# Patient Record
Sex: FEMALE | Race: ASIAN | NOT HISPANIC OR LATINO | URBAN - METROPOLITAN AREA
[De-identification: names, ages, dates, MRNs, and addresses within clinical notes are randomized per-mention and may not be internally consistent; named-entity substitution may affect disease eponyms.]

---

## 2019-10-30 ENCOUNTER — INPATIENT (INPATIENT)
Facility: HOSPITAL | Age: 84
LOS: 6 days | Discharge: NURSING, REHAB OR LONG-TERM CARE | End: 2019-11-06
Attending: INTERNAL MEDICINE | Admitting: INTERNAL MEDICINE
Payer: MEDICARE

## 2019-10-30 VITALS
HEART RATE: 100 BPM | OXYGEN SATURATION: 93 % | RESPIRATION RATE: 24 BRPM | DIASTOLIC BLOOD PRESSURE: 69 MMHG | SYSTOLIC BLOOD PRESSURE: 156 MMHG | TEMPERATURE: 98 F

## 2019-10-30 DIAGNOSIS — J96.01 ACUTE RESPIRATORY FAILURE WITH HYPOXIA: ICD-10-CM

## 2019-10-30 LAB
ALBUMIN SERPL ELPH-MCNC: 3.9 G/DL — SIGNIFICANT CHANGE UP (ref 3.3–5)
ALP SERPL-CCNC: 64 U/L — SIGNIFICANT CHANGE UP (ref 40–120)
ALT FLD-CCNC: 23 U/L — SIGNIFICANT CHANGE UP (ref 4–33)
ANION GAP SERPL CALC-SCNC: 12 MMO/L — SIGNIFICANT CHANGE UP (ref 7–14)
APTT BLD: 30.7 SEC — SIGNIFICANT CHANGE UP (ref 27.5–36.3)
AST SERPL-CCNC: 40 U/L — HIGH (ref 4–32)
B PERT DNA SPEC QL NAA+PROBE: NOT DETECTED — SIGNIFICANT CHANGE UP
BASE EXCESS BLDA CALC-SCNC: 3 MMOL/L — SIGNIFICANT CHANGE UP
BASE EXCESS BLDA CALC-SCNC: 6.9 MMOL/L — SIGNIFICANT CHANGE UP
BASE EXCESS BLDV CALC-SCNC: 3.5 MMOL/L — SIGNIFICANT CHANGE UP
BASE EXCESS BLDV CALC-SCNC: 6.1 MMOL/L — SIGNIFICANT CHANGE UP
BASE EXCESS BLDV CALC-SCNC: 7.1 MMOL/L — SIGNIFICANT CHANGE UP
BILIRUB SERPL-MCNC: 0.3 MG/DL — SIGNIFICANT CHANGE UP (ref 0.2–1.2)
BLOOD GAS ARTERIAL - FIO2: 40 — SIGNIFICANT CHANGE UP
BLOOD GAS VENOUS - CREATININE: 0.96 MG/DL — SIGNIFICANT CHANGE UP (ref 0.5–1.3)
BLOOD GAS VENOUS - CREATININE: 0.98 MG/DL — SIGNIFICANT CHANGE UP (ref 0.5–1.3)
BLOOD GAS VENOUS - CREATININE: 1.06 MG/DL — SIGNIFICANT CHANGE UP (ref 0.5–1.3)
BLOOD GAS VENOUS - FIO2: 21 — SIGNIFICANT CHANGE UP
BUN SERPL-MCNC: 18 MG/DL — SIGNIFICANT CHANGE UP (ref 7–23)
C PNEUM DNA SPEC QL NAA+PROBE: NOT DETECTED — SIGNIFICANT CHANGE UP
CALCIUM SERPL-MCNC: 9.2 MG/DL — SIGNIFICANT CHANGE UP (ref 8.4–10.5)
CHLORIDE BLDA-SCNC: 97 MMOL/L — SIGNIFICANT CHANGE UP (ref 96–108)
CHLORIDE BLDV-SCNC: 100 MMOL/L — SIGNIFICANT CHANGE UP (ref 96–108)
CHLORIDE BLDV-SCNC: 98 MMOL/L — SIGNIFICANT CHANGE UP (ref 96–108)
CHLORIDE BLDV-SCNC: 98 MMOL/L — SIGNIFICANT CHANGE UP (ref 96–108)
CHLORIDE SERPL-SCNC: 96 MMOL/L — LOW (ref 98–107)
CO2 SERPL-SCNC: 29 MMOL/L — SIGNIFICANT CHANGE UP (ref 22–31)
CREAT SERPL-MCNC: 0.99 MG/DL — SIGNIFICANT CHANGE UP (ref 0.5–1.3)
FLUAV H1 2009 PAND RNA SPEC QL NAA+PROBE: NOT DETECTED — SIGNIFICANT CHANGE UP
FLUAV H1 RNA SPEC QL NAA+PROBE: NOT DETECTED — SIGNIFICANT CHANGE UP
FLUAV H3 RNA SPEC QL NAA+PROBE: NOT DETECTED — SIGNIFICANT CHANGE UP
FLUAV SUBTYP SPEC NAA+PROBE: NOT DETECTED — SIGNIFICANT CHANGE UP
FLUBV RNA SPEC QL NAA+PROBE: NOT DETECTED — SIGNIFICANT CHANGE UP
GAS PNL BLDV: 132 MMOL/L — LOW (ref 136–146)
GAS PNL BLDV: 133 MMOL/L — LOW (ref 136–146)
GAS PNL BLDV: 136 MMOL/L — SIGNIFICANT CHANGE UP (ref 136–146)
GLUCOSE BLDA-MCNC: 198 MG/DL — HIGH (ref 70–99)
GLUCOSE BLDC GLUCOMTR-MCNC: 178 MG/DL — HIGH (ref 70–99)
GLUCOSE BLDV-MCNC: 151 MG/DL — HIGH (ref 70–99)
GLUCOSE BLDV-MCNC: 178 MG/DL — HIGH (ref 70–99)
GLUCOSE BLDV-MCNC: 244 MG/DL — HIGH (ref 70–99)
GLUCOSE SERPL-MCNC: 153 MG/DL — HIGH (ref 70–99)
HADV DNA SPEC QL NAA+PROBE: NOT DETECTED — SIGNIFICANT CHANGE UP
HCO3 BLDA-SCNC: 25 MMOL/L — SIGNIFICANT CHANGE UP (ref 22–26)
HCO3 BLDA-SCNC: 29 MMOL/L — HIGH (ref 22–26)
HCO3 BLDV-SCNC: 25 MMOL/L — SIGNIFICANT CHANGE UP (ref 20–27)
HCO3 BLDV-SCNC: 27 MMOL/L — SIGNIFICANT CHANGE UP (ref 20–27)
HCO3 BLDV-SCNC: 28 MMOL/L — HIGH (ref 20–27)
HCOV PNL SPEC NAA+PROBE: SIGNIFICANT CHANGE UP
HCT VFR BLD CALC: 44.1 % — SIGNIFICANT CHANGE UP (ref 34.5–45)
HCT VFR BLDA CALC: 36.9 % — SIGNIFICANT CHANGE UP (ref 34.5–46.5)
HCT VFR BLDV CALC: 38.6 % — SIGNIFICANT CHANGE UP (ref 34.5–45)
HCT VFR BLDV CALC: 38.6 % — SIGNIFICANT CHANGE UP (ref 34.5–45)
HCT VFR BLDV CALC: 41.2 % — SIGNIFICANT CHANGE UP (ref 34.5–45)
HGB BLD-MCNC: 12.4 G/DL — SIGNIFICANT CHANGE UP (ref 11.5–15.5)
HGB BLDA-MCNC: 12 G/DL — SIGNIFICANT CHANGE UP (ref 11.5–15.5)
HGB BLDV-MCNC: 12.5 G/DL — SIGNIFICANT CHANGE UP (ref 11.5–15.5)
HGB BLDV-MCNC: 12.6 G/DL — SIGNIFICANT CHANGE UP (ref 11.5–15.5)
HGB BLDV-MCNC: 13.4 G/DL — SIGNIFICANT CHANGE UP (ref 11.5–15.5)
HMPV RNA SPEC QL NAA+PROBE: NOT DETECTED — SIGNIFICANT CHANGE UP
HPIV1 RNA SPEC QL NAA+PROBE: NOT DETECTED — SIGNIFICANT CHANGE UP
HPIV2 RNA SPEC QL NAA+PROBE: NOT DETECTED — SIGNIFICANT CHANGE UP
HPIV3 RNA SPEC QL NAA+PROBE: NOT DETECTED — SIGNIFICANT CHANGE UP
HPIV4 RNA SPEC QL NAA+PROBE: NOT DETECTED — SIGNIFICANT CHANGE UP
INR BLD: 0.97 — SIGNIFICANT CHANGE UP (ref 0.88–1.17)
LACTATE BLDA-SCNC: 1.1 MMOL/L — SIGNIFICANT CHANGE UP (ref 0.5–2)
LACTATE BLDV-MCNC: 1.6 MMOL/L — SIGNIFICANT CHANGE UP (ref 0.5–2)
LACTATE BLDV-MCNC: 1.9 MMOL/L — SIGNIFICANT CHANGE UP (ref 0.5–2)
LACTATE BLDV-MCNC: 3.5 MMOL/L — HIGH (ref 0.5–2)
MAGNESIUM SERPL-MCNC: 2.2 MG/DL — SIGNIFICANT CHANGE UP (ref 1.6–2.6)
MCHC RBC-ENTMCNC: 24.8 PG — LOW (ref 27–34)
MCHC RBC-ENTMCNC: 28.1 % — LOW (ref 32–36)
MCV RBC AUTO: 88.2 FL — SIGNIFICANT CHANGE UP (ref 80–100)
NRBC # FLD: 0 K/UL — SIGNIFICANT CHANGE UP (ref 0–0)
NT-PROBNP SERPL-SCNC: 653.2 PG/ML — SIGNIFICANT CHANGE UP
PCO2 BLDA: 66 MMHG — HIGH (ref 32–48)
PCO2 BLDA: 78 MMHG — CRITICAL HIGH (ref 32–48)
PCO2 BLDV: 84 MMHG — CRITICAL HIGH (ref 41–51)
PCO2 BLDV: 86 MMHG — CRITICAL HIGH (ref 41–51)
PCO2 BLDV: 88 MMHG — CRITICAL HIGH (ref 41–51)
PH BLDA: 7.21 PH — LOW (ref 7.35–7.45)
PH BLDA: 7.32 PH — LOW (ref 7.35–7.45)
PH BLDV: 7.18 PH — CRITICAL LOW (ref 7.32–7.43)
PH BLDV: 7.22 PH — LOW (ref 7.32–7.43)
PH BLDV: 7.24 PH — LOW (ref 7.32–7.43)
PLATELET # BLD AUTO: 253 K/UL — SIGNIFICANT CHANGE UP (ref 150–400)
PMV BLD: 10.2 FL — SIGNIFICANT CHANGE UP (ref 7–13)
PO2 BLDA: 147 MMHG — HIGH (ref 83–108)
PO2 BLDA: 95 MMHG — SIGNIFICANT CHANGE UP (ref 83–108)
PO2 BLDV: 44 MMHG — HIGH (ref 35–40)
PO2 BLDV: 53 MMHG — HIGH (ref 35–40)
PO2 BLDV: 57 MMHG — HIGH (ref 35–40)
POTASSIUM BLDA-SCNC: 5.3 MMOL/L — HIGH (ref 3.4–4.5)
POTASSIUM BLDV-SCNC: 4.6 MMOL/L — HIGH (ref 3.4–4.5)
POTASSIUM BLDV-SCNC: 5.4 MMOL/L — HIGH (ref 3.4–4.5)
POTASSIUM BLDV-SCNC: 6.9 MMOL/L — CRITICAL HIGH (ref 3.4–4.5)
POTASSIUM SERPL-MCNC: 4.8 MMOL/L — SIGNIFICANT CHANGE UP (ref 3.5–5.3)
POTASSIUM SERPL-MCNC: 5.6 MMOL/L — HIGH (ref 3.5–5.3)
POTASSIUM SERPL-SCNC: 4.8 MMOL/L — SIGNIFICANT CHANGE UP (ref 3.5–5.3)
POTASSIUM SERPL-SCNC: 5.6 MMOL/L — HIGH (ref 3.5–5.3)
PROT SERPL-MCNC: 8.6 G/DL — HIGH (ref 6–8.3)
PROTHROM AB SERPL-ACNC: 11.1 SEC — SIGNIFICANT CHANGE UP (ref 9.8–13.1)
RBC # BLD: 5 M/UL — SIGNIFICANT CHANGE UP (ref 3.8–5.2)
RBC # FLD: 15.8 % — HIGH (ref 10.3–14.5)
RSV RNA SPEC QL NAA+PROBE: NOT DETECTED — SIGNIFICANT CHANGE UP
RV+EV RNA SPEC QL NAA+PROBE: NOT DETECTED — SIGNIFICANT CHANGE UP
SAO2 % BLDA: 96.6 % — SIGNIFICANT CHANGE UP (ref 95–99)
SAO2 % BLDA: 99.2 % — HIGH (ref 95–99)
SAO2 % BLDV: 71.5 % — SIGNIFICANT CHANGE UP (ref 60–85)
SAO2 % BLDV: 80.7 % — SIGNIFICANT CHANGE UP (ref 60–85)
SAO2 % BLDV: 83.7 % — SIGNIFICANT CHANGE UP (ref 60–85)
SODIUM BLDA-SCNC: 136 MMOL/L — SIGNIFICANT CHANGE UP (ref 136–146)
SODIUM SERPL-SCNC: 137 MMOL/L — SIGNIFICANT CHANGE UP (ref 135–145)
TROPONIN T, HIGH SENSITIVITY: 22 NG/L — SIGNIFICANT CHANGE UP (ref ?–14)
WBC # BLD: 12.29 K/UL — HIGH (ref 3.8–10.5)
WBC # FLD AUTO: 12.29 K/UL — HIGH (ref 3.8–10.5)

## 2019-10-30 PROCEDURE — 71045 X-RAY EXAM CHEST 1 VIEW: CPT | Mod: 26

## 2019-10-30 PROCEDURE — 99291 CRITICAL CARE FIRST HOUR: CPT

## 2019-10-30 RX ORDER — HEPARIN SODIUM 5000 [USP'U]/ML
5000 INJECTION INTRAVENOUS; SUBCUTANEOUS EVERY 8 HOURS
Refills: 0 | Status: DISCONTINUED | OUTPATIENT
Start: 2019-10-30 | End: 2019-11-06

## 2019-10-30 RX ORDER — DEXTROSE 50 % IN WATER 50 %
15 SYRINGE (ML) INTRAVENOUS ONCE
Refills: 0 | Status: DISCONTINUED | OUTPATIENT
Start: 2019-10-30 | End: 2019-11-06

## 2019-10-30 RX ORDER — SODIUM CHLORIDE 9 MG/ML
1000 INJECTION, SOLUTION INTRAVENOUS
Refills: 0 | Status: DISCONTINUED | OUTPATIENT
Start: 2019-10-30 | End: 2019-10-30

## 2019-10-30 RX ORDER — IPRATROPIUM/ALBUTEROL SULFATE 18-103MCG
3 AEROSOL WITH ADAPTER (GRAM) INHALATION ONCE
Refills: 0 | Status: COMPLETED | OUTPATIENT
Start: 2019-10-30 | End: 2019-10-30

## 2019-10-30 RX ORDER — IPRATROPIUM/ALBUTEROL SULFATE 18-103MCG
3 AEROSOL WITH ADAPTER (GRAM) INHALATION ONCE
Refills: 0 | Status: DISCONTINUED | OUTPATIENT
Start: 2019-10-30 | End: 2019-10-30

## 2019-10-30 RX ORDER — CHLORHEXIDINE GLUCONATE 213 G/1000ML
1 SOLUTION TOPICAL
Refills: 0 | Status: DISCONTINUED | OUTPATIENT
Start: 2019-10-30 | End: 2019-10-31

## 2019-10-30 RX ORDER — DEXTROSE 50 % IN WATER 50 %
15 SYRINGE (ML) INTRAVENOUS ONCE
Refills: 0 | Status: DISCONTINUED | OUTPATIENT
Start: 2019-10-30 | End: 2019-10-30

## 2019-10-30 RX ORDER — DEXTROSE 50 % IN WATER 50 %
25 SYRINGE (ML) INTRAVENOUS ONCE
Refills: 0 | Status: DISCONTINUED | OUTPATIENT
Start: 2019-10-30 | End: 2019-11-06

## 2019-10-30 RX ORDER — FUROSEMIDE 40 MG
20 TABLET ORAL DAILY
Refills: 0 | Status: DISCONTINUED | OUTPATIENT
Start: 2019-10-31 | End: 2019-10-31

## 2019-10-30 RX ORDER — INSULIN LISPRO 100/ML
VIAL (ML) SUBCUTANEOUS AT BEDTIME
Refills: 0 | Status: DISCONTINUED | OUTPATIENT
Start: 2019-10-30 | End: 2019-10-30

## 2019-10-30 RX ORDER — INSULIN LISPRO 100/ML
VIAL (ML) SUBCUTANEOUS EVERY 6 HOURS
Refills: 0 | Status: DISCONTINUED | OUTPATIENT
Start: 2019-10-30 | End: 2019-10-31

## 2019-10-30 RX ORDER — INSULIN LISPRO 100/ML
VIAL (ML) SUBCUTANEOUS
Refills: 0 | Status: DISCONTINUED | OUTPATIENT
Start: 2019-10-30 | End: 2019-10-30

## 2019-10-30 RX ORDER — ATORVASTATIN CALCIUM 80 MG/1
40 TABLET, FILM COATED ORAL AT BEDTIME
Refills: 0 | Status: DISCONTINUED | OUTPATIENT
Start: 2019-10-30 | End: 2019-11-06

## 2019-10-30 RX ORDER — DEXTROSE 50 % IN WATER 50 %
25 SYRINGE (ML) INTRAVENOUS ONCE
Refills: 0 | Status: DISCONTINUED | OUTPATIENT
Start: 2019-10-30 | End: 2019-10-30

## 2019-10-30 RX ORDER — FUROSEMIDE 40 MG
20 TABLET ORAL ONCE
Refills: 0 | Status: COMPLETED | OUTPATIENT
Start: 2019-10-30 | End: 2019-10-30

## 2019-10-30 RX ORDER — SODIUM CHLORIDE 9 MG/ML
1000 INJECTION, SOLUTION INTRAVENOUS
Refills: 0 | Status: DISCONTINUED | OUTPATIENT
Start: 2019-10-30 | End: 2019-11-06

## 2019-10-30 RX ORDER — GLUCAGON INJECTION, SOLUTION 0.5 MG/.1ML
1 INJECTION, SOLUTION SUBCUTANEOUS ONCE
Refills: 0 | Status: DISCONTINUED | OUTPATIENT
Start: 2019-10-30 | End: 2019-10-30

## 2019-10-30 RX ORDER — DEXTROSE 50 % IN WATER 50 %
12.5 SYRINGE (ML) INTRAVENOUS ONCE
Refills: 0 | Status: DISCONTINUED | OUTPATIENT
Start: 2019-10-30 | End: 2019-10-30

## 2019-10-30 RX ORDER — GLUCAGON INJECTION, SOLUTION 0.5 MG/.1ML
1 INJECTION, SOLUTION SUBCUTANEOUS ONCE
Refills: 0 | Status: DISCONTINUED | OUTPATIENT
Start: 2019-10-30 | End: 2019-11-06

## 2019-10-30 RX ORDER — IPRATROPIUM/ALBUTEROL SULFATE 18-103MCG
3 AEROSOL WITH ADAPTER (GRAM) INHALATION EVERY 6 HOURS
Refills: 0 | Status: DISCONTINUED | OUTPATIENT
Start: 2019-10-30 | End: 2019-11-06

## 2019-10-30 RX ORDER — DEXTROSE 50 % IN WATER 50 %
12.5 SYRINGE (ML) INTRAVENOUS ONCE
Refills: 0 | Status: DISCONTINUED | OUTPATIENT
Start: 2019-10-30 | End: 2019-11-06

## 2019-10-30 RX ADMIN — Medication 125 MILLIGRAM(S): at 06:30

## 2019-10-30 RX ADMIN — ATORVASTATIN CALCIUM 40 MILLIGRAM(S): 80 TABLET, FILM COATED ORAL at 21:41

## 2019-10-30 RX ADMIN — Medication 20 MILLIGRAM(S): at 07:29

## 2019-10-30 RX ADMIN — Medication 3 MILLILITER(S): at 22:31

## 2019-10-30 RX ADMIN — Medication 3 MILLILITER(S): at 06:30

## 2019-10-30 RX ADMIN — Medication 1: at 23:15

## 2019-10-30 RX ADMIN — HEPARIN SODIUM 5000 UNIT(S): 5000 INJECTION INTRAVENOUS; SUBCUTANEOUS at 21:41

## 2019-10-30 NOTE — ED PROVIDER NOTE - PHYSICAL EXAMINATION
Gen: NAD, AOx3, able to make needs known, non-toxic  Head: NCAT  HEENT: EOMI, oral mucosa moist, normal conjunctiva  Lungs: diminished air entry throughout all lung fields, no obvious respiratory distress, speaking in full sentences  CV: Tachycardic, no murmurs  Abd: soft, NTND, no guarding  MSK: no visible deformities  Neuro: No focal sensory or motor deficits  Skin: Warm, well perfused  Psych: normal affect

## 2019-10-30 NOTE — H&P ADULT - ASSESSMENT
83 y/o F HTN, chronic asthma, copd/emphysema on home O2 (2L), SHAD on CPAP, type 2 DM, HLD, GERD presenting w/ shortness of breath and increased work of breathing concerning for COPD exacerbation vs. acute CHF 85 y/o F HTN, chronic asthma, copd/emphysema on home O2 (2L), SHAD on CPAP, type 2 DM, HLD, GERD presenting w/ shortness of breath and increased work of breathing concerning for COPD exacerbation vs. acute CHF    #Neuro:  -earlier in the day was AAOx3 but currently needs sternal rub to awaken her in the setting of hypercapia; currently on bipap  -continue to monitor mental status every 4 hours  -if mental status does not improve, may need to be intubated     #Respiratory:  -Pt with chronic asthma and copd on home O2 and SHAD on CPAP;   -currently on bipap with worsening pCO2 on blood gas  -continue to monitor VBG q 4hrs; if hypercapnea and/or mental status does not improve on current settings; many need to be intubated; monitor closely  -duonebs tx q 6hrs   -steroids   -CXR shows pulmonary edema; s/p IV lasix 20mg ; c/w IV lasix 20mg and monitor urine output and strict I/O    #Cardiovascular:  -HTN: holding home meds in the setting of worsening mental status and may need to be intubated  -respiratory distress may be a component of heart failure exacerbation as BNP mildly elevated at 600s and CXR showing pulmonary edema  -s/p IV lasix 20mg ; c/w IV lasix 20mg daily  -strict I/O; holman  -TTE    #GI  -currently NPO with bipap and possible intubation    #Renal  -Cr 0.99  -continue to monitor    #Endo  -pt has type 2DM; ISS and monitor FS    #Heme  -anemia ; stable;   -continue to monitor    #ID:  -RVP negative  -f/u blood cx  -monitor off of antibiotics     DVT ppx: heparin subq  Code Full

## 2019-10-30 NOTE — H&P ADULT - NSICDXPASTMEDICALHX_GEN_ALL_CORE_FT
PAST MEDICAL HISTORY:  Asthma     DM (diabetes mellitus)     GERD (gastroesophageal reflux disease)     HLD (hyperlipidemia)     HTN (hypertension)

## 2019-10-30 NOTE — ED ADULT NURSE NOTE - OBJECTIVE STATEMENT
Patient is awake, A&O x 3, appears uncomfortable, respirations equal but labored and unable to speak in full sentences.  Presents to ED for worsening of SOB since last night.  She used neb tx and Albuterol at home with some improvement.  Patient uses continuous oxygen at home.  Complaining of chest tightness.  20g IV left AC, labs drawn and sent, vitals taken and connected to cardiac monitor @ sinus rhythm.  Family at bedside.

## 2019-10-30 NOTE — CONSULT NOTE ADULT - SUBJECTIVE AND OBJECTIVE BOX
CHIEF COMPLAINT: SOB and increased WOB     HPI:    83 y/o F HTN, chronic asthma, copd/emphysema on home O2 (2L), SHAD on CPAP, type 2 DM, HLD, GERD presenting w/ shortness of breath. Per daughter at bedside, pt had increased SOB yesterday and Increased her oxygen to 3L (baseline 2L) for increased work of breathing. When pt placed her CPAP overnight, there was mild improvement but when pt woke up this morning, continued to have SOB and brought her into the ED. Pt also tried duonebs without improvement. Pt Has chronic cough, no change recently in that but does endorse increased sputum production. Also endorses BERG but at baseline.  Pt can walk 1 block at baseline. Denies known hx of CHF but per daughter was prescribed lasix 40mg daily and had not been taking it for the past few days.  No known recent sick contacts. Endorses weakness but Denies fevers, chills, headache, dizziness, chest pain, abdominal pain, n/v/d/c, urinary symptoms, MSK pain.    In the ED, pt had increased WOB and placed on non-rebreather. T: 98.1, HR , /42 and 93% on nonrebreather.  Labs significant for pH 7.2 and pCO2 of 86.  Patient was placed on bipap 12/6 FiO2 of 40%.  Pt received IV lasix 20mg, solumedrol 125mg , and duonebs x 3.     MICU consulted for new bipap.     PAST MEDICAL & SURGICAL HISTORY:  GERD (gastroesophageal reflux disease)  DM (diabetes mellitus)  Asthma  HLD (hyperlipidemia)  HTN (hypertension)      SOCIAL HISTORY:  Never smoker, no EtOH use, no drug use.     Allergies    No Known Allergies    Intolerances    REVIEW OF SYSTEMS:    CONSTITUTIONAL: +weakness, no fevers or chills  EYES/ENT: No visual changes;    NECK: No pain or stiffness  RESPIRATORY: +cough, no wheezing, hemoptysis; +SOB   CARDIOVASCULAR: No chest pain or palpitations  GASTROINTESTINAL: No abdominal or epigastric pain. No nausea, vomiting, or hematemesis; No diarrhea or constipation. No melena or hematochezia.  GENITOURINARY: No dysuria, frequency or hematuria  NEUROLOGICAL: No numbness or weakness  SKIN: No itching, rashes      OBJECTIVE:  ICU Vital Signs Last 24 Hrs  T(C): 36.7 (30 Oct 2019 05:13), Max: 36.7 (30 Oct 2019 05:13)  T(F): 98.1 (30 Oct 2019 05:13), Max: 98.1 (30 Oct 2019 05:13)  HR: 98 (30 Oct 2019 10:00) (98 - 105)  BP: 131/42 (30 Oct 2019 09:40) (131/42 - 156/69)  BP(mean): --  ABP: --  ABP(mean): --  RR: 21 (30 Oct 2019 09:40) (12 - 24)  SpO2: 97% (30 Oct 2019 10:00) (93% - 100%)        CAPILLARY BLOOD GLUCOSE    PHYSICAL EXAM:  GENERAL: NAD, well-developed, obese pt on bipap   HEAD:  Atraumatic, Normocephalic  EYES: EOMI, PERRLA, conjunctiva and sclera clear  NECK: Supple,   CHEST/LUNG: decreased lung sounds bilaterally;   HEART: Regular rate and rhythm; decreased heart sounds due to body habitus   ABDOMEN: Soft, Nontender, Nondistended; Bowel sounds present  EXTREMITIES:  2+ Peripheral Pulses, No clubbing, cyanosis, or edema  PSYCH: AAOx3  NEUROLOGY: non-focal  SKIN: No rashes or lesions      LABS:                        12.4   12.29 )-----------( 253      ( 30 Oct 2019 06:31 )             44.1     10-30    x   |  x   |  x   ----------------------------<  x   4.8   |  x   |  x     Ca    9.2      30 Oct 2019 06:31  Mg     2.2     10-30    TPro  8.6<H>  /  Alb  3.9  /  TBili  0.3  /  DBili  x   /  AST  40<H>  /  ALT  23  /  AlkPhos  64  10-30    PT/INR - ( 30 Oct 2019 06:31 )   PT: 11.1 SEC;   INR: 0.97          PTT - ( 30 Oct 2019 06:31 )  PTT:30.7 SEC      Venous Blood Gas:  10-30 @ 08:04  7.22/86/53/27/80.7  VBG Lactate: 1.9  Venous Blood Gas:  10-30 @ 06:31  7.24/84/44/28/71.5  VBG Lactate: 1.6      < from: Xray Chest 1 View- PORTABLE-Urgent (10.30.19 @ 05:53) >    Perihilar haze and prominent hilar assistant with CHF. Heart is difficult   to evaluate on this projection but is probably enlarged. No focal   consolidations. No definite effusions.      COMPARISON:  None available      IMPRESSION:  CHF.    < end of copied text >      EKG: no ST elevations or TWI

## 2019-10-30 NOTE — ED ADULT NURSE NOTE - INTERVENTIONS DEFINITIONS
[place] to call system/Instruct patient to call for assistance/Stretcher in lowest position, wheels locked, appropriate side rails in place

## 2019-10-30 NOTE — ED PROVIDER NOTE - CLINICAL SUMMARY MEDICAL DECISION MAKING FREE TEXT BOX
85 y/o F w/ PMH of HTN, asthma, copd/emphysema on home O2, DM, HLD, GERD presenting w/ shortness of breath. Concern for asthma vs. COPD exacerbation. No known hx but will eval for CHF. Labs, CXR, nebs, steroids, EKG, bnp, trop. Reassess.

## 2019-10-30 NOTE — ED ADULT TRIAGE NOTE - CHIEF COMPLAINT QUOTE
Pt comes in for c/o SOB, reports that she use O2 at home, and appears in distress in triage, vs as noted and pt placed on non-rebreather in triage.

## 2019-10-30 NOTE — H&P ADULT - ATTENDING COMMENTS
Acute on chronic hypoxemic and hypercapnic respiratory failure from COPD exacerbation. PCO2 in 78 and arousable only to sternal rub. Pt switch to AVAPS from BiPAP. Repeat abg. Agree with steroids and bronchodilators.       I have examined pt at bedside with resident. I have spent 35 min cc time not including procedures.

## 2019-10-30 NOTE — H&P ADULT - NSHPREVIEWOFSYSTEMS_GEN_ALL_CORE
REVIEW OF SYSTEMS:    CONSTITUTIONAL: +weakness, no fevers or chills  EYES/ENT: No visual changes;  No vertigo or throat pain   NECK: No pain or stiffness  RESPIRATORY: +cough, no wheezing, hemoptysis; +SOB  CARDIOVASCULAR: No chest pain or palpitations  GASTROINTESTINAL: No abdominal or epigastric pain. No nausea, vomiting, or hematemesis; No diarrhea or constipation. No melena or hematochezia.  GENITOURINARY: No dysuria, frequency or hematuria  NEUROLOGICAL: No numbness or weakness  SKIN: No itching, rashes

## 2019-10-30 NOTE — H&P ADULT - HISTORY OF PRESENT ILLNESS
83 y/o F HTN, chronic asthma, copd/emphysema on home O2 (2L), SHAD on CPAP, type 2 DM, HLD, GERD presenting w/ shortness of breath. Per daughter at bedside, pt had increased SOB yesterday and Increased her oxygen to 3L (baseline 2L) for increased work of breathing. When pt placed her CPAP overnight, there was mild improvement but when pt woke up this morning, continued to have SOB and brought her into the ED. Pt also tried duonebs without improvement. Pt Has chronic cough, no change recently in that but does endorse increased sputum production. Also endorses BERG but at baseline.  Pt can walk 1 block at baseline. Denies known hx of CHF but per daughter was prescribed lasix 40mg daily and had not been taking it for the past few days.  No known recent sick contacts. Endorses weakness but Denies fevers, chills, headache, dizziness, chest pain, abdominal pain, n/v/d/c, urinary symptoms, MSK pain.    In the ED, pt had increased WOB and placed on non-rebreather. T: 98.1, HR , /42 and 93% on nonrebreather.  Labs significant for pH 7.2 and pCO2 of 86.  Patient was placed on bipap 12/6 FiO2 of 40%.  Pt received IV lasix 20mg, solumedrol 125mg , and duonebs x 3. In the ED, pt's mental status continue to worsen and ABG showed pCO2 of 78. Patient was accepted to MICU for acute hypercapnic respiratory failure.

## 2019-10-30 NOTE — ED PROVIDER NOTE - ATTENDING CONTRIBUTION TO CARE
Afebrile. Awake and Alert. Lungs decreased air movement diffuse. Heart RRR. Abdomen soft NTND. CN II-XII grossly intact. Moves all extremities without lateralization.    h/o asthma tx for exacerbation with steroids and nebs  r/o PNA  r/o bronchitis  r/o CHF

## 2019-10-30 NOTE — H&P ADULT - NSHPLABSRESULTS_GEN_ALL_CORE
LABS:                          12.4   12.29 )-----------( 253      ( 30 Oct 2019 06:31 )             44.1       10-30    x   |  x   |  x   ----------------------------<  x   4.8   |  x   |  x     Ca    9.2      30 Oct 2019 06:31  Mg     2.2     10-30    TPro  8.6<H>  /  Alb  3.9  /  TBili  0.3  /  DBili  x   /  AST  40<H>  /  ALT  23  /  AlkPhos  64  10-30       LIVER FUNCTIONS - ( 30 Oct 2019 06:31 )  Alb: 3.9 g/dL / Pro: 8.6 g/dL / ALK PHOS: 64 u/L / ALT: 23 u/L / AST: 40 u/L / GGT: x                ABG - ( 30 Oct 2019 14:02 )  pH, Arterial: 7.21  pH, Blood: x     /  pCO2: 78    /  pO2: 95    / HCO3: 25    / Base Excess: 3.0   /  SaO2: 96.6                    PT/INR - ( 30 Oct 2019 06:31 )   PT: 11.1 SEC;   INR: 0.97          PTT - ( 30 Oct 2019 06:31 )  PTT:30.7 SEC    Lactate Trend            CAPILLARY BLOOD GLUCOSE                RADIOLOGY & ADDITIONAL TESTS: Reviewed

## 2019-10-30 NOTE — ED PROVIDER NOTE - PMH
Asthma    DM (diabetes mellitus)    GERD (gastroesophageal reflux disease)    HLD (hyperlipidemia)    HTN (hypertension)

## 2019-10-30 NOTE — ED PROVIDER NOTE - OBJECTIVE STATEMENT
83 y/o F w/ PMH of HTN, asthma, copd/emphysema on home O2, DM, HLD, GERD 85 y/o F w/ PMH of HTN, asthma, copd/emphysema on home O2, DM, HLD, GERD presenting w/ shortness of breath. Pt reports over the last 2 days has been having increased work of breath. Described a tight feeling in her chest. Used her albuterol at home which did not help. Increased her oxygen to 3L (baseline 2L) for increased work of breathing. Has chronic cough, no change recently in that. No sputum production. Has been more short of breath while walking. Denies known hx of CHF. No known recent sick contacts. Denies fevers, chills, headache, dizziness, chest pain, abdominal pain, n/v/d/c, urinary symptoms, MSK pain.

## 2019-10-30 NOTE — ED PROVIDER NOTE - CARE PLAN
Principal Discharge DX:	Asthma Principal Discharge DX:	Acute respiratory failure with hypoxia and hypercapnia  Secondary Diagnosis:	Asthma

## 2019-10-30 NOTE — ED ADULT NURSE REASSESSMENT NOTE - NS ED NURSE REASSESS COMMENT FT1
pt brought to the bathroom via wheelchair. appears SOB. received pt on 6L NC. pt currently being placed on BiPap. tolerating well. appears more comfortable. daughter at bedside, will cont to monitor.

## 2019-10-30 NOTE — H&P ADULT - NSHPPHYSICALEXAM_GEN_ALL_CORE
ICU Vital Signs Last 24 Hrs  T(C): 37.2 (30 Oct 2019 16:12), Max: 37.2 (30 Oct 2019 16:12)  T(F): 98.9 (30 Oct 2019 16:12), Max: 98.9 (30 Oct 2019 16:12)  HR: 78 (30 Oct 2019 16:12) (78 - 105)  BP: 126/45 (30 Oct 2019 16:12) (119/53 - 156/69)  RR: 19 (30 Oct 2019 16:12) (12 - 24)  SpO2: 98% (30 Oct 2019 16:12) (93% - 100%)    PHYSICAL EXAM:  GENERAL: NAD, well-developed; on bipap ; obese   HEAD:  Atraumatic, Normocephalic  EYES: EOMI, PERRLA, conjunctiva and sclera clear  NECK: Supple,   CHEST/LUNG: Decreased lung sounds b/l;  HEART: decreased lung sounds b/l   ABDOMEN: Soft, Nontender, Nondistended; Bowel sounds present  EXTREMITIES:  2+ Peripheral Pulses, No clubbing, cyanosis, no edema   PSYCH: AAOx3 but requiring sternal rub to awaken her at a later time   NEUROLOGY: non-focal  SKIN: No rashes or lesions

## 2019-10-30 NOTE — ED ADULT NURSE REASSESSMENT NOTE - NS ED NURSE REASSESS COMMENT FT1
pt increasingly lethargic, responsive to tactile stimuli. pt repositioned and sitting up in bed. pt currently not following commands and remains lethargic. MD Acuña aware of change in pt condition. repeat VBG obtained and sent to lab. daughter remains at bedside, will cont to monitor.

## 2019-10-31 DIAGNOSIS — J44.9 CHRONIC OBSTRUCTIVE PULMONARY DISEASE, UNSPECIFIED: ICD-10-CM

## 2019-10-31 DIAGNOSIS — Z51.5 ENCOUNTER FOR PALLIATIVE CARE: ICD-10-CM

## 2019-10-31 DIAGNOSIS — J45.909 UNSPECIFIED ASTHMA, UNCOMPLICATED: ICD-10-CM

## 2019-10-31 DIAGNOSIS — R53.81 OTHER MALAISE: ICD-10-CM

## 2019-10-31 DIAGNOSIS — R06.02 SHORTNESS OF BREATH: ICD-10-CM

## 2019-10-31 LAB
ALBUMIN SERPL ELPH-MCNC: 3.5 G/DL — SIGNIFICANT CHANGE UP (ref 3.3–5)
ALP SERPL-CCNC: 50 U/L — SIGNIFICANT CHANGE UP (ref 40–120)
ALT FLD-CCNC: 19 U/L — SIGNIFICANT CHANGE UP (ref 4–33)
ANION GAP SERPL CALC-SCNC: 10 MMO/L — SIGNIFICANT CHANGE UP (ref 7–14)
AST SERPL-CCNC: 20 U/L — SIGNIFICANT CHANGE UP (ref 4–32)
BASE EXCESS BLDV CALC-SCNC: 7.1 MMOL/L — SIGNIFICANT CHANGE UP
BASE EXCESS BLDV CALC-SCNC: 7.7 MMOL/L — SIGNIFICANT CHANGE UP
BASOPHILS # BLD AUTO: 0.01 K/UL — SIGNIFICANT CHANGE UP (ref 0–0.2)
BASOPHILS NFR BLD AUTO: 0.1 % — SIGNIFICANT CHANGE UP (ref 0–2)
BILIRUB SERPL-MCNC: 0.3 MG/DL — SIGNIFICANT CHANGE UP (ref 0.2–1.2)
BUN SERPL-MCNC: 28 MG/DL — HIGH (ref 7–23)
CALCIUM SERPL-MCNC: 9.1 MG/DL — SIGNIFICANT CHANGE UP (ref 8.4–10.5)
CHLORIDE SERPL-SCNC: 96 MMOL/L — LOW (ref 98–107)
CO2 SERPL-SCNC: 30 MMOL/L — SIGNIFICANT CHANGE UP (ref 22–31)
CREAT SERPL-MCNC: 1.07 MG/DL — SIGNIFICANT CHANGE UP (ref 0.5–1.3)
EOSINOPHIL # BLD AUTO: 0 K/UL — SIGNIFICANT CHANGE UP (ref 0–0.5)
EOSINOPHIL NFR BLD AUTO: 0 % — SIGNIFICANT CHANGE UP (ref 0–6)
GAS PNL BLDV: 134 MMOL/L — LOW (ref 136–146)
GAS PNL BLDV: 134 MMOL/L — LOW (ref 136–146)
GLUCOSE BLDC GLUCOMTR-MCNC: 124 MG/DL — HIGH (ref 70–99)
GLUCOSE BLDC GLUCOMTR-MCNC: 171 MG/DL — HIGH (ref 70–99)
GLUCOSE BLDC GLUCOMTR-MCNC: 181 MG/DL — HIGH (ref 70–99)
GLUCOSE BLDC GLUCOMTR-MCNC: 181 MG/DL — HIGH (ref 70–99)
GLUCOSE BLDC GLUCOMTR-MCNC: 187 MG/DL — HIGH (ref 70–99)
GLUCOSE BLDC GLUCOMTR-MCNC: 209 MG/DL — HIGH (ref 70–99)
GLUCOSE BLDV-MCNC: 184 MG/DL — HIGH (ref 70–99)
GLUCOSE BLDV-MCNC: 219 MG/DL — HIGH (ref 70–99)
GLUCOSE SERPL-MCNC: 172 MG/DL — HIGH (ref 70–99)
HCO3 BLDV-SCNC: 29 MMOL/L — HIGH (ref 20–27)
HCO3 BLDV-SCNC: 29 MMOL/L — HIGH (ref 20–27)
HCT VFR BLD CALC: 39.6 % — SIGNIFICANT CHANGE UP (ref 34.5–45)
HCT VFR BLDV CALC: 35.9 % — SIGNIFICANT CHANGE UP (ref 34.5–45)
HCT VFR BLDV CALC: 37.5 % — SIGNIFICANT CHANGE UP (ref 34.5–45)
HGB BLD-MCNC: 11.7 G/DL — SIGNIFICANT CHANGE UP (ref 11.5–15.5)
HGB BLDV-MCNC: 11.7 G/DL — SIGNIFICANT CHANGE UP (ref 11.5–15.5)
HGB BLDV-MCNC: 12.2 G/DL — SIGNIFICANT CHANGE UP (ref 11.5–15.5)
IMM GRANULOCYTES NFR BLD AUTO: 0.7 % — SIGNIFICANT CHANGE UP (ref 0–1.5)
LYMPHOCYTES # BLD AUTO: 0.82 K/UL — LOW (ref 1–3.3)
LYMPHOCYTES # BLD AUTO: 6.1 % — LOW (ref 13–44)
MAGNESIUM SERPL-MCNC: 2.1 MG/DL — SIGNIFICANT CHANGE UP (ref 1.6–2.6)
MCHC RBC-ENTMCNC: 25.6 PG — LOW (ref 27–34)
MCHC RBC-ENTMCNC: 29.5 % — LOW (ref 32–36)
MCV RBC AUTO: 86.7 FL — SIGNIFICANT CHANGE UP (ref 80–100)
MONOCYTES # BLD AUTO: 0.27 K/UL — SIGNIFICANT CHANGE UP (ref 0–0.9)
MONOCYTES NFR BLD AUTO: 2 % — SIGNIFICANT CHANGE UP (ref 2–14)
NEUTROPHILS # BLD AUTO: 12.19 K/UL — HIGH (ref 1.8–7.4)
NEUTROPHILS NFR BLD AUTO: 91.1 % — HIGH (ref 43–77)
NRBC # FLD: 0 K/UL — SIGNIFICANT CHANGE UP (ref 0–0)
PCO2 BLDV: 72 MMHG — HIGH (ref 41–51)
PCO2 BLDV: 77 MMHG — HIGH (ref 41–51)
PH BLDV: 7.27 PH — LOW (ref 7.32–7.43)
PH BLDV: 7.29 PH — LOW (ref 7.32–7.43)
PHOSPHATE SERPL-MCNC: 3.1 MG/DL — SIGNIFICANT CHANGE UP (ref 2.5–4.5)
PLATELET # BLD AUTO: 226 K/UL — SIGNIFICANT CHANGE UP (ref 150–400)
PMV BLD: 9.8 FL — SIGNIFICANT CHANGE UP (ref 7–13)
PO2 BLDV: 42 MMHG — HIGH (ref 35–40)
PO2 BLDV: 76 MMHG — HIGH (ref 35–40)
POTASSIUM BLDV-SCNC: 5.3 MMOL/L — HIGH (ref 3.4–4.5)
POTASSIUM BLDV-SCNC: 5.3 MMOL/L — HIGH (ref 3.4–4.5)
POTASSIUM SERPL-MCNC: 5.5 MMOL/L — HIGH (ref 3.5–5.3)
POTASSIUM SERPL-MCNC: 5.5 MMOL/L — HIGH (ref 3.5–5.3)
POTASSIUM SERPL-SCNC: 5.5 MMOL/L — HIGH (ref 3.5–5.3)
POTASSIUM SERPL-SCNC: 5.5 MMOL/L — HIGH (ref 3.5–5.3)
PROT SERPL-MCNC: 8 G/DL — SIGNIFICANT CHANGE UP (ref 6–8.3)
RBC # BLD: 4.57 M/UL — SIGNIFICANT CHANGE UP (ref 3.8–5.2)
RBC # FLD: 16 % — HIGH (ref 10.3–14.5)
SAO2 % BLDV: 71.8 % — SIGNIFICANT CHANGE UP (ref 60–85)
SAO2 % BLDV: 93.4 % — HIGH (ref 60–85)
SODIUM SERPL-SCNC: 136 MMOL/L — SIGNIFICANT CHANGE UP (ref 135–145)
WBC # BLD: 13.38 K/UL — HIGH (ref 3.8–10.5)
WBC # FLD AUTO: 13.38 K/UL — HIGH (ref 3.8–10.5)

## 2019-10-31 PROCEDURE — 99223 1ST HOSP IP/OBS HIGH 75: CPT | Mod: GC

## 2019-10-31 PROCEDURE — 93306 TTE W/DOPPLER COMPLETE: CPT | Mod: 26

## 2019-10-31 RX ORDER — ALBUTEROL 90 UG/1
5 AEROSOL, METERED ORAL ONCE
Refills: 0 | Status: COMPLETED | OUTPATIENT
Start: 2019-10-31 | End: 2019-10-31

## 2019-10-31 RX ORDER — AMLODIPINE BESYLATE 2.5 MG/1
10 TABLET ORAL DAILY
Refills: 0 | Status: DISCONTINUED | OUTPATIENT
Start: 2019-10-31 | End: 2019-11-06

## 2019-10-31 RX ORDER — METOPROLOL TARTRATE 50 MG
100 TABLET ORAL DAILY
Refills: 0 | Status: DISCONTINUED | OUTPATIENT
Start: 2019-10-31 | End: 2019-11-06

## 2019-10-31 RX ORDER — INSULIN LISPRO 100/ML
VIAL (ML) SUBCUTANEOUS AT BEDTIME
Refills: 0 | Status: DISCONTINUED | OUTPATIENT
Start: 2019-10-31 | End: 2019-11-06

## 2019-10-31 RX ORDER — OXYCODONE AND ACETAMINOPHEN 5; 325 MG/1; MG/1
1 TABLET ORAL EVERY 6 HOURS
Refills: 0 | Status: DISCONTINUED | OUTPATIENT
Start: 2019-10-31 | End: 2019-11-01

## 2019-10-31 RX ORDER — INSULIN HUMAN 100 [IU]/ML
5 INJECTION, SOLUTION SUBCUTANEOUS ONCE
Refills: 0 | Status: COMPLETED | OUTPATIENT
Start: 2019-10-31 | End: 2019-10-31

## 2019-10-31 RX ORDER — DEXTROSE 50 % IN WATER 50 %
25 SYRINGE (ML) INTRAVENOUS ONCE
Refills: 0 | Status: COMPLETED | OUTPATIENT
Start: 2019-10-31 | End: 2019-10-31

## 2019-10-31 RX ORDER — INSULIN LISPRO 100/ML
VIAL (ML) SUBCUTANEOUS
Refills: 0 | Status: DISCONTINUED | OUTPATIENT
Start: 2019-10-31 | End: 2019-11-06

## 2019-10-31 RX ADMIN — Medication 3 MILLILITER(S): at 15:45

## 2019-10-31 RX ADMIN — Medication 1: at 12:57

## 2019-10-31 RX ADMIN — Medication 100 MILLIGRAM(S): at 18:55

## 2019-10-31 RX ADMIN — Medication 3 MILLILITER(S): at 04:19

## 2019-10-31 RX ADMIN — INSULIN HUMAN 5 UNIT(S): 100 INJECTION, SOLUTION SUBCUTANEOUS at 05:25

## 2019-10-31 RX ADMIN — Medication 25 MILLILITER(S): at 05:25

## 2019-10-31 RX ADMIN — ATORVASTATIN CALCIUM 40 MILLIGRAM(S): 80 TABLET, FILM COATED ORAL at 21:09

## 2019-10-31 RX ADMIN — HEPARIN SODIUM 5000 UNIT(S): 5000 INJECTION INTRAVENOUS; SUBCUTANEOUS at 21:09

## 2019-10-31 RX ADMIN — ALBUTEROL 5 MILLIGRAM(S): 90 AEROSOL, METERED ORAL at 04:43

## 2019-10-31 RX ADMIN — Medication 40 MILLIGRAM(S): at 05:24

## 2019-10-31 RX ADMIN — OXYCODONE AND ACETAMINOPHEN 1 TABLET(S): 5; 325 TABLET ORAL at 17:58

## 2019-10-31 RX ADMIN — Medication 3 MILLILITER(S): at 11:38

## 2019-10-31 RX ADMIN — AMLODIPINE BESYLATE 10 MILLIGRAM(S): 2.5 TABLET ORAL at 18:55

## 2019-10-31 RX ADMIN — Medication 20 MILLIGRAM(S): at 05:25

## 2019-10-31 RX ADMIN — OXYCODONE AND ACETAMINOPHEN 1 TABLET(S): 5; 325 TABLET ORAL at 18:28

## 2019-10-31 RX ADMIN — Medication 1: at 17:27

## 2019-10-31 RX ADMIN — Medication 1: at 06:34

## 2019-10-31 RX ADMIN — ALBUTEROL 5 MILLIGRAM(S): 90 AEROSOL, METERED ORAL at 04:44

## 2019-10-31 RX ADMIN — HEPARIN SODIUM 5000 UNIT(S): 5000 INJECTION INTRAVENOUS; SUBCUTANEOUS at 13:04

## 2019-10-31 RX ADMIN — HEPARIN SODIUM 5000 UNIT(S): 5000 INJECTION INTRAVENOUS; SUBCUTANEOUS at 05:24

## 2019-10-31 RX ADMIN — Medication 3 MILLILITER(S): at 21:50

## 2019-10-31 NOTE — CONSULT NOTE ADULT - ATTENDING COMMENTS
1. Acute on chronic hypercapnic and hypoxemic respirator failure due to COPD exacerbation. Pt initially unresponsive to BiPAP . Pt switched to AVAPS with good response and reduction of PCO2 to ~80 from 90's.   Admit to MICU for COPD exacerbation . Start steroids and bronchodilators.
Pt seen with Np.  Agree with above plan.  GOC: pt stated that she would want a natural death but for now will discuss with family, ongoing GOC. No HCP appointed, will f/u tomorrow.  Symptoms: controlled.

## 2019-10-31 NOTE — CONSULT NOTE ADULT - PROBLEM SELECTOR RECOMMENDATION 9
secondary to her COPD and Asthma. feeling better after her nebulizer, secondary to her COPD and Asthma. feeling better after her nebulizer. Continue care as per RCU team Secondary to her Asthma. Feeling better after her nebulizer. Continue care as per RCU team.

## 2019-10-31 NOTE — CONSULT NOTE ADULT - PROBLEM SELECTOR RECOMMENDATION 3
Patient with chronic asthma, lives with daughter whose  is a smoker which exacerbates her asthma. Continue care as per RCU team.

## 2019-10-31 NOTE — CONSULT NOTE ADULT - ASSESSMENT
83 y/o F HTN, chronic asthma, copd/emphysema on home O2 (2L), SHAD on CPAP, type 2 DM, HLD, GERD presenting w/ shortness of breath and increased work of breathing concerning for COPD exacerbation vs. acute CHF. MICU consulted for new bipap.     #Increased SOB and WOB DDx: COPD exacerbation vs new CHF  -pt with history of COPD on 2L home O2 coming in with pCO2 of 86 with HCO3 of 29; compensated acutely for respiratory acidosis; currently on bipap 12/6; FiO2 40%; Differential diagnosis includes new acute heart failure exacerbation; per family no history of CHF and BNP is 653  -c/w solumedrol and duonebs; can give azithromycin for anti-inflammatory effects for 3 days   -repeat VBG in 4 hours  -s/p IV lasix 20mg with good urine output per daughter at bedside; please monitor strict I/O; obtain TTE;     Not a MICU candidate at this time.     ***Note not finalized****
84 year old woman with Respiratory distress, debility, COPD and encounter for palliative care.

## 2019-10-31 NOTE — CONSULT NOTE ADULT - SUBJECTIVE AND OBJECTIVE BOX
HPI:  85 y/o F HTN, chronic asthma, copd/emphysema on home O2 (2L), SHAD on CPAP, type 2 DM, HLD, GERD presenting w/ shortness of breath. Per daughter at bedside, pt had increased SOB yesterday and Increased her oxygen to 3L (baseline 2L) for increased work of breathing. When pt placed her CPAP overnight, there was mild improvement but when pt woke up this morning, continued to have SOB and brought her into the ED. Pt also tried duonebs without improvement. Pt Has chronic cough, no change recently in that but does endorse increased sputum production. Also endorses BERG but at baseline.  Pt can walk 1 block at baseline. Denies known hx of CHF but per daughter was prescribed lasix 40mg daily and had not been taking it for the past few days.  No known recent sick contacts. Endorses weakness but Denies fevers, chills, headache, dizziness, chest pain, abdominal pain, n/v/d/c, urinary symptoms, MSK pain.    In the ED, pt had increased WOB and placed on non-rebreather. T: 98.1, HR , /42 and 93% on nonrebreather.  Labs significant for pH 7.2 and pCO2 of 86.  Patient was placed on bipap 12/6 FiO2 of 40%.  Pt received IV lasix 20mg, solumedrol 125mg , and duonebs x 3. In the ED, pt's mental status continue to worsen and ABG showed pCO2 of 78. Patient was accepted to MICU for acute hypercapnic respiratory failure. (30 Oct 2019 16:43)    PERTINENT PM/SXH:   GERD (gastroesophageal reflux disease)  DM (diabetes mellitus)  Asthma  HLD (hyperlipidemia)  HTN (hypertension)      FAMILY HISTORY:      SOCIAL HISTORY:   Significant other/partner: Yes [ ]  No [ ] Children:  Yes [ ]  No [ ] Adventist/Spirituality:  Substance hx: Yes[ ]  No [ ]   Tobacco hx:  Yes [ ] No [ ]   Alcohol hx: Yes [ ] No [ ]   Home Opioid hx:  [ ] I-Stop Reference No:  Living Situation: [ ]Home  [ ]Long term care  [ ]Rehab [ ]Other    ADVANCE DIRECTIVES:    DNR  MOLST  [ ]  Living Will  [ ]   DECISION MAKER(s):  [ ] Health Care Proxy(s)  [ ] Surrogate(s)  [ ] Guardian           Name(s): Phone Number(s):    BASELINE (I)ADL(s) (prior to admission):  [place]: [ ]Total  [ ] Moderate [ ]Dependent    Allergies    No Known Allergies    Intolerances    MEDICATIONS  (STANDING):  albuterol/ipratropium for Nebulization 3 milliLiter(s) Nebulizer every 6 hours  atorvastatin 40 milliGRAM(s) Oral at bedtime  dextrose 5%. 1000 milliLiter(s) (50 mL/Hr) IV Continuous <Continuous>  dextrose 50% Injectable 12.5 Gram(s) IV Push once  dextrose 50% Injectable 25 Gram(s) IV Push once  dextrose 50% Injectable 25 Gram(s) IV Push once  heparin  Injectable 5000 Unit(s) SubCutaneous every 8 hours  insulin lispro (HumaLOG) corrective regimen sliding scale   SubCutaneous every 6 hours  methylPREDNISolone sodium succinate Injectable 40 milliGRAM(s) IV Push daily    MEDICATIONS  (PRN):  dextrose 40% Gel 15 Gram(s) Oral once PRN Blood Glucose LESS THAN 70 milliGRAM(s)/deciliter  glucagon  Injectable 1 milliGRAM(s) IntraMuscular once PRN Glucose LESS THAN 70 milligrams/deciliter    PRESENT SYMPTOMS: [ ]Unable to obtain due to poor mentation   Source if other than patient:  [ ]Family   [ ]Team     Pain (Impact on QOL):    Location -   Severity -        Minimal acceptable level (0-10 scale):  Quality:   Onset:   Duration:                 Aggravating factors -  Relieving factors -  Radiation -    PAIN AD Score:     [email or web address] (press ctrl +  left click to view)    Dyspnea:  Yes [ ] No [ ] - [ ]Mild [ ]Moderate [ ]Severe  Anxiety:    Yes [ ] No [ ] - [ ]Mild [ ]Moderate [ ]Severe  Fatigue:    Yes [ ] No [ ] - [ ]Mild [ ]Moderate [ ]Severe  Nausea:    Yes [ ] No [ ] - [ ]Mild [ ]Moderate [ ]Severe                         Loss of appetite: Yes [ ] No [ ] - [ ]Mild [ ]Moderate [ ]Severe             Constipation:  Yes [ ] No [ ] - [ ]Mild [ ]Moderate [ ]Severe  Grief:  Yes [ ] No [ ]     Other Symptoms:  [ ]All other review of systems negative     Karnofsky Performance Score/Palliative Performance Status Version 2:         %    http://palliative.info/resource_material/[email or web address].pdf  PHYSICAL EXAM:  Vital Signs Last 24 Hrs  T(C): 36.4 (31 Oct 2019 04:00), Max: 37.2 (30 Oct 2019 16:12)  T(F): 97.5 (31 Oct 2019 04:00), Max: 98.9 (30 Oct 2019 16:12)  HR: 90 (31 Oct 2019 09:00) (65 - 100)  BP: 144/63 (31 Oct 2019 09:00) (109/46 - 148/66)  BP(mean): 83 (31 Oct 2019 09:00) (63 - 133)  RR: 27 (31 Oct 2019 09:00) (10 - 27)  SpO2: 95% (31 Oct 2019 09:00) (83% - 100%) I&O's Summary    30 Oct 2019 07:01  -  31 Oct 2019 07:00  --------------------------------------------------------  IN: 0 mL / OUT: 0 mL / NET: 0 mL    31 Oct 2019 07:01  -  31 Oct 2019 11:28  --------------------------------------------------------  IN: 100 mL / OUT: 400 mL / NET: -300 mL    GENERAL:  [ ]Alert  [ ]Oriented x   [ ]Lethargic  [ ]Cachexia  [ ]Unarousable  [ ]Verbal  [ ]Non-Verbal  Behavioral:   [ ] Anxiety  [ ] Delirium [ ] Agitation [ ] Other  HEENT:  [ ]Normal   [ ]Dry mouth   [ ]ET Tube/Trach  [ ]Oral lesions  PULMONARY:   [ ]Clear  [ ]Tachypnea  [ ]Audible excessive secretions   [ ]Rhonchi        [ ]Right [ ]Left [ ]Bilateral  [ ]Crackles        [ ]Right [ ]Left [ ]Bilateral  [ ]Wheezing     [ ]Right [ ]Left [ ]Bilateral  CARDIOVASCULAR:    [ ]Regular [ ]Irregular [ ]Tachy  [ ]Beto [ ]Murmur [ ]Other  GASTROINTESTINAL:  [ ]Soft  [ ]Distended   [ ]+BS  [ ]Non tender [ ]Tender  [ ]PEG [ ]OGT/ NGT  Last BM:     GENITOURINARY:  [ ]Normal [ ] Incontinent   [ ]Oliguria/Anuria   [ ]Humphrey  MUSCULOSKELETAL:   [ ]Normal   [ ]Weakness  [ ]Bed/Wheelchair bound [ ]Edema  NEUROLOGIC:   [ ]No focal deficits  [ ] Cognitive impairment  [ ] Dysphagia [ ]Dysarthria [ ] Paresis [ ]Other   SKIN:   [ ]Normal   [ ]Pressure ulcer(s)  [ ]Rash    LABS:                        11.7   13.38 )-----------( 226      ( 31 Oct 2019 03:35 )             39.6   10-31    x   |  x   |  x   ----------------------------<  x   5.5<H>   |  x   |  x     Ca    9.1      31 Oct 2019 03:35  Phos  3.1     10-31  Mg     2.1     10-31    TPro  8.0  /  Alb  3.5  /  TBili  0.3  /  DBili  x   /  AST  20  /  ALT  19  /  AlkPhos  50  10-31  PT/INR - ( 30 Oct 2019 06:31 )   PT: 11.1 SEC;   INR: 0.97          PTT - ( 30 Oct 2019 06:31 )  PTT:30.7 SEC      RADIOLOGY & ADDITIONAL STUDIES:    PROTEIN CALORIE MALNUTRITION PRESENT: [ ] Yes [ ] No  [ ] PPSV2 < or = to 30% [ ] significant weight loss  [ ] poor nutritional intake [ ] catabolic state [ ] anasarca     Albumin, Serum: 3.5 g/dL (10-31-19 @ 03:35)      REFERRALS:   [ ]Chaplaincy  [ ] Hospice  [ ]Child Life  [ ]Social Work  [ ]Case management [ ]Holistic Therapy   Goals of Care Discussion Document: HPI:  83 y/o F HTN, chronic asthma, copd/emphysema on home O2 (2L), SHAD on CPAP, type 2 DM, HLD, GERD presenting w/ shortness of breath. Per daughter at bedside, pt had increased SOB yesterday and Increased her oxygen to 3L (baseline 2L) for increased work of breathing. When pt placed her CPAP overnight, there was mild improvement but when pt woke up this morning, continued to have SOB and brought her into the ED. Pt also tried duonebs without improvement. Pt Has chronic cough, no change recently in that but does endorse increased sputum production. Also endorses BERG but at baseline.  Pt can walk 1 block at baseline. Denies known hx of CHF but per daughter was prescribed lasix 40mg daily and had not been taking it for the past few days.  No known recent sick contacts. Endorses weakness but Denies fevers, chills, headache, dizziness, chest pain, abdominal pain, n/v/d/c, urinary symptoms, MSK pain.    In the ED, pt had increased WOB and placed on non-rebreather. T: 98.1, HR , /42 and 93% on nonrebreather.  Labs significant for pH 7.2 and pCO2 of 86.  Patient was placed on bipap 12/6 FiO2 of 40%.  Pt received IV lasix 20mg, solumedrol 125mg , and duonebs x 3. In the ED, pt's mental status continue to worsen and ABG showed pCO2 of 78. Patient was accepted to MICU for acute hypercapnic respiratory failure. (30 Oct 2019 16:43)    Patient resting in bed in no distress. Getting nebulizer treatment. No distress noted.     PERTINENT PM/SXH:   GERD (gastroesophageal reflux disease)  DM (diabetes mellitus)  Asthma  HLD (hyperlipidemia)  HTN (hypertension)      FAMILY HISTORY:      SOCIAL HISTORY:   Significant other/partner: Yes [ ]  No [x ] Children:  Yes [x]  No [ ] Rastafari/Spirituality:  Substance hx: Yes[ ]  No [x]   Tobacco hx:  Yes [ ] No [ x ]  Alcohol hx: Yes [ ] No [ x ]   Home Opioid hx:  [ ] I-Stop Reference No:  Living Situation: [x]Home  [ ]Long term care  [ ]Rehab [ ]Other    ADVANCE DIRECTIVES:    Full code  DECISION MAKER(s):  [ ] Health Care Proxy(s)  [x ] Surrogate(s)  [ ] Guardian           Name(s): Phone Number(s):  Melvin Shannon [phone]    BASELINE (I)ADL(s) (prior to admission):  [place]: [ ]Total  [x ] Moderate [ ]Dependent    Allergies    No Known Allergies    Intolerances    MEDICATIONS  (STANDING):  albuterol/ipratropium for Nebulization 3 milliLiter(s) Nebulizer every 6 hours  atorvastatin 40 milliGRAM(s) Oral at bedtime  dextrose 5%. 1000 milliLiter(s) (50 mL/Hr) IV Continuous <Continuous>  dextrose 50% Injectable 12.5 Gram(s) IV Push once  dextrose 50% Injectable 25 Gram(s) IV Push once  dextrose 50% Injectable 25 Gram(s) IV Push once  heparin  Injectable 5000 Unit(s) SubCutaneous every 8 hours  insulin lispro (HumaLOG) corrective regimen sliding scale   SubCutaneous every 6 hours  methylPREDNISolone sodium succinate Injectable 40 milliGRAM(s) IV Push daily    MEDICATIONS  (PRN):  dextrose 40% Gel 15 Gram(s) Oral once PRN Blood Glucose LESS THAN 70 milliGRAM(s)/deciliter  glucagon  Injectable 1 milliGRAM(s) IntraMuscular once PRN Glucose LESS THAN 70 milligrams/deciliter    PRESENT SYMPTOMS: [ ]Unable to obtain due to poor mentation   Source if other than patient:  [ ]Family   [ ]Team     Pain (Impact on QOL):  No pain  Location -   Severity -        Minimal acceptable level (0-10 scale):  Quality:   Onset:   Duration:                 Aggravating factors -  Relieving factors -  Radiation -    PAIN AD Score:     [email or web address] (press ctrl +  left click to view)    Dyspnea:  Yes [x ] No [ ] - [ ]Mild [x]Moderate [ ]Severe  Anxiety:    Yes [ ] No [ x] - [ ]Mild [ ]Moderate [ ]Severe  Fatigue:    Yes [ ] No [x ] - [ ]Mild [ ]Moderate [ ]Severe  Nausea:    Yes [ ] No [ x] - [ ]Mild [ ]Moderate [ ]Severe                         Loss of appetite: Yes [ ] No [x ] - [ ]Mild [ ]Moderate [ ]Severe             Constipation:  Yes [ ] No [x ] - [ ]Mild [ ]Moderate [ ]Severe  Grief:  Yes [ ] No [ x]     Other Symptoms:  [x]All other review of systems negative     Karnofsky Performance Score/Palliative Performance Status Version 2:         50 %    http://palliative.info/resource_material/[email or web address].pdf  PHYSICAL EXAM:  Vital Signs Last 24 Hrs  T(C): 36.4 (31 Oct 2019 04:00), Max: 37.2 (30 Oct 2019 16:12)  T(F): 97.5 (31 Oct 2019 04:00), Max: 98.9 (30 Oct 2019 16:12)  HR: 90 (31 Oct 2019 09:00) (65 - 100)  BP: 144/63 (31 Oct 2019 09:00) (109/46 - 148/66)  BP(mean): 83 (31 Oct 2019 09:00) (63 - 133)  RR: 27 (31 Oct 2019 09:00) (10 - 27)  SpO2: 95% (31 Oct 2019 09:00) (83% - 100%) I&O's Summary    30 Oct 2019 07:01  -  31 Oct 2019 07:00  --------------------------------------------------------  IN: 0 mL / OUT: 0 mL / NET: 0 mL    31 Oct 2019 07:01  -  31 Oct 2019 11:28  --------------------------------------------------------  IN: 100 mL / OUT: 400 mL / NET: -300 mL    GENERAL:  [x ]Alert  [x ]Oriented x 3  [ ]Lethargic  [ ]Cachexia  [ ]Unarousable  [ ]Verbal  [ ]Non-Verbal  Behavioral:   [ ] Anxiety  [ ] Delirium [ ] Agitation [ ] Other  HEENT:  [ ]Normal   [ x]Dry mouth   [ ]ET Tube/Trach  [ ]Oral lesions  PULMONARY:   [ x]dimished breath sound anteriorly   [ ]Tachypnea  [ ]Audible excessive secretions   [ ]Rhonchi        [ ]Right [ ]Left [ ]Bilateral  [ ]Crackles        [ ]Right [ ]Left [ ]Bilateral  [ ]Wheezing     [ ]Right [ ]Left [ ]Bilateral  CARDIOVASCULAR:    [ x]Regular [ ]Irregular [ ]Tachy  [ ]Beto [ ]Murmur [ ]Other  GASTROINTESTINAL:  [x ]Soft  [ ]Distended   [ ]+BS  [ x]Non tender [ ]Tender  [ ]PEG [ ]OGT/ NGT  Last BM:   GENITOURINARY:  [ ]Normal [x ] Incontinent   [ ]Oliguria/Anuria   [ ]Humphrey  MUSCULOSKELETAL:   [ ]Normal   [ x]Weakness  [ ]Bed/Wheelchair bound [ ]Edema  NEUROLOGIC:   [x ]No focal deficits  [ ] Cognitive impairment  [ ] Dysphagia [ ]Dysarthria [ ] Paresis [ ]Other   SKIN:   [x ]Normal   [ ]Pressure ulcer(s)  [ ]Rash    LABS:                        11.7   13.38 )-----------( 226      ( 31 Oct 2019 03:35 )             39.6   10-31    x   |  x   |  x   ----------------------------<  x   5.5<H>   |  x   |  x     Ca    9.1      31 Oct 2019 03:35  Phos  3.1     10-31  Mg     2.1     10-31    TPro  8.0  /  Alb  3.5  /  TBili  0.3  /  DBili  x   /  AST  20  /  ALT  19  /  AlkPhos  50  10-31  PT/INR - ( 30 Oct 2019 06:31 )   PT: 11.1 SEC;   INR: 0.97          PTT - ( 30 Oct 2019 06:31 )  PTT:30.7 SEC      RADIOLOGY & ADDITIONAL STUDIES:  < from: Xray Chest 1 View- PORTABLE-Urgent (10.30.19 @ 05:53) >  EXAM:  XR CHEST PORTABLE URGENT 1V        PROCEDURE DATE:  Oct 30 2019 IMPRESSION:  CHF.    < end of copied text >      PROTEIN CALORIE MALNUTRITION PRESENT: [ ] Yes [ ] No  [ ] PPSV2 < or = to 30% [ ] significant weight loss  [ ] poor nutritional intake [ ] catabolic state [ ] anasarca     Albumin, Serum: 3.5 g/dL (10-31-19 @ 03:35)      REFERRALS:   [ ]Chaplaincy  [ ] Hospice  [ ]Child Life  [ ]Social Work  [ ]Case management [ ]Holistic Therapy   Goals of Care Discussion Document: HPI:  85 y/o F HTN, chronic asthma, copd/emphysema on home O2 (2L), SHAD on CPAP, type 2 DM, HLD, GERD presenting w/ shortness of breath. Per daughter at bedside, pt had increased SOB yesterday and Increased her oxygen to 3L (baseline 2L) for increased work of breathing. When pt placed her CPAP overnight, there was mild improvement but when pt woke up this morning, continued to have SOB and brought her into the ED. Pt also tried duonebs without improvement. Pt Has chronic cough, no change recently in that but does endorse increased sputum production. Also endorses BERG but at baseline.  Pt can walk 1 block at baseline. Denies known hx of CHF but per daughter was prescribed lasix 40mg daily and had not been taking it for the past few days.  No known recent sick contacts. Endorses weakness but Denies fevers, chills, headache, dizziness, chest pain, abdominal pain, n/v/d/c, urinary symptoms, MSK pain.    In the ED, pt had increased WOB and placed on non-rebreather. T: 98.1, HR , /42 and 93% on nonrebreather.  Labs significant for pH 7.2 and pCO2 of 86.  Patient was placed on bipap 12/6 FiO2 of 40%.  Pt received IV lasix 20mg, solumedrol 125mg , and duonebs x 3. In the ED, pt's mental status continue to worsen and ABG showed pCO2 of 78. Patient was accepted to MICU for acute hypercapnic respiratory failure. (30 Oct 2019 16:43)    Patient resting in bed in no distress. Getting nebulizer treatment. No distress noted.     PERTINENT PM/SXH:   GERD (gastroesophageal reflux disease)  DM (diabetes mellitus)  Asthma  HLD (hyperlipidemia)  HTN (hypertension)      FAMILY HISTORY:      SOCIAL HISTORY:   Significant other/partner: Yes [ ]  No [x ] Children:  Yes [x]  No [ ] Roman Catholic/Spirituality:  Substance hx: Yes[ ]  No [x]   Tobacco hx:  Yes [ ] No [ x ]  Alcohol hx: Yes [ ] No [ x ]   Home Opioid hx:  [ ] I-Stop Reference No:  Living Situation: [x]Home  [ ]Long term care  [ ]Rehab [ ]Other    ADVANCE DIRECTIVES:    Full code  DECISION MAKER(s):  [ ] Health Care Proxy(s)  [x ] Surrogate(s)  [ ] Guardian           Name(s): Phone Number(s):  Melvin Shannon [phone]    BASELINE (I)ADL(s) (prior to admission):  [place]: [ ]Total  [x ] Moderate [ ]Dependent    Allergies    No Known Allergies    Intolerances    MEDICATIONS  (STANDING):  albuterol/ipratropium for Nebulization 3 milliLiter(s) Nebulizer every 6 hours  atorvastatin 40 milliGRAM(s) Oral at bedtime  dextrose 5%. 1000 milliLiter(s) (50 mL/Hr) IV Continuous <Continuous>  dextrose 50% Injectable 12.5 Gram(s) IV Push once  dextrose 50% Injectable 25 Gram(s) IV Push once  dextrose 50% Injectable 25 Gram(s) IV Push once  heparin  Injectable 5000 Unit(s) SubCutaneous every 8 hours  insulin lispro (HumaLOG) corrective regimen sliding scale   SubCutaneous every 6 hours  methylPREDNISolone sodium succinate Injectable 40 milliGRAM(s) IV Push daily    MEDICATIONS  (PRN):  dextrose 40% Gel 15 Gram(s) Oral once PRN Blood Glucose LESS THAN 70 milliGRAM(s)/deciliter  glucagon  Injectable 1 milliGRAM(s) IntraMuscular once PRN Glucose LESS THAN 70 milligrams/deciliter    PRESENT SYMPTOMS: [ ]Unable to obtain due to poor mentation   Source if other than patient:  [ ]Family   [ ]Team     Pain (Impact on QOL):  No pain  Location -   Severity -        Minimal acceptable level (0-10 scale):  Quality:   Onset:   Duration:                 Aggravating factors -  Relieving factors -  Radiation -    PAIN AD Score:     [email or web address] (press ctrl +  left click to view)    Dyspnea:  Yes [x ] No [ ] - [ ]Mild [x]Moderate [ ]Severe  Anxiety:    Yes [ ] No [ x] - [ ]Mild [ ]Moderate [ ]Severe  Fatigue:    Yes [ ] No [x ] - [ ]Mild [ ]Moderate [ ]Severe  Nausea:    Yes [ ] No [ x] - [ ]Mild [ ]Moderate [ ]Severe                         Loss of appetite: Yes [ ] No [x ] - [ ]Mild [ ]Moderate [ ]Severe             Constipation:  Yes [ ] No [x ] - [ ]Mild [ ]Moderate [ ]Severe  Grief:  Yes [ ] No [ x]     Other Symptoms:  [x]All other review of systems negative     Karnofsky Performance Score/Palliative Performance Status Version 2:         50 %    http://palliative.info/resource_material/[email or web address].pdf  PHYSICAL EXAM:  Vital Signs Last 24 Hrs  T(C): 36.4 (31 Oct 2019 04:00), Max: 37.2 (30 Oct 2019 16:12)  T(F): 97.5 (31 Oct 2019 04:00), Max: 98.9 (30 Oct 2019 16:12)  HR: 90 (31 Oct 2019 09:00) (65 - 100)  BP: 144/63 (31 Oct 2019 09:00) (109/46 - 148/66)  BP(mean): 83 (31 Oct 2019 09:00) (63 - 133)  RR: 27 (31 Oct 2019 09:00) (10 - 27)  SpO2: 95% (31 Oct 2019 09:00) (83% - 100%) I&O's Summary    30 Oct 2019 07:01  -  31 Oct 2019 07:00  --------------------------------------------------------  IN: 0 mL / OUT: 0 mL / NET: 0 mL    31 Oct 2019 07:01  -  31 Oct 2019 11:28  --------------------------------------------------------  IN: 100 mL / OUT: 400 mL / NET: -300 mL    GENERAL:  [x ]Alert  [x ]Oriented x 3  [ ]Lethargic  [ ]Cachexia  [ ]Unarousable  [ ]Verbal  [ ]Non-Verbal  Behavioral:   [ ] Anxiety  [ ] Delirium [ ] Agitation [x ] Other  HEENT:  [ ]Normal   [x]Dry mouth   [ ]ET Tube/Trach  [ ]Oral lesions  PULMONARY:   [x]dimished breath sound anteriorly   [ ]Tachypnea  [ ]Audible excessive secretions   [ ]Rhonchi        [ ]Right [ ]Left [ ]Bilateral  [ ]Crackles        [ ]Right [ ]Left [ ]Bilateral  [ ]Wheezing     [ ]Right [ ]Left [ ]Bilateral  CARDIOVASCULAR:    [ x]Regular [ ]Irregular [ ]Tachy  [ ]Beto [ ]Murmur [ ]Other  GASTROINTESTINAL:  [x ]Soft  [ ]Distended   [ ]+BS  [ x]Non tender [ ]Tender  [ ]PEG [ ]OGT/ NGT  Last BM:   GENITOURINARY:  [ ]Normal [x ] Incontinent   [ ]Oliguria/Anuria   [ ]Humphrey  MUSCULOSKELETAL:   [ ]Normal   [ x]Weakness  [ ]Bed/Wheelchair bound [ ]Edema  NEUROLOGIC:   [x ]No focal deficits  [ ] Cognitive impairment  [ ] Dysphagia [ ]Dysarthria [ ] Paresis [ ]Other   SKIN:   [x ]Normal   [ ]Pressure ulcer(s)  [ ]Rash    LABS:                        11.7   13.38 )-----------( 226      ( 31 Oct 2019 03:35 )             39.6   10-31    x   |  x   |  x   ----------------------------<  x   5.5<H>   |  x   |  x     Ca    9.1      31 Oct 2019 03:35  Phos  3.1     10-31  Mg     2.1     10-31    TPro  8.0  /  Alb  3.5  /  TBili  0.3  /  DBili  x   /  AST  20  /  ALT  19  /  AlkPhos  50  10-31  PT/INR - ( 30 Oct 2019 06:31 )   PT: 11.1 SEC;   INR: 0.97          PTT - ( 30 Oct 2019 06:31 )  PTT:30.7 SEC      RADIOLOGY & ADDITIONAL STUDIES:  < from: Xray Chest 1 View- PORTABLE-Urgent (10.30.19 @ 05:53) >  EXAM:  XR CHEST PORTABLE URGENT 1V        PROCEDURE DATE:  Oct 30 2019 IMPRESSION:  CHF.    < end of copied text >      PROTEIN CALORIE MALNUTRITION PRESENT: [ ] Yes [ ] No  [ ] PPSV2 < or = to 30% [ ] significant weight loss  [ ] poor nutritional intake [ ] catabolic state [ ] anasarca     Albumin, Serum: 3.5 g/dL (10-31-19 @ 03:35)      REFERRALS:   [ ]Chaplaincy  [ ] Hospice  [ ]Child Life  [ ]Social Work  [ ]Case management [ ]Holistic Therapy   Goals of Care Discussion Document: HPI:  83 y/o F HTN, chronic asthma, copd/emphysema on home O2 (2L), SHAD on CPAP, type 2 DM, HLD, GERD presenting w/ shortness of breath. Per daughter at bedside, pt had increased SOB yesterday and Increased her oxygen to 3L (baseline 2L) for increased work of breathing. When pt placed her CPAP overnight, there was mild improvement but when pt woke up this morning, continued to have SOB and brought her into the ED. Pt also tried duonebs without improvement. Pt Has chronic cough, no change recently in that but does endorse increased sputum production. Also endorses BERG but at baseline.  Pt can walk 1 block at baseline. Denies known hx of CHF but per daughter was prescribed lasix 40mg daily and had not been taking it for the past few days.  No known recent sick contacts. Endorses weakness but Denies fevers, chills, headache, dizziness, chest pain, abdominal pain, n/v/d/c, urinary symptoms, MSK pain.    In the ED, pt had increased WOB and placed on non-rebreather. T: 98.1, HR , /42 and 93% on nonrebreather.  Labs significant for pH 7.2 and pCO2 of 86.  Patient was placed on bipap 12/6 FiO2 of 40%.  Pt received IV lasix 20mg, solumedrol 125mg , and duonebs x 3. In the ED, pt's mental status continue to worsen and ABG showed pCO2 of 78. Patient was accepted to MICU for acute hypercapnic respiratory failure. (30 Oct 2019 16:43)    Patient resting in bed in no distress. Getting nebulizer treatment. No distress noted.     PERTINENT PM/SXH:   GERD (gastroesophageal reflux disease)  DM (diabetes mellitus)  Asthma  HLD (hyperlipidemia)  HTN (hypertension)    FAMILY HISTORY:    SOCIAL HISTORY:   Significant other/partner: Yes [ ]  No [x ] Children:  Yes [x]  No [ ] Confucianist/Spirituality:  Substance hx: Yes[ ]  No [x]   Tobacco hx:  Yes [ ] No [ x ]  Alcohol hx: Yes [ ] No [ x ]   Home Opioid hx:  [ ] I-Stop Reference No:  Living Situation: [x]Home  [ ]Long term care  [ ]Rehab [ ]Other    ADVANCE DIRECTIVES:    Full code  DECISION MAKER(s):  [ ] Health Care Proxy(s)  [x ] Surrogate(s)  [ ] Guardian           Name(s): Phone Number(s):  Melvin Shannon (daughter) [phone]    BASELINE (I)ADL(s) (prior to admission):  [place]: [ ]Total  [x ] Moderate [ ]Dependent    Allergies    No Known Allergies    Intolerances    MEDICATIONS  (STANDING):  albuterol/ipratropium for Nebulization 3 milliLiter(s) Nebulizer every 6 hours  atorvastatin 40 milliGRAM(s) Oral at bedtime  dextrose 5%. 1000 milliLiter(s) (50 mL/Hr) IV Continuous <Continuous>  dextrose 50% Injectable 12.5 Gram(s) IV Push once  dextrose 50% Injectable 25 Gram(s) IV Push once  dextrose 50% Injectable 25 Gram(s) IV Push once  heparin  Injectable 5000 Unit(s) SubCutaneous every 8 hours  insulin lispro (HumaLOG) corrective regimen sliding scale   SubCutaneous every 6 hours  methylPREDNISolone sodium succinate Injectable 40 milliGRAM(s) IV Push daily    MEDICATIONS  (PRN):  dextrose 40% Gel 15 Gram(s) Oral once PRN Blood Glucose LESS THAN 70 milliGRAM(s)/deciliter  glucagon  Injectable 1 milliGRAM(s) IntraMuscular once PRN Glucose LESS THAN 70 milligrams/deciliter    PRESENT SYMPTOMS: [ ]Unable to obtain due to poor mentation   Source if other than patient:  [ ]Family   [ ]Team     Pain (Impact on QOL):  No pain  Location -   Severity -        Minimal acceptable level (0-10 scale):  Quality:   Onset:   Duration:                 Aggravating factors -  Relieving factors -  Radiation -    PAIN AD Score:     [email or web address] (press ctrl +  left click to view)    Dyspnea:  Yes [x ] No [ ] - [ ]Mild [x]Moderate [ ]Severe  Anxiety:    Yes [ ] No [ x] - [ ]Mild [ ]Moderate [ ]Severe  Fatigue:    Yes [ ] No [x ] - [ ]Mild [ ]Moderate [ ]Severe  Nausea:    Yes [ ] No [ x] - [ ]Mild [ ]Moderate [ ]Severe                         Loss of appetite: Yes [ ] No [x ] - [ ]Mild [ ]Moderate [ ]Severe             Constipation:  Yes [ ] No [x ] - [ ]Mild [ ]Moderate [ ]Severe  Grief:  Yes [ ] No [ x]     Other Symptoms:  [x]All other review of systems negative     Karnofsky Performance Score/Palliative Performance Status Version 2:         50 %    http://palliative.info/resource_material/[email or web address]    PHYSICAL EXAM:  Vital Signs Last 24 Hrs  T(C): 36.4 (31 Oct 2019 04:00), Max: 37.2 (30 Oct 2019 16:12)  T(F): 97.5 (31 Oct 2019 04:00), Max: 98.9 (30 Oct 2019 16:12)  HR: 90 (31 Oct 2019 09:00) (65 - 100)  BP: 144/63 (31 Oct 2019 09:00) (109/46 - 148/66)  BP(mean): 83 (31 Oct 2019 09:00) (63 - 133)  RR: 27 (31 Oct 2019 09:00) (10 - 27)  SpO2: 95% (31 Oct 2019 09:00) (83% - 100%) I&O's Summary    30 Oct 2019 07:01  -  31 Oct 2019 07:00  --------------------------------------------------------  IN: 0 mL / OUT: 0 mL / NET: 0 mL    31 Oct 2019 07:01  -  31 Oct 2019 11:28  --------------------------------------------------------  IN: 100 mL / OUT: 400 mL / NET: -300 mL    GENERAL:  [x ]Alert  [x ]Oriented x 3  [ ]Lethargic  [ ]Cachexia  [ ]Unarousable  [ x]Verbal  [ ]Non-Verbal  Behavioral:   [ ] Anxiety  [ ] Delirium [ ] Agitation [x ] Other  HEENT:  [ ]Normal   [x]Dry mouth   [ ]ET Tube/Trach  [ ]Oral lesions  PULMONARY:   [x] diminished breath sound anteriorly   [ ]Tachypnea  [ ]Audible excessive secretions   [ ]Rhonchi        [ ]Right [ ]Left [ ]Bilateral  [ ]Crackles        [ ]Right [ ]Left [ ]Bilateral  [ ]Wheezing     [ ]Right [ ]Left [ ]Bilateral  CARDIOVASCULAR:    [ x]Regular [ ]Irregular [ ]Tachy  [ ]Beto [ ]Murmur [ ]Other  GASTROINTESTINAL:  [x ]Soft  [ ]Distended   [x ]+BS  [ x]Non tender [ ]Tender  [ ]PEG [ ]OGT/ NGT  Last BM: 10/29   GENITOURINARY:  [ ]Normal [x ] Incontinent   [ ]Oliguria/Anuria   [ ]Humphrey  MUSCULOSKELETAL:   [ ]Normal   [ x]Weakness  [ ]Bed/Wheelchair bound [ ]Edema  NEUROLOGIC:   [x ]No focal deficits  [ ] Cognitive impairment  [ ] Dysphagia [ ]Dysarthria [ ] Paresis [ ]Other   SKIN:   [x ]Normal   [ ]Pressure ulcer(s)  [ ]Rash    LABS:                        11.7   13.38 )-----------( 226      ( 31 Oct 2019 03:35 )             39.6   10-31    x   |  x   |  x   ----------------------------<  x   5.5<H>   |  x   |  x     Ca    9.1      31 Oct 2019 03:35  Phos  3.1     10-31  Mg     2.1     10-31    TPro  8.0  /  Alb  3.5  /  TBili  0.3  /  DBili  x   /  AST  20  /  ALT  19  /  AlkPhos  50  10-31  PT/INR - ( 30 Oct 2019 06:31 )   PT: 11.1 SEC;   INR: 0.97        PTT - ( 30 Oct 2019 06:31 )  PTT:30.7 SEC    RADIOLOGY & ADDITIONAL STUDIES:    < from: Xray Chest 1 View- PORTABLE-Urgent (10.30.19 @ 05:53) >    EXAM:  XR CHEST PORTABLE URGENT 1V      PROCEDURE DATE:  Oct 30 2019 IMPRESSION:  CHF.    PROTEIN CALORIE MALNUTRITION PRESENT: [ ] Yes [ ] No  [ ] PPSV2 < or = to 30% [ ] significant weight loss  [ ] poor nutritional intake [ ] catabolic state [ ] anasarca     Albumin, Serum: 3.5 g/dL (10-31-19 @ 03:35)    REFERRALS:   [ ]Chaplaincy  [ ] Hospice  [ ]Child Life  [ ]Social Work  [ ]Case management [ ]Holistic Therapy   Goals of Care Discussion Document:

## 2019-10-31 NOTE — CONSULT NOTE ADULT - PROBLEM SELECTOR RECOMMENDATION 4
Discussion had with Patient, she stated that she would want a natural death and not be put on machines, but wanted to discuss with her daughter first. We discussed HCP and she wanted to make her brother Henna Tao her Proxy [phone] but again wanted to discuss with him as well. Will continue to follow for goals of care. Discussion had with Patient, she stated that she would want a natural death and not be put on machines, but wanted to discuss with her daughter first. We discussed appointing a HCP and she wanted to make her brother Henna Tao her Proxy [phone] but again wanted to discuss with him as well. Will continue to follow for goals of care.

## 2019-10-31 NOTE — CHART NOTE - NSCHARTNOTEFT_GEN_A_CORE
MICU Transfer Note    Transfer from: MICU  Transfer to:  (  ) Medicine    (  ) Telemetry    ( x ) RCU    (  ) Palliative    (  ) Stroke Unit    (  ) _______________  Accepting physican:      MICU COURSE:  83 y/o F HTN, chronic asthma, copd/emphysema on home O2 (2L), SHAD on CPAP, type 2 DM, HLD, GERD presenting w/ shortness of breath. Per daughter at bedside, pt had increased SOB yesterday and Increased her oxygen to 3L (baseline 2L) for increased work of breathing. When pt placed her CPAP overnight, there was mild improvement but when pt woke up this morning, continued to have SOB and brought her into the ED. Pt also tried duonebs without improvement. Pt Has chronic cough, no change recently in that but does endorse increased sputum production. Also endorses BERG but at baseline.  Pt can walk 1 block at baseline. Denies known hx of CHF but per daughter was prescribed lasix 40mg daily and had not been taking it for the past few days.  No known recent sick contacts. Endorses weakness but Denies fevers, chills, headache, dizziness, chest pain, abdominal pain, n/v/d/c, urinary symptoms, MSK pain.    In the ED, pt had increased WOB and placed on non-rebreather. T: 98.1, HR , /42 and 93% on nonrebreather.  Labs significant for pH 7.2 and pCO2 of 86.  Patient was placed on bipap 12/6 FiO2 of 40%.  Pt received IV lasix 20mg, solumedrol 125mg , and duonebs x 3. In the ED, pt's mental status continue to worsen and ABG showed pCO2 of 78. Patient was accepted to MICU for acute hypercapnic respiratory failure.           ASSESSMENT & PLAN:   83 y/o F HTN, chronic asthma, copd/emphysema on home O2 (2L), SHAD on CPAP, type 2 DM, HLD, GERD presenting w/ shortness of breath and increased work of breathing concerning for COPD exacerbation.    #Neuro:  -earlier in the day was AAOx3 but currently needs sternal rub to awaken her in the setting of hypercapia; currently on bipap  -continue to monitor mental status every 4 hours  -if mental status does not improve, may need to be intubated     #Respiratory:  -Pt with chronic asthma and copd on home O2 and SHAD on CPAP;   -currently on bipap with worsening pCO2 on blood gas  -continue to monitor VBG q 4hrs; if hypercapnea and/or mental status does not improve on current settings; many need to be intubated; monitor closely  -duonebs tx q 6hrs   -steroids   -CXR shows pulmonary edema; s/p IV lasix 20mg ; continue to monitor urine output    #Cardiovascular:  -HTN: holding home meds in the setting of worsening mental status and may need to be intubated  -respiratory distress may be a component of heart failure exacerbation as BNP mildly elevated at 600s and CXR showing pulmonary edema  -s/p IV lasix 20mg ;   -strict I/O; holman  -TTE    #GI  -currently NPO with bipap and possible intubation    #Renal  -Cr 0.99  -continue to monitor    #Endo  -pt has type 2DM; ISS and monitor FS    #Heme  -anemia ; stable;   -continue to monitor    #ID:  -RVP negative  -f/u blood cx  -monitor off of antibiotics     DVT ppx: heparin subq  Code Full        For Follow-Up:    [ ] monitor VBG q 8hrs  [ ] restart home bp meds as tolerated   [ ] start feeds      Vital Signs Last 24 Hrs  T(C): 36.4 (31 Oct 2019 04:00), Max: 37.2 (30 Oct 2019 16:12)  T(F): 97.5 (31 Oct 2019 04:00), Max: 98.9 (30 Oct 2019 16:12)  HR: 75 (31 Oct 2019 07:00) (65 - 105)  BP: 138/46 (31 Oct 2019 07:00) (109/46 - 148/66)  BP(mean): 73 (31 Oct 2019 07:00) (63 - 133)  RR: 17 (31 Oct 2019 07:00) (10 - 24)  SpO2: 99% (31 Oct 2019 07:00) (83% - 100%)  I&O's Summary    30 Oct 2019 07:01  -  31 Oct 2019 07:00  --------------------------------------------------------  IN: 0 mL / OUT: 0 mL / NET: 0 mL          MEDICATIONS  (STANDING):  albuterol/ipratropium for Nebulization 3 milliLiter(s) Nebulizer every 6 hours  atorvastatin 40 milliGRAM(s) Oral at bedtime  chlorhexidine 4% Liquid 1 Application(s) Topical <User Schedule>  dextrose 5%. 1000 milliLiter(s) (50 mL/Hr) IV Continuous <Continuous>  dextrose 50% Injectable 12.5 Gram(s) IV Push once  dextrose 50% Injectable 25 Gram(s) IV Push once  dextrose 50% Injectable 25 Gram(s) IV Push once  furosemide   Injectable 20 milliGRAM(s) IV Push daily  heparin  Injectable 5000 Unit(s) SubCutaneous every 8 hours  insulin lispro (HumaLOG) corrective regimen sliding scale   SubCutaneous every 6 hours  methylPREDNISolone sodium succinate Injectable 40 milliGRAM(s) IV Push daily    MEDICATIONS  (PRN):  dextrose 40% Gel 15 Gram(s) Oral once PRN Blood Glucose LESS THAN 70 milliGRAM(s)/deciliter  glucagon  Injectable 1 milliGRAM(s) IntraMuscular once PRN Glucose LESS THAN 70 milligrams/deciliter        LABS                                            11.7                  Neurophils% (auto):   91.1   (10-31 @ 03:35):    13.38)-----------(226          Lymphocytes% (auto):  6.1                                           39.6                   Eosinphils% (auto):   0.0      Manual%: Neutrophils x    ; Lymphocytes x    ; Eosinophils x    ; Bands%: x    ; Blasts x                                    136    |  96     |  28                  Calcium: 9.1   / iCa: x      (10-31 @ 03:35)    ----------------------------<  172       Magnesium: 2.1                              5.5     |  30     |  1.07             Phosphorous: 3.1      TPro  8.0    /  Alb  3.5    /  TBili  0.3    /  DBili  x      /  AST  20     /  ALT  19     /  AlkPhos  50     31 Oct 2019 03:35

## 2019-11-01 DIAGNOSIS — Z29.9 ENCOUNTER FOR PROPHYLACTIC MEASURES, UNSPECIFIED: ICD-10-CM

## 2019-11-01 DIAGNOSIS — Z71.89 OTHER SPECIFIED COUNSELING: ICD-10-CM

## 2019-11-01 DIAGNOSIS — K21.9 GASTRO-ESOPHAGEAL REFLUX DISEASE WITHOUT ESOPHAGITIS: ICD-10-CM

## 2019-11-01 DIAGNOSIS — I10 ESSENTIAL (PRIMARY) HYPERTENSION: ICD-10-CM

## 2019-11-01 DIAGNOSIS — G47.33 OBSTRUCTIVE SLEEP APNEA (ADULT) (PEDIATRIC): ICD-10-CM

## 2019-11-01 DIAGNOSIS — E11.9 TYPE 2 DIABETES MELLITUS WITHOUT COMPLICATIONS: ICD-10-CM

## 2019-11-01 DIAGNOSIS — R06.03 ACUTE RESPIRATORY DISTRESS: ICD-10-CM

## 2019-11-01 DIAGNOSIS — E78.5 HYPERLIPIDEMIA, UNSPECIFIED: ICD-10-CM

## 2019-11-01 LAB
ANION GAP SERPL CALC-SCNC: 10 MMO/L — SIGNIFICANT CHANGE UP (ref 7–14)
ANION GAP SERPL CALC-SCNC: 8 MMO/L — SIGNIFICANT CHANGE UP (ref 7–14)
BUN SERPL-MCNC: 36 MG/DL — HIGH (ref 7–23)
BUN SERPL-MCNC: 37 MG/DL — HIGH (ref 7–23)
CALCIUM SERPL-MCNC: 8.8 MG/DL — SIGNIFICANT CHANGE UP (ref 8.4–10.5)
CALCIUM SERPL-MCNC: 8.9 MG/DL — SIGNIFICANT CHANGE UP (ref 8.4–10.5)
CHLORIDE SERPL-SCNC: 93 MMOL/L — LOW (ref 98–107)
CHLORIDE SERPL-SCNC: 96 MMOL/L — LOW (ref 98–107)
CO2 SERPL-SCNC: 33 MMOL/L — HIGH (ref 22–31)
CO2 SERPL-SCNC: 33 MMOL/L — HIGH (ref 22–31)
CREAT SERPL-MCNC: 0.99 MG/DL — SIGNIFICANT CHANGE UP (ref 0.5–1.3)
CREAT SERPL-MCNC: 1.06 MG/DL — SIGNIFICANT CHANGE UP (ref 0.5–1.3)
GLUCOSE BLDC GLUCOMTR-MCNC: 113 MG/DL — HIGH (ref 70–99)
GLUCOSE BLDC GLUCOMTR-MCNC: 201 MG/DL — HIGH (ref 70–99)
GLUCOSE BLDC GLUCOMTR-MCNC: 202 MG/DL — HIGH (ref 70–99)
GLUCOSE BLDC GLUCOMTR-MCNC: 239 MG/DL — HIGH (ref 70–99)
GLUCOSE SERPL-MCNC: 109 MG/DL — HIGH (ref 70–99)
GLUCOSE SERPL-MCNC: 273 MG/DL — HIGH (ref 70–99)
HBA1C BLD-MCNC: 6.7 % — HIGH (ref 4–5.6)
HCT VFR BLD CALC: 40.7 % — SIGNIFICANT CHANGE UP (ref 34.5–45)
HGB BLD-MCNC: 11.7 G/DL — SIGNIFICANT CHANGE UP (ref 11.5–15.5)
MCHC RBC-ENTMCNC: 25.5 PG — LOW (ref 27–34)
MCHC RBC-ENTMCNC: 28.7 % — LOW (ref 32–36)
MCV RBC AUTO: 88.9 FL — SIGNIFICANT CHANGE UP (ref 80–100)
NRBC # FLD: 0 K/UL — SIGNIFICANT CHANGE UP (ref 0–0)
PLATELET # BLD AUTO: 256 K/UL — SIGNIFICANT CHANGE UP (ref 150–400)
PMV BLD: 10.5 FL — SIGNIFICANT CHANGE UP (ref 7–13)
POTASSIUM SERPL-MCNC: 5.4 MMOL/L — HIGH (ref 3.5–5.3)
POTASSIUM SERPL-MCNC: 5.8 MMOL/L — HIGH (ref 3.5–5.3)
POTASSIUM SERPL-SCNC: 5.4 MMOL/L — HIGH (ref 3.5–5.3)
POTASSIUM SERPL-SCNC: 5.8 MMOL/L — HIGH (ref 3.5–5.3)
RBC # BLD: 4.58 M/UL — SIGNIFICANT CHANGE UP (ref 3.8–5.2)
RBC # FLD: 16.1 % — HIGH (ref 10.3–14.5)
SODIUM SERPL-SCNC: 136 MMOL/L — SIGNIFICANT CHANGE UP (ref 135–145)
SODIUM SERPL-SCNC: 137 MMOL/L — SIGNIFICANT CHANGE UP (ref 135–145)
WBC # BLD: 16.55 K/UL — HIGH (ref 3.8–10.5)
WBC # FLD AUTO: 16.55 K/UL — HIGH (ref 3.8–10.5)

## 2019-11-01 PROCEDURE — 94375 RESPIRATORY FLOW VOLUME LOOP: CPT | Mod: 26

## 2019-11-01 PROCEDURE — 78582 LUNG VENTILAT&PERFUS IMAGING: CPT | Mod: 26,GC

## 2019-11-01 PROCEDURE — 99497 ADVNCD CARE PLAN 30 MIN: CPT | Mod: 25

## 2019-11-01 PROCEDURE — 71045 X-RAY EXAM CHEST 1 VIEW: CPT | Mod: 26

## 2019-11-01 PROCEDURE — 99233 SBSQ HOSP IP/OBS HIGH 50: CPT | Mod: GC

## 2019-11-01 PROCEDURE — 99233 SBSQ HOSP IP/OBS HIGH 50: CPT

## 2019-11-01 RX ORDER — CHLORHEXIDINE GLUCONATE 213 G/1000ML
1 SOLUTION TOPICAL DAILY
Refills: 0 | Status: DISCONTINUED | OUTPATIENT
Start: 2019-11-01 | End: 2019-11-06

## 2019-11-01 RX ORDER — FUROSEMIDE 40 MG
20 TABLET ORAL DAILY
Refills: 0 | Status: DISCONTINUED | OUTPATIENT
Start: 2019-11-01 | End: 2019-11-06

## 2019-11-01 RX ADMIN — Medication 2: at 18:32

## 2019-11-01 RX ADMIN — HEPARIN SODIUM 5000 UNIT(S): 5000 INJECTION INTRAVENOUS; SUBCUTANEOUS at 05:53

## 2019-11-01 RX ADMIN — Medication 40 MILLIGRAM(S): at 05:53

## 2019-11-01 RX ADMIN — ATORVASTATIN CALCIUM 40 MILLIGRAM(S): 80 TABLET, FILM COATED ORAL at 22:27

## 2019-11-01 RX ADMIN — AMLODIPINE BESYLATE 10 MILLIGRAM(S): 2.5 TABLET ORAL at 05:53

## 2019-11-01 RX ADMIN — Medication 3 MILLILITER(S): at 09:00

## 2019-11-01 RX ADMIN — Medication 30 MILLILITER(S): at 18:31

## 2019-11-01 RX ADMIN — CHLORHEXIDINE GLUCONATE 1 APPLICATION(S): 213 SOLUTION TOPICAL at 18:31

## 2019-11-01 RX ADMIN — Medication 3 MILLILITER(S): at 22:23

## 2019-11-01 RX ADMIN — Medication 3 MILLILITER(S): at 14:59

## 2019-11-01 RX ADMIN — HEPARIN SODIUM 5000 UNIT(S): 5000 INJECTION INTRAVENOUS; SUBCUTANEOUS at 22:27

## 2019-11-01 RX ADMIN — Medication 3 MILLILITER(S): at 03:49

## 2019-11-01 RX ADMIN — Medication 20 MILLIGRAM(S): at 11:59

## 2019-11-01 RX ADMIN — Medication 2: at 12:25

## 2019-11-01 RX ADMIN — HEPARIN SODIUM 5000 UNIT(S): 5000 INJECTION INTRAVENOUS; SUBCUTANEOUS at 14:05

## 2019-11-01 RX ADMIN — Medication 100 MILLIGRAM(S): at 05:53

## 2019-11-01 NOTE — PROGRESS NOTE ADULT - ATTENDING COMMENTS
Pt seen with NP.  Agree with above plan.  GOC: pt indicates that she would want to have a natural death, would not want to be intubated or dependent on MV, MOLST filled out for DNR/DNI, limited medical interventions. Despite explaining the importance of filling out proper documentation with her HCP she indicated that she has already completed it in the past and that she wants to continue following care with her own doctors who have all documents. Pt seen with NP.  Agree with above plan.  GOC: pt indicates that she would want to have a natural death, would not want to be intubated or dependent on MV, MOLST filled out for DNR/DNI, limited medical interventions. Despite explaining the importance of filling out proper documentation with her HCP she indicated that she has already completed it in the past and that she wants to continue following care with her own doctors who have all documents.  Spent 30 min of ACP as above.

## 2019-11-01 NOTE — PHYSICAL THERAPY INITIAL EVALUATION ADULT - ADDITIONAL COMMENTS
Pt. left supine in bed with all tubes/lines intact, call bell in reach and in NAD. ORBERT Schaffer present at end of therapy session

## 2019-11-01 NOTE — PROGRESS NOTE ADULT - SUBJECTIVE AND OBJECTIVE BOX
CHIEF COMPLAINT:    Interval Events:    REVIEW OF SYSTEMS:  Constitutional:   Eyes:  ENT:  CV:  Resp:  GI:  :  MSK:  Integumentary:  Neurological:  Psychiatric:  Endocrine:  Hematologic/Lymphatic:  Allergic/Immunologic:  [ ] All other systems negative  [ ] Unable to assess ROS because ________    OBJECTIVE:  ICU Vital Signs Last 24 Hrs  T(C): 36.8 (01 Nov 2019 05:50), Max: 37 (31 Oct 2019 10:00)  T(F): 98.3 (01 Nov 2019 05:50), Max: 98.6 (31 Oct 2019 10:00)  HR: 87 (01 Nov 2019 05:50) (80 - 92)  BP: 143/76 (01 Nov 2019 05:50) (136/56 - 145/65)  BP(mean): 83 (31 Oct 2019 09:00) (83 - 83)  ABP: --  ABP(mean): --  RR: 18 (01 Nov 2019 05:50) (18 - 27)  SpO2: 93% (01 Nov 2019 05:50) (93% - 97%)    Mode: standby    10-31 @ 07:01  -  11-01 @ 07:00  --------------------------------------------------------  IN: 100 mL / OUT: 1150 mL / NET: -1050 mL      CAPILLARY BLOOD GLUCOSE      POCT Blood Glucose.: 113 mg/dL (01 Nov 2019 08:06)      PHYSICAL EXAM:  General:   HEENT:   Lymph Nodes:  Neck:   Respiratory:   Cardiovascular:   Abdomen:   Extremities:   Skin:   Neurological:  Psychiatry:    HOSPITAL MEDICATIONS:  MEDICATIONS  (STANDING):  albuterol/ipratropium for Nebulization 3 milliLiter(s) Nebulizer every 6 hours  amLODIPine   Tablet 10 milliGRAM(s) Oral daily  atorvastatin 40 milliGRAM(s) Oral at bedtime  dextrose 5%. 1000 milliLiter(s) (50 mL/Hr) IV Continuous <Continuous>  dextrose 50% Injectable 12.5 Gram(s) IV Push once  dextrose 50% Injectable 25 Gram(s) IV Push once  dextrose 50% Injectable 25 Gram(s) IV Push once  heparin  Injectable 5000 Unit(s) SubCutaneous every 8 hours  insulin lispro (HumaLOG) corrective regimen sliding scale   SubCutaneous three times a day before meals  insulin lispro (HumaLOG) corrective regimen sliding scale   SubCutaneous at bedtime  methylPREDNISolone sodium succinate Injectable 40 milliGRAM(s) IV Push daily  metoprolol succinate  milliGRAM(s) Oral daily    MEDICATIONS  (PRN):  dextrose 40% Gel 15 Gram(s) Oral once PRN Blood Glucose LESS THAN 70 milliGRAM(s)/deciliter  glucagon  Injectable 1 milliGRAM(s) IntraMuscular once PRN Glucose LESS THAN 70 milligrams/deciliter  oxycodone    5 mG/acetaminophen 325 mG 1 Tablet(s) Oral every 6 hours PRN moderate and severe pain      LABS:                        11.7   16.55 )-----------( 256      ( 01 Nov 2019 06:45 )             40.7     11-01    137  |  96<L>  |  36<H>  ----------------------------<  109<H>  5.8<H>   |  33<H>  |  1.06    Ca    8.9      01 Nov 2019 06:45  Phos  3.1     10-31  Mg     2.1     10-31    TPro  8.0  /  Alb  3.5  /  TBili  0.3  /  DBili  x   /  AST  20  /  ALT  19  /  AlkPhos  50  10-31        Arterial Blood Gas:  10-30 @ 22:40  7.32/66/147/29/99.2/6.9  ABG lactate: 1.1  Arterial Blood Gas:  10-30 @ 14:02  7.21/78/95/25/96.6/3.0  ABG lactate: --    Venous Blood Gas:  10-31 @ 07:00  7.27/77/76/29/93.4  VBG Lactate: --  Venous Blood Gas:  10-31 @ 03:47  7.29/72/42/29/71.8  VBG Lactate: --  Venous Blood Gas:  10-30 @ 13:47  7.18/88/57/25/83.7  VBG Lactate: 3.5      MICROBIOLOGY:     RADIOLOGY:  [ ] Reviewed and interpreted by me    PULMONARY FUNCTION TESTS:    EKG: CHIEF COMPLAINT: Patient is a 84y old  Female who presents with a chief complaint of acute hypercapnic respiratory failure (01 Nov 2019 12:41)      137  |  96<L>  |  36<H>  ----------------------------<  109<H>  5.8<H>   |  33<H>  |  1.06    Ca    8.9      01 Nov 2019 06:45  Phos  3.1     10-31  Mg     2.1     10-31    TPro  8.0  /  Alb  3.5  /  TBili  0.3  /  DBili  x   /  AST  20  /  ALT  19  /  AlkPhos  50  10-31      Interval Events: none overnight     REVIEW OF SYSTEMS:  Constitutional: Feels uncomfortable after breakfast   CV: Denies   Resp: + BERG   GI: abdominal fullness   Endocrine: refusing insulin coverage until speaks with PMD   [x ] All other systems negative  [ ] Unable to assess ROS because ________    OBJECTIVE:  ICU Vital Signs Last 24 Hrs  T(C): 36.8 (01 Nov 2019 05:50), Max: 37 (31 Oct 2019 10:00)  T(F): 98.3 (01 Nov 2019 05:50), Max: 98.6 (31 Oct 2019 10:00)  HR: 87 (01 Nov 2019 05:50) (80 - 92)  BP: 143/76 (01 Nov 2019 05:50) (136/56 - 145/65)  BP(mean): 83 (31 Oct 2019 09:00) (83 - 83)  ABP: --  ABP(mean): --  RR: 18 (01 Nov 2019 05:50) (18 - 27)  SpO2: 93% (01 Nov 2019 05:50) (93% - 97%)    Mode: standby    10-31 @ 07:01  -  11-01 @ 07:00  --------------------------------------------------------  IN: 100 mL / OUT: 1150 mL / NET: -1050 mL      CAPILLARY BLOOD GLUCOSE      POCT Blood Glucose.: 113 mg/dL (01 Nov 2019 08:06)      HOSPITAL MEDICATIONS:  MEDICATIONS  (STANDING):  albuterol/ipratropium for Nebulization 3 milliLiter(s) Nebulizer every 6 hours  amLODIPine   Tablet 10 milliGRAM(s) Oral daily  atorvastatin 40 milliGRAM(s) Oral at bedtime  dextrose 5%. 1000 milliLiter(s) (50 mL/Hr) IV Continuous <Continuous>  dextrose 50% Injectable 12.5 Gram(s) IV Push once  dextrose 50% Injectable 25 Gram(s) IV Push once  dextrose 50% Injectable 25 Gram(s) IV Push once  heparin  Injectable 5000 Unit(s) SubCutaneous every 8 hours  insulin lispro (HumaLOG) corrective regimen sliding scale   SubCutaneous three times a day before meals  insulin lispro (HumaLOG) corrective regimen sliding scale   SubCutaneous at bedtime  methylPREDNISolone sodium succinate Injectable 40 milliGRAM(s) IV Push daily  metoprolol succinate  milliGRAM(s) Oral daily    MEDICATIONS  (PRN):  dextrose 40% Gel 15 Gram(s) Oral once PRN Blood Glucose LESS THAN 70 milliGRAM(s)/deciliter  glucagon  Injectable 1 milliGRAM(s) IntraMuscular once PRN Glucose LESS THAN 70 milligrams/deciliter  oxycodone    5 mG/acetaminophen 325 mG 1 Tablet(s) Oral every 6 hours PRN moderate and severe pain      LABS:                        11.7   16.55 )-----------( 256      ( 01 Nov 2019 06:45 )             40.7     11-01    137  |  96<L>  |  36<H>  ----------------------------<  109<H>  5.8<H>   |  33<H>  |  1.06    Ca    8.9      01 Nov 2019 06:45  Phos  3.1     10-31  Mg     2.1     10-31    TPro  8.0  /  Alb  3.5  /  TBili  0.3  /  DBili  x   /  AST  20  /  ALT  19  /  AlkPhos  50  10-31        Arterial Blood Gas:  10-30 @ 22:40  7.32/66/147/29/99.2/6.9  ABG lactate: 1.1  Arterial Blood Gas:  10-30 @ 14:02  7.21/78/95/25/96.6/3.0  ABG lactate: --    Venous Blood Gas:  10-31 @ 07:00  7.27/77/76/29/93.4  VBG Lactate: --  Venous Blood Gas:  10-31 @ 03:47  7.29/72/42/29/71.8  VBG Lactate: --  Venous Blood Gas:  10-30 @ 13:47  7.18/88/57/25/83.7  VBG Lactate: 3.5      MICROBIOLOGY:     RADIOLOGY:  [ ] Reviewed and interpreted by me    PULMONARY FUNCTION TESTS:    EKG:

## 2019-11-01 NOTE — PHYSICAL THERAPY INITIAL EVALUATION ADULT - DISCHARGE DISPOSITION, PT EVAL
anticipated discharge to rehab facility to address current functional limitation to optimize safety to allow pt. to reach their optimal level of function./rehabilitation facility

## 2019-11-01 NOTE — PHYSICAL THERAPY INITIAL EVALUATION ADULT - PERTINENT HX OF CURRENT PROBLEM, REHAB EVAL
Pt. is an 84 year old female admitted to [facility] with Acute renal failure with hypoxia .PMH: GERD, DM, Asthma,

## 2019-11-01 NOTE — PROGRESS NOTE ADULT - SUBJECTIVE AND OBJECTIVE BOX
INTERVAL HPI/OVERNIGHT EVENTS:    patient resting in bed in no distress, no family at bedside.     Code Status: Full code  Allergies    No Known Allergies    Intolerances    MEDICATIONS  (STANDING):  albuterol/ipratropium for Nebulization 3 milliLiter(s) Nebulizer every 6 hours  amLODIPine   Tablet 10 milliGRAM(s) Oral daily  atorvastatin 40 milliGRAM(s) Oral at bedtime  dextrose 5%. 1000 milliLiter(s) (50 mL/Hr) IV Continuous <Continuous>  dextrose 50% Injectable 12.5 Gram(s) IV Push once  dextrose 50% Injectable 25 Gram(s) IV Push once  dextrose 50% Injectable 25 Gram(s) IV Push once  furosemide    Tablet 20 milliGRAM(s) Oral daily  heparin  Injectable 5000 Unit(s) SubCutaneous every 8 hours  insulin lispro (HumaLOG) corrective regimen sliding scale   SubCutaneous three times a day before meals  insulin lispro (HumaLOG) corrective regimen sliding scale   SubCutaneous at bedtime  metoprolol succinate  milliGRAM(s) Oral daily  predniSONE   Tablet 20 milliGRAM(s) Oral daily    MEDICATIONS  (PRN):  aluminum hydroxide/magnesium hydroxide/simethicone Suspension 30 milliLiter(s) Oral once PRN Dyspepsia  dextrose 40% Gel 15 Gram(s) Oral once PRN Blood Glucose LESS THAN 70 milliGRAM(s)/deciliter  glucagon  Injectable 1 milliGRAM(s) IntraMuscular once PRN Glucose LESS THAN 70 milligrams/deciliter      PRESENT SYMPTOMS: [ ]Unable to obtain due to poor mentation   Source if other than patient:  [ ]Family   [ ]Team     Pain (Impact on QOL):  No pain  Location:  Severity:  Minimal acceptable level (0-10 scale):       Quality:       Onset:  Duration:  Aggravating factors:  Relieving Factors  Radiation:    Dyspnea:  Yes [ x] No [ ] - [x ]Mild [ ]Moderate [ ]Severe  Anxiety:    Yes [ ] No [ x] - [ ]Mild [ ]Moderate [ ]Severe  Fatigue:    Yes [ ] No [x ] - [ ]Mild [ ]Moderate [ ]Severe  Nausea:    Yes [ ] No [ x] - [ ]Mild [ ]Moderate [ ]Severe                         Loss of appetite: Yes [ ] No [x ] - [ ]Mild [ ]Moderate [ ]Severe             Constipation:  Yes [ ] No [ x] - [ ]Mild [ ]Moderate [ ]Severe  Grief:  Yes [ ] No [x ]     PAIN AD Score:	  [email or web address]/cog/painad.pdf (Ctrl + left click to view)    Other Symptoms:  [ x]All other review of systems negative     Karnofsky Performance Score/Palliative Performance Status Version 2:         50%    http://palliative.info/resource_material/[email or web address].pdf    PHYSICAL EXAM:  Vital Signs Last 24 Hrs  T(C): 36.8 (01 Nov 2019 05:50), Max: 36.8 (01 Nov 2019 05:50)  T(F): 98.3 (01 Nov 2019 05:50), Max: 98.3 (01 Nov 2019 05:50)  HR: 70 (01 Nov 2019 09:01) (70 - 90)  BP: 143/76 (01 Nov 2019 05:50) (136/56 - 145/65)  BP(mean): --  RR: 18 (01 Nov 2019 05:50) (18 - 18)  SpO2: 96% (01 Nov 2019 09:01) (93% - 96%) I&O's Summary    31 Oct 2019 07:01  -  01 Nov 2019 07:00  --------------------------------------------------------  IN: 100 mL / OUT: 1150 mL / NET: -1050 mL     GENERAL:  [x ]Alert  [x ]Oriented x 3  [ ]Lethargic  [ ]Cachexia  [ ]Unarousable  [ x]Verbal  [ ]Non-Verbal  Behavioral:   [ ] Anxiety  [ ] Delirium [ ] Agitation [x ] Other  HEENT:  [ ]Normal   [x]Dry mouth   [ ]ET Tube/Trach  [ ]Oral lesions  PULMONARY:   [x] diminished breath sound anteriorly   [ ]Tachypnea  [ ]Audible excessive secretions   [ ]Rhonchi        [ ]Right [ ]Left [ ]Bilateral  [ ]Crackles        [ ]Right [ ]Left [ ]Bilateral  [ ]Wheezing     [ ]Right [ ]Left [ ]Bilateral  CARDIOVASCULAR:    [ x]Regular [ ]Irregular [ ]Tachy  [ ]Beto [ ]Murmur [ ]Other  GASTROINTESTINAL:  [x ]Soft  [ ]Distended   [x ]+BS  [ x]Non tender [ ]Tender  [ ]PEG [ ]OGT/ NGT  Last BM: 10/29   GENITOURINARY:  [ ]Normal [x ] Incontinent   [ ]Oliguria/Anuria   [ ]Humphrey  MUSCULOSKELETAL:   [ ]Normal   [ x]Weakness  [ ]Bed/Wheelchair bound [ ]Edema  NEUROLOGIC:   [x ]No focal deficits  [ ] Cognitive impairment  [ ] Dysphagia [ ]Dysarthria [ ] Paresis [ ]Other   SKIN:   [x ]Normal   [ ]Pressure ulcer(s)  [ ]Rash    CRITICAL CARE:  [ ] Shock Present  [ ]Septic [ ]Cardiogenic [ ]Neurologic [ ]Hypovolemic  [ ]  Vasopressors [ ]  Inotropes   [ ] Respiratory failure present  [ ] Acute  [ ] Chronic [ ] Hypoxic  [ ] Hypercarbic [ ] Other  [ ] Other organ failure     LABS:                        11.7   16.55 )-----------( 256      ( 01 Nov 2019 06:45 )             40.7   11-01    137  |  96<L>  |  36<H>  ----------------------------<  109<H>  5.8<H>   |  33<H>  |  1.06    Ca    8.9      01 Nov 2019 06:45  Phos  3.1     10-31  Mg     2.1     10-31    TPro  8.0  /  Alb  3.5  /  TBili  0.3  /  DBili  x   /  AST  20  /  ALT  19  /  AlkPhos  50  10-31        RADIOLOGY & ADDITIONAL STUDIES:    Protein Calorie Malnutrition Present: [ ] yes [ ] no  [ ] PPSV2 < or = 30%  [ ] significant weight loss [ ] poor nutritional intake [ ] anasarca [ ] catabolic state Albumin, Serum: 3.5 g/dL (10-31-19 @ 03:35)      REFERRALS:   [ ]Chaplaincy  [ ] Hospice  [ ]Child Life  [ ]Social Work  [ ]Case management [ ]Holistic Therapy   Goals of Care Document: INTERVAL HPI/OVERNIGHT EVENTS:    patient resting in bed in no distress, no family at bedside.     Code Status: Full code  Allergies    No Known Allergies    Intolerances    MEDICATIONS  (STANDING):  albuterol/ipratropium for Nebulization 3 milliLiter(s) Nebulizer every 6 hours  amLODIPine   Tablet 10 milliGRAM(s) Oral daily  atorvastatin 40 milliGRAM(s) Oral at bedtime  dextrose 5%. 1000 milliLiter(s) (50 mL/Hr) IV Continuous <Continuous>  dextrose 50% Injectable 12.5 Gram(s) IV Push once  dextrose 50% Injectable 25 Gram(s) IV Push once  dextrose 50% Injectable 25 Gram(s) IV Push once  furosemide    Tablet 20 milliGRAM(s) Oral daily  heparin  Injectable 5000 Unit(s) SubCutaneous every 8 hours  insulin lispro (HumaLOG) corrective regimen sliding scale   SubCutaneous three times a day before meals  insulin lispro (HumaLOG) corrective regimen sliding scale   SubCutaneous at bedtime  metoprolol succinate  milliGRAM(s) Oral daily  predniSONE   Tablet 20 milliGRAM(s) Oral daily    MEDICATIONS  (PRN):  aluminum hydroxide/magnesium hydroxide/simethicone Suspension 30 milliLiter(s) Oral once PRN Dyspepsia  dextrose 40% Gel 15 Gram(s) Oral once PRN Blood Glucose LESS THAN 70 milliGRAM(s)/deciliter  glucagon  Injectable 1 milliGRAM(s) IntraMuscular once PRN Glucose LESS THAN 70 milligrams/deciliter    PRESENT SYMPTOMS: [ ]Unable to obtain due to poor mentation   Source if other than patient:  [ ]Family   [ ]Team     Pain (Impact on QOL):  No pain  Location:  Severity:  Minimal acceptable level (0-10 scale):       Quality:       Onset:  Duration:  Aggravating factors:  Relieving Factors  Radiation:    Dyspnea:  Yes [ x] No [ ] - [x ]Mild [ ]Moderate [ ]Severe  Anxiety:    Yes [ ] No [ x] - [ ]Mild [ ]Moderate [ ]Severe  Fatigue:    Yes [ ] No [x ] - [ ]Mild [ ]Moderate [ ]Severe  Nausea:    Yes [ ] No [ x] - [ ]Mild [ ]Moderate [ ]Severe                         Loss of appetite: Yes [ ] No [x ] - [ ]Mild [ ]Moderate [ ]Severe             Constipation:  Yes [ ] No [ x] - [ ]Mild [ ]Moderate [ ]Severe  Grief:  Yes [ ] No [x ]     PAIN AD Score:	  [email or web address]/[email or web address] (Ctrl + left click to view)    Other Symptoms:  [ x]All other review of systems negative     Karnofsky Performance Score/Palliative Performance Status Version 2:         50%    http://palliative.info/resource_material/[email or web address].pdf    PHYSICAL EXAM:  Vital Signs Last 24 Hrs  T(C): 36.8 (01 Nov 2019 05:50), Max: 36.8 (01 Nov 2019 05:50)  T(F): 98.3 (01 Nov 2019 05:50), Max: 98.3 (01 Nov 2019 05:50)  HR: 70 (01 Nov 2019 09:01) (70 - 90)  BP: 143/76 (01 Nov 2019 05:50) (136/56 - 145/65)  BP(mean): --  RR: 18 (01 Nov 2019 05:50) (18 - 18)  SpO2: 96% (01 Nov 2019 09:01) (93% - 96%) I&O's Summary    31 Oct 2019 07:01  -  01 Nov 2019 07:00  --------------------------------------------------------  IN: 100 mL / OUT: 1150 mL / NET: -1050 mL     GENERAL:  [x ]Alert  [x ]Oriented x 3  [ ]Lethargic  [ ]Cachexia  [ ]Unarousable  [ x]Verbal  [ ]Non-Verbal  Behavioral:   [ ] Anxiety  [ ] Delirium [ ] Agitation [x ] Other  HEENT:  [ ]Normal   [x]Dry mouth   [ ]ET Tube/Trach  [ ]Oral lesions  PULMONARY:   [x] diminished breath sound anteriorly   [ ]Tachypnea  [ ]Audible excessive secretions   [ ]Rhonchi        [ ]Right [ ]Left [ ]Bilateral  [ ]Crackles        [ ]Right [ ]Left [ ]Bilateral  [ ]Wheezing     [ ]Right [ ]Left [ ]Bilateral  CARDIOVASCULAR:    [ x]Regular [ ]Irregular [ ]Tachy  [ ]Beto [ ]Murmur [ ]Other  GASTROINTESTINAL:  [x ]Soft  [ ]Distended   [x ]+BS  [ x]Non tender [ ]Tender  [ ]PEG [ ]OGT/ NGT  Last BM: 10/29   GENITOURINARY:  [ ]Normal [x ] Incontinent   [ ]Oliguria/Anuria   [ ]Humphrey  MUSCULOSKELETAL:   [ ]Normal   [ x]Weakness  [ ]Bed/Wheelchair bound [ ]Edema  NEUROLOGIC:   [x ]No focal deficits  [ ] Cognitive impairment  [ ] Dysphagia [ ]Dysarthria [ ] Paresis [ ]Other   SKIN:   [x ]Normal   [ ]Pressure ulcer(s)  [ ]Rash    CRITICAL CARE:  [ ] Shock Present  [ ]Septic [ ]Cardiogenic [ ]Neurologic [ ]Hypovolemic  [ ]  Vasopressors [ ]  Inotropes   [ ] Respiratory failure present  [ ] Acute  [ ] Chronic [ ] Hypoxic  [ ] Hypercarbic [ ] Other  [ ] Other organ failure     LABS:                        11.7   16.55 )-----------( 256      ( 01 Nov 2019 06:45 )             40.7   11-01    137  |  96<L>  |  36<H>  ----------------------------<  109<H>  5.8<H>   |  33<H>  |  1.06    Ca    8.9      01 Nov 2019 06:45  Phos  3.1     10-31  Mg     2.1     10-31    TPro  8.0  /  Alb  3.5  /  TBili  0.3  /  DBili  x   /  AST  20  /  ALT  19  /  AlkPhos  50  10-31    RADIOLOGY & ADDITIONAL STUDIES: reviewed.     Protein Calorie Malnutrition Present: [ ] yes [ ] no  [ ] PPSV2 < or = 30%  [ ] significant weight loss [ ] poor nutritional intake [ ] anasarca [ ] catabolic state Albumin, Serum: 3.5 g/dL (10-31-19 @ 03:35)    REFERRALS:   [ ]Chaplaincy  [ ] Hospice  [ ]Child Life  [ ]Social Work  [ ]Case management [ ]Holistic Therapy   Goals of Care Document:

## 2019-11-01 NOTE — PROGRESS NOTE ADULT - PROBLEM SELECTOR PLAN 5
Discussion had with patient regarding her overall goals of care. She stated that she makes her own decisions and no one else. She stated that she would like a natural death and not to be put on any machines, and a MOLST was filled out stated that. She also stated that she does not feel that her daughter has her best interest in mind, and because she has filled out paper work at another institution does not want to fill out a health care proxy at this time with this institution. Explained the importance of the health care proxy if she loses the capacity to make decisions for herself but she continued to refuse. MOLST was filled out and put in the chart. Discussion had with patient regarding her overall goals of care. She stated that she makes her own decisions and no one else. She stated that she would like a natural death and not to be put on any machines, and a MOLST was filled out stating that. She also stated that she does not feel that her daughter has her best interest in mind, and because she has filled out paper work at another institution does not want to fill out a health care proxy at this time with this institution. Explained the importance of the health care proxy if she loses the capacity to make decisions for herself but she continued to refuse, she mentioned she has appointed her brother as HCP in the past and wants to continue to have her brother as HCP. MOLST was filled out and put in the chart.

## 2019-11-01 NOTE — PHYSICAL THERAPY INITIAL EVALUATION ADULT - GAIT DISTANCE, PT EVAL
4-6 side steps, during ambulation Pt Spo2 decreasing to 85% while on supplemental O2. Pt. denied symptoms. Pt. returned supine in bed in which Spo2 Increased to above 91%. Further distance to be progress as appropriate.

## 2019-11-01 NOTE — PROGRESS NOTE ADULT - ATTENDING COMMENTS
acute on chronic resp failure with hypoxia and hypercapnea  OHS/SHAD, diastolic dysfunction, pulmonary hypertension, ? carries dx of asthma. nonsmoker  now also reports h/o PE after knee surgery, said she couldn't be on blood thinners?    Continue nocturnal AVAPS. spirometry.  Decrease steroids to pred 20, quickly taper. lungs are clear. no evidence of copd  Lasix 20 daily  VQ scan

## 2019-11-02 ENCOUNTER — TRANSCRIPTION ENCOUNTER (OUTPATIENT)
Age: 84
End: 2019-11-02

## 2019-11-02 LAB
ANION GAP SERPL CALC-SCNC: 6 MMO/L — LOW (ref 7–14)
BUN SERPL-MCNC: 32 MG/DL — HIGH (ref 7–23)
CALCIUM SERPL-MCNC: 9.2 MG/DL — SIGNIFICANT CHANGE UP (ref 8.4–10.5)
CHLORIDE SERPL-SCNC: 95 MMOL/L — LOW (ref 98–107)
CO2 SERPL-SCNC: 36 MMOL/L — HIGH (ref 22–31)
CREAT SERPL-MCNC: 0.99 MG/DL — SIGNIFICANT CHANGE UP (ref 0.5–1.3)
GLUCOSE BLDC GLUCOMTR-MCNC: 107 MG/DL — HIGH (ref 70–99)
GLUCOSE BLDC GLUCOMTR-MCNC: 113 MG/DL — HIGH (ref 70–99)
GLUCOSE BLDC GLUCOMTR-MCNC: 120 MG/DL — HIGH (ref 70–99)
GLUCOSE BLDC GLUCOMTR-MCNC: 179 MG/DL — HIGH (ref 70–99)
GLUCOSE SERPL-MCNC: 153 MG/DL — HIGH (ref 70–99)
POTASSIUM SERPL-MCNC: 5.4 MMOL/L — HIGH (ref 3.5–5.3)
POTASSIUM SERPL-SCNC: 5.4 MMOL/L — HIGH (ref 3.5–5.3)
SODIUM SERPL-SCNC: 137 MMOL/L — SIGNIFICANT CHANGE UP (ref 135–145)

## 2019-11-02 PROCEDURE — 99233 SBSQ HOSP IP/OBS HIGH 50: CPT | Mod: GC

## 2019-11-02 RX ORDER — POLYETHYLENE GLYCOL 3350 17 G/17G
17 POWDER, FOR SOLUTION ORAL ONCE
Refills: 0 | Status: COMPLETED | OUTPATIENT
Start: 2019-11-02 | End: 2019-11-02

## 2019-11-02 RX ORDER — POLYETHYLENE GLYCOL 3350 17 G/17G
POWDER, FOR SOLUTION ORAL
Refills: 0 | Status: DISCONTINUED | OUTPATIENT
Start: 2019-11-02 | End: 2019-11-06

## 2019-11-02 RX ORDER — POLYETHYLENE GLYCOL 3350 17 G/17G
17 POWDER, FOR SOLUTION ORAL DAILY
Refills: 0 | Status: DISCONTINUED | OUTPATIENT
Start: 2019-11-02 | End: 2019-11-06

## 2019-11-02 RX ORDER — INSULIN HUMAN 100 [IU]/ML
5 INJECTION, SOLUTION SUBCUTANEOUS ONCE
Refills: 0 | Status: COMPLETED | OUTPATIENT
Start: 2019-11-02 | End: 2019-11-02

## 2019-11-02 RX ORDER — DEXTROSE 50 % IN WATER 50 %
25 SYRINGE (ML) INTRAVENOUS ONCE
Refills: 0 | Status: COMPLETED | OUTPATIENT
Start: 2019-11-02 | End: 2019-11-02

## 2019-11-02 RX ORDER — HYDRALAZINE HCL 50 MG
100 TABLET ORAL THREE TIMES A DAY
Refills: 0 | Status: DISCONTINUED | OUTPATIENT
Start: 2019-11-02 | End: 2019-11-06

## 2019-11-02 RX ADMIN — Medication 20 MILLIGRAM(S): at 05:42

## 2019-11-02 RX ADMIN — ATORVASTATIN CALCIUM 40 MILLIGRAM(S): 80 TABLET, FILM COATED ORAL at 22:10

## 2019-11-02 RX ADMIN — POLYETHYLENE GLYCOL 3350 17 GRAM(S): 17 POWDER, FOR SOLUTION ORAL at 05:41

## 2019-11-02 RX ADMIN — INSULIN HUMAN 5 UNIT(S): 100 INJECTION, SOLUTION SUBCUTANEOUS at 09:25

## 2019-11-02 RX ADMIN — HEPARIN SODIUM 5000 UNIT(S): 5000 INJECTION INTRAVENOUS; SUBCUTANEOUS at 22:10

## 2019-11-02 RX ADMIN — Medication 25 MILLILITER(S): at 09:26

## 2019-11-02 RX ADMIN — HEPARIN SODIUM 5000 UNIT(S): 5000 INJECTION INTRAVENOUS; SUBCUTANEOUS at 13:48

## 2019-11-02 RX ADMIN — CHLORHEXIDINE GLUCONATE 1 APPLICATION(S): 213 SOLUTION TOPICAL at 12:03

## 2019-11-02 RX ADMIN — AMLODIPINE BESYLATE 10 MILLIGRAM(S): 2.5 TABLET ORAL at 05:40

## 2019-11-02 RX ADMIN — Medication 3 MILLILITER(S): at 09:14

## 2019-11-02 RX ADMIN — HEPARIN SODIUM 5000 UNIT(S): 5000 INJECTION INTRAVENOUS; SUBCUTANEOUS at 05:40

## 2019-11-02 RX ADMIN — Medication 100 MILLIGRAM(S): at 22:10

## 2019-11-02 RX ADMIN — Medication 20 MILLIGRAM(S): at 05:40

## 2019-11-02 RX ADMIN — Medication 1: at 12:02

## 2019-11-02 RX ADMIN — Medication 100 MILLIGRAM(S): at 13:48

## 2019-11-02 RX ADMIN — POLYETHYLENE GLYCOL 3350 17 GRAM(S): 17 POWDER, FOR SOLUTION ORAL at 12:02

## 2019-11-02 RX ADMIN — Medication 3 MILLILITER(S): at 21:52

## 2019-11-02 RX ADMIN — Medication 3 MILLILITER(S): at 15:19

## 2019-11-02 RX ADMIN — Medication 100 MILLIGRAM(S): at 05:40

## 2019-11-02 RX ADMIN — Medication 3 MILLILITER(S): at 03:54

## 2019-11-02 NOTE — PROGRESS NOTE ADULT - PROBLEM SELECTOR PLAN 3
- cont BIPAP HS and PRN  - pt has a machine at home, but unsure if CPAP/BIPAP and settings  - SW daughter today, will bring in machine tomorrow

## 2019-11-02 NOTE — DISCHARGE NOTE PROVIDER - NSDCCPCAREPLAN_GEN_ALL_CORE_FT
PRINCIPAL DISCHARGE DIAGNOSIS  Diagnosis: Acute respiratory failure with hypoxia and hypercapnia  Assessment and Plan of Treatment: Will need to continue nasal cannula during the day that BIPAP nightly.  Please follow up with pulmonologist within 1-2 weeks of discharge from hospital.      SECONDARY DISCHARGE DIAGNOSES  Diagnosis: HTN (hypertension)  Assessment and Plan of Treatment: Blood pressure is improved.  Continue medications------------    Diagnosis: SHAD (obstructive sleep apnea)  Assessment and Plan of Treatment: Continue with BIPAP-------------    Diagnosis: DM (diabetes mellitus)  Assessment and Plan of Treatment: Your a1c is 6.7  Continue oral medicaitons and diet control. PRINCIPAL DISCHARGE DIAGNOSIS  Diagnosis: Acute respiratory failure with hypoxia and hypercapnia  Assessment and Plan of Treatment: Will need to continue nasal cannula during the day that BIPAP nightly.  Nasal cannula 4L day/ Bipap 18/10 40%  Please follow up with pulmonologist within 1-2 weeks of discharge from hospital.  CM arranged [facility] to be delivered on dc from rehab for home use.  Call when pt being dc   continue pulmonary inhalers as ordered      SECONDARY DISCHARGE DIAGNOSES  Diagnosis: SHAD (obstructive sleep apnea)  Assessment and Plan of Treatment: Continue with BIPAP at hs   nasal cannula during day    Diagnosis: HTN (hypertension)  Assessment and Plan of Treatment: Blood pressure is improved.  Continue medications ordered   Losartan held for now    Diagnosis: DM (diabetes mellitus)  Assessment and Plan of Treatment: Your a1c is 6.7  Continue oral medicaitons and diet control.  Follow accuchecks    Diagnosis: Prophylactic measure  Assessment and Plan of Treatment: Prophylactic measure  Heparin subq until ambulation improved at rehab

## 2019-11-02 NOTE — DISCHARGE NOTE PROVIDER - PROVIDER TOKENS
FREE:[LAST:[augie],FIRST:[last],PHONE:[phone]],FAX:[(   )    -]],FREE:[LAST:[edinson BAUMAN],PHONE:[(   )    -],FAX:[(   )    -],ADDRESS:[ on CT from rehab]]

## 2019-11-02 NOTE — PROGRESS NOTE ADULT - SUBJECTIVE AND OBJECTIVE BOX
CHIEF COMPLAINT: Patient is a 84y old  Female who presents with a chief complaint of acute hypercapnic respiratory failure (01 Nov 2019 12:41)    Interval Events:      REVIEW OF SYSTEMS:  Constitutional:   Eyes:  ENT:  CV:  Resp:  GI:  :  MSK:  Integumentary:  Neurological:  Psychiatric:  Endocrine:  Hematologic/Lymphatic:  Allergic/Immunologic:  [ ] All other systems negative  [ ] Unable to assess ROS because ________      OBJECTIVE:  ICU Vital Signs Last 24 Hrs  T(C): 36.6 (02 Nov 2019 05:39), Max: 36.8 (01 Nov 2019 14:02)  T(F): 97.8 (02 Nov 2019 05:39), Max: 98.3 (01 Nov 2019 14:02)  HR: 64 (02 Nov 2019 06:50) (64 - 83)  BP: 142/67 (02 Nov 2019 05:39) (137/64 - 150/68)  BP(mean): --  ABP: --  ABP(mean): --  RR: 22 (02 Nov 2019 05:39) (19 - 22)  SpO2: 93% (02 Nov 2019 06:50) (92% - 99%)    Mode: standby    11-01 @ 07:01  -  11-02 @ 07:00  --------------------------------------------------------  IN: 240 mL / OUT: 400 mL / NET: -160 mL    POCT Blood Glucose.: 120 mg/dL (02 Nov 2019 08:08)    HOSPITAL MEDICATIONS:  MEDICATIONS  (STANDING):  albuterol/ipratropium for Nebulization 3 milliLiter(s) Nebulizer every 6 hours  amLODIPine   Tablet 10 milliGRAM(s) Oral daily  atorvastatin 40 milliGRAM(s) Oral at bedtime  chlorhexidine 4% Liquid 1 Application(s) Topical daily  dextrose 5%. 1000 milliLiter(s) (50 mL/Hr) IV Continuous <Continuous>  dextrose 50% Injectable 12.5 Gram(s) IV Push once  dextrose 50% Injectable 25 Gram(s) IV Push once  dextrose 50% Injectable 25 Gram(s) IV Push once  dextrose 50% Injectable 25 milliLiter(s) IV Push once  furosemide    Tablet 20 milliGRAM(s) Oral daily  heparin  Injectable 5000 Unit(s) SubCutaneous every 8 hours  hydrALAZINE 100 milliGRAM(s) Oral three times a day  insulin lispro (HumaLOG) corrective regimen sliding scale   SubCutaneous three times a day before meals  insulin lispro (HumaLOG) corrective regimen sliding scale   SubCutaneous at bedtime  insulin regular  human recombinant. 5 Unit(s) IV Push once  metoprolol succinate  milliGRAM(s) Oral daily  polyethylene glycol 3350      polyethylene glycol 3350 17 Gram(s) Oral daily    MEDICATIONS  (PRN):  dextrose 40% Gel 15 Gram(s) Oral once PRN Blood Glucose LESS THAN 70 milliGRAM(s)/deciliter  glucagon  Injectable 1 milliGRAM(s) IntraMuscular once PRN Glucose LESS THAN 70 milligrams/deciliter      LABS:                        11.7   16.55 )-----------( 256      ( 01 Nov 2019 06:45 )             40.7     11-02    137  |  95<L>  |  32<H>  ----------------------------<  153<H>  5.4<H>   |  36<H>  |  0.99    Ca    9.2      02 Nov 2019 06:15                MICROBIOLOGY:     RADIOLOGY:  [ ] Reviewed and interpreted by me    PULMONARY FUNCTION TESTS:    EKG: CHIEF COMPLAINT: Patient is a 84y old  Female who presents with a chief complaint of acute hypercapnic respiratory failure (01 Nov 2019 12:41)    Interval Events: none overnight       REVIEW OF SYSTEMS:  Constitutional: no complaints   CV: denies   Resp: denies   GI: denies   [x] All other systems negative  [ ] Unable to assess ROS because ________      OBJECTIVE:  ICU Vital Signs Last 24 Hrs  T(C): 36.6 (02 Nov 2019 05:39), Max: 36.8 (01 Nov 2019 14:02)  T(F): 97.8 (02 Nov 2019 05:39), Max: 98.3 (01 Nov 2019 14:02)  HR: 64 (02 Nov 2019 06:50) (64 - 83)  BP: 142/67 (02 Nov 2019 05:39) (137/64 - 150/68)  BP(mean): --  ABP: --  ABP(mean): --  RR: 22 (02 Nov 2019 05:39) (19 - 22)  SpO2: 93% (02 Nov 2019 06:50) (92% - 99%)    Mode: standby    11-01 @ 07:01  -  11-02 @ 07:00  --------------------------------------------------------  IN: 240 mL / OUT: 400 mL / NET: -160 mL    POCT Blood Glucose.: 120 mg/dL (02 Nov 2019 08:08)    HOSPITAL MEDICATIONS:  MEDICATIONS  (STANDING):  albuterol/ipratropium for Nebulization 3 milliLiter(s) Nebulizer every 6 hours  amLODIPine   Tablet 10 milliGRAM(s) Oral daily  atorvastatin 40 milliGRAM(s) Oral at bedtime  chlorhexidine 4% Liquid 1 Application(s) Topical daily  dextrose 5%. 1000 milliLiter(s) (50 mL/Hr) IV Continuous <Continuous>  dextrose 50% Injectable 12.5 Gram(s) IV Push once  dextrose 50% Injectable 25 Gram(s) IV Push once  dextrose 50% Injectable 25 Gram(s) IV Push once  dextrose 50% Injectable 25 milliLiter(s) IV Push once  furosemide    Tablet 20 milliGRAM(s) Oral daily  heparin  Injectable 5000 Unit(s) SubCutaneous every 8 hours  hydrALAZINE 100 milliGRAM(s) Oral three times a day  insulin lispro (HumaLOG) corrective regimen sliding scale   SubCutaneous three times a day before meals  insulin lispro (HumaLOG) corrective regimen sliding scale   SubCutaneous at bedtime  insulin regular  human recombinant. 5 Unit(s) IV Push once  metoprolol succinate  milliGRAM(s) Oral daily  polyethylene glycol 3350      polyethylene glycol 3350 17 Gram(s) Oral daily    MEDICATIONS  (PRN):  dextrose 40% Gel 15 Gram(s) Oral once PRN Blood Glucose LESS THAN 70 milliGRAM(s)/deciliter  glucagon  Injectable 1 milliGRAM(s) IntraMuscular once PRN Glucose LESS THAN 70 milligrams/deciliter      LABS:               11-02    137  |  95<L>  |  32<H>  ----------------------------<  153<H>  5.4<H>   |  36<H>  |  0.99    Ca    9.2      02 Nov 2019 06:15

## 2019-11-02 NOTE — DISCHARGE NOTE PROVIDER - NSDCMRMEDTOKEN_GEN_ALL_CORE_FT
Advair Diskus 250 mcg-50 mcg inhalation powder: 1 puff(s) inhaled 2 times a day  alendronate 70 mg oral tablet: 1 tab(s) orally once a week  amLODIPine 10 mg oral tablet: 1 tab(s) orally once a day  ascorbic acid 500 mg oral capsule: 1 cap(s) orally once a day  Atrovent HFA 17 mcg/inh inhalation aerosol: 2 puff(s) inhaled 4 times a day  azelastine 137 mcg/inh (0.1%) nasal spray: 2 spray(s) nasal 2 times a day  calcium (as carbonate) 500 mg oral tablet: 0.5 tab(s) orally once a day  cholecalciferol 2000 intl units oral tablet: 1 tab(s) orally once a day  glimepiride 2 mg oral tablet: 1 tab(s) orally once a day  hydrALAZINE 100 mg oral tablet: 1 tab(s) orally 3 times a day  lactulose 10 g/15 mL oral syrup: 30 milliliter(s) orally 2 times a day  losartan 100 mg oral tablet: 1 tab(s) orally once a day  metoprolol succinate 100 mg oral tablet, extended release: 1 tab(s) orally once a day  montelukast 10 mg oral tablet: 1 tab(s) orally once a day  Percocet 5/325 oral tablet: 1 tab(s) orally every 6 hours, As Needed  ProAir HFA 90 mcg/inh inhalation aerosol: 2 puff(s) inhaled 4 times a day, As Needed  rosuvastatin 10 mg oral tablet: 1 tab(s) orally once a day Advair Diskus 250 mcg-50 mcg inhalation powder: 1 puff(s) inhaled 2 times a day  alendronate 70 mg oral tablet: 1 tab(s) orally once a week  amLODIPine 10 mg oral tablet: 1 tab(s) orally once a day  ascorbic acid 500 mg oral capsule: 1 cap(s) orally once a day  Atrovent HFA 17 mcg/inh inhalation aerosol: 2 puff(s) inhaled 4 times a day  azelastine 137 mcg/inh (0.1%) nasal spray: 2 spray(s) nasal 2 times a day  calcium (as carbonate) 500 mg oral tablet: 0.5 tab(s) orally once a day  cholecalciferol 2000 intl units oral tablet: 1 tab(s) orally once a day  furosemide 20 mg oral tablet: 1 tab(s) orally once a day  glimepiride 2 mg oral tablet: 1 tab(s) orally once a day  heparin: 5000 unit(s) subcutaneous every 8 hours  hydrALAZINE 100 mg oral tablet: 1 tab(s) orally 3 times a day  hydrALAZINE 100 mg oral tablet: 1 tab(s) orally 3 times a day  metoprolol succinate 100 mg oral tablet, extended release: 1 tab(s) orally once a day  montelukast 10 mg oral tablet: 1 tab(s) orally once a day  ocular lubricant ophthalmic solution: 1 drop(s) to each affected eye 3 times a day, As needed, Dry Eyes  polyethylene glycol 3350 oral powder for reconstitution: 17 gram(s) orally once a day  ProAir HFA 90 mcg/inh inhalation aerosol: 2 puff(s) inhaled 4 times a day, As Needed  rosuvastatin 10 mg oral tablet: 1 tab(s) orally once a day Advair Diskus 250 mcg-50 mcg inhalation powder: 1 puff(s) inhaled 2 times a day  alendronate 70 mg oral tablet: 1 tab(s) orally once a week  amLODIPine 10 mg oral tablet: 1 tab(s) orally once a day  ascorbic acid 500 mg oral capsule: 1 cap(s) orally once a day  azelastine 137 mcg/inh (0.1%) nasal spray: 2 spray(s) nasal 2 times a day  calcium (as carbonate) 500 mg oral tablet: 0.5 tab(s) orally once a day  cholecalciferol 2000 intl units oral tablet: 1 tab(s) orally once a day  furosemide 20 mg oral tablet: 1 tab(s) orally once a day  glimepiride 2 mg oral tablet: 1 tab(s) orally once a day  heparin: 5000 unit(s) subcutaneous every 8 hours  hydrALAZINE 100 mg oral tablet: 1 tab(s) orally 3 times a day  hydrALAZINE 100 mg oral tablet: 1 tab(s) orally 3 times a day  ipratropium-albuterol 0.5 mg-2.5 mg/3 mLinhalation solution: 3 milliliter(s) inhaled every 6 hours  metoprolol succinate 100 mg oral tablet, extended release: 1 tab(s) orally once a day  montelukast 10 mg oral tablet: 1 tab(s) orally once a day  ocular lubricant ophthalmic solution: 1 drop(s) to each affected eye 3 times a day, As needed, Dry Eyes  polyethylene glycol 3350 oral powder for reconstitution: 17 gram(s) orally once a day  ProAir HFA 90 mcg/inh inhalation aerosol: 2 puff(s) inhaled 4 times a day, As Needed  rosuvastatin 10 mg oral tablet: 1 tab(s) orally once a day

## 2019-11-02 NOTE — PROGRESS NOTE ADULT - ATTENDING COMMENTS
Pt feels well  Wearing her AVAPS at night  V/Q negative  acute on chronic resp failure with hypercapnea and hypoxia, morbid obesity, SHAD/OHS, home opiates for pain, carries dx also asthma?, and moderate pulm hypertension - group 2 and 3  quick taper prednisone  lasix 20 daily  nocturnal avaps, spirometry for qualifier  DM, on home oral meds, sliding scale here as well - hyperglycemia had been worsened by steroids  d/c planning Pt remains stable  d/c planning

## 2019-11-02 NOTE — DISCHARGE NOTE PROVIDER - HOSPITAL COURSE
85 y/o F HTN, chronic asthma, copd/emphysema on home O2 (2L), SHAD on CPAP, type 2 DM, HLD, GERD presenting w/ shortness of breath. Per daughter at bedside, pt had increased SOB yesterday and Increased her oxygen to 3L (baseline 2L) for increased work of breathing. When pt placed her CPAP overnight, there was mild improvement but when pt woke up this morning, continued to have SOB and brought her into the ED. Pt also tried duonebs without improvement. Pt Has chronic cough, no change recently in that but does endorse increased sputum production. Also endorses BERG but at baseline.  Pt can walk 1 block at baseline. Denies known hx of CHF but per daughter was prescribed lasix 40mg daily and had not been taking it for the past few days.  No known recent sick contacts. Endorses weakness but Denies fevers, chills, headache, dizziness, chest pain, abdominal pain, n/v/d/c, urinary symptoms, MSK pain.        In the ED, pt had increased WOB and placed on non-rebreather. T: 98.1, HR , /42 and 93% on nonrebreather.  Labs significant for pH 7.2 and pCO2 of 86.  Patient was placed on bipap 12/6 FiO2 of 40%.  Pt received IV lasix 20mg, solumedrol 125mg , and duonebs x 3. In the ED, pt's mental status continue to worsen and ABG showed pCO2 of 78. Patient was accepted to MICU for acute hypercapnic respiratory failure.         In MICU pt continued BIPAP, steroids, nebs.    Pt improved, was tx to [facility] on 10/31 on NC day, AVAPs HS.    Pt states she has a mask machine at home----------    PT recs rehab - family declines.                dispo: 83 y/o F HTN, chronic asthma, copd/emphysema on home O2 (2L), SHAD on CPAP, type 2 DM, HLD, GERD presenting w/ shortness of breath. Per daughter at bedside, pt had increased SOB yesterday and Increased her oxygen to 3L (baseline 2L) for increased work of breathing. When pt placed her CPAP overnight, there was mild improvement but when pt woke up this morning, continued to have SOB and brought her into the ED. Pt also tried duonebs without improvement. Pt Has chronic cough, no change recently in that but does endorse increased sputum production. Also endorses BERG but at baseline.  Pt can walk 1 block at baseline. Denies known hx of CHF but per daughter was prescribed lasix 40mg daily and had not been taking it for the past few days.  No known recent sick contacts. Endorses weakness but Denies fevers, chills, headache, dizziness, chest pain, abdominal pain, n/v/d/c, urinary symptoms, MSK pain.        In the ED, pt had increased WOB and placed on non-rebreather. T: 98.1, HR , /42 and 93% on nonrebreather.  Labs significant for pH 7.2 and pCO2 of 86.  Patient was placed on bipap 12/6 FiO2 of 40%.  Pt received IV lasix 20mg, solumedrol 125mg , and duonebs x 3. In the ED, pt's mental status continue to worsen and ABG showed pCO2 of 78. Patient was accepted to MICU for acute hypercapnic respiratory failure.         In MICU pt continued BIPAP, steroids, nebs.    Pt improved, was tx to [facility] on 10/31 on NC day, AVAPs HS.    VQ scan was negative for hypoxia. Spirometry done for eval of bi level for home      Pt states she has a mask machine at home-  arranged for bipap for home use - documentation sent to [facility]     HTN continue meds follow bp with paramters     DM consistant carb diet, follow accuchecks continue meds     HLD - statin     PT recs rehab pt will go to [facility] nasal cannula during day /bipap at night         Pt seen and evaluated by Dr. Lisker 11/6/19 and cleared for discharge to rehab         dispo: Rehab

## 2019-11-02 NOTE — DISCHARGE NOTE PROVIDER - CARE PROVIDER_API CALL
last ghosh  Phone: [phone]  Fax: (   )    -  Follow Up Time:     edinson BAUMAN,   Fu on dc from rehab  Phone: (   )    -  Fax: (   )    -  Follow Up Time:

## 2019-11-03 LAB
ANION GAP SERPL CALC-SCNC: 11 MMO/L — SIGNIFICANT CHANGE UP (ref 7–14)
BUN SERPL-MCNC: 28 MG/DL — HIGH (ref 7–23)
CALCIUM SERPL-MCNC: 9 MG/DL — SIGNIFICANT CHANGE UP (ref 8.4–10.5)
CHLORIDE SERPL-SCNC: 92 MMOL/L — LOW (ref 98–107)
CO2 SERPL-SCNC: 34 MMOL/L — HIGH (ref 22–31)
CREAT SERPL-MCNC: 0.88 MG/DL — SIGNIFICANT CHANGE UP (ref 0.5–1.3)
GLUCOSE BLDC GLUCOMTR-MCNC: 113 MG/DL — HIGH (ref 70–99)
GLUCOSE BLDC GLUCOMTR-MCNC: 151 MG/DL — HIGH (ref 70–99)
GLUCOSE BLDC GLUCOMTR-MCNC: 158 MG/DL — HIGH (ref 70–99)
GLUCOSE BLDC GLUCOMTR-MCNC: 94 MG/DL — SIGNIFICANT CHANGE UP (ref 70–99)
GLUCOSE SERPL-MCNC: 83 MG/DL — SIGNIFICANT CHANGE UP (ref 70–99)
HCT VFR BLD CALC: 40.6 % — SIGNIFICANT CHANGE UP (ref 34.5–45)
HGB BLD-MCNC: 11.9 G/DL — SIGNIFICANT CHANGE UP (ref 11.5–15.5)
MCHC RBC-ENTMCNC: 25.2 PG — LOW (ref 27–34)
MCHC RBC-ENTMCNC: 29.3 % — LOW (ref 32–36)
MCV RBC AUTO: 86 FL — SIGNIFICANT CHANGE UP (ref 80–100)
NRBC # FLD: 0 K/UL — SIGNIFICANT CHANGE UP (ref 0–0)
PLATELET # BLD AUTO: 230 K/UL — SIGNIFICANT CHANGE UP (ref 150–400)
PMV BLD: 10.4 FL — SIGNIFICANT CHANGE UP (ref 7–13)
POTASSIUM SERPL-MCNC: 4.8 MMOL/L — SIGNIFICANT CHANGE UP (ref 3.5–5.3)
POTASSIUM SERPL-SCNC: 4.8 MMOL/L — SIGNIFICANT CHANGE UP (ref 3.5–5.3)
RBC # BLD: 4.72 M/UL — SIGNIFICANT CHANGE UP (ref 3.8–5.2)
RBC # FLD: 15.9 % — HIGH (ref 10.3–14.5)
SODIUM SERPL-SCNC: 137 MMOL/L — SIGNIFICANT CHANGE UP (ref 135–145)
WBC # BLD: 11.43 K/UL — HIGH (ref 3.8–10.5)
WBC # FLD AUTO: 11.43 K/UL — HIGH (ref 3.8–10.5)

## 2019-11-03 PROCEDURE — 99233 SBSQ HOSP IP/OBS HIGH 50: CPT | Mod: GC

## 2019-11-03 RX ORDER — KETOROLAC TROMETHAMINE 30 MG/ML
15 SYRINGE (ML) INJECTION ONCE
Refills: 0 | Status: DISCONTINUED | OUTPATIENT
Start: 2019-11-03 | End: 2019-11-03

## 2019-11-03 RX ORDER — LIDOCAINE 4 G/100G
1 CREAM TOPICAL ONCE
Refills: 0 | Status: COMPLETED | OUTPATIENT
Start: 2019-11-03 | End: 2019-11-03

## 2019-11-03 RX ADMIN — POLYETHYLENE GLYCOL 3350 17 GRAM(S): 17 POWDER, FOR SOLUTION ORAL at 11:57

## 2019-11-03 RX ADMIN — Medication 3 MILLILITER(S): at 03:35

## 2019-11-03 RX ADMIN — Medication 100 MILLIGRAM(S): at 05:33

## 2019-11-03 RX ADMIN — CHLORHEXIDINE GLUCONATE 1 APPLICATION(S): 213 SOLUTION TOPICAL at 11:58

## 2019-11-03 RX ADMIN — Medication 3 MILLILITER(S): at 10:44

## 2019-11-03 RX ADMIN — Medication 3 MILLILITER(S): at 16:49

## 2019-11-03 RX ADMIN — Medication 15 MILLIGRAM(S): at 22:33

## 2019-11-03 RX ADMIN — HEPARIN SODIUM 5000 UNIT(S): 5000 INJECTION INTRAVENOUS; SUBCUTANEOUS at 13:48

## 2019-11-03 RX ADMIN — Medication 15 MILLIGRAM(S): at 22:43

## 2019-11-03 RX ADMIN — Medication 100 MILLIGRAM(S): at 21:59

## 2019-11-03 RX ADMIN — Medication 100 MILLIGRAM(S): at 13:48

## 2019-11-03 RX ADMIN — HEPARIN SODIUM 5000 UNIT(S): 5000 INJECTION INTRAVENOUS; SUBCUTANEOUS at 05:36

## 2019-11-03 RX ADMIN — Medication 10 MILLIGRAM(S): at 05:36

## 2019-11-03 RX ADMIN — AMLODIPINE BESYLATE 10 MILLIGRAM(S): 2.5 TABLET ORAL at 05:33

## 2019-11-03 RX ADMIN — Medication 3 MILLILITER(S): at 22:16

## 2019-11-03 RX ADMIN — Medication 20 MILLIGRAM(S): at 05:33

## 2019-11-03 RX ADMIN — ATORVASTATIN CALCIUM 40 MILLIGRAM(S): 80 TABLET, FILM COATED ORAL at 21:59

## 2019-11-03 RX ADMIN — Medication 1: at 11:57

## 2019-11-03 RX ADMIN — HEPARIN SODIUM 5000 UNIT(S): 5000 INJECTION INTRAVENOUS; SUBCUTANEOUS at 21:59

## 2019-11-03 RX ADMIN — LIDOCAINE 1 PATCH: 4 CREAM TOPICAL at 22:33

## 2019-11-03 NOTE — PROGRESS NOTE ADULT - PROBLEM SELECTOR PLAN 5
- cont amlodipine and metoprolol  - will also restart hydralazine today  - monitor BP - cont amlodipine and metoprolol  -hydralazine started 11/2  - monitor BP

## 2019-11-03 NOTE — PROGRESS NOTE ADULT - SUBJECTIVE AND OBJECTIVE BOX
CHIEF COMPLAINT: Patient is a 84y old  Female who presents with a chief complaint of acute hypercapnic respiratory failure (01 Nov 2019 12:41)    Interval Events: none overnight       REVIEW OF SYSTEMS:  Constitutional: no complaints   CV: denies   Resp: denies   GI: denies   [x] All other systems negative  [ ] Unable to assess ROS because ________      OBJECTIVE:  ICU Vital Signs Last 24 Hrs  T(C): 36.6 (02 Nov 2019 05:39), Max: 36.8 (01 Nov 2019 14:02)  T(F): 97.8 (02 Nov 2019 05:39), Max: 98.3 (01 Nov 2019 14:02)  HR: 64 (02 Nov 2019 06:50) (64 - 83)  BP: 142/67 (02 Nov 2019 05:39) (137/64 - 150/68)  BP(mean): --  ABP: --  ABP(mean): --  RR: 22 (02 Nov 2019 05:39) (19 - 22)  SpO2: 93% (02 Nov 2019 06:50) (92% - 99%)    Mode: standby    11-01 @ 07:01  -  11-02 @ 07:00  --------------------------------------------------------  IN: 240 mL / OUT: 400 mL / NET: -160 mL    POCT Blood Glucose.: 120 mg/dL (02 Nov 2019 08:08)    HOSPITAL MEDICATIONS:  MEDICATIONS  (STANDING):  albuterol/ipratropium for Nebulization 3 milliLiter(s) Nebulizer every 6 hours  amLODIPine   Tablet 10 milliGRAM(s) Oral daily  atorvastatin 40 milliGRAM(s) Oral at bedtime  chlorhexidine 4% Liquid 1 Application(s) Topical daily  dextrose 5%. 1000 milliLiter(s) (50 mL/Hr) IV Continuous <Continuous>  dextrose 50% Injectable 12.5 Gram(s) IV Push once  dextrose 50% Injectable 25 Gram(s) IV Push once  dextrose 50% Injectable 25 Gram(s) IV Push once  dextrose 50% Injectable 25 milliLiter(s) IV Push once  furosemide    Tablet 20 milliGRAM(s) Oral daily  heparin  Injectable 5000 Unit(s) SubCutaneous every 8 hours  hydrALAZINE 100 milliGRAM(s) Oral three times a day  insulin lispro (HumaLOG) corrective regimen sliding scale   SubCutaneous three times a day before meals  insulin lispro (HumaLOG) corrective regimen sliding scale   SubCutaneous at bedtime  insulin regular  human recombinant. 5 Unit(s) IV Push once  metoprolol succinate  milliGRAM(s) Oral daily  polyethylene glycol 3350      polyethylene glycol 3350 17 Gram(s) Oral daily    MEDICATIONS  (PRN):  dextrose 40% Gel 15 Gram(s) Oral once PRN Blood Glucose LESS THAN 70 milliGRAM(s)/deciliter  glucagon  Injectable 1 milliGRAM(s) IntraMuscular once PRN Glucose LESS THAN 70 milligrams/deciliter      LABS:               11-02    137  |  95<L>  |  32<H>  ----------------------------<  153<H>  5.4<H>   |  36<H>  |  0.99    Ca    9.2      02 Nov 2019 06:15 CHIEF COMPLAINT: Patient is a 84y old  Female who presents with a chief complaint of acute hypercapnic respiratory failure (01 Nov 2019 12:41)    Interval Events: none overnight       REVIEW OF SYSTEMS:  Constitutional: no complaints   CV: denies   Resp: denies   GI: denies   [x] All other systems negative      OBJECTIVE:  ICU Vital Signs Last 24 Hrs  T(C): 36.6 (02 Nov 2019 05:39), Max: 36.8 (01 Nov 2019 14:02)  T(F): 97.8 (02 Nov 2019 05:39), Max: 98.3 (01 Nov 2019 14:02)  HR: 64 (02 Nov 2019 06:50) (64 - 83)  BP: 142/67 (02 Nov 2019 05:39) (137/64 - 150/68)  BP(mean): --  ABP: --  ABP(mean): --  RR: 22 (02 Nov 2019 05:39) (19 - 22)  SpO2: 93% (02 Nov 2019 06:50) (92% - 99%)    Mode: standby    11-01 @ 07:01  -  11-02 @ 07:00  --------------------------------------------------------  IN: 240 mL / OUT: 400 mL / NET: -160 mL    POCT Blood Glucose.: 120 mg/dL (02 Nov 2019 08:08)    HOSPITAL MEDICATIONS:  MEDICATIONS  (STANDING):  albuterol/ipratropium for Nebulization 3 milliLiter(s) Nebulizer every 6 hours  amLODIPine   Tablet 10 milliGRAM(s) Oral daily  atorvastatin 40 milliGRAM(s) Oral at bedtime  chlorhexidine 4% Liquid 1 Application(s) Topical daily  dextrose 5%. 1000 milliLiter(s) (50 mL/Hr) IV Continuous <Continuous>  dextrose 50% Injectable 12.5 Gram(s) IV Push once  dextrose 50% Injectable 25 Gram(s) IV Push once  dextrose 50% Injectable 25 Gram(s) IV Push once  dextrose 50% Injectable 25 milliLiter(s) IV Push once  furosemide    Tablet 20 milliGRAM(s) Oral daily  heparin  Injectable 5000 Unit(s) SubCutaneous every 8 hours  hydrALAZINE 100 milliGRAM(s) Oral three times a day  insulin lispro (HumaLOG) corrective regimen sliding scale   SubCutaneous three times a day before meals  insulin lispro (HumaLOG) corrective regimen sliding scale   SubCutaneous at bedtime  insulin regular  human recombinant. 5 Unit(s) IV Push once  metoprolol succinate  milliGRAM(s) Oral daily  polyethylene glycol 3350      polyethylene glycol 3350 17 Gram(s) Oral daily    MEDICATIONS  (PRN):  dextrose 40% Gel 15 Gram(s) Oral once PRN Blood Glucose LESS THAN 70 milliGRAM(s)/deciliter  glucagon  Injectable 1 milliGRAM(s) IntraMuscular once PRN Glucose LESS THAN 70 milligrams/deciliter      LABS:               11-02    137  |  95<L>  |  32<H>  ----------------------------<  153<H>  5.4<H>   |  36<H>  |  0.99    Ca    9.2      02 Nov 2019 06:15

## 2019-11-03 NOTE — PROGRESS NOTE ADULT - PROBLEM SELECTOR PLAN 3
- cont BIPAP HS and PRN  - pt has a machine at home, but unsure if CPAP/BIPAP and settings  - SW daughter today, will bring in machine tomorrow - cont BIPAP HS and PRN  - pt has a machine at home, but unsure if CPAP/BIPAP and settings  - SW daughter today, to bring in machine

## 2019-11-04 LAB
GLUCOSE BLDC GLUCOMTR-MCNC: 119 MG/DL — HIGH (ref 70–99)
GLUCOSE BLDC GLUCOMTR-MCNC: 136 MG/DL — HIGH (ref 70–99)
GLUCOSE BLDC GLUCOMTR-MCNC: 156 MG/DL — HIGH (ref 70–99)
GLUCOSE BLDC GLUCOMTR-MCNC: 162 MG/DL — HIGH (ref 70–99)

## 2019-11-04 PROCEDURE — 99233 SBSQ HOSP IP/OBS HIGH 50: CPT | Mod: GC

## 2019-11-04 RX ADMIN — Medication 3 MILLILITER(S): at 10:18

## 2019-11-04 RX ADMIN — LIDOCAINE 1 PATCH: 4 CREAM TOPICAL at 10:39

## 2019-11-04 RX ADMIN — LIDOCAINE 1 PATCH: 4 CREAM TOPICAL at 07:32

## 2019-11-04 RX ADMIN — POLYETHYLENE GLYCOL 3350 17 GRAM(S): 17 POWDER, FOR SOLUTION ORAL at 11:28

## 2019-11-04 RX ADMIN — Medication 3 MILLILITER(S): at 04:08

## 2019-11-04 RX ADMIN — Medication 100 MILLIGRAM(S): at 06:16

## 2019-11-04 RX ADMIN — Medication 3 MILLILITER(S): at 15:40

## 2019-11-04 RX ADMIN — Medication 20 MILLIGRAM(S): at 06:16

## 2019-11-04 RX ADMIN — Medication 100 MILLIGRAM(S): at 13:37

## 2019-11-04 RX ADMIN — Medication 100 MILLIGRAM(S): at 22:29

## 2019-11-04 RX ADMIN — CHLORHEXIDINE GLUCONATE 1 APPLICATION(S): 213 SOLUTION TOPICAL at 11:32

## 2019-11-04 RX ADMIN — Medication 3 MILLILITER(S): at 22:01

## 2019-11-04 RX ADMIN — Medication 10 MILLIGRAM(S): at 06:16

## 2019-11-04 RX ADMIN — HEPARIN SODIUM 5000 UNIT(S): 5000 INJECTION INTRAVENOUS; SUBCUTANEOUS at 22:29

## 2019-11-04 RX ADMIN — Medication 1: at 12:22

## 2019-11-04 RX ADMIN — AMLODIPINE BESYLATE 10 MILLIGRAM(S): 2.5 TABLET ORAL at 06:16

## 2019-11-04 RX ADMIN — HEPARIN SODIUM 5000 UNIT(S): 5000 INJECTION INTRAVENOUS; SUBCUTANEOUS at 06:16

## 2019-11-04 RX ADMIN — ATORVASTATIN CALCIUM 40 MILLIGRAM(S): 80 TABLET, FILM COATED ORAL at 22:30

## 2019-11-04 RX ADMIN — HEPARIN SODIUM 5000 UNIT(S): 5000 INJECTION INTRAVENOUS; SUBCUTANEOUS at 13:37

## 2019-11-04 NOTE — PROGRESS NOTE ADULT - PROBLEM SELECTOR PLAN 3
- cont BIPAP HS and PRN  - pt has a machine at home, but unsure if CPAP/BIPAP and settings  - SW daughter today, to bring in machine - cont BIPAP HS and PRN  - pt has a machine at home, but unsure if CPAP/BIPAP and settings  - SW daughter today, to bring in machine  -spirometry ordered- being tested for Bipap quantification

## 2019-11-04 NOTE — PROGRESS NOTE ADULT - SUBJECTIVE AND OBJECTIVE BOX
CHIEF COMPLAINT: Patient is a 84y old  Female who presents with a chief complaint of acute hypercapnic respiratory failure (01 Nov 2019 12:41)    Interval Events: none overnight       REVIEW OF SYSTEMS:  Constitutional: no complaints   CV: denies   Resp: denies   GI: denies   [x] All other systems negative      OBJECTIVE:  ICU Vital Signs Last 24 Hrs  T(C): 36.6 (02 Nov 2019 05:39), Max: 36.8 (01 Nov 2019 14:02)  T(F): 97.8 (02 Nov 2019 05:39), Max: 98.3 (01 Nov 2019 14:02)  HR: 64 (02 Nov 2019 06:50) (64 - 83)  BP: 142/67 (02 Nov 2019 05:39) (137/64 - 150/68)  BP(mean): --  ABP: --  ABP(mean): --  RR: 22 (02 Nov 2019 05:39) (19 - 22)  SpO2: 93% (02 Nov 2019 06:50) (92% - 99%)    Mode: standby    11-01 @ 07:01  -  11-02 @ 07:00  --------------------------------------------------------  IN: 240 mL / OUT: 400 mL / NET: -160 mL    POCT Blood Glucose.: 120 mg/dL (02 Nov 2019 08:08)    HOSPITAL MEDICATIONS:  MEDICATIONS  (STANDING):  albuterol/ipratropium for Nebulization 3 milliLiter(s) Nebulizer every 6 hours  amLODIPine   Tablet 10 milliGRAM(s) Oral daily  atorvastatin 40 milliGRAM(s) Oral at bedtime  chlorhexidine 4% Liquid 1 Application(s) Topical daily  dextrose 5%. 1000 milliLiter(s) (50 mL/Hr) IV Continuous <Continuous>  dextrose 50% Injectable 12.5 Gram(s) IV Push once  dextrose 50% Injectable 25 Gram(s) IV Push once  dextrose 50% Injectable 25 Gram(s) IV Push once  dextrose 50% Injectable 25 milliLiter(s) IV Push once  furosemide    Tablet 20 milliGRAM(s) Oral daily  heparin  Injectable 5000 Unit(s) SubCutaneous every 8 hours  hydrALAZINE 100 milliGRAM(s) Oral three times a day  insulin lispro (HumaLOG) corrective regimen sliding scale   SubCutaneous three times a day before meals  insulin lispro (HumaLOG) corrective regimen sliding scale   SubCutaneous at bedtime  insulin regular  human recombinant. 5 Unit(s) IV Push once  metoprolol succinate  milliGRAM(s) Oral daily  polyethylene glycol 3350      polyethylene glycol 3350 17 Gram(s) Oral daily    MEDICATIONS  (PRN):  dextrose 40% Gel 15 Gram(s) Oral once PRN Blood Glucose LESS THAN 70 milliGRAM(s)/deciliter  glucagon  Injectable 1 milliGRAM(s) IntraMuscular once PRN Glucose LESS THAN 70 milligrams/deciliter      LABS:               11-02    137  |  95<L>  |  32<H>  ----------------------------<  153<H>  5.4<H>   |  36<H>  |  0.99    Ca    9.2      02 Nov 2019 06:15 CHIEF COMPLAINT: Patient is a 84y old  Female who presents with a chief complaint of acute hypercapnic respiratory failure (01 Nov 2019 12:41)    Interval Events: none overnight, overall feeling better      REVIEW OF SYSTEMS:  Constitutional: no complaints   CV: denies   Resp: denies   GI: denies   [x] All other systems negative      OBJECTIVE:  ICU Vital Signs Last 24 Hrs  T(C): 36.6 (02 Nov 2019 05:39), Max: 36.8 (01 Nov 2019 14:02)  T(F): 97.8 (02 Nov 2019 05:39), Max: 98.3 (01 Nov 2019 14:02)  HR: 64 (02 Nov 2019 06:50) (64 - 83)  BP: 142/67 (02 Nov 2019 05:39) (137/64 - 150/68)  BP(mean): --  ABP: --  ABP(mean): --  RR: 22 (02 Nov 2019 05:39) (19 - 22)  SpO2: 93% (02 Nov 2019 06:50) (92% - 99%)    Mode: standby    11-01 @ 07:01  -  11-02 @ 07:00  --------------------------------------------------------  IN: 240 mL / OUT: 400 mL / NET: -160 mL    POCT Blood Glucose.: 120 mg/dL (02 Nov 2019 08:08)    HOSPITAL MEDICATIONS:  MEDICATIONS  (STANDING):  albuterol/ipratropium for Nebulization 3 milliLiter(s) Nebulizer every 6 hours  amLODIPine   Tablet 10 milliGRAM(s) Oral daily  atorvastatin 40 milliGRAM(s) Oral at bedtime  chlorhexidine 4% Liquid 1 Application(s) Topical daily  dextrose 5%. 1000 milliLiter(s) (50 mL/Hr) IV Continuous <Continuous>  dextrose 50% Injectable 12.5 Gram(s) IV Push once  dextrose 50% Injectable 25 Gram(s) IV Push once  dextrose 50% Injectable 25 Gram(s) IV Push once  dextrose 50% Injectable 25 milliLiter(s) IV Push once  furosemide    Tablet 20 milliGRAM(s) Oral daily  heparin  Injectable 5000 Unit(s) SubCutaneous every 8 hours  hydrALAZINE 100 milliGRAM(s) Oral three times a day  insulin lispro (HumaLOG) corrective regimen sliding scale   SubCutaneous three times a day before meals  insulin lispro (HumaLOG) corrective regimen sliding scale   SubCutaneous at bedtime  insulin regular  human recombinant. 5 Unit(s) IV Push once  metoprolol succinate  milliGRAM(s) Oral daily  polyethylene glycol 3350      polyethylene glycol 3350 17 Gram(s) Oral daily    MEDICATIONS  (PRN):  dextrose 40% Gel 15 Gram(s) Oral once PRN Blood Glucose LESS THAN 70 milliGRAM(s)/deciliter  glucagon  Injectable 1 milliGRAM(s) IntraMuscular once PRN Glucose LESS THAN 70 milligrams/deciliter      LABS:               11-02    137  |  95<L>  |  32<H>  ----------------------------<  153<H>  5.4<H>   |  36<H>  |  0.99    Ca    9.2      02 Nov 2019 06:15

## 2019-11-04 NOTE — PROGRESS NOTE ADULT - ATTENDING COMMENTS
pt remains stable  agreeable for rehab  dispo pending  also in process for qualifying for home bipap (seems like what she has is cpap. )

## 2019-11-04 NOTE — CHART NOTE - NSCHARTNOTEFT_GEN_A_CORE
Patients goals are established. MOLST in chart. Palliative care will sign off at this time. Please reconsult as needed.

## 2019-11-05 LAB
GLUCOSE BLDC GLUCOMTR-MCNC: 105 MG/DL — HIGH (ref 70–99)
GLUCOSE BLDC GLUCOMTR-MCNC: 123 MG/DL — HIGH (ref 70–99)
GLUCOSE BLDC GLUCOMTR-MCNC: 153 MG/DL — HIGH (ref 70–99)
GLUCOSE BLDC GLUCOMTR-MCNC: 156 MG/DL — HIGH (ref 70–99)

## 2019-11-05 PROCEDURE — 99232 SBSQ HOSP IP/OBS MODERATE 35: CPT | Mod: GC

## 2019-11-05 RX ADMIN — Medication 3 MILLILITER(S): at 04:08

## 2019-11-05 RX ADMIN — CHLORHEXIDINE GLUCONATE 1 APPLICATION(S): 213 SOLUTION TOPICAL at 11:07

## 2019-11-05 RX ADMIN — Medication 1 DROP(S): at 00:00

## 2019-11-05 RX ADMIN — HEPARIN SODIUM 5000 UNIT(S): 5000 INJECTION INTRAVENOUS; SUBCUTANEOUS at 13:38

## 2019-11-05 RX ADMIN — Medication 100 MILLIGRAM(S): at 21:19

## 2019-11-05 RX ADMIN — POLYETHYLENE GLYCOL 3350 17 GRAM(S): 17 POWDER, FOR SOLUTION ORAL at 11:08

## 2019-11-05 RX ADMIN — HEPARIN SODIUM 5000 UNIT(S): 5000 INJECTION INTRAVENOUS; SUBCUTANEOUS at 06:17

## 2019-11-05 RX ADMIN — ATORVASTATIN CALCIUM 40 MILLIGRAM(S): 80 TABLET, FILM COATED ORAL at 21:20

## 2019-11-05 RX ADMIN — Medication 1: at 12:02

## 2019-11-05 RX ADMIN — Medication 3 MILLILITER(S): at 15:16

## 2019-11-05 RX ADMIN — Medication 3 MILLILITER(S): at 22:42

## 2019-11-05 RX ADMIN — Medication 100 MILLIGRAM(S): at 06:14

## 2019-11-05 RX ADMIN — HEPARIN SODIUM 5000 UNIT(S): 5000 INJECTION INTRAVENOUS; SUBCUTANEOUS at 21:18

## 2019-11-05 RX ADMIN — AMLODIPINE BESYLATE 10 MILLIGRAM(S): 2.5 TABLET ORAL at 06:13

## 2019-11-05 RX ADMIN — Medication 100 MILLIGRAM(S): at 13:37

## 2019-11-05 RX ADMIN — Medication 20 MILLIGRAM(S): at 06:13

## 2019-11-05 RX ADMIN — Medication 3 MILLILITER(S): at 09:46

## 2019-11-05 RX ADMIN — Medication 100 MILLIGRAM(S): at 06:13

## 2019-11-05 NOTE — DIETITIAN INITIAL EVALUATION ADULT. - PERTINENT LABORATORY DATA
11-03 Na137 mmol/L Glu 83 mg/dL K+ 4.8 mmol/L Cr  0.88 mg/dL BUN 28 mg/dL<H> 10-31 Phos 3.1 mg/dL 10-31 Alb 3.5 g/dL 11-01 [id number] 6.7 %<H>    CAPILLARY BLOOD GLUCOSE      POCT Blood Glucose.: 105 mg/dL (05 Nov 2019 07:56)  POCT Blood Glucose.: 156 mg/dL (04 Nov 2019 21:22)  POCT Blood Glucose.: 136 mg/dL (04 Nov 2019 16:44)  POCT Blood Glucose.: 162 mg/dL (04 Nov 2019 12:00)

## 2019-11-05 NOTE — DIETITIAN INITIAL EVALUATION ADULT. - EST PROTEIN NEEDS4
0800: Pt transferred from [facility] post code blue. 0830: Labs drawn and sent, pt intubated, CXR ordered. Will get lawson and OGT placed. 0845: Pt seizing, 4mg Ativan ordered per Abron Pole  
 
0900: Ativan held at this time. Does not appear to be a seizure per EEG and Abron Pole. Will start sedation. 1020: Family requesting for Dr. Davion Paul to come to bedside. 1030: Pt speaking with Palliative team and Dr. Davion Paul, wishing to withdrawal care at this time. Family stating that pt would not want any of this done. 1215: [facility] called regarding plan to withdrawal of care. 1256: Comfort measures at this time. 1312: Time of death pronounced by Dr. Davion Paul. Family at the bedside, Death record filled out, anh paged. [facility] called. 1410: Pt transported to [facility]. 62.3

## 2019-11-05 NOTE — DIETITIAN INITIAL EVALUATION ADULT. - PERTINENT MEDS FT
MEDICATIONS  (STANDING):  albuterol/ipratropium for Nebulization 3 milliLiter(s) Nebulizer every 6 hours  amLODIPine   Tablet 10 milliGRAM(s) Oral daily  atorvastatin 40 milliGRAM(s) Oral at bedtime  chlorhexidine 4% Liquid 1 Application(s) Topical daily  dextrose 5%. 1000 milliLiter(s) (50 mL/Hr) IV Continuous <Continuous>  dextrose 50% Injectable 12.5 Gram(s) IV Push once  dextrose 50% Injectable 25 Gram(s) IV Push once  dextrose 50% Injectable 25 Gram(s) IV Push once  furosemide    Tablet 20 milliGRAM(s) Oral daily  heparin  Injectable 5000 Unit(s) SubCutaneous every 8 hours  hydrALAZINE 100 milliGRAM(s) Oral three times a day  insulin lispro (HumaLOG) corrective regimen sliding scale   SubCutaneous three times a day before meals  insulin lispro (HumaLOG) corrective regimen sliding scale   SubCutaneous at bedtime  metoprolol succinate  milliGRAM(s) Oral daily  polyethylene glycol 3350      polyethylene glycol 3350 17 Gram(s) Oral daily    MEDICATIONS  (PRN):  artificial tears (preservative free) Ophthalmic Solution 1 Drop(s) Both EYES three times a day PRN Dry Eyes  dextrose 40% Gel 15 Gram(s) Oral once PRN Blood Glucose LESS THAN 70 milliGRAM(s)/deciliter  glucagon  Injectable 1 milliGRAM(s) IntraMuscular once PRN Glucose LESS THAN 70 milligrams/deciliter

## 2019-11-05 NOTE — CHART NOTE - NSCHARTNOTEFT_GEN_A_CORE
85 y/o F HTN, chronic asthma, Chronic hypercapnic respiratory failure,, SHAD on CPAP, type 2 DM, HLD, GERD presenting w/ shortness of breath. Patient had an inpatient spirometry which did not show obstructive disease. Based on her BMI, serum bicarb and Blood gases the patient meets criteria for Obesity Hypoventilation Syndrome. Patient will require Bipap on discharge.     Dae Hyeon Kim MD-PGY5  Pulmonary Critical Care Fellow  Pager [phone] 85 y/o F HTN, chronic asthma, Chronic hypercapnic respiratory failure,, SHAD on CPAP, type 2 DM, HLD, GERD presenting w/ shortness of breath. Patient had an inpatient spirometry which did not show obstructive disease. Based on her BMI, serum bicarb and Blood gases the patient meets criteria for Obesity Hypoventilation Syndrome. This is a NEW diagnosis for the patient. The patient will require Bipap on discharge.     Dae Hyeon Kim MD-PGY5  Pulmonary Critical Care Fellow  Pager [phone]

## 2019-11-05 NOTE — PROGRESS NOTE ADULT - ATTENDING COMMENTS
stable for d/c to [facility]    chronic respiratory failure with hypoxia and hypercapnea, obesity hypoventilation, restricition on spirometry  change to nocturnal bipap - [id number]

## 2019-11-05 NOTE — PROGRESS NOTE ADULT - PROBLEM SELECTOR PLAN 3
- cont BIPAP HS and PRN  - pt has a machine at home, but unsure if CPAP/BIPAP and settings  - SW daughter today, to bring in machine  -spirometry ordered- being tested for Bipap quantification - cont BIPAP HS and PRN  - pt has a machine at home, but unsure if CPAP/BIPAP and settings  - SW daughter to bring in machine  -spirometry done 11/4. Being tested for Bipap quantification  - in process for qualifying for home bipap

## 2019-11-05 NOTE — PROGRESS NOTE ADULT - SUBJECTIVE AND OBJECTIVE BOX
CHIEF COMPLAINT: Patient is a 84y old  Female who presents with a chief complaint of acute hypercapnic respiratory failure (01 Nov 2019 12:41)    Interval Events: none overnight, overall feeling better      REVIEW OF SYSTEMS:  Constitutional: no complaints   CV: denies   Resp: denies   GI: denies   [x] All other systems negative      OBJECTIVE:  ICU Vital Signs Last 24 Hrs  T(C): 36.6 (02 Nov 2019 05:39), Max: 36.8 (01 Nov 2019 14:02)  T(F): 97.8 (02 Nov 2019 05:39), Max: 98.3 (01 Nov 2019 14:02)  HR: 64 (02 Nov 2019 06:50) (64 - 83)  BP: 142/67 (02 Nov 2019 05:39) (137/64 - 150/68)  BP(mean): --  ABP: --  ABP(mean): --  RR: 22 (02 Nov 2019 05:39) (19 - 22)  SpO2: 93% (02 Nov 2019 06:50) (92% - 99%)    Mode: standby    11-01 @ 07:01  -  11-02 @ 07:00  --------------------------------------------------------  IN: 240 mL / OUT: 400 mL / NET: -160 mL    POCT Blood Glucose.: 120 mg/dL (02 Nov 2019 08:08)    HOSPITAL MEDICATIONS:  MEDICATIONS  (STANDING):  albuterol/ipratropium for Nebulization 3 milliLiter(s) Nebulizer every 6 hours  amLODIPine   Tablet 10 milliGRAM(s) Oral daily  atorvastatin 40 milliGRAM(s) Oral at bedtime  chlorhexidine 4% Liquid 1 Application(s) Topical daily  dextrose 5%. 1000 milliLiter(s) (50 mL/Hr) IV Continuous <Continuous>  dextrose 50% Injectable 12.5 Gram(s) IV Push once  dextrose 50% Injectable 25 Gram(s) IV Push once  dextrose 50% Injectable 25 Gram(s) IV Push once  dextrose 50% Injectable 25 milliLiter(s) IV Push once  furosemide    Tablet 20 milliGRAM(s) Oral daily  heparin  Injectable 5000 Unit(s) SubCutaneous every 8 hours  hydrALAZINE 100 milliGRAM(s) Oral three times a day  insulin lispro (HumaLOG) corrective regimen sliding scale   SubCutaneous three times a day before meals  insulin lispro (HumaLOG) corrective regimen sliding scale   SubCutaneous at bedtime  insulin regular  human recombinant. 5 Unit(s) IV Push once  metoprolol succinate  milliGRAM(s) Oral daily  polyethylene glycol 3350      polyethylene glycol 3350 17 Gram(s) Oral daily    MEDICATIONS  (PRN):  dextrose 40% Gel 15 Gram(s) Oral once PRN Blood Glucose LESS THAN 70 milliGRAM(s)/deciliter  glucagon  Injectable 1 milliGRAM(s) IntraMuscular once PRN Glucose LESS THAN 70 milligrams/deciliter      LABS:               11-02    137  |  95<L>  |  32<H>  ----------------------------<  153<H>  5.4<H>   |  36<H>  |  0.99    Ca    9.2      02 Nov 2019 06:15

## 2019-11-05 NOTE — DIETITIAN INITIAL EVALUATION ADULT. - OTHER INFO
83 yo F with PMH of HTN, chronic asthma, copd/emphysema on home O2 (2L), SHAD on CPAP, type 2 DM, HLD, GERD presenting w/ shortness of breath and increased work of breathing concerning for COPD exacerbation vs. acute CHF dx with acute respiratory failure with hypoxia. Pt remains stable. D/c planning.  Patient in good spirits, reports good appetite/ PO Intake during her admission and PTA. Observed 100% of breakfast tray consumed. Patient endorses UBW of 270lb for the past 17 years and denies recent weight changes. Current weight 271lb. BMI 45.2 (Obese) Patient reports that she lives at home, and that her family members help her with grocery shopping and she does most of her own food preparation. Patient with previous diet education, understands and able to state CKD III therapeutic diet guidelines (listed potassium containing foods, and stated that she has cut back on beef protein for her kidney health). Denies any nausea/vomiting/diarrhea or difficulty chewing and swallowing. +C on bowel regimen. NKFA or intolerances reported. Provided verbal and written education on consistent carbohydrates and CKD III therapeutic diet guidelines and encouraged low sodium, heart healthy choices.

## 2019-11-06 ENCOUNTER — TRANSCRIPTION ENCOUNTER (OUTPATIENT)
Age: 84
End: 2019-11-06

## 2019-11-06 VITALS
DIASTOLIC BLOOD PRESSURE: 61 MMHG | RESPIRATION RATE: 18 BRPM | HEART RATE: 79 BPM | OXYGEN SATURATION: 98 % | SYSTOLIC BLOOD PRESSURE: 138 MMHG | TEMPERATURE: 98 F

## 2019-11-06 LAB
GLUCOSE BLDC GLUCOMTR-MCNC: 105 MG/DL — HIGH (ref 70–99)
GLUCOSE BLDC GLUCOMTR-MCNC: 176 MG/DL — HIGH (ref 70–99)

## 2019-11-06 PROCEDURE — 99232 SBSQ HOSP IP/OBS MODERATE 35: CPT | Mod: GC

## 2019-11-06 RX ORDER — IPRATROPIUM/ALBUTEROL SULFATE 18-103MCG
3 AEROSOL WITH ADAPTER (GRAM) INHALATION
Qty: 0 | Refills: 0 | DISCHARGE
Start: 2019-11-06

## 2019-11-06 RX ORDER — HYDRALAZINE HCL 50 MG
1 TABLET ORAL
Qty: 0 | Refills: 0 | DISCHARGE
Start: 2019-11-06

## 2019-11-06 RX ORDER — FUROSEMIDE 40 MG
1 TABLET ORAL
Qty: 0 | Refills: 0 | DISCHARGE
Start: 2019-11-06

## 2019-11-06 RX ORDER — POLYETHYLENE GLYCOL 3350 17 G/17G
17 POWDER, FOR SOLUTION ORAL
Qty: 0 | Refills: 0 | DISCHARGE
Start: 2019-11-06

## 2019-11-06 RX ORDER — HEPARIN SODIUM 5000 [USP'U]/ML
5000 INJECTION INTRAVENOUS; SUBCUTANEOUS
Qty: 0 | Refills: 0 | DISCHARGE
Start: 2019-11-06

## 2019-11-06 RX ADMIN — Medication 20 MILLIGRAM(S): at 06:35

## 2019-11-06 RX ADMIN — Medication 100 MILLIGRAM(S): at 13:30

## 2019-11-06 RX ADMIN — POLYETHYLENE GLYCOL 3350 17 GRAM(S): 17 POWDER, FOR SOLUTION ORAL at 11:21

## 2019-11-06 RX ADMIN — HEPARIN SODIUM 5000 UNIT(S): 5000 INJECTION INTRAVENOUS; SUBCUTANEOUS at 06:35

## 2019-11-06 RX ADMIN — Medication 3 MILLILITER(S): at 09:32

## 2019-11-06 RX ADMIN — Medication 100 MILLIGRAM(S): at 06:35

## 2019-11-06 RX ADMIN — HEPARIN SODIUM 5000 UNIT(S): 5000 INJECTION INTRAVENOUS; SUBCUTANEOUS at 13:30

## 2019-11-06 RX ADMIN — Medication 3 MILLILITER(S): at 03:40

## 2019-11-06 RX ADMIN — Medication 1: at 11:22

## 2019-11-06 RX ADMIN — CHLORHEXIDINE GLUCONATE 1 APPLICATION(S): 213 SOLUTION TOPICAL at 11:21

## 2019-11-06 RX ADMIN — AMLODIPINE BESYLATE 10 MILLIGRAM(S): 2.5 TABLET ORAL at 06:35

## 2019-11-06 NOTE — PROGRESS NOTE ADULT - PROBLEM SELECTOR PLAN 2
- as above
PPS 50%. Skin care PRN. Assist with ADL's PRN.
- as above
-Pt with chronic asthma and copd on home O2 and SHAD on CPAP;   -steroids- decreased to 20mg and taper   -Duonebs   - bedside spirometery

## 2019-11-06 NOTE — PROGRESS NOTE ADULT - PROBLEM SELECTOR PROBLEM 3
Advance care planning
SHAD (obstructive sleep apnea)

## 2019-11-06 NOTE — PROGRESS NOTE ADULT - SUBJECTIVE AND OBJECTIVE BOX
CHIEF COMPLAINT:    Interval Events:    REVIEW OF SYSTEMS:  Constitutional:   Eyes:  ENT:  CV:  Resp:  GI:  :  MSK:  Integumentary:  Neurological:  Psychiatric:  Endocrine:  Hematologic/Lymphatic:  Allergic/Immunologic:  [ ] All other systems negative  [ ] Unable to assess ROS because ________    OBJECTIVE:  ICU Vital Signs Last 24 Hrs  T(C): 36.8 (06 Nov 2019 06:08), Max: 36.8 (05 Nov 2019 21:09)  T(F): 98.2 (06 Nov 2019 06:08), Max: 98.3 (05 Nov 2019 21:09)  HR: 70 (06 Nov 2019 07:28) (66 - 91)  BP: 137/60 (06 Nov 2019 06:08) (137/60 - 144/56)  BP(mean): --  ABP: --  ABP(mean): --  RR: 18 (06 Nov 2019 06:08) (18 - 19)  SpO2: 92% (06 Nov 2019 07:28) (92% - 99%)        CAPILLARY BLOOD GLUCOSE      POCT Blood Glucose.: 105 mg/dL (06 Nov 2019 07:41)      PHYSICAL EXAM:  General:   HEENT:   Lymph Nodes:  Neck:   Respiratory:   Cardiovascular:   Abdomen:   Extremities:   Skin:   Neurological:  Psychiatry:    HOSPITAL MEDICATIONS:  MEDICATIONS  (STANDING):  albuterol/ipratropium for Nebulization 3 milliLiter(s) Nebulizer every 6 hours  amLODIPine   Tablet 10 milliGRAM(s) Oral daily  atorvastatin 40 milliGRAM(s) Oral at bedtime  chlorhexidine 4% Liquid 1 Application(s) Topical daily  dextrose 5%. 1000 milliLiter(s) (50 mL/Hr) IV Continuous <Continuous>  dextrose 50% Injectable 12.5 Gram(s) IV Push once  dextrose 50% Injectable 25 Gram(s) IV Push once  dextrose 50% Injectable 25 Gram(s) IV Push once  furosemide    Tablet 20 milliGRAM(s) Oral daily  heparin  Injectable 5000 Unit(s) SubCutaneous every 8 hours  hydrALAZINE 100 milliGRAM(s) Oral three times a day  insulin lispro (HumaLOG) corrective regimen sliding scale   SubCutaneous three times a day before meals  insulin lispro (HumaLOG) corrective regimen sliding scale   SubCutaneous at bedtime  metoprolol succinate  milliGRAM(s) Oral daily  polyethylene glycol 3350      polyethylene glycol 3350 17 Gram(s) Oral daily    MEDICATIONS  (PRN):  artificial tears (preservative free) Ophthalmic Solution 1 Drop(s) Both EYES three times a day PRN Dry Eyes  dextrose 40% Gel 15 Gram(s) Oral once PRN Blood Glucose LESS THAN 70 milliGRAM(s)/deciliter  glucagon  Injectable 1 milliGRAM(s) IntraMuscular once PRN Glucose LESS THAN 70 milligrams/deciliter      LABS:                    MICROBIOLOGY:     RADIOLOGY:  [ ] Reviewed and interpreted by me    PULMONARY FUNCTION TESTS:    EKG: CHIEF COMPLAINT: Patient is a 84y old  Female who presents with a chief complaint of acute hypercapneic respiratory failure (06 Nov 2019 08:10)      Interval Events: none     REVIEW OF SYSTEMS:  Constitutional: No fever or chills   CV: Denies   Resp: + thomas   GI: Denies   [x ] All other systems negative  [ ] Unable to assess ROS because ________    OBJECTIVE:  ICU Vital Signs Last 24 Hrs  T(C): 36.8 (06 Nov 2019 06:08), Max: 36.8 (05 Nov 2019 21:09)  T(F): 98.2 (06 Nov 2019 06:08), Max: 98.3 (05 Nov 2019 21:09)  HR: 70 (06 Nov 2019 07:28) (66 - 91)  BP: 137/60 (06 Nov 2019 06:08) (137/60 - 144/56)  BP(mean): --  ABP: --  ABP(mean): --  RR: 18 (06 Nov 2019 06:08) (18 - 19)  SpO2: 92% (06 Nov 2019 07:28) (92% - 99%)        CAPILLARY BLOOD GLUCOSE      POCT Blood Glucose.: 105 mg/dL (06 Nov 2019 07:41)      HOSPITAL MEDICATIONS:  MEDICATIONS  (STANDING):  albuterol/ipratropium for Nebulization 3 milliLiter(s) Nebulizer every 6 hours  amLODIPine   Tablet 10 milliGRAM(s) Oral daily  atorvastatin 40 milliGRAM(s) Oral at bedtime  chlorhexidine 4% Liquid 1 Application(s) Topical daily  dextrose 5%. 1000 milliLiter(s) (50 mL/Hr) IV Continuous <Continuous>  dextrose 50% Injectable 12.5 Gram(s) IV Push once  dextrose 50% Injectable 25 Gram(s) IV Push once  dextrose 50% Injectable 25 Gram(s) IV Push once  furosemide    Tablet 20 milliGRAM(s) Oral daily  heparin  Injectable 5000 Unit(s) SubCutaneous every 8 hours  hydrALAZINE 100 milliGRAM(s) Oral three times a day  insulin lispro (HumaLOG) corrective regimen sliding scale   SubCutaneous three times a day before meals  insulin lispro (HumaLOG) corrective regimen sliding scale   SubCutaneous at bedtime  metoprolol succinate  milliGRAM(s) Oral daily  polyethylene glycol 3350      polyethylene glycol 3350 17 Gram(s) Oral daily    MEDICATIONS  (PRN):  artificial tears (preservative free) Ophthalmic Solution 1 Drop(s) Both EYES three times a day PRN Dry Eyes  dextrose 40% Gel 15 Gram(s) Oral once PRN Blood Glucose LESS THAN 70 milliGRAM(s)/deciliter  glucagon  Injectable 1 milliGRAM(s) IntraMuscular once PRN Glucose LESS THAN 70 milligrams/deciliter      LABS:                    MICROBIOLOGY:     RADIOLOGY:  [ ] Reviewed and interpreted by me    PULMONARY FUNCTION TESTS:    EKG:

## 2019-11-06 NOTE — PROGRESS NOTE ADULT - RS GEN PE MLT RESP DETAILS PC
airway patent/breath sounds equal/good air movement
airway patent/breath sounds equal/diminished breath sounds, L/diminished breath sounds, R
airway patent/breath sounds equal/good air movement
airway patent/breath sounds equal
airway patent/breath sounds equal
good air movement/respirations non-labored

## 2019-11-06 NOTE — PROGRESS NOTE ADULT - ASSESSMENT
83 y/o F HTN, chronic asthma, copd/emphysema on home O2 (2L), SHAD on CPAP, type 2 DM, HLD, GERD presenting w/ shortness of breath and increased work of breathing concerning for COPD exacerbation vs. acute CHF
84 year old woman with Respiratory distress, advance care planning, debility, COPD and encounter for palliative care.
85 y/o F HTN, chronic asthma, copd/emphysema on home O2 (2L), SHAD on CPAP, type 2 DM, HLD, GERD presenting w/ shortness of breath and increased work of breathing concerning for COPD exacerbation vs. acute CHF
85 y/o F HTN, chronic asthma, copd/emphysema on home O2 (2L), SHAD on CPAP, type 2 DM, HLD, GERD presenting w/ shortness of breath and increased work of breathing concerning for COPD exacerbation vs. acute CHF    #Neuro:  -earlier in the day was AAOx3 but currently needs sternal rub to awaken her in the setting of hypercapia; currently on bipap  -continue to monitor mental status every 4 hours  -if mental status does not improve, may need to be intubated     #Respiratory:    -currently on bipap with worsening pCO2 on blood gas  -continue to monitor VBG q 4hrs; if hypercapnea and/or mental status does not improve on current settings; many need to be intubated; monitor closely  -duonebs tx q 6hrs   -steroids       #Cardiovascular:  -HTN: holding home meds in the setting of worsening mental status and may need to be intubated  -respiratory distress may be a component of heart failure exacerbation as BNP mildly elevated at 600s and CXR showing pulmonary edema  -s/p IV lasix 20mg ; c/w IV lasix 20mg daily  -strict I/O; holman  -TTE    #GI  -currently NPO with bipap and possible intubation    #Renal  -Cr 0.99  -continue to monitor    #Endo  -pt has type 2DM; ISS and monitor FS    #Heme  -anemia ; stable;   -continue to monitor    #ID:  -RVP negative  -f/u blood cx  -monitor off of antibiotics     DVT ppx: heparin subq  Code Full

## 2019-11-06 NOTE — PROGRESS NOTE ADULT - PROBLEM SELECTOR PLAN 1
- likely 2/2 SHAD/SHAD, poss asthma  - V/Q scan negative  - cont steroids, nebs  - cont NC day, BIPAP HS
Patient feeling better after her Nebulizer treatment. Continue care as per primary team.
- likely 2/2 SHAD/SHAD, poss asthma  - V/Q scan negative  - cont steroids, nebs  - cont NC day, BIPAP HS
- Pt reported today she had PE in past after knee surgery- unable to take blood thinners ? why   - VQ scan ordered  - Nasal cannula during day - bipap at hs  - admitting cxr ? pulm edema   - lasix daily started

## 2019-11-06 NOTE — PROGRESS NOTE ADULT - MS EXT PE MLT D E PC
no clubbing/no cyanosis/no pedal edema
bilat edema 2+/no clubbing/no cyanosis
no clubbing/no cyanosis
no clubbing/no cyanosis

## 2019-11-06 NOTE — PROGRESS NOTE ADULT - PROBLEM SELECTOR PROBLEM 4
Asthma
DM (diabetes mellitus)

## 2019-11-06 NOTE — PROGRESS NOTE ADULT - CVS HE PE MLT D E PC
regular rate and rhythm/no rub/no murmur
regular rate and rhythm
regular rate and rhythm/no murmur
regular rate and rhythm
regular rate and rhythm
regular rate and rhythm/no murmur

## 2019-11-06 NOTE — PROGRESS NOTE ADULT - PROBLEM SELECTOR PROBLEM 6
Prophylactic measure
GERD (gastroesophageal reflux disease)

## 2019-11-06 NOTE — PROGRESS NOTE ADULT - PROBLEM SELECTOR PROBLEM 5
Encounter for palliative care
HTN (hypertension)

## 2019-11-06 NOTE — PROGRESS NOTE ADULT - NEUROLOGICAL DETAILS
alert and oriented x 3/responds to pain/responds to verbal commands
alert and oriented x 3

## 2019-11-06 NOTE — PROGRESS NOTE ADULT - CONSTITUTIONAL DETAILS
no distress/obese
respiratory distress
well-nourished/no distress
well-developed/well-groomed
well-nourished/well-groomed

## 2019-11-06 NOTE — PROGRESS NOTE ADULT - CONSTITUTIONAL
detailed exam
detailed exam
Well-developed, well nourished
detailed exam

## 2019-11-06 NOTE — DISCHARGE NOTE NURSING/CASE MANAGEMENT/SOCIAL WORK - PATIENT PORTAL LINK FT
You can access the FollowMyHealth Patient Portal offered by [facility] by registering at the following website: [email or web address]. By joining [employer]’s FollowMyHealth portal, you will also be able to view your health information using other applications (apps) compatible with our system.

## 2019-11-06 NOTE — PROGRESS NOTE ADULT - PROBLEM SELECTOR PLAN 4
- a1c 6.7  - cont ISS
Patient with chronic asthma, lives with daughter whose  is a smoker which exacerbates her asthma. Patient stated that she is independent and makes her own decisions. Continue care as per RCU team.
- a1c 6.7  - cont ISS
- accuchecks ac and hs   - RISS   - Refusing coverage for elevated accuchecks- wants to speak to her own MD   pt /family given extensive explanations regarding steroids with DM and impact  - consistent carb diet

## 2019-11-06 NOTE — PROGRESS NOTE ADULT - PROBLEM SELECTOR PLAN 3
- cont BIPAP HS and PRN  - pt has a machine at home, but unsure if CPAP/BIPAP and settings  - SW daughter to bring in machine  -spirometry done 11/4. Being tested for Bipap quantification  - in process for qualifying for home bipap - cont BIPAP HS and PRN  - pt has a machine at home, but unsure if CPAP/BIPAP and settings  - SW daughter to bring in machine  -spirometry done 11/4. Being tested for Bipap quantification  -  qualified for home bipap- [facility] for [facility]

## 2020-01-27 PROBLEM — Z00.00 ENCOUNTER FOR PREVENTIVE HEALTH EXAMINATION: Status: ACTIVE | Noted: 2020-01-27

## 2020-01-28 ENCOUNTER — APPOINTMENT (OUTPATIENT)
Dept: PULMONOLOGY | Facility: CLINIC | Age: 85
End: 2020-01-28
Payer: MEDICARE

## 2020-01-28 VITALS
DIASTOLIC BLOOD PRESSURE: 84 MMHG | SYSTOLIC BLOOD PRESSURE: 131 MMHG | TEMPERATURE: 97.9 F | HEIGHT: 61.5 IN | OXYGEN SATURATION: 93 % | RESPIRATION RATE: 18 BRPM | WEIGHT: 266 LBS | BODY MASS INDEX: 49.58 KG/M2 | HEART RATE: 80 BPM

## 2020-01-28 DIAGNOSIS — M81.0 AGE-RELATED OSTEOPOROSIS W/OUT CURRENT PATHOLOGICAL FRACTURE: ICD-10-CM

## 2020-01-28 DIAGNOSIS — I27.20 PULMONARY HYPERTENSION, UNSPECIFIED: ICD-10-CM

## 2020-01-28 DIAGNOSIS — I10 ESSENTIAL (PRIMARY) HYPERTENSION: ICD-10-CM

## 2020-01-28 DIAGNOSIS — E78.5 HYPERLIPIDEMIA, UNSPECIFIED: ICD-10-CM

## 2020-01-28 DIAGNOSIS — J44.9 CHRONIC OBSTRUCTIVE PULMONARY DISEASE, UNSPECIFIED: ICD-10-CM

## 2020-01-28 PROCEDURE — 99204 OFFICE O/P NEW MOD 45 MIN: CPT | Mod: 25

## 2020-01-28 PROCEDURE — 94726 PLETHYSMOGRAPHY LUNG VOLUMES: CPT

## 2020-01-28 PROCEDURE — 94060 EVALUATION OF WHEEZING: CPT

## 2020-01-28 PROCEDURE — 94729 DIFFUSING CAPACITY: CPT

## 2020-01-28 PROCEDURE — ZZZZZ: CPT

## 2020-01-31 PROBLEM — E78.5 HYPERLIPIDEMIA: Status: ACTIVE | Noted: 2020-01-31

## 2020-01-31 PROBLEM — M81.0 OSTEOPOROSIS: Status: ACTIVE | Noted: 2020-01-31

## 2020-01-31 NOTE — REVIEW OF SYSTEMS
[Cough] : no cough [Hemoptysis] : no hemoptysis [Chest Tightness] : no chest tightness [Dyspnea] : dyspnea [Frequent URIs] : no frequent URIs [Sputum] : no sputum [Wheezing] : no wheezing [Pleuritic Pain] : no pleuritic pain [SOB on Exertion] : sob on exertion [A.M. Dry Mouth] : a.m. dry mouth [Claudication] : no claudication [Chest Discomfort] : no chest discomfort [Edema] : edema [Orthopnea] : no orthopnea [Palpitations] : no palpitations [Syncope] : no syncope [Arthralgias] : arthralgias [Diabetes] : no diabetes [Thyroid Problem] : no thyroid problem [Obesity] : obesity [Negative] : Psychiatric [TextBox_144] : osteoporosis

## 2020-01-31 NOTE — CONSULT LETTER
[Dear  ___] : Dear  [unfilled], [Consult Letter:] : I had the pleasure of evaluating your patient, [unfilled]. [Please see my note below.] : Please see my note below. [( Thank you for referring [unfilled] for consultation for _____ )] : Thank you for referring [unfilled] for consultation for [unfilled] [Consult Closing:] : Thank you very much for allowing me to participate in the care of this patient.  If you have any questions, please do not hesitate to contact me. [Sincerely,] : Sincerely, [FreeTextEntry3] : Fariba Yao MD\par  \par Division of Pulmonary, Critical Care & Sleep Medicine\par [facility]\par \par \par  [FreeTextEntry2] : Dr. Seven Huff

## 2020-01-31 NOTE — HISTORY OF PRESENT ILLNESS
[Never] : never [Obstructive Sleep Apnea] : obstructive sleep apnea [TextBox_4] : 83 yo F presents for initial evaluation. History of COPD(?), supplemental oxygen dependence and SHAD on PAP therapy.\dorothy Was hospitalized at [facility] the beginning of Nov 2019 for acute on chronic hypercapnic and hypoxic respiratory failure. Requiring Bilevel therapy and admitted to MICU for close monitoring. Improved and transferred to RCU, discharged to rehab with Bilevel QHS.\par \dorothy Presents today to establish care. Lifelong nonsmoker, denies cough or chest pain. Chronic dyspnea on exertion- progressive, ambulates with walker and becomes dyspneic with approx 20 feet of ambulation per her daughter, however, is predominantly sedentary.\par Chronic bipedal edema\par Inhaler regimen includes: Advair 250/50 BID, Spiriva, Albuterol. \dorothy Uses oxygen as needed, prescribed by her PCP 2 years ago. \par \par PFTs today: Severe restrictive defect, moderately reduced DLCO\par Chest imaging: CXR 11/1/19- limited exam, pulmonary edema. \par NM V/Q scan 11/1/19: very low prob for PE\par \par Echo: 10/31/19: LVEF 65-70%, limited study, Concentric LV remodeling, Hyperdynamic LV. Moderate pulmonary hypertension with est RVSP of 55 mmHg. Mild AS\par \[facility] Does not provide her machine or data card for download.\par DME: [facility]\par \par

## 2020-01-31 NOTE — PHYSICAL EXAM
[No Acute Distress] : no acute distress [No Deformities] : no deformities [III] : Mallampati Class: III [Normal Appearance] : normal appearance [No Neck Mass] : no neck mass [Supple] : supple [Normal S1, S2] : normal s1, s2 [Normal Rate/Rhythm] : normal rate/rhythm [No JVD] : no jvd [No Murmurs] : no murmurs [No Resp Distress] : no resp distress [Clear to Auscultation Bilaterally] : clear to auscultation bilaterally [Benign] : benign [No Abnormalities] : no abnormalities [No Clubbing] : no clubbing [No Focal Deficits] : no focal deficits [1+ Pitting] : 1+ pitting [Normal Color/ Pigmentation] : normal color/ pigmentation [Cranial Nerves Intact] : cranial nerves intact [Oriented x3] : oriented x3 [Normal Affect] : normal affect [TextBox_99] : did not test gait [TextBox_2] : obese

## 2020-01-31 NOTE — REASON FOR VISIT
[Initial] : an initial visit [COPD] : COPD [Sleep Apnea] : sleep apnea [Shortness of Breath] : shortness of breath [Emphysema] : emphysema [Other: _____] : [unfilled] [TextBox_13] : Dr. Seven Huff

## 2020-01-31 NOTE — ASSESSMENT
[FreeTextEntry1] : 85 yo F presents for initial evaluation. History of COPD(?), supplemental oxygen dependence and SHAD on PAP therapy.\dorothy Was hospitalized at [facility] the beginning of Nov 2019 for acute on chronic hypercapnic and hypoxic respiratory failure. Requiring Bilevel therapy and admitted to MICU for close monitoring. Improved and transferred to RCU, discharged to rehab with Bilevel QHS.\par \dorothy Presents today to establish care. Lifelong nonsmoker, denies cough or chest pain. Chronic dyspnea on exertion- progressive, ambulates with walker and becomes dyspneic with approx 20 feet of ambulation per her daughter, however, is predominantly sedentary.\par Chronic bipedal edema\par Inhaler regimen includes: Advair 250/50 BID, Spiriva, Albuterol. \par Uses oxygen as needed, prescribed by her PCP 2 years ago. \par \par PFTs today: Severe restrictive defect, moderately reduced DLCO\par Chest imaging: CXR 11/1/19- limited exam, pulmonary edema. \par NM V/Q scan 11/1/19: very low prob for PE\par \par Echo: 10/31/19: LVEF 65-70%, limited study, Concentric LV remodeling, Hyperdynamic LV. Moderate pulmonary hypertension with est RVSP of 55 mmHg. Mild AS\par \[facility] Does not provide her machine or data card for download.\par DME: [facility] \par A/P: SHAD, OHS, moderate pulmonary hypertension (group 2/3), morbid obesity (BMI 49.5)\par Unclear that the patient has obstructive airways disease. PFTs without evidence of this\par For now will c/w current inhaler regimen.\par Hypoxic respiratory failure - related to obesity, hypoventilation - Unclear what her supplemental oxygen needs are, currently 93% on RA at rest - send for EOT \par Hypercapnic respiratory failure - ABG prior to next visit\par TTE to re-eval RV/PA pressures now that she is in her baseline state\par Pulmonary edema - repeat CXR, c/w diuretic therapy and cardiac optimization\par SHAD/OHS on Bilevel therapy - will request machine data from [facility] \[facility] Follow up in 6 weeks to review above. After this will send for PSG with TCCO2 monitoring and titration study.

## 2020-02-19 ENCOUNTER — FORM ENCOUNTER (OUTPATIENT)
Age: 85
End: 2020-02-19

## 2020-02-20 ENCOUNTER — APPOINTMENT (OUTPATIENT)
Dept: RADIOLOGY | Facility: IMAGING CENTER | Age: 85
End: 2020-02-20

## 2020-02-20 ENCOUNTER — OUTPATIENT (OUTPATIENT)
Dept: OUTPATIENT SERVICES | Facility: HOSPITAL | Age: 85
LOS: 1 days | End: 2020-02-20
Payer: MEDICARE

## 2020-02-20 DIAGNOSIS — Z00.8 ENCOUNTER FOR OTHER GENERAL EXAMINATION: ICD-10-CM

## 2020-02-20 PROCEDURE — 71046 X-RAY EXAM CHEST 2 VIEWS: CPT

## 2020-02-20 PROCEDURE — 71046 X-RAY EXAM CHEST 2 VIEWS: CPT | Mod: 26

## 2020-03-03 ENCOUNTER — APPOINTMENT (OUTPATIENT)
Dept: CV DIAGNOSITCS | Facility: HOSPITAL | Age: 85
End: 2020-03-03
Payer: MEDICARE

## 2020-03-03 ENCOUNTER — OUTPATIENT (OUTPATIENT)
Dept: OUTPATIENT SERVICES | Facility: HOSPITAL | Age: 85
LOS: 1 days | End: 2020-03-03

## 2020-03-03 DIAGNOSIS — J96.11 CHRONIC RESPIRATORY FAILURE WITH HYPOXIA: ICD-10-CM

## 2020-03-03 DIAGNOSIS — I27.20 PULMONARY HYPERTENSION, UNSPECIFIED: ICD-10-CM

## 2020-03-03 PROCEDURE — 93306 TTE W/DOPPLER COMPLETE: CPT | Mod: 26

## 2020-03-12 ENCOUNTER — APPOINTMENT (OUTPATIENT)
Dept: PULMONOLOGY | Facility: CLINIC | Age: 85
End: 2020-03-12
Payer: MEDICARE

## 2020-03-12 VITALS
SYSTOLIC BLOOD PRESSURE: 106 MMHG | RESPIRATION RATE: 16 BRPM | BODY MASS INDEX: 49.39 KG/M2 | HEIGHT: 61.5 IN | HEART RATE: 75 BPM | WEIGHT: 265 LBS | TEMPERATURE: 98.1 F | OXYGEN SATURATION: 93 % | DIASTOLIC BLOOD PRESSURE: 67 MMHG

## 2020-03-12 DIAGNOSIS — Z78.9 OTHER SPECIFIED HEALTH STATUS: ICD-10-CM

## 2020-03-12 PROCEDURE — 99214 OFFICE O/P EST MOD 30 MIN: CPT | Mod: 25

## 2020-03-12 PROCEDURE — ZZZZZ: CPT

## 2020-03-12 PROCEDURE — 94618 PULMONARY STRESS TESTING: CPT

## 2020-03-12 PROCEDURE — 36600 WITHDRAWAL OF ARTERIAL BLOOD: CPT | Mod: 59

## 2020-03-12 PROCEDURE — 82803 BLOOD GASES ANY COMBINATION: CPT

## 2020-03-14 NOTE — PATIENT PROFILE ADULT - FALL HARM RISK
Bedside report received from night RN. Assumed care of patient. Daily plan of care discussed. Pt resting in bed, wakes easily to voice. CBI in place at this time, urine currently appears pale yellow with few red streaks. Hourly rounding in place.     none

## 2020-03-16 PROBLEM — Z78.9 NON-SMOKER: Status: ACTIVE | Noted: 2020-03-16

## 2020-03-16 RX ORDER — AMLODIPINE BESYLATE 10 MG/1
10 TABLET ORAL
Refills: 0 | Status: ACTIVE | COMMUNITY
Start: 2020-03-16

## 2020-03-16 RX ORDER — MONTELUKAST 10 MG/1
10 TABLET, FILM COATED ORAL
Refills: 0 | Status: ACTIVE | COMMUNITY
Start: 2020-03-16

## 2020-03-16 RX ORDER — METOPROLOL SUCCINATE 100 MG/1
100 TABLET, EXTENDED RELEASE ORAL
Refills: 0 | Status: ACTIVE | COMMUNITY
Start: 2020-03-16

## 2020-03-16 RX ORDER — GLIMEPIRIDE 2 MG/1
2 TABLET ORAL
Refills: 0 | Status: ACTIVE | COMMUNITY
Start: 2020-03-16

## 2020-03-16 RX ORDER — MULTIVIT-MIN/FOLIC/VIT K/LYCOP 400-300MCG
500 TABLET ORAL
Refills: 0 | Status: ACTIVE | COMMUNITY
Start: 2020-03-16

## 2020-03-16 RX ORDER — HYDRALAZINE HYDROCHLORIDE 100 MG/1
100 TABLET ORAL
Refills: 0 | Status: ACTIVE | COMMUNITY
Start: 2020-03-16

## 2020-03-16 RX ORDER — CALCIUM CARBONATE 500 MG/1
500 TABLET, CHEWABLE ORAL
Refills: 0 | Status: ACTIVE | COMMUNITY
Start: 2020-03-16

## 2020-03-16 RX ORDER — ROSUVASTATIN CALCIUM 10 MG/1
10 TABLET, FILM COATED ORAL
Refills: 0 | Status: ACTIVE | COMMUNITY
Start: 2020-03-16

## 2020-03-16 RX ORDER — ALENDRONATE SODIUM 70 MG/1
70 TABLET ORAL
Refills: 0 | Status: ACTIVE | COMMUNITY
Start: 2020-03-16

## 2020-03-16 NOTE — PHYSICAL EXAM
[No Acute Distress] : no acute distress [No Deformities] : no deformities [III] : Mallampati Class: III [Normal Appearance] : normal appearance [Supple] : supple [No Neck Mass] : no neck mass [No JVD] : no jvd [Normal Rate/Rhythm] : normal rate/rhythm [Normal S1, S2] : normal s1, s2 [No Murmurs] : no murmurs [No Resp Distress] : no resp distress [Clear to Auscultation Bilaterally] : clear to auscultation bilaterally [No Abnormalities] : no abnormalities [Benign] : benign [No Clubbing] : no clubbing [1+ Pitting] : 1+ pitting [Normal Color/ Pigmentation] : normal color/ pigmentation [No Focal Deficits] : no focal deficits [Cranial Nerves Intact] : cranial nerves intact [Oriented x3] : oriented x3 [Normal Affect] : normal affect [TextBox_2] : obese [TextBox_99] : did not test gait

## 2020-03-16 NOTE — CONSULT LETTER
[Dear  ___] : Dear  [unfilled], [Please see my note below.] : Please see my note below. [Consult Closing:] : Thank you very much for allowing me to participate in the care of this patient.  If you have any questions, please do not hesitate to contact me. [Sincerely,] : Sincerely, [Courtesy Letter:] : I had the pleasure of seeing your patient, [unfilled], in my office today. [FreeTextEntry2] : Dr. Seven Huff [FreeTextEntry3] : Fariba Yao MD\par  \par Division of Pulmonary, Critical Care & Sleep Medicine\par [facility] [employer] [facility]\par \par \par

## 2020-03-16 NOTE — HISTORY OF PRESENT ILLNESS
[Never] : never [Obstructive Sleep Apnea] : obstructive sleep apnea [TextBox_4] : 83 yo F presents for follow up, lifelong nonsmoker, obese (bmi 49), history of COPD(?), supplemental oxygen dependence and SHAD on PAP therapy.\dorothy Was hospitalized at [facility] the beginning of Nov 2019 for acute on chronic hypercapnic and hypoxic respiratory failure. Requiring Bilevel therapy and admitted to MICU for close monitoring. Improved and transferred to RCU, discharged to rehab with Bilevel Q.\par \par Initial office visit 1/28/20: Lifelong nonsmoker, denies cough or chest pain. Chronic dyspnea on exertion- progressive, ambulates with walker and becomes dyspneic with approx 20 feet of ambulation per her daughter, however, is predominantly sedentary.\par Chronic bipedal edema\par Inhaler regimen includes: Advair 250/50 BID, Spiriva, Albuterol. \par Uses oxygen as needed, prescribed by her PCP 2 years ago. \par \par PFTs: Severe restrictive defect, moderately reduced DLCO\par Chest imaging: CXR 11/1/19- limited exam, pulmonary edema. \par NM V/Q scan 11/1/19: very low prob for PE\par \par Echo: 10/31/19: LVEF 65-70%, limited study, Concentric LV remodeling, Hyperdynamic LV. Moderate pulmonary hypertension with est RVSP of 55 mmHg. Mild AS\par \par Follow up visit 2/12/200- accompanied by her daughter\par States symptoms are unchanged - dyspnea on minimal exertion and bipedal edema.\par TTE 3/3/20 - very limited study, unable to eval PA pressures. Grossly normal LV fxn\par \par ABG on RA: 7.35/59.6/58/32/84%\par EOT - unable to complete 2/2 severe hip pain, standing O2 sat on RA 87%\par \par Data download: days 1/4-2/2/2020\par Device: DreamStation Autopap, settings: CPAP 15 cmH2O\par DME: [facility]\par Use 93% of days, average use 23 minutes\par AHI 1.3

## 2020-03-16 NOTE — REVIEW OF SYSTEMS
[Dyspnea] : dyspnea [A.M. Dry Mouth] : a.m. dry mouth [SOB on Exertion] : sob on exertion [Edema] : edema [Arthralgias] : arthralgias [Obesity] : obesity [Negative] : Psychiatric [Fever] : no fever [Fatigue] : no fatigue [Recent Wt Gain (___ Lbs)] : ~T no recent weight gain [EDS] : EDS [Chills] : no chills [Poor Appetite] : no poor appetite [Recent Wt Loss (___ Lbs)] : ~T no recent weight loss [Cough] : no cough [Hemoptysis] : no hemoptysis [Chest Tightness] : no chest tightness [Frequent URIs] : no frequent URIs [Sputum] : no sputum [Pleuritic Pain] : no pleuritic pain [Wheezing] : no wheezing [Chest Discomfort] : no chest discomfort [Claudication] : no claudication [Orthopnea] : no orthopnea [Palpitations] : no palpitations [Syncope] : no syncope [Diabetes] : no diabetes [Thyroid Problem] : no thyroid problem [TextBox_144] : osteoporosis

## 2020-03-16 NOTE — ASSESSMENT
[FreeTextEntry1] : 83 yo F presents for initial evaluation. History of COPD(?), supplemental oxygen dependence and SHAD on PAP therapy.\par Was hospitalized at [facility] the beginning of Nov 2019 for acute on chronic hypercapnic and hypoxic respiratory failure. Requiring Bilevel therapy and admitted to MICU for close monitoring. Improved and transferred to RCU, discharged to rehab with Bilevel QHS.\par \par Presents today to establish care. Lifelong nonsmoker, denies cough or chest pain. Chronic dyspnea on exertion- progressive, ambulates with walker and becomes dyspneic with approx 20 feet of ambulation per her daughter, however, is predominantly sedentary.\par Chronic bipedal edema\par Inhaler regimen includes: Advair 250/50 BID, Spiriva, Albuterol. \par Uses oxygen as needed, prescribed by her PCP 2 years ago. \par \par PFTs today: Severe restrictive defect, moderately reduced DLCO\par Chest imaging: CXR 11/1/19- limited exam, pulmonary edema. \par NM V/Q scan 11/1/19: very low prob for PE\par \par Echo: 10/31/19: LVEF 65-70%, limited study, Concentric LV remodeling, Hyperdynamic LV. Moderate pulmonary hypertension with est RVSP of 55 mmHg. Mild AS\par \par Does not provide her machine or data card for download.\par DME: [facility]\par \par A/P: SHAD, OHS, moderate pulmonary hypertension (group 2/3), morbid obesity (BMI 49.5)\par Unclear that the patient has obstructive airways disease. PFTs without evidence of obstruction. Does not appear to be benefiting from current inhaler regimen, therefore will stop Spiriva. can c/w advair\par Hypoxic respiratory failure - related to obesity, hypoventilation - c/w 3 LNC continuously\par Hypercapnic respiratory failure -currently has Cpap at 15 and data shows minimal use - instructed to use whenever sleeping (napping and at night), Requires Split study with Bilevel and TCCO2 monitoring\par \par TTE to re-eval RV/PA pressures- limited study. Will repeat in 3 months\par c/w diuretic therapy and cardiac optimization. Also referring to cardiology for additional management. Provided with referral list.\par \par Follow up after Split study is completed. \par

## 2020-04-01 ENCOUNTER — APPOINTMENT (OUTPATIENT)
Dept: CARDIOLOGY | Facility: CLINIC | Age: 85
End: 2020-04-01

## 2020-07-29 ENCOUNTER — APPOINTMENT (OUTPATIENT)
Dept: SLEEP CENTER | Facility: CLINIC | Age: 85
End: 2020-07-29

## 2020-11-29 ENCOUNTER — INPATIENT (INPATIENT)
Facility: HOSPITAL | Age: 85
LOS: 9 days | Discharge: HOME HEALTH | End: 2020-12-09
Attending: STUDENT IN AN ORGANIZED HEALTH CARE EDUCATION/TRAINING PROGRAM | Admitting: STUDENT IN AN ORGANIZED HEALTH CARE EDUCATION/TRAINING PROGRAM
Payer: MEDICARE

## 2020-11-29 VITALS
HEIGHT: 65 IN | HEART RATE: 95 BPM | SYSTOLIC BLOOD PRESSURE: 163 MMHG | TEMPERATURE: 99 F | OXYGEN SATURATION: 98 % | DIASTOLIC BLOOD PRESSURE: 78 MMHG | RESPIRATION RATE: 22 BRPM

## 2020-11-29 DIAGNOSIS — E78.5 HYPERLIPIDEMIA, UNSPECIFIED: ICD-10-CM

## 2020-11-29 DIAGNOSIS — I10 ESSENTIAL (PRIMARY) HYPERTENSION: ICD-10-CM

## 2020-11-29 DIAGNOSIS — E11.9 TYPE 2 DIABETES MELLITUS WITHOUT COMPLICATIONS: ICD-10-CM

## 2020-11-29 DIAGNOSIS — U07.1 COVID-19: ICD-10-CM

## 2020-11-29 DIAGNOSIS — E87.1 HYPO-OSMOLALITY AND HYPONATREMIA: ICD-10-CM

## 2020-11-29 DIAGNOSIS — J96.21 ACUTE AND CHRONIC RESPIRATORY FAILURE WITH HYPOXIA: ICD-10-CM

## 2020-11-29 LAB
ALBUMIN SERPL ELPH-MCNC: 3.7 G/DL — SIGNIFICANT CHANGE UP (ref 3.3–5)
ALP SERPL-CCNC: 53 U/L — SIGNIFICANT CHANGE UP (ref 40–120)
ALT FLD-CCNC: 23 U/L — SIGNIFICANT CHANGE UP (ref 4–33)
ANION GAP SERPL CALC-SCNC: 10 MMO/L — SIGNIFICANT CHANGE UP (ref 7–14)
APTT BLD: 35.1 SEC — SIGNIFICANT CHANGE UP (ref 27–36.3)
AST SERPL-CCNC: 36 U/L — HIGH (ref 4–32)
B PERT DNA SPEC QL NAA+PROBE: SIGNIFICANT CHANGE UP
BASE EXCESS BLDV CALC-SCNC: 6.1 MMOL/L — SIGNIFICANT CHANGE UP
BASE EXCESS BLDV CALC-SCNC: 7.5 MMOL/L — SIGNIFICANT CHANGE UP
BASOPHILS # BLD AUTO: 0.02 K/UL — SIGNIFICANT CHANGE UP (ref 0–0.2)
BASOPHILS NFR BLD AUTO: 0.3 % — SIGNIFICANT CHANGE UP (ref 0–2)
BILIRUB SERPL-MCNC: 0.3 MG/DL — SIGNIFICANT CHANGE UP (ref 0.2–1.2)
BLOOD GAS VENOUS - CREATININE: 1 MG/DL — SIGNIFICANT CHANGE UP (ref 0.5–1.3)
BLOOD GAS VENOUS - CREATININE: 1.13 MG/DL — SIGNIFICANT CHANGE UP (ref 0.5–1.3)
BLOOD GAS VENOUS - FIO2: 100 — SIGNIFICANT CHANGE UP
BLOOD GAS VENOUS - FIO2: 21 — SIGNIFICANT CHANGE UP
BUN SERPL-MCNC: 20 MG/DL — SIGNIFICANT CHANGE UP (ref 7–23)
C PNEUM DNA SPEC QL NAA+PROBE: SIGNIFICANT CHANGE UP
CALCIUM SERPL-MCNC: 8.9 MG/DL — SIGNIFICANT CHANGE UP (ref 8.4–10.5)
CHLORIDE BLDV-SCNC: 93 MMOL/L — LOW (ref 96–108)
CHLORIDE BLDV-SCNC: 94 MMOL/L — LOW (ref 96–108)
CHLORIDE SERPL-SCNC: 90 MMOL/L — LOW (ref 98–107)
CO2 SERPL-SCNC: 29 MMOL/L — SIGNIFICANT CHANGE UP (ref 22–31)
CREAT SERPL-MCNC: 0.98 MG/DL — SIGNIFICANT CHANGE UP (ref 0.5–1.3)
EOSINOPHIL # BLD AUTO: 0.01 K/UL — SIGNIFICANT CHANGE UP (ref 0–0.5)
EOSINOPHIL NFR BLD AUTO: 0.2 % — SIGNIFICANT CHANGE UP (ref 0–6)
FLUAV H1 2009 PAND RNA SPEC QL NAA+PROBE: SIGNIFICANT CHANGE UP
FLUAV H1 RNA SPEC QL NAA+PROBE: SIGNIFICANT CHANGE UP
FLUAV H3 RNA SPEC QL NAA+PROBE: SIGNIFICANT CHANGE UP
FLUAV SUBTYP SPEC NAA+PROBE: SIGNIFICANT CHANGE UP
FLUBV RNA SPEC QL NAA+PROBE: SIGNIFICANT CHANGE UP
GAS PNL BLDV: 128 MMOL/L — LOW (ref 136–146)
GAS PNL BLDV: 131 MMOL/L — LOW (ref 136–146)
GLUCOSE BLDV-MCNC: 111 MG/DL — HIGH (ref 70–99)
GLUCOSE BLDV-MCNC: 119 MG/DL — HIGH (ref 70–99)
GLUCOSE SERPL-MCNC: 109 MG/DL — HIGH (ref 70–99)
HADV DNA SPEC QL NAA+PROBE: SIGNIFICANT CHANGE UP
HCO3 BLDV-SCNC: 28 MMOL/L — HIGH (ref 20–27)
HCO3 BLDV-SCNC: 29 MMOL/L — HIGH (ref 20–27)
HCOV PNL SPEC NAA+PROBE: SIGNIFICANT CHANGE UP
HCT VFR BLD CALC: 41.1 % — SIGNIFICANT CHANGE UP (ref 34.5–45)
HCT VFR BLDV CALC: 34.7 % — SIGNIFICANT CHANGE UP (ref 34.5–45)
HCT VFR BLDV CALC: 40.2 % — SIGNIFICANT CHANGE UP (ref 34.5–45)
HGB BLD-MCNC: 12.3 G/DL — SIGNIFICANT CHANGE UP (ref 11.5–15.5)
HGB BLDV-MCNC: 11.3 G/DL — LOW (ref 11.5–15.5)
HGB BLDV-MCNC: 13.1 G/DL — SIGNIFICANT CHANGE UP (ref 11.5–15.5)
HMPV RNA SPEC QL NAA+PROBE: SIGNIFICANT CHANGE UP
HPIV1 RNA SPEC QL NAA+PROBE: SIGNIFICANT CHANGE UP
HPIV2 RNA SPEC QL NAA+PROBE: SIGNIFICANT CHANGE UP
HPIV3 RNA SPEC QL NAA+PROBE: SIGNIFICANT CHANGE UP
HPIV4 RNA SPEC QL NAA+PROBE: SIGNIFICANT CHANGE UP
IMM GRANULOCYTES NFR BLD AUTO: 0.3 % — SIGNIFICANT CHANGE UP (ref 0–1.5)
INR BLD: 1.13 — SIGNIFICANT CHANGE UP (ref 0.88–1.16)
LACTATE BLDV-MCNC: 0.8 MMOL/L — SIGNIFICANT CHANGE UP (ref 0.5–2)
LACTATE BLDV-MCNC: 1 MMOL/L — SIGNIFICANT CHANGE UP (ref 0.5–2)
LYMPHOCYTES # BLD AUTO: 1.79 K/UL — SIGNIFICANT CHANGE UP (ref 1–3.3)
LYMPHOCYTES # BLD AUTO: 28.5 % — SIGNIFICANT CHANGE UP (ref 13–44)
MCHC RBC-ENTMCNC: 25.6 PG — LOW (ref 27–34)
MCHC RBC-ENTMCNC: 29.9 % — LOW (ref 32–36)
MCV RBC AUTO: 85.4 FL — SIGNIFICANT CHANGE UP (ref 80–100)
MONOCYTES # BLD AUTO: 0.68 K/UL — SIGNIFICANT CHANGE UP (ref 0–0.9)
MONOCYTES NFR BLD AUTO: 10.8 % — SIGNIFICANT CHANGE UP (ref 2–14)
NEUTROPHILS # BLD AUTO: 3.75 K/UL — SIGNIFICANT CHANGE UP (ref 1.8–7.4)
NEUTROPHILS NFR BLD AUTO: 59.9 % — SIGNIFICANT CHANGE UP (ref 43–77)
NRBC # FLD: 0 K/UL — SIGNIFICANT CHANGE UP (ref 0–0)
NT-PROBNP SERPL-SCNC: 292.7 PG/ML — SIGNIFICANT CHANGE UP
PCO2 BLDV: 69 MMHG — HIGH (ref 41–51)
PCO2 BLDV: 71 MMHG — HIGH (ref 41–51)
PH BLDV: 7.3 PH — LOW (ref 7.32–7.43)
PH BLDV: 7.3 PH — LOW (ref 7.32–7.43)
PLATELET # BLD AUTO: 201 K/UL — SIGNIFICANT CHANGE UP (ref 150–400)
PMV BLD: 9.3 FL — SIGNIFICANT CHANGE UP (ref 7–13)
PO2 BLDV: 37 MMHG — SIGNIFICANT CHANGE UP (ref 35–40)
PO2 BLDV: 59 MMHG — HIGH (ref 35–40)
POTASSIUM BLDV-SCNC: 4.6 MMOL/L — HIGH (ref 3.4–4.5)
POTASSIUM BLDV-SCNC: 4.9 MMOL/L — HIGH (ref 3.4–4.5)
POTASSIUM SERPL-MCNC: 5 MMOL/L — SIGNIFICANT CHANGE UP (ref 3.5–5.3)
POTASSIUM SERPL-SCNC: 5 MMOL/L — SIGNIFICANT CHANGE UP (ref 3.5–5.3)
PROT SERPL-MCNC: 8.4 G/DL — HIGH (ref 6–8.3)
PROTHROM AB SERPL-ACNC: 12.8 SEC — SIGNIFICANT CHANGE UP (ref 10.6–13.6)
RAPID RVP RESULT: DETECTED
RBC # BLD: 4.81 M/UL — SIGNIFICANT CHANGE UP (ref 3.8–5.2)
RBC # FLD: 15.9 % — HIGH (ref 10.3–14.5)
RV+EV RNA SPEC QL NAA+PROBE: SIGNIFICANT CHANGE UP
SAO2 % BLDV: 63.6 % — SIGNIFICANT CHANGE UP (ref 60–85)
SAO2 % BLDV: 87.9 % — HIGH (ref 60–85)
SARS-COV-2 RNA SPEC QL NAA+PROBE: DETECTED
SODIUM SERPL-SCNC: 129 MMOL/L — LOW (ref 135–145)
TROPONIN T, HIGH SENSITIVITY: 27 NG/L — SIGNIFICANT CHANGE UP (ref ?–14)
WBC # BLD: 6.27 K/UL — SIGNIFICANT CHANGE UP (ref 3.8–10.5)
WBC # FLD AUTO: 6.27 K/UL — SIGNIFICANT CHANGE UP (ref 3.8–10.5)

## 2020-11-29 PROCEDURE — 99291 CRITICAL CARE FIRST HOUR: CPT | Mod: CS

## 2020-11-29 PROCEDURE — 99223 1ST HOSP IP/OBS HIGH 75: CPT | Mod: CS,AI

## 2020-11-29 PROCEDURE — 71045 X-RAY EXAM CHEST 1 VIEW: CPT | Mod: 26

## 2020-11-29 RX ORDER — HYDRALAZINE HCL 50 MG
100 TABLET ORAL THREE TIMES A DAY
Refills: 0 | Status: DISCONTINUED | OUTPATIENT
Start: 2020-11-29 | End: 2020-12-09

## 2020-11-29 RX ORDER — GABAPENTIN 400 MG/1
100 CAPSULE ORAL THREE TIMES A DAY
Refills: 0 | Status: DISCONTINUED | OUTPATIENT
Start: 2020-11-29 | End: 2020-12-09

## 2020-11-29 RX ORDER — SODIUM CHLORIDE 9 MG/ML
1000 INJECTION, SOLUTION INTRAVENOUS
Refills: 0 | Status: DISCONTINUED | OUTPATIENT
Start: 2020-11-29 | End: 2020-12-09

## 2020-11-29 RX ORDER — REMDESIVIR 5 MG/ML
INJECTION INTRAVENOUS
Refills: 0 | Status: COMPLETED | OUTPATIENT
Start: 2020-11-29 | End: 2020-12-04

## 2020-11-29 RX ORDER — DEXTROSE 50 % IN WATER 50 %
12.5 SYRINGE (ML) INTRAVENOUS ONCE
Refills: 0 | Status: DISCONTINUED | OUTPATIENT
Start: 2020-11-29 | End: 2020-12-09

## 2020-11-29 RX ORDER — AMLODIPINE BESYLATE 2.5 MG/1
10 TABLET ORAL DAILY
Refills: 0 | Status: DISCONTINUED | OUTPATIENT
Start: 2020-11-29 | End: 2020-12-09

## 2020-11-29 RX ORDER — REMDESIVIR 5 MG/ML
200 INJECTION INTRAVENOUS EVERY 24 HOURS
Refills: 0 | Status: COMPLETED | OUTPATIENT
Start: 2020-11-29 | End: 2020-11-29

## 2020-11-29 RX ORDER — GLUCAGON INJECTION, SOLUTION 0.5 MG/.1ML
1 INJECTION, SOLUTION SUBCUTANEOUS ONCE
Refills: 0 | Status: DISCONTINUED | OUTPATIENT
Start: 2020-11-29 | End: 2020-12-09

## 2020-11-29 RX ORDER — DEXTROSE 50 % IN WATER 50 %
15 SYRINGE (ML) INTRAVENOUS ONCE
Refills: 0 | Status: DISCONTINUED | OUTPATIENT
Start: 2020-11-29 | End: 2020-12-09

## 2020-11-29 RX ORDER — DEXTROSE 50 % IN WATER 50 %
25 SYRINGE (ML) INTRAVENOUS ONCE
Refills: 0 | Status: DISCONTINUED | OUTPATIENT
Start: 2020-11-29 | End: 2020-12-09

## 2020-11-29 RX ORDER — INSULIN LISPRO 100/ML
VIAL (ML) SUBCUTANEOUS
Refills: 0 | Status: DISCONTINUED | OUTPATIENT
Start: 2020-11-29 | End: 2020-12-09

## 2020-11-29 RX ORDER — HEPARIN SODIUM 5000 [USP'U]/ML
5000 INJECTION INTRAVENOUS; SUBCUTANEOUS EVERY 8 HOURS
Refills: 0 | Status: DISCONTINUED | OUTPATIENT
Start: 2020-11-29 | End: 2020-12-09

## 2020-11-29 RX ORDER — CEFTRIAXONE 500 MG/1
1000 INJECTION, POWDER, FOR SOLUTION INTRAMUSCULAR; INTRAVENOUS ONCE
Refills: 0 | Status: COMPLETED | OUTPATIENT
Start: 2020-11-29 | End: 2020-11-29

## 2020-11-29 RX ORDER — METOPROLOL TARTRATE 50 MG
100 TABLET ORAL DAILY
Refills: 0 | Status: DISCONTINUED | OUTPATIENT
Start: 2020-11-29 | End: 2020-12-09

## 2020-11-29 RX ORDER — CHOLECALCIFEROL (VITAMIN D3) 125 MCG
2000 CAPSULE ORAL DAILY
Refills: 0 | Status: DISCONTINUED | OUTPATIENT
Start: 2020-11-29 | End: 2020-12-09

## 2020-11-29 RX ORDER — ALBUTEROL 90 UG/1
2 AEROSOL, METERED ORAL EVERY 6 HOURS
Refills: 0 | Status: DISCONTINUED | OUTPATIENT
Start: 2020-11-29 | End: 2020-12-09

## 2020-11-29 RX ORDER — INSULIN GLARGINE 100 [IU]/ML
14 INJECTION, SOLUTION SUBCUTANEOUS AT BEDTIME
Refills: 0 | Status: DISCONTINUED | OUTPATIENT
Start: 2020-11-29 | End: 2020-12-09

## 2020-11-29 RX ORDER — FUROSEMIDE 40 MG
40 TABLET ORAL
Refills: 0 | Status: DISCONTINUED | OUTPATIENT
Start: 2020-11-29 | End: 2020-12-02

## 2020-11-29 RX ORDER — ACETAMINOPHEN 500 MG
650 TABLET ORAL ONCE
Refills: 0 | Status: COMPLETED | OUTPATIENT
Start: 2020-11-29 | End: 2020-11-29

## 2020-11-29 RX ORDER — REMDESIVIR 5 MG/ML
100 INJECTION INTRAVENOUS EVERY 24 HOURS
Refills: 0 | Status: COMPLETED | OUTPATIENT
Start: 2020-11-30 | End: 2020-12-04

## 2020-11-29 RX ORDER — AZITHROMYCIN 500 MG/1
500 TABLET, FILM COATED ORAL ONCE
Refills: 0 | Status: COMPLETED | OUTPATIENT
Start: 2020-11-29 | End: 2020-11-29

## 2020-11-29 RX ORDER — CALCIUM CARBONATE 500(1250)
0.5 TABLET ORAL
Refills: 0 | Status: DISCONTINUED | OUTPATIENT
Start: 2020-11-29 | End: 2020-12-09

## 2020-11-29 RX ORDER — DEXAMETHASONE 0.5 MG/5ML
6 ELIXIR ORAL ONCE
Refills: 0 | Status: COMPLETED | OUTPATIENT
Start: 2020-11-29 | End: 2020-11-29

## 2020-11-29 RX ORDER — MONTELUKAST 4 MG/1
10 TABLET, CHEWABLE ORAL DAILY
Refills: 0 | Status: DISCONTINUED | OUTPATIENT
Start: 2020-11-29 | End: 2020-12-09

## 2020-11-29 RX ORDER — INSULIN LISPRO 100/ML
6 VIAL (ML) SUBCUTANEOUS
Refills: 0 | Status: DISCONTINUED | OUTPATIENT
Start: 2020-11-29 | End: 2020-12-09

## 2020-11-29 RX ORDER — ASCORBIC ACID 60 MG
500 TABLET,CHEWABLE ORAL DAILY
Refills: 0 | Status: DISCONTINUED | OUTPATIENT
Start: 2020-11-29 | End: 2020-12-09

## 2020-11-29 RX ORDER — ATORVASTATIN CALCIUM 80 MG/1
40 TABLET, FILM COATED ORAL AT BEDTIME
Refills: 0 | Status: DISCONTINUED | OUTPATIENT
Start: 2020-11-29 | End: 2020-12-09

## 2020-11-29 RX ORDER — DEXAMETHASONE 0.5 MG/5ML
6 ELIXIR ORAL DAILY
Refills: 0 | Status: COMPLETED | OUTPATIENT
Start: 2020-11-30 | End: 2020-12-09

## 2020-11-29 RX ORDER — POLYETHYLENE GLYCOL 3350 17 G/17G
17 POWDER, FOR SOLUTION ORAL DAILY
Refills: 0 | Status: DISCONTINUED | OUTPATIENT
Start: 2020-11-29 | End: 2020-12-07

## 2020-11-29 RX ADMIN — ATORVASTATIN CALCIUM 40 MILLIGRAM(S): 80 TABLET, FILM COATED ORAL at 22:22

## 2020-11-29 RX ADMIN — Medication 100 MILLIGRAM(S): at 22:22

## 2020-11-29 RX ADMIN — Medication 650 MILLIGRAM(S): at 13:50

## 2020-11-29 RX ADMIN — REMDESIVIR 500 MILLIGRAM(S): 5 INJECTION INTRAVENOUS at 22:23

## 2020-11-29 RX ADMIN — INSULIN GLARGINE 14 UNIT(S): 100 INJECTION, SOLUTION SUBCUTANEOUS at 22:22

## 2020-11-29 RX ADMIN — CEFTRIAXONE 100 MILLIGRAM(S): 500 INJECTION, POWDER, FOR SOLUTION INTRAMUSCULAR; INTRAVENOUS at 13:50

## 2020-11-29 RX ADMIN — GABAPENTIN 100 MILLIGRAM(S): 400 CAPSULE ORAL at 22:22

## 2020-11-29 RX ADMIN — CEFTRIAXONE 1000 MILLIGRAM(S): 500 INJECTION, POWDER, FOR SOLUTION INTRAMUSCULAR; INTRAVENOUS at 14:44

## 2020-11-29 RX ADMIN — HEPARIN SODIUM 5000 UNIT(S): 5000 INJECTION INTRAVENOUS; SUBCUTANEOUS at 22:22

## 2020-11-29 RX ADMIN — Medication 6 MILLIGRAM(S): at 15:08

## 2020-11-29 RX ADMIN — AZITHROMYCIN 500 MILLIGRAM(S): 500 TABLET, FILM COATED ORAL at 13:50

## 2020-11-29 RX ADMIN — Medication 650 MILLIGRAM(S): at 14:45

## 2020-11-29 NOTE — ED PROVIDER NOTE - CLINICAL SUMMARY MEDICAL DECISION MAKING FREE TEXT BOX
Joseph Frankel PGY2: 84 yo F with COPD, DM, HTN presenting with sob and cough. Febrile. Tachypneic, requiring oxygen. Otherwise VSS. Patient is non toxic appearing. PE as above. Concern for infectious process- PNA vs COVID. Also consider CHF vs COPD exacerbation. Will get labs, cxr, empiric ab. Will hold fluids for now as patient with signs of fluid overload. Will reassesses closely.

## 2020-11-29 NOTE — H&P ADULT - GASTROINTESTINAL DETAILS
soft/nontender/no distention/no masses palpable/bowel sounds normal/no bruit/no rebound tenderness/no guarding/no rigidity

## 2020-11-29 NOTE — H&P ADULT - NSHPPHYSICALEXAM_GEN_ALL_CORE
Vital Signs Last 24 Hrs  T(C): 37.9 (29 Nov 2020 15:06), Max: 38.7 (29 Nov 2020 13:31)  T(F): 100.2 (29 Nov 2020 15:06), Max: 101.6 (29 Nov 2020 13:31)  HR: 94 (29 Nov 2020 15:26) (94 - 104)  BP: 104/62 (29 Nov 2020 15:26) (104/62 - 167/80)  BP(mean): --  RR: 19 (29 Nov 2020 15:26) (19 - 22)  SpO2: 97% (29 Nov 2020 15:26) (97% - 99%)

## 2020-11-29 NOTE — H&P ADULT - PROBLEM SELECTOR PLAN 2
most likely due to hypervolemia  Lasix 40mg IV bid started  check urine electrolytes, urine osm, TSH, free T4

## 2020-11-29 NOTE — H&P ADULT - ASSESSMENT
85 y.o. female w/ hx morbid obesity, COPD on 2LNC at home, HTN, DM2, hyperlipidemia, GERD p/w worsening SOB at home for a few days with mild dry cough found to have acute on chronic respiratory failure due to acute infection with COVID 19.   85 y.o. female w/ hx morbid obesity, COPD/asthma on 2LNC at home, HTN, DM2, hyperlipidemia, GERD p/w worsening SOB at home for a few days with mild dry cough found to have acute on chronic respiratory failure due to acute infection with COVID 19.

## 2020-11-29 NOTE — H&P ADULT - PROBLEM SELECTOR PLAN 3
Hold glimepiride  start insulin sliding scale; Humalog 6 Units qAC and Lantus 14 Units qHs  diabetic diet  check HbA1c  c/w neurontin for neuropathy

## 2020-11-29 NOTE — ED ADULT TRIAGE NOTE - CHIEF COMPLAINT QUOTE
Pt brought in for increasing SOB. Pt has a hx of COPD and asthma. As per EMS pts daughter called [phone] because pts SpO2 was 80s on RA.

## 2020-11-29 NOTE — ED ADULT NURSE NOTE - OBJECTIVE STATEMENT
pt arrives by ems with 100 nrb in place, c/o sob pt has been having sob for 2 days now,  iv placed in left ac, labs sent off pt swabbed for covid, pt made comfortable

## 2020-11-29 NOTE — ED PROVIDER NOTE - PROGRESS NOTE DETAILS
rectal temp positive for fever, abx for cap coverage ordered, steroids ordered given h/o copd and possibly covid with worsened o2 requirement, hold on fluid for now given exam findings concerning for hypervolemia Norma Pugh MD PGY-3 MICHAEL note. COPD, DM, HTN on home 02 presents with cough and SOB x a few days, worse this morning. Reportedly would become hypoxic at home and increase her NC to 6L with response in Sp02 but today, no response. Brought to ER. Pulm exam significant for RLL crackles, currently on 97% 5 L NC, COVID positive, initially given dose of abx, given decadron, to be admitted given new/worsening 02 requirements in setting of covid

## 2020-11-29 NOTE — H&P ADULT - PROBLEM SELECTOR PLAN 1
due to COVID 19  admit to airborne isolation  c/w 6LNC  start dexamethasone 6mg qdaily  x 10 days  start remdesivir x 5 days  encouraged incentive spirometry  c/w albuterol HFA , advair  given Lasix 40mg IV bid since positive hypervolemia; Patient takes lasix 40mg PO bid at home  c/w montelukast and azelastine for asthma due to COVID 19  admit to airborne isolation  c/w 6LNC  start dexamethasone 6mg IV qdaily  x 10 days  start remdesivir x 5 days  check procalcitonin levels and if > 3 start abx.   encouraged incentive spirometry  c/w albuterol HFA , advair  given Lasix 40mg IV bid since positive hypervolemia; Patient takes lasix 40mg PO bid at home  c/w montelukast and azelastine for asthma

## 2020-11-29 NOTE — ED PROVIDER NOTE - OBJECTIVE STATEMENT
86 yo F with pmh of COPD (on home O2), DM, HTN presenting with few days of SOB worsening this morning. Had similar episode yesterday and increased home O2 to six liters with improvement, but that did not work today. Associated with cough and chronic BL le edema. Denies fevers, n/v, chest pain. 84 yo F with pmh of COPD (on home O2), DM, HTN presenting with few days of cough and SOB worsening this morning. Had similar episode yesterday and increased home O2 to six liters with improvement, but that did not work today. Associated with cough and chronic BL le edema. Denies fevers, n/v, chest pain.

## 2020-11-29 NOTE — ED PROVIDER NOTE - NS ED ROS FT
Gen: Denies fever, weight loss  CV: Denies chest pain, palpitations  HEENT: Denies neck pain, sore throat, eye discharge  Skin: Denies rash, erythema, color changes  Resp: Endorses SOB, cough  GI: Denies constipation, nausea, vomiting  Msk: Denies back pain, extremity pain  : Denies dysuria, increased frequency  Neuro: Denies LOC, weakness, numbness/tingling.

## 2020-11-29 NOTE — ED PROVIDER NOTE - ATTENDING CONTRIBUTION TO CARE
agree with above hpi  on my exam  GEN -A+O x3, able to answer questions, tachypneic  HEAD - NC/AT   EYES- PERRL, EOMI  ENT: Airway patent, mmm, Oral cavity and pharynx normal. No inflammation, swelling, exudate, or lesions.  NECK: Neck supple, no masses.  PULMONARY - tachypneic, 100% o2 on NRB, transition to 5LNC with good o2 sat and appears comfortable, exam limited by habitus, crackles/rhonchi to bases  CARDIAC -s1s2, RRR, no M,G,R  ABDOMEN - +BS, ND, NT, soft, no guarding, no rebound, no masses   BACK - no CVA tenderness, Normal  spine   EXTREMITIES - FROM, 2+ b/l le edema  SKIN - no rash or bruising   NEUROLOGIC - alert, speech clear, no focal deficits  PSYCH -nl mood/affect, nl insight.  Patient presents with several days of nonprod cough and worsening shortness of breath, h/o COPD on home o2, sats dropped at home despite uptitrating NC. Denies fevers at home, chest pain, abd pain, vomiting, diarrhea, dysuria. Does state has had b/l le edema worsening over last few m. Patient appears tachypneic, able to speak and answer questions, lung exam limited by habitus but b/l crackles/rhonchi noted. Concern for infection (bacterial v. viral)  with possible concommitant fluid overload-eval with xr, labs, continue o2, rectal temp, reass.

## 2020-11-29 NOTE — ED PROVIDER NOTE - PHYSICAL EXAMINATION
Gen: Alert and oriented. Appears mildly anxious. Answering questions appropriately  HEENT: extra occular movements intact, no nasal discharge, mucous membranes moist  CV: Regular rate and rhythm, +S1/S2, no murmurs/rubs/gallops,   Resp: Tachypneic. mild crackles at right base. No wheezing auscultated  GI: Abdomen large but soft and non-distended, non tender to palpation, no masses  MSK: No open wounds, no bruising, bl 2+ LE edema, Homans sign negative bl  Neuro: A&Ox4, following commands, moving all four extremities spontaneously  Psych: appropriate mood

## 2020-11-29 NOTE — H&P ADULT - HISTORY OF PRESENT ILLNESS
85 y.o. female w/ hx COPD on 2LNC at home, HTN, DM2, hyperlipidemia, GERD p/w worsening SOB at home found to have B/L patchy infiltrates on CXR and COVID positive 85 y.o. female w/ hx morbid obesity, COPD on 2LNC at home, HTN, DM2, hyperlipidemia, GERD p/w worsening SOB at home for a few days with mild dry cough which didn't improve with her inhalers and increase in supplemental oxygen. In ED she was febrile and found to have B/L patchy infiltrates on CXR and to be COVID positive. Patient denies sick contacts at home or travel history.  She was given dexamethasone 6mg IV x 1, ceftriaxone/zithromax but still requires 6L of oxygen.  85 y.o. female w/ hx morbid obesity, COPD/asthma on 2LNC at home, HTN, DM2, hyperlipidemia, GERD p/w worsening SOB at home for a few days with mild dry cough which didn't improve with her inhalers and increase in supplemental oxygen. In ED she was febrile and found to have B/L patchy infiltrates on CXR and to be COVID positive. Patient denies sick contacts at home or travel history.  She was given dexamethasone 6mg IV x 1, ceftriaxone/zithromax but still requires 6L of oxygen.

## 2020-11-30 DIAGNOSIS — U07.1 COVID-19: ICD-10-CM

## 2020-11-30 DIAGNOSIS — J43.9 EMPHYSEMA, UNSPECIFIED: ICD-10-CM

## 2020-11-30 LAB
ALBUMIN SERPL ELPH-MCNC: 3.3 G/DL — SIGNIFICANT CHANGE UP (ref 3.3–5)
ALBUMIN SERPL ELPH-MCNC: 3.4 G/DL — SIGNIFICANT CHANGE UP (ref 3.3–5)
ALP SERPL-CCNC: 46 U/L — SIGNIFICANT CHANGE UP (ref 40–120)
ALP SERPL-CCNC: 46 U/L — SIGNIFICANT CHANGE UP (ref 40–120)
ALT FLD-CCNC: 20 U/L — SIGNIFICANT CHANGE UP (ref 4–33)
ALT FLD-CCNC: 22 U/L — SIGNIFICANT CHANGE UP (ref 4–33)
ANION GAP SERPL CALC-SCNC: 8 MMO/L — SIGNIFICANT CHANGE UP (ref 7–14)
ANION GAP SERPL CALC-SCNC: 9 MMO/L — SIGNIFICANT CHANGE UP (ref 7–14)
ANISOCYTOSIS BLD QL: SLIGHT — SIGNIFICANT CHANGE UP
AST SERPL-CCNC: 31 U/L — SIGNIFICANT CHANGE UP (ref 4–32)
AST SERPL-CCNC: 51 U/L — HIGH (ref 4–32)
BASOPHILS # BLD AUTO: 0 K/UL — SIGNIFICANT CHANGE UP (ref 0–0.2)
BASOPHILS NFR BLD AUTO: 0 % — SIGNIFICANT CHANGE UP (ref 0–2)
BASOPHILS NFR SPEC: 0 % — SIGNIFICANT CHANGE UP (ref 0–2)
BILIRUB DIRECT SERPL-MCNC: < 0.2 MG/DL — SIGNIFICANT CHANGE UP (ref 0.1–0.2)
BILIRUB SERPL-MCNC: 0.2 MG/DL — SIGNIFICANT CHANGE UP (ref 0.2–1.2)
BILIRUB SERPL-MCNC: 0.2 MG/DL — SIGNIFICANT CHANGE UP (ref 0.2–1.2)
BUN SERPL-MCNC: 26 MG/DL — HIGH (ref 7–23)
BUN SERPL-MCNC: 29 MG/DL — HIGH (ref 7–23)
CALCIUM SERPL-MCNC: 8.1 MG/DL — LOW (ref 8.4–10.5)
CALCIUM SERPL-MCNC: 8.7 MG/DL — SIGNIFICANT CHANGE UP (ref 8.4–10.5)
CHLORIDE SERPL-SCNC: 91 MMOL/L — LOW (ref 98–107)
CHLORIDE SERPL-SCNC: 92 MMOL/L — LOW (ref 98–107)
CO2 SERPL-SCNC: 30 MMOL/L — SIGNIFICANT CHANGE UP (ref 22–31)
CO2 SERPL-SCNC: 34 MMOL/L — HIGH (ref 22–31)
CREAT SERPL-MCNC: 1.02 MG/DL — SIGNIFICANT CHANGE UP (ref 0.5–1.3)
CREAT SERPL-MCNC: 1.02 MG/DL — SIGNIFICANT CHANGE UP (ref 0.5–1.3)
CREAT SERPL-MCNC: 1.17 MG/DL — SIGNIFICANT CHANGE UP (ref 0.5–1.3)
ELLIPTOCYTES BLD QL SMEAR: SLIGHT — SIGNIFICANT CHANGE UP
EOSINOPHIL # BLD AUTO: 0 K/UL — SIGNIFICANT CHANGE UP (ref 0–0.5)
EOSINOPHIL NFR BLD AUTO: 0 % — SIGNIFICANT CHANGE UP (ref 0–6)
EOSINOPHIL NFR FLD: 0 % — SIGNIFICANT CHANGE UP (ref 0–6)
FERRITIN SERPL-MCNC: 431 NG/ML — HIGH (ref 15–150)
GIANT PLATELETS BLD QL SMEAR: PRESENT — SIGNIFICANT CHANGE UP
GLUCOSE BLDC GLUCOMTR-MCNC: 207 MG/DL — HIGH (ref 70–99)
GLUCOSE BLDC GLUCOMTR-MCNC: 228 MG/DL — HIGH (ref 70–99)
GLUCOSE SERPL-MCNC: 257 MG/DL — HIGH (ref 70–99)
GLUCOSE SERPL-MCNC: 289 MG/DL — HIGH (ref 70–99)
HBA1C BLD-MCNC: 6.3 % — HIGH (ref 4–5.6)
HCT VFR BLD CALC: 39.8 % — SIGNIFICANT CHANGE UP (ref 34.5–45)
HGB BLD-MCNC: 11.9 G/DL — SIGNIFICANT CHANGE UP (ref 11.5–15.5)
IMM GRANULOCYTES NFR BLD AUTO: 0.4 % — SIGNIFICANT CHANGE UP (ref 0–1.5)
LYMPHOCYTES # BLD AUTO: 0.67 K/UL — LOW (ref 1–3.3)
LYMPHOCYTES # BLD AUTO: 23.6 % — SIGNIFICANT CHANGE UP (ref 13–44)
LYMPHOCYTES NFR SPEC AUTO: 9.9 % — LOW (ref 13–44)
MAGNESIUM SERPL-MCNC: 1.8 MG/DL — SIGNIFICANT CHANGE UP (ref 1.6–2.6)
MCHC RBC-ENTMCNC: 25.8 PG — LOW (ref 27–34)
MCHC RBC-ENTMCNC: 29.9 % — LOW (ref 32–36)
MCV RBC AUTO: 86.3 FL — SIGNIFICANT CHANGE UP (ref 80–100)
METAMYELOCYTES # FLD: 0.9 % — SIGNIFICANT CHANGE UP (ref 0–1)
MONOCYTES # BLD AUTO: 0.19 K/UL — SIGNIFICANT CHANGE UP (ref 0–0.9)
MONOCYTES NFR BLD AUTO: 6.7 % — SIGNIFICANT CHANGE UP (ref 2–14)
MONOCYTES NFR BLD: 6.3 % — SIGNIFICANT CHANGE UP (ref 2–9)
NEUTROPHIL AB SER-ACNC: 67.6 % — SIGNIFICANT CHANGE UP (ref 43–77)
NEUTROPHILS # BLD AUTO: 1.97 K/UL — SIGNIFICANT CHANGE UP (ref 1.8–7.4)
NEUTROPHILS NFR BLD AUTO: 69.3 % — SIGNIFICANT CHANGE UP (ref 43–77)
NEUTS BAND # BLD: 9.9 % — HIGH (ref 0–6)
NRBC # FLD: 0 K/UL — SIGNIFICANT CHANGE UP (ref 0–0)
OVALOCYTES BLD QL SMEAR: SLIGHT — SIGNIFICANT CHANGE UP
PHOSPHATE SERPL-MCNC: 3.2 MG/DL — SIGNIFICANT CHANGE UP (ref 2.5–4.5)
PLATELET # BLD AUTO: 183 K/UL — SIGNIFICANT CHANGE UP (ref 150–400)
PLATELET COUNT - ESTIMATE: NORMAL — SIGNIFICANT CHANGE UP
PMV BLD: 9.3 FL — SIGNIFICANT CHANGE UP (ref 7–13)
POIKILOCYTOSIS BLD QL AUTO: SLIGHT — SIGNIFICANT CHANGE UP
POTASSIUM SERPL-MCNC: 5 MMOL/L — SIGNIFICANT CHANGE UP (ref 3.5–5.3)
POTASSIUM SERPL-MCNC: 5.5 MMOL/L — HIGH (ref 3.5–5.3)
POTASSIUM SERPL-SCNC: 5 MMOL/L — SIGNIFICANT CHANGE UP (ref 3.5–5.3)
POTASSIUM SERPL-SCNC: 5.5 MMOL/L — HIGH (ref 3.5–5.3)
PROCALCITONIN SERPL-MCNC: 0.08 NG/ML — SIGNIFICANT CHANGE UP (ref 0.02–0.1)
PROCALCITONIN SERPL-MCNC: 0.09 NG/ML — SIGNIFICANT CHANGE UP (ref 0.02–0.1)
PROT SERPL-MCNC: 6.8 G/DL — SIGNIFICANT CHANGE UP (ref 6–8.3)
PROT SERPL-MCNC: 7.4 G/DL — SIGNIFICANT CHANGE UP (ref 6–8.3)
RBC # BLD: 4.61 M/UL — SIGNIFICANT CHANGE UP (ref 3.8–5.2)
RBC # FLD: 15.7 % — HIGH (ref 10.3–14.5)
SMUDGE CELLS # BLD: PRESENT — SIGNIFICANT CHANGE UP
SODIUM SERPL-SCNC: 130 MMOL/L — LOW (ref 135–145)
SODIUM SERPL-SCNC: 134 MMOL/L — LOW (ref 135–145)
T4 FREE SERPL-MCNC: 1.02 NG/DL — SIGNIFICANT CHANGE UP (ref 0.9–1.8)
TROPONIN T, HIGH SENSITIVITY: 20 NG/L — SIGNIFICANT CHANGE UP (ref ?–14)
TSH SERPL-MCNC: 0.49 UIU/ML — SIGNIFICANT CHANGE UP (ref 0.27–4.2)
VARIANT LYMPHS # BLD: 5.4 % — SIGNIFICANT CHANGE UP
WBC # BLD: 2.84 K/UL — LOW (ref 3.8–10.5)
WBC # FLD AUTO: 2.84 K/UL — LOW (ref 3.8–10.5)

## 2020-11-30 PROCEDURE — 99233 SBSQ HOSP IP/OBS HIGH 50: CPT | Mod: CS

## 2020-11-30 RX ADMIN — ATORVASTATIN CALCIUM 40 MILLIGRAM(S): 80 TABLET, FILM COATED ORAL at 21:25

## 2020-11-30 RX ADMIN — HEPARIN SODIUM 5000 UNIT(S): 5000 INJECTION INTRAVENOUS; SUBCUTANEOUS at 14:26

## 2020-11-30 RX ADMIN — INSULIN GLARGINE 14 UNIT(S): 100 INJECTION, SOLUTION SUBCUTANEOUS at 23:58

## 2020-11-30 RX ADMIN — Medication 100 MILLIGRAM(S): at 14:26

## 2020-11-30 RX ADMIN — Medication 500 MILLIGRAM(S): at 14:26

## 2020-11-30 RX ADMIN — Medication 6 UNIT(S): at 12:25

## 2020-11-30 RX ADMIN — Medication 40 MILLIGRAM(S): at 17:31

## 2020-11-30 RX ADMIN — Medication 0.5 TABLET(S): at 17:32

## 2020-11-30 RX ADMIN — GABAPENTIN 100 MILLIGRAM(S): 400 CAPSULE ORAL at 14:26

## 2020-11-30 RX ADMIN — Medication 0.5 TABLET(S): at 06:22

## 2020-11-30 RX ADMIN — Medication 40 MILLIGRAM(S): at 06:22

## 2020-11-30 RX ADMIN — GABAPENTIN 100 MILLIGRAM(S): 400 CAPSULE ORAL at 21:24

## 2020-11-30 RX ADMIN — AMLODIPINE BESYLATE 10 MILLIGRAM(S): 2.5 TABLET ORAL at 06:23

## 2020-11-30 RX ADMIN — Medication 4: at 09:04

## 2020-11-30 RX ADMIN — Medication 6 UNIT(S): at 17:32

## 2020-11-30 RX ADMIN — HEPARIN SODIUM 5000 UNIT(S): 5000 INJECTION INTRAVENOUS; SUBCUTANEOUS at 21:24

## 2020-11-30 RX ADMIN — Medication 100 MILLIGRAM(S): at 06:23

## 2020-11-30 RX ADMIN — REMDESIVIR 500 MILLIGRAM(S): 5 INJECTION INTRAVENOUS at 21:24

## 2020-11-30 RX ADMIN — ALBUTEROL 2 PUFF(S): 90 AEROSOL, METERED ORAL at 23:58

## 2020-11-30 RX ADMIN — Medication 6 MILLIGRAM(S): at 06:22

## 2020-11-30 RX ADMIN — GABAPENTIN 100 MILLIGRAM(S): 400 CAPSULE ORAL at 06:22

## 2020-11-30 RX ADMIN — Medication 4: at 12:24

## 2020-11-30 RX ADMIN — Medication 100 MILLIGRAM(S): at 06:25

## 2020-11-30 RX ADMIN — MONTELUKAST 10 MILLIGRAM(S): 4 TABLET, CHEWABLE ORAL at 14:27

## 2020-11-30 RX ADMIN — HEPARIN SODIUM 5000 UNIT(S): 5000 INJECTION INTRAVENOUS; SUBCUTANEOUS at 06:22

## 2020-11-30 RX ADMIN — Medication 4: at 17:32

## 2020-11-30 RX ADMIN — Medication 100 MILLIGRAM(S): at 21:24

## 2020-11-30 RX ADMIN — Medication 2000 UNIT(S): at 14:26

## 2020-11-30 NOTE — PROGRESS NOTE ADULT - SUBJECTIVE AND OBJECTIVE BOX
[facility] Division of Hospital Medicine  Autumn Mathur MD  Pager [phone]    Patient is a 85y old  Female who presents with a chief complaint of SOB       SUBJECTIVE / OVERNIGHT EVENTS: lots of complaints but doesn't seem SOB while speaking to me;       MEDICATIONS  (STANDING):  amLODIPine   Tablet 10 milliGRAM(s) Oral daily  ascorbic acid 500 milliGRAM(s) Oral daily  atorvastatin 40 milliGRAM(s) Oral at bedtime  calcium carbonate    500 mG (Tums) Chewable 0.5 Tablet(s) Chew two times a day  cholecalciferol 2000 Unit(s) Oral daily  dexAMETHasone  Injectable 6 milliGRAM(s) IV Push daily  dextrose 40% Gel 15 Gram(s) Oral once  dextrose 5%. 1000 milliLiter(s) (50 mL/Hr) IV Continuous <Continuous>  dextrose 5%. 1000 milliLiter(s) (100 mL/Hr) IV Continuous <Continuous>  dextrose 50% Injectable 25 Gram(s) IV Push once  dextrose 50% Injectable 12.5 Gram(s) IV Push once  dextrose 50% Injectable 25 Gram(s) IV Push once  furosemide   Injectable 40 milliGRAM(s) IV Push two times a day  gabapentin 100 milliGRAM(s) Oral three times a day  glucagon  Injectable 1 milliGRAM(s) IntraMuscular once  heparin   Injectable 5000 Unit(s) SubCutaneous every 8 hours  hydrALAZINE 100 milliGRAM(s) Oral three times a day  insulin glargine Injectable (LANTUS) 14 Unit(s) SubCutaneous at bedtime  insulin lispro (ADMELOG) corrective regimen sliding scale   SubCutaneous three times a day before meals  insulin lispro Injectable (ADMELOG) 6 Unit(s) SubCutaneous three times a day before meals  metoprolol succinate  milliGRAM(s) Oral daily  montelukast 10 milliGRAM(s) Oral daily  polyethylene glycol 3350 17 Gram(s) Oral daily  remdesivir  IVPB   IV Intermittent   remdesivir  IVPB 100 milliGRAM(s) IV Intermittent every 24 hours    MEDICATIONS  (PRN):  ALBUTerol    90 MICROgram(s) HFA Inhaler 2 Puff(s) Inhalation every 6 hours PRN Shortness of Breath and/or Wheezing  artificial tears (preservative free) Ophthalmic Solution 1 Drop(s) Both EYES three times a day PRN Dry Eyes      CAPILLARY BLOOD GLUCOSE  POCT Blood Glucose.: 244 mg/dL (30 Nov 2020 08:36)  POCT Blood Glucose.: 258 mg/dL (30 Nov 2020 06:41)        PHYSICAL EXAM:  Vital Signs Last 24 Hrs  T(F): 98.1 (30 Nov 2020 05:22), Max: 100.2 (29 Nov 2020 15:06)  HR: 85 (30 Nov 2020 05:22) (84 - 100)  BP: 127/72 (30 Nov 2020 05:22) (104/62 - 135/79)  RR: 17 (30 Nov 2020 05:22) (17 - 21)  SpO2: 92% (30 Nov 2020 05:22) (91% - 97%)    CONSTITUTIONAL: NAD  RESPIRATORY: Normal respiratory effort;  CARDIOVASCULAR: No lower extremity edema;   ABDOMEN: Nontender to palpation, obese  MUSCULOSKELETAL:  no clubbing or cyanosis of digits  PSYCH: affect appropriate  NEUROLOGY:  no gross sensory deficits       LABS:                        11.9   2.84  )-----------( 183      ( 30 Nov 2020 06:15 )             39.8     11-30    130<L>  |  91<L>  |  26<H>  ----------------------------<  289<H>  5.5<H>   |  30  |  1.02    Ca    8.1<L>      30 Nov 2020 06:15    TPro  7.4  /  Alb  3.3  /  TBili  0.2  /  DBili  < 0.2  /  AST  31  /  ALT  22  /  AlkPhos  46  11-30

## 2020-11-30 NOTE — PROGRESS NOTE ADULT - ASSESSMENT
Pt is an 86 yo female with ho COPD on home O2 2L, morbidly obese, DM a/w acute on chronic hypoxic resp failure due to COVID

## 2020-12-01 LAB
-  CANDIDA ALBICANS: SIGNIFICANT CHANGE UP
-  CANDIDA GLABRATA: SIGNIFICANT CHANGE UP
-  CANDIDA KRUSEI: SIGNIFICANT CHANGE UP
-  CANDIDA PARAPSILOSIS: SIGNIFICANT CHANGE UP
-  CANDIDA TROPICALIS: SIGNIFICANT CHANGE UP
-  COAGULASE NEGATIVE STAPHYLOCOCCUS: SIGNIFICANT CHANGE UP
-  K. PNEUMONIAE GROUP: SIGNIFICANT CHANGE UP
-  KPC RESISTANCE GENE: SIGNIFICANT CHANGE UP
-  STREPTOCOCCUS SP. (NOT GRP A, B OR S PNEUMONIAE): SIGNIFICANT CHANGE UP
A BAUMANNII DNA SPEC QL NAA+PROBE: SIGNIFICANT CHANGE UP
ALBUMIN SERPL ELPH-MCNC: 3.4 G/DL — SIGNIFICANT CHANGE UP (ref 3.3–5)
ALBUMIN SERPL ELPH-MCNC: 3.4 G/DL — SIGNIFICANT CHANGE UP (ref 3.3–5)
ALP SERPL-CCNC: 45 U/L — SIGNIFICANT CHANGE UP (ref 40–120)
ALP SERPL-CCNC: 45 U/L — SIGNIFICANT CHANGE UP (ref 40–120)
ALT FLD-CCNC: 24 U/L — SIGNIFICANT CHANGE UP (ref 4–33)
ALT FLD-CCNC: 24 U/L — SIGNIFICANT CHANGE UP (ref 4–33)
ANION GAP SERPL CALC-SCNC: 9 MMO/L — SIGNIFICANT CHANGE UP (ref 7–14)
APPEARANCE UR: SIGNIFICANT CHANGE UP
AST SERPL-CCNC: 29 U/L — SIGNIFICANT CHANGE UP (ref 4–32)
AST SERPL-CCNC: 29 U/L — SIGNIFICANT CHANGE UP (ref 4–32)
BACTERIA # UR AUTO: HIGH
BASOPHILS # BLD AUTO: 0.01 K/UL — SIGNIFICANT CHANGE UP (ref 0–0.2)
BASOPHILS NFR BLD AUTO: 0.1 % — SIGNIFICANT CHANGE UP (ref 0–2)
BILIRUB DIRECT SERPL-MCNC: < 0.2 MG/DL — SIGNIFICANT CHANGE UP (ref 0.1–0.2)
BILIRUB SERPL-MCNC: 0.2 MG/DL — SIGNIFICANT CHANGE UP (ref 0.2–1.2)
BILIRUB SERPL-MCNC: 0.2 MG/DL — SIGNIFICANT CHANGE UP (ref 0.2–1.2)
BILIRUB UR-MCNC: NEGATIVE — SIGNIFICANT CHANGE UP
BLOOD UR QL VISUAL: NEGATIVE — SIGNIFICANT CHANGE UP
BUN SERPL-MCNC: 30 MG/DL — HIGH (ref 7–23)
CALCIUM SERPL-MCNC: 8.5 MG/DL — SIGNIFICANT CHANGE UP (ref 8.4–10.5)
CHLORIDE SERPL-SCNC: 92 MMOL/L — LOW (ref 98–107)
CHLORIDE UR-SCNC: 79 MMOL/L — SIGNIFICANT CHANGE UP
CO2 SERPL-SCNC: 34 MMOL/L — HIGH (ref 22–31)
COLOR SPEC: SIGNIFICANT CHANGE UP
CREAT SERPL-MCNC: 0.95 MG/DL — SIGNIFICANT CHANGE UP (ref 0.5–1.3)
CREAT SERPL-MCNC: 0.95 MG/DL — SIGNIFICANT CHANGE UP (ref 0.5–1.3)
D DIMER BLD IA.RAPID-MCNC: 336 NG/ML — SIGNIFICANT CHANGE UP
E CLOAC COMP DNA BLD POS QL NAA+PROBE: SIGNIFICANT CHANGE UP
E COLI DNA BLD POS QL NAA+NON-PROBE: SIGNIFICANT CHANGE UP
ENTEROCOC DNA BLD POS QL NAA+NON-PROBE: SIGNIFICANT CHANGE UP
ENTEROCOC DNA BLD POS QL NAA+NON-PROBE: SIGNIFICANT CHANGE UP
EOSINOPHIL # BLD AUTO: 0 K/UL — SIGNIFICANT CHANGE UP (ref 0–0.5)
EOSINOPHIL NFR BLD AUTO: 0 % — SIGNIFICANT CHANGE UP (ref 0–6)
GLUCOSE BLDC GLUCOMTR-MCNC: 135 MG/DL — HIGH (ref 70–99)
GLUCOSE BLDC GLUCOMTR-MCNC: 139 MG/DL — HIGH (ref 70–99)
GLUCOSE BLDC GLUCOMTR-MCNC: 145 MG/DL — HIGH (ref 70–99)
GLUCOSE BLDC GLUCOMTR-MCNC: 181 MG/DL — HIGH (ref 70–99)
GLUCOSE SERPL-MCNC: 148 MG/DL — HIGH (ref 70–99)
GLUCOSE UR-MCNC: NEGATIVE — SIGNIFICANT CHANGE UP
GP B STREP DNA BLD POS QL NAA+NON-PROBE: SIGNIFICANT CHANGE UP
GRAM STN FLD: SIGNIFICANT CHANGE UP
HAEM INFLU DNA BLD POS QL NAA+NON-PROBE: SIGNIFICANT CHANGE UP
HCT VFR BLD CALC: 40.7 % — SIGNIFICANT CHANGE UP (ref 34.5–45)
HGB BLD-MCNC: 12 G/DL — SIGNIFICANT CHANGE UP (ref 11.5–15.5)
HYALINE CASTS # UR AUTO: NEGATIVE — SIGNIFICANT CHANGE UP
IMM GRANULOCYTES NFR BLD AUTO: 0.4 % — SIGNIFICANT CHANGE UP (ref 0–1.5)
K OXYTOCA DNA BLD POS QL NAA+NON-PROBE: SIGNIFICANT CHANGE UP
KETONES UR-MCNC: NEGATIVE — SIGNIFICANT CHANGE UP
L MONOCYTOG DNA BLD POS QL NAA+NON-PROBE: SIGNIFICANT CHANGE UP
LEUKOCYTE ESTERASE UR-ACNC: NEGATIVE — SIGNIFICANT CHANGE UP
LYMPHOCYTES # BLD AUTO: 0.92 K/UL — LOW (ref 1–3.3)
LYMPHOCYTES # BLD AUTO: 8.7 % — LOW (ref 13–44)
MAGNESIUM SERPL-MCNC: 1.9 MG/DL — SIGNIFICANT CHANGE UP (ref 1.6–2.6)
MCHC RBC-ENTMCNC: 25.3 PG — LOW (ref 27–34)
MCHC RBC-ENTMCNC: 29.5 % — LOW (ref 32–36)
MCV RBC AUTO: 85.7 FL — SIGNIFICANT CHANGE UP (ref 80–100)
METHOD TYPE: SIGNIFICANT CHANGE UP
MONOCYTES # BLD AUTO: 0.59 K/UL — SIGNIFICANT CHANGE UP (ref 0–0.9)
MONOCYTES NFR BLD AUTO: 5.6 % — SIGNIFICANT CHANGE UP (ref 2–14)
MRSA SPEC QL CULT: SIGNIFICANT CHANGE UP
MSSA DNA SPEC QL NAA+PROBE: SIGNIFICANT CHANGE UP
N MEN ISLT CULT: SIGNIFICANT CHANGE UP
NEUTROPHILS # BLD AUTO: 9.01 K/UL — HIGH (ref 1.8–7.4)
NEUTROPHILS NFR BLD AUTO: 85.2 % — HIGH (ref 43–77)
NITRITE UR-MCNC: NEGATIVE — SIGNIFICANT CHANGE UP
NRBC # FLD: 0 K/UL — SIGNIFICANT CHANGE UP (ref 0–0)
OSMOLALITY UR: 510 MOSMO/KG — SIGNIFICANT CHANGE UP (ref 50–1200)
P AERUGINOSA DNA BLD POS NAA+NON-PROBE: SIGNIFICANT CHANGE UP
PH UR: 6 — SIGNIFICANT CHANGE UP (ref 5–8)
PHOSPHATE SERPL-MCNC: 2.6 MG/DL — SIGNIFICANT CHANGE UP (ref 2.5–4.5)
PLATELET # BLD AUTO: 219 K/UL — SIGNIFICANT CHANGE UP (ref 150–400)
PMV BLD: 9.8 FL — SIGNIFICANT CHANGE UP (ref 7–13)
POTASSIUM SERPL-MCNC: 4.6 MMOL/L — SIGNIFICANT CHANGE UP (ref 3.5–5.3)
POTASSIUM SERPL-SCNC: 4.6 MMOL/L — SIGNIFICANT CHANGE UP (ref 3.5–5.3)
POTASSIUM UR-SCNC: 28.4 MMOL/L — SIGNIFICANT CHANGE UP
PROT SERPL-MCNC: 7.4 G/DL — SIGNIFICANT CHANGE UP (ref 6–8.3)
PROT SERPL-MCNC: 7.4 G/DL — SIGNIFICANT CHANGE UP (ref 6–8.3)
PROT UR-MCNC: 20 — SIGNIFICANT CHANGE UP
RBC # BLD: 4.75 M/UL — SIGNIFICANT CHANGE UP (ref 3.8–5.2)
RBC # FLD: 15.8 % — HIGH (ref 10.3–14.5)
RBC CASTS # UR COMP ASSIST: SIGNIFICANT CHANGE UP (ref 0–?)
S MARCESCENS DNA BLD POS NAA+NON-PROBE: SIGNIFICANT CHANGE UP
S PNEUM DNA BLD POS QL NAA+NON-PROBE: SIGNIFICANT CHANGE UP
S PYO DNA BLD POS QL NAA+NON-PROBE: SIGNIFICANT CHANGE UP
SODIUM SERPL-SCNC: 135 MMOL/L — SIGNIFICANT CHANGE UP (ref 135–145)
SODIUM UR-SCNC: 85 MMOL/L — SIGNIFICANT CHANGE UP
SP GR SPEC: 1.02 — SIGNIFICANT CHANGE UP (ref 1–1.04)
SQUAMOUS # UR AUTO: SIGNIFICANT CHANGE UP
UROBILINOGEN FLD QL: NORMAL — SIGNIFICANT CHANGE UP
WBC # BLD: 10.57 K/UL — HIGH (ref 3.8–10.5)
WBC # FLD AUTO: 10.57 K/UL — HIGH (ref 3.8–10.5)
WBC UR QL: SIGNIFICANT CHANGE UP (ref 0–?)
YEAST FLD HPF-#/AREA: SIGNIFICANT CHANGE UP

## 2020-12-01 PROCEDURE — 99233 SBSQ HOSP IP/OBS HIGH 50: CPT | Mod: CS

## 2020-12-01 RX ORDER — OXYCODONE HYDROCHLORIDE 5 MG/1
5 TABLET ORAL ONCE
Refills: 0 | Status: DISCONTINUED | OUTPATIENT
Start: 2020-12-01 | End: 2020-12-01

## 2020-12-01 RX ORDER — ACETAMINOPHEN 500 MG
650 TABLET ORAL EVERY 6 HOURS
Refills: 0 | Status: DISCONTINUED | OUTPATIENT
Start: 2020-12-01 | End: 2020-12-09

## 2020-12-01 RX ADMIN — Medication 650 MILLIGRAM(S): at 10:55

## 2020-12-01 RX ADMIN — HEPARIN SODIUM 5000 UNIT(S): 5000 INJECTION INTRAVENOUS; SUBCUTANEOUS at 06:45

## 2020-12-01 RX ADMIN — Medication 100 MILLIGRAM(S): at 14:23

## 2020-12-01 RX ADMIN — ATORVASTATIN CALCIUM 40 MILLIGRAM(S): 80 TABLET, FILM COATED ORAL at 22:43

## 2020-12-01 RX ADMIN — Medication 500 MILLIGRAM(S): at 14:22

## 2020-12-01 RX ADMIN — GABAPENTIN 100 MILLIGRAM(S): 400 CAPSULE ORAL at 22:43

## 2020-12-01 RX ADMIN — Medication 0.5 TABLET(S): at 17:23

## 2020-12-01 RX ADMIN — Medication 2: at 17:24

## 2020-12-01 RX ADMIN — Medication 6 UNIT(S): at 17:24

## 2020-12-01 RX ADMIN — REMDESIVIR 500 MILLIGRAM(S): 5 INJECTION INTRAVENOUS at 22:44

## 2020-12-01 RX ADMIN — Medication 650 MILLIGRAM(S): at 11:55

## 2020-12-01 RX ADMIN — Medication 100 MILLIGRAM(S): at 06:45

## 2020-12-01 RX ADMIN — Medication 2000 UNIT(S): at 14:22

## 2020-12-01 RX ADMIN — Medication 6 MILLIGRAM(S): at 06:45

## 2020-12-01 RX ADMIN — Medication 40 MILLIGRAM(S): at 06:45

## 2020-12-01 RX ADMIN — HEPARIN SODIUM 5000 UNIT(S): 5000 INJECTION INTRAVENOUS; SUBCUTANEOUS at 22:43

## 2020-12-01 RX ADMIN — HEPARIN SODIUM 5000 UNIT(S): 5000 INJECTION INTRAVENOUS; SUBCUTANEOUS at 14:23

## 2020-12-01 RX ADMIN — AMLODIPINE BESYLATE 10 MILLIGRAM(S): 2.5 TABLET ORAL at 06:45

## 2020-12-01 RX ADMIN — Medication 6 UNIT(S): at 08:01

## 2020-12-01 RX ADMIN — INSULIN GLARGINE 14 UNIT(S): 100 INJECTION, SOLUTION SUBCUTANEOUS at 22:43

## 2020-12-01 RX ADMIN — Medication 100 MILLIGRAM(S): at 22:43

## 2020-12-01 RX ADMIN — MONTELUKAST 10 MILLIGRAM(S): 4 TABLET, CHEWABLE ORAL at 14:23

## 2020-12-01 RX ADMIN — GABAPENTIN 100 MILLIGRAM(S): 400 CAPSULE ORAL at 14:22

## 2020-12-01 RX ADMIN — Medication 100 MILLIGRAM(S): at 06:46

## 2020-12-01 RX ADMIN — GABAPENTIN 100 MILLIGRAM(S): 400 CAPSULE ORAL at 06:46

## 2020-12-01 RX ADMIN — Medication 40 MILLIGRAM(S): at 17:23

## 2020-12-01 RX ADMIN — OXYCODONE HYDROCHLORIDE 5 MILLIGRAM(S): 5 TABLET ORAL at 17:22

## 2020-12-01 RX ADMIN — Medication 0.5 TABLET(S): at 06:46

## 2020-12-01 RX ADMIN — Medication 6 UNIT(S): at 12:06

## 2020-12-01 RX ADMIN — POLYETHYLENE GLYCOL 3350 17 GRAM(S): 17 POWDER, FOR SOLUTION ORAL at 17:22

## 2020-12-01 NOTE — PROGRESS NOTE ADULT - SUBJECTIVE AND OBJECTIVE BOX
[facility] Division of Hospital Medicine  Autumn Mathur MD  Pager [phone]    Patient is a 85y old  Female who presents with a chief complaint of SOB       SUBJECTIVE / OVERNIGHT EVENTS: in no distress; wants to sleep today; states she did not sleep because of her roommate      MEDICATIONS  (STANDING):  amLODIPine   Tablet 10 milliGRAM(s) Oral daily  ascorbic acid 500 milliGRAM(s) Oral daily  atorvastatin 40 milliGRAM(s) Oral at bedtime  calcium carbonate    500 mG (Tums) Chewable 0.5 Tablet(s) Chew two times a day  cholecalciferol 2000 Unit(s) Oral daily  dexAMETHasone  Injectable 6 milliGRAM(s) IV Push daily  dextrose 40% Gel 15 Gram(s) Oral once  dextrose 5%. 1000 milliLiter(s) (50 mL/Hr) IV Continuous <Continuous>  dextrose 5%. 1000 milliLiter(s) (100 mL/Hr) IV Continuous <Continuous>  dextrose 50% Injectable 25 Gram(s) IV Push once  dextrose 50% Injectable 12.5 Gram(s) IV Push once  dextrose 50% Injectable 25 Gram(s) IV Push once  furosemide   Injectable 40 milliGRAM(s) IV Push two times a day  gabapentin 100 milliGRAM(s) Oral three times a day  glucagon  Injectable 1 milliGRAM(s) IntraMuscular once  heparin   Injectable 5000 Unit(s) SubCutaneous every 8 hours  hydrALAZINE 100 milliGRAM(s) Oral three times a day  insulin glargine Injectable (LANTUS) 14 Unit(s) SubCutaneous at bedtime  insulin lispro (ADMELOG) corrective regimen sliding scale   SubCutaneous three times a day before meals  insulin lispro Injectable (ADMELOG) 6 Unit(s) SubCutaneous three times a day before meals  metoprolol succinate  milliGRAM(s) Oral daily  montelukast 10 milliGRAM(s) Oral daily  polyethylene glycol 3350 17 Gram(s) Oral daily  remdesivir  IVPB   IV Intermittent   remdesivir  IVPB 100 milliGRAM(s) IV Intermittent every 24 hours    MEDICATIONS  (PRN):  acetaminophen   Tablet .. 650 milliGRAM(s) Oral every 6 hours PRN Mild Pain (1 - 3), Moderate Pain (4 - 6)  ALBUTerol    90 MICROgram(s) HFA Inhaler 2 Puff(s) Inhalation every 6 hours PRN Shortness of Breath and/or Wheezing  artificial tears (preservative free) Ophthalmic Solution 1 Drop(s) Both EYES three times a day PRN Dry Eyes      CAPILLARY BLOOD GLUCOSE  POCT Blood Glucose.: 145 mg/dL (01 Dec 2020 11:53)  POCT Blood Glucose.: 135 mg/dL (01 Dec 2020 07:53)  POCT Blood Glucose.: 207 mg/dL (2020 23:22)  POCT Blood Glucose.: 228 mg/dL (2020 17:13)      PHYSICAL EXAM:  Vital Signs Last 24 Hrs  T(F): 98.8 (01 Dec 2020 08:57), Max: 98.8 (01 Dec 2020 01:46)  HR: 80 (01 Dec 2020 08:57) (73 - 88)  BP: 119/51 (01 Dec 2020 08:57) (119/51 - 140/64)  RR: 22 (01 Dec 2020 08:57) (17 - 22)  SpO2: 91% (01 Dec 2020 08:57) (91% - 98%)    CONSTITUTIONAL: NAD  RESPIRATORY: Normal respiratory effort;   CARDIOVASCULAR: No lower extremity edema;   ABDOMEN: Nontender to palpation, normoactive bowel sounds  MUSCULOSKELETAL:  no joint swelling or tenderness to palpation  PSYCH: affect appropriate  NEUROLOGY: no gross sensory deficits       LABS:                        12.0   10.57 )-----------( 219      ( 01 Dec 2020 07:30 )             40.7     12-    135  |  92<L>  |  30<H>  ----------------------------<  148<H>  4.6   |  34<H>  |  0.95    Ca    8.5      01 Dec 2020 07:30  Phos  2.6     12  Mg     1.9         TPro  7.4  /  Alb  3.4  /  TBili  0.2  /  DBili  < 0.2  /  AST  29  /  ALT  24  /  AlkPhos  45  12-          Urinalysis Basic - ( 01 Dec 2020 09:28 )    Color: LIGHT YELLOW / Appearance: Lt TURBID / S.017 / pH: 6.0  Gluc: NEGATIVE / Ketone: NEGATIVE  / Bili: NEGATIVE / Urobili: NORMAL   Blood: NEGATIVE / Protein: 20 / Nitrite: NEGATIVE   Leuk Esterase: NEGATIVE / RBC: 2-5 / WBC 0-2   Sq Epi: OCC / Non Sq Epi: x / Bacteria: MODERATE        Culture - Blood (collected 2020 16:20)  Source: .Blood Blood-Peripheral  Gram Stain (01 Dec 2020 07:00):    Growth in aerobic bottle: Gram Negative Coccobacilli  Preliminary Report (01 Dec 2020 07:01):    Growth in aerobic bottle: Gram Negative Coccobacilli    "Due to technical problems, Proteus sp. will Not be reported as part of    the BCID panel until further notice"    ***Blood Panel PCR results on this specimen are available    approximately 3 hours after the Gram stain result.***    Gram stain, PCR, and/or culture results may not always    correspond due to difference in methodologies.    ************************************************************    This PCR assay was performed using [employer].    The following targets are tested for: Enterococcus,    vancomycin resistant enterococci, Listeria monocytogenes,    coagulase negative staphylococci, S. aureus,    methicillin resistant S. aureus, Streptococcus agalactiae    (Group B), S. pneumoniae, S. pyogenes (Group A),    Acinetobacter baumannii, Enterobacter cloacae, E. coli,    Klebsiella oxytoca, K. pneumoniae, Proteus sp.,    Serratia marcescens, Haemophilus influenzae,    Neisseria meningitidis, Pseudomonas aeruginosa, Candida    albicans, C. glabrata, C krusei, C parapsilosis,    C. tropicalis and the KPC resistance gene.  Organism: Blood Culture PCR (01 Dec 2020 08:19)  Organism: Blood Culture PCR (01 Dec 2020 08:19)    Culture - Blood (collected 2020 16:20)  Source: .Blood Blood-Peripheral  Preliminary Report (2020 17:01):    No growth to date.

## 2020-12-01 NOTE — PROGRESS NOTE ADULT - ASSESSMENT
Pt is an 84 yo female with ho COPD on home O2 2L, morbidly obese, DM a/w acute on chronic hypoxic resp failure due to COVID

## 2020-12-02 LAB
ALBUMIN SERPL ELPH-MCNC: 3.6 G/DL — SIGNIFICANT CHANGE UP (ref 3.3–5)
ALBUMIN SERPL ELPH-MCNC: 3.6 G/DL — SIGNIFICANT CHANGE UP (ref 3.3–5)
ALP SERPL-CCNC: 46 U/L — SIGNIFICANT CHANGE UP (ref 40–120)
ALP SERPL-CCNC: 47 U/L — SIGNIFICANT CHANGE UP (ref 40–120)
ALT FLD-CCNC: 27 U/L — SIGNIFICANT CHANGE UP (ref 4–33)
ALT FLD-CCNC: 27 U/L — SIGNIFICANT CHANGE UP (ref 4–33)
ANION GAP SERPL CALC-SCNC: 8 MMO/L — SIGNIFICANT CHANGE UP (ref 7–14)
AST SERPL-CCNC: 36 U/L — HIGH (ref 4–32)
AST SERPL-CCNC: 37 U/L — HIGH (ref 4–32)
BASOPHILS # BLD AUTO: 0.01 K/UL — SIGNIFICANT CHANGE UP (ref 0–0.2)
BASOPHILS NFR BLD AUTO: 0.1 % — SIGNIFICANT CHANGE UP (ref 0–2)
BILIRUB DIRECT SERPL-MCNC: < 0.2 MG/DL — SIGNIFICANT CHANGE UP (ref 0.1–0.2)
BILIRUB SERPL-MCNC: 0.3 MG/DL — SIGNIFICANT CHANGE UP (ref 0.2–1.2)
BILIRUB SERPL-MCNC: 0.4 MG/DL — SIGNIFICANT CHANGE UP (ref 0.2–1.2)
BUN SERPL-MCNC: 35 MG/DL — HIGH (ref 7–23)
CALCIUM SERPL-MCNC: 9 MG/DL — SIGNIFICANT CHANGE UP (ref 8.4–10.5)
CHLORIDE SERPL-SCNC: 89 MMOL/L — LOW (ref 98–107)
CO2 SERPL-SCNC: 36 MMOL/L — HIGH (ref 22–31)
CREAT SERPL-MCNC: 1.02 MG/DL — SIGNIFICANT CHANGE UP (ref 0.5–1.3)
CREAT SERPL-MCNC: 1.03 MG/DL — SIGNIFICANT CHANGE UP (ref 0.5–1.3)
CULTURE RESULTS: SIGNIFICANT CHANGE UP
D DIMER BLD IA.RAPID-MCNC: 298 NG/ML — SIGNIFICANT CHANGE UP
EOSINOPHIL # BLD AUTO: 0 K/UL — SIGNIFICANT CHANGE UP (ref 0–0.5)
EOSINOPHIL NFR BLD AUTO: 0 % — SIGNIFICANT CHANGE UP (ref 0–6)
FERRITIN SERPL-MCNC: 735.3 NG/ML — HIGH (ref 15–150)
GLUCOSE BLDC GLUCOMTR-MCNC: 132 MG/DL — HIGH (ref 70–99)
GLUCOSE BLDC GLUCOMTR-MCNC: 142 MG/DL — HIGH (ref 70–99)
GLUCOSE BLDC GLUCOMTR-MCNC: 201 MG/DL — HIGH (ref 70–99)
GLUCOSE BLDC GLUCOMTR-MCNC: 232 MG/DL — HIGH (ref 70–99)
GLUCOSE SERPL-MCNC: 129 MG/DL — HIGH (ref 70–99)
HCT VFR BLD CALC: 43.1 % — SIGNIFICANT CHANGE UP (ref 34.5–45)
HGB BLD-MCNC: 12.9 G/DL — SIGNIFICANT CHANGE UP (ref 11.5–15.5)
IMM GRANULOCYTES NFR BLD AUTO: 0.7 % — SIGNIFICANT CHANGE UP (ref 0–1.5)
LDH SERPL L TO P-CCNC: 246 U/L — HIGH (ref 135–225)
LYMPHOCYTES # BLD AUTO: 1.48 K/UL — SIGNIFICANT CHANGE UP (ref 1–3.3)
LYMPHOCYTES # BLD AUTO: 15.8 % — SIGNIFICANT CHANGE UP (ref 13–44)
MCHC RBC-ENTMCNC: 25.3 PG — LOW (ref 27–34)
MCHC RBC-ENTMCNC: 29.9 % — LOW (ref 32–36)
MCV RBC AUTO: 84.5 FL — SIGNIFICANT CHANGE UP (ref 80–100)
MONOCYTES # BLD AUTO: 0.52 K/UL — SIGNIFICANT CHANGE UP (ref 0–0.9)
MONOCYTES NFR BLD AUTO: 5.6 % — SIGNIFICANT CHANGE UP (ref 2–14)
NEUTROPHILS # BLD AUTO: 7.28 K/UL — SIGNIFICANT CHANGE UP (ref 1.8–7.4)
NEUTROPHILS NFR BLD AUTO: 77.8 % — HIGH (ref 43–77)
NRBC # FLD: 0 K/UL — SIGNIFICANT CHANGE UP (ref 0–0)
ORGANISM # SPEC MICROSCOPIC CNT: SIGNIFICANT CHANGE UP
ORGANISM # SPEC MICROSCOPIC CNT: SIGNIFICANT CHANGE UP
PLATELET # BLD AUTO: 222 K/UL — SIGNIFICANT CHANGE UP (ref 150–400)
PMV BLD: 9.5 FL — SIGNIFICANT CHANGE UP (ref 7–13)
POTASSIUM SERPL-MCNC: 4.3 MMOL/L — SIGNIFICANT CHANGE UP (ref 3.5–5.3)
POTASSIUM SERPL-SCNC: 4.3 MMOL/L — SIGNIFICANT CHANGE UP (ref 3.5–5.3)
PROT SERPL-MCNC: 8 G/DL — SIGNIFICANT CHANGE UP (ref 6–8.3)
PROT SERPL-MCNC: 8 G/DL — SIGNIFICANT CHANGE UP (ref 6–8.3)
RBC # BLD: 5.1 M/UL — SIGNIFICANT CHANGE UP (ref 3.8–5.2)
RBC # FLD: 15.7 % — HIGH (ref 10.3–14.5)
SODIUM SERPL-SCNC: 133 MMOL/L — LOW (ref 135–145)
SPECIMEN SOURCE: SIGNIFICANT CHANGE UP
WBC # BLD: 9.36 K/UL — SIGNIFICANT CHANGE UP (ref 3.8–10.5)
WBC # FLD AUTO: 9.36 K/UL — SIGNIFICANT CHANGE UP (ref 3.8–10.5)

## 2020-12-02 PROCEDURE — 99233 SBSQ HOSP IP/OBS HIGH 50: CPT | Mod: CS

## 2020-12-02 RX ORDER — ONDANSETRON 8 MG/1
4 TABLET, FILM COATED ORAL ONCE
Refills: 0 | Status: DISCONTINUED | OUTPATIENT
Start: 2020-12-02 | End: 2020-12-02

## 2020-12-02 RX ORDER — FUROSEMIDE 40 MG
40 TABLET ORAL DAILY
Refills: 0 | Status: DISCONTINUED | OUTPATIENT
Start: 2020-12-02 | End: 2020-12-09

## 2020-12-02 RX ORDER — ACETAMINOPHEN 500 MG
650 TABLET ORAL EVERY 6 HOURS
Refills: 0 | Status: DISCONTINUED | OUTPATIENT
Start: 2020-12-02 | End: 2020-12-02

## 2020-12-02 RX ADMIN — Medication 6 MILLIGRAM(S): at 05:46

## 2020-12-02 RX ADMIN — HEPARIN SODIUM 5000 UNIT(S): 5000 INJECTION INTRAVENOUS; SUBCUTANEOUS at 14:58

## 2020-12-02 RX ADMIN — Medication 500 MILLIGRAM(S): at 14:58

## 2020-12-02 RX ADMIN — Medication 100 MILLIGRAM(S): at 14:57

## 2020-12-02 RX ADMIN — Medication 6 UNIT(S): at 17:51

## 2020-12-02 RX ADMIN — Medication 2000 UNIT(S): at 14:58

## 2020-12-02 RX ADMIN — GABAPENTIN 100 MILLIGRAM(S): 400 CAPSULE ORAL at 05:46

## 2020-12-02 RX ADMIN — Medication 40 MILLIGRAM(S): at 05:46

## 2020-12-02 RX ADMIN — Medication 40 MILLIGRAM(S): at 17:46

## 2020-12-02 RX ADMIN — Medication 100 MILLIGRAM(S): at 21:31

## 2020-12-02 RX ADMIN — Medication 6 UNIT(S): at 12:35

## 2020-12-02 RX ADMIN — GABAPENTIN 100 MILLIGRAM(S): 400 CAPSULE ORAL at 21:30

## 2020-12-02 RX ADMIN — ALBUTEROL 2 PUFF(S): 90 AEROSOL, METERED ORAL at 17:46

## 2020-12-02 RX ADMIN — Medication 0.5 TABLET(S): at 05:45

## 2020-12-02 RX ADMIN — Medication 0.5 TABLET(S): at 17:46

## 2020-12-02 RX ADMIN — MONTELUKAST 10 MILLIGRAM(S): 4 TABLET, CHEWABLE ORAL at 14:58

## 2020-12-02 RX ADMIN — HEPARIN SODIUM 5000 UNIT(S): 5000 INJECTION INTRAVENOUS; SUBCUTANEOUS at 21:30

## 2020-12-02 RX ADMIN — Medication 6 UNIT(S): at 08:55

## 2020-12-02 RX ADMIN — ATORVASTATIN CALCIUM 40 MILLIGRAM(S): 80 TABLET, FILM COATED ORAL at 21:30

## 2020-12-02 RX ADMIN — GABAPENTIN 100 MILLIGRAM(S): 400 CAPSULE ORAL at 14:56

## 2020-12-02 RX ADMIN — INSULIN GLARGINE 14 UNIT(S): 100 INJECTION, SOLUTION SUBCUTANEOUS at 23:35

## 2020-12-02 RX ADMIN — HEPARIN SODIUM 5000 UNIT(S): 5000 INJECTION INTRAVENOUS; SUBCUTANEOUS at 05:47

## 2020-12-02 RX ADMIN — REMDESIVIR 500 MILLIGRAM(S): 5 INJECTION INTRAVENOUS at 23:34

## 2020-12-02 RX ADMIN — Medication 4: at 12:33

## 2020-12-02 RX ADMIN — Medication 100 MILLIGRAM(S): at 05:46

## 2020-12-02 RX ADMIN — Medication 100 MILLIGRAM(S): at 05:47

## 2020-12-02 RX ADMIN — AMLODIPINE BESYLATE 10 MILLIGRAM(S): 2.5 TABLET ORAL at 05:46

## 2020-12-02 NOTE — PROGRESS NOTE ADULT - SUBJECTIVE AND OBJECTIVE BOX
[facility] Division of Hospital Medicine  Autumn Mathur MD  Pager [phone]    Patient is a 85y old  Female who presents with a chief complaint of SOB       SUBJECTIVE / OVERNIGHT EVENTS: resp status improved; hungry      MEDICATIONS  (STANDING):  amLODIPine   Tablet 10 milliGRAM(s) Oral daily  ascorbic acid 500 milliGRAM(s) Oral daily  atorvastatin 40 milliGRAM(s) Oral at bedtime  calcium carbonate    500 mG (Tums) Chewable 0.5 Tablet(s) Chew two times a day  cholecalciferol 2000 Unit(s) Oral daily  dexAMETHasone  Injectable 6 milliGRAM(s) IV Push daily  dextrose 40% Gel 15 Gram(s) Oral once  dextrose 5%. 1000 milliLiter(s) (50 mL/Hr) IV Continuous <Continuous>  dextrose 5%. 1000 milliLiter(s) (100 mL/Hr) IV Continuous <Continuous>  dextrose 50% Injectable 25 Gram(s) IV Push once  dextrose 50% Injectable 12.5 Gram(s) IV Push once  dextrose 50% Injectable 25 Gram(s) IV Push once  furosemide   Injectable 40 milliGRAM(s) IV Push two times a day  gabapentin 100 milliGRAM(s) Oral three times a day  glucagon  Injectable 1 milliGRAM(s) IntraMuscular once  heparin   Injectable 5000 Unit(s) SubCutaneous every 8 hours  hydrALAZINE 100 milliGRAM(s) Oral three times a day  insulin glargine Injectable (LANTUS) 14 Unit(s) SubCutaneous at bedtime  insulin lispro (ADMELOG) corrective regimen sliding scale   SubCutaneous three times a day before meals  insulin lispro Injectable (ADMELOG) 6 Unit(s) SubCutaneous three times a day before meals  metoprolol succinate  milliGRAM(s) Oral daily  montelukast 10 milliGRAM(s) Oral daily  polyethylene glycol 3350 17 Gram(s) Oral daily  remdesivir  IVPB   IV Intermittent   remdesivir  IVPB 100 milliGRAM(s) IV Intermittent every 24 hours    MEDICATIONS  (PRN):  acetaminophen   Tablet .. 650 milliGRAM(s) Oral every 6 hours PRN Mild Pain (1 - 3), Moderate Pain (4 - 6)  ALBUTerol    90 MICROgram(s) HFA Inhaler 2 Puff(s) Inhalation every 6 hours PRN Shortness of Breath and/or Wheezing  artificial tears (preservative free) Ophthalmic Solution 1 Drop(s) Both EYES three times a day PRN Dry Eyes      CAPILLARY BLOOD GLUCOSE  POCT Blood Glucose.: 232 mg/dL (02 Dec 2020 11:52)  POCT Blood Glucose.: 142 mg/dL (02 Dec 2020 08:43)  POCT Blood Glucose.: 139 mg/dL (01 Dec 2020 22:39)  POCT Blood Glucose.: 181 mg/dL (01 Dec 2020 17:15)          PHYSICAL EXAM:  Vital Signs Last 24 Hrs  T(F): 98.9 (02 Dec 2020 10:42), Max: 99.3 (01 Dec 2020 14:20)  HR: 87 (02 Dec 2020 10:42) (63 - 93)  BP: 139/84 (02 Dec 2020 10:42) (118/69 - 151/62)  RR: 18 (02 Dec 2020 10:42) (17 - 20)  SpO2: 92% (02 Dec 2020 10:42) (85% - 95%)    CONSTITUTIONAL: NAD  RESPIRATORY: Normal respiratory effort;   CARDIOVASCULAR: No lower extremity edema;  ABDOMEN: Nontender to palpation, normoactive bowel sounds  MUSCULOSKELETAL:  no joint swelling or tenderness to palpation  PSYCH:  affect appropriate  NEUROLOGY: no gross sensory deficits       LABS:                        12.9   9.36  )-----------( 222      ( 02 Dec 2020 07:34 )             43.1     12-02    133<L>  |  89<L>  |  35<H>  ----------------------------<  129<H>  4.3   |  36<H>  |  1.03    Ca    9.0      02 Dec 2020 07:34  Phos  2.6     12-  Mg     1.9     12-    TPro  8.0  /  Alb  3.6  /  TBili  0.4  /  DBili  < 0.2  /  AST  36<H>  /  ALT  27  /  AlkPhos  46  12-02          Urinalysis Basic - ( 01 Dec 2020 09:28 )    Color: LIGHT YELLOW / Appearance: Lt TURBID / S.017 / pH: 6.0  Gluc: NEGATIVE / Ketone: NEGATIVE  / Bili: NEGATIVE / Urobili: NORMAL   Blood: NEGATIVE / Protein: 20 / Nitrite: NEGATIVE   Leuk Esterase: NEGATIVE / RBC: 2-5 / WBC 0-2   Sq Epi: OCC / Non Sq Epi: x / Bacteria: MODERATE        Culture - Blood (collected 01 Dec 2020 12:39)  Source: .Blood Blood-Peripheral  Preliminary Report (02 Dec 2020 13:01):    No growth to date.    Culture - Blood (collected 2020 16:20)  Source: .Blood Blood-Peripheral  Preliminary Report (2020 17:01):    No growth to date.

## 2020-12-03 LAB
ALBUMIN SERPL ELPH-MCNC: 3.4 G/DL — SIGNIFICANT CHANGE UP (ref 3.3–5)
ALBUMIN SERPL ELPH-MCNC: 3.4 G/DL — SIGNIFICANT CHANGE UP (ref 3.3–5)
ALP SERPL-CCNC: 42 U/L — SIGNIFICANT CHANGE UP (ref 40–120)
ALP SERPL-CCNC: 42 U/L — SIGNIFICANT CHANGE UP (ref 40–120)
ALT FLD-CCNC: 24 U/L — SIGNIFICANT CHANGE UP (ref 4–33)
ALT FLD-CCNC: 24 U/L — SIGNIFICANT CHANGE UP (ref 4–33)
ANION GAP SERPL CALC-SCNC: 7 MMO/L — SIGNIFICANT CHANGE UP (ref 7–14)
AST SERPL-CCNC: 29 U/L — SIGNIFICANT CHANGE UP (ref 4–32)
AST SERPL-CCNC: 29 U/L — SIGNIFICANT CHANGE UP (ref 4–32)
BILIRUB DIRECT SERPL-MCNC: < 0.2 MG/DL — SIGNIFICANT CHANGE UP (ref 0.1–0.2)
BILIRUB SERPL-MCNC: 0.4 MG/DL — SIGNIFICANT CHANGE UP (ref 0.2–1.2)
BILIRUB SERPL-MCNC: 0.4 MG/DL — SIGNIFICANT CHANGE UP (ref 0.2–1.2)
BUN SERPL-MCNC: 38 MG/DL — HIGH (ref 7–23)
CALCIUM SERPL-MCNC: 8.8 MG/DL — SIGNIFICANT CHANGE UP (ref 8.4–10.5)
CHLORIDE SERPL-SCNC: 91 MMOL/L — LOW (ref 98–107)
CO2 SERPL-SCNC: 38 MMOL/L — HIGH (ref 22–31)
CREAT SERPL-MCNC: 1.05 MG/DL — SIGNIFICANT CHANGE UP (ref 0.5–1.3)
GLUCOSE BLDC GLUCOMTR-MCNC: 150 MG/DL — HIGH (ref 70–99)
GLUCOSE BLDC GLUCOMTR-MCNC: 160 MG/DL — HIGH (ref 70–99)
GLUCOSE BLDC GLUCOMTR-MCNC: 271 MG/DL — HIGH (ref 70–99)
GLUCOSE SERPL-MCNC: 153 MG/DL — HIGH (ref 70–99)
HCT VFR BLD CALC: 41.6 % — SIGNIFICANT CHANGE UP (ref 34.5–45)
HGB BLD-MCNC: 12.3 G/DL — SIGNIFICANT CHANGE UP (ref 11.5–15.5)
MAGNESIUM SERPL-MCNC: 1.9 MG/DL — SIGNIFICANT CHANGE UP (ref 1.6–2.6)
MCHC RBC-ENTMCNC: 25.1 PG — LOW (ref 27–34)
MCHC RBC-ENTMCNC: 29.6 % — LOW (ref 32–36)
MCV RBC AUTO: 84.9 FL — SIGNIFICANT CHANGE UP (ref 80–100)
NRBC # FLD: 0 K/UL — SIGNIFICANT CHANGE UP (ref 0–0)
PHOSPHATE SERPL-MCNC: 3.3 MG/DL — SIGNIFICANT CHANGE UP (ref 2.5–4.5)
PLATELET # BLD AUTO: 211 K/UL — SIGNIFICANT CHANGE UP (ref 150–400)
PMV BLD: 9.5 FL — SIGNIFICANT CHANGE UP (ref 7–13)
POTASSIUM SERPL-MCNC: 4.4 MMOL/L — SIGNIFICANT CHANGE UP (ref 3.5–5.3)
POTASSIUM SERPL-SCNC: 4.4 MMOL/L — SIGNIFICANT CHANGE UP (ref 3.5–5.3)
PROT SERPL-MCNC: 7.5 G/DL — SIGNIFICANT CHANGE UP (ref 6–8.3)
PROT SERPL-MCNC: 7.5 G/DL — SIGNIFICANT CHANGE UP (ref 6–8.3)
RBC # BLD: 4.9 M/UL — SIGNIFICANT CHANGE UP (ref 3.8–5.2)
RBC # FLD: 15.8 % — HIGH (ref 10.3–14.5)
SODIUM SERPL-SCNC: 136 MMOL/L — SIGNIFICANT CHANGE UP (ref 135–145)
WBC # BLD: 4.13 K/UL — SIGNIFICANT CHANGE UP (ref 3.8–10.5)
WBC # FLD AUTO: 4.13 K/UL — SIGNIFICANT CHANGE UP (ref 3.8–10.5)

## 2020-12-03 PROCEDURE — 99233 SBSQ HOSP IP/OBS HIGH 50: CPT | Mod: CS

## 2020-12-03 RX ADMIN — Medication 6 MILLIGRAM(S): at 06:51

## 2020-12-03 RX ADMIN — GABAPENTIN 100 MILLIGRAM(S): 400 CAPSULE ORAL at 06:50

## 2020-12-03 RX ADMIN — Medication 100 MILLIGRAM(S): at 06:50

## 2020-12-03 RX ADMIN — Medication 500 MILLIGRAM(S): at 13:05

## 2020-12-03 RX ADMIN — Medication 40 MILLIGRAM(S): at 06:51

## 2020-12-03 RX ADMIN — INSULIN GLARGINE 14 UNIT(S): 100 INJECTION, SOLUTION SUBCUTANEOUS at 20:49

## 2020-12-03 RX ADMIN — Medication 6 UNIT(S): at 17:27

## 2020-12-03 RX ADMIN — Medication 2000 UNIT(S): at 13:05

## 2020-12-03 RX ADMIN — GABAPENTIN 100 MILLIGRAM(S): 400 CAPSULE ORAL at 20:49

## 2020-12-03 RX ADMIN — Medication 100 MILLIGRAM(S): at 13:06

## 2020-12-03 RX ADMIN — HEPARIN SODIUM 5000 UNIT(S): 5000 INJECTION INTRAVENOUS; SUBCUTANEOUS at 20:49

## 2020-12-03 RX ADMIN — HEPARIN SODIUM 5000 UNIT(S): 5000 INJECTION INTRAVENOUS; SUBCUTANEOUS at 13:05

## 2020-12-03 RX ADMIN — Medication 100 MILLIGRAM(S): at 20:49

## 2020-12-03 RX ADMIN — Medication 0.5 TABLET(S): at 06:49

## 2020-12-03 RX ADMIN — Medication 6 UNIT(S): at 13:06

## 2020-12-03 RX ADMIN — GABAPENTIN 100 MILLIGRAM(S): 400 CAPSULE ORAL at 13:05

## 2020-12-03 RX ADMIN — MONTELUKAST 10 MILLIGRAM(S): 4 TABLET, CHEWABLE ORAL at 13:05

## 2020-12-03 RX ADMIN — Medication 6: at 13:06

## 2020-12-03 RX ADMIN — AMLODIPINE BESYLATE 10 MILLIGRAM(S): 2.5 TABLET ORAL at 06:50

## 2020-12-03 RX ADMIN — POLYETHYLENE GLYCOL 3350 17 GRAM(S): 17 POWDER, FOR SOLUTION ORAL at 13:06

## 2020-12-03 RX ADMIN — HEPARIN SODIUM 5000 UNIT(S): 5000 INJECTION INTRAVENOUS; SUBCUTANEOUS at 06:51

## 2020-12-03 RX ADMIN — Medication 6 UNIT(S): at 09:21

## 2020-12-03 RX ADMIN — Medication 2: at 17:27

## 2020-12-03 RX ADMIN — ALBUTEROL 2 PUFF(S): 90 AEROSOL, METERED ORAL at 14:59

## 2020-12-03 RX ADMIN — ATORVASTATIN CALCIUM 40 MILLIGRAM(S): 80 TABLET, FILM COATED ORAL at 20:49

## 2020-12-03 RX ADMIN — Medication 0.5 TABLET(S): at 17:26

## 2020-12-03 NOTE — CHART NOTE - NSCHARTNOTEFT_GEN_A_CORE
notified by RN - as per patient has BIPAP at home for SHAD ,.  Called family for confirmation - spoke with daughter unsure of setting   previous chart reviewed . will place on previous BIPAP settings . Nocturnal BIPAP ordered

## 2020-12-03 NOTE — PROGRESS NOTE ADULT - SUBJECTIVE AND OBJECTIVE BOX
[facility] Division of Hospital Medicine  Autumn Mathur MD  Pager [phone]    Patient is a 85y old  Female who presents with a chief complaint of SOB       SUBJECTIVE / OVERNIGHT EVENTS: resp status stable, no distress      MEDICATIONS  (STANDING):  amLODIPine   Tablet 10 milliGRAM(s) Oral daily  ascorbic acid 500 milliGRAM(s) Oral daily  atorvastatin 40 milliGRAM(s) Oral at bedtime  calcium carbonate    500 mG (Tums) Chewable 0.5 Tablet(s) Chew two times a day  cholecalciferol 2000 Unit(s) Oral daily  dexAMETHasone  Injectable 6 milliGRAM(s) IV Push daily  dextrose 40% Gel 15 Gram(s) Oral once  dextrose 5%. 1000 milliLiter(s) (50 mL/Hr) IV Continuous <Continuous>  dextrose 5%. 1000 milliLiter(s) (100 mL/Hr) IV Continuous <Continuous>  dextrose 50% Injectable 25 Gram(s) IV Push once  dextrose 50% Injectable 12.5 Gram(s) IV Push once  dextrose 50% Injectable 25 Gram(s) IV Push once  furosemide    Tablet 40 milliGRAM(s) Oral daily  gabapentin 100 milliGRAM(s) Oral three times a day  glucagon  Injectable 1 milliGRAM(s) IntraMuscular once  heparin   Injectable 5000 Unit(s) SubCutaneous every 8 hours  hydrALAZINE 100 milliGRAM(s) Oral three times a day  insulin glargine Injectable (LANTUS) 14 Unit(s) SubCutaneous at bedtime  insulin lispro (ADMELOG) corrective regimen sliding scale   SubCutaneous three times a day before meals  insulin lispro Injectable (ADMELOG) 6 Unit(s) SubCutaneous three times a day before meals  metoprolol succinate  milliGRAM(s) Oral daily  montelukast 10 milliGRAM(s) Oral daily  polyethylene glycol 3350 17 Gram(s) Oral daily  remdesivir  IVPB 100 milliGRAM(s) IV Intermittent every 24 hours  remdesivir  IVPB   IV Intermittent     MEDICATIONS  (PRN):  acetaminophen   Tablet .. 650 milliGRAM(s) Oral every 6 hours PRN Mild Pain (1 - 3), Moderate Pain (4 - 6)  ALBUTerol    90 MICROgram(s) HFA Inhaler 2 Puff(s) Inhalation every 6 hours PRN Shortness of Breath and/or Wheezing  artificial tears (preservative free) Ophthalmic Solution 1 Drop(s) Both EYES three times a day PRN Dry Eyes      CAPILLARY BLOOD GLUCOSE  POCT Blood Glucose.: 271 mg/dL (03 Dec 2020 12:58)  POCT Blood Glucose.: 150 mg/dL (03 Dec 2020 08:54)  POCT Blood Glucose.: 201 mg/dL (02 Dec 2020 23:33)  POCT Blood Glucose.: 132 mg/dL (02 Dec 2020 17:50)        PHYSICAL EXAM:  Vital Signs Last 24 Hrs  T(F): 98.1 (03 Dec 2020 13:09), Max: 98.3 (03 Dec 2020 06:43)  HR: 81 (03 Dec 2020 13:09) (81 - 93)  BP: 130/52 (03 Dec 2020 09:26) (124/65 - 151/79)  RR: 20 (03 Dec 2020 13:09) (16 - 20)  SpO2: 94% (03 Dec 2020 13:09) (88% - 96%)    CONSTITUTIONAL: NAD  RESPIRATORY: Normal respiratory effort;   CARDIOVASCULAR: Regular rate and rhythm; No lower extremity edema;   ABDOMEN: Nontender to palpation, normoactive bowel sounds  MUSCULOSKELETAL:   no joint swelling or tenderness to palpation  PSYCH: affect appropriate  NEUROLOGY: no gross sensory deficits       LABS:                        12.3   4.13  )-----------( 211      ( 03 Dec 2020 06:37 )             41.6     12-03    136  |  91<L>  |  38<H>  ----------------------------<  153<H>  4.4   |  38<H>  |  1.05    Ca    8.8      03 Dec 2020 06:37  Phos  3.3     12-03  Mg     1.9     12-03    TPro  7.5  /  Alb  3.4  /  TBili  0.4  /  DBili  < 0.2  /  AST  29  /  ALT  24  /  AlkPhos  42  12-03              Culture - Blood (collected 01 Dec 2020 12:39)  Source: .Blood Blood-Peripheral  Preliminary Report (02 Dec 2020 13:01):    No growth to date.

## 2020-12-04 LAB
ANION GAP SERPL CALC-SCNC: 10 MMO/L — SIGNIFICANT CHANGE UP (ref 7–14)
BASE EXCESS BLDA CALC-SCNC: 13.1 MMOL/L — SIGNIFICANT CHANGE UP
BLOOD GAS ARTERIAL - FIO2: 32 — SIGNIFICANT CHANGE UP
BUN SERPL-MCNC: 44 MG/DL — HIGH (ref 7–23)
CALCIUM SERPL-MCNC: 9 MG/DL — SIGNIFICANT CHANGE UP (ref 8.4–10.5)
CHLORIDE SERPL-SCNC: 90 MMOL/L — LOW (ref 98–107)
CO2 SERPL-SCNC: 35 MMOL/L — HIGH (ref 22–31)
CREAT SERPL-MCNC: 0.93 MG/DL — SIGNIFICANT CHANGE UP (ref 0.5–1.3)
CULTURE RESULTS: SIGNIFICANT CHANGE UP
GLUCOSE BLDC GLUCOMTR-MCNC: 166 MG/DL — HIGH (ref 70–99)
GLUCOSE SERPL-MCNC: 109 MG/DL — HIGH (ref 70–99)
HCO3 BLDA-SCNC: 35 MMOL/L — HIGH (ref 22–26)
HCT VFR BLD CALC: 41.9 % — SIGNIFICANT CHANGE UP (ref 34.5–45)
HGB BLD-MCNC: 12.7 G/DL — SIGNIFICANT CHANGE UP (ref 11.5–15.5)
MAGNESIUM SERPL-MCNC: 2.1 MG/DL — SIGNIFICANT CHANGE UP (ref 1.6–2.6)
MCHC RBC-ENTMCNC: 25.3 PG — LOW (ref 27–34)
MCHC RBC-ENTMCNC: 30.3 % — LOW (ref 32–36)
MCV RBC AUTO: 83.5 FL — SIGNIFICANT CHANGE UP (ref 80–100)
NRBC # FLD: 0 K/UL — SIGNIFICANT CHANGE UP (ref 0–0)
PCO2 BLDA: 61 MMHG — HIGH (ref 32–48)
PH BLDA: 7.42 PH — SIGNIFICANT CHANGE UP (ref 7.35–7.45)
PHOSPHATE SERPL-MCNC: 3.2 MG/DL — SIGNIFICANT CHANGE UP (ref 2.5–4.5)
PLATELET # BLD AUTO: 243 K/UL — SIGNIFICANT CHANGE UP (ref 150–400)
PMV BLD: 9.9 FL — SIGNIFICANT CHANGE UP (ref 7–13)
PO2 BLDA: 61 MMHG — LOW (ref 83–108)
POTASSIUM SERPL-MCNC: 4.4 MMOL/L — SIGNIFICANT CHANGE UP (ref 3.5–5.3)
POTASSIUM SERPL-SCNC: 4.4 MMOL/L — SIGNIFICANT CHANGE UP (ref 3.5–5.3)
RBC # BLD: 5.02 M/UL — SIGNIFICANT CHANGE UP (ref 3.8–5.2)
RBC # FLD: 15.9 % — HIGH (ref 10.3–14.5)
SAO2 % BLDA: 90.1 % — LOW (ref 95–99)
SODIUM SERPL-SCNC: 135 MMOL/L — SIGNIFICANT CHANGE UP (ref 135–145)
SPECIMEN SOURCE: SIGNIFICANT CHANGE UP
WBC # BLD: 6.1 K/UL — SIGNIFICANT CHANGE UP (ref 3.8–10.5)
WBC # FLD AUTO: 6.1 K/UL — SIGNIFICANT CHANGE UP (ref 3.8–10.5)

## 2020-12-04 PROCEDURE — 99233 SBSQ HOSP IP/OBS HIGH 50: CPT | Mod: CS

## 2020-12-04 PROCEDURE — 99223 1ST HOSP IP/OBS HIGH 75: CPT | Mod: CS,GC

## 2020-12-04 RX ADMIN — Medication 4: at 10:08

## 2020-12-04 RX ADMIN — Medication 6 MILLIGRAM(S): at 06:00

## 2020-12-04 RX ADMIN — AMLODIPINE BESYLATE 10 MILLIGRAM(S): 2.5 TABLET ORAL at 06:01

## 2020-12-04 RX ADMIN — REMDESIVIR 500 MILLIGRAM(S): 5 INJECTION INTRAVENOUS at 23:31

## 2020-12-04 RX ADMIN — Medication 100 MILLIGRAM(S): at 06:01

## 2020-12-04 RX ADMIN — Medication 100 MILLIGRAM(S): at 06:00

## 2020-12-04 RX ADMIN — POLYETHYLENE GLYCOL 3350 17 GRAM(S): 17 POWDER, FOR SOLUTION ORAL at 12:42

## 2020-12-04 RX ADMIN — MONTELUKAST 10 MILLIGRAM(S): 4 TABLET, CHEWABLE ORAL at 12:46

## 2020-12-04 RX ADMIN — Medication 100 MILLIGRAM(S): at 13:21

## 2020-12-04 RX ADMIN — ATORVASTATIN CALCIUM 40 MILLIGRAM(S): 80 TABLET, FILM COATED ORAL at 22:26

## 2020-12-04 RX ADMIN — Medication 100 MILLIGRAM(S): at 22:26

## 2020-12-04 RX ADMIN — Medication 2: at 12:40

## 2020-12-04 RX ADMIN — HEPARIN SODIUM 5000 UNIT(S): 5000 INJECTION INTRAVENOUS; SUBCUTANEOUS at 13:21

## 2020-12-04 RX ADMIN — GABAPENTIN 100 MILLIGRAM(S): 400 CAPSULE ORAL at 13:21

## 2020-12-04 RX ADMIN — Medication 40 MILLIGRAM(S): at 06:01

## 2020-12-04 RX ADMIN — ALBUTEROL 2 PUFF(S): 90 AEROSOL, METERED ORAL at 22:33

## 2020-12-04 RX ADMIN — Medication 2000 UNIT(S): at 12:43

## 2020-12-04 RX ADMIN — HEPARIN SODIUM 5000 UNIT(S): 5000 INJECTION INTRAVENOUS; SUBCUTANEOUS at 06:00

## 2020-12-04 RX ADMIN — Medication 6 UNIT(S): at 17:45

## 2020-12-04 RX ADMIN — Medication 500 MILLIGRAM(S): at 12:41

## 2020-12-04 RX ADMIN — INSULIN GLARGINE 14 UNIT(S): 100 INJECTION, SOLUTION SUBCUTANEOUS at 22:19

## 2020-12-04 RX ADMIN — ALBUTEROL 2 PUFF(S): 90 AEROSOL, METERED ORAL at 12:46

## 2020-12-04 RX ADMIN — Medication 0.5 TABLET(S): at 06:01

## 2020-12-04 RX ADMIN — HEPARIN SODIUM 5000 UNIT(S): 5000 INJECTION INTRAVENOUS; SUBCUTANEOUS at 22:33

## 2020-12-04 RX ADMIN — GABAPENTIN 100 MILLIGRAM(S): 400 CAPSULE ORAL at 22:26

## 2020-12-04 RX ADMIN — GABAPENTIN 100 MILLIGRAM(S): 400 CAPSULE ORAL at 06:01

## 2020-12-04 RX ADMIN — Medication 6 UNIT(S): at 10:09

## 2020-12-04 RX ADMIN — Medication 6 UNIT(S): at 12:43

## 2020-12-04 NOTE — PROGRESS NOTE ADULT - SUBJECTIVE AND OBJECTIVE BOX
[facility] Division of Hospital Medicine  Autumn Mathur MD  Pager [phone]    Patient is a 85y old  Female who presents with a chief complaint of SOB       SUBJECTIVE / OVERNIGHT EVENTS: c/o constipation      MEDICATIONS  (STANDING):  amLODIPine   Tablet 10 milliGRAM(s) Oral daily  ascorbic acid 500 milliGRAM(s) Oral daily  atorvastatin 40 milliGRAM(s) Oral at bedtime  calcium carbonate    500 mG (Tums) Chewable 0.5 Tablet(s) Chew two times a day  cholecalciferol 2000 Unit(s) Oral daily  dexAMETHasone  Injectable 6 milliGRAM(s) IV Push daily  dextrose 40% Gel 15 Gram(s) Oral once  dextrose 5%. 1000 milliLiter(s) (50 mL/Hr) IV Continuous <Continuous>  dextrose 5%. 1000 milliLiter(s) (100 mL/Hr) IV Continuous <Continuous>  dextrose 50% Injectable 25 Gram(s) IV Push once  dextrose 50% Injectable 12.5 Gram(s) IV Push once  dextrose 50% Injectable 25 Gram(s) IV Push once  furosemide    Tablet 40 milliGRAM(s) Oral daily  gabapentin 100 milliGRAM(s) Oral three times a day  glucagon  Injectable 1 milliGRAM(s) IntraMuscular once  heparin   Injectable 5000 Unit(s) SubCutaneous every 8 hours  hydrALAZINE 100 milliGRAM(s) Oral three times a day  insulin glargine Injectable (LANTUS) 14 Unit(s) SubCutaneous at bedtime  insulin lispro (ADMELOG) corrective regimen sliding scale   SubCutaneous three times a day before meals  insulin lispro Injectable (ADMELOG) 6 Unit(s) SubCutaneous three times a day before meals  metoprolol succinate  milliGRAM(s) Oral daily  montelukast 10 milliGRAM(s) Oral daily  polyethylene glycol 3350 17 Gram(s) Oral daily  remdesivir  IVPB   IV Intermittent   remdesivir  IVPB 100 milliGRAM(s) IV Intermittent every 24 hours    MEDICATIONS  (PRN):  acetaminophen   Tablet .. 650 milliGRAM(s) Oral every 6 hours PRN Mild Pain (1 - 3), Moderate Pain (4 - 6)  ALBUTerol    90 MICROgram(s) HFA Inhaler 2 Puff(s) Inhalation every 6 hours PRN Shortness of Breath and/or Wheezing  artificial tears (preservative free) Ophthalmic Solution 1 Drop(s) Both EYES three times a day PRN Dry Eyes        PHYSICAL EXAM:  Vital Signs Last 24 Hrs  T(F): 98.8 (04 Dec 2020 13:17), Max: 98.8 (04 Dec 2020 13:17)  HR: 77 (04 Dec 2020 13:17) (70 - 87)  BP: 132/58 (04 Dec 2020 13:17) (107/63 - 147/83)  RR: 20 (04 Dec 2020 13:17) (18 - 20)  SpO2: 98% (04 Dec 2020 13:17) (93% - 98%)    CONSTITUTIONAL: NAD  RESPIRATORY: Normal respiratory effort;   CARDIOVASCULAR: Regular rate and rhythm; No lower extremity edema;   ABDOMEN: Nontender to palpation, normoactive bowel sounds  MUSCULOSKELETAL: no clubbing or cyanosis of digits;   PSYCH: affect appropriate  NEUROLOGY: no gross sensory deficits       LABS:                        12.7   6.10  )-----------( 243      ( 04 Dec 2020 06:20 )             41.9     12-04    135  |  90<L>  |  44<H>  ----------------------------<  109<H>  4.4   |  35<H>  |  0.93    Ca    9.0      04 Dec 2020 06:20  Phos  3.2     12-04  Mg     2.1     12-04

## 2020-12-04 NOTE — PROGRESS NOTE ADULT - PROBLEM SELECTOR PLAN 3
goal to return to home O2 2L  no evidence of COPD exac  albuterol PRN  now on 3L sat 94-95%  collateral from family that pt on nocturnal bipap, pulm to see pt

## 2020-12-04 NOTE — CONSULT NOTE ADULT - SUBJECTIVE AND OBJECTIVE BOX
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 She feels short of breath but feels she is better since coming to the hospital. Denies fever or cough. Started on remdesivir and dexamethasone. O2 saturation in 90s on 3L.       PAST MEDICAL & SURGICAL HISTORY:  GERD (gastroesophageal reflux disease)    DM (diabetes mellitus)    Asthma    HLD (hyperlipidemia)    HTN (hypertension)      FAMILY HISTORY: noncontributory      SOCIAL HISTORY:  Smoking: [x ] Never Smoked [ ] Former Smoker (__ packs x ___ years) [ ] Current Smoker  (__ packs x ___ years)  Substance Use: [x ] Never Used [ ] Used ____  EtOH Use: none  Marital Status: [ ] Single [ ]  [ ]  [ ]   Sexual History:   Occupation:  Recent Travel:  Country of Birth:  Advance Directives:    Allergies    No Known Allergies    Intolerances        HOME MEDICATIONS:  Home Medications:  Advair Diskus 250 mcg-50 mcg inhalation powder: 1 puff(s) inhaled 2 times a day (30 Oct 2019 16:54)  alendronate 70 mg oral tablet: 1 tab(s) orally once a week (30 Oct 2019 16:54)  amLODIPine 10 mg oral tablet: 1 tab(s) orally once a day (30 Oct 2019 16:54)  ascorbic acid 500 mg oral capsule: 1 cap(s) orally once a day (30 Oct 2019 16:54)  azelastine 137 mcg/inh (0.1%) nasal spray: 2 spray(s) nasal 2 times a day (30 Oct 2019 16:54)  azelastine 137 mcg/inh (0.1%) nasal spray: 1 spray(s) nasal 2 times a day, As Needed (29 Nov 2020 18:20)  calcium (as carbonate) 500 mg oral tablet: 0.5 tab(s) orally once a day (30 Oct 2019 16:54)  cholecalciferol 2000 intl units oral tablet: 1 tab(s) orally once a day (30 Oct 2019 16:54)  furosemide 20 mg oral tablet: 1 tab(s) orally once a day (06 Nov 2019 13:41)  glimepiride 2 mg oral tablet: 1 tab(s) orally once a day (30 Oct 2019 16:54)  heparin: 5000 unit(s) subcutaneous every 8 hours (06 Nov 2019 13:41)  hydrALAZINE 100 mg oral tablet: 1 tab(s) orally 3 times a day (30 Oct 2019 16:54)  hydrALAZINE 100 mg oral tablet: 1 tab(s) orally 3 times a day (06 Nov 2019 13:41)  ipratropium-albuterol 0.5 mg-2.5 mg/3 mLinhalation solution: 3 milliliter(s) inhaled every 6 hours (06 Nov 2019 14:05)  metoprolol succinate 100 mg oral tablet, extended release: 1 tab(s) orally once a day (30 Oct 2019 16:54)  montelukast 10 mg oral tablet: 1 tab(s) orally once a day (30 Oct 2019 16:54)  Neurontin 100 mg oral capsule: 1 cap(s) orally 3 times a day (29 Nov 2020 18:19)  ocular lubricant ophthalmic solution: 1 drop(s) to each affected eye 3 times a day, As needed, Dry Eyes (06 Nov 2019 13:41)  polyethylene glycol 3350 oral powder for reconstitution: 17 gram(s) orally once a day (06 Nov 2019 13:41)  ProAir HFA 90 mcg/inh inhalation aerosol: 2 puff(s) inhaled 4 times a day, As Needed (30 Oct 2019 16:54)  rosuvastatin 10 mg oral tablet: 1 tab(s) orally once a day (30 Oct 2019 16:54)      REVIEW OF SYSTEMS:  Constitutional: [ ] negative [ ] fevers [ ] chills [ ] weight loss [ ] weight gain [ ] malaise  HEENT: [ ] negative [ ] visual disturbances [ ] postnasal drip [ ] nasal congestion [ ] sore throat  CV: [ ] negative [ ] chest pain [ ] palpitations [ ] orthopnea   Resp: [ ] negative [ ] cough [ ] shortness of breath [ ] wheezing [ ] sputum [ ] hemoptysis  GI: [ ] negative [ ] nausea [ ] vomiting [ ] abdominal pain [ ] dysphagia [ ] diarrhea [ ] melena [ ] hematochezia  : [ ] negative [ ] dysuria [ ] nocturia [ ] hematuria [ ] increased urinary frequency  Musculoskeletal: [ ] negative [ ] back pain [ ] myalgias [ ] arthralgias [ ] fracture  Skin: [ ] negative [ ] rash [ ] pruritus  Neurological: [ ] negative [ ] headache [ ] dizziness [ ] syncope [ ] weakness [ ] numbness  Psychiatric: [ ] negative [ ] anxiety [ ] depression  Endocrine: [ ] negative [ ] diabetes [ ] thyroid problem  Hematologic/Lymphatic: [ ] negative [ ] anemia [ ] bleeding problem  Allergic/Immunologic: [ ] hives [ ] angioedema  [ ] All other systems negative  [ ] Unable to assess ROS because ________    OBJECTIVE:  ICU Vital Signs Last 24 Hrs  T(C): 37.1 (04 Dec 2020 13:17), Max: 37.1 (04 Dec 2020 13:17)  T(F): 98.8 (04 Dec 2020 13:17), Max: 98.8 (04 Dec 2020 13:17)  HR: 77 (04 Dec 2020 13:17) (70 - 87)  BP: 132/58 (04 Dec 2020 13:17) (107/63 - 147/83)  BP(mean): 75 (04 Dec 2020 13:17) (73 - 75)  ABP: --  ABP(mean): --  RR: 20 (04 Dec 2020 13:17) (18 - 20)  SpO2: 98% (04 Dec 2020 13:17) (93% - 98%)        12-03 @ 07:01  -  12-04 @ 07:00  --------------------------------------------------------  IN: 0 mL / OUT: 600 mL / NET: -600 mL      CAPILLARY BLOOD GLUCOSE      POCT Blood Glucose.: 160 mg/dL (03 Dec 2020 17:19)      PHYSICAL EXAM:  General: NAD, morbidly obese, appears comfortable on 3L  Skin: Warm and dry, no rashes  Neuro: AAOx3, nonfocal  HEENT: PERRL, EOMI, no oral lesions  Neck: Full range of motion, no JVD  Lungs: Clear to ascultation bilaterally no wheezes, rales, or rhonchi   Heart: Regular rate and rhythm, no murmurs  Abdomen: Normoactive bowl sounds, soft, nontender, nondistended  Extremities: No lower extremity tenderness, erythema, or edema   Psych: normal mood and affect    LINES:     HOSPITAL MEDICATIONS:  Standing Meds:  amLODIPine   Tablet 10 milliGRAM(s) Oral daily  ascorbic acid 500 milliGRAM(s) Oral daily  atorvastatin 40 milliGRAM(s) Oral at bedtime  calcium carbonate    500 mG (Tums) Chewable 0.5 Tablet(s) Chew two times a day  cholecalciferol 2000 Unit(s) Oral daily  dexAMETHasone  Injectable 6 milliGRAM(s) IV Push daily  dextrose 40% Gel 15 Gram(s) Oral once  dextrose 5%. 1000 milliLiter(s) IV Continuous <Continuous>  dextrose 5%. 1000 milliLiter(s) IV Continuous <Continuous>  dextrose 50% Injectable 25 Gram(s) IV Push once  dextrose 50% Injectable 12.5 Gram(s) IV Push once  dextrose 50% Injectable 25 Gram(s) IV Push once  furosemide    Tablet 40 milliGRAM(s) Oral daily  gabapentin 100 milliGRAM(s) Oral three times a day  glucagon  Injectable 1 milliGRAM(s) IntraMuscular once  heparin   Injectable 5000 Unit(s) SubCutaneous every 8 hours  hydrALAZINE 100 milliGRAM(s) Oral three times a day  insulin glargine Injectable (LANTUS) 14 Unit(s) SubCutaneous at bedtime  insulin lispro (ADMELOG) corrective regimen sliding scale   SubCutaneous three times a day before meals  insulin lispro Injectable (ADMELOG) 6 Unit(s) SubCutaneous three times a day before meals  metoprolol succinate  milliGRAM(s) Oral daily  montelukast 10 milliGRAM(s) Oral daily  polyethylene glycol 3350 17 Gram(s) Oral daily  remdesivir  IVPB   IV Intermittent   remdesivir  IVPB 100 milliGRAM(s) IV Intermittent every 24 hours      PRN Meds:  acetaminophen   Tablet .. 650 milliGRAM(s) Oral every 6 hours PRN  ALBUTerol    90 MICROgram(s) HFA Inhaler 2 Puff(s) Inhalation every 6 hours PRN  artificial tears (preservative free) Ophthalmic Solution 1 Drop(s) Both EYES three times a day PRN      LABS:                        12.7   6.10  )-----------( 243      ( 04 Dec 2020 06:20 )             41.9     Hgb Trend: 12.7<--, 12.3<--, 12.9<--, 12.0<--, 11.9<--  12-04    135  |  90<L>  |  44<H>  ----------------------------<  109<H>  4.4   |  35<H>  |  0.93    Ca    9.0      04 Dec 2020 06:20  Phos  3.2     12-04  Mg     2.1     12-04    TPro  7.5  /  Alb  3.4  /  TBili  0.4  /  DBili  < 0.2  /  AST  29  /  ALT  24  /  AlkPhos  42  12-03    Creatinine Trend: 0.93<--, 1.05<--, 1.03<--, 0.95<--, 1.17<--, 1.02<--

## 2020-12-05 LAB
ALBUMIN SERPL ELPH-MCNC: 3.1 G/DL — LOW (ref 3.3–5)
ALP SERPL-CCNC: 46 U/L — SIGNIFICANT CHANGE UP (ref 40–120)
ALT FLD-CCNC: 28 U/L — SIGNIFICANT CHANGE UP (ref 4–33)
ANION GAP SERPL CALC-SCNC: 5 MMO/L — LOW (ref 7–14)
AST SERPL-CCNC: 28 U/L — SIGNIFICANT CHANGE UP (ref 4–32)
BASE EXCESS BLDA CALC-SCNC: 13.1 MMOL/L — SIGNIFICANT CHANGE UP
BILIRUB DIRECT SERPL-MCNC: < 0.2 MG/DL — SIGNIFICANT CHANGE UP (ref 0.1–0.2)
BILIRUB SERPL-MCNC: 0.3 MG/DL — SIGNIFICANT CHANGE UP (ref 0.2–1.2)
BUN SERPL-MCNC: 46 MG/DL — HIGH (ref 7–23)
CALCIUM SERPL-MCNC: 9.1 MG/DL — SIGNIFICANT CHANGE UP (ref 8.4–10.5)
CHLORIDE SERPL-SCNC: 92 MMOL/L — LOW (ref 98–107)
CO2 SERPL-SCNC: 36 MMOL/L — HIGH (ref 22–31)
CREAT SERPL-MCNC: 0.96 MG/DL — SIGNIFICANT CHANGE UP (ref 0.5–1.3)
CRP SERPL-MCNC: 8.5 MG/L — HIGH
FERRITIN SERPL-MCNC: 469.9 NG/ML — HIGH (ref 15–150)
GLUCOSE BLDC GLUCOMTR-MCNC: 135 MG/DL — HIGH (ref 70–99)
GLUCOSE BLDC GLUCOMTR-MCNC: 158 MG/DL — HIGH (ref 70–99)
GLUCOSE BLDC GLUCOMTR-MCNC: 286 MG/DL — HIGH (ref 70–99)
GLUCOSE SERPL-MCNC: 149 MG/DL — HIGH (ref 70–99)
HCO3 BLDA-SCNC: 35 MMOL/L — HIGH (ref 22–26)
HCT VFR BLD CALC: 41.4 % — SIGNIFICANT CHANGE UP (ref 34.5–45)
HGB BLD-MCNC: 12.3 G/DL — SIGNIFICANT CHANGE UP (ref 11.5–15.5)
LDH SERPL L TO P-CCNC: 184 U/L — SIGNIFICANT CHANGE UP (ref 135–225)
MAGNESIUM SERPL-MCNC: 2.2 MG/DL — SIGNIFICANT CHANGE UP (ref 1.6–2.6)
MCHC RBC-ENTMCNC: 25.2 PG — LOW (ref 27–34)
MCHC RBC-ENTMCNC: 29.7 % — LOW (ref 32–36)
MCV RBC AUTO: 84.8 FL — SIGNIFICANT CHANGE UP (ref 80–100)
NRBC # FLD: 0 K/UL — SIGNIFICANT CHANGE UP (ref 0–0)
PCO2 BLDA: 61 MMHG — HIGH (ref 32–48)
PH BLDA: 7.42 PH — SIGNIFICANT CHANGE UP (ref 7.35–7.45)
PHOSPHATE SERPL-MCNC: 3.5 MG/DL — SIGNIFICANT CHANGE UP (ref 2.5–4.5)
PLATELET # BLD AUTO: 252 K/UL — SIGNIFICANT CHANGE UP (ref 150–400)
PMV BLD: SIGNIFICANT CHANGE UP FL (ref 7–13)
PO2 BLDA: 94 MMHG — SIGNIFICANT CHANGE UP (ref 83–108)
POTASSIUM SERPL-MCNC: 4.4 MMOL/L — SIGNIFICANT CHANGE UP (ref 3.5–5.3)
POTASSIUM SERPL-SCNC: 4.4 MMOL/L — SIGNIFICANT CHANGE UP (ref 3.5–5.3)
PROT SERPL-MCNC: 6.9 G/DL — SIGNIFICANT CHANGE UP (ref 6–8.3)
RBC # BLD: 4.88 M/UL — SIGNIFICANT CHANGE UP (ref 3.8–5.2)
RBC # FLD: 15.9 % — HIGH (ref 10.3–14.5)
SAO2 % BLDA: 97.1 % — SIGNIFICANT CHANGE UP (ref 95–99)
SODIUM SERPL-SCNC: 133 MMOL/L — LOW (ref 135–145)
WBC # BLD: 8.02 K/UL — SIGNIFICANT CHANGE UP (ref 3.8–10.5)
WBC # FLD AUTO: 8.02 K/UL — SIGNIFICANT CHANGE UP (ref 3.8–10.5)

## 2020-12-05 PROCEDURE — 93306 TTE W/DOPPLER COMPLETE: CPT | Mod: 26

## 2020-12-05 PROCEDURE — 99233 SBSQ HOSP IP/OBS HIGH 50: CPT | Mod: CS,GC

## 2020-12-05 PROCEDURE — 36600 WITHDRAWAL OF ARTERIAL BLOOD: CPT

## 2020-12-05 RX ADMIN — Medication 6: at 12:25

## 2020-12-05 RX ADMIN — GABAPENTIN 100 MILLIGRAM(S): 400 CAPSULE ORAL at 23:17

## 2020-12-05 RX ADMIN — Medication 0.5 TABLET(S): at 07:01

## 2020-12-05 RX ADMIN — GABAPENTIN 100 MILLIGRAM(S): 400 CAPSULE ORAL at 14:28

## 2020-12-05 RX ADMIN — Medication 100 MILLIGRAM(S): at 14:28

## 2020-12-05 RX ADMIN — Medication 100 MILLIGRAM(S): at 07:02

## 2020-12-05 RX ADMIN — INSULIN GLARGINE 14 UNIT(S): 100 INJECTION, SOLUTION SUBCUTANEOUS at 23:17

## 2020-12-05 RX ADMIN — Medication 6 UNIT(S): at 18:04

## 2020-12-05 RX ADMIN — Medication 2: at 18:04

## 2020-12-05 RX ADMIN — GABAPENTIN 100 MILLIGRAM(S): 400 CAPSULE ORAL at 07:02

## 2020-12-05 RX ADMIN — HEPARIN SODIUM 5000 UNIT(S): 5000 INJECTION INTRAVENOUS; SUBCUTANEOUS at 14:28

## 2020-12-05 RX ADMIN — Medication 2000 UNIT(S): at 12:27

## 2020-12-05 RX ADMIN — POLYETHYLENE GLYCOL 3350 17 GRAM(S): 17 POWDER, FOR SOLUTION ORAL at 12:27

## 2020-12-05 RX ADMIN — MONTELUKAST 10 MILLIGRAM(S): 4 TABLET, CHEWABLE ORAL at 12:26

## 2020-12-05 RX ADMIN — Medication 6 UNIT(S): at 12:26

## 2020-12-05 RX ADMIN — ATORVASTATIN CALCIUM 40 MILLIGRAM(S): 80 TABLET, FILM COATED ORAL at 23:18

## 2020-12-05 RX ADMIN — Medication 100 MILLIGRAM(S): at 23:18

## 2020-12-05 RX ADMIN — Medication 6 MILLIGRAM(S): at 07:01

## 2020-12-05 RX ADMIN — Medication 40 MILLIGRAM(S): at 07:02

## 2020-12-05 RX ADMIN — HEPARIN SODIUM 5000 UNIT(S): 5000 INJECTION INTRAVENOUS; SUBCUTANEOUS at 07:03

## 2020-12-05 RX ADMIN — AMLODIPINE BESYLATE 10 MILLIGRAM(S): 2.5 TABLET ORAL at 07:00

## 2020-12-05 RX ADMIN — Medication 0.5 TABLET(S): at 18:05

## 2020-12-05 RX ADMIN — Medication 6 UNIT(S): at 08:39

## 2020-12-05 RX ADMIN — Medication 500 MILLIGRAM(S): at 12:26

## 2020-12-05 RX ADMIN — HEPARIN SODIUM 5000 UNIT(S): 5000 INJECTION INTRAVENOUS; SUBCUTANEOUS at 23:18

## 2020-12-05 NOTE — PROGRESS NOTE ADULT - SUBJECTIVE AND OBJECTIVE BOX
Patient is a 85y old  Female who presents with a chief complaint of SOB (04 Dec 2020 13:42)      INTERVAL HPI/OVERNIGHT EVENTS: pt very anxious to go home     MEDICATIONS  (STANDING):  amLODIPine   Tablet 10 milliGRAM(s) Oral daily  ascorbic acid 500 milliGRAM(s) Oral daily  atorvastatin 40 milliGRAM(s) Oral at bedtime  calcium carbonate    500 mG (Tums) Chewable 0.5 Tablet(s) Chew two times a day  cholecalciferol 2000 Unit(s) Oral daily  dexAMETHasone  Injectable 6 milliGRAM(s) IV Push daily  dextrose 40% Gel 15 Gram(s) Oral once  dextrose 5%. 1000 milliLiter(s) (50 mL/Hr) IV Continuous <Continuous>  dextrose 5%. 1000 milliLiter(s) (100 mL/Hr) IV Continuous <Continuous>  dextrose 50% Injectable 25 Gram(s) IV Push once  dextrose 50% Injectable 12.5 Gram(s) IV Push once  dextrose 50% Injectable 25 Gram(s) IV Push once  furosemide    Tablet 40 milliGRAM(s) Oral daily  gabapentin 100 milliGRAM(s) Oral three times a day  glucagon  Injectable 1 milliGRAM(s) IntraMuscular once  heparin   Injectable 5000 Unit(s) SubCutaneous every 8 hours  hydrALAZINE 100 milliGRAM(s) Oral three times a day  insulin glargine Injectable (LANTUS) 14 Unit(s) SubCutaneous at bedtime  insulin lispro (ADMELOG) corrective regimen sliding scale   SubCutaneous three times a day before meals  insulin lispro Injectable (ADMELOG) 6 Unit(s) SubCutaneous three times a day before meals  metoprolol succinate  milliGRAM(s) Oral daily  montelukast 10 milliGRAM(s) Oral daily  polyethylene glycol 3350 17 Gram(s) Oral daily    MEDICATIONS  (PRN):  acetaminophen   Tablet .. 650 milliGRAM(s) Oral every 6 hours PRN Mild Pain (1 - 3), Moderate Pain (4 - 6)  ALBUTerol    90 MICROgram(s) HFA Inhaler 2 Puff(s) Inhalation every 6 hours PRN Shortness of Breath and/or Wheezing  artificial tears (preservative free) Ophthalmic Solution 1 Drop(s) Both EYES three times a day PRN Dry Eyes      Allergies    No Known Allergies    Intolerances        REVIEW OF SYSTEMS:  Please see interval HPI:    Vital Signs Last 24 Hrs  T(C): 36.9 (05 Dec 2020 18:08), Max: 37.1 (05 Dec 2020 14:21)  T(F): 98.4 (05 Dec 2020 18:08), Max: 98.7 (05 Dec 2020 14:21)  HR: 79 (05 Dec 2020 19:50) (70 - 83)  BP: 130/80 (05 Dec 2020 18:08) (129/72 - 155/71)  BP(mean): --  RR: 18 (05 Dec 2020 18:08) (18 - 18)  SpO2: 95% (05 Dec 2020 19:50) (92% - 100%)  I&O's Detail    05 Dec 2020 07:01  -  05 Dec 2020 20:03  --------------------------------------------------------  IN:    Oral Fluid: 600 mL  Total IN: 600 mL    OUT:  Total OUT: 0 mL    Total NET: 600 mL            PHYSICAL EXAM:  GENERAL: NAD  HEAD:  NCAT  EYES: anicteric  ENMT: MMM  NECK: supple  NERVOUS SYSTEM:  AAOX3  CHEST/LUNG: Decreased bs b.l  HEART: +s1s2 no mrg  ABDOMEN: soft NTND  EXTREMITIES:  b/l TEE  SKIN: no rashes    LABS:                        12.3   8.02  )-----------( 252      ( 05 Dec 2020 04:00 )             41.4     05 Dec 2020 04:00    133    |  92     |  46     ----------------------------<  149    4.4     |  36     |  0.96     Ca    9.1        05 Dec 2020 04:00  Phos  3.5       05 Dec 2020 04:00  Mg     2.2       05 Dec 2020 04:00    TPro  6.9    /  Alb  3.1    /  TBili  0.3    /  DBili  < 0.2  /  AST  28     /  ALT  28     /  AlkPhos  46     05 Dec 2020 04:00      CAPILLARY BLOOD GLUCOSE      POCT Blood Glucose.: 158 mg/dL (05 Dec 2020 17:37)  POCT Blood Glucose.: 286 mg/dL (05 Dec 2020 11:50)  POCT Blood Glucose.: 166 mg/dL (04 Dec 2020 21:24)    BLOOD CULTURE    RADIOLOGY & ADDITIONAL TESTS:    Imaging Personally Reviewed:  [ ] YES     Consultant(s) Notes Reviewed:      Care Discussed with Consultants/Other Providers:

## 2020-12-05 NOTE — PHYSICAL THERAPY INITIAL EVALUATION ADULT - ADDITIONAL COMMENTS
Pt lives with her family, pt owns a cane, wheelchair and walker. Pt was using walker and cane prior to admission for ambulation. Pt was requiring assist with ADL prior to admission. Pt is on home O2.

## 2020-12-05 NOTE — CHART NOTE - NSCHARTNOTEFT_GEN_A_CORE
Called to obtain ABG. Arterial stick successful on right radial artery. Labs drawn and labeled and sent to lab. Patient tolerated procedure well. Applied pressure to the site. No active bleeding / ecchymosis / erythema noted.

## 2020-12-05 NOTE — PHYSICAL THERAPY INITIAL EVALUATION ADULT - DISCHARGE DISPOSITION, PT EVAL
to improve strength and balance for safe ambulation and transfers to prevent future falls/rehabilitation facility

## 2020-12-05 NOTE — PHYSICAL THERAPY INITIAL EVALUATION ADULT - PERTINENT HX OF CURRENT PROBLEM, REHAB EVAL
85 y.o. female w/ hx morbid obesity, COPD/asthma on 2LNC at home, HTN, DM2, hyperlipidemia, GERD p/w worsening SOB at home for a few days with mild dry cough which didn't improve with her inhalers and increase in supplemental oxygen.

## 2020-12-06 LAB
ALBUMIN SERPL ELPH-MCNC: 3.5 G/DL — SIGNIFICANT CHANGE UP (ref 3.3–5)
ALP SERPL-CCNC: 53 U/L — SIGNIFICANT CHANGE UP (ref 40–120)
ALT FLD-CCNC: 29 U/L — SIGNIFICANT CHANGE UP (ref 4–33)
ANION GAP SERPL CALC-SCNC: 7 MMOL/L — SIGNIFICANT CHANGE UP (ref 7–14)
ANION GAP SERPL CALC-SCNC: 9 MMOL/L — SIGNIFICANT CHANGE UP (ref 7–14)
AST SERPL-CCNC: 30 U/L — SIGNIFICANT CHANGE UP (ref 4–32)
BASOPHILS # BLD AUTO: 0.01 K/UL — SIGNIFICANT CHANGE UP (ref 0–0.2)
BASOPHILS NFR BLD AUTO: 0.1 % — SIGNIFICANT CHANGE UP (ref 0–2)
BILIRUB DIRECT SERPL-MCNC: <0.2 MG/DL — SIGNIFICANT CHANGE UP (ref 0–0.2)
BILIRUB INDIRECT FLD-MCNC: >0.3 MG/DL — SIGNIFICANT CHANGE UP (ref 0–1)
BILIRUB SERPL-MCNC: 0.5 MG/DL — SIGNIFICANT CHANGE UP (ref 0.2–1.2)
BUN SERPL-MCNC: 46 MG/DL — HIGH (ref 7–23)
BUN SERPL-MCNC: 48 MG/DL — HIGH (ref 7–23)
CALCIUM SERPL-MCNC: 9.1 MG/DL — SIGNIFICANT CHANGE UP (ref 8.4–10.5)
CALCIUM SERPL-MCNC: 9.5 MG/DL — SIGNIFICANT CHANGE UP (ref 8.4–10.5)
CHLORIDE SERPL-SCNC: 91 MMOL/L — LOW (ref 98–107)
CHLORIDE SERPL-SCNC: 91 MMOL/L — LOW (ref 98–107)
CO2 SERPL-SCNC: 31 MMOL/L — SIGNIFICANT CHANGE UP (ref 22–31)
CO2 SERPL-SCNC: 33 MMOL/L — HIGH (ref 22–31)
CREAT SERPL-MCNC: 0.96 MG/DL — SIGNIFICANT CHANGE UP (ref 0.5–1.3)
CREAT SERPL-MCNC: 0.99 MG/DL — SIGNIFICANT CHANGE UP (ref 0.5–1.3)
CREAT SERPL-MCNC: 0.99 MG/DL — SIGNIFICANT CHANGE UP (ref 0.5–1.3)
CULTURE RESULTS: SIGNIFICANT CHANGE UP
EOSINOPHIL # BLD AUTO: 0 K/UL — SIGNIFICANT CHANGE UP (ref 0–0.5)
EOSINOPHIL NFR BLD AUTO: 0 % — SIGNIFICANT CHANGE UP (ref 0–6)
GLUCOSE BLDC GLUCOMTR-MCNC: 131 MG/DL — HIGH (ref 70–99)
GLUCOSE BLDC GLUCOMTR-MCNC: 140 MG/DL — HIGH (ref 70–99)
GLUCOSE BLDC GLUCOMTR-MCNC: 157 MG/DL — HIGH (ref 70–99)
GLUCOSE BLDC GLUCOMTR-MCNC: 204 MG/DL — HIGH (ref 70–99)
GLUCOSE SERPL-MCNC: 122 MG/DL — HIGH (ref 70–99)
GLUCOSE SERPL-MCNC: 274 MG/DL — HIGH (ref 70–99)
HCT VFR BLD CALC: 42.8 % — SIGNIFICANT CHANGE UP (ref 34.5–45)
HCT VFR BLD CALC: 46.5 % — HIGH (ref 34.5–45)
HGB BLD-MCNC: 12.6 G/DL — SIGNIFICANT CHANGE UP (ref 11.5–15.5)
HGB BLD-MCNC: 13.8 G/DL — SIGNIFICANT CHANGE UP (ref 11.5–15.5)
IANC: 8.25 K/UL — SIGNIFICANT CHANGE UP (ref 1.5–8.5)
IMM GRANULOCYTES NFR BLD AUTO: 1 % — SIGNIFICANT CHANGE UP (ref 0–1.5)
LYMPHOCYTES # BLD AUTO: 1.58 K/UL — SIGNIFICANT CHANGE UP (ref 1–3.3)
LYMPHOCYTES # BLD AUTO: 14.7 % — SIGNIFICANT CHANGE UP (ref 13–44)
MAGNESIUM SERPL-MCNC: 2.1 MG/DL — SIGNIFICANT CHANGE UP (ref 1.6–2.6)
MAGNESIUM SERPL-MCNC: 2.2 MG/DL — SIGNIFICANT CHANGE UP (ref 1.6–2.6)
MCHC RBC-ENTMCNC: 25.3 PG — LOW (ref 27–34)
MCHC RBC-ENTMCNC: 25.3 PG — LOW (ref 27–34)
MCHC RBC-ENTMCNC: 29.4 GM/DL — LOW (ref 32–36)
MCHC RBC-ENTMCNC: 29.7 GM/DL — LOW (ref 32–36)
MCV RBC AUTO: 85.2 FL — SIGNIFICANT CHANGE UP (ref 80–100)
MCV RBC AUTO: 85.9 FL — SIGNIFICANT CHANGE UP (ref 80–100)
MONOCYTES # BLD AUTO: 0.83 K/UL — SIGNIFICANT CHANGE UP (ref 0–0.9)
MONOCYTES NFR BLD AUTO: 7.7 % — SIGNIFICANT CHANGE UP (ref 2–14)
NEUTROPHILS # BLD AUTO: 8.25 K/UL — HIGH (ref 1.8–7.4)
NEUTROPHILS NFR BLD AUTO: 76.5 % — SIGNIFICANT CHANGE UP (ref 43–77)
NRBC # BLD: 0 /100 WBCS — SIGNIFICANT CHANGE UP
NRBC # BLD: 0 /100 WBCS — SIGNIFICANT CHANGE UP
NRBC # FLD: 0 K/UL — SIGNIFICANT CHANGE UP
NRBC # FLD: 0 K/UL — SIGNIFICANT CHANGE UP
PHOSPHATE SERPL-MCNC: 3.8 MG/DL — SIGNIFICANT CHANGE UP (ref 2.5–4.5)
PHOSPHATE SERPL-MCNC: 3.8 MG/DL — SIGNIFICANT CHANGE UP (ref 2.5–4.5)
PLATELET # BLD AUTO: 251 K/UL — SIGNIFICANT CHANGE UP (ref 150–400)
PLATELET # BLD AUTO: 278 K/UL — SIGNIFICANT CHANGE UP (ref 150–400)
POTASSIUM SERPL-MCNC: 4.5 MMOL/L — SIGNIFICANT CHANGE UP (ref 3.5–5.3)
POTASSIUM SERPL-MCNC: 4.7 MMOL/L — SIGNIFICANT CHANGE UP (ref 3.5–5.3)
POTASSIUM SERPL-SCNC: 4.5 MMOL/L — SIGNIFICANT CHANGE UP (ref 3.5–5.3)
POTASSIUM SERPL-SCNC: 4.7 MMOL/L — SIGNIFICANT CHANGE UP (ref 3.5–5.3)
PROT SERPL-MCNC: 7.5 G/DL — SIGNIFICANT CHANGE UP (ref 6–8.3)
RBC # BLD: 4.98 M/UL — SIGNIFICANT CHANGE UP (ref 3.8–5.2)
RBC # BLD: 5.46 M/UL — HIGH (ref 3.8–5.2)
RBC # FLD: 15.9 % — HIGH (ref 10.3–14.5)
RBC # FLD: 16 % — HIGH (ref 10.3–14.5)
SODIUM SERPL-SCNC: 129 MMOL/L — LOW (ref 135–145)
SODIUM SERPL-SCNC: 133 MMOL/L — LOW (ref 135–145)
SPECIMEN SOURCE: SIGNIFICANT CHANGE UP
WBC # BLD: 10.78 K/UL — HIGH (ref 3.8–10.5)
WBC # BLD: 15.12 K/UL — HIGH (ref 3.8–10.5)
WBC # FLD AUTO: 10.78 K/UL — HIGH (ref 3.8–10.5)
WBC # FLD AUTO: 15.12 K/UL — HIGH (ref 3.8–10.5)

## 2020-12-06 PROCEDURE — 99233 SBSQ HOSP IP/OBS HIGH 50: CPT | Mod: CS

## 2020-12-06 RX ORDER — LACTULOSE 10 G/15ML
20 SOLUTION ORAL ONCE
Refills: 0 | Status: COMPLETED | OUTPATIENT
Start: 2020-12-06 | End: 2020-12-06

## 2020-12-06 RX ADMIN — Medication 100 MILLIGRAM(S): at 07:40

## 2020-12-06 RX ADMIN — Medication 6 UNIT(S): at 12:15

## 2020-12-06 RX ADMIN — HEPARIN SODIUM 5000 UNIT(S): 5000 INJECTION INTRAVENOUS; SUBCUTANEOUS at 07:39

## 2020-12-06 RX ADMIN — Medication 0.5 TABLET(S): at 17:37

## 2020-12-06 RX ADMIN — Medication 100 MILLIGRAM(S): at 22:18

## 2020-12-06 RX ADMIN — Medication 4: at 12:15

## 2020-12-06 RX ADMIN — POLYETHYLENE GLYCOL 3350 17 GRAM(S): 17 POWDER, FOR SOLUTION ORAL at 11:11

## 2020-12-06 RX ADMIN — Medication 6 UNIT(S): at 08:07

## 2020-12-06 RX ADMIN — Medication 2000 UNIT(S): at 11:10

## 2020-12-06 RX ADMIN — GABAPENTIN 100 MILLIGRAM(S): 400 CAPSULE ORAL at 22:19

## 2020-12-06 RX ADMIN — HEPARIN SODIUM 5000 UNIT(S): 5000 INJECTION INTRAVENOUS; SUBCUTANEOUS at 22:19

## 2020-12-06 RX ADMIN — Medication 6 UNIT(S): at 16:54

## 2020-12-06 RX ADMIN — Medication 40 MILLIGRAM(S): at 07:39

## 2020-12-06 RX ADMIN — MONTELUKAST 10 MILLIGRAM(S): 4 TABLET, CHEWABLE ORAL at 11:10

## 2020-12-06 RX ADMIN — Medication 6 MILLIGRAM(S): at 07:39

## 2020-12-06 RX ADMIN — ATORVASTATIN CALCIUM 40 MILLIGRAM(S): 80 TABLET, FILM COATED ORAL at 22:19

## 2020-12-06 RX ADMIN — GABAPENTIN 100 MILLIGRAM(S): 400 CAPSULE ORAL at 07:39

## 2020-12-06 RX ADMIN — Medication 100 MILLIGRAM(S): at 14:55

## 2020-12-06 RX ADMIN — Medication 0.5 TABLET(S): at 07:39

## 2020-12-06 RX ADMIN — AMLODIPINE BESYLATE 10 MILLIGRAM(S): 2.5 TABLET ORAL at 07:39

## 2020-12-06 RX ADMIN — LACTULOSE 20 GRAM(S): 10 SOLUTION ORAL at 17:37

## 2020-12-06 RX ADMIN — INSULIN GLARGINE 14 UNIT(S): 100 INJECTION, SOLUTION SUBCUTANEOUS at 22:18

## 2020-12-06 RX ADMIN — HEPARIN SODIUM 5000 UNIT(S): 5000 INJECTION INTRAVENOUS; SUBCUTANEOUS at 14:55

## 2020-12-06 RX ADMIN — Medication 2: at 16:53

## 2020-12-06 NOTE — PROGRESS NOTE ADULT - PROBLEM SELECTOR PLAN 1
due to COVID  1:2 + bcx, repeat, likely contam; pt non toxic  repeat negative  remains on 3L NC  CM and pulm following for coordination of o/p bipap

## 2020-12-06 NOTE — PROGRESS NOTE ADULT - SUBJECTIVE AND OBJECTIVE BOX
Medicine Progress Note    Patient is a 85y old  Female who presents with a chief complaint of SOB (05 Dec 2020 20:03)      SUBJECTIVE / OVERNIGHT EVENTS: no acute events overnight, pt reports constipation    ADDITIONAL REVIEW OF SYSTEMS:    MEDICATIONS  (STANDING):  amLODIPine   Tablet 10 milliGRAM(s) Oral daily  ascorbic acid 500 milliGRAM(s) Oral daily  atorvastatin 40 milliGRAM(s) Oral at bedtime  calcium carbonate    500 mG (Tums) Chewable 0.5 Tablet(s) Chew two times a day  cholecalciferol 2000 Unit(s) Oral daily  dexAMETHasone  Injectable 6 milliGRAM(s) IV Push daily  dextrose 40% Gel 15 Gram(s) Oral once  dextrose 5%. 1000 milliLiter(s) (50 mL/Hr) IV Continuous <Continuous>  dextrose 5%. 1000 milliLiter(s) (100 mL/Hr) IV Continuous <Continuous>  dextrose 50% Injectable 25 Gram(s) IV Push once  dextrose 50% Injectable 12.5 Gram(s) IV Push once  dextrose 50% Injectable 25 Gram(s) IV Push once  furosemide    Tablet 40 milliGRAM(s) Oral daily  gabapentin 100 milliGRAM(s) Oral three times a day  glucagon  Injectable 1 milliGRAM(s) IntraMuscular once  heparin   Injectable 5000 Unit(s) SubCutaneous every 8 hours  hydrALAZINE 100 milliGRAM(s) Oral three times a day  insulin glargine Injectable (LANTUS) 14 Unit(s) SubCutaneous at bedtime  insulin lispro (ADMELOG) corrective regimen sliding scale   SubCutaneous three times a day before meals  insulin lispro Injectable (ADMELOG) 6 Unit(s) SubCutaneous three times a day before meals  lactulose Syrup 20 Gram(s) Oral once  metoprolol succinate  milliGRAM(s) Oral daily  montelukast 10 milliGRAM(s) Oral daily  polyethylene glycol 3350 17 Gram(s) Oral daily    MEDICATIONS  (PRN):  acetaminophen   Tablet .. 650 milliGRAM(s) Oral every 6 hours PRN Mild Pain (1 - 3), Moderate Pain (4 - 6)  ALBUTerol    90 MICROgram(s) HFA Inhaler 2 Puff(s) Inhalation every 6 hours PRN Shortness of Breath and/or Wheezing  artificial tears (preservative free) Ophthalmic Solution 1 Drop(s) Both EYES three times a day PRN Dry Eyes    CAPILLARY BLOOD GLUCOSE      POCT Blood Glucose.: 204 mg/dL (06 Dec 2020 12:12)  POCT Blood Glucose.: 140 mg/dL (06 Dec 2020 07:51)  POCT Blood Glucose.: 135 mg/dL (05 Dec 2020 22:16)  POCT Blood Glucose.: 158 mg/dL (05 Dec 2020 17:37)    I&O's Summary    05 Dec 2020 07:01  -  06 Dec 2020 07:00  --------------------------------------------------------  IN: 600 mL / OUT: 0 mL / NET: 600 mL        PHYSICAL EXAM:  Vital Signs Last 24 Hrs  T(C): 37.2 (06 Dec 2020 14:54), Max: 37.6 (06 Dec 2020 09:37)  T(F): 99 (06 Dec 2020 14:54), Max: 99.7 (06 Dec 2020 09:37)  HR: 72 (06 Dec 2020 14:55) (68 - 80)  BP: 150/67 (06 Dec 2020 14:54) (125/60 - 151/73)  BP(mean): --  RR: 17 (06 Dec 2020 14:54) (16 - 18)  SpO2: 92% (06 Dec 2020 14:55) (92% - 100%)  CONSTITUTIONAL: NAD, well-developed, well-groomed  ENMT: Moist oral mucosa, no pharyngeal injection or exudates; normal dentition  RESPIRATORY: decreased bs b/l  CARDIOVASCULAR: Regular rate and rhythm, normal S1 and S2, no murmur/rub/gallop; b/l +1 extremity edema; Peripheral pulses are 2+ bilaterally  ABDOMEN: Nontender to palpation, normoactive bowel sounds, no rebound/guarding; No hepatosplenomegaly  PSYCH: A+O to person, place, and time; affect appropriate  NEUROLOGY: CN 2-12 are intact and symmetric; no gross sensory deficits   SKIN: No rashes; no palpable lesions    LABS:                        13.8   10.78 )-----------( 278      ( 06 Dec 2020 12:00 )             46.5     12-06    133<L>  |  91<L>  |  46<H>  ----------------------------<  122<H>  4.7   |  33<H>  |  0.96    Ca    9.5      06 Dec 2020 11:59  Phos  3.8     12-06  Mg     2.2     12-06    TPro  7.5  /  Alb  3.5  /  TBili  0.5  /  DBili  <0.2  /  AST  30  /  ALT  29  /  AlkPhos  53  12-06              SARS-CoV-2: Detected (29 Nov 2020 13:42)      RADIOLOGY & ADDITIONAL TESTS:  Imaging from Last 24 Hours:    Electrocardiogram/QTc Interval:    COORDINATION OF CARE:  Care Discussed with Consultants/Other Providers:

## 2020-12-07 LAB
ALBUMIN SERPL ELPH-MCNC: 3.1 G/DL — LOW (ref 3.3–5)
ALBUMIN SERPL ELPH-MCNC: 3.2 G/DL — LOW (ref 3.3–5)
ALP SERPL-CCNC: 49 U/L — SIGNIFICANT CHANGE UP (ref 40–120)
ALP SERPL-CCNC: 49 U/L — SIGNIFICANT CHANGE UP (ref 40–120)
ALT FLD-CCNC: 24 U/L — SIGNIFICANT CHANGE UP (ref 4–33)
ALT FLD-CCNC: 25 U/L — SIGNIFICANT CHANGE UP (ref 4–33)
ANION GAP SERPL CALC-SCNC: 9 MMOL/L — SIGNIFICANT CHANGE UP (ref 7–14)
AST SERPL-CCNC: 24 U/L — SIGNIFICANT CHANGE UP (ref 4–32)
AST SERPL-CCNC: 24 U/L — SIGNIFICANT CHANGE UP (ref 4–32)
BILIRUB DIRECT SERPL-MCNC: <0.2 MG/DL — SIGNIFICANT CHANGE UP (ref 0–0.2)
BILIRUB INDIRECT FLD-MCNC: >0.3 MG/DL — SIGNIFICANT CHANGE UP (ref 0–1)
BILIRUB SERPL-MCNC: 0.5 MG/DL — SIGNIFICANT CHANGE UP (ref 0.2–1.2)
BILIRUB SERPL-MCNC: 0.5 MG/DL — SIGNIFICANT CHANGE UP (ref 0.2–1.2)
BUN SERPL-MCNC: 46 MG/DL — HIGH (ref 7–23)
CALCIUM SERPL-MCNC: 9.6 MG/DL — SIGNIFICANT CHANGE UP (ref 8.4–10.5)
CHLORIDE SERPL-SCNC: 96 MMOL/L — LOW (ref 98–107)
CO2 SERPL-SCNC: 32 MMOL/L — HIGH (ref 22–31)
CREAT SERPL-MCNC: 0.96 MG/DL — SIGNIFICANT CHANGE UP (ref 0.5–1.3)
CREAT SERPL-MCNC: 0.97 MG/DL — SIGNIFICANT CHANGE UP (ref 0.5–1.3)
GLUCOSE BLDC GLUCOMTR-MCNC: 122 MG/DL — HIGH (ref 70–99)
GLUCOSE BLDC GLUCOMTR-MCNC: 159 MG/DL — HIGH (ref 70–99)
GLUCOSE BLDC GLUCOMTR-MCNC: 181 MG/DL — HIGH (ref 70–99)
GLUCOSE BLDC GLUCOMTR-MCNC: 199 MG/DL — HIGH (ref 70–99)
GLUCOSE SERPL-MCNC: 110 MG/DL — HIGH (ref 70–99)
HCT VFR BLD CALC: 42.8 % — SIGNIFICANT CHANGE UP (ref 34.5–45)
HGB BLD-MCNC: 12.7 G/DL — SIGNIFICANT CHANGE UP (ref 11.5–15.5)
MAGNESIUM SERPL-MCNC: 2.2 MG/DL — SIGNIFICANT CHANGE UP (ref 1.6–2.6)
MCHC RBC-ENTMCNC: 25 PG — LOW (ref 27–34)
MCHC RBC-ENTMCNC: 29.7 GM/DL — LOW (ref 32–36)
MCV RBC AUTO: 84.1 FL — SIGNIFICANT CHANGE UP (ref 80–100)
NRBC # BLD: 0 /100 WBCS — SIGNIFICANT CHANGE UP
NRBC # FLD: 0 K/UL — SIGNIFICANT CHANGE UP
PHOSPHATE SERPL-MCNC: 4.3 MG/DL — SIGNIFICANT CHANGE UP (ref 2.5–4.5)
PLATELET # BLD AUTO: 286 K/UL — SIGNIFICANT CHANGE UP (ref 150–400)
POTASSIUM SERPL-MCNC: 4.7 MMOL/L — SIGNIFICANT CHANGE UP (ref 3.5–5.3)
POTASSIUM SERPL-SCNC: 4.7 MMOL/L — SIGNIFICANT CHANGE UP (ref 3.5–5.3)
PROT SERPL-MCNC: 7.2 G/DL — SIGNIFICANT CHANGE UP (ref 6–8.3)
PROT SERPL-MCNC: 7.2 G/DL — SIGNIFICANT CHANGE UP (ref 6–8.3)
RBC # BLD: 5.09 M/UL — SIGNIFICANT CHANGE UP (ref 3.8–5.2)
RBC # FLD: 16 % — HIGH (ref 10.3–14.5)
SODIUM SERPL-SCNC: 137 MMOL/L — SIGNIFICANT CHANGE UP (ref 135–145)
WBC # BLD: 14.19 K/UL — HIGH (ref 3.8–10.5)
WBC # FLD AUTO: 14.19 K/UL — HIGH (ref 3.8–10.5)

## 2020-12-07 PROCEDURE — 99233 SBSQ HOSP IP/OBS HIGH 50: CPT | Mod: CS,GC

## 2020-12-07 PROCEDURE — 99233 SBSQ HOSP IP/OBS HIGH 50: CPT | Mod: CS

## 2020-12-07 RX ORDER — SENNA PLUS 8.6 MG/1
2 TABLET ORAL AT BEDTIME
Refills: 0 | Status: DISCONTINUED | OUTPATIENT
Start: 2020-12-07 | End: 2020-12-09

## 2020-12-07 RX ORDER — POLYETHYLENE GLYCOL 3350 17 G/17G
17 POWDER, FOR SOLUTION ORAL
Refills: 0 | Status: DISCONTINUED | OUTPATIENT
Start: 2020-12-07 | End: 2020-12-09

## 2020-12-07 RX ADMIN — Medication 0.5 TABLET(S): at 07:51

## 2020-12-07 RX ADMIN — HEPARIN SODIUM 5000 UNIT(S): 5000 INJECTION INTRAVENOUS; SUBCUTANEOUS at 07:50

## 2020-12-07 RX ADMIN — HEPARIN SODIUM 5000 UNIT(S): 5000 INJECTION INTRAVENOUS; SUBCUTANEOUS at 22:40

## 2020-12-07 RX ADMIN — MONTELUKAST 10 MILLIGRAM(S): 4 TABLET, CHEWABLE ORAL at 12:55

## 2020-12-07 RX ADMIN — Medication 2: at 13:01

## 2020-12-07 RX ADMIN — Medication 40 MILLIGRAM(S): at 07:51

## 2020-12-07 RX ADMIN — INSULIN GLARGINE 14 UNIT(S): 100 INJECTION, SOLUTION SUBCUTANEOUS at 22:35

## 2020-12-07 RX ADMIN — POLYETHYLENE GLYCOL 3350 17 GRAM(S): 17 POWDER, FOR SOLUTION ORAL at 12:55

## 2020-12-07 RX ADMIN — Medication 6 UNIT(S): at 17:02

## 2020-12-07 RX ADMIN — GABAPENTIN 100 MILLIGRAM(S): 400 CAPSULE ORAL at 07:51

## 2020-12-07 RX ADMIN — Medication 6 UNIT(S): at 12:54

## 2020-12-07 RX ADMIN — HEPARIN SODIUM 5000 UNIT(S): 5000 INJECTION INTRAVENOUS; SUBCUTANEOUS at 12:59

## 2020-12-07 RX ADMIN — POLYETHYLENE GLYCOL 3350 17 GRAM(S): 17 POWDER, FOR SOLUTION ORAL at 17:44

## 2020-12-07 RX ADMIN — Medication 100 MILLIGRAM(S): at 07:51

## 2020-12-07 RX ADMIN — GABAPENTIN 100 MILLIGRAM(S): 400 CAPSULE ORAL at 22:40

## 2020-12-07 RX ADMIN — Medication 6 MILLIGRAM(S): at 07:50

## 2020-12-07 RX ADMIN — GABAPENTIN 100 MILLIGRAM(S): 400 CAPSULE ORAL at 12:59

## 2020-12-07 RX ADMIN — Medication 100 MILLIGRAM(S): at 22:40

## 2020-12-07 RX ADMIN — Medication 0.5 TABLET(S): at 17:03

## 2020-12-07 RX ADMIN — Medication 2: at 17:02

## 2020-12-07 RX ADMIN — Medication 6 UNIT(S): at 08:00

## 2020-12-07 RX ADMIN — ATORVASTATIN CALCIUM 40 MILLIGRAM(S): 80 TABLET, FILM COATED ORAL at 22:40

## 2020-12-07 RX ADMIN — AMLODIPINE BESYLATE 10 MILLIGRAM(S): 2.5 TABLET ORAL at 07:51

## 2020-12-07 RX ADMIN — Medication 2000 UNIT(S): at 12:55

## 2020-12-07 RX ADMIN — Medication 500 MILLIGRAM(S): at 12:55

## 2020-12-07 RX ADMIN — Medication 100 MILLIGRAM(S): at 12:59

## 2020-12-07 NOTE — PROGRESS NOTE ADULT - PROBLEM SELECTOR PLAN 2
supportive care  continue O2  completed remdesivir  cont decadron supportive care  continue O2  completed remdesivir  cont decadron till 12/9

## 2020-12-07 NOTE — PROGRESS NOTE ADULT - SUBJECTIVE AND OBJECTIVE BOX
[facility] Division of Hospital Medicine  Giuseppe Kessler MD  In House Pager [phone]    Patient is a 85y old  Female who presents with a chief complaint of SOB (07 Dec 2020 09:04)      SUBJECTIVE / OVERNIGHT EVENTS:  No overnight events. Patient seen and examined at bedside, labs and vitals reviewed.  patient reports feeling much better now. her energy has improved. does not feel SOB on NC. She has oxygen and CPAP at home. she wishes to return home ASAP.   No fever, no chills, no SOB, no CP, no n/v/d, no abd pain, no dysuria      MEDICATIONS  (STANDING):  amLODIPine   Tablet 10 milliGRAM(s) Oral daily  ascorbic acid 500 milliGRAM(s) Oral daily  atorvastatin 40 milliGRAM(s) Oral at bedtime  calcium carbonate    500 mG (Tums) Chewable 0.5 Tablet(s) Chew two times a day  cholecalciferol 2000 Unit(s) Oral daily  dexAMETHasone  Injectable 6 milliGRAM(s) IV Push daily  dextrose 40% Gel 15 Gram(s) Oral once  dextrose 5%. 1000 milliLiter(s) (50 mL/Hr) IV Continuous <Continuous>  dextrose 5%. 1000 milliLiter(s) (100 mL/Hr) IV Continuous <Continuous>  dextrose 50% Injectable 25 Gram(s) IV Push once  dextrose 50% Injectable 12.5 Gram(s) IV Push once  dextrose 50% Injectable 25 Gram(s) IV Push once  furosemide    Tablet 40 milliGRAM(s) Oral daily  gabapentin 100 milliGRAM(s) Oral three times a day  glucagon  Injectable 1 milliGRAM(s) IntraMuscular once  heparin   Injectable 5000 Unit(s) SubCutaneous every 8 hours  hydrALAZINE 100 milliGRAM(s) Oral three times a day  insulin glargine Injectable (LANTUS) 14 Unit(s) SubCutaneous at bedtime  insulin lispro (ADMELOG) corrective regimen sliding scale   SubCutaneous three times a day before meals  insulin lispro Injectable (ADMELOG) 6 Unit(s) SubCutaneous three times a day before meals  metoprolol succinate  milliGRAM(s) Oral daily  montelukast 10 milliGRAM(s) Oral daily  polyethylene glycol 3350 17 Gram(s) Oral daily    MEDICATIONS  (PRN):  acetaminophen   Tablet .. 650 milliGRAM(s) Oral every 6 hours PRN Mild Pain (1 - 3), Moderate Pain (4 - 6)  ALBUTerol    90 MICROgram(s) HFA Inhaler 2 Puff(s) Inhalation every 6 hours PRN Shortness of Breath and/or Wheezing  artificial tears (preservative free) Ophthalmic Solution 1 Drop(s) Both EYES three times a day PRN Dry Eyes    CAPILLARY BLOOD GLUCOSE      POCT Blood Glucose.: 181 mg/dL (07 Dec 2020 13:01)  POCT Blood Glucose.: 122 mg/dL (07 Dec 2020 07:49)  POCT Blood Glucose.: 131 mg/dL (06 Dec 2020 21:33)  POCT Blood Glucose.: 157 mg/dL (06 Dec 2020 16:52)    I&O's Summary    06 Dec 2020 07:01  -  07 Dec 2020 07:00  --------------------------------------------------------  IN: 590 mL / OUT: 1000 mL / NET: -410 mL        PHYSICAL EXAM:  Vital Signs Last 24 Hrs  T(C): 36.7 (07 Dec 2020 10:37), Max: 37.2 (06 Dec 2020 14:54)  T(F): 98 (07 Dec 2020 10:37), Max: 99 (06 Dec 2020 14:54)  HR: 79 (07 Dec 2020 10:37) (70 - 80)  BP: 149/70 (07 Dec 2020 10:37) (138/62 - 150/67)  BP(mean): --  RR: 20 (07 Dec 2020 11:00) (17 - 20)  SpO2: 94% (07 Dec 2020 11:00) (92% - 99%)  Gen: NAD; resting in bed.  Pulm: no respiratory distress; CTA b/l; no wheezing  Cards: RRR, nl S1/S2; no obvious murmurs  Abd: soft; NT on exam  Ext: no cyanosis; no edema  Skin: no rash; no cyanosis    LABS:                        12.7   14.19 )-----------( 286      ( 07 Dec 2020 09:48 )             42.8     12-07    137  |  96<L>  |  46<H>  ----------------------------<  110<H>  4.7   |  32<H>  |  0.96    Ca    9.6      07 Dec 2020 09:47  Phos  4.3     12-07  Mg     2.2     12-07    TPro  7.2  /  Alb  3.1<L>  /  TBili  0.5  /  DBili  x   /  AST  24  /  ALT  25  /  AlkPhos  49  12-07                RADIOLOGY & ADDITIONAL TESTS:  Results Reviewed: Y  Imaging Personally Reviewed: Y  Electrocardiogram Personally Reviewed: Y    COORDINATION OF CARE:  Care Discussed with Consultants/Other Providers [Y/N]: Y  Prior or Outpatient Records Reviewed [Y/N]: Y   [facility] Division of Hospital Medicine  Giuseppe Kessler MD  In House Pager [phone]    Patient is a 85y old  Female who presents with a chief complaint of SOB (07 Dec 2020 09:04)      SUBJECTIVE / OVERNIGHT EVENTS:  No overnight events. Patient seen and examined at bedside, labs and vitals reviewed.  patient reports feeling much better now. her energy has improved. does not feel SOB on NC. She has oxygen and CPAP at home. she wishes to return home ASAP.   No fever, no chills, no SOB, no CP, no n/v/d, no abd pain, no dysuria      MEDICATIONS  (STANDING):  amLODIPine   Tablet 10 milliGRAM(s) Oral daily  ascorbic acid 500 milliGRAM(s) Oral daily  atorvastatin 40 milliGRAM(s) Oral at bedtime  calcium carbonate    500 mG (Tums) Chewable 0.5 Tablet(s) Chew two times a day  cholecalciferol 2000 Unit(s) Oral daily  dexAMETHasone  Injectable 6 milliGRAM(s) IV Push daily  dextrose 40% Gel 15 Gram(s) Oral once  dextrose 5%. 1000 milliLiter(s) (50 mL/Hr) IV Continuous <Continuous>  dextrose 5%. 1000 milliLiter(s) (100 mL/Hr) IV Continuous <Continuous>  dextrose 50% Injectable 25 Gram(s) IV Push once  dextrose 50% Injectable 12.5 Gram(s) IV Push once  dextrose 50% Injectable 25 Gram(s) IV Push once  furosemide    Tablet 40 milliGRAM(s) Oral daily  gabapentin 100 milliGRAM(s) Oral three times a day  glucagon  Injectable 1 milliGRAM(s) IntraMuscular once  heparin   Injectable 5000 Unit(s) SubCutaneous every 8 hours  hydrALAZINE 100 milliGRAM(s) Oral three times a day  insulin glargine Injectable (LANTUS) 14 Unit(s) SubCutaneous at bedtime  insulin lispro (ADMELOG) corrective regimen sliding scale   SubCutaneous three times a day before meals  insulin lispro Injectable (ADMELOG) 6 Unit(s) SubCutaneous three times a day before meals  metoprolol succinate  milliGRAM(s) Oral daily  montelukast 10 milliGRAM(s) Oral daily  polyethylene glycol 3350 17 Gram(s) Oral daily    MEDICATIONS  (PRN):  acetaminophen   Tablet .. 650 milliGRAM(s) Oral every 6 hours PRN Mild Pain (1 - 3), Moderate Pain (4 - 6)  ALBUTerol    90 MICROgram(s) HFA Inhaler 2 Puff(s) Inhalation every 6 hours PRN Shortness of Breath and/or Wheezing  artificial tears (preservative free) Ophthalmic Solution 1 Drop(s) Both EYES three times a day PRN Dry Eyes    CAPILLARY BLOOD GLUCOSE      POCT Blood Glucose.: 181 mg/dL (07 Dec 2020 13:01)  POCT Blood Glucose.: 122 mg/dL (07 Dec 2020 07:49)  POCT Blood Glucose.: 131 mg/dL (06 Dec 2020 21:33)  POCT Blood Glucose.: 157 mg/dL (06 Dec 2020 16:52)    I&O's Summary    06 Dec 2020 07:01  -  07 Dec 2020 07:00  --------------------------------------------------------  IN: 590 mL / OUT: 1000 mL / NET: -410 mL        PHYSICAL EXAM:  Vital Signs Last 24 Hrs  T(C): 36.7 (07 Dec 2020 10:37), Max: 37.2 (06 Dec 2020 14:54)  T(F): 98 (07 Dec 2020 10:37), Max: 99 (06 Dec 2020 14:54)  HR: 79 (07 Dec 2020 10:37) (70 - 80)  BP: 149/70 (07 Dec 2020 10:37) (138/62 - 150/67)  BP(mean): --  RR: 20 (07 Dec 2020 11:00) (17 - 20)  SpO2: 94% (07 Dec 2020 11:00) (92% - 99%)  Gen: NAD; resting in bed.  Pulm: no respiratory distress; CTA b/l; no wheezing  Cards: RRR, nl S1/S2; no obvious murmurs, b/l +1 extremity edema; Peripheral pulses are 2+ bilaterally  Abd: soft; NT on exam  Ext: no cyanosis; no edema  Skin: no rash; no cyanosis    LABS:                        12.7   14.19 )-----------( 286      ( 07 Dec 2020 09:48 )             42.8     12-07    137  |  96<L>  |  46<H>  ----------------------------<  110<H>  4.7   |  32<H>  |  0.96    Ca    9.6      07 Dec 2020 09:47  Phos  4.3     12-07  Mg     2.2     12-07    TPro  7.2  /  Alb  3.1<L>  /  TBili  0.5  /  DBili  x   /  AST  24  /  ALT  25  /  AlkPhos  49  12-07                RADIOLOGY & ADDITIONAL TESTS:  Results Reviewed: Y  Imaging Personally Reviewed: Y  Electrocardiogram Personally Reviewed: Y    COORDINATION OF CARE:  Care Discussed with Consultants/Other Providers [Y/N]: Y  Prior or Outpatient Records Reviewed [Y/N]: Y

## 2020-12-07 NOTE — PROGRESS NOTE ADULT - PROBLEM SELECTOR PLAN 3
goal to return to home O2 2L  no evidence of COPD exac  albuterol PRN  now on 3L sat 94-95%  collateral from family that pt on nocturnal bipap  - f/u pulm recs. goal to return to home O2 2L  no evidence of COPD exac  albuterol PRN  now on 2L sat 94-95%  collateral from family that pt on nocturnal bipap  - f/u pulm recs.

## 2020-12-07 NOTE — PROGRESS NOTE ADULT - SUBJECTIVE AND OBJECTIVE BOX
CHIEF COMPLAINT:    Interval Events:    REVIEW OF SYSTEMS:  Constitutional: [ ] negative [ ] fevers [ ] chills [ ] weight loss [ ] weight gain  HEENT: [ ] negative [ ] dry eyes [ ] eye irritation [ ] postnasal drip [ ] nasal congestion  CV: [ ] negative  [ ] chest pain [ ] orthopnea [ ] palpitations [ ] murmur  Resp: [ ] negative [ ] cough [ ] shortness of breath [ ] dyspnea [ ] wheezing [ ] sputum [ ] hemoptysis  GI: [ ] negative [ ] nausea [ ] vomiting [ ] diarrhea [ ] constipation [ ] abd pain [ ] dysphagia   : [ ] negative [ ] dysuria [ ] nocturia [ ] hematuria [ ] increased urinary frequency  Musculoskeletal: [ ] negative [ ] back pain [ ] myalgias [ ] arthralgias [ ] fracture  Skin: [ ] negative [ ] rash [ ] itch  Neurological: [ ] negative [ ] headache [ ] dizziness [ ] syncope [ ] weakness [ ] numbness  Psychiatric: [ ] negative [ ] anxiety [ ] depression  Endocrine: [ ] negative [ ] diabetes [ ] thyroid problem  Hematologic/Lymphatic: [ ] negative [ ] anemia [ ] bleeding problem  Allergic/Immunologic: [ ] negative [ ] itchy eyes [ ] nasal discharge [ ] hives [ ] angioedema  [ ] All other systems negative  [ ] Unable to assess ROS because ________    OBJECTIVE:  ICU Vital Signs Last 24 Hrs  T(C): 36.7 (07 Dec 2020 01:50), Max: 37.6 (06 Dec 2020 09:37)  T(F): 98.1 (07 Dec 2020 01:50), Max: 99.7 (06 Dec 2020 09:37)  HR: 75 (07 Dec 2020 02:45) (70 - 80)  BP: 141/78 (07 Dec 2020 01:50) (125/60 - 150/67)  BP(mean): --  ABP: --  ABP(mean): --  RR: 19 (07 Dec 2020 07:19) (16 - 19)  SpO2: 99% (07 Dec 2020 07:19) (92% - 99%)        12-06 @ 07:01  -  12-07 @ 07:00  --------------------------------------------------------  IN: 590 mL / OUT: 1000 mL / NET: -410 mL      CAPILLARY BLOOD GLUCOSE      POCT Blood Glucose.: 122 mg/dL (07 Dec 2020 07:49)      PHYSICAL EXAM:  General:   HEENT:   Lymph Nodes:  Neck:   Respiratory:   Cardiovascular:   Abdomen:   Extremities:   Skin:   Neurological:  Psychiatry:    HOSPITAL MEDICATIONS:  MEDICATIONS  (STANDING):  amLODIPine   Tablet 10 milliGRAM(s) Oral daily  ascorbic acid 500 milliGRAM(s) Oral daily  atorvastatin 40 milliGRAM(s) Oral at bedtime  calcium carbonate    500 mG (Tums) Chewable 0.5 Tablet(s) Chew two times a day  cholecalciferol 2000 Unit(s) Oral daily  dexAMETHasone  Injectable 6 milliGRAM(s) IV Push daily  dextrose 40% Gel 15 Gram(s) Oral once  dextrose 5%. 1000 milliLiter(s) (50 mL/Hr) IV Continuous <Continuous>  dextrose 5%. 1000 milliLiter(s) (100 mL/Hr) IV Continuous <Continuous>  dextrose 50% Injectable 25 Gram(s) IV Push once  dextrose 50% Injectable 12.5 Gram(s) IV Push once  dextrose 50% Injectable 25 Gram(s) IV Push once  furosemide    Tablet 40 milliGRAM(s) Oral daily  gabapentin 100 milliGRAM(s) Oral three times a day  glucagon  Injectable 1 milliGRAM(s) IntraMuscular once  heparin   Injectable 5000 Unit(s) SubCutaneous every 8 hours  hydrALAZINE 100 milliGRAM(s) Oral three times a day  insulin glargine Injectable (LANTUS) 14 Unit(s) SubCutaneous at bedtime  insulin lispro (ADMELOG) corrective regimen sliding scale   SubCutaneous three times a day before meals  insulin lispro Injectable (ADMELOG) 6 Unit(s) SubCutaneous three times a day before meals  metoprolol succinate  milliGRAM(s) Oral daily  montelukast 10 milliGRAM(s) Oral daily  polyethylene glycol 3350 17 Gram(s) Oral daily    MEDICATIONS  (PRN):  acetaminophen   Tablet .. 650 milliGRAM(s) Oral every 6 hours PRN Mild Pain (1 - 3), Moderate Pain (4 - 6)  ALBUTerol    90 MICROgram(s) HFA Inhaler 2 Puff(s) Inhalation every 6 hours PRN Shortness of Breath and/or Wheezing  artificial tears (preservative free) Ophthalmic Solution 1 Drop(s) Both EYES three times a day PRN Dry Eyes      LABS:                        13.8   10.78 )-----------( 278      ( 06 Dec 2020 12:00 )             46.5     Hgb Trend: 13.8<--, 12.6<--, 12.3<--, 12.7<--, 12.3<--  12-06    133<L>  |  91<L>  |  46<H>  ----------------------------<  122<H>  4.7   |  33<H>  |  0.96    Ca    9.5      06 Dec 2020 11:59  Phos  3.8     12-06  Mg     2.2     12-06    TPro  7.5  /  Alb  3.5  /  TBili  0.5  /  DBili  <0.2  /  AST  30  /  ALT  29  /  AlkPhos  53  12-06    Creatinine Trend: 0.96<--, 0.99<--, 0.99<--, 0.96<--, 0.93<--, 1.05<--            MICROBIOLOGY:       RADIOLOGY:  [ ] Reviewed and interpreted by me    PULMONARY FUNCTION TESTS:    EKG: CHIEF COMPLAINT: Shortness of breath    Interval Events: No events overnight, tolerating 2L NC.    REVIEW OF SYSTEMS:  Constitutional: [ ] negative [ ] fevers [ ] chills [ ] weight loss [ ] weight gain  HEENT: [ ] negative [ ] dry eyes [ ] eye irritation [ ] postnasal drip [ ] nasal congestion  CV: [ ] negative  [ ] chest pain [ ] orthopnea [ ] palpitations [ ] murmur  Resp: [ ] negative [ ] cough [ ] shortness of breath [ ] dyspnea [ ] wheezing [ ] sputum [ ] hemoptysis  GI: [ ] negative [ ] nausea [ ] vomiting [ ] diarrhea [ ] constipation [ ] abd pain [ ] dysphagia   : [ ] negative [ ] dysuria [ ] nocturia [ ] hematuria [ ] increased urinary frequency  Musculoskeletal: [ ] negative [ ] back pain [ ] myalgias [ ] arthralgias [ ] fracture  Skin: [ ] negative [ ] rash [ ] itch  Neurological: [ ] negative [ ] headache [ ] dizziness [ ] syncope [ ] weakness [ ] numbness  Psychiatric: [ ] negative [ ] anxiety [ ] depression  Endocrine: [ ] negative [ ] diabetes [ ] thyroid problem  Hematologic/Lymphatic: [ ] negative [ ] anemia [ ] bleeding problem  Allergic/Immunologic: [ ] negative [ ] itchy eyes [ ] nasal discharge [ ] hives [ ] angioedema  [x ] All other systems negative  [ ] Unable to assess ROS because ________    OBJECTIVE:  ICU Vital Signs Last 24 Hrs  T(C): 36.7 (07 Dec 2020 01:50), Max: 37.6 (06 Dec 2020 09:37)  T(F): 98.1 (07 Dec 2020 01:50), Max: 99.7 (06 Dec 2020 09:37)  HR: 75 (07 Dec 2020 02:45) (70 - 80)  BP: 141/78 (07 Dec 2020 01:50) (125/60 - 150/67)  BP(mean): --  ABP: --  ABP(mean): --  RR: 19 (07 Dec 2020 07:19) (16 - 19)  SpO2: 99% (07 Dec 2020 07:19) (92% - 99%)        12-06 @ 07:01  -  12-07 @ 07:00  --------------------------------------------------------  IN: 590 mL / OUT: 1000 mL / NET: -410 mL      CAPILLARY BLOOD GLUCOSE      POCT Blood Glucose.: 122 mg/dL (07 Dec 2020 07:49)      PHYSICAL EXAM:  General: NAD, morbidly obese, appears comfortable on 2L  Skin: Warm and dry, no rashes  Neuro: AAOx3, nonfocal  HEENT: PERRL, EOMI, no oral lesions  Neck: Full range of motion, no JVD  Lungs: Clear to ascultation bilaterally no wheezes, rales, or rhonchi   Heart: Regular rate and rhythm, no murmurs  Abdomen: Normoactive bowl sounds, soft, nontender, nondistended  Extremities: No lower extremity tenderness, erythema, or edema   Psych: normal mood and affect      HOSPITAL MEDICATIONS:  MEDICATIONS  (STANDING):  amLODIPine   Tablet 10 milliGRAM(s) Oral daily  ascorbic acid 500 milliGRAM(s) Oral daily  atorvastatin 40 milliGRAM(s) Oral at bedtime  calcium carbonate    500 mG (Tums) Chewable 0.5 Tablet(s) Chew two times a day  cholecalciferol 2000 Unit(s) Oral daily  dexAMETHasone  Injectable 6 milliGRAM(s) IV Push daily  dextrose 40% Gel 15 Gram(s) Oral once  dextrose 5%. 1000 milliLiter(s) (50 mL/Hr) IV Continuous <Continuous>  dextrose 5%. 1000 milliLiter(s) (100 mL/Hr) IV Continuous <Continuous>  dextrose 50% Injectable 25 Gram(s) IV Push once  dextrose 50% Injectable 12.5 Gram(s) IV Push once  dextrose 50% Injectable 25 Gram(s) IV Push once  furosemide    Tablet 40 milliGRAM(s) Oral daily  gabapentin 100 milliGRAM(s) Oral three times a day  glucagon  Injectable 1 milliGRAM(s) IntraMuscular once  heparin   Injectable 5000 Unit(s) SubCutaneous every 8 hours  hydrALAZINE 100 milliGRAM(s) Oral three times a day  insulin glargine Injectable (LANTUS) 14 Unit(s) SubCutaneous at bedtime  insulin lispro (ADMELOG) corrective regimen sliding scale   SubCutaneous three times a day before meals  insulin lispro Injectable (ADMELOG) 6 Unit(s) SubCutaneous three times a day before meals  metoprolol succinate  milliGRAM(s) Oral daily  montelukast 10 milliGRAM(s) Oral daily  polyethylene glycol 3350 17 Gram(s) Oral daily    MEDICATIONS  (PRN):  acetaminophen   Tablet .. 650 milliGRAM(s) Oral every 6 hours PRN Mild Pain (1 - 3), Moderate Pain (4 - 6)  ALBUTerol    90 MICROgram(s) HFA Inhaler 2 Puff(s) Inhalation every 6 hours PRN Shortness of Breath and/or Wheezing  artificial tears (preservative free) Ophthalmic Solution 1 Drop(s) Both EYES three times a day PRN Dry Eyes      LABS:                        13.8   10.78 )-----------( 278      ( 06 Dec 2020 12:00 )             46.5     Hgb Trend: 13.8<--, 12.6<--, 12.3<--, 12.7<--, 12.3<--  12-06    133<L>  |  91<L>  |  46<H>  ----------------------------<  122<H>  4.7   |  33<H>  |  0.96    Ca    9.5      06 Dec 2020 11:59  Phos  3.8     12-06  Mg     2.2     12-06    TPro  7.5  /  Alb  3.5  /  TBili  0.5  /  DBili  <0.2  /  AST  30  /  ALT  29  /  AlkPhos  53  12-06    Creatinine Trend: 0.96<--, 0.99<--, 0.99<--, 0.96<--, 0.93<--, 1.05<--            MICROBIOLOGY:       RADIOLOGY:  [ ] Reviewed and interpreted by me    PULMONARY FUNCTION TESTS:    EKG:

## 2020-12-07 NOTE — CHART NOTE - NSCHARTNOTEFT_GEN_A_CORE
RT supervisor Letitia [id number] called to arrange for overnight pulse oxymetry to qualify patient for BiPAP.    Andrea Zhou PA-C  Medicine ACP, pgr [phone]

## 2020-12-07 NOTE — PROGRESS NOTE ADULT - ASSESSMENT
Pt is an 84 yo female with ho COPD on home O2 2L, morbidly obese, DM a/w acute on chronic hypoxic resp failure due to COVID. clinically improving, back to baseline O2 requirement.

## 2020-12-07 NOTE — PROGRESS NOTE ADULT - ASSESSMENT
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     Problems:  Acute on chronic hypoxemic and hypercapnic respiratory failure  Obesity Hypoventilation Syndrome  Restrictive Lung Disease  COVID-19 infection    Recommendations  - Overnight oximetry study on nasal canula for 4-6 hours tonight to help qualify for BIPAP  - Check ABG following oximetry study after 4-6 hours off BIPAP, this would most likely demonstrate hypercapnia to help qualify for BIPAP  - After overnight oximetry study for 4-6 hours and ABG, resume BIPAP 18/10 40% qhs  - Dexamethasone 6 mg IV x10 days  - Remdesivir       Teddy Childress MD  Fellow, Pulmonary and Critical Care Medicine  [facility]  Pager: [phone], [phone] ([facility])  Email: [email or web address]

## 2020-12-07 NOTE — PROGRESS NOTE ADULT - PROBLEM SELECTOR PLAN 1
due to COVID  1:2 + bcx, repeat, likely contam; pt non toxic  repeat negative  CM and pulm following for coordination of o/p bipap  currently on 2L NC at rest.

## 2020-12-08 ENCOUNTER — TRANSCRIPTION ENCOUNTER (OUTPATIENT)
Age: 85
End: 2020-12-08

## 2020-12-08 LAB
ANION GAP SERPL CALC-SCNC: 8 MMOL/L — SIGNIFICANT CHANGE UP (ref 7–14)
BLOOD GAS ARTERIAL COMPREHENSIVE RESULT: SIGNIFICANT CHANGE UP
BUN SERPL-MCNC: 45 MG/DL — HIGH (ref 7–23)
CALCIUM SERPL-MCNC: 9.5 MG/DL — SIGNIFICANT CHANGE UP (ref 8.4–10.5)
CHLORIDE SERPL-SCNC: 95 MMOL/L — LOW (ref 98–107)
CO2 SERPL-SCNC: 33 MMOL/L — HIGH (ref 22–31)
CREAT SERPL-MCNC: 0.98 MG/DL — SIGNIFICANT CHANGE UP (ref 0.5–1.3)
CRP SERPL-MCNC: 13.4 MG/L — HIGH
D DIMER BLD IA.RAPID-MCNC: 242 NG/ML DDU — HIGH
FERRITIN SERPL-MCNC: 477 NG/ML — HIGH (ref 15–150)
GLUCOSE BLDC GLUCOMTR-MCNC: 127 MG/DL — HIGH (ref 70–99)
GLUCOSE BLDC GLUCOMTR-MCNC: 175 MG/DL — HIGH (ref 70–99)
GLUCOSE BLDC GLUCOMTR-MCNC: 178 MG/DL — HIGH (ref 70–99)
GLUCOSE BLDC GLUCOMTR-MCNC: 256 MG/DL — HIGH (ref 70–99)
GLUCOSE SERPL-MCNC: 114 MG/DL — HIGH (ref 70–99)
HCT VFR BLD CALC: 42.9 % — SIGNIFICANT CHANGE UP (ref 34.5–45)
HGB BLD-MCNC: 12.9 G/DL — SIGNIFICANT CHANGE UP (ref 11.5–15.5)
LDH SERPL L TO P-CCNC: 238 U/L — HIGH (ref 135–225)
MAGNESIUM SERPL-MCNC: 2.3 MG/DL — SIGNIFICANT CHANGE UP (ref 1.6–2.6)
MCHC RBC-ENTMCNC: 25.3 PG — LOW (ref 27–34)
MCHC RBC-ENTMCNC: 30.1 GM/DL — LOW (ref 32–36)
MCV RBC AUTO: 84.3 FL — SIGNIFICANT CHANGE UP (ref 80–100)
NRBC # BLD: 0 /100 WBCS — SIGNIFICANT CHANGE UP
NRBC # FLD: 0 K/UL — SIGNIFICANT CHANGE UP
PHOSPHATE SERPL-MCNC: 4.3 MG/DL — SIGNIFICANT CHANGE UP (ref 2.5–4.5)
PLATELET # BLD AUTO: 266 K/UL — SIGNIFICANT CHANGE UP (ref 150–400)
POTASSIUM SERPL-MCNC: 4.5 MMOL/L — SIGNIFICANT CHANGE UP (ref 3.5–5.3)
POTASSIUM SERPL-SCNC: 4.5 MMOL/L — SIGNIFICANT CHANGE UP (ref 3.5–5.3)
PROCALCITONIN SERPL-MCNC: 0.03 NG/ML — SIGNIFICANT CHANGE UP (ref 0.02–0.1)
RBC # BLD: 5.09 M/UL — SIGNIFICANT CHANGE UP (ref 3.8–5.2)
RBC # FLD: 15.9 % — HIGH (ref 10.3–14.5)
SODIUM SERPL-SCNC: 136 MMOL/L — SIGNIFICANT CHANGE UP (ref 135–145)
WBC # BLD: 13 K/UL — HIGH (ref 3.8–10.5)
WBC # FLD AUTO: 13 K/UL — HIGH (ref 3.8–10.5)

## 2020-12-08 PROCEDURE — 99233 SBSQ HOSP IP/OBS HIGH 50: CPT | Mod: CS

## 2020-12-08 PROCEDURE — 99232 SBSQ HOSP IP/OBS MODERATE 35: CPT | Mod: CS

## 2020-12-08 PROCEDURE — 36600 WITHDRAWAL OF ARTERIAL BLOOD: CPT

## 2020-12-08 RX ORDER — RIVAROXABAN 15 MG-20MG
1 KIT ORAL
Qty: 30 | Refills: 0
Start: 2020-12-08 | End: 2021-01-06

## 2020-12-08 RX ADMIN — Medication 6 UNIT(S): at 08:24

## 2020-12-08 RX ADMIN — Medication 100 MILLIGRAM(S): at 06:20

## 2020-12-08 RX ADMIN — GABAPENTIN 100 MILLIGRAM(S): 400 CAPSULE ORAL at 06:20

## 2020-12-08 RX ADMIN — Medication 6 UNIT(S): at 12:21

## 2020-12-08 RX ADMIN — Medication 100 MILLIGRAM(S): at 13:44

## 2020-12-08 RX ADMIN — GABAPENTIN 100 MILLIGRAM(S): 400 CAPSULE ORAL at 23:08

## 2020-12-08 RX ADMIN — HEPARIN SODIUM 5000 UNIT(S): 5000 INJECTION INTRAVENOUS; SUBCUTANEOUS at 06:20

## 2020-12-08 RX ADMIN — ATORVASTATIN CALCIUM 40 MILLIGRAM(S): 80 TABLET, FILM COATED ORAL at 23:07

## 2020-12-08 RX ADMIN — Medication 2000 UNIT(S): at 12:48

## 2020-12-08 RX ADMIN — Medication 6 MILLIGRAM(S): at 06:20

## 2020-12-08 RX ADMIN — Medication 0.5 TABLET(S): at 17:10

## 2020-12-08 RX ADMIN — AMLODIPINE BESYLATE 10 MILLIGRAM(S): 2.5 TABLET ORAL at 06:19

## 2020-12-08 RX ADMIN — Medication 0.5 TABLET(S): at 06:19

## 2020-12-08 RX ADMIN — SENNA PLUS 2 TABLET(S): 8.6 TABLET ORAL at 23:09

## 2020-12-08 RX ADMIN — Medication 500 MILLIGRAM(S): at 12:48

## 2020-12-08 RX ADMIN — MONTELUKAST 10 MILLIGRAM(S): 4 TABLET, CHEWABLE ORAL at 12:48

## 2020-12-08 RX ADMIN — INSULIN GLARGINE 14 UNIT(S): 100 INJECTION, SOLUTION SUBCUTANEOUS at 23:07

## 2020-12-08 RX ADMIN — POLYETHYLENE GLYCOL 3350 17 GRAM(S): 17 POWDER, FOR SOLUTION ORAL at 06:21

## 2020-12-08 RX ADMIN — Medication 10 MILLIGRAM(S): at 23:08

## 2020-12-08 RX ADMIN — Medication 6 UNIT(S): at 17:09

## 2020-12-08 RX ADMIN — Medication 650 MILLIGRAM(S): at 13:44

## 2020-12-08 RX ADMIN — Medication 650 MILLIGRAM(S): at 14:40

## 2020-12-08 RX ADMIN — Medication 100 MILLIGRAM(S): at 23:11

## 2020-12-08 RX ADMIN — HEPARIN SODIUM 5000 UNIT(S): 5000 INJECTION INTRAVENOUS; SUBCUTANEOUS at 23:08

## 2020-12-08 RX ADMIN — Medication 40 MILLIGRAM(S): at 06:19

## 2020-12-08 RX ADMIN — Medication 2: at 17:08

## 2020-12-08 RX ADMIN — GABAPENTIN 100 MILLIGRAM(S): 400 CAPSULE ORAL at 13:44

## 2020-12-08 RX ADMIN — HEPARIN SODIUM 5000 UNIT(S): 5000 INJECTION INTRAVENOUS; SUBCUTANEOUS at 13:45

## 2020-12-08 RX ADMIN — Medication 6: at 12:21

## 2020-12-08 RX ADMIN — POLYETHYLENE GLYCOL 3350 17 GRAM(S): 17 POWDER, FOR SOLUTION ORAL at 17:10

## 2020-12-08 NOTE — PROGRESS NOTE ADULT - SUBJECTIVE AND OBJECTIVE BOX
[facility] Division of Hospital Medicine  Giuseppe Kessler MD  In House Pager [phone]    Patient is a 85y old  Female who presents with a chief complaint of Shortness of breath (08 Dec 2020 09:27)      SUBJECTIVE / OVERNIGHT EVENTS:  No overnight events. Patient seen and examined at bedside, labs and vitals reviewed.  patient is back to baseline O2 requirement. respiratory stable. she has no acute complaints.   currently being evaluated for home BiPAP by pulmonary.   No fever, no chills, no SOB, no CP, no n/v/d, no abd pain, no dysuria      MEDICATIONS  (STANDING):  amLODIPine   Tablet 10 milliGRAM(s) Oral daily  ascorbic acid 500 milliGRAM(s) Oral daily  atorvastatin 40 milliGRAM(s) Oral at bedtime  bisacodyl Suppository 10 milliGRAM(s) Rectal once  calcium carbonate    500 mG (Tums) Chewable 0.5 Tablet(s) Chew two times a day  cholecalciferol 2000 Unit(s) Oral daily  dexAMETHasone  Injectable 6 milliGRAM(s) IV Push daily  dextrose 40% Gel 15 Gram(s) Oral once  dextrose 5%. 1000 milliLiter(s) (50 mL/Hr) IV Continuous <Continuous>  dextrose 5%. 1000 milliLiter(s) (100 mL/Hr) IV Continuous <Continuous>  dextrose 50% Injectable 25 Gram(s) IV Push once  dextrose 50% Injectable 12.5 Gram(s) IV Push once  dextrose 50% Injectable 25 Gram(s) IV Push once  furosemide    Tablet 40 milliGRAM(s) Oral daily  gabapentin 100 milliGRAM(s) Oral three times a day  glucagon  Injectable 1 milliGRAM(s) IntraMuscular once  heparin   Injectable 5000 Unit(s) SubCutaneous every 8 hours  hydrALAZINE 100 milliGRAM(s) Oral three times a day  insulin glargine Injectable (LANTUS) 14 Unit(s) SubCutaneous at bedtime  insulin lispro (ADMELOG) corrective regimen sliding scale   SubCutaneous three times a day before meals  insulin lispro Injectable (ADMELOG) 6 Unit(s) SubCutaneous three times a day before meals  metoprolol succinate  milliGRAM(s) Oral daily  montelukast 10 milliGRAM(s) Oral daily  polyethylene glycol 3350 17 Gram(s) Oral two times a day  senna 2 Tablet(s) Oral at bedtime    MEDICATIONS  (PRN):  acetaminophen   Tablet .. 650 milliGRAM(s) Oral every 6 hours PRN Mild Pain (1 - 3), Moderate Pain (4 - 6)  ALBUTerol    90 MICROgram(s) HFA Inhaler 2 Puff(s) Inhalation every 6 hours PRN Shortness of Breath and/or Wheezing  artificial tears (preservative free) Ophthalmic Solution 1 Drop(s) Both EYES three times a day PRN Dry Eyes    CAPILLARY BLOOD GLUCOSE      POCT Blood Glucose.: 256 mg/dL (08 Dec 2020 11:37)  POCT Blood Glucose.: 127 mg/dL (08 Dec 2020 07:41)  POCT Blood Glucose.: 159 mg/dL (07 Dec 2020 21:41)  POCT Blood Glucose.: 199 mg/dL (07 Dec 2020 16:49)    I&O's Summary    07 Dec 2020 07:01  -  08 Dec 2020 07:00  --------------------------------------------------------  IN: 120 mL / OUT: 401 mL / NET: -281 mL        PHYSICAL EXAM:  Vital Signs Last 24 Hrs  T(C): 37.2 (08 Dec 2020 13:39), Max: 37.2 (08 Dec 2020 05:00)  T(F): 98.9 (08 Dec 2020 13:39), Max: 99 (08 Dec 2020 05:00)  HR: 75 (08 Dec 2020 13:39) (75 - 90)  BP: 133/50 (08 Dec 2020 13:39) (126/79 - 155/77)  BP(mean): --  RR: 17 (08 Dec 2020 13:39) (17 - 20)  SpO2: 95% (08 Dec 2020 13:39) (91% - 97%)    Gen: NAD; resting in bed.  Pulm: on NC; no respiratory distress; CTA b/l; no wheezing  Cards: RRR, nl S1/S2; no obvious murmurs  Abd: soft; NT on exam  Ext: no cyanosis; no edema  Skin: no rash; no cyanosis    LABS:                        12.9   13.00 )-----------( 266      ( 08 Dec 2020 08:08 )             42.9     12-08    136  |  95<L>  |  45<H>  ----------------------------<  114<H>  4.5   |  33<H>  |  0.98    Ca    9.5      08 Dec 2020 08:08  Phos  4.3     12-08  Mg     2.3     12-08    TPro  7.2  /  Alb  3.1<L>  /  TBili  0.5  /  DBili  x   /  AST  24  /  ALT  25  /  AlkPhos  49  12-07                RADIOLOGY & ADDITIONAL TESTS:  Results Reviewed: Y  Imaging Personally Reviewed: Y  Electrocardiogram Personally Reviewed: Y    COORDINATION OF CARE:  Care Discussed with Consultants/Other Providers [Y/N]: Y  Prior or Outpatient Records Reviewed [Y/N]: Y

## 2020-12-08 NOTE — PROVIDER CONTACT NOTE (CRITICAL VALUE NOTIFICATION) - SITUATION
Blood culture 11/29, growth in aerobic bottle, gram negative cocci bacilli
Pt came in for COVID. Pt had arterial blood gas drawn, and lactate came back as 2.6. Pt Normal Sinus Rhythm on telemetry, on 2lpm NC and breathing unlabored.

## 2020-12-08 NOTE — DISCHARGE NOTE PROVIDER - NSDCCPCAREPLAN_GEN_ALL_CORE_FT
PRINCIPAL DISCHARGE DIAGNOSIS  Diagnosis: COVID-19  Assessment and Plan of Treatment: You have been diagnosed with the COVID-19 virus during your hospital stay. You must self quarantine to complete a 14 day time period.  Monitor for fevers, shortness of breath and cough primarily.  Monitor your temperature daily to not any changes and increases.    It has been determined that you no longer need hospitalization and can recover while remaining in self-quarantine at home. You should follow the prevention steps below until a healthcare provider or local or state health department says you can return to your normal activities.  1. You should restrict activities outside your home, except for getting medical care.  2. Do not go to work, school, or public areas.  3. Avoid using public transportation, ride-sharing, or taxis.  4. Separate yourself from other people and animals in your home.  5. Call ahead before visiting your doctor.  6. Wear a facemask.  7. Cover your coughs and sneezes.  8. Clean your hands often.  9. Avoid sharing personal household items.  10. Clean all “high-touch” surfaces everyday.  11. Monitor your symptoms.  If you have a medical emergency and need to call 911, notify the dispatch personnel that you have COVID-19 If possible, put on a facemask before emergency medical services arrive.  12. Stopping home isolation.  Patients with confirmed COVID-19 should remain under home isolation precautions for 14 days since the positive COVID-19 test and until the risk of secondary transmission to others is thought to be low. The decision to discontinue home isolation precautions should be made on a case-by-case basis, in consultation with the primary health care provide. You can call the [facility] [phone] or [facility] of [facility] at [phone] for further information about COVID-19.      SECONDARY DISCHARGE DIAGNOSES  Diagnosis: Need for prophylactic measure  Assessment and Plan of Treatment: During your admission you were noted to be high risk for developing blood clots. Please continue your blood thinners as directed. Monitor for signs/symptoms of bleeding such as easy bruising, shortness of breath, blood in stool or vomit. If bleeding occurs please notify your primary care provider immediately or return to hospital ED.  **Follow up with your primary care provider within 1 month for further care and monitoring.    Diagnosis: Type 2 diabetes mellitus without complication, without long-term current use of insulin  Assessment and Plan of Treatment: Continue your medication regimen and a consistent carbohydrate diet (Meaning eating the same amount of carbohydrates at the same time each day). Monitor blood glucose levels throughout the day before meals and at bedtime. Record blood sugars and bring to outpatient providers appointment in order to be reviewed by your doctor for management modifications. If your sugars are more than 400 or less than 70 you should contact your PCP immediately. Monitor for signs/symptoms of low blood glucose, such as, dizziness, altered mental status, or cool/clammy skin. In addition, monitor for signs/symptoms of high blood glucose, such as, feeling hot, dry, fatigued, or with increased thirst/urination. Make regular podiatry appointments in order to have feet checked for wounds and uncontrolled toe nail growth to prevent infections, as well as, appointments with an ophthalmologist to monitor your vision.    Diagnosis: Pulmonary emphysema, unspecified emphysema type  Assessment and Plan of Treatment: Please continue to use your inhalers as prescribed and follow-up with your primary care provider/pulmonologist for further care/recommendations. It is recommended to undergo annual pulmonary function testings. Monitor for signs/symptoms of COPD exacerbation, such as, shortness of breath, increased sputum production, increased cough, wheezing, difficulty breathing, or fever - Report to the emergency room if symptoms are not relieved by usual regimen.    Diagnosis: Essential hypertension  Assessment and Plan of Treatment: Continue blood pressure medication regimen as directed. Monitor for any visual changes, headaches or dizziness.  Monitor blood pressure regularly.  Follow up with your PCP for further management for high blood pressure.     PRINCIPAL DISCHARGE DIAGNOSIS  Diagnosis: COVID-19  Assessment and Plan of Treatment: You have been diagnosed with the COVID-19 virus during your hospital stay. You must self quarantine to complete a 14 day time period.  Monitor for fevers, shortness of breath and cough primarily.  Monitor your temperature daily to not any changes and increases.    It has been determined that you no longer need hospitalization and can recover while remaining in self-quarantine at home. You should follow the prevention steps below until a healthcare provider or local or state health department says you can return to your normal activities.  1. You should restrict activities outside your home, except for getting medical care.  2. Do not go to work, school, or public areas.  3. Avoid using public transportation, ride-sharing, or taxis.  4. Separate yourself from other people and animals in your home.  5. Call ahead before visiting your doctor.  6. Wear a facemask.  7. Cover your coughs and sneezes.  8. Clean your hands often.  9. Avoid sharing personal household items.  10. Clean all “high-touch” surfaces everyday.  11. Monitor your symptoms.  If you have a medical emergency and need to call 911, notify the dispatch personnel that you have COVID-19 If possible, put on a facemask before emergency medical services arrive.  12. Stopping home isolation.  Patients with confirmed COVID-19 should remain under home isolation precautions for 14 days since the positive COVID-19 test and until the risk of secondary transmission to others is thought to be low. The decision to discontinue home isolation precautions should be made on a case-by-case basis, in consultation with the primary health care provide. You can call the [facility] [phone] or [facility] of [facility] at [phone] for further information about COVID-19.      SECONDARY DISCHARGE DIAGNOSES  Diagnosis: Need for prophylactic measure  Assessment and Plan of Treatment: During your admission you were noted to be high risk for developing blood clots. Please continue your blood thinners as directed. Monitor for signs/symptoms of bleeding such as easy bruising, shortness of breath, blood in stool or vomit. If bleeding occurs please notify your primary care provider immediately or return to hospital ED.  Please take Xarelto as prescribed for 30 days and follow up with your doctor for further management.   **Follow up with your primary care provider within 1 month for further care and monitoring.    Diagnosis: Type 2 diabetes mellitus without complication, without long-term current use of insulin  Assessment and Plan of Treatment: Continue your medication regimen and a consistent carbohydrate diet (Meaning eating the same amount of carbohydrates at the same time each day). Monitor blood glucose levels throughout the day before meals and at bedtime. Record blood sugars and bring to outpatient providers appointment in order to be reviewed by your doctor for management modifications. If your sugars are more than 400 or less than 70 you should contact your PCP immediately. Monitor for signs/symptoms of low blood glucose, such as, dizziness, altered mental status, or cool/clammy skin. In addition, monitor for signs/symptoms of high blood glucose, such as, feeling hot, dry, fatigued, or with increased thirst/urination. Make regular podiatry appointments in order to have feet checked for wounds and uncontrolled toe nail growth to prevent infections, as well as, appointments with an ophthalmologist to monitor your vision.    Diagnosis: Essential hypertension  Assessment and Plan of Treatment: Continue blood pressure medication regimen as directed. Monitor for any visual changes, headaches or dizziness.  Monitor blood pressure regularly.  Follow up with your PCP for further management for high blood pressure.    Diagnosis: Pulmonary emphysema, unspecified emphysema type  Assessment and Plan of Treatment: Please continue to use your inhalers as prescribed and follow-up with your primary care provider/pulmonologist for further care/recommendations. It is recommended to undergo annual pulmonary function testings. Monitor for signs/symptoms of COPD exacerbation, such as, shortness of breath, increased sputum production, increased cough, wheezing, difficulty breathing, or fever - Report to the emergency room if symptoms are not relieved by usual regimen.     PRINCIPAL DISCHARGE DIAGNOSIS  Diagnosis: COVID-19  Assessment and Plan of Treatment: You have been diagnosed with the COVID-19 virus during your hospital stay. You must self quarantine to complete a 14 day time period.  Monitor for fevers, shortness of breath and cough primarily.  Monitor your temperature daily to not any changes and increases.    It has been determined that you no longer need hospitalization and can recover while remaining in self-quarantine at home. You should follow the prevention steps below until a healthcare provider or local or state health department says you can return to your normal activities.  1. You should restrict activities outside your home, except for getting medical care.  2. Do not go to work, school, or public areas.  3. Avoid using public transportation, ride-sharing, or taxis.  4. Separate yourself from other people and animals in your home.  5. Call ahead before visiting your doctor.  6. Wear a facemask.  7. Cover your coughs and sneezes.  8. Clean your hands often.  9. Avoid sharing personal household items.  10. Clean all “high-touch” surfaces everyday.  11. Monitor your symptoms.  If you have a medical emergency and need to call 911, notify the dispatch personnel that you have COVID-19 If possible, put on a facemask before emergency medical services arrive.  12. Stopping home isolation.  Patients with confirmed COVID-19 should remain under home isolation precautions for 14 days since the positive COVID-19 test and until the risk of secondary transmission to others is thought to be low. The decision to discontinue home isolation precautions should be made on a case-by-case basis, in consultation with the primary health care provide. You can call the [facility] [phone] or [facility] of [facility] at [phone] for further information about COVID-19.      SECONDARY DISCHARGE DIAGNOSES  Diagnosis: Need for prophylactic measure  Assessment and Plan of Treatment: During your admission you were noted to be high risk for developing blood clots. Please continue your blood thinners as directed. Monitor for signs/symptoms of bleeding such as easy bruising, shortness of breath, blood in stool or vomit. If bleeding occurs please notify your primary care provider immediately or return to hospital ED.  Please take Xarelto as prescribed for 30 days and follow up with your doctor for further management.   **Follow up with your primary care provider within 1 month for further care and monitoring.    Diagnosis: Type 2 diabetes mellitus without complication, without long-term current use of insulin  Assessment and Plan of Treatment: Continue your medication regimen and a consistent carbohydrate diet (Meaning eating the same amount of carbohydrates at the same time each day). Monitor blood glucose levels throughout the day before meals and at bedtime. Record blood sugars and bring to outpatient providers appointment in order to be reviewed by your doctor for management modifications. If your sugars are more than 400 or less than 70 you should contact your PCP immediately. Monitor for signs/symptoms of low blood glucose, such as, dizziness, altered mental status, or cool/clammy skin. In addition, monitor for signs/symptoms of high blood glucose, such as, feeling hot, dry, fatigued, or with increased thirst/urination. Make regular podiatry appointments in order to have feet checked for wounds and uncontrolled toe nail growth to prevent infections, as well as, appointments with an ophthalmologist to monitor your vision.    Diagnosis: Essential hypertension  Assessment and Plan of Treatment: Continue blood pressure medication regimen as directed. Monitor for any visual changes, headaches or dizziness.  Monitor blood pressure regularly.  Follow up with your PCP for further management for high blood pressure.    Diagnosis: Pulmonary emphysema, unspecified emphysema type  Assessment and Plan of Treatment: Please continue to use your inhalers as prescribed and follow-up with your primary care provider/pulmonologist for further care/recommendations. It is recommended to undergo annual pulmonary function testings. Monitor for signs/symptoms of COPD exacerbation, such as, shortness of breath, increased sputum production, increased cough, wheezing, difficulty breathing, or fever - Report to the emergency room if symptoms are not relieved by usual regimen.  Continue with your CPAP at home at night and follow up with Dr Augustine for further treatment and management.

## 2020-12-08 NOTE — PROGRESS NOTE ADULT - PROBLEM SELECTOR PLAN 3
goal to return to home O2 2L  no evidence of COPD exac  albuterol PRN  now on 2L sat 94-95%  collateral from family that pt on nocturnal bipap  - pulm evaluating for nocturnal BiPap arrangement at home.

## 2020-12-08 NOTE — PROGRESS NOTE ADULT - ASSESSMENT
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     Problems:  Acute on chronic hypoxemic and hypercapnic respiratory failure  Obesity Hypoventilation Syndrome  Restrictive Lung Disease  COVID-19 infection    Recommendations    - Check ABG this AM as was off  this would most likely demonstrate hypercapnia to help qualify for BIPAP  - After overnight oximetry study for 4-6 hours and ABG, resume BIPAP 18/10 40% qhs  - Dexamethasone 6 mg IV x10 days  - Remdesivir       Teddy Childress MD  Fellow, Pulmonary and Critical Care Medicine  [facility]  Pager: [phone], [phone] ([facility])  Email: [email or web address]     85F never smoker with morbidy obesity, DM, chronic 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     Problems:  Acute on chronic hypoxemic and hypercapnic respiratory failure  Obesity Hypoventilation Syndrome  Restrictive Lung Disease  COVID-19 infection    Recommendations  - Check ABG this AM as was off BIPAP and this would most likely demonstrate hypercapnia to help qualify for BIPAP from insurance  - Will review overnight oximetry study  - Dexamethasone 6 mg IV x10 days  - Remdesivir x 5 days  - Albuterol q6h PRN for shortness of breath  - Encourage incentive spirometry, out of bed to chair  - Ambulate to determine home O2 needs, previously was on 2L  - Continue oxygen supplementation to maintain o2 saturation> 92% at rest and ambulation      Teddy Childress MD  Fellow, Pulmonary and Critical Care Medicine  [facility]  Pager: [phone], [phone] ([facility])  Email: [email or web address]

## 2020-12-08 NOTE — DISCHARGE NOTE PROVIDER - NSFOLLOWUPCLINICS_GEN_ALL_ED_FT
[facility] Pulmonolgy and Sleep Medicine  Pulmonology  [address] [place] [address]  Phone: [phone]  Fax:   Follow Up Time:

## 2020-12-08 NOTE — DISCHARGE NOTE PROVIDER - NSDCFUADDINST_GEN_ALL_CORE_FT
You were diagnosed COVID (+) on 11/29/2020.    Call your primary care provider within 1 - 2 days for further management recommendations and to schedule a follow up appointment. If you do not have one, you can call [phone] to establish care with our clinics.     Should you require more information, please call our coronavirus specialists at [phone] [facility] ([phone]).    Those caring for the patient should maintain strict hand hygiene and avoid touching face as much as possible. If any members develop shortness of breath or fevers they should contact their primary care provider as soon as possible.

## 2020-12-08 NOTE — DISCHARGE NOTE PROVIDER - NSDCFUADDAPPT_GEN_ALL_CORE_FT
Please follow up with appointment at [facility]. They should contact you to set up an appointment that is convenient. If not, please call and schedule appointment to be seen within the next 3-5 days.

## 2020-12-08 NOTE — DISCHARGE NOTE PROVIDER - HOSPITAL COURSE
85 F with PmHx of COPD/Asthma (Home O2, 2L NC during the day & CPAP qHS), morbidly obese, DM@ and HTN a/w acute on chronic hypoxic resp failure due to COVID.    COVID-19 infection  - COVID PCR (+) 11/29  - CXR w/ B/L patchy opacities  - s/p Remdesivir/Dexamethasone  - Course complicated by continued respiratory failure requiring upgrade to BiPAP from CPAP  - Pulm consulted for further evaluation and planned for evaluation for BiPAP @ home  - c/w Supplemental O2 as follows: 2L NC during the day & BiPAP/CPAP qHS  - c/w Albuterol/Advair/Singulair/Azelastine  - DVT ppx: SQH with plan for Xarelto on discharge    COPD/Emphysema  - Follows with [facility] pulmonary [facility]  - c/w Inhalers & home oxygen  - Outpatient follow up with Pulm [facility] clinic    Hyponatremia  - Likely 2/2 hypervolemia  - Patient started on Lasix with improvement in sodium levels  - Resolved    DM type II  - HgbA1c 6.3%  - c/w Basal/Bolus & ISS w/ FS QID  - c/w Neurontin for neuropathy  - Resume PO anti-diabetics on discharge    Essential hypertension  - c/w Hydralazine, Metoprolol, losartan and Norvasc  - c/w Rosuvastatin    On ___ this case was reviewed with  ____, the patient is medically stable and optimized for discharge. All medications were reviewed and prescriptions were sent to mutually agreed upon pharmacy. 85 F with PmHx of COPD/Asthma (Home O2, 2L NC during the day & CPAP qHS), morbidly obese, DM2 and HTN a/w acute on chronic hypoxic resp failure due to COVID.    COVID-19 infection  - COVID PCR (+) 11/29  - CXR w/ B/L patchy opacities  - s/p Remdesivir/Dexamethasone  - Course complicated by continued respiratory failure requiring upgrade to BiPAP from CPAP  - Pulm consulted for further evaluation and planned for evaluation for BiPAP @ home  - c/w Supplemental O2 as follows: 2L NC during the day & BiPAP/CPAP qHS  - c/w Albuterol/Advair/Singulair/Azelastine  - DVT ppx: SQH with plan for Xarelto on discharge, will send home with patient from [facility] pharmacy     COPD/Emphysema  - Follows with [facility] pulmonary clinic  - c/w Inhalers & home oxygen  - Outpatient follow up with Pulm [facility] clinic, emailed for appt to contact daughter     Hyponatremia  - Likely 2/2 hypervolemia  - Patient started on Lasix with improvement in sodium levels  - Resolved    DM type II  - HgbA1c 6.3%  - c/w Basal/Bolus & ISS w/ FS QID  - c/w Neurontin for neuropathy  - Resume PO anti-diabetics on discharge    Essential hypertension  - c/w Hydralazine, Metoprolol, losartan and Norvasc  - c/w Rosuvastatin    On 12/9/2020, this case was reviewed with Dr. Giuseppe Kessler, the patient is medically stable and optimized for discharge. All medications were reviewed and prescriptions were sent to mutually agreed upon pharmacy. 85 F with PmHx of COPD/Asthma (Home O2, 2L NC during the day & CPAP qHS), morbidly obese, DM2 and HTN a/w acute on chronic hypoxic resp failure due to COVID.    COVID-19 infection  - COVID PCR (+) 11/29  - CXR w/ B/L patchy opacities  - s/p Remdesivir/Dexamethasone  - Course complicated by continued respiratory failure requiring upgrade to BiPAP from CPAP  - Pulm consulted for further evaluation and planned for evaluation for BiPAP @ home  - c/w Supplemental O2 as follows: 2L NC during the day & BiPAP/CPAP qHS  - c/w Albuterol/Advair/Singulair/Azelastine  - DVT ppx: SQH with plan for Xarelto on discharge, will send home with patient from [facility] pharmacy     COPD/Emphysema  - Follows with [facility] pulmonary clinic  - c/w Inhalers & home oxygen  - Outpatient follow up with Pulm [facility] clinic, emailed for appt to contact daughter     Hyponatremia  - Likely 2/2 hypervolemia  - Patient started on Lasix with improvement in sodium levels  - Resolved    DM type II  - HgbA1c 6.3%  - c/w Basal/Bolus & ISS w/ FS QID  - c/w Neurontin for neuropathy  - Resume PO anti-diabetics on discharge    Essential hypertension  - c/w Hydralazine, Metoprolol, losartan and Norvasc  - c/w Rosuvastatin    On 12/9/2020, this case was reviewed with Dr. Giuseppe Kessler, the patient is medically stable and optimized for discharge. All medications were reviewed and prescriptions were sent to mutually agreed upon pharmacy.     Patient with chronic emphysema presneted with acute on chronic hypercarbic and hypoxic respiratory failure due to COVID19. Her symptoms improved with treatment, now back to baseline oxygen requirement. She is medically stable to discharge home with outpatient follow up with her pulmonologist and PCP.

## 2020-12-08 NOTE — DISCHARGE NOTE PROVIDER - NSDCMRMEDTOKEN_GEN_ALL_CORE_FT
Advair Diskus 250 mcg-50 mcg inhalation powder: 1 puff(s) inhaled 2 times a day  alendronate 70 mg oral tablet: 1 tab(s) orally once a week  amLODIPine 10 mg oral tablet: 1 tab(s) orally once a day  ascorbic acid 500 mg oral capsule: 1 cap(s) orally once a day  azelastine 137 mcg/inh (0.1%) nasal spray: 2 spray(s) nasal 2 times a day  azelastine 137 mcg/inh (0.1%) nasal spray: 1 spray(s) nasal 2 times a day, As Needed  calcium (as carbonate) 500 mg oral tablet: 0.5 tab(s) orally once a day  cholecalciferol 2000 intl units oral tablet: 1 tab(s) orally once a day  furosemide 20 mg oral tablet: 1 tab(s) orally once a day  glimepiride 2 mg oral tablet: 1 tab(s) orally once a day  heparin: 5000 unit(s) subcutaneous every 8 hours  hydrALAZINE 100 mg oral tablet: 1 tab(s) orally 3 times a day  hydrALAZINE 100 mg oral tablet: 1 tab(s) orally 3 times a day  ipratropium-albuterol 0.5 mg-2.5 mg/3 mLinhalation solution: 3 milliliter(s) inhaled every 6 hours  metoprolol succinate 100 mg oral tablet, extended release: 1 tab(s) orally once a day  montelukast 10 mg oral tablet: 1 tab(s) orally once a day  Neurontin 100 mg oral capsule: 1 cap(s) orally 3 times a day  ocular lubricant ophthalmic solution: 1 drop(s) to each affected eye 3 times a day, As needed, Dry Eyes  polyethylene glycol 3350 oral powder for reconstitution: 17 gram(s) orally once a day  ProAir HFA 90 mcg/inh inhalation aerosol: 2 puff(s) inhaled 4 times a day, As Needed  rosuvastatin 10 mg oral tablet: 1 tab(s) orally once a day  Xarelto 10 mg oral tablet: 1 tab(s) orally once a day for blood clot prevention   acetaminophen 325 mg oral tablet: 2 tab(s) orally every 6 hours, As needed, Mild Pain (1 - 3), Moderate Pain (4 - 6)  Advair Diskus 250 mcg-50 mcg inhalation powder: 1 puff(s) inhaled 2 times a day  alendronate 70 mg oral tablet: 1 tab(s) orally once a week  amLODIPine 10 mg oral tablet: 1 tab(s) orally once a day  ascorbic acid 500 mg oral capsule: 1 cap(s) orally once a day  azelastine 137 mcg/inh (0.1%) nasal spray: 2 spray(s) nasal 2 times a day  calcium (as carbonate) 500 mg oral tablet: 0.5 tab(s) orally once a day  cholecalciferol 2000 intl units oral tablet: 1 tab(s) orally once a day  furosemide 20 mg oral tablet: 1 tab(s) orally once a day  glimepiride 2 mg oral tablet: 1 tab(s) orally once a day  hydrALAZINE 100 mg oral tablet: 1 tab(s) orally 3 times a day  ipratropium-albuterol 0.5 mg-2.5 mg/3 mLinhalation solution: 3 milliliter(s) inhaled every 6 hours  metoprolol succinate 100 mg oral tablet, extended release: 1 tab(s) orally once a day  montelukast 10 mg oral tablet: 1 tab(s) orally once a day  Neurontin 100 mg oral capsule: 1 cap(s) orally 3 times a day  ocular lubricant ophthalmic solution: 1 drop(s) to each affected eye 3 times a day, As needed, Dry Eyes  polyethylene glycol 3350 oral powder for reconstitution: 17 gram(s) orally once a day  ProAir HFA 90 mcg/inh inhalation aerosol: 2 puff(s) inhaled 4 times a day, As Needed  rosuvastatin 10 mg oral tablet: 1 tab(s) orally once a day  Xarelto 10 mg oral tablet: 1 tab(s) orally once a day for blood clot prevention

## 2020-12-08 NOTE — DISCHARGE NOTE PROVIDER - CARE PROVIDER_API CALL
Dr. Babb,   Your primary care provider  Phone: (   )    -  Fax: (   )    -  Established Patient  Follow Up Time: 1-3 days    Fariba Yao  INTERNAL MEDICINE  [address] [place] [address]  Phone: [phone]  Fax: [phone]  Established Patient  Follow Up Time:

## 2020-12-08 NOTE — PROVIDER CONTACT NOTE (CRITICAL VALUE NOTIFICATION) - ASSESSMENT
Blood culture 11/29, growth in aerobic bottle, gram negative cocci bacilli. Patient afebrile throughout shift. Patient currently on 5L nasal cannula.
Pt sitting at edge of bed, upright and talking on the phone to son. Pt on 2lpm NC that she is on at home. Pt denies chest pain, shortness of breath or dizziness.

## 2020-12-08 NOTE — DISCHARGE NOTE PROVIDER - CARE PROVIDERS DIRECT ADDRESSES
,DirectAddress_Unknown,[email or web address]

## 2020-12-08 NOTE — PROGRESS NOTE ADULT - SUBJECTIVE AND OBJECTIVE BOX
CHIEF COMPLAINT: Shortness of breath    Interval Events: No events overnight. Pt was off bipap for oximetry study. Saturating low 90s on home 2L, no respiratory complaints.     REVIEW OF SYSTEMS:  Constitutional: [ ] negative [ ] fevers [ ] chills [ ] weight loss [ ] weight gain  HEENT: [ ] negative [ ] dry eyes [ ] eye irritation [ ] postnasal drip [ ] nasal congestion  CV: [ ] negative  [ ] chest pain [ ] orthopnea [ ] palpitations [ ] murmur  Resp: [ ] negative [ ] cough [ ] shortness of breath [ ] dyspnea [ ] wheezing [ ] sputum [ ] hemoptysis  GI: [ ] negative [ ] nausea [ ] vomiting [ ] diarrhea [ ] constipation [ ] abd pain [ ] dysphagia   : [ ] negative [ ] dysuria [ ] nocturia [ ] hematuria [ ] increased urinary frequency  Musculoskeletal: [ ] negative [ ] back pain [ ] myalgias [ ] arthralgias [ ] fracture  Skin: [ ] negative [ ] rash [ ] itch  Neurological: [ ] negative [ ] headache [ ] dizziness [ ] syncope [ ] weakness [ ] numbness  Psychiatric: [ ] negative [ ] anxiety [ ] depression  Endocrine: [ ] negative [ ] diabetes [ ] thyroid problem  Hematologic/Lymphatic: [ ] negative [ ] anemia [ ] bleeding problem  Allergic/Immunologic: [ ] negative [ ] itchy eyes [ ] nasal discharge [ ] hives [ ] angioedema  [ x ] All other systems negative  [ ] Unable to assess ROS because ________    OBJECTIVE:  ICU Vital Signs Last 24 Hrs  T(C): 37.2 (08 Dec 2020 05:00), Max: 37.2 (08 Dec 2020 05:00)  T(F): 99 (08 Dec 2020 05:00), Max: 99 (08 Dec 2020 05:00)  HR: 88 (08 Dec 2020 06:04) (77 - 90)  BP: 155/77 (08 Dec 2020 06:04) (130/64 - 155/77)  BP(mean): --  ABP: --  ABP(mean): --  RR: 18 (08 Dec 2020 06:04) (18 - 20)  SpO2: 92% (08 Dec 2020 06:04) (91% - 97%)        12-07 @ 07:01  -  12-08 @ 07:00  --------------------------------------------------------  IN: 120 mL / OUT: 401 mL / NET: -281 mL      CAPILLARY BLOOD GLUCOSE      POCT Blood Glucose.: 127 mg/dL (08 Dec 2020 07:41)      PHYSICAL EXAM:  General: NAD, morbidly obese, appears comfortable on 2L  Skin: Warm and dry, no rashes  Neuro: AAOx3, nonfocal  HEENT: PERRL, EOMI, no oral lesions  Neck: Full range of motion, no JVD  Lungs: Clear to ascultation bilaterally no wheezes, rales, or rhonchi   Heart: Regular rate and rhythm, no murmurs  Abdomen: Normoactive bowl sounds, soft, nontender, nondistended  Extremities: No lower extremity tenderness, erythema, or edema   Psych: normal mood and affect    HOSPITAL MEDICATIONS:  MEDICATIONS  (STANDING):  amLODIPine   Tablet 10 milliGRAM(s) Oral daily  ascorbic acid 500 milliGRAM(s) Oral daily  atorvastatin 40 milliGRAM(s) Oral at bedtime  bisacodyl Suppository 10 milliGRAM(s) Rectal once  calcium carbonate    500 mG (Tums) Chewable 0.5 Tablet(s) Chew two times a day  cholecalciferol 2000 Unit(s) Oral daily  dexAMETHasone  Injectable 6 milliGRAM(s) IV Push daily  dextrose 40% Gel 15 Gram(s) Oral once  dextrose 5%. 1000 milliLiter(s) (50 mL/Hr) IV Continuous <Continuous>  dextrose 5%. 1000 milliLiter(s) (100 mL/Hr) IV Continuous <Continuous>  dextrose 50% Injectable 25 Gram(s) IV Push once  dextrose 50% Injectable 25 Gram(s) IV Push once  dextrose 50% Injectable 12.5 Gram(s) IV Push once  furosemide    Tablet 40 milliGRAM(s) Oral daily  gabapentin 100 milliGRAM(s) Oral three times a day  glucagon  Injectable 1 milliGRAM(s) IntraMuscular once  heparin   Injectable 5000 Unit(s) SubCutaneous every 8 hours  hydrALAZINE 100 milliGRAM(s) Oral three times a day  insulin glargine Injectable (LANTUS) 14 Unit(s) SubCutaneous at bedtime  insulin lispro (ADMELOG) corrective regimen sliding scale   SubCutaneous three times a day before meals  insulin lispro Injectable (ADMELOG) 6 Unit(s) SubCutaneous three times a day before meals  metoprolol succinate  milliGRAM(s) Oral daily  montelukast 10 milliGRAM(s) Oral daily  polyethylene glycol 3350 17 Gram(s) Oral two times a day  senna 2 Tablet(s) Oral at bedtime    MEDICATIONS  (PRN):  acetaminophen   Tablet .. 650 milliGRAM(s) Oral every 6 hours PRN Mild Pain (1 - 3), Moderate Pain (4 - 6)  ALBUTerol    90 MICROgram(s) HFA Inhaler 2 Puff(s) Inhalation every 6 hours PRN Shortness of Breath and/or Wheezing  artificial tears (preservative free) Ophthalmic Solution 1 Drop(s) Both EYES three times a day PRN Dry Eyes      LABS:                        12.9   13.00 )-----------( 266      ( 08 Dec 2020 08:08 )             42.9     Hgb Trend: 12.9<--, 12.7<--, 13.8<--, 12.6<--, 12.3<--  12-07    137  |  96<L>  |  46<H>  ----------------------------<  110<H>  4.7   |  32<H>  |  0.96    Ca    9.6      07 Dec 2020 09:47  Phos  4.3     12-07  Mg     2.2     12-07    TPro  7.2  /  Alb  3.1<L>  /  TBili  0.5  /  DBili  x   /  AST  24  /  ALT  25  /  AlkPhos  49  12-07    Creatinine Trend: 0.96<--, 0.97<--, 0.96<--, 0.99<--, 0.99<--, 0.96<--            MICROBIOLOGY:       RADIOLOGY:  [ ] Reviewed and interpreted by me    PULMONARY FUNCTION TESTS:    EKG:

## 2020-12-08 NOTE — PROGRESS NOTE ADULT - PROBLEM SELECTOR PLAN 1
due to COVID  CM and pulm following for coordination of o/p bipap  currently on 2L NC at rest, which is baseline for patient.

## 2020-12-08 NOTE — PROVIDER CONTACT NOTE (CRITICAL VALUE NOTIFICATION) - BACKGROUND
Patient A&Ox4, admitted for worsening shortness of breath. Patient covid positive. PMH DM, GERD, HTN, HLD, asthma. Patient on 2LNC at home.
Pt has hx of HTN, HLD, DM, and GERD.

## 2020-12-08 NOTE — DISCHARGE NOTE PROVIDER - PROVIDER TOKENS
FREE:[LAST:[Dr. Babb],PHONE:[(   )    -],FAX:[(   )    -],ADDRESS:[Your primary care provider],FOLLOWUP:[1-3 days],ESTABLISHEDPATIENT:[T]],PROVIDER:[TOKEN:[[id number]],ESTABLISHEDPATIENT:[T]]

## 2020-12-09 ENCOUNTER — TRANSCRIPTION ENCOUNTER (OUTPATIENT)
Age: 85
End: 2020-12-09

## 2020-12-09 ENCOUNTER — APPOINTMENT (OUTPATIENT)
Dept: PULMONOLOGY | Facility: CLINIC | Age: 85
End: 2020-12-09

## 2020-12-09 VITALS — OXYGEN SATURATION: 95 % | HEART RATE: 81 BPM

## 2020-12-09 LAB
GLUCOSE BLDC GLUCOMTR-MCNC: 121 MG/DL — HIGH (ref 70–99)
GLUCOSE BLDC GLUCOMTR-MCNC: 136 MG/DL — HIGH (ref 70–99)
GLUCOSE BLDC GLUCOMTR-MCNC: 166 MG/DL — HIGH (ref 70–99)
GLUCOSE BLDC GLUCOMTR-MCNC: 204 MG/DL — HIGH (ref 70–99)

## 2020-12-09 PROCEDURE — 99239 HOSP IP/OBS DSCHRG MGMT >30: CPT | Mod: CS

## 2020-12-09 PROCEDURE — 99233 SBSQ HOSP IP/OBS HIGH 50: CPT | Mod: CS,25

## 2020-12-09 RX ORDER — ACETAMINOPHEN 500 MG
2 TABLET ORAL
Qty: 0 | Refills: 0 | DISCHARGE
Start: 2020-12-09

## 2020-12-09 RX ADMIN — HEPARIN SODIUM 5000 UNIT(S): 5000 INJECTION INTRAVENOUS; SUBCUTANEOUS at 13:07

## 2020-12-09 RX ADMIN — Medication 500 MILLIGRAM(S): at 11:17

## 2020-12-09 RX ADMIN — Medication 6 UNIT(S): at 13:00

## 2020-12-09 RX ADMIN — GABAPENTIN 100 MILLIGRAM(S): 400 CAPSULE ORAL at 13:07

## 2020-12-09 RX ADMIN — Medication 100 MILLIGRAM(S): at 13:07

## 2020-12-09 RX ADMIN — GABAPENTIN 100 MILLIGRAM(S): 400 CAPSULE ORAL at 06:23

## 2020-12-09 RX ADMIN — Medication 6 MILLIGRAM(S): at 06:24

## 2020-12-09 RX ADMIN — Medication 100 MILLIGRAM(S): at 06:24

## 2020-12-09 RX ADMIN — Medication 4: at 13:00

## 2020-12-09 RX ADMIN — Medication 6 UNIT(S): at 08:33

## 2020-12-09 RX ADMIN — POLYETHYLENE GLYCOL 3350 17 GRAM(S): 17 POWDER, FOR SOLUTION ORAL at 06:25

## 2020-12-09 RX ADMIN — Medication 100 MILLIGRAM(S): at 11:16

## 2020-12-09 RX ADMIN — Medication 40 MILLIGRAM(S): at 06:23

## 2020-12-09 RX ADMIN — Medication 2000 UNIT(S): at 11:17

## 2020-12-09 RX ADMIN — Medication 0.5 TABLET(S): at 06:23

## 2020-12-09 RX ADMIN — HEPARIN SODIUM 5000 UNIT(S): 5000 INJECTION INTRAVENOUS; SUBCUTANEOUS at 06:23

## 2020-12-09 RX ADMIN — MONTELUKAST 10 MILLIGRAM(S): 4 TABLET, CHEWABLE ORAL at 11:17

## 2020-12-09 RX ADMIN — AMLODIPINE BESYLATE 10 MILLIGRAM(S): 2.5 TABLET ORAL at 06:23

## 2020-12-09 NOTE — PROGRESS NOTE ADULT - PROVIDER SPECIALTY LIST ADULT
Hospitalist
Pulmonology
Hospitalist

## 2020-12-09 NOTE — DISCHARGE NOTE NURSING/CASE MANAGEMENT/SOCIAL WORK - NSDCFUADDAPPT_GEN_ALL_CORE_FT
Transportation [facility] 4pm pickup via [facility] 4pm pickup via [facility] [phone]   TRIP #: [id number]

## 2020-12-09 NOTE — PROGRESS NOTE ADULT - PROBLEM SELECTOR PROBLEM 3
Pulmonary emphysema, unspecified emphysema type
PAIN SCALE 9 OF 10.

## 2020-12-09 NOTE — PROGRESS NOTE ADULT - ASSESSMENT
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 Pt now comfortable on 2-3L O2, which is her baseline.     Problems:  Acute on chronic hypoxemic and hypercapnic respiratory failure  Obesity Hypoventilation Syndrome  Restrictive Lung Disease  COVID-19 infection    Recommendations  - No pulmonary contraindication to discharge  - Ambulate to determine liters home O2 needed with ambulation   - Continue home oxygen supplementation to maintain O2 saturation> 92% at rest and ambulation  - Continue home CPAP, will arrange for pulmonary follow up   - Complete remdesivir x 5 days  - Completed dexamethasone 6 mg IV x10 days  - Albuterol q6h PRN for shortness of breath      Teddy Childress MD  Fellow, Pulmonary and Critical Care Medicine  [facility]  Pager: [phone], [phone] ([facility])  Email: [email or web address]

## 2020-12-09 NOTE — PROGRESS NOTE ADULT - PROBLEM SELECTOR PROBLEM 1
Acute on chronic respiratory failure with hypoxia

## 2020-12-09 NOTE — PROGRESS NOTE ADULT - SUBJECTIVE AND OBJECTIVE BOX
[facility] Division of Hospital Medicine  Giuseppe Kessler MD  In House Pager [phone]    Patient is a 85y old  Female who presents with a chief complaint of SOB (08 Dec 2020 15:43)      SUBJECTIVE / OVERNIGHT EVENTS:  No overnight events. Patient seen and examined at bedside, labs and vitals reviewed.  respiratory wise stable. no acute complaints today.   No fever, no chills, no SOB, no CP, no n/v/d, no abd pain, no dysuria      MEDICATIONS  (STANDING):  amLODIPine   Tablet 10 milliGRAM(s) Oral daily  ascorbic acid 500 milliGRAM(s) Oral daily  atorvastatin 40 milliGRAM(s) Oral at bedtime  calcium carbonate    500 mG (Tums) Chewable 0.5 Tablet(s) Chew two times a day  cholecalciferol 2000 Unit(s) Oral daily  dextrose 40% Gel 15 Gram(s) Oral once  dextrose 5%. 1000 milliLiter(s) (50 mL/Hr) IV Continuous <Continuous>  dextrose 5%. 1000 milliLiter(s) (100 mL/Hr) IV Continuous <Continuous>  dextrose 50% Injectable 25 Gram(s) IV Push once  dextrose 50% Injectable 12.5 Gram(s) IV Push once  dextrose 50% Injectable 25 Gram(s) IV Push once  furosemide    Tablet 40 milliGRAM(s) Oral daily  gabapentin 100 milliGRAM(s) Oral three times a day  glucagon  Injectable 1 milliGRAM(s) IntraMuscular once  heparin   Injectable 5000 Unit(s) SubCutaneous every 8 hours  hydrALAZINE 100 milliGRAM(s) Oral three times a day  insulin glargine Injectable (LANTUS) 14 Unit(s) SubCutaneous at bedtime  insulin lispro (ADMELOG) corrective regimen sliding scale   SubCutaneous three times a day before meals  insulin lispro Injectable (ADMELOG) 6 Unit(s) SubCutaneous three times a day before meals  metoprolol succinate  milliGRAM(s) Oral daily  montelukast 10 milliGRAM(s) Oral daily  polyethylene glycol 3350 17 Gram(s) Oral two times a day  senna 2 Tablet(s) Oral at bedtime    MEDICATIONS  (PRN):  acetaminophen   Tablet .. 650 milliGRAM(s) Oral every 6 hours PRN Mild Pain (1 - 3), Moderate Pain (4 - 6)  ALBUTerol    90 MICROgram(s) HFA Inhaler 2 Puff(s) Inhalation every 6 hours PRN Shortness of Breath and/or Wheezing  artificial tears (preservative free) Ophthalmic Solution 1 Drop(s) Both EYES three times a day PRN Dry Eyes    CAPILLARY BLOOD GLUCOSE      POCT Blood Glucose.: 136 mg/dL (09 Dec 2020 08:32)  POCT Blood Glucose.: 121 mg/dL (09 Dec 2020 07:11)  POCT Blood Glucose.: 178 mg/dL (08 Dec 2020 21:45)  POCT Blood Glucose.: 175 mg/dL (08 Dec 2020 16:46)  POCT Blood Glucose.: 256 mg/dL (08 Dec 2020 11:37)    I&O's Summary    08 Dec 2020 07:01  -  09 Dec 2020 07:00  --------------------------------------------------------  IN: 400 mL / OUT: 400 mL / NET: 0 mL        PHYSICAL EXAM:  Vital Signs Last 24 Hrs  T(C): 36.9 (09 Dec 2020 07:04), Max: 37.2 (08 Dec 2020 13:39)  T(F): 98.4 (09 Dec 2020 07:04), Max: 98.9 (08 Dec 2020 13:39)  HR: 94 (09 Dec 2020 07:40) (75 - 94)  BP: 127/72 (09 Dec 2020 07:04) (126/79 - 133/50)  BP(mean): --  RR: 18 (09 Dec 2020 07:04) (17 - 19)  SpO2: 96% (09 Dec 2020 08:00) (93% - 100%)  Gen: NAD; resting in bed.  Pulm: no respiratory distress; CTA b/l; no wheezing  Cards: RRR, nl S1/S2; no obvious murmurs  Abd: soft; NT on exam  Ext: no cyanosis; no edema  Skin: no rash; no cyanosis    LABS:                        12.9   13.00 )-----------( 266      ( 08 Dec 2020 08:08 )             42.9     12-08    136  |  95<L>  |  45<H>  ----------------------------<  114<H>  4.5   |  33<H>  |  0.98    Ca    9.5      08 Dec 2020 08:08  Phos  4.3     12-08  Mg     2.3     12-08                  RADIOLOGY & ADDITIONAL TESTS:  Results Reviewed: Y  Imaging Personally Reviewed: Y  Electrocardiogram Personally Reviewed: Y    COORDINATION OF CARE:  Care Discussed with Consultants/Other Providers [Y/N]: Y  Prior or Outpatient Records Reviewed [Y/N]: Y

## 2020-12-09 NOTE — DISCHARGE NOTE NURSING/CASE MANAGEMENT/SOCIAL WORK - PATIENT PORTAL LINK FT
You can access the FollowMyHealth Patient Portal offered by [facility] by registering at the following website: [email or web address]. By joining [employer]’s FollowMyHealth portal, you will also be able to view your health information using other applications (apps) compatible with our system.

## 2020-12-09 NOTE — PROGRESS NOTE ADULT - ATTENDING COMMENTS
Acute on chronic hypoxemic and hypercapnic respiratory failure due to covid 19, OHS, restrictive lung disease. Improving!  Would c/w steroids, diuretics for pulm htn, bipap qhs, O2 during day.
Agree with plan as above. Patient seen and examined at bedside. Patient history, laboratory data, and imaging personally reviewed.     Pt is an 85F with PMHx chronic hypercapnic respiratory failure 2/2 SHAD/OHA and severe restrctive lung dz on continuous home O2 with pulmonary HTN presenting to [facility] on 11/29/20 with acute on chronic hypoxemic respiratory failure 2/2 COVID19 PNA. Pt clinically well-appearing on 2L NC. Continue to wean O2 supplementation with goal >90% O2 saturation. Evaluate for home O2 at rest and with ambulation. Completed course of Remdesivir, on 10 day course Dexamethasone through 12/9. Airway clearance therapy as above. Will need O/P evaluation for change to home CPAP settings.
Agree with plan as above. Patient seen and examined at bedside. Patient history, laboratory data, and imaging personally reviewed.     Pt is an 85F with PMHx chronic hypercapnic respiratory failure 2/2 SHAD/OHA and severe restrctive lung dz on continuous home O2 with pulmonary HTN presenting to [facility] on 11/29/20 with acute on chronic hypoxemic respiratory failure 2/2 COVID19 PNA. Pt clinically well-appearing on 2L NC. Continue to wean O2 supplementation with goal >90% O2 saturation. Evaluate for home O2 at rest and with ambulation. Completed course of Remdesivir, on 10 day course Dexamethasone through 12/9. Airway clearance therapy as above. qhs BIPAP on current prescribed settings. Pulmonary to continue to follow.
goal for dc is to get back to baseline O2 requirements
goal for dc is to get back to baseline O2 requirements
Patient is medically stable for discharge home. not candidate for home BiPap per pulmonary evaluation. c/w CPAP at home. will need extended Xarelto for extended DVT ppx.    55 minutes spent coordinating discharge

## 2020-12-09 NOTE — PROGRESS NOTE ADULT - ASSESSMENT
Pt is an 86 yo female with ho COPD on home O2 2L, morbidly obese, DM a/w acute on chronic hypoxic resp failure due to COVID. clinically improving, back to baseline O2 requirement. awaiting evaluation for home BiPAP.

## 2020-12-09 NOTE — DISCHARGE NOTE NURSING/CASE MANAGEMENT/SOCIAL WORK - NSSCTYPOFSERV_GEN_ALL_CORE
RN/PT services. Nurse to contact patient within 24 hours of discharge. Nurse to evaluate for Home Physical therapy.

## 2020-12-09 NOTE — PROGRESS NOTE ADULT - SUBJECTIVE AND OBJECTIVE BOX
CHIEF COMPLAINT:    Interval Events:    REVIEW OF SYSTEMS:  Constitutional: [ ] negative [ ] fevers [ ] chills [ ] weight loss [ ] weight gain  HEENT: [ ] negative [ ] dry eyes [ ] eye irritation [ ] postnasal drip [ ] nasal congestion  CV: [ ] negative  [ ] chest pain [ ] orthopnea [ ] palpitations [ ] murmur  Resp: [ ] negative [ ] cough [ ] shortness of breath [ ] dyspnea [ ] wheezing [ ] sputum [ ] hemoptysis  GI: [ ] negative [ ] nausea [ ] vomiting [ ] diarrhea [ ] constipation [ ] abd pain [ ] dysphagia   : [ ] negative [ ] dysuria [ ] nocturia [ ] hematuria [ ] increased urinary frequency  Musculoskeletal: [ ] negative [ ] back pain [ ] myalgias [ ] arthralgias [ ] fracture  Skin: [ ] negative [ ] rash [ ] itch  Neurological: [ ] negative [ ] headache [ ] dizziness [ ] syncope [ ] weakness [ ] numbness  Psychiatric: [ ] negative [ ] anxiety [ ] depression  Endocrine: [ ] negative [ ] diabetes [ ] thyroid problem  Hematologic/Lymphatic: [ ] negative [ ] anemia [ ] bleeding problem  Allergic/Immunologic: [ ] negative [ ] itchy eyes [ ] nasal discharge [ ] hives [ ] angioedema  [ ] All other systems negative  [ ] Unable to assess ROS because ________    OBJECTIVE:  ICU Vital Signs Last 24 Hrs  T(C): 36.9 (09 Dec 2020 07:04), Max: 37.2 (08 Dec 2020 13:39)  T(F): 98.4 (09 Dec 2020 07:04), Max: 98.9 (08 Dec 2020 13:39)  HR: 99 (09 Dec 2020 11:00) (75 - 105)  BP: 131/63 (09 Dec 2020 11:00) (127/72 - 133/50)  BP(mean): --  ABP: --  ABP(mean): --  RR: 18 (09 Dec 2020 11:00) (17 - 18)  SpO2: 91% (09 Dec 2020 11:00) (91% - 100%)        12-08 @ 07:01  -  12-09 @ 07:00  --------------------------------------------------------  IN: 400 mL / OUT: 400 mL / NET: 0 mL      CAPILLARY BLOOD GLUCOSE      POCT Blood Glucose.: 136 mg/dL (09 Dec 2020 08:32)      PHYSICAL EXAM:  General:   HEENT:   Lymph Nodes:  Neck:   Respiratory:   Cardiovascular:   Abdomen:   Extremities:   Skin:   Neurological:  Psychiatry:    HOSPITAL MEDICATIONS:  MEDICATIONS  (STANDING):  amLODIPine   Tablet 10 milliGRAM(s) Oral daily  ascorbic acid 500 milliGRAM(s) Oral daily  atorvastatin 40 milliGRAM(s) Oral at bedtime  calcium carbonate    500 mG (Tums) Chewable 0.5 Tablet(s) Chew two times a day  cholecalciferol 2000 Unit(s) Oral daily  dextrose 40% Gel 15 Gram(s) Oral once  dextrose 5%. 1000 milliLiter(s) (50 mL/Hr) IV Continuous <Continuous>  dextrose 5%. 1000 milliLiter(s) (100 mL/Hr) IV Continuous <Continuous>  dextrose 50% Injectable 25 Gram(s) IV Push once  dextrose 50% Injectable 12.5 Gram(s) IV Push once  dextrose 50% Injectable 25 Gram(s) IV Push once  furosemide    Tablet 40 milliGRAM(s) Oral daily  gabapentin 100 milliGRAM(s) Oral three times a day  glucagon  Injectable 1 milliGRAM(s) IntraMuscular once  heparin   Injectable 5000 Unit(s) SubCutaneous every 8 hours  hydrALAZINE 100 milliGRAM(s) Oral three times a day  insulin glargine Injectable (LANTUS) 14 Unit(s) SubCutaneous at bedtime  insulin lispro (ADMELOG) corrective regimen sliding scale   SubCutaneous three times a day before meals  insulin lispro Injectable (ADMELOG) 6 Unit(s) SubCutaneous three times a day before meals  metoprolol succinate  milliGRAM(s) Oral daily  montelukast 10 milliGRAM(s) Oral daily  polyethylene glycol 3350 17 Gram(s) Oral two times a day  senna 2 Tablet(s) Oral at bedtime    MEDICATIONS  (PRN):  acetaminophen   Tablet .. 650 milliGRAM(s) Oral every 6 hours PRN Mild Pain (1 - 3), Moderate Pain (4 - 6)  ALBUTerol    90 MICROgram(s) HFA Inhaler 2 Puff(s) Inhalation every 6 hours PRN Shortness of Breath and/or Wheezing  artificial tears (preservative free) Ophthalmic Solution 1 Drop(s) Both EYES three times a day PRN Dry Eyes      LABS:                        12.9   13.00 )-----------( 266      ( 08 Dec 2020 08:08 )             42.9     Hgb Trend: 12.9<--, 12.7<--, 13.8<--, 12.6<--, 12.3<--  12-08    136  |  95<L>  |  45<H>  ----------------------------<  114<H>  4.5   |  33<H>  |  0.98    Ca    9.5      08 Dec 2020 08:08  Phos  4.3     12-08  Mg     2.3     12-08      Creatinine Trend: 0.98<--, 0.96<--, 0.97<--, 0.96<--, 0.99<--, 0.99<--      Arterial Blood Gas:  12-08 @ 10:34  7.43/48/76/30/94.2/6.8  ABG lactate: --        MICROBIOLOGY:       RADIOLOGY:  [ ] Reviewed and interpreted by me    PULMONARY FUNCTION TESTS:    EKG: CHIEF COMPLAINT: Shortness of breath    Interval Events: No events overnight. No shortness of breath on 2-3L O2. Wa    REVIEW OF SYSTEMS:  Constitutional: [ ] negative [ ] fevers [ ] chills [ ] weight loss [ ] weight gain  HEENT: [ ] negative [ ] dry eyes [ ] eye irritation [ ] postnasal drip [ ] nasal congestion  CV: [ ] negative  [ ] chest pain [ ] orthopnea [ ] palpitations [ ] murmur  Resp: [ ] negative [ ] cough [ ] shortness of breath [ ] dyspnea [ ] wheezing [ ] sputum [ ] hemoptysis  GI: [ ] negative [ ] nausea [ ] vomiting [ ] diarrhea [ ] constipation [ ] abd pain [ ] dysphagia   : [ ] negative [ ] dysuria [ ] nocturia [ ] hematuria [ ] increased urinary frequency  Musculoskeletal: [ ] negative [ ] back pain [ ] myalgias [ ] arthralgias [ ] fracture  Skin: [ ] negative [ ] rash [ ] itch  Neurological: [ ] negative [ ] headache [ ] dizziness [ ] syncope [ ] weakness [ ] numbness  Psychiatric: [ ] negative [ ] anxiety [ ] depression  Endocrine: [ ] negative [ ] diabetes [ ] thyroid problem  Hematologic/Lymphatic: [ ] negative [ ] anemia [ ] bleeding problem  Allergic/Immunologic: [ ] negative [ ] itchy eyes [ ] nasal discharge [ ] hives [ ] angioedema  [ ] All other systems negative  [ ] Unable to assess ROS because ________    OBJECTIVE:  ICU Vital Signs Last 24 Hrs  T(C): 36.9 (09 Dec 2020 07:04), Max: 37.2 (08 Dec 2020 13:39)  T(F): 98.4 (09 Dec 2020 07:04), Max: 98.9 (08 Dec 2020 13:39)  HR: 99 (09 Dec 2020 11:00) (75 - 105)  BP: 131/63 (09 Dec 2020 11:00) (127/72 - 133/50)  BP(mean): --  ABP: --  ABP(mean): --  RR: 18 (09 Dec 2020 11:00) (17 - 18)  SpO2: 91% (09 Dec 2020 11:00) (91% - 100%)        12-08 @ 07:01  -  12-09 @ 07:00  --------------------------------------------------------  IN: 400 mL / OUT: 400 mL / NET: 0 mL      CAPILLARY BLOOD GLUCOSE      POCT Blood Glucose.: 136 mg/dL (09 Dec 2020 08:32)      PHYSICAL EXAM:  General:   HEENT:   Lymph Nodes:  Neck:   Respiratory:   Cardiovascular:   Abdomen:   Extremities:   Skin:   Neurological:  Psychiatry:    HOSPITAL MEDICATIONS:  MEDICATIONS  (STANDING):  amLODIPine   Tablet 10 milliGRAM(s) Oral daily  ascorbic acid 500 milliGRAM(s) Oral daily  atorvastatin 40 milliGRAM(s) Oral at bedtime  calcium carbonate    500 mG (Tums) Chewable 0.5 Tablet(s) Chew two times a day  cholecalciferol 2000 Unit(s) Oral daily  dextrose 40% Gel 15 Gram(s) Oral once  dextrose 5%. 1000 milliLiter(s) (50 mL/Hr) IV Continuous <Continuous>  dextrose 5%. 1000 milliLiter(s) (100 mL/Hr) IV Continuous <Continuous>  dextrose 50% Injectable 25 Gram(s) IV Push once  dextrose 50% Injectable 12.5 Gram(s) IV Push once  dextrose 50% Injectable 25 Gram(s) IV Push once  furosemide    Tablet 40 milliGRAM(s) Oral daily  gabapentin 100 milliGRAM(s) Oral three times a day  glucagon  Injectable 1 milliGRAM(s) IntraMuscular once  heparin   Injectable 5000 Unit(s) SubCutaneous every 8 hours  hydrALAZINE 100 milliGRAM(s) Oral three times a day  insulin glargine Injectable (LANTUS) 14 Unit(s) SubCutaneous at bedtime  insulin lispro (ADMELOG) corrective regimen sliding scale   SubCutaneous three times a day before meals  insulin lispro Injectable (ADMELOG) 6 Unit(s) SubCutaneous three times a day before meals  metoprolol succinate  milliGRAM(s) Oral daily  montelukast 10 milliGRAM(s) Oral daily  polyethylene glycol 3350 17 Gram(s) Oral two times a day  senna 2 Tablet(s) Oral at bedtime    MEDICATIONS  (PRN):  acetaminophen   Tablet .. 650 milliGRAM(s) Oral every 6 hours PRN Mild Pain (1 - 3), Moderate Pain (4 - 6)  ALBUTerol    90 MICROgram(s) HFA Inhaler 2 Puff(s) Inhalation every 6 hours PRN Shortness of Breath and/or Wheezing  artificial tears (preservative free) Ophthalmic Solution 1 Drop(s) Both EYES three times a day PRN Dry Eyes      LABS:                        12.9   13.00 )-----------( 266      ( 08 Dec 2020 08:08 )             42.9     Hgb Trend: 12.9<--, 12.7<--, 13.8<--, 12.6<--, 12.3<--  12-08    136  |  95<L>  |  45<H>  ----------------------------<  114<H>  4.5   |  33<H>  |  0.98    Ca    9.5      08 Dec 2020 08:08  Phos  4.3     12-08  Mg     2.3     12-08      Creatinine Trend: 0.98<--, 0.96<--, 0.97<--, 0.96<--, 0.99<--, 0.99<--      Arterial Blood Gas:  12-08 @ 10:34  7.43/48/76/30/94.2/6.8  ABG lactate: --        MICROBIOLOGY:       RADIOLOGY:  [ ] Reviewed and interpreted by me    PULMONARY FUNCTION TESTS:    EKG: CHIEF COMPLAINT: Shortness of breath    Interval Events: No events overnight. No shortness of breath on 2-3L O2. Wa    REVIEW OF SYSTEMS:  Constitutional: [ ] negative [ ] fevers [ ] chills [ ] weight loss [ ] weight gain  HEENT: [ ] negative [ ] dry eyes [ ] eye irritation [ ] postnasal drip [ ] nasal congestion  CV: [ ] negative  [ ] chest pain [ ] orthopnea [ ] palpitations [ ] murmur  Resp: [ ] negative [ ] cough [ ] shortness of breath [ ] dyspnea [ ] wheezing [ ] sputum [ ] hemoptysis  GI: [ ] negative [ ] nausea [ ] vomiting [ ] diarrhea [ ] constipation [ ] abd pain [ ] dysphagia   : [ ] negative [ ] dysuria [ ] nocturia [ ] hematuria [ ] increased urinary frequency  Musculoskeletal: [ ] negative [ ] back pain [ ] myalgias [ ] arthralgias [ ] fracture  Skin: [ ] negative [ ] rash [ ] itch  Neurological: [ ] negative [ ] headache [ ] dizziness [ ] syncope [ ] weakness [ ] numbness  Psychiatric: [ ] negative [ ] anxiety [ ] depression  Endocrine: [ ] negative [ ] diabetes [ ] thyroid problem  Hematologic/Lymphatic: [ ] negative [ ] anemia [ ] bleeding problem  Allergic/Immunologic: [ ] negative [ ] itchy eyes [ ] nasal discharge [ ] hives [ ] angioedema  [ ] All other systems negative  [ ] Unable to assess ROS because ________    OBJECTIVE:  ICU Vital Signs Last 24 Hrs  T(C): 36.9 (09 Dec 2020 07:04), Max: 37.2 (08 Dec 2020 13:39)  T(F): 98.4 (09 Dec 2020 07:04), Max: 98.9 (08 Dec 2020 13:39)  HR: 99 (09 Dec 2020 11:00) (75 - 105)  BP: 131/63 (09 Dec 2020 11:00) (127/72 - 133/50)  BP(mean): --  ABP: --  ABP(mean): --  RR: 18 (09 Dec 2020 11:00) (17 - 18)  SpO2: 91% (09 Dec 2020 11:00) (91% - 100%)        12-08 @ 07:01  -  12-09 @ 07:00  --------------------------------------------------------  IN: 400 mL / OUT: 400 mL / NET: 0 mL      CAPILLARY BLOOD GLUCOSE      POCT Blood Glucose.: 136 mg/dL (09 Dec 2020 08:32)      PHYSICAL EXAM:  General: NAD, morbidly obese, appears comfortable on nasal canula  Skin: Warm and dry, no rashes  Neuro: AAOx3, nonfocal  HEENT: PERRL, EOMI, no oral lesions  Neck: Full range of motion, no JVD  Lungs: Clear to ascultation bilaterally no wheezes, rales, or rhonchi   Heart: Regular rate and rhythm, no murmurs  Abdomen: Normoactive bowl sounds, soft, nontender, nondistended  Extremities: No lower extremity tenderness, erythema, or edema   Psych: normal mood and affect    HOSPITAL MEDICATIONS:  MEDICATIONS  (STANDING):  amLODIPine   Tablet 10 milliGRAM(s) Oral daily  ascorbic acid 500 milliGRAM(s) Oral daily  atorvastatin 40 milliGRAM(s) Oral at bedtime  calcium carbonate    500 mG (Tums) Chewable 0.5 Tablet(s) Chew two times a day  cholecalciferol 2000 Unit(s) Oral daily  dextrose 40% Gel 15 Gram(s) Oral once  dextrose 5%. 1000 milliLiter(s) (50 mL/Hr) IV Continuous <Continuous>  dextrose 5%. 1000 milliLiter(s) (100 mL/Hr) IV Continuous <Continuous>  dextrose 50% Injectable 25 Gram(s) IV Push once  dextrose 50% Injectable 12.5 Gram(s) IV Push once  dextrose 50% Injectable 25 Gram(s) IV Push once  furosemide    Tablet 40 milliGRAM(s) Oral daily  gabapentin 100 milliGRAM(s) Oral three times a day  glucagon  Injectable 1 milliGRAM(s) IntraMuscular once  heparin   Injectable 5000 Unit(s) SubCutaneous every 8 hours  hydrALAZINE 100 milliGRAM(s) Oral three times a day  insulin glargine Injectable (LANTUS) 14 Unit(s) SubCutaneous at bedtime  insulin lispro (ADMELOG) corrective regimen sliding scale   SubCutaneous three times a day before meals  insulin lispro Injectable (ADMELOG) 6 Unit(s) SubCutaneous three times a day before meals  metoprolol succinate  milliGRAM(s) Oral daily  montelukast 10 milliGRAM(s) Oral daily  polyethylene glycol 3350 17 Gram(s) Oral two times a day  senna 2 Tablet(s) Oral at bedtime    MEDICATIONS  (PRN):  acetaminophen   Tablet .. 650 milliGRAM(s) Oral every 6 hours PRN Mild Pain (1 - 3), Moderate Pain (4 - 6)  ALBUTerol    90 MICROgram(s) HFA Inhaler 2 Puff(s) Inhalation every 6 hours PRN Shortness of Breath and/or Wheezing  artificial tears (preservative free) Ophthalmic Solution 1 Drop(s) Both EYES three times a day PRN Dry Eyes      LABS:                        12.9   13.00 )-----------( 266      ( 08 Dec 2020 08:08 )             42.9     Hgb Trend: 12.9<--, 12.7<--, 13.8<--, 12.6<--, 12.3<--  12-08    136  |  95<L>  |  45<H>  ----------------------------<  114<H>  4.5   |  33<H>  |  0.98    Ca    9.5      08 Dec 2020 08:08  Phos  4.3     12-08  Mg     2.3     12-08      Creatinine Trend: 0.98<--, 0.96<--, 0.97<--, 0.96<--, 0.99<--, 0.99<--      Arterial Blood Gas:  12-08 @ 10:34  7.43/48/76/30/94.2/6.8  ABG lactate: --        MICROBIOLOGY:       RADIOLOGY:  [ ] Reviewed and interpreted by me    PULMONARY FUNCTION TESTS:    EKG:

## 2020-12-15 ENCOUNTER — APPOINTMENT (OUTPATIENT)
Dept: PULMONOLOGY | Facility: CLINIC | Age: 85
End: 2020-12-15
Payer: MEDICARE

## 2020-12-15 DIAGNOSIS — R93.89 ABNORMAL FINDINGS ON DIAGNOSTIC IMAGING OF OTHER SPECIFIED BODY STRUCTURES: ICD-10-CM

## 2020-12-15 PROCEDURE — 99214 OFFICE O/P EST MOD 30 MIN: CPT | Mod: CS,95

## 2020-12-16 PROBLEM — R93.89 ABNORMAL CHEST XRAY: Status: ACTIVE | Noted: 2020-12-16

## 2020-12-16 NOTE — REASON FOR VISIT
[Home] : at home, [unfilled] , at the time of the visit. [Medical Office: ([facility])___] : at the medical office located in  [Family Member] : family member [Verbal consent obtained from patient] : the patient, [unfilled] [Sleep Apnea] : sleep apnea [Shortness of Breath] : shortness of breath [Follow-Up - From Hospitalization] : a follow-up visit after a recent hospitalization

## 2020-12-16 NOTE — HISTORY OF PRESENT ILLNESS
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 Requiring Bilevel therapy and admitted to MICU for close monitoring. Improved and transferred to RCU, discharged to rehab with Bilevel Q.\par \par Initial office visit 1/28/20: Lifelong nonsmoker, denies cough or chest pain. Chronic dyspnea on exertion- progressive, ambulates with walker and becomes dyspneic with approx 20 feet of ambulation per her daughter, however, is predominantly sedentary.\par Chronic bipedal edema\par Inhaler regimen includes: Advair 250/50 BID, Spiriva, Albuterol. \par Uses oxygen as needed, prescribed by her PCP 2 years ago. \par \par PFTs: Severe restrictive defect, moderately reduced DLCO\par Chest imaging: CXR 11/1/19- limited exam, pulmonary edema. \par NM V/Q scan 11/1/19: very low prob for PE\par \par Echo: 10/31/19: LVEF 65-70%, limited study, Concentric LV remodeling, Hyperdynamic LV. Moderate pulmonary hypertension with est RVSP of 55 mmHg. Mild AS\par \par Follow up visit 2/12/200- accompanied by her daughter\par States symptoms are unchanged - dyspnea on minimal exertion and bipedal edema.\par TTE 3/3/20 - very limited study, unable to eval PA pressures. Grossly normal LV fxn\par \par ABG on RA: 7.35/59.6/58/32/84%\par EOT - unable to complete 2/2 severe hip pain, standing O2 sat on RA 87%\par \par Data download: days 1/4-2/2/2020\par Device: DreamStation Autopap, settings: CPAP 15 cmH2O\par DME: [facility]\par Use 93% of days, average use 23 minutes\par AHI 1.3\par \par 12/15/20:\par Televisit follow up, accompanied by her daughter.\par REcently hospitalized at [facility] hospital 11/29-12/9/20 for COVID 19 infecttion, acute on chronic hypoxic and hypercapnic respiratory failure.\par Was treated with Remdesivir, Decadron, Bilevel and discharge on Xarelto. CXR reviewed- bilateral patchy opacities. \par Overall doing better - minimally ambulates at home, assisted by her HHA. Endorses mild dyspnea and decreased appetite. Denies fever or cough. Using Albuterol prn and Advair 500/50 bid. \par Seen by her PMD Dr. Huff yesterday who narendra blood work and gave her the influenza vaccine.

## 2020-12-16 NOTE — ASSESSMENT
[FreeTextEntry1] : A/P: SHAD, OHS, moderate pulmonary hypertension (group 2/3), morbid obesity (BMI 49.5), recent hospitalization for COVID-19 infection.\par \par 1. COVID-19 infection - \par Clinically improving\par repeat CXR at 6 weeks (mid January) - mail CXR prescription\par Will c/w Advair and bronchodilators\par Encouraged to remain active at home. \par remains on Xarelto to complete 30 days\par \par 2. Hypoxic respiratory failure - related to obesity, hypoventilation - c/w 3 LNC continuously\par \par 3. Hypercapnic respiratory failure -currently has Cpap at 15 and data shows minimal use - instructed to use whenever sleeping (napping and at night)\par ABG from 12/8 at baseline 7.43/48/76/30/94\par Requires Split study with Bilevel and TCCO2 monitoring- Will readdress obtaining Split study at next visit\par \par 4.Pulmonary hypertension - TTE from 12/8/20 reviewed - very limited study, unable to eval RV\par c/w diuretic therapy and cardiac optimization. \par \par Above was discussed with the patient and her daughter at length and they appear to understand, all questions answered. \par Follow up in 1 month

## 2020-12-16 NOTE — CONSULT LETTER
[Dear  ___] : Dear  [unfilled], [Please see my note below.] : Please see my note below. [Consult Closing:] : Thank you very much for allowing me to participate in the care of this patient.  If you have any questions, please do not hesitate to contact me. [Sincerely,] : Sincerely, [Courtesy Letter:] : I had the pleasure of seeing your patient, [unfilled], in my office today. [FreeTextEntry2] : Dr. Seevn Huff [FreeTextEntry3] : Fariba Yao MD\par  \par Division of Pulmonary, Critical Care & Sleep Medicine\par [facility] [employer] [facility]\par \par \par

## 2020-12-16 NOTE — REVIEW OF SYSTEMS
[EDS] : EDS [Dyspnea] : dyspnea [A.M. Dry Mouth] : a.m. dry mouth [SOB on Exertion] : sob on exertion [Edema] : edema [Arthralgias] : arthralgias [Obesity] : obesity [Negative] : Psychiatric [Fever] : no fever [Fatigue] : no fatigue [Recent Wt Gain (___ Lbs)] : ~T no recent weight gain [Chills] : no chills [Poor Appetite] : no poor appetite [Recent Wt Loss (___ Lbs)] : ~T no recent weight loss [Cough] : no cough [Hemoptysis] : no hemoptysis [Chest Tightness] : no chest tightness [Frequent URIs] : no frequent URIs [Sputum] : no sputum [Pleuritic Pain] : no pleuritic pain [Wheezing] : no wheezing [Chest Discomfort] : no chest discomfort [Claudication] : no claudication [Orthopnea] : no orthopnea [Palpitations] : no palpitations [Syncope] : no syncope [Diabetes] : no diabetes [Thyroid Problem] : no thyroid problem [TextBox_144] : osteoporosis

## 2021-05-29 ENCOUNTER — INPATIENT (INPATIENT)
Facility: HOSPITAL | Age: 86
LOS: 5 days | Discharge: HOME HEALTH | End: 2021-06-04
Attending: INTERNAL MEDICINE | Admitting: INTERNAL MEDICINE
Payer: MEDICARE

## 2021-05-29 VITALS
HEIGHT: 66 IN | HEART RATE: 79 BPM | DIASTOLIC BLOOD PRESSURE: 61 MMHG | RESPIRATION RATE: 20 BRPM | TEMPERATURE: 98 F | OXYGEN SATURATION: 100 % | SYSTOLIC BLOOD PRESSURE: 141 MMHG

## 2021-05-29 DIAGNOSIS — M79.89 OTHER SPECIFIED SOFT TISSUE DISORDERS: ICD-10-CM

## 2021-05-29 DIAGNOSIS — Z96.659 PRESENCE OF UNSPECIFIED ARTIFICIAL KNEE JOINT: Chronic | ICD-10-CM

## 2021-05-29 DIAGNOSIS — I50.9 HEART FAILURE, UNSPECIFIED: ICD-10-CM

## 2021-05-29 DIAGNOSIS — Z29.9 ENCOUNTER FOR PROPHYLACTIC MEASURES, UNSPECIFIED: ICD-10-CM

## 2021-05-29 DIAGNOSIS — R82.90 UNSPECIFIED ABNORMAL FINDINGS IN URINE: ICD-10-CM

## 2021-05-29 DIAGNOSIS — J44.9 CHRONIC OBSTRUCTIVE PULMONARY DISEASE, UNSPECIFIED: ICD-10-CM

## 2021-05-29 DIAGNOSIS — I10 ESSENTIAL (PRIMARY) HYPERTENSION: ICD-10-CM

## 2021-05-29 DIAGNOSIS — F32.9 MAJOR DEPRESSIVE DISORDER, SINGLE EPISODE, UNSPECIFIED: ICD-10-CM

## 2021-05-29 DIAGNOSIS — E11.69 TYPE 2 DIABETES MELLITUS WITH OTHER SPECIFIED COMPLICATION: ICD-10-CM

## 2021-05-29 DIAGNOSIS — G47.33 OBSTRUCTIVE SLEEP APNEA (ADULT) (PEDIATRIC): ICD-10-CM

## 2021-05-29 LAB
ALBUMIN SERPL ELPH-MCNC: 3.7 G/DL — SIGNIFICANT CHANGE UP (ref 3.3–5)
ALP SERPL-CCNC: 65 U/L — SIGNIFICANT CHANGE UP (ref 40–120)
ALT FLD-CCNC: 17 U/L — SIGNIFICANT CHANGE UP (ref 4–33)
ANION GAP SERPL CALC-SCNC: 10 MMOL/L — SIGNIFICANT CHANGE UP (ref 7–14)
APPEARANCE UR: CLEAR — SIGNIFICANT CHANGE UP
AST SERPL-CCNC: 20 U/L — SIGNIFICANT CHANGE UP (ref 4–32)
BACTERIA # UR AUTO: ABNORMAL
BASOPHILS # BLD AUTO: 0.03 K/UL — SIGNIFICANT CHANGE UP (ref 0–0.2)
BASOPHILS NFR BLD AUTO: 0.3 % — SIGNIFICANT CHANGE UP (ref 0–2)
BILIRUB SERPL-MCNC: 0.3 MG/DL — SIGNIFICANT CHANGE UP (ref 0.2–1.2)
BILIRUB UR-MCNC: NEGATIVE — SIGNIFICANT CHANGE UP
BUN SERPL-MCNC: 29 MG/DL — HIGH (ref 7–23)
CALCIUM SERPL-MCNC: 9.6 MG/DL — SIGNIFICANT CHANGE UP (ref 8.4–10.5)
CHLORIDE SERPL-SCNC: 95 MMOL/L — LOW (ref 98–107)
CO2 SERPL-SCNC: 33 MMOL/L — HIGH (ref 22–31)
COLOR SPEC: SIGNIFICANT CHANGE UP
CREAT SERPL-MCNC: 0.97 MG/DL — SIGNIFICANT CHANGE UP (ref 0.5–1.3)
DIFF PNL FLD: NEGATIVE — SIGNIFICANT CHANGE UP
EOSINOPHIL # BLD AUTO: 0.19 K/UL — SIGNIFICANT CHANGE UP (ref 0–0.5)
EOSINOPHIL NFR BLD AUTO: 2.1 % — SIGNIFICANT CHANGE UP (ref 0–6)
EPI CELLS # UR: 6 /HPF — HIGH (ref 0–5)
GLUCOSE SERPL-MCNC: 124 MG/DL — HIGH (ref 70–99)
GLUCOSE UR QL: NEGATIVE — SIGNIFICANT CHANGE UP
HCT VFR BLD CALC: 37.3 % — SIGNIFICANT CHANGE UP (ref 34.5–45)
HGB BLD-MCNC: 10.7 G/DL — LOW (ref 11.5–15.5)
HYALINE CASTS # UR AUTO: 1 /LPF — SIGNIFICANT CHANGE UP (ref 0–7)
IANC: 6.43 K/UL — SIGNIFICANT CHANGE UP (ref 1.5–8.5)
IMM GRANULOCYTES NFR BLD AUTO: 0.2 % — SIGNIFICANT CHANGE UP (ref 0–1.5)
KETONES UR-MCNC: NEGATIVE — SIGNIFICANT CHANGE UP
LEUKOCYTE ESTERASE UR-ACNC: ABNORMAL
LYMPHOCYTES # BLD AUTO: 1.89 K/UL — SIGNIFICANT CHANGE UP (ref 1–3.3)
LYMPHOCYTES # BLD AUTO: 20.4 % — SIGNIFICANT CHANGE UP (ref 13–44)
MCHC RBC-ENTMCNC: 25.6 PG — LOW (ref 27–34)
MCHC RBC-ENTMCNC: 28.7 GM/DL — LOW (ref 32–36)
MCV RBC AUTO: 89.2 FL — SIGNIFICANT CHANGE UP (ref 80–100)
MONOCYTES # BLD AUTO: 0.69 K/UL — SIGNIFICANT CHANGE UP (ref 0–0.9)
MONOCYTES NFR BLD AUTO: 7.5 % — SIGNIFICANT CHANGE UP (ref 2–14)
NEUTROPHILS # BLD AUTO: 6.43 K/UL — SIGNIFICANT CHANGE UP (ref 1.8–7.4)
NEUTROPHILS NFR BLD AUTO: 69.5 % — SIGNIFICANT CHANGE UP (ref 43–77)
NITRITE UR-MCNC: NEGATIVE — SIGNIFICANT CHANGE UP
NRBC # BLD: 0 /100 WBCS — SIGNIFICANT CHANGE UP
NRBC # FLD: 0 K/UL — SIGNIFICANT CHANGE UP
NT-PROBNP SERPL-SCNC: 322 PG/ML — HIGH
PH UR: 6.5 — SIGNIFICANT CHANGE UP (ref 5–8)
PLATELET # BLD AUTO: 217 K/UL — SIGNIFICANT CHANGE UP (ref 150–400)
POTASSIUM SERPL-MCNC: 4.9 MMOL/L — SIGNIFICANT CHANGE UP (ref 3.5–5.3)
POTASSIUM SERPL-SCNC: 4.9 MMOL/L — SIGNIFICANT CHANGE UP (ref 3.5–5.3)
PROT SERPL-MCNC: 8.5 G/DL — HIGH (ref 6–8.3)
PROT UR-MCNC: ABNORMAL
RBC # BLD: 4.18 M/UL — SIGNIFICANT CHANGE UP (ref 3.8–5.2)
RBC # FLD: 14.7 % — HIGH (ref 10.3–14.5)
RBC CASTS # UR COMP ASSIST: 1 /HPF — SIGNIFICANT CHANGE UP (ref 0–4)
SODIUM SERPL-SCNC: 138 MMOL/L — SIGNIFICANT CHANGE UP (ref 135–145)
SP GR SPEC: 1.01 — SIGNIFICANT CHANGE UP (ref 1.01–1.02)
TROPONIN T, HIGH SENSITIVITY RESULT: 24 NG/L — SIGNIFICANT CHANGE UP
TROPONIN T, HIGH SENSITIVITY RESULT: 25 NG/L — SIGNIFICANT CHANGE UP
UROBILINOGEN FLD QL: SIGNIFICANT CHANGE UP
WBC # BLD: 9.25 K/UL — SIGNIFICANT CHANGE UP (ref 3.8–10.5)
WBC # FLD AUTO: 9.25 K/UL — SIGNIFICANT CHANGE UP (ref 3.8–10.5)
WBC UR QL: 32 /HPF — HIGH (ref 0–5)

## 2021-05-29 PROCEDURE — 99223 1ST HOSP IP/OBS HIGH 75: CPT

## 2021-05-29 PROCEDURE — 99285 EMERGENCY DEPT VISIT HI MDM: CPT

## 2021-05-29 PROCEDURE — 71046 X-RAY EXAM CHEST 2 VIEWS: CPT | Mod: 26

## 2021-05-29 RX ORDER — GLUCAGON INJECTION, SOLUTION 0.5 MG/.1ML
1 INJECTION, SOLUTION SUBCUTANEOUS ONCE
Refills: 0 | Status: DISCONTINUED | OUTPATIENT
Start: 2021-05-29 | End: 2021-06-04

## 2021-05-29 RX ORDER — DEXTROSE 50 % IN WATER 50 %
15 SYRINGE (ML) INTRAVENOUS ONCE
Refills: 0 | Status: DISCONTINUED | OUTPATIENT
Start: 2021-05-29 | End: 2021-06-04

## 2021-05-29 RX ORDER — FUROSEMIDE 40 MG
40 TABLET ORAL ONCE
Refills: 0 | Status: COMPLETED | OUTPATIENT
Start: 2021-05-29 | End: 2021-05-29

## 2021-05-29 RX ORDER — DEXTROSE 50 % IN WATER 50 %
25 SYRINGE (ML) INTRAVENOUS ONCE
Refills: 0 | Status: DISCONTINUED | OUTPATIENT
Start: 2021-05-29 | End: 2021-06-04

## 2021-05-29 RX ORDER — SODIUM CHLORIDE 9 MG/ML
1000 INJECTION, SOLUTION INTRAVENOUS
Refills: 0 | Status: DISCONTINUED | OUTPATIENT
Start: 2021-05-29 | End: 2021-06-04

## 2021-05-29 RX ORDER — METOPROLOL TARTRATE 50 MG
100 TABLET ORAL DAILY
Refills: 0 | Status: DISCONTINUED | OUTPATIENT
Start: 2021-05-29 | End: 2021-06-04

## 2021-05-29 RX ORDER — AMLODIPINE BESYLATE 2.5 MG/1
10 TABLET ORAL DAILY
Refills: 0 | Status: DISCONTINUED | OUTPATIENT
Start: 2021-05-29 | End: 2021-06-04

## 2021-05-29 RX ORDER — ENOXAPARIN SODIUM 100 MG/ML
40 INJECTION SUBCUTANEOUS DAILY
Refills: 0 | Status: DISCONTINUED | OUTPATIENT
Start: 2021-05-29 | End: 2021-06-04

## 2021-05-29 RX ORDER — DEXTROSE 50 % IN WATER 50 %
12.5 SYRINGE (ML) INTRAVENOUS ONCE
Refills: 0 | Status: DISCONTINUED | OUTPATIENT
Start: 2021-05-29 | End: 2021-06-04

## 2021-05-29 RX ORDER — INSULIN LISPRO 100/ML
VIAL (ML) SUBCUTANEOUS AT BEDTIME
Refills: 0 | Status: DISCONTINUED | OUTPATIENT
Start: 2021-05-29 | End: 2021-05-30

## 2021-05-29 RX ORDER — BUDESONIDE AND FORMOTEROL FUMARATE DIHYDRATE 160; 4.5 UG/1; UG/1
2 AEROSOL RESPIRATORY (INHALATION)
Refills: 0 | Status: DISCONTINUED | OUTPATIENT
Start: 2021-05-29 | End: 2021-06-04

## 2021-05-29 RX ORDER — HYDRALAZINE HCL 50 MG
100 TABLET ORAL THREE TIMES A DAY
Refills: 0 | Status: DISCONTINUED | OUTPATIENT
Start: 2021-05-29 | End: 2021-06-04

## 2021-05-29 RX ORDER — FUROSEMIDE 40 MG
40 TABLET ORAL EVERY 12 HOURS
Refills: 0 | Status: DISCONTINUED | OUTPATIENT
Start: 2021-05-30 | End: 2021-05-31

## 2021-05-29 RX ORDER — GABAPENTIN 400 MG/1
100 CAPSULE ORAL THREE TIMES A DAY
Refills: 0 | Status: DISCONTINUED | OUTPATIENT
Start: 2021-05-29 | End: 2021-06-04

## 2021-05-29 RX ORDER — INSULIN LISPRO 100/ML
VIAL (ML) SUBCUTANEOUS
Refills: 0 | Status: DISCONTINUED | OUTPATIENT
Start: 2021-05-29 | End: 2021-05-30

## 2021-05-29 RX ORDER — ATORVASTATIN CALCIUM 80 MG/1
40 TABLET, FILM COATED ORAL AT BEDTIME
Refills: 0 | Status: DISCONTINUED | OUTPATIENT
Start: 2021-05-29 | End: 2021-06-04

## 2021-05-29 RX ORDER — ALBUTEROL 90 UG/1
2 AEROSOL, METERED ORAL EVERY 6 HOURS
Refills: 0 | Status: DISCONTINUED | OUTPATIENT
Start: 2021-05-29 | End: 2021-06-04

## 2021-05-29 RX ORDER — IPRATROPIUM/ALBUTEROL SULFATE 18-103MCG
3 AEROSOL WITH ADAPTER (GRAM) INHALATION EVERY 8 HOURS
Refills: 0 | Status: DISCONTINUED | OUTPATIENT
Start: 2021-05-29 | End: 2021-06-04

## 2021-05-29 RX ORDER — MONTELUKAST 4 MG/1
10 TABLET, CHEWABLE ORAL DAILY
Refills: 0 | Status: DISCONTINUED | OUTPATIENT
Start: 2021-05-29 | End: 2021-06-04

## 2021-05-29 RX ADMIN — Medication 100 MILLIGRAM(S): at 23:44

## 2021-05-29 RX ADMIN — Medication 40 MILLIGRAM(S): at 22:25

## 2021-05-29 NOTE — H&P ADULT - HISTORY OF PRESENT ILLNESS
Pt is an 84 y/o woman with hx of COPD/asthma on 3L NC O2, Dm2, HTN, mirela on cpap, lymphedema on bumex who was brought to the ER by daughter initially because she had woken up from a deep sleep and was seemingly very depressed (now denying) but also complaining of worsening LE edema b/l and continued SOB. Pt's son passed away 7 months ago and apparently when she woke up from sleep she was talking about wanting to join him. She denies SI or hallucinations currently. She then is saying her bumex was increased recently but still having no effect on her legs. She is also having issues with her nasal cannula, complaining of dry nose and upper lip requiring alot of vaseline. Her shortness of breath is at baseline for her. No cough, fever, chest pain, abd pain, dysuria or diarrhea. In the ER pt was given iv lasix.

## 2021-05-29 NOTE — ED ADULT TRIAGE NOTE - CHIEF COMPLAINT QUOTE
Pt brought in by EMS called by daughter due to unable to wake her home, reports pt kept stating "let me sleep. I want to go the eternal home." Denies SI/HI. Pt denies any pain/discomfort, dizziness, SOB. Pt with no complaints. PMHx diabetes, htn, hld, GERD, COPD on 2-4L of O2 Pt brought in by EMS called by daughter due to unable to wake her up, reports pt kept stating "let me sleep. I want to go the eternal home." Denies SI/HI. Pt denies any pain/discomfort, dizziness, SOB. Pt with no complaints. PMHx diabetes, htn, hld, GERD, COPD on 2-4L of O2

## 2021-05-29 NOTE — H&P ADULT - NSICDXPASTMEDICALHX_GEN_ALL_CORE_FT
PAST MEDICAL HISTORY:  Asthma     DM (diabetes mellitus)     GERD (gastroesophageal reflux disease)     HLD (hyperlipidemia)     HTN (hypertension)     Lymphedema

## 2021-05-29 NOTE — ED PROVIDER NOTE - ATTENDING CONTRIBUTION TO CARE
I performed a face-to-face evaluation of the patient and performed a history and physical examination. I agree with the history and physical examination.    Francisco: SHAD (on home O2). Daughter tried to wake pt. Pt. said she's depressed (son  6 mos ago). Pt. denies depression or SI. C/o chronic lymphedema swelling (on Bumex). Check BNP and Cr and UA as causes of swelling (CHF, CKD, cirrhosis, proteinuria). If labs unremarkable, dc home.

## 2021-05-29 NOTE — H&P ADULT - NSHPLABSRESULTS_GEN_ALL_CORE
138  |  95<L>  |  29<H>  ----------------------------<  124<H>  4.9   |  33<H>  |  0.97    Ca    9.6      29 May 2021 18:59    TPro  8.5<H>  /  Alb  3.7  /  TBili  0.3  /  DBili  x   /  AST  20  /  ALT  17  /  AlkPhos  65                              10.7   9.25  )-----------( 217      ( 29 May 2021 18:59 )             37.3             LIVER FUNCTIONS - ( 29 May 2021 18:59 )  Alb: 3.7 g/dL / Pro: 8.5 g/dL / ALK PHOS: 65 U/L / ALT: 17 U/L / AST: 20 U/L / GGT: x                 Urinalysis Basic - ( 29 May 2021 18:59 )    Color: Light Yellow / Appearance: Clear / S.010 / pH: x  Gluc: x / Ketone: Negative  / Bili: Negative / Urobili: <2 mg/dL   Blood: x / Protein: Trace / Nitrite: Negative   Leuk Esterase: Large / RBC: 1 /HPF / WBC 32 /HPF   Sq Epi: x / Non Sq Epi: 6 /HPF / Bacteria: Few    EKG: unavailable in paper chart, repeat ordered    CXR: INTERPRETATION:  cardiomegaly. pulmonary edema.

## 2021-05-29 NOTE — H&P ADULT - NSHPREVIEWOFSYSTEMS_GEN_ALL_CORE
REVIEW OF SYSTEMS:    CONSTITUTIONAL: No weakness, fevers or chills  EYES/ENT: No visual changes;  No dysphagia  NECK: No pain or stiffness  RESPIRATORY: +chronic SOB. No cough, wheezing, hemoptysis  CARDIOVASCULAR: +b/l LE edema No chest pain or palpitations  GASTROINTESTINAL: No abdominal or epigastric pain. No nausea, vomiting, or hematemesis; No diarrhea or constipation. No melena or hematochezia.  GENITOURINARY: No dysuria, frequency or hematuria  NEUROLOGICAL: No numbness or weakness  SKIN: +dry skin/cracked around nasal cannula oxygen delivery

## 2021-05-29 NOTE — H&P ADULT - NSHPPHYSICALEXAM_GEN_ALL_CORE
PHYSICAL EXAM:  GENERAL: NAD, well-developed, well-nourished  HEAD:  Atraumatic, Normocephalic  EYES: EOMI, PERRLA, conjunctiva and sclera clear  NOSE: +dry cracked skin around nasal cannula with vaseline on  NECK: Supple, No JVD, +mild rash around skin fold/nasal cannula tubing, nonerythematous, nontender to palpation  CHEST/LUNG: diminished breath sounds b/l. noo wheezes or rales  HEART: Regular rate and rhythm; No murmurs, rubs, or gallops, (+)S1, S2  ABDOMEN: Soft, obese, Nontender, Nondistended; Normal Bowel sounds   EXTREMITIES:  b/l 2+ pitting edema   PSYCH: denies suicidal ideation hallucinations  NEUROLOGY: AAOx3, non-focal  SKIN: as above

## 2021-05-29 NOTE — ED ADULT NURSE NOTE - NSIMPLEMENTINTERV_GEN_ALL_ED
Implemented All Fall Risk Interventions:  [place] to call system. Call bell, personal items and telephone within reach. Instruct patient to call for assistance. Room bathroom lighting operational. Non-slip footwear when patient is off stretcher. Physically safe environment: no spills, clutter or unnecessary equipment. Stretcher in lowest position, wheels locked, appropriate side rails in place. Provide visual cue, wrist band, yellow gown, etc. Monitor gait and stability. Monitor for mental status changes and reorient to person, place, and time. Review medications for side effects contributing to fall risk. Reinforce activity limits and safety measures with patient and family.

## 2021-05-29 NOTE — ED ADULT NURSE NOTE - OBJECTIVE STATEMENT
Pt presents to room 26, alert&orientedx4, ambulatory with cane, PMHX COPD (3L of O2 NC at home), HTN, HLD, and [id number] coming to ED for multiple complaints, coming from home by EMS. Daughter states she is concern with pt's depression as her son passed away 7 months ago, continues to say "I want to be with him and go peacefully". As per daughter, she was trying to wake up patient this morning and "took a while" and that is not normal. As per patient " I wanted to get extra sleep". Pt denies any fevers,n/v/d, chest pain or SOB at this time. Redness and rash noted to neck and face, concern of allergies. +3 pitting edema noted to b/l lower extremity. Pt denies any SI/HI. PA at bedside for eval. Awaiting further plan of care.

## 2021-05-29 NOTE — ED PROVIDER NOTE - OBJECTIVE STATEMENT
This is a 85 y.o female with a PMHx of COPD on home oxygen, DM, HTN, HLD, Asthma, pulmonary emphysema, lymphedema, sleep apnea on CPAP Presented to the ED complaining of having bilateral leg swelling worsening over the past few months. Pt states that she was recently started on a bumex at home however states that the swelling in her legs has persistent getting worse. Pt states that she has shortness of breath at baseline however she normally does due to the COPD and the asthma. Pt was never told that she has heart failure. As per daughter, patient was sleeping more than normal and daughter was concerned so she went to wake up the patient whom then said that she would like to go to her son whom passed away 7 months prior. Daughter got concerned thus called EMS. Patient currently does not have any depression or anxiety, denies having any sucidial ideations or homicidal ideations, and currently is not a threat to herself. Daughter agrees that patient is not harm to herselr or a risk.

## 2021-05-29 NOTE — H&P ADULT - PROBLEM SELECTOR PLAN 3
continue home metoprolol, hydralazine. Pt reportedly on losartan? will need to confirm with medrec in AM

## 2021-05-29 NOTE — H&P ADULT - PROBLEM SELECTOR PLAN 7
unclear episode at home. pt reports she was in deep sleep and was not in right mind. Now denying SI or hallucinations. monitor.  -sleeping more today per daughter? pt denies this and is Aox3. If more confused, would elect to treat UA above

## 2021-05-29 NOTE — H&P ADULT - ASSESSMENT
84 y/o woman with hx of COPD/asthma on 3L NC O2, Dm2, HTN, mirela on cpap, lymphedema on bumex who was brought to the ER by daughter initially because she had woken up from a deep sleep and was seemingly very depressed (now denying) but also complaining of worsening LE edema b/l and continued SOB. found in the ER to have pulmonary edema and b/l LE pitting edema to be admitted for further evaluation.

## 2021-05-29 NOTE — H&P ADULT - PROBLEM SELECTOR PLAN 1
Pt with worsening LE edema b/l despite using bumex (supposedly for lymphedema). found to have evidence of pulmonary edema on cxr. no shortness of breath nor chest pain reported. Trops flat. will obtain EKG (unavailable in chart, however per ED read : no ST segment changes or arrythmias.)  -probably in the setting of pulmonary hypertension given hx of SHAD, OHS, COPD/asthma  -repeat echo. continue Iv lasix 40mg bid for now. monitor on tele  -will need accurate medrec in AM, pt unsure how much bumex she is on

## 2021-05-29 NOTE — H&P ADULT - PROBLEM SELECTOR PLAN 2
no wheezing, uses 3L NC at baseline. continue nebs prn, advair  -pt reporting the nasal cannula and oxygen are irritating her nose and causing dry skin. will see if social work can arrange humidified oxygen at home

## 2021-05-29 NOTE — ED ADULT NURSE NOTE - CHIEF COMPLAINT QUOTE
Pt brought in by EMS called by daughter due to unable to wake her up, reports pt kept stating "let me sleep. I want to go the eternal home." Denies SI/HI. Pt denies any pain/discomfort, dizziness, SOB. Pt with no complaints. PMHx diabetes, htn, hld, GERD, COPD on 2-4L of O2

## 2021-05-29 NOTE — ED PROVIDER NOTE - CLINICAL SUMMARY MEDICAL DECISION MAKING FREE TEXT BOX
Francisco: SHAD (on home O2). Daughter tried to wake pt. Pt. said she's depressed (son  6 mos ago). Pt. denies depression or SI. C/o chronic lymphedema swelling (on Bumex). Check BNP and Cr and UA as causes of swelling (CHF, CKD, cirrhosis, proteinuria). If labs unremarkable, dc home. This is a 85 y.o female with a PMHx of COPD on home oxygen, DM, HTN, HLD, Asthma, pulmonary emphysema, lymphedema, sleep apnea on CPAP Presented to the ED complaining of having bilateral leg swelling worsening over the past few months- chronic bilateral leg swelling-labs, ekg, trop reassess     Francisco: SHAD (on home O2). Daughter tried to wake pt. Pt. said she's depressed (son  6 mos ago). Pt. denies depression or SI. C/o chronic lymphedema swelling (on Bumex). Check BNP and Cr and UA as causes of swelling (CHF, CKD, cirrhosis, proteinuria). If labs unremarkable, dc home.

## 2021-05-29 NOTE — ED PROVIDER NOTE - PROGRESS NOTE DETAILS
Jim, PGY2 - Received pt as sign-out, tba for new CHF w/u, endorsed to Dr. Bermudez. UA+, bt pt denies any urinary symptoms.

## 2021-05-30 LAB
A1C WITH ESTIMATED AVERAGE GLUCOSE RESULT: 6.8 % — HIGH (ref 4–5.6)
ALBUMIN SERPL ELPH-MCNC: 3.6 G/DL — SIGNIFICANT CHANGE UP (ref 3.3–5)
ALP SERPL-CCNC: 55 U/L — SIGNIFICANT CHANGE UP (ref 40–120)
ALT FLD-CCNC: 13 U/L — SIGNIFICANT CHANGE UP (ref 4–33)
ANION GAP SERPL CALC-SCNC: 8 MMOL/L — SIGNIFICANT CHANGE UP (ref 7–14)
ANISOCYTOSIS BLD QL: SLIGHT — SIGNIFICANT CHANGE UP
AST SERPL-CCNC: 16 U/L — SIGNIFICANT CHANGE UP (ref 4–32)
BASE EXCESS BLDV CALC-SCNC: 15 MMOL/L — HIGH (ref -3–2)
BASOPHILS # BLD AUTO: 0.02 K/UL — SIGNIFICANT CHANGE UP (ref 0–0.2)
BASOPHILS NFR BLD AUTO: 0.3 % — SIGNIFICANT CHANGE UP (ref 0–2)
BILIRUB SERPL-MCNC: 0.2 MG/DL — SIGNIFICANT CHANGE UP (ref 0.2–1.2)
BUN SERPL-MCNC: 30 MG/DL — HIGH (ref 7–23)
CALCIUM SERPL-MCNC: 9.3 MG/DL — SIGNIFICANT CHANGE UP (ref 8.4–10.5)
CHLORIDE SERPL-SCNC: 93 MMOL/L — LOW (ref 98–107)
CO2 SERPL-SCNC: 38 MMOL/L — HIGH (ref 22–31)
CREAT SERPL-MCNC: 1.05 MG/DL — SIGNIFICANT CHANGE UP (ref 0.5–1.3)
EOSINOPHIL # BLD AUTO: 0.13 K/UL — SIGNIFICANT CHANGE UP (ref 0–0.5)
EOSINOPHIL NFR BLD AUTO: 1.6 % — SIGNIFICANT CHANGE UP (ref 0–6)
ESTIMATED AVERAGE GLUCOSE: 148 MG/DL — HIGH (ref 68–114)
GIANT PLATELETS BLD QL SMEAR: PRESENT — SIGNIFICANT CHANGE UP
GLUCOSE SERPL-MCNC: 161 MG/DL — HIGH (ref 70–99)
HCO3 BLDV-SCNC: 36 MMOL/L — HIGH (ref 20–27)
HCT VFR BLD CALC: 35.8 % — SIGNIFICANT CHANGE UP (ref 34.5–45)
HGB BLD-MCNC: 10 G/DL — LOW (ref 11.5–15.5)
HYPOCHROMIA BLD QL: SLIGHT — SIGNIFICANT CHANGE UP
IANC: 5.64 K/UL — SIGNIFICANT CHANGE UP (ref 1.5–8.5)
IMM GRANULOCYTES NFR BLD AUTO: 0.3 % — SIGNIFICANT CHANGE UP (ref 0–1.5)
INR BLD: 1.13 RATIO — SIGNIFICANT CHANGE UP (ref 0.88–1.16)
LYMPHOCYTES # BLD AUTO: 1.59 K/UL — SIGNIFICANT CHANGE UP (ref 1–3.3)
LYMPHOCYTES # BLD AUTO: 20.2 % — SIGNIFICANT CHANGE UP (ref 13–44)
MAGNESIUM SERPL-MCNC: 1.8 MG/DL — SIGNIFICANT CHANGE UP (ref 1.6–2.6)
MANUAL SMEAR VERIFICATION: YES — SIGNIFICANT CHANGE UP
MCHC RBC-ENTMCNC: 25.1 PG — LOW (ref 27–34)
MCHC RBC-ENTMCNC: 27.9 GM/DL — LOW (ref 32–36)
MCV RBC AUTO: 89.9 FL — SIGNIFICANT CHANGE UP (ref 80–100)
MONOCYTES # BLD AUTO: 0.49 K/UL — SIGNIFICANT CHANGE UP (ref 0–0.9)
MONOCYTES NFR BLD AUTO: 6.2 % — SIGNIFICANT CHANGE UP (ref 2–14)
NEUTROPHILS # BLD AUTO: 5.64 K/UL — SIGNIFICANT CHANGE UP (ref 1.8–7.4)
NEUTROPHILS NFR BLD AUTO: 71.4 % — SIGNIFICANT CHANGE UP (ref 43–77)
NRBC # BLD: 0 /100 WBCS — SIGNIFICANT CHANGE UP
NRBC # FLD: 0 K/UL — SIGNIFICANT CHANGE UP
PCO2 BLDV: 92 MMHG — CRITICAL HIGH (ref 41–51)
PH BLDV: 7.28 — LOW (ref 7.32–7.43)
PHOSPHATE SERPL-MCNC: 3.8 MG/DL — SIGNIFICANT CHANGE UP (ref 2.5–4.5)
PLAT MORPH BLD: ABNORMAL
PLATELET # BLD AUTO: 193 K/UL — SIGNIFICANT CHANGE UP (ref 150–400)
PLATELET COUNT - ESTIMATE: NORMAL — SIGNIFICANT CHANGE UP
PO2 BLDV: 36 MMHG — SIGNIFICANT CHANGE UP (ref 35–40)
POLYCHROMASIA BLD QL SMEAR: SLIGHT — SIGNIFICANT CHANGE UP
POTASSIUM SERPL-MCNC: 4.3 MMOL/L — SIGNIFICANT CHANGE UP (ref 3.5–5.3)
POTASSIUM SERPL-SCNC: 4.3 MMOL/L — SIGNIFICANT CHANGE UP (ref 3.5–5.3)
PROT SERPL-MCNC: 7.6 G/DL — SIGNIFICANT CHANGE UP (ref 6–8.3)
PROTHROM AB SERPL-ACNC: 12.8 SEC — SIGNIFICANT CHANGE UP (ref 10.6–13.6)
RBC # BLD: 3.98 M/UL — SIGNIFICANT CHANGE UP (ref 3.8–5.2)
RBC # FLD: 14.6 % — HIGH (ref 10.3–14.5)
RBC BLD AUTO: ABNORMAL
SAO2 % BLDV: 65.4 % — SIGNIFICANT CHANGE UP (ref 60–85)
SARS-COV-2 RNA SPEC QL NAA+PROBE: SIGNIFICANT CHANGE UP
SODIUM SERPL-SCNC: 139 MMOL/L — SIGNIFICANT CHANGE UP (ref 135–145)
WBC # BLD: 7.89 K/UL — SIGNIFICANT CHANGE UP (ref 3.8–10.5)
WBC # FLD AUTO: 7.89 K/UL — SIGNIFICANT CHANGE UP (ref 3.8–10.5)

## 2021-05-30 RX ORDER — INSULIN LISPRO 100/ML
VIAL (ML) SUBCUTANEOUS EVERY 6 HOURS
Refills: 0 | Status: DISCONTINUED | OUTPATIENT
Start: 2021-05-30 | End: 2021-05-31

## 2021-05-30 RX ADMIN — GABAPENTIN 100 MILLIGRAM(S): 400 CAPSULE ORAL at 13:17

## 2021-05-30 RX ADMIN — Medication 100 MILLIGRAM(S): at 05:33

## 2021-05-30 RX ADMIN — Medication 100 MILLIGRAM(S): at 05:32

## 2021-05-30 RX ADMIN — Medication 3: at 17:05

## 2021-05-30 RX ADMIN — Medication 100 MILLIGRAM(S): at 13:17

## 2021-05-30 RX ADMIN — ENOXAPARIN SODIUM 40 MILLIGRAM(S): 100 INJECTION SUBCUTANEOUS at 11:22

## 2021-05-30 RX ADMIN — Medication 40 MILLIGRAM(S): at 17:06

## 2021-05-30 RX ADMIN — MONTELUKAST 10 MILLIGRAM(S): 4 TABLET, CHEWABLE ORAL at 11:22

## 2021-05-30 RX ADMIN — ALBUTEROL 2 PUFF(S): 90 AEROSOL, METERED ORAL at 21:09

## 2021-05-30 RX ADMIN — ATORVASTATIN CALCIUM 40 MILLIGRAM(S): 80 TABLET, FILM COATED ORAL at 21:07

## 2021-05-30 RX ADMIN — GABAPENTIN 100 MILLIGRAM(S): 400 CAPSULE ORAL at 21:07

## 2021-05-30 RX ADMIN — ALBUTEROL 2 PUFF(S): 90 AEROSOL, METERED ORAL at 11:21

## 2021-05-30 RX ADMIN — BUDESONIDE AND FORMOTEROL FUMARATE DIHYDRATE 2 PUFF(S): 160; 4.5 AEROSOL RESPIRATORY (INHALATION) at 21:08

## 2021-05-30 RX ADMIN — ALBUTEROL 2 PUFF(S): 90 AEROSOL, METERED ORAL at 05:31

## 2021-05-30 RX ADMIN — Medication 40 MILLIGRAM(S): at 05:32

## 2021-05-30 RX ADMIN — AMLODIPINE BESYLATE 10 MILLIGRAM(S): 2.5 TABLET ORAL at 05:33

## 2021-05-30 RX ADMIN — GABAPENTIN 100 MILLIGRAM(S): 400 CAPSULE ORAL at 05:33

## 2021-05-30 RX ADMIN — BUDESONIDE AND FORMOTEROL FUMARATE DIHYDRATE 2 PUFF(S): 160; 4.5 AEROSOL RESPIRATORY (INHALATION) at 11:21

## 2021-05-30 RX ADMIN — Medication 100 MILLIGRAM(S): at 21:06

## 2021-05-30 NOTE — CONSULT NOTE ADULT - ASSESSMENT
84 y/o woman with hx of COPD/asthma on 3L NC O2, Dm2, HTN, mirela on cpap, lymphedema on bumex who was brought to the ER by daughter initially because she had woken up from a deep sleep and was seemingly very depressed (now denying) but also complaining of worsening LE edema b/l and continued SOB. found in the ER to have pulmonary edema and b/l LE pitting edema to be admitted for further evaluation.       hypoxic HYPERCAPNIC RESP FAILURE: CHF: DO VBG: CONT DIURETICS:  cont to mantain os sao2 above 905: she may need bipap at night tie depending upon the ABG :   ASTHMA: ON SYMBICORT, SINGULAIR AS WELL AS bd: SHE IS NOT WHEEZING AT THIS TIE=ME:   obese: counselled  DM : control   HTN: controled  MIRELA: N CPAP; DOES not  REMEMBER THE SEttings  dax bernstein

## 2021-05-30 NOTE — CONSULT NOTE ADULT - TIME BILLING
Patient seen and examined.  Agree with above NP note.  85 year old woman with history of COPD/asthma on 3L NC O2, Dm2, HTN, mirela on cpap, lymphedema on bumex who was brought to the ER by daughter initially because she had woken up from a deep sleep and was seemingly very depressed (now denying) but also complaining of worsening LE edema b/l and continued SOB.    Acute on Chronic Diastolic CHF  -pt mildly overloaded on exam  -pt with hx of lymphedema - on bumex at home   -CXR w pulmonary edema  -recent echo w grossly nl lv sys fx  -r/o DVT   -check echo   -cont Lasix IVP 40mg BID for now  -cont bb    2. COPD/asthma  -on home O2  -pulm eval     3. HTN  -bp stable  -cont current meds     4. DM2  -med eval    5. Abnormal UA  +UA noted  -no abx for now   -med eval     dvt ppx

## 2021-05-30 NOTE — ED ADULT NURSE REASSESSMENT NOTE - NS ED NURSE REASSESS COMMENT FT1
Patient received to spot 20 for BiPAP, same initiated. Pt tolerating well. VSS. call bell placed within reach. warm blanket provided. Will monitor

## 2021-05-30 NOTE — CONSULT NOTE ADULT - ASSESSMENT
Echo 12/5/20: grossly nl lv sys fx    a/p  Pt is an 86 y/o woman with hx of COPD/asthma on 3L NC O2, Dm2, HTN, mirela on cpap, lymphedema on bumex who was brought to the ER by daughter initially because she had woken up from a deep sleep and was seemingly very depressed (now denying) but also complaining of worsening LE edema b/l and continued SOB.    1. Acute on Chronic Diastolic CHF  -pt mildly overloaded on exam  -pt with hx of lymphedema - on bumex at home   -CXR w pulmonary edema  -hsT neg, no acs on EKG   -recent echo w grossly nl lv sys fx  -obtain LE doppler to r/o DVT   -cont Lasix IVP 40mg BID for now  -cont bb  -strict IO/DW    2. COPD/asthma  -on home O2  -pulm eval     3. HTN  -bp stable  -cont current meds     4. DM2  -med eval    5. Abnormal UA  +UA noted  -no abx for now   -med eval     dvt ppx        Echo 12/5/20: grossly nl lv sys fx    a/p  Pt is an 84 y/o woman with hx of COPD/asthma on 3L NC O2, Dm2, HTN, mirela on cpap, lymphedema on bumex who was brought to the ER by daughter initially because she had woken up from a deep sleep and was seemingly very depressed (now denying) but also complaining of worsening LE edema b/l and continued SOB.    1. Acute on Chronic Diastolic CHF  -pt mildly overloaded on exam  -pt with hx of lymphedema - on bumex at home   -CXR w pulmonary edema  -hsT neg, no acs on EKG   -recent echo w grossly nl lv sys fx  -obtain LE doppler to r/o DVT   -pending repeat echo   -cont Lasix IVP 40mg BID for now  -cont bb  -strict IO/DW    2. COPD/asthma  -on home O2  -pulm eval     3. HTN  -bp stable  -cont current meds     4. DM2  -med eval    5. Abnormal UA  +UA noted  -no abx for now   -med eval     dvt ppx

## 2021-05-31 LAB
BASE EXCESS BLDV CALC-SCNC: 14.6 MMOL/L — HIGH (ref -3–2)
BLOOD GAS VENOUS - CREATININE: 1.2 MG/DL — SIGNIFICANT CHANGE UP (ref 0.5–1.3)
BLOOD GAS VENOUS COMPREHENSIVE RESULT: SIGNIFICANT CHANGE UP
CHLORIDE BLDV-SCNC: 95 MMOL/L — LOW (ref 96–108)
COVID-19 SPIKE DOMAIN AB INTERP: POSITIVE
COVID-19 SPIKE DOMAIN ANTIBODY RESULT: >250 U/ML — HIGH
D DIMER BLD IA.RAPID-MCNC: 228 NG/ML DDU — SIGNIFICANT CHANGE UP
GAS PNL BLDV: 142 MMOL/L — SIGNIFICANT CHANGE UP (ref 136–146)
GLUCOSE BLDV-MCNC: 107 MG/DL — HIGH (ref 70–99)
HCO3 BLDV-SCNC: 36 MMOL/L — HIGH (ref 20–27)
HCT VFR BLDA CALC: 33.6 % — LOW (ref 34.5–46.5)
HGB BLD CALC-MCNC: 10.9 G/DL — LOW (ref 11.5–15.5)
LACTATE BLDV-MCNC: 1.5 MMOL/L — SIGNIFICANT CHANGE UP (ref 0.5–2)
PCO2 BLDV: 87 MMHG — CRITICAL HIGH (ref 41–51)
PH BLDV: 7.3 — LOW (ref 7.32–7.43)
PO2 BLDV: 47 MMHG — HIGH (ref 35–40)
POTASSIUM BLDV-SCNC: 4.1 MMOL/L — SIGNIFICANT CHANGE UP (ref 3.4–4.5)
SAO2 % BLDV: 77.9 % — SIGNIFICANT CHANGE UP (ref 60–85)
SARS-COV-2 IGG+IGM SERPL QL IA: >250 U/ML — HIGH
SARS-COV-2 IGG+IGM SERPL QL IA: POSITIVE

## 2021-05-31 PROCEDURE — 71250 CT THORAX DX C-: CPT | Mod: 26

## 2021-05-31 RX ORDER — FUROSEMIDE 40 MG
40 TABLET ORAL DAILY
Refills: 0 | Status: DISCONTINUED | OUTPATIENT
Start: 2021-06-01 | End: 2021-06-01

## 2021-05-31 RX ORDER — INSULIN LISPRO 100/ML
VIAL (ML) SUBCUTANEOUS AT BEDTIME
Refills: 0 | Status: DISCONTINUED | OUTPATIENT
Start: 2021-05-31 | End: 2021-06-04

## 2021-05-31 RX ORDER — SODIUM CHLORIDE 0.65 %
1 AEROSOL, SPRAY (ML) NASAL
Refills: 0 | Status: DISCONTINUED | OUTPATIENT
Start: 2021-05-31 | End: 2021-06-04

## 2021-05-31 RX ORDER — INSULIN LISPRO 100/ML
VIAL (ML) SUBCUTANEOUS
Refills: 0 | Status: DISCONTINUED | OUTPATIENT
Start: 2021-05-31 | End: 2021-06-04

## 2021-05-31 RX ORDER — PETROLATUM,WHITE
1 JELLY (GRAM) TOPICAL
Refills: 0 | Status: DISCONTINUED | OUTPATIENT
Start: 2021-05-31 | End: 2021-06-04

## 2021-05-31 RX ADMIN — GABAPENTIN 100 MILLIGRAM(S): 400 CAPSULE ORAL at 15:04

## 2021-05-31 RX ADMIN — ALBUTEROL 2 PUFF(S): 90 AEROSOL, METERED ORAL at 15:04

## 2021-05-31 RX ADMIN — Medication 100 MILLIGRAM(S): at 20:41

## 2021-05-31 RX ADMIN — GABAPENTIN 100 MILLIGRAM(S): 400 CAPSULE ORAL at 05:04

## 2021-05-31 RX ADMIN — GABAPENTIN 100 MILLIGRAM(S): 400 CAPSULE ORAL at 20:41

## 2021-05-31 RX ADMIN — ALBUTEROL 2 PUFF(S): 90 AEROSOL, METERED ORAL at 20:42

## 2021-05-31 RX ADMIN — ALBUTEROL 2 PUFF(S): 90 AEROSOL, METERED ORAL at 09:59

## 2021-05-31 RX ADMIN — MONTELUKAST 10 MILLIGRAM(S): 4 TABLET, CHEWABLE ORAL at 12:30

## 2021-05-31 RX ADMIN — ALBUTEROL 2 PUFF(S): 90 AEROSOL, METERED ORAL at 04:58

## 2021-05-31 RX ADMIN — ENOXAPARIN SODIUM 40 MILLIGRAM(S): 100 INJECTION SUBCUTANEOUS at 12:29

## 2021-05-31 RX ADMIN — Medication 1 APPLICATION(S): at 17:40

## 2021-05-31 RX ADMIN — Medication 100 MILLIGRAM(S): at 05:04

## 2021-05-31 RX ADMIN — ATORVASTATIN CALCIUM 40 MILLIGRAM(S): 80 TABLET, FILM COATED ORAL at 20:41

## 2021-05-31 RX ADMIN — BUDESONIDE AND FORMOTEROL FUMARATE DIHYDRATE 2 PUFF(S): 160; 4.5 AEROSOL RESPIRATORY (INHALATION) at 09:59

## 2021-05-31 RX ADMIN — Medication 100 MILLIGRAM(S): at 05:05

## 2021-05-31 RX ADMIN — AMLODIPINE BESYLATE 10 MILLIGRAM(S): 2.5 TABLET ORAL at 05:05

## 2021-05-31 RX ADMIN — BUDESONIDE AND FORMOTEROL FUMARATE DIHYDRATE 2 PUFF(S): 160; 4.5 AEROSOL RESPIRATORY (INHALATION) at 20:42

## 2021-05-31 RX ADMIN — Medication 100 MILLIGRAM(S): at 15:04

## 2021-05-31 RX ADMIN — Medication 40 MILLIGRAM(S): at 05:05

## 2021-05-31 RX ADMIN — Medication 1 SPRAY(S): at 17:39

## 2021-05-31 NOTE — PROGRESS NOTE ADULT - ASSESSMENT
86 y/o woman with hx of COPD/asthma on 3L NC O2, Dm2, HTN, mirela on cpap, lymphedema on bumex who was brought to the ER by daughter initially because she had woken up from a deep sleep and was seemingly very depressed (now denying) but also complaining of worsening LE edema b/l and continued SOB. found in the ER to have pulmonary edema and b/l LE pitting edema to be admitted for further evaluation.       hypoxic HYPERCAPNIC RESP FAILURE: CHF: DO VBG: CONT DIURETICS:  cont to mantain os sao2 above 905: she may need bipap at night tie depending upon the ABG :   ASTHMA: ON SYMBICORT, SINGULAIR AS WELL AS bd: SHE IS NOT WHEEZING AT THIS TIE=ME:   obese: counselled  DM : control   HTN: controled  MIRELA: N CPAP; DOES not  REMEMBER THE SEttings  dax bersntein      05/31:      hypoxic HYPERCAPNIC RESP FAILURE: CHF: DO VBG: CONT DIURETICS:  cont to maintain os sao2 above 90%: she stil needs bipap in the daytime  as she desats: she has ac on chr hypercaribc resp fialure: likely has MIRELA and oHS too:   ASTHMA: ON SYMBICORT, SINGULAIR : no wheezing  obese: counselled  DM : control   HTN: controlled  MIRELA: N CPAP; DOES not  REMEMBER THE Settings: however at this time she would need bipap at this time as well as dc:   nathalia bernstein

## 2021-05-31 NOTE — CONSULT NOTE ADULT - ASSESSMENT
CHF : distolic dysfunction :  -c/w diuresis   mild leg edema   -denies any SOB now   cardio following     COPD   -c/w neb rx  -pul following   no wheezing     HTN / HLD   -c/w home meds     thank you so much for the consult , will follow     justine cook MD

## 2021-05-31 NOTE — PROGRESS NOTE ADULT - ASSESSMENT
Echo 12/5/20: grossly nl lv sys fx    a/p  Pt is an 86 y/o woman with hx of COPD/asthma on 3L NC O2, Dm2, HTN, mirela on cpap, lymphedema on bumex who was brought to the ER by daughter initially because she had woken up from a deep sleep and was seemingly very depressed (now denying) but also complaining of worsening LE edema b/l and continued SOB.    1. Acute on Chronic Diastolic CHF  -le edema improving, still mildly overloaded   -pt with hx of lymphedema - on bumex at home   -CXR w pulmonary edema  -hsT neg, no acs on EKG   -recent echo w grossly nl lv sys fx  -r/o DVT   -pending repeat echo   -cont Lasix IVP 40mg BID for now  -cont bb  -strict IO/DW    2. COPD/asthma  -on home O2  -CO2 noted  -pulm f/u     3. HTN  -bp stable  -cont current meds     4. DM2  -med eval    5. Abnormal UA  +UA noted, asymptomatic   -no abx for now   -med eval     dvt ppx       plan discussed with ACP

## 2021-05-31 NOTE — CONSULT NOTE ADULT - SUBJECTIVE AND OBJECTIVE BOX
CARDIOLOGY CONSULT - Dr. 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 Pt noted le edema worsening over the past month.  Went to [facility] for dopplers several weeks ago, but were negative for clots.  Pt's son passed away 7 months ago and apparently when she woke up from sleep she was talking about wanting to join him. She denies SI or hallucinations currently. She then is saying her bumex was increased recently but still having no effect on her legs. She is also having issues with her nasal cannula, complaining of dry nose and upper lip requiring alot of vaseline. Her shortness of breath is at baseline for her. No cough, fever, chest pain, abd pain, dysuria or diarrhea. In the ER pt was given iv lasix. Of note, recent echo from 2020 revealed nl lv sys fx. Patient sees a cardio in [place], but is unsure of recent cardiac testing.  Denies sob, cp, palpations, dizziness.  ROS otherwise neg       PAST MEDICAL & SURGICAL HISTORY:  HTN (hypertension)    HLD (hyperlipidemia)    Asthma    DM (diabetes mellitus)    GERD (gastroesophageal reflux disease)    Lymphedema    S/P knee replacement            PREVIOUS DIAGNOSTIC TESTING:    [x ] Echocardiogram:   < from: Transthoracic Echocardiogram (12.05.20 @ 09:47) >  OBSERVATIONS:  Mitral Valve: Mitral annular calcification, otherwise  normal mitral valve.  Aortic Root: Aortic root not well visualized.  Aortic Valve: Aortic valve leaflet morphology not well  visualized.  Left Atrium: Normal left atrium.  Left Ventricle: Endocardium not well visualized; grossly  normal left ventricular systolic function.  Right Heart: Normal right atrium. Unable to accurately  evaluate right ventricular size or systolic function.  Tricuspid valve not well visualized. Pulmonic valve not  well visualized.  Pericardium/PleuraNormal pericardium with no pericardial  effusion.  ------------------------------------------------------------------------  CONCLUSIONS:  Technically very difficult study.  1. Endocardium not well visualized; grossly normal left  ventricular systolic function.  2. Unable to accurately evaluate right ventricular sizeor  systolic function.        [ ]  Catheterization:  [ ] Stress Test:  	    MEDICATIONS:  Home Medications:  acetaminophen 325 mg oral tablet: 2 tab(s) orally every 6 hours, As needed, Mild Pain (1 - 3), Moderate Pain (4 - 6) (09 Dec 2020 14:08)  Advair Diskus 250 mcg-50 mcg inhalation powder: 1 puff(s) inhaled 2 times a day (30 Oct 2019 16:54)  alendronate 70 mg oral tablet: 1 tab(s) orally once a week (30 Oct 2019 16:54)  amLODIPine 10 mg oral tablet: 1 tab(s) orally once a day (30 Oct 2019 16:54)  ascorbic acid 500 mg oral capsule: 1 cap(s) orally once a day (30 Oct 2019 16:54)  azelastine 137 mcg/inh (0.1%) nasal spray: 2 spray(s) nasal 2 times a day (30 Oct 2019 16:54)  calcium (as carbonate) 500 mg oral tablet: 0.5 tab(s) orally once a day (30 Oct 2019 16:54)  cholecalciferol 2000 intl units oral tablet: 1 tab(s) orally once a day (30 Oct 2019 16:54)  glimepiride 2 mg oral tablet: 1 tab(s) orally once a day (30 Oct 2019 16:54)  hydrALAZINE 100 mg oral tablet: 1 tab(s) orally 3 times a day (06 Nov 2019 13:41)  ipratropium-albuterol 0.5 mg-2.5 mg/3 mLinhalation solution: 3 milliliter(s) inhaled every 6 hours (06 Nov 2019 14:05)  metoprolol succinate 100 mg oral tablet, extended release: 1 tab(s) orally once a day (30 Oct 2019 16:54)  montelukast 10 mg oral tablet: 1 tab(s) orally once a day (30 Oct 2019 16:54)  Neurontin 100 mg oral capsule: 1 cap(s) orally 3 times a day (29 Nov 2020 18:19)  ocular lubricant ophthalmic solution: 1 drop(s) to each affected eye 3 times a day, As needed, Dry Eyes (06 Nov 2019 13:41)  polyethylene glycol 3350 oral powder for reconstitution: 17 gram(s) orally once a day (06 Nov 2019 13:41)  ProAir HFA 90 mcg/inh inhalation aerosol: 2 puff(s) inhaled 4 times a day, As Needed (30 Oct 2019 16:54)  rosuvastatin 10 mg oral tablet: 1 tab(s) orally once a day (30 Oct 2019 16:54)      MEDICATIONS  (STANDING):  ALBUTerol    90 MICROgram(s) HFA Inhaler 2 Puff(s) Inhalation every 6 hours  amLODIPine   Tablet 10 milliGRAM(s) Oral daily  atorvastatin 40 milliGRAM(s) Oral at bedtime  budesonide 160 MICROgram(s)/formoterol 4.5 MICROgram(s) Inhaler 2 Puff(s) Inhalation two times a day  dextrose 40% Gel 15 Gram(s) Oral once  dextrose 5%. 1000 milliLiter(s) (50 mL/Hr) IV Continuous <Continuous>  dextrose 5%. 1000 milliLiter(s) (100 mL/Hr) IV Continuous <Continuous>  dextrose 50% Injectable 25 Gram(s) IV Push once  dextrose 50% Injectable 12.5 Gram(s) IV Push once  dextrose 50% Injectable 25 Gram(s) IV Push once  enoxaparin Injectable 40 milliGRAM(s) SubCutaneous daily  furosemide   Injectable 40 milliGRAM(s) IV Push every 12 hours  gabapentin 100 milliGRAM(s) Oral three times a day  glucagon  Injectable 1 milliGRAM(s) IntraMuscular once  hydrALAZINE 100 milliGRAM(s) Oral three times a day  insulin lispro (ADMELOG) corrective regimen sliding scale   SubCutaneous three times a day before meals  insulin lispro (ADMELOG) corrective regimen sliding scale   SubCutaneous at bedtime  metoprolol succinate  milliGRAM(s) Oral daily  montelukast 10 milliGRAM(s) Oral daily      FAMILY HISTORY:  No pertinent family history in first degree relatives        SOCIAL HISTORY:    [ ] Non-smoker  [ ] Smoker  [ ] Alcohol    Allergies    No Known Allergies    Intolerances    	    REVIEW OF SYSTEMS:  CONSTITUTIONAL: No fever, weight loss, or fatigue  EYES: No eye pain, visual disturbances, or discharge  ENMT:  No difficulty hearing, tinnitus, vertigo; No sinus or throat pain  NECK: No pain or stiffness  RESPIRATORY: No cough, wheezing, chills or hemoptysis; +Shortness of Breath  CARDIOVASCULAR: No chest pain, palpitations, passing out, dizziness, +leg swelling  GASTROINTESTINAL: No abdominal or epigastric pain. No nausea, vomiting, or hematemesis; No diarrhea or constipation. No melena or hematochezia.  GENITOURINARY: No dysuria, frequency, hematuria, or incontinence  NEUROLOGICAL: No headaches, memory loss, loss of strength, numbness, or tremors  SKIN: No itching, burning, rashes, or lesions   	    [ x] All others negative	 see hpi   [ ] Unable to obtain    PHYSICAL EXAM:  T(C): 36.8 (05-30-21 @ 05:04), Max: 36.8 (05-30-21 @ 05:04)  HR: 78 (05-30-21 @ 07:48) (73 - 86)  BP: 128/44 (05-30-21 @ 05:04) (128/44 - 175/85)  RR: 20 (05-30-21 @ 05:04) (17 - 20)  SpO2: 97% (05-30-21 @ 07:48) (95% - 100%)  Wt(kg): --  I&O's Summary    29 May 2021 07:01  -  30 May 2021 07:00  --------------------------------------------------------  IN: 120 mL / OUT: 1150 mL / NET: -1030 mL        Appearance: Normal	  Psychiatry: A & O x 3, Mood & affect appropriate  HEENT:   Normal oral mucosa, PERRL, EOMI	  Lymphatic: No lymphadenopathy  Cardiovascular: Normal S1 S2,RRR, No JVD, No murmurs  Respiratory: Lungs clear to auscultation	  Gastrointestinal:  Soft, Non-tender, + BS	  Skin: No rashes, No ecchymoses, No cyanosis	  Neurologic: Non-focal  Extremities: Normal range of motion, No clubbing, cyanosis + bl le edema  Vascular: Peripheral pulses palpable 2+ bilaterally    TELEMETRY: 	NSR     ECG:  	NSR 79 - no acute ischemic changes   RADIOLOGY:  < from: Xray Chest 2 Views PA/Lat (05.29.21 @ 19:29) >    INTERPRETATION:  cardiomegaly.pulmonary edema.    < end of copied text >    OTHER: 	  	  LABS:	 	    CARDIAC MARKERS:  Troponin T, High Sensitivity Result: 24 ng/L (05-29 @ 21:42)  Troponin T, High Sensitivity Result: 25 ng/L (05-29 @ 18:59)                                  10.7   9.25  )-----------( 217      ( 29 May 2021 18:59 )             37.3     05-29    138  |  95<L>  |  29<H>  ----------------------------<  124<H>  4.9   |  33<H>  |  0.97    Ca    9.6      29 May 2021 18:59    TPro  8.5<H>  /  Alb  3.7  /  TBili  0.3  /  DBili  x   /  AST  20  /  ALT  17  /  AlkPhos  65  05-29      proBNP: Serum Pro-Brain Natriuretic Peptide: 322 pg/mL (05-29 @ 18:59)    Lipid Profile:   HgA1c:   TSH:       
  05-30-21 @ 12:36    Patient is a 85y old  Female who presents with a chief complaint of hypervolemia (30 May 2021 10:41)      HPI:  Pt is an 86 y/o woman with hx of COPD/asthma on 3L NC O2, Dm2, HTN, shad on cpap, lymphedema on bumex who was brought to the ER by daughter initially because she had woken up from a deep sleep and was seemingly very depressed (now denying      ) but also complaining of worsening LE edema b/l and continued SOB. Pt's son passed away 7 months ago and apparently when she woke up from sleep she was talking about wanting to join him. She denies SI or hallucinations currently. She then is saying her bumex was increased recently but still having no effect on her legs. She is also having issues with her nasal cannula, complaining of dry nose and upper lip requiring alot of vaseline. Her shortness of breath is at baseline for her. No cough, fever, chest pain, abd pain, dysuria or diarrhea. In the ER pt was given iv lasix.  (29 May 2021 22:51)      now pulmonary called to evaluated. she ia alert and aweke and says sheis es cpap as well as oxygen at home; she also has astma dn takes Advair and albuterol!     ?FOLLOWING PRESENT  [x ] Hx of PE/DVT, [ ?] Hx COPD, [ y] Hx of Asthma, [y ] Hx of Hospitalization, y[ ]  Hx of BiPAP/CPAP use, [ y] Hx of SHAD    Allergies    No Known Allergies    Intolerances        PAST MEDICAL & SURGICAL HISTORY:  HTN (hypertension)    HLD (hyperlipidemia)    Asthma    DM (diabetes mellitus)    GERD (gastroesophageal reflux disease)    Lymphedema    S/P knee replacement        FAMILY HISTORY:  No pertinent family history in first degree relatives        Social History: [ x ] TOBACCO                  [  x] ETOH                                 [ x ] IVDA/DRUGS    REVIEW OF SYSTEMS    SOMNOLENT  General:	OBESE++    Skin/Breast:  	  Ophthalmologic:  	  ENMT:	    Respiratory and Thorax: BERG   	  Cardiovascular:	    Gastrointestinal:	    Genitourinary:	    Musculoskeletal:	    Neurological:	    Psychiatric:	    Hematology/Lymphatics:	    Endocrine:	    Allergic/Immunologic:	    MEDICATIONS  (STANDING):  ALBUTerol    90 MICROgram(s) HFA Inhaler 2 Puff(s) Inhalation every 6 hours  amLODIPine   Tablet 10 milliGRAM(s) Oral daily  atorvastatin 40 milliGRAM(s) Oral at bedtime  budesonide 160 MICROgram(s)/formoterol 4.5 MICROgram(s) Inhaler 2 Puff(s) Inhalation two times a day  dextrose 40% Gel 15 Gram(s) Oral once  dextrose 5%. 1000 milliLiter(s) (50 mL/Hr) IV Continuous <Continuous>  dextrose 5%. 1000 milliLiter(s) (100 mL/Hr) IV Continuous <Continuous>  dextrose 50% Injectable 25 Gram(s) IV Push once  dextrose 50% Injectable 12.5 Gram(s) IV Push once  dextrose 50% Injectable 25 Gram(s) IV Push once  enoxaparin Injectable 40 milliGRAM(s) SubCutaneous daily  furosemide   Injectable 40 milliGRAM(s) IV Push every 12 hours  gabapentin 100 milliGRAM(s) Oral three times a day  glucagon  Injectable 1 milliGRAM(s) IntraMuscular once  hydrALAZINE 100 milliGRAM(s) Oral three times a day  insulin lispro (ADMELOG) corrective regimen sliding scale   SubCutaneous three times a day before meals  insulin lispro (ADMELOG) corrective regimen sliding scale   SubCutaneous at bedtime  metoprolol succinate  milliGRAM(s) Oral daily  montelukast 10 milliGRAM(s) Oral daily    MEDICATIONS  (PRN):  albuterol/ipratropium for Nebulization 3 milliLiter(s) Nebulizer every 8 hours PRN Bronchospasm       Vital Signs Last 24 Hrs  T(C): 37.1 (30 May 2021 11:41), Max: 37.1 (30 May 2021 11:41)  T(F): 98.7 (30 May 2021 11:41), Max: 98.7 (30 May 2021 11:41)  HR: 72 (30 May 2021 11:41) (72 - 86)  BP: 118/52 (30 May 2021 11:41) (118/52 - 175/85)  BP(mean): --  RR: 18 (30 May 2021 11:41) (17 - 20)  SpO2: 95% (30 May 2021 11:41) (95% - 100%)Orthostatic VS          I&O's Summary    29 May 2021 07:01  -  30 May 2021 07:00  --------------------------------------------------------  IN: 120 mL / OUT: 1150 mL / NET: -1030 mL        Physical Exam:   GENERAL: oBESE+  HEENT: NANCY/   Atraumatic, Normocephalic  ENMT: No tonsillar erythema, exudates, or enlargement; Moist mucous membranes, Good dentition, No lesions  NECK: Supple, No JVD, Normal thyroid  CHEST/LUNG: Clear to auscultation bilaterally; No rales, rhonchi, wheezing, or rub THOUGH EXAM IS LIMITED  CVS: Regular rate and rhythm; No murmurs, rubs, or gallops  GI: : Soft, Nontender, Nondistended; Bowel sounds present  NERVOUS SYSTEM:  Alert & Oriented X3, Good concentration; Motor Strength 5/5 B/L upper and lower extremities; DTRs 2+ intact and symmetric  EXTREMITIES: + edema  LYMPH: No lymphadenopathy noted  SKIN: No rashes or lesions  ENDOCRINOLOGY: No Thyromegaly  PSYCH: Appropriate    Labs:  COVID-19 PCR: NotDetec (29 May 2021 21:14)                              10.0   7.89  )-----------( 193      ( 30 May 2021 11:32 )             35.8                         10.7   9.25  )-----------( 217      ( 29 May 2021 18:59 )             37.3     05-30    139  |  93<L>  |  30<H>  ----------------------------<  161<H>  4.3   |  38<H>  |  1.05  05-    138  |  95<L>  |  29<H>  ----------------------------<  124<H>  4.9   |  33<H>  |  0.97    Ca    9.3      30 May 2021 11:32  Ca    9.6      29 May 2021 18:59  Phos  3.8     05  Mg     1.8         TPro  7.6  /  Alb  3.6  /  TBili  0.2  /  DBili  x   /  AST  16  /  ALT  13  /  AlkPhos  55    TPro  8.5<H>  /  Alb  3.7  /  TBili  0.3  /  DBili  x   /  AST  20  /  ALT  17  /  AlkPhos  65      CAPILLARY BLOOD GLUCOSE      POCT Blood Glucose.: 143 mg/dL (30 May 2021 12:20)  POCT Blood Glucose.: 108 mg/dL (30 May 2021 09:01)  POCT Blood Glucose.: 91 mg/dL (29 May 2021 22:54)  POCT Blood Glucose.: 116 mg/dL (29 May 2021 18:18)    LIVER FUNCTIONS - ( 30 May 2021 11:32 )  Alb: 3.6 g/dL / Pro: 7.6 g/dL / ALK PHOS: 55 U/L / ALT: 13 U/L / AST: 16 U/L / GGT: x           PT/INR - ( 30 May 2021 11:32 )   PT: 12.8 sec;   INR: 1.13 ratio           Urinalysis Basic - ( 29 May 2021 18:59 )    Color: Light Yellow / Appearance: Clear / S.010 / pH: x  Gluc: x / Ketone: Negative  / Bili: Negative / Urobili: <2 mg/dL   Blood: x / Protein: Trace / Nitrite: Negative   Leuk Esterase: Large / RBC: 1 /HPF / WBC 32 /HPF   Sq Epi: x / Non Sq Epi: 6 /HPF / Bacteria: Few      D DImer  Serum Pro-Brain Natriuretic Peptide: 322 pg/mL ( @ 18:59)      Studies  Chest X-RAY  CT SCAN Chest   CT Abdomen  Venous Dopplers: LE:   Others      AD< from: Xray Chest 2 Views PA/Lat (21 @ 19:29) >  XAMINATION: XR CHEST PA AND LATERAL    CLINICAL INDICATION: Leg swelling. Evaluate for pulmonary edema.    TECHNIQUE: Frontal and lateral views of the chest were obtained.    COMPARISON: X-ray chest from 2020.    FINDINGS:    Cardiomegaly.  Pulmonary edema. There is no pneumothorax. There is no significant pleural effusion.    IMPRESSION:  Pulmonary edema.  Cardiomegaly.            JAVIER ALVARADO MD; Resident Interventional Radiology  This document has been electronically signed.  BEN CONNOR MD; Attending Interventional Radiologist  This document has been electronically signed. May 30 2021 10:48AM    < end of copied text >              
Pt is an 84 y/o woman with hx of COPD/asthma on 3L NC O2, Dm2, HTN, mirela on cpap, lymphedema on bumex who was brought to the ER by daughter initially because she had woken up from a deep sleep and was seemingly very depressed (now denying) but also complaining of worsening LE edema b/l and continued SOB. Pt's son passed away 7 months ago and apparently when she woke up from sleep she was talking about wanting to join him. She denies SI or hallucinations currently. She then is saying her bumex was increased recently but still having no effect on her legs. She is also having issues with her nasal cannula, complaining of dry nose and upper lip requiring alot of vaseline. Her shortness of breath is at baseline for her. No cough, fever, chest pain, abd pain, dysuria or diarrhea    INTERVAL HPI/OVERNIGHT EVENTS:  T(C): 36.9 (05-31-21 @ 19:48), Max: 36.9 (05-31-21 @ 11:23)  HR: 88 (05-31-21 @ 19:48) (76 - 92)  BP: 118/64 (05-31-21 @ 19:48) (118/64 - 119/67)  RR: 16 (05-31-21 @ 19:48) (12 - 18)  SpO2: 98% (05-31-21 @ 19:48) (96% - 100%)  Wt(kg): --  I&O's Summary    30 May 2021 07:01  -  31 May 2021 07:00  --------------------------------------------------------  IN: 0 mL / OUT: 1600 mL / NET: -1600 mL    31 May 2021 07:01  -  31 May 2021 21:34  --------------------------------------------------------  IN: 0 mL / OUT: 1050 mL / NET: -1050 mL        PAST MEDICAL & SURGICAL HISTORY:  HTN (hypertension)    HLD (hyperlipidemia)    Asthma    DM (diabetes mellitus)    GERD (gastroesophageal reflux disease)    Lymphedema    S/P knee replacement        SOCIAL HISTORY  Alcohol:  Tobacco:  Illicit substance use:    FAMILY HISTORY:    REVIEW OF SYSTEMS:  CONSTITUTIONAL: No fever, weight loss, or fatigue  EYES: No eye pain, visual disturbances, or discharge  ENMT:  No difficulty hearing, tinnitus, vertigo; No sinus or throat pain  NECK: No pain or stiffness  RESPIRATORY: No cough, wheezing, chills or hemoptysis; No shortness of breath  CARDIOVASCULAR: No chest pain, palpitations, dizziness, or leg swelling  GASTROINTESTINAL: No abdominal or epigastric pain. No nausea, vomiting, or hematemesis; No diarrhea or constipation. No melena or hematochezia.  GENITOURINARY: No dysuria, frequency, hematuria, or incontinence  NEUROLOGICAL: No headaches, memory loss, loss of strength, numbness, or tremors  SKIN: No itching, burning, rashes, or lesions   LYMPH NODES: No enlarged glands  ENDOCRINE: No heat or cold intolerance; No hair loss  MUSCULOSKELETAL: No joint pain or swelling; No muscle, back, or extremity pain  PSYCHIATRIC: No depression, anxiety, mood swings, or difficulty sleeping  HEME/LYMPH: No easy bruising, or bleeding gums  ALLERY AND IMMUNOLOGIC: No hives or eczema    RADIOLOGY & ADDITIONAL TESTS:    Imaging Personally Reviewed:  [ ] YES  [ ] NO    Consultant(s) Notes Reviewed:  [ ] YES  [ ] NO    PHYSICAL EXAM:  GENERAL: NAD, well-groomed, well-developed  HEAD:  Atraumatic, Normocephalic  EYES: EOMI, PERRLA, conjunctiva and sclera clear  ENMT: No tonsillar erythema, exudates, or enlargement; Moist mucous membranes, Good dentition, No lesions  NECK: Supple, No JVD, Normal thyroid  NERVOUS SYSTEM:  Alert & Oriented X3, Good concentration; Motor Strength 5/5 B/L upper and lower extremities; DTRs 2+ intact and symmetric  CHEST/LUNG: Clear to percussion bilaterally; No rales, rhonchi, wheezing, or rubs  HEART: Regular rate and rhythm; No murmurs, rubs, or gallops  ABDOMEN: Soft, Nontender, Nondistended; Bowel sounds present  EXTREMITIES:  2+ Peripheral Pulses, No clubbing, cyanosis, or edema  LYMPH: No lymphadenopathy noted  SKIN: No rashes or lesions    LABS:                        10.0   7.89  )-----------( 193      ( 30 May 2021 11:32 )             35.8     05-30    139  |  93<L>  |  30<H>  ----------------------------<  161<H>  4.3   |  38<H>  |  1.05    Ca    9.3      30 May 2021 11:32  Phos  3.8     05-30  Mg     1.8     05-30    TPro  7.6  /  Alb  3.6  /  TBili  0.2  /  DBili  x   /  AST  16  /  ALT  13  /  AlkPhos  55  05-30    PT/INR - ( 30 May 2021 11:32 )   PT: 12.8 sec;   INR: 1.13 ratio             CAPILLARY BLOOD GLUCOSE      POCT Blood Glucose.: 207 mg/dL (31 May 2021 21:27)  POCT Blood Glucose.: 121 mg/dL (31 May 2021 17:28)  POCT Blood Glucose.: 133 mg/dL (31 May 2021 12:15)  POCT Blood Glucose.: 92 mg/dL (31 May 2021 08:46)  POCT Blood Glucose.: 111 mg/dL (31 May 2021 05:26)  POCT Blood Glucose.: 113 mg/dL (30 May 2021 23:36)            MEDICATIONS  (STANDING):  ALBUTerol    90 MICROgram(s) HFA Inhaler 2 Puff(s) Inhalation every 6 hours  amLODIPine   Tablet 10 milliGRAM(s) Oral daily  atorvastatin 40 milliGRAM(s) Oral at bedtime  budesonide 160 MICROgram(s)/formoterol 4.5 MICROgram(s) Inhaler 2 Puff(s) Inhalation two times a day  dextrose 40% Gel 15 Gram(s) Oral once  dextrose 5%. 1000 milliLiter(s) (50 mL/Hr) IV Continuous <Continuous>  dextrose 5%. 1000 milliLiter(s) (100 mL/Hr) IV Continuous <Continuous>  dextrose 50% Injectable 25 Gram(s) IV Push once  dextrose 50% Injectable 12.5 Gram(s) IV Push once  dextrose 50% Injectable 25 Gram(s) IV Push once  enoxaparin Injectable 40 milliGRAM(s) SubCutaneous daily  gabapentin 100 milliGRAM(s) Oral three times a day  glucagon  Injectable 1 milliGRAM(s) IntraMuscular once  hydrALAZINE 100 milliGRAM(s) Oral three times a day  insulin lispro (ADMELOG) corrective regimen sliding scale   SubCutaneous three times a day before meals  insulin lispro (ADMELOG) corrective regimen sliding scale   SubCutaneous at bedtime  metoprolol succinate  milliGRAM(s) Oral daily  montelukast 10 milliGRAM(s) Oral daily    MEDICATIONS  (PRN):  albuterol/ipratropium for Nebulization 3 milliLiter(s) Nebulizer every 8 hours PRN Bronchospasm  AQUAPHOR (petrolatum Ointment) 1 Application(s) Topical four times a day PRN nasal dryness  sodium chloride 0.65% Nasal 1 Spray(s) Both Nostrils four times a day PRN Nasal Congestion      Care Discussed with Consultants/Other Providers [ ] YES  [ ] NO

## 2021-06-01 LAB
-  IMIPENEM: SIGNIFICANT CHANGE UP
-  PIPERACILLIN/TAZOBACTAM: SIGNIFICANT CHANGE UP
ANION GAP SERPL CALC-SCNC: 10 MMOL/L — SIGNIFICANT CHANGE UP (ref 7–14)
BUN SERPL-MCNC: 34 MG/DL — HIGH (ref 7–23)
CALCIUM SERPL-MCNC: 9.3 MG/DL — SIGNIFICANT CHANGE UP (ref 8.4–10.5)
CHLORIDE SERPL-SCNC: 93 MMOL/L — LOW (ref 98–107)
CO2 SERPL-SCNC: 36 MMOL/L — HIGH (ref 22–31)
CREAT SERPL-MCNC: 1.06 MG/DL — SIGNIFICANT CHANGE UP (ref 0.5–1.3)
GLUCOSE SERPL-MCNC: 155 MG/DL — HIGH (ref 70–99)
HCT VFR BLD CALC: 34.9 % — SIGNIFICANT CHANGE UP (ref 34.5–45)
HGB BLD-MCNC: 9.9 G/DL — LOW (ref 11.5–15.5)
MAGNESIUM SERPL-MCNC: 2 MG/DL — SIGNIFICANT CHANGE UP (ref 1.6–2.6)
MCHC RBC-ENTMCNC: 25.5 PG — LOW (ref 27–34)
MCHC RBC-ENTMCNC: 28.4 GM/DL — LOW (ref 32–36)
MCV RBC AUTO: 89.9 FL — SIGNIFICANT CHANGE UP (ref 80–100)
METHOD TYPE: SIGNIFICANT CHANGE UP
NRBC # BLD: 0 /100 WBCS — SIGNIFICANT CHANGE UP
NRBC # FLD: 0 K/UL — SIGNIFICANT CHANGE UP
PHOSPHATE SERPL-MCNC: 4 MG/DL — SIGNIFICANT CHANGE UP (ref 2.5–4.5)
PLATELET # BLD AUTO: 211 K/UL — SIGNIFICANT CHANGE UP (ref 150–400)
POTASSIUM SERPL-MCNC: 3.8 MMOL/L — SIGNIFICANT CHANGE UP (ref 3.5–5.3)
POTASSIUM SERPL-SCNC: 3.8 MMOL/L — SIGNIFICANT CHANGE UP (ref 3.5–5.3)
RBC # BLD: 3.88 M/UL — SIGNIFICANT CHANGE UP (ref 3.8–5.2)
RBC # FLD: 15 % — HIGH (ref 10.3–14.5)
SODIUM SERPL-SCNC: 139 MMOL/L — SIGNIFICANT CHANGE UP (ref 135–145)
WBC # BLD: 9.1 K/UL — SIGNIFICANT CHANGE UP (ref 3.8–10.5)
WBC # FLD AUTO: 9.1 K/UL — SIGNIFICANT CHANGE UP (ref 3.8–10.5)

## 2021-06-01 PROCEDURE — 93970 EXTREMITY STUDY: CPT | Mod: 26

## 2021-06-01 PROCEDURE — 93306 TTE W/DOPPLER COMPLETE: CPT | Mod: 26

## 2021-06-01 RX ORDER — BUMETANIDE 0.25 MG/ML
1 INJECTION INTRAMUSCULAR; INTRAVENOUS DAILY
Refills: 0 | Status: DISCONTINUED | OUTPATIENT
Start: 2021-06-02 | End: 2021-06-04

## 2021-06-01 RX ORDER — POLYETHYLENE GLYCOL 3350 17 G/17G
17 POWDER, FOR SOLUTION ORAL DAILY
Refills: 0 | Status: DISCONTINUED | OUTPATIENT
Start: 2021-06-01 | End: 2021-06-04

## 2021-06-01 RX ADMIN — Medication 1: at 17:54

## 2021-06-01 RX ADMIN — ALBUTEROL 2 PUFF(S): 90 AEROSOL, METERED ORAL at 04:46

## 2021-06-01 RX ADMIN — Medication 1 DROP(S): at 17:52

## 2021-06-01 RX ADMIN — ENOXAPARIN SODIUM 40 MILLIGRAM(S): 100 INJECTION SUBCUTANEOUS at 13:58

## 2021-06-01 RX ADMIN — GABAPENTIN 100 MILLIGRAM(S): 400 CAPSULE ORAL at 20:56

## 2021-06-01 RX ADMIN — Medication 1: at 13:57

## 2021-06-01 RX ADMIN — ALBUTEROL 2 PUFF(S): 90 AEROSOL, METERED ORAL at 10:56

## 2021-06-01 RX ADMIN — Medication 100 MILLIGRAM(S): at 13:58

## 2021-06-01 RX ADMIN — AMLODIPINE BESYLATE 10 MILLIGRAM(S): 2.5 TABLET ORAL at 04:45

## 2021-06-01 RX ADMIN — Medication 100 MILLIGRAM(S): at 04:45

## 2021-06-01 RX ADMIN — Medication 40 MILLIGRAM(S): at 04:46

## 2021-06-01 RX ADMIN — Medication 100 MILLIGRAM(S): at 20:56

## 2021-06-01 RX ADMIN — MONTELUKAST 10 MILLIGRAM(S): 4 TABLET, CHEWABLE ORAL at 13:57

## 2021-06-01 RX ADMIN — ALBUTEROL 2 PUFF(S): 90 AEROSOL, METERED ORAL at 20:55

## 2021-06-01 RX ADMIN — GABAPENTIN 100 MILLIGRAM(S): 400 CAPSULE ORAL at 13:57

## 2021-06-01 RX ADMIN — Medication 1 SPRAY(S): at 17:53

## 2021-06-01 RX ADMIN — BUDESONIDE AND FORMOTEROL FUMARATE DIHYDRATE 2 PUFF(S): 160; 4.5 AEROSOL RESPIRATORY (INHALATION) at 10:56

## 2021-06-01 RX ADMIN — ATORVASTATIN CALCIUM 40 MILLIGRAM(S): 80 TABLET, FILM COATED ORAL at 20:56

## 2021-06-01 RX ADMIN — BUDESONIDE AND FORMOTEROL FUMARATE DIHYDRATE 2 PUFF(S): 160; 4.5 AEROSOL RESPIRATORY (INHALATION) at 20:55

## 2021-06-01 RX ADMIN — GABAPENTIN 100 MILLIGRAM(S): 400 CAPSULE ORAL at 04:45

## 2021-06-01 RX ADMIN — ALBUTEROL 2 PUFF(S): 90 AEROSOL, METERED ORAL at 17:53

## 2021-06-01 NOTE — PROGRESS NOTE ADULT - ASSESSMENT
86 y/o woman with hx of COPD/asthma on 3L NC O2, Dm2, HTN, mirela on cpap, lymphedema on bumex who was brought to the ER by daughter initially because she had woken up from a deep sleep and was seemingly very depressed (now denying) but also complaining of worsening LE edema b/l and continued SOB. found in the ER to have pulmonary edema and b/l LE pitting edema to be admitted for further evaluation.       hypoxic HYPERCAPNIC RESP FAILURE: CHF: DO VBG: CONT DIURETICS:  cont to mantain os sao2 above 905: she may need bipap at night tie depending upon the ABG :   ASTHMA: ON SYMBICORT, SINGULAIR AS WELL AS bd: SHE IS NOT WHEEZING AT THIS TIE=ME:   obese: counselled  DM : control   HTN: controled  MIRELA: N CPAP; DOES not  REMEMBER THE SEttings  dax cp      05/31:      hypoxic HYPERCAPNIC RESP FAILURE: CHF: DO VBG: CONT DIURETICS:  cont to maintain os sao2 above 90%: she stil needs bipap in the daytime  as she desats: she has ac on chr hypercaribc resp fialure: likely has MIRELA and oHS too:   ASTHMA: ON SYMBICORT, SINGULAIR : no wheezing  obese: counselled  DM : control   HTN: controlled  MIRELA: N CPAP; DOES not  REMEMBER THE Settings: however at this time she would need bipap at this time as well as dc:   nathalia  cp    6/1;        hypoxic HYPERCAPNIC RESP FAILURE: CHF: DO VBG: CONT DIURETICS:  cont to maintain os sao2 above 90%: she still  needs bipap in the daytime  as she desats: she has ac on chr hypercarbic resp failure likely has MIRELA and OHS too: she would need bipap at home: she only has oxygen at home: sheis responding appropriately today and seems more awake:   ASTHMA: ON SYMBICORT, SINGULAIR : no wheezing: and BD prn   obese: counselled  DM : control   HTN: controlled  MIRELA: N CPAP; DOES not  REMEMBER THE Settings: however at this time she would need bipap at this time as well as dc:   dw  acp

## 2021-06-01 NOTE — PROGRESS NOTE ADULT - ASSESSMENT
A/P    CHF : distolic dysfunction :  -c/w diuresis   mild leg edema   -denies any SOB now   cardio following     COPD   -c/w neb rx  -pul following   no wheezing     HTN / HLD   -c/w home meds     DM-2 :  -c/w FSBS/ Insulin

## 2021-06-01 NOTE — PROGRESS NOTE ADULT - ASSESSMENT
Echo 12/5/20: grossly nl lv sys fx    a/p  Pt is an 84 y/o woman with hx of COPD/asthma on 3L NC O2, Dm2, HTN, mirela on cpap, lymphedema on bumex who was brought to the ER by daughter initially because she had woken up from a deep sleep and was seemingly very depressed (now denying) but also complaining of worsening LE edema b/l and continued SOB.    1. Acute on Chronic Diastolic CHF  -le edema improved  -transition to oral bumex 1 mg QD  -CXR w pulmonary edema  -hsT neg, no acs on EKG   -recent echo w grossly nl lv sys fx  -r/o DVT   -pending repeat echo   -cont bb  -strict IO/DW    2. Acute on Chronic Hypercarbic Resp Failure /asthma  -currently on 3L NC - tolerating well   -CO2 noted  -CT chest with GGO   -Ddimer neg   -pulm f/u     3. HTN  -bp stable  -cont current meds     4. DM2  -med eval noted    5. Abnormal UA  +UA noted, asymptomatic   +Ucx noted   -med f/u     6. Chronic Lymphedema  -transition to Bumex as above     dvt ppx     plan discussed with ACP

## 2021-06-02 LAB
-  AMIKACIN: SIGNIFICANT CHANGE UP
-  AMPICILLIN/SULBACTAM: SIGNIFICANT CHANGE UP
-  CEFEPIME: SIGNIFICANT CHANGE UP
-  CEFTAZIDIME: SIGNIFICANT CHANGE UP
-  CEFTRIAXONE: SIGNIFICANT CHANGE UP
-  CIPROFLOXACIN: SIGNIFICANT CHANGE UP
-  GENTAMICIN: SIGNIFICANT CHANGE UP
-  LEVOFLOXACIN: SIGNIFICANT CHANGE UP
-  MEROPENEM: SIGNIFICANT CHANGE UP
-  TOBRAMYCIN: SIGNIFICANT CHANGE UP
-  TRIMETHOPRIM/SULFAMETHOXAZOLE: SIGNIFICANT CHANGE UP
ANION GAP SERPL CALC-SCNC: 9 MMOL/L — SIGNIFICANT CHANGE UP (ref 7–14)
BUN SERPL-MCNC: 30 MG/DL — HIGH (ref 7–23)
CALCIUM SERPL-MCNC: 9 MG/DL — SIGNIFICANT CHANGE UP (ref 8.4–10.5)
CHLORIDE SERPL-SCNC: 96 MMOL/L — LOW (ref 98–107)
CO2 SERPL-SCNC: 34 MMOL/L — HIGH (ref 22–31)
CREAT SERPL-MCNC: 1.01 MG/DL — SIGNIFICANT CHANGE UP (ref 0.5–1.3)
CULTURE RESULTS: SIGNIFICANT CHANGE UP
GLUCOSE SERPL-MCNC: 110 MG/DL — HIGH (ref 70–99)
HCT VFR BLD CALC: 33.9 % — LOW (ref 34.5–45)
HGB BLD-MCNC: 9.7 G/DL — LOW (ref 11.5–15.5)
MAGNESIUM SERPL-MCNC: 2.1 MG/DL — SIGNIFICANT CHANGE UP (ref 1.6–2.6)
MCHC RBC-ENTMCNC: 25.7 PG — LOW (ref 27–34)
MCHC RBC-ENTMCNC: 28.6 GM/DL — LOW (ref 32–36)
MCV RBC AUTO: 89.7 FL — SIGNIFICANT CHANGE UP (ref 80–100)
METHOD TYPE: SIGNIFICANT CHANGE UP
NRBC # BLD: 0 /100 WBCS — SIGNIFICANT CHANGE UP
NRBC # FLD: 0 K/UL — SIGNIFICANT CHANGE UP
ORGANISM # SPEC MICROSCOPIC CNT: SIGNIFICANT CHANGE UP
PHOSPHATE SERPL-MCNC: 3.6 MG/DL — SIGNIFICANT CHANGE UP (ref 2.5–4.5)
PLATELET # BLD AUTO: 210 K/UL — SIGNIFICANT CHANGE UP (ref 150–400)
POTASSIUM SERPL-MCNC: 4.2 MMOL/L — SIGNIFICANT CHANGE UP (ref 3.5–5.3)
POTASSIUM SERPL-SCNC: 4.2 MMOL/L — SIGNIFICANT CHANGE UP (ref 3.5–5.3)
RBC # BLD: 3.78 M/UL — LOW (ref 3.8–5.2)
RBC # FLD: 14.9 % — HIGH (ref 10.3–14.5)
SODIUM SERPL-SCNC: 139 MMOL/L — SIGNIFICANT CHANGE UP (ref 135–145)
SPECIMEN SOURCE: SIGNIFICANT CHANGE UP
WBC # BLD: 8.39 K/UL — SIGNIFICANT CHANGE UP (ref 3.8–10.5)
WBC # FLD AUTO: 8.39 K/UL — SIGNIFICANT CHANGE UP (ref 3.8–10.5)

## 2021-06-02 RX ADMIN — BUMETANIDE 1 MILLIGRAM(S): 0.25 INJECTION INTRAMUSCULAR; INTRAVENOUS at 05:07

## 2021-06-02 RX ADMIN — GABAPENTIN 100 MILLIGRAM(S): 400 CAPSULE ORAL at 15:17

## 2021-06-02 RX ADMIN — MONTELUKAST 10 MILLIGRAM(S): 4 TABLET, CHEWABLE ORAL at 15:17

## 2021-06-02 RX ADMIN — AMLODIPINE BESYLATE 10 MILLIGRAM(S): 2.5 TABLET ORAL at 05:08

## 2021-06-02 RX ADMIN — Medication 100 MILLIGRAM(S): at 21:17

## 2021-06-02 RX ADMIN — Medication 1: at 12:34

## 2021-06-02 RX ADMIN — Medication 100 MILLIGRAM(S): at 05:08

## 2021-06-02 RX ADMIN — GABAPENTIN 100 MILLIGRAM(S): 400 CAPSULE ORAL at 21:17

## 2021-06-02 RX ADMIN — ATORVASTATIN CALCIUM 40 MILLIGRAM(S): 80 TABLET, FILM COATED ORAL at 21:17

## 2021-06-02 RX ADMIN — BUDESONIDE AND FORMOTEROL FUMARATE DIHYDRATE 2 PUFF(S): 160; 4.5 AEROSOL RESPIRATORY (INHALATION) at 08:51

## 2021-06-02 RX ADMIN — POLYETHYLENE GLYCOL 3350 17 GRAM(S): 17 POWDER, FOR SOLUTION ORAL at 12:34

## 2021-06-02 RX ADMIN — ALBUTEROL 2 PUFF(S): 90 AEROSOL, METERED ORAL at 09:03

## 2021-06-02 RX ADMIN — Medication 100 MILLIGRAM(S): at 05:07

## 2021-06-02 RX ADMIN — ALBUTEROL 2 PUFF(S): 90 AEROSOL, METERED ORAL at 21:17

## 2021-06-02 RX ADMIN — GABAPENTIN 100 MILLIGRAM(S): 400 CAPSULE ORAL at 05:08

## 2021-06-02 RX ADMIN — Medication 100 MILLIGRAM(S): at 15:18

## 2021-06-02 RX ADMIN — Medication 1 DROP(S): at 05:08

## 2021-06-02 RX ADMIN — ALBUTEROL 2 PUFF(S): 90 AEROSOL, METERED ORAL at 15:18

## 2021-06-02 RX ADMIN — Medication 1: at 17:33

## 2021-06-02 RX ADMIN — ENOXAPARIN SODIUM 40 MILLIGRAM(S): 100 INJECTION SUBCUTANEOUS at 12:34

## 2021-06-02 RX ADMIN — Medication 1 DROP(S): at 17:33

## 2021-06-02 RX ADMIN — BUDESONIDE AND FORMOTEROL FUMARATE DIHYDRATE 2 PUFF(S): 160; 4.5 AEROSOL RESPIRATORY (INHALATION) at 21:17

## 2021-06-02 NOTE — CHART NOTE - NSCHARTNOTEFT_GEN_A_CORE
patient has not used cpap for over a year; will need to re-demonstrate need.  pulms requesting change cpap to bipap for home.  discussed with respiratory will need overnight oximetry on oxygen showing desat to at least 88% or less for 5 minutes during testing

## 2021-06-02 NOTE — PHYSICAL THERAPY INITIAL EVALUATION ADULT - GENERAL OBSERVATIONS, REHAB EVAL
Patient received seated at bedside (+) morbidly obese , (+) 3LO2 nasal cannula , (+) prima fit , (+) tele monitor , mild SOB with activities

## 2021-06-02 NOTE — PROGRESS NOTE ADULT - ASSESSMENT
86 y/o woman with hx of COPD/asthma on 3L NC O2, Dm2, HTN, mirela on cpap, lymphedema on bumex who was brought to the ER by daughter initially because she had woken up from a deep sleep and was seemingly very depressed (now denying) but also complaining of worsening LE edema b/l and continued SOB. found in the ER to have pulmonary edema and b/l LE pitting edema to be admitted for further evaluation.       hypoxic HYPERCAPNIC RESP FAILURE: CHF: DO VBG: CONT DIURETICS:  cont to mantain os sao2 above 90%: she may need bipap at night tie depending upon the ABG :   ASTHMA: ON SYMBICORT, SINGULAIR AS WELL AS bd: SHE IS NOT WHEEZING AT THIS TIE=ME:   obese: counselled  DM : control   HTN: controled  MIRELA: N CPAP; DOES not  REMEMBER THE SEttings  dwa cp      05/31:      hypoxic HYPERCAPNIC RESP FAILURE: CHF: DO VBG: CONT DIURETICS:  cont to maintain os sao2 above 90%: she stil needs bipap in the daytime  as she desats: she has ac on chr hypercaribc resp fialure: likely has MIRELA and oHS too:   ASTHMA: ON SYMBICORT, SINGULAIR : no wheezing  obese: counselled  DM : control   HTN: controlled  MIRELA: N CPAP; DOES not  REMEMBER THE Settings: however at this time she would need bipap at this time as well as dc:   dw  cp    6/1;        hypoxic HYPERCAPNIC RESP FAILURE: CHF: DO VBG: CONT DIURETICS:  cont to maintain os sao2 above 90%: she still  needs bipap in the daytime  as she desats: she has ac on chr hypercarbic resp failure likely has MIRELA and OHS too: she would need bipap at home: she only has oxygen at home: sheis responding appropriately today and seems more awake:   ASTHMA: ON SYMBICORT, SINGULAIR : no wheezing: and BD prn   obese: counselled  DM : control   HTN: controlled  MIRELA: N CPAP; DOES not  REMEMBER THE Settings: however at this time she would need bipap at this time as well as dc:   dw  acp      6/2:    hypoxic HYPERCAPNIC RESP FAILURE: CHF: CONT DIURETICS:  cont to maintain os sao2 above 90%: she still  needs bipap in the daytime  as she desats: she has ac on chr hypercarbic resp failure likely has MIRELA and OHS too: she would need bipap at home: she only has oxygen at home: she is responding appropriately today and seems more awake:   ASTHMA: ON SYMBICORT, SINGULAIR : no wheezing: and BD prn   obese: counselled  DM : control   HTN: controlled  MIRELA: N CPAP; DOES not  REMEMBER THE Settings: however at this time she would need bipap at this time as well as dc:   nathalia  acp

## 2021-06-02 NOTE — PHYSICAL THERAPY INITIAL EVALUATION ADULT - PERTINENT HX OF CURRENT PROBLEM, REHAB EVAL
This is an 86 y/o woman with hx of COPD/asthma on 3L NC O2, DM2, daughter brought her seemingly very depressed but also complaining of worsening LE edema b/l and continued SOB. found in the ER to have pulmonary edema and b/l LE pitting edema and admitted for further evaluation.

## 2021-06-02 NOTE — PROGRESS NOTE ADULT - ASSESSMENT
Echo 6/2/21: Min MR, grossly nl lv sys fx   Echo 12/5/20: grossly nl lv sys fx    a/p  Pt is an 84 y/o woman with hx of COPD/asthma on 3L NC O2, Dm2, HTN, mirela on cpap, lymphedema on bumex who was brought to the ER by daughter initially because she had woken up from a deep sleep and was seemingly very depressed (now denying) but also complaining of worsening LE edema b/l and continued SOB.    1. Acute on Chronic Diastolic CHF  -le edema improved  -cont oral bumex as ordered   -repeat Echo w grossly nl lv sys fx   -CXR w pulmonary edema  -hsT neg, no acs on EKG   -Le doppler neg for DVT  -cont bb  -strict IO/DW    2. Acute on Chronic Hypercarbic Resp Failure /asthma  -currently on 3L NC - tolerating well   -CO2 noted  -CT chest with GGO   -Ddimer neg   -pulm f/u     3. HTN  -bp stable  -cont current meds     4. DM2  -med f/u     5. Abnormal UA  +UA noted, asymptomatic   +Ucx noted   -med f/u     6. Chronic Lymphedema  -on bumex     dvt ppx   DCP pending bipap set up at home and PT recs   plan discussed with ACP

## 2021-06-03 ENCOUNTER — TRANSCRIPTION ENCOUNTER (OUTPATIENT)
Age: 86
End: 2021-06-03

## 2021-06-03 LAB
ANION GAP SERPL CALC-SCNC: 9 MMOL/L — SIGNIFICANT CHANGE UP (ref 7–14)
BASE EXCESS BLDA CALC-SCNC: 11.3 MMOL/L — HIGH (ref -2–2)
BUN SERPL-MCNC: 26 MG/DL — HIGH (ref 7–23)
CALCIUM SERPL-MCNC: 8.8 MG/DL — SIGNIFICANT CHANGE UP (ref 8.4–10.5)
CHLORIDE SERPL-SCNC: 92 MMOL/L — LOW (ref 98–107)
CO2 SERPL-SCNC: 35 MMOL/L — HIGH (ref 22–31)
CREAT SERPL-MCNC: 0.92 MG/DL — SIGNIFICANT CHANGE UP (ref 0.5–1.3)
GLUCOSE SERPL-MCNC: 108 MG/DL — HIGH (ref 70–99)
HCO3 BLDA-SCNC: 34 MMOL/L — HIGH (ref 22–26)
HCT VFR BLD CALC: 33 % — LOW (ref 34.5–45)
HGB BLD-MCNC: 9.8 G/DL — LOW (ref 11.5–15.5)
MAGNESIUM SERPL-MCNC: 2 MG/DL — SIGNIFICANT CHANGE UP (ref 1.6–2.6)
MCHC RBC-ENTMCNC: 26.1 PG — LOW (ref 27–34)
MCHC RBC-ENTMCNC: 29.7 GM/DL — LOW (ref 32–36)
MCV RBC AUTO: 87.8 FL — SIGNIFICANT CHANGE UP (ref 80–100)
NRBC # BLD: 0 /100 WBCS — SIGNIFICANT CHANGE UP
NRBC # FLD: 0 K/UL — SIGNIFICANT CHANGE UP
PCO2 BLDA: 71 MMHG — CRITICAL HIGH (ref 32–48)
PH BLDA: 7.34 — LOW (ref 7.35–7.45)
PHOSPHATE SERPL-MCNC: 3.2 MG/DL — SIGNIFICANT CHANGE UP (ref 2.5–4.5)
PLATELET # BLD AUTO: 202 K/UL — SIGNIFICANT CHANGE UP (ref 150–400)
PO2 BLDA: 113 MMHG — HIGH (ref 83–108)
POTASSIUM SERPL-MCNC: 3.8 MMOL/L — SIGNIFICANT CHANGE UP (ref 3.5–5.3)
POTASSIUM SERPL-SCNC: 3.8 MMOL/L — SIGNIFICANT CHANGE UP (ref 3.5–5.3)
RBC # BLD: 3.76 M/UL — LOW (ref 3.8–5.2)
RBC # FLD: 14.7 % — HIGH (ref 10.3–14.5)
SAO2 % BLDA: 98.6 % — SIGNIFICANT CHANGE UP (ref 95–99)
SODIUM SERPL-SCNC: 136 MMOL/L — SIGNIFICANT CHANGE UP (ref 135–145)
WBC # BLD: 9.39 K/UL — SIGNIFICANT CHANGE UP (ref 3.8–10.5)
WBC # FLD AUTO: 9.39 K/UL — SIGNIFICANT CHANGE UP (ref 3.8–10.5)

## 2021-06-03 RX ORDER — ACETAMINOPHEN 500 MG
650 TABLET ORAL ONCE
Refills: 0 | Status: COMPLETED | OUTPATIENT
Start: 2021-06-03 | End: 2021-06-03

## 2021-06-03 RX ORDER — POTASSIUM CHLORIDE 20 MEQ
20 PACKET (EA) ORAL ONCE
Refills: 0 | Status: COMPLETED | OUTPATIENT
Start: 2021-06-03 | End: 2021-06-03

## 2021-06-03 RX ADMIN — Medication 100 MILLIGRAM(S): at 04:26

## 2021-06-03 RX ADMIN — ALBUTEROL 2 PUFF(S): 90 AEROSOL, METERED ORAL at 22:49

## 2021-06-03 RX ADMIN — ALBUTEROL 2 PUFF(S): 90 AEROSOL, METERED ORAL at 04:26

## 2021-06-03 RX ADMIN — ENOXAPARIN SODIUM 40 MILLIGRAM(S): 100 INJECTION SUBCUTANEOUS at 12:50

## 2021-06-03 RX ADMIN — Medication 100 MILLIGRAM(S): at 12:51

## 2021-06-03 RX ADMIN — BUDESONIDE AND FORMOTEROL FUMARATE DIHYDRATE 2 PUFF(S): 160; 4.5 AEROSOL RESPIRATORY (INHALATION) at 22:50

## 2021-06-03 RX ADMIN — MONTELUKAST 10 MILLIGRAM(S): 4 TABLET, CHEWABLE ORAL at 12:49

## 2021-06-03 RX ADMIN — BUMETANIDE 1 MILLIGRAM(S): 0.25 INJECTION INTRAMUSCULAR; INTRAVENOUS at 04:27

## 2021-06-03 RX ADMIN — Medication 1 DROP(S): at 04:28

## 2021-06-03 RX ADMIN — ATORVASTATIN CALCIUM 40 MILLIGRAM(S): 80 TABLET, FILM COATED ORAL at 22:49

## 2021-06-03 RX ADMIN — Medication 1 DROP(S): at 17:23

## 2021-06-03 RX ADMIN — GABAPENTIN 100 MILLIGRAM(S): 400 CAPSULE ORAL at 22:49

## 2021-06-03 RX ADMIN — Medication 20 MILLIEQUIVALENT(S): at 12:49

## 2021-06-03 RX ADMIN — Medication 1 APPLICATION(S): at 22:50

## 2021-06-03 RX ADMIN — AMLODIPINE BESYLATE 10 MILLIGRAM(S): 2.5 TABLET ORAL at 04:27

## 2021-06-03 RX ADMIN — Medication 650 MILLIGRAM(S): at 21:06

## 2021-06-03 RX ADMIN — ALBUTEROL 2 PUFF(S): 90 AEROSOL, METERED ORAL at 17:23

## 2021-06-03 RX ADMIN — BUDESONIDE AND FORMOTEROL FUMARATE DIHYDRATE 2 PUFF(S): 160; 4.5 AEROSOL RESPIRATORY (INHALATION) at 09:13

## 2021-06-03 RX ADMIN — GABAPENTIN 100 MILLIGRAM(S): 400 CAPSULE ORAL at 04:27

## 2021-06-03 RX ADMIN — Medication 650 MILLIGRAM(S): at 20:12

## 2021-06-03 RX ADMIN — Medication 1: at 12:49

## 2021-06-03 RX ADMIN — ALBUTEROL 2 PUFF(S): 90 AEROSOL, METERED ORAL at 09:13

## 2021-06-03 RX ADMIN — Medication 100 MILLIGRAM(S): at 22:49

## 2021-06-03 RX ADMIN — Medication 100 MILLIGRAM(S): at 04:27

## 2021-06-03 RX ADMIN — GABAPENTIN 100 MILLIGRAM(S): 400 CAPSULE ORAL at 12:50

## 2021-06-03 NOTE — PROVIDER CONTACT NOTE (OTHER) - ASSESSMENT
Patient desat to mid 60's. Woken up, increased to 4L patient sating in 70's. Increased 02 to 5L NC, patient sating 95%
pt not in any distress
Patient unable to tolerate room air. 3L NC applied, sating 100%

## 2021-06-03 NOTE — DISCHARGE NOTE PROVIDER - HOSPITAL COURSE
86 y/o woman with hx of COPD/asthma on 3L NC O2, Dm2, HTN, mirela on cpap, lymphedema on bumex who was brought to the ER by daughter initially because she had woken up from a deep sleep and was seemingly very depressed (now denying) but also complaining of worsening LE edema b/l and continued SOB. found in the ER to have pulmonary edema and b/l LE pitting edema to be admitted for further evaluation.       Acute congestive heart failure, unspecified heart failure type.   worsening LE edema b/l despite using bumex (supposedly for lymphedema). found to have evidence of pulmonary edema on cxr. no shortness of breath nor chest pain reported.   -probably in the setting of pulmonary hypertension given hx of MIRELA, OHS, COPD/asthma  -continue Iv lasix 40mg bid changed to bumex po 6/1   -CT chest Mild interlobular septal thickening and groundglass opacities can represent mild edema. Bibasilar subsegmental atelectasis/consolidations.  -TTE Technically difficult study. Mitral annular calcification, Minimal mitral regurgitation. Unable to accurately evaluate right ventricular size or  -LE US negative dvt   cardiology following     COPD   uses 3L NC at home   has not used cpap trying to obtain bipap for home use   pulmonary following     Obstructive sleep apnea.    3LNC during day and CPAP at night - change to BIPAP for discharge     HTN / HLD   -c/w home meds     DM-2 :  -c/w FSBS/ Insulin   continue home meds for discharge     Discussed with attending ready for discharge ??????????????                 86 y/o woman with hx of COPD/asthma on 3L NC O2, Dm2, HTN, mirela on cpap, lymphedema on bumex who was brought to the ER by daughter initially because she had woken up from a deep sleep and was seemingly very depressed (now denying) but also complaining of worsening LE edema b/l and continued SOB. found in the ER to have pulmonary edema and b/l LE pitting edema to be admitted for further evaluation.       Acute congestive heart failure, unspecified heart failure type.   worsening LE edema b/l despite using bumex (supposedly for lymphedema). found to have evidence of pulmonary edema on cxr. no shortness of breath nor chest pain reported.   -probably in the setting of pulmonary hypertension given hx of MIRELA, OHS, COPD/asthma  -continue Iv lasix 40mg bid changed to bumex po 6/1   -CT chest Mild interlobular septal thickening and groundglass opacities can represent mild edema. Bibasilar subsegmental atelectasis/consolidations.  -TTE Technically difficult study. Mitral annular calcification, Minimal mitral regurgitation. Unable to accurately evaluate right ventricular size or  -LE US negative dvt   cardiology following     COPD   uses 3L NC at home   has not used cpap trying to obtain bipap for home use   pulmonary following     Obstructive sleep apnea.    3LNC during day and CPAP at night - change to BIPAP for discharge     HTN / HLD   -c/w home meds     DM-2 :  -c/w FSBS/ Insulin   continue home meds for discharge     Discussed with attending ready for discharge 6/4 - home with home care and new Bipap set up

## 2021-06-03 NOTE — PROVIDER CONTACT NOTE (OTHER) - SITUATION
Patient desat to mid 60's. Woken up, increased to 4L patient sating in 70's. Increased 02 to 5L NC, patient sating 95%

## 2021-06-03 NOTE — DISCHARGE NOTE PROVIDER - CARE PROVIDER_API CALL
French Rubio)  Medicine  [address] [place] [address]  Phone: [phone]  Fax: [phone]  Follow Up Time:     Arjun Mckeon)  Critical Care Medicine; Internal Medicine; Pulmonary Disease  [address] [place] [address]  Phone: [phone]  Fax: [phone]  Follow Up Time:     Gutierrez Bermudez)  Cardiology  [address], [address]  [place] [address]  Phone: [phone]  Fax: [phone]  Follow Up Time:    Arjun Mckeon)  Critical Care Medicine; Internal Medicine; Pulmonary Disease  [address] [place] [address]  Phone: [phone]  Fax: [phone]  Follow Up Time:     Gutierrez Bermudez)  Cardiology  [address] [facility] [address], [address]  [place] [address]  Phone: [phone]  Fax: [phone]  Scheduled Appointment: 06/10/2021 04:15 PM    Dr. Tripp - PCP,   Phone: (   )    -  Fax: (   )    -  Follow Up Time:

## 2021-06-03 NOTE — DISCHARGE NOTE PROVIDER - NSDCCPCAREPLAN_GEN_ALL_CORE_FT
PRINCIPAL DISCHARGE DIAGNOSIS  Diagnosis: Acute on chronic systolic congestive heart failure  Assessment and Plan of Treatment:   worsening lower extremity edema b/l despite using bumex (supposedly for lymphedema). found to have evidence of pulmonary edema on chest xray.   -probably in the setting of pulmonary hypertension given hx of SHAD, OHS, COPD/asthma  -was on intravenous  lasix 40mg twice a day changed to bumex oral  6/1   -CT chest Mild interlobular septal thickening and groundglass opacities can represent mild edema. Bibasilar subsegmental atelectasis/consolidations.  -TTE Technically difficult study. Mitral annular calcification, Minimal mitral regurgitation. Unable to accurately evaluate right ventricular size or  -LE US negative dvt   take your medications as perscribed, follow recommended diet, follow up with your PCP and cardiologist call for appointment      SECONDARY DISCHARGE DIAGNOSES  Diagnosis: COPD with asthma  Assessment and Plan of Treatment:   uses 3L NC at home   has not used cpap trying to obtain bipap for home use   pulmonary following   take your medications as perscribed, use your oxygen during the day and BIPAP at night   follow up with pulmonary call for appointment       Diagnosis: SHAD (obstructive sleep apnea)  Assessment and Plan of Treatment:   3LNC during day and CPAP at night - change to BIPAP for discharge    Diagnosis: HTN (hypertension)  Assessment and Plan of Treatment: continue taking your medications as perscribed follow  a low salt diet   follow up with your PCP and or cardiologist call for appointment    Diagnosis: HLD (hyperlipidemia)  Assessment and Plan of Treatment: continue taking your medications as perscribed follow  a low cholesterold diet   follow up with your PCP and or cardiologist call for appointment       Diagnosis: Diabetes  Assessment and Plan of Treatment: continue taking your medications as perscribed follow  a diabetic  diet   follow up with your PCP and or endocrinologist  call for appointment  follow up with opthamology and podiatry call for appointment     PRINCIPAL DISCHARGE DIAGNOSIS  Diagnosis: Acute on chronic systolic congestive heart failure  Assessment and Plan of Treatment: worsening lower extremity edema b/l despite using bumex (supposedly for lymphedema). found to have evidence of pulmonary edema on chest xray.   -probably in the setting of pulmonary hypertension given hx of SHAD, OHS, COPD/asthma  -was on intravenous  lasix 40mg twice a day changed to bumex oral  6/1 - being toelrated well - continue bumex as directed  take your medications as perscribed, follow recommended diet, follow up with your PCP and cardiologist call for appointment      SECONDARY DISCHARGE DIAGNOSES  Diagnosis: COPD with asthma  Assessment and Plan of Treatment: uses 3L NC at home   has not used cpap trying to obtain bipap for home use   take your medications as perscribed, use your oxygen during the day and BIPAP at night   follow up with pulmonary call for appointment       Diagnosis: SHAD (obstructive sleep apnea)  Assessment and Plan of Treatment: 3LNC during day and CPAP at night - change to BIPAP for discharge    Diagnosis: HTN (hypertension)  Assessment and Plan of Treatment: continue taking your medications as perscribed follow  a low salt diet   follow up with your PCP and or cardiologist call for appointment    Diagnosis: HLD (hyperlipidemia)  Assessment and Plan of Treatment: continue taking your medications as perscribed follow  a low cholesterold diet   follow up with your PCP and or cardiologist call for appointment       Diagnosis: Diabetes  Assessment and Plan of Treatment: Continue consistent carbohydrate diet.  Monitor blood glucose levels throughout the day before meals and at bedtime.  Record blood sugars and bring to outpatient providers appointment in order to be reviewed by your doctor for management modifications.  Be aware of diabetes management symptoms including feeling cool and clammy may be related to low glucose levels.  Feeling hot and dry may indicate high glucose levels.  If  you feel these symptoms, check your blood sugar.  Make regular podiatry appointments in order to have feet checked for wounds and toe nails cut by a doctor to prevent infections.

## 2021-06-03 NOTE — PROGRESS NOTE ADULT - ASSESSMENT
84 y/o woman with hx of COPD/asthma on 3L NC O2, Dm2, HTN, mirela on cpap, lymphedema on bumex who was brought to the ER by daughter initially because she had woken up from a deep sleep and was seemingly very depressed (now denying) but also complaining of worsening LE edema b/l and continued SOB. found in the ER to have pulmonary edema and b/l LE pitting edema to be admitted for further evaluation.       hypoxic HYPERCAPNIC RESP FAILURE: CHF: DO VBG: CONT DIURETICS:  cont to mantain os sao2 above 90%: she may need bipap at night tie depending upon the ABG :   ASTHMA: ON SYMBICORT, SINGULAIR AS WELL AS bd: SHE IS NOT WHEEZING AT THIS TIE=ME:   obese: counselled  DM : control   HTN: controled  MIRELA: N CPAP; DOES not  REMEMBER THE SEttings  dwa cp      05/31:      hypoxic HYPERCAPNIC RESP FAILURE: CHF: DO VBG: CONT DIURETICS:  cont to maintain os sao2 above 90%: she stil needs bipap in the daytime  as she desats: she has ac on chr hypercaribc resp fialure: likely has MIRELA and oHS too:   ASTHMA: ON SYMBICORT, SINGULAIR : no wheezing  obese: counselled  DM : control   HTN: controlled  MIRELA: N CPAP; DOES not  REMEMBER THE Settings: however at this time she would need bipap at this time as well as dc:   dw  cp    6/1;        hypoxic HYPERCAPNIC RESP FAILURE: CHF: DO VBG: CONT DIURETICS:  cont to maintain os sao2 above 90%: she still  needs bipap in the daytime  as she desats: she has ac on chr hypercarbic resp failure likely has MIRELA and OHS too: she would need bipap at home: she only has oxygen at home: sheis responding appropriately today and seems more awake:   ASTHMA: ON SYMBICORT, SINGULAIR : no wheezing: and BD prn   obese: counselled  DM : control   HTN: controlled  MIRELA: N CPAP; DOES not  REMEMBER THE Settings: however at this time she would need bipap at this time as well as dc:   dw  acp      6/2:    hypoxic HYPERCAPNIC RESP FAILURE: CHF: CONT DIURETICS:  cont to maintain os sao2 above 90%: she still  needs bipap in the daytime  as she desats: she has ac on chr hypercarbic resp failure likely has MIRELA and OHS too: she would need bipap at home: she only has oxygen at home: she is responding appropriately today and seems more awake:   ASTHMA: ON SYMBICORT, SINGULAIR : no wheezing: and BD prn   obese: counselled  DM : control   HTN: controlled  MIRELA: N CPAP; DOES not  REMEMBER THE Settings: however at this time she would need bipap at this time as well as dc:   dw  acp      6/3:  hypoxic HYPERCAPNIC RESP FAILURE: CHF: CONT DIURETICS:  she seems to be better with bipap: she feels better: she is awake and alert: no chest pain:   ASTHMA: ON SYMBICORT, SINGULAIR : no wheezing: and BD prn   obese: counselled  DM : control   HTN: controlled  MIRELA: N CPAP; DOES not  REMEMBER THE Settings: however at this time she would need bipap at this time as well as dc:   dw  acp

## 2021-06-03 NOTE — DISCHARGE NOTE PROVIDER - NSFTFHOMEHTHYNRD_GEN_ALL_CORE
Quality 130: Documentation Of Current Medications In The Medical Record: Eligible clinician attests to documenting in the medical record the patient is not eligible for a current list of medications being obtained, updated, or reviewed by the eligible clinician Yes Additional Notes: Patient does Not take any medications Detail Level: Detailed

## 2021-06-03 NOTE — CHART NOTE - NSCHARTNOTEFT_GEN_A_CORE
Pt has a pmhx of COPD.  PCO2 on RA abg 87.  overnight oximetry on oxygen desaturation to mid 60's, woken up, increased to 4L patient sating in 70's.  BIPAP settings fi02 34% IP 15 EP 5 back up rate 12 target sat 88%-97% Pt will require BIPAP for a diagnosis of COPD.  Patient was tested for BIPAP overnight oximetry overnight oximetry on oxygen desaturation to mid 60's, woken up, increased to 4L patient sating in 70's. PCO2 on RA abg 87. Pt will require BIPAP for a diagnosis of COPD.  Patient was tested for BIPAP overnight oximetry overnight oximetry on oxygen desaturation to mid 60's, woken up, increased to 4L patient sating in 70's. PCO2 on RA vbg 87. Pt will require BIPAP for a diagnosis of COPD.  Patient was tested for BIPAP overnight oximetry overnight oximetry on oxygen desaturation to mid 60's, woken up, increased to 4L patient sating in 70's. PCO2 on oxygen  abg 71.

## 2021-06-03 NOTE — PROGRESS NOTE ADULT - ASSESSMENT
Echo 6/2/21: Min MR, grossly nl lv sys fx   Echo 12/5/20: grossly nl lv sys fx    a/p  Pt is an 84 y/o woman with hx of COPD/asthma on 3L NC O2, Dm2, HTN, mirela on cpap, lymphedema on bumex who was brought to the ER by daughter initially because she had woken up from a deep sleep and was seemingly very depressed (now denying) but also complaining of worsening LE edema b/l and continued SOB.    1. Acute on Chronic Diastolic CHF  -le edema improved  -cont oral bumex as ordered   -repeat Echo w grossly nl lv sys fx   -CXR w pulmonary edema  -hsT neg, no acs on EKG   -Le doppler neg for DVT  -cont bb  -strict IO/DW    2. Acute on Chronic Hypercarbic Resp Failure /asthma  -CO2 noted  -CT chest with GGO   -Ddimer neg   -pt qualified for bipap at home -pending set up   -pulm f/u     3. HTN  -bp stable  -cont current meds     4. DM2  -med f/u     5. Abnormal UA  +UA noted, asymptomatic   +Ucx noted   -med f/u     6. Chronic Lymphedema  -on bumex     dvt ppx   DCP pending bipap set up at home   plan discussed with ACP  Echo 6/2/21: Min MR, grossly nl lv sys fx   Echo 12/5/20: grossly nl lv sys fx    a/p  Pt is an 84 y/o woman with hx of COPD/asthma on 3L NC O2, Dm2, HTN, mirela on cpap, lymphedema on bumex who was brought to the ER by daughter initially because she had woken up from a deep sleep and was seemingly very depressed (now denying) but also complaining of worsening LE edema b/l and continued SOB.    1. Acute on Chronic Diastolic CHF  -le edema improved  -cont oral bumex as ordered   -repeat Echo w grossly nl lv sys fx   -CXR w pulmonary edema  -hsT neg, no acs on EKG   -Le doppler neg for DVT  -cont bb  -strict IO/DW    2. Acute on Chronic Hypercarbic Resp Failure /asthma  -CO2 noted  -CT chest with GGO   -Ddimer neg   -pt qualified for bipap at home -pending set up   -pulm f/u     3. HTN  -bp stable  -cont current meds     4. DM2  -med f/u     5. Abnormal UA  +UA noted, asymptomatic   +Ucx noted   -med f/u     6. Chronic Lymphedema  -on bumex     dvt ppx   DCP pending bipap set up at home   pt to f/u w outpt cardio upon DC  plan discussed with ACP

## 2021-06-03 NOTE — PROVIDER CONTACT NOTE (OTHER) - BACKGROUND
HF
(Admit Diagnosis) Other disorders of soft tissue  (PMH) Lymphedema  (PMH) DM (diabetes mellitus)  (PMH) HTN (hypertension)
(Admit Diagnosis) Other disorders of soft tissue  (PMH) Lymphedema  (PMH) DM (diabetes mellitus)  (PMH) HTN (hypertension)

## 2021-06-03 NOTE — PROVIDER CONTACT NOTE (OTHER) - RECOMMENDATIONS
Continue to monitor on oxygen therapy
Continue to monitor on oxygen therapy, attempt to titrate down

## 2021-06-03 NOTE — DISCHARGE NOTE PROVIDER - PROVIDER TOKENS
PROVIDER:[TOKEN:[[id number]]],PROVIDER:[TOKEN:[[id number]]],PROVIDER:[TOKEN:[[id number]]] PROVIDER:[TOKEN:[[id number]]],PROVIDER:[TOKEN:[[id number]],SCHEDULEDAPPT:[06/10/2021],SCHEDULEDAPPTTIME:[04:15 PM]],FREE:[LAST:[Dr. Tripp - PCP],PHONE:[(   )    -],FAX:[(   )    -]]

## 2021-06-03 NOTE — DISCHARGE NOTE PROVIDER - CARE PROVIDERS DIRECT ADDRESSES
,[email or web address],DirectAddress_Unknown,DirectAddress_Unknown ,DirectAddress_Unknown,DirectAddress_Unknown,DirectAddress_Unknown

## 2021-06-03 NOTE — DISCHARGE NOTE PROVIDER - NSDCMRMEDTOKEN_GEN_ALL_CORE_FT
acetaminophen 325 mg oral tablet: 2 tab(s) orally every 6 hours, As needed, Mild Pain (1 - 3), Moderate Pain (4 - 6)  Advair Diskus 250 mcg-50 mcg inhalation powder: 1 puff(s) inhaled 2 times a day  alendronate 70 mg oral tablet: 1 tab(s) orally once a week  amLODIPine 10 mg oral tablet: 1 tab(s) orally once a day  ascorbic acid 500 mg oral capsule: 1 cap(s) orally once a day  azelastine 137 mcg/inh (0.1%) nasal spray: 2 spray(s) nasal 2 times a day  BIPAP : BIPAP IP 15 EP 5 backup rate 12 FIO2 34% target saturation 88%-97%   calcium (as carbonate) 500 mg oral tablet: 0.5 tab(s) orally once a day  cholecalciferol 2000 intl units oral tablet: 1 tab(s) orally once a day  glimepiride 2 mg oral tablet: 1 tab(s) orally once a day  hydrALAZINE 100 mg oral tablet: 1 tab(s) orally 3 times a day  ipratropium-albuterol 0.5 mg-2.5 mg/3 mLinhalation solution: 3 milliliter(s) inhaled every 6 hours  metoprolol succinate 100 mg oral tablet, extended release: 1 tab(s) orally once a day  montelukast 10 mg oral tablet: 1 tab(s) orally once a day  Neurontin 100 mg oral capsule: 1 cap(s) orally 3 times a day  ocular lubricant ophthalmic solution: 1 drop(s) to each affected eye 3 times a day, As needed, Dry Eyes  polyethylene glycol 3350 oral powder for reconstitution: 17 gram(s) orally once a day  ProAir HFA 90 mcg/inh inhalation aerosol: 2 puff(s) inhaled 4 times a day, As Needed  rosuvastatin 10 mg oral tablet: 1 tab(s) orally once a day   Advair Diskus 250 mcg-50 mcg inhalation powder: 1 puff(s) inhaled 2 times a day  alendronate 70 mg oral tablet: 1 tab(s) orally once a week  amLODIPine 10 mg oral tablet: 1 tab(s) orally once a day  ascorbic acid 500 mg oral capsule: 1 cap(s) orally once a day  azelastine 137 mcg/inh (0.1%) nasal spray: 2 spray(s) nasal 2 times a day  BIPAP : BIPAP IP 15 EP 5 backup rate 12 FIO2 34% target saturation 88%-97%   bumetanide 1 mg oral tablet: 1 tab(s) orally once a day  cholecalciferol 2000 intl units oral tablet: 1 tab(s) orally once a day  glimepiride 2 mg oral tablet: 1 tab(s) orally once a day  hydrALAZINE 100 mg oral tablet: 1 tab(s) orally 3 times a day  ipratropium-albuterol 0.5 mg-2.5 mg/3 mLinhalation solution: 3 milliliter(s) inhaled every 6 hours, As Needed  metoprolol succinate 100 mg oral tablet, extended release: 1 tab(s) orally once a day  montelukast 10 mg oral tablet: 1 tab(s) orally once a day  Neurontin 100 mg oral capsule: 1 cap(s) orally 3 times a day  ocular lubricant ophthalmic solution: 1 drop(s) to each affected eye 3 times a day, As needed, Dry Eyes  polyethylene glycol 3350 oral powder for reconstitution: 17 gram(s) orally once a day  ProAir HFA 90 mcg/inh inhalation aerosol: 2 puff(s) inhaled 4 times a day, As Needed  rosuvastatin 10 mg oral tablet: 1 tab(s) orally once a day

## 2021-06-04 ENCOUNTER — TRANSCRIPTION ENCOUNTER (OUTPATIENT)
Age: 86
End: 2021-06-04

## 2021-06-04 VITALS
SYSTOLIC BLOOD PRESSURE: 150 MMHG | TEMPERATURE: 98 F | RESPIRATION RATE: 16 BRPM | DIASTOLIC BLOOD PRESSURE: 60 MMHG | HEART RATE: 80 BPM | OXYGEN SATURATION: 100 %

## 2021-06-04 LAB
ANION GAP SERPL CALC-SCNC: 9 MMOL/L — SIGNIFICANT CHANGE UP (ref 7–14)
BUN SERPL-MCNC: 35 MG/DL — HIGH (ref 7–23)
CALCIUM SERPL-MCNC: 9 MG/DL — SIGNIFICANT CHANGE UP (ref 8.4–10.5)
CHLORIDE SERPL-SCNC: 93 MMOL/L — LOW (ref 98–107)
CO2 SERPL-SCNC: 35 MMOL/L — HIGH (ref 22–31)
CREAT SERPL-MCNC: 1.05 MG/DL — SIGNIFICANT CHANGE UP (ref 0.5–1.3)
GLUCOSE SERPL-MCNC: 116 MG/DL — HIGH (ref 70–99)
HCT VFR BLD CALC: 33.6 % — LOW (ref 34.5–45)
HGB BLD-MCNC: 9.3 G/DL — LOW (ref 11.5–15.5)
MAGNESIUM SERPL-MCNC: 2.2 MG/DL — SIGNIFICANT CHANGE UP (ref 1.6–2.6)
MCHC RBC-ENTMCNC: 25.3 PG — LOW (ref 27–34)
MCHC RBC-ENTMCNC: 27.7 GM/DL — LOW (ref 32–36)
MCV RBC AUTO: 91.3 FL — SIGNIFICANT CHANGE UP (ref 80–100)
NRBC # BLD: 0 /100 WBCS — SIGNIFICANT CHANGE UP
NRBC # FLD: 0 K/UL — SIGNIFICANT CHANGE UP
PHOSPHATE SERPL-MCNC: 3.6 MG/DL — SIGNIFICANT CHANGE UP (ref 2.5–4.5)
PLATELET # BLD AUTO: 210 K/UL — SIGNIFICANT CHANGE UP (ref 150–400)
POTASSIUM SERPL-MCNC: 4.2 MMOL/L — SIGNIFICANT CHANGE UP (ref 3.5–5.3)
POTASSIUM SERPL-SCNC: 4.2 MMOL/L — SIGNIFICANT CHANGE UP (ref 3.5–5.3)
RBC # BLD: 3.68 M/UL — LOW (ref 3.8–5.2)
RBC # FLD: 14.9 % — HIGH (ref 10.3–14.5)
SODIUM SERPL-SCNC: 137 MMOL/L — SIGNIFICANT CHANGE UP (ref 135–145)
WBC # BLD: 7.27 K/UL — SIGNIFICANT CHANGE UP (ref 3.8–10.5)
WBC # FLD AUTO: 7.27 K/UL — SIGNIFICANT CHANGE UP (ref 3.8–10.5)

## 2021-06-04 RX ORDER — BUMETANIDE 0.25 MG/ML
1 INJECTION INTRAMUSCULAR; INTRAVENOUS
Qty: 30 | Refills: 0
Start: 2021-06-04 | End: 2021-07-03

## 2021-06-04 RX ADMIN — Medication 1 DROP(S): at 05:06

## 2021-06-04 RX ADMIN — AMLODIPINE BESYLATE 10 MILLIGRAM(S): 2.5 TABLET ORAL at 05:07

## 2021-06-04 RX ADMIN — ALBUTEROL 2 PUFF(S): 90 AEROSOL, METERED ORAL at 09:22

## 2021-06-04 RX ADMIN — MONTELUKAST 10 MILLIGRAM(S): 4 TABLET, CHEWABLE ORAL at 09:24

## 2021-06-04 RX ADMIN — GABAPENTIN 100 MILLIGRAM(S): 400 CAPSULE ORAL at 05:09

## 2021-06-04 RX ADMIN — GABAPENTIN 100 MILLIGRAM(S): 400 CAPSULE ORAL at 13:09

## 2021-06-04 RX ADMIN — Medication 100 MILLIGRAM(S): at 13:07

## 2021-06-04 RX ADMIN — Medication 100 MILLIGRAM(S): at 05:07

## 2021-06-04 RX ADMIN — BUMETANIDE 1 MILLIGRAM(S): 0.25 INJECTION INTRAMUSCULAR; INTRAVENOUS at 05:07

## 2021-06-04 RX ADMIN — Medication 1: at 13:07

## 2021-06-04 RX ADMIN — ENOXAPARIN SODIUM 40 MILLIGRAM(S): 100 INJECTION SUBCUTANEOUS at 09:24

## 2021-06-04 RX ADMIN — BUDESONIDE AND FORMOTEROL FUMARATE DIHYDRATE 2 PUFF(S): 160; 4.5 AEROSOL RESPIRATORY (INHALATION) at 09:22

## 2021-06-04 NOTE — PROGRESS NOTE ADULT - ASSESSMENT
Echo 6/2/21: Min MR, grossly nl lv sys fx   Echo 12/5/20: grossly nl lv sys fx    a/p  Pt is an 84 y/o woman with hx of COPD/asthma on 3L NC O2, Dm2, HTN, mirela on cpap, lymphedema on bumex who was brought to the ER by daughter initially because she had woken up from a deep sleep and was seemingly very depressed (now denying) but also complaining of worsening LE edema b/l and continued SOB.    1. Acute on Chronic Diastolic CHF  -le edema improved  -cont oral bumex as ordered   -repeat Echo w grossly nl lv sys fx   -CXR w pulmonary edema  -hsT neg, no acs on EKG   -Le doppler neg for DVT  -cont bb  -strict IO/DW    2. Acute on Chronic Hypercarbic Resp Failure /asthma  -CO2 noted  -CT chest with GGO   -Ddimer neg   -pt qualified for bipap at home -pending set up   -pulm f/u     3. HTN  -bp stable  -cont current meds     4. DM2  -med f/u     5. Abnormal UA  +UA noted, asymptomatic   +Ucx noted   -med f/u     6. Chronic Lymphedema  -on bumex     dvt ppx   DCP pending bipap set up at home     plan discussed with ACP  Echo 6/2/21: Min MR, grossly nl lv sys fx   Echo 12/5/20: grossly nl lv sys fx    a/p  Pt is an 86 y/o woman with hx of COPD/asthma on 3L NC O2, Dm2, HTN, mirela on cpap, lymphedema on bumex who was brought to the ER by daughter initially because she had woken up from a deep sleep and was seemingly very depressed (now denying) but also complaining of worsening LE edema b/l and continued SOB.    1. Acute on Chronic Diastolic CHF  -le edema improved  -cont oral bumex as ordered   -repeat Echo w grossly nl lv sys fx   -CXR w pulmonary edema  -hsT neg, no acs on EKG   -Le doppler neg for DVT  -cont bb  -strict IO/DW    2. Acute on Chronic Hypercarbic Resp Failure /asthma  -CO2 noted  -CT chest with GGO   -Ddimer neg   -pt qualified for bipap at home -pending set up   -pulm f/u     3. HTN  -bp stable  -cont current meds     4. DM2  -med f/u     5. Abnormal UA  +UA noted, asymptomatic   +Ucx noted   -med f/u     6. Chronic Lymphedema  -on bumex     dvt ppx   DCP pending bipap set up at home   pt to f/u with Dr. Erasmo Bermudez on 6/10 @ 415pm   plan discussed with ACP

## 2021-06-04 NOTE — DISCHARGE NOTE NURSING/CASE MANAGEMENT/SOCIAL WORK - PATIENT PORTAL LINK FT
You can access the FollowMyHealth Patient Portal offered by [facility] by registering at the following website: [email or web address]. By joining [employer]’s FollowMyHealth portal, you will also be able to view your health information using other applications (apps) compatible with our system.

## 2021-06-04 NOTE — PROGRESS NOTE ADULT - NSICDXPILOT_GEN_ALL_CORE
[place]

## 2021-06-04 NOTE — PHARMACOTHERAPY INTERVENTION NOTE - COMMENTS
Patient and patient's daughter via phone, Marina, counseled on medication indications, administration, and side effects. Also discussed fluid and salt restrictions, and maintaining a log of daily weights. Patient and patient's daughter verbalized understanding.    Jean Fox, PharmD  Clinical Pharmacist- Internal Medicine  [facility] [id number]

## 2021-06-04 NOTE — PROGRESS NOTE ADULT - PROVIDER SPECIALTY LIST ADULT
Cardiology
Internal Medicine
Pulmonology
Cardiology
Internal Medicine
Pulmonology
Cardiology
Cardiology
Pulmonology
Pulmonology
Cardiology
Internal Medicine
Pulmonology

## 2021-06-04 NOTE — PROGRESS NOTE ADULT - SUBJECTIVE AND OBJECTIVE BOX
CARDIOLOGY FOLLOW UP - Dr. Bermudez  Date of Service: 6/3/21  CC: events noted, denies cp, sob, and palpitations     Review of Systems:  Constitutional: No fever, weight loss, or fatigue  Respiratory: No cough, wheezing, or hemoptysis, no shortness of breath  Cardiovascular: No chest pain, palpitations, passing out, dizziness, or leg swelling  Gastrointestinal: No abd or epigastric pain.  No nausea, vomiting, or hematemesis; no diarrhea or constipation, no melena or hematochezia  Vascular: no edema       PHYSICAL EXAM:  T(C): 36.7 (06-03-21 @ 09:08), Max: 37 (06-03-21 @ 04:25)  HR: 79 (06-03-21 @ 09:08) (73 - 83)  BP: 129/57 (06-03-21 @ 09:08) (129/57 - 155/59)  RR: 18 (06-03-21 @ 09:08) (18 - 18)  SpO2: 96% (06-03-21 @ 09:08) (90% - 99%)  Wt(kg): --  I&O's Summary    02 Jun 2021 07:01  -  03 Jun 2021 07:00  --------------------------------------------------------  IN: 400 mL / OUT: 1000 mL / NET: -600 mL        Appearance: Normal	  Cardiovascular: Normal S1 S2,RRR, No JVD, No murmurs  Respiratory: Lungs clear to auscultation	  Gastrointestinal:  Soft, Non-tender, + BS	  Extremities: Normal range of motion, No clubbing, cyanosis or edema      Home Medications:  acetaminophen 325 mg oral tablet: 2 tab(s) orally every 6 hours, As needed, Mild Pain (1 - 3), Moderate Pain (4 - 6) (09 Dec 2020 14:08)  Advair Diskus 250 mcg-50 mcg inhalation powder: 1 puff(s) inhaled 2 times a day (30 Oct 2019 16:54)  alendronate 70 mg oral tablet: 1 tab(s) orally once a week (30 Oct 2019 16:54)  amLODIPine 10 mg oral tablet: 1 tab(s) orally once a day (30 Oct 2019 16:54)  ascorbic acid 500 mg oral capsule: 1 cap(s) orally once a day (30 Oct 2019 16:54)  azelastine 137 mcg/inh (0.1%) nasal spray: 2 spray(s) nasal 2 times a day (30 Oct 2019 16:54)  calcium (as carbonate) 500 mg oral tablet: 0.5 tab(s) orally once a day (30 Oct 2019 16:54)  cholecalciferol 2000 intl units oral tablet: 1 tab(s) orally once a day (30 Oct 2019 16:54)  glimepiride 2 mg oral tablet: 1 tab(s) orally once a day (30 Oct 2019 16:54)  hydrALAZINE 100 mg oral tablet: 1 tab(s) orally 3 times a day (06 Nov 2019 13:41)  ipratropium-albuterol 0.5 mg-2.5 mg/3 mLinhalation solution: 3 milliliter(s) inhaled every 6 hours (06 Nov 2019 14:05)  metoprolol succinate 100 mg oral tablet, extended release: 1 tab(s) orally once a day (30 Oct 2019 16:54)  montelukast 10 mg oral tablet: 1 tab(s) orally once a day (30 Oct 2019 16:54)  Neurontin 100 mg oral capsule: 1 cap(s) orally 3 times a day (29 Nov 2020 18:19)  ocular lubricant ophthalmic solution: 1 drop(s) to each affected eye 3 times a day, As needed, Dry Eyes (06 Nov 2019 13:41)  polyethylene glycol 3350 oral powder for reconstitution: 17 gram(s) orally once a day (06 Nov 2019 13:41)  ProAir HFA 90 mcg/inh inhalation aerosol: 2 puff(s) inhaled 4 times a day, As Needed (30 Oct 2019 16:54)  rosuvastatin 10 mg oral tablet: 1 tab(s) orally once a day (30 Oct 2019 16:54)      MEDICATIONS  (STANDING):  ALBUTerol    90 MICROgram(s) HFA Inhaler 2 Puff(s) Inhalation every 6 hours  amLODIPine   Tablet 10 milliGRAM(s) Oral daily  artificial tears (preservative free) Ophthalmic Solution 1 Drop(s) Both EYES two times a day  atorvastatin 40 milliGRAM(s) Oral at bedtime  budesonide 160 MICROgram(s)/formoterol 4.5 MICROgram(s) Inhaler 2 Puff(s) Inhalation two times a day  buMETAnide 1 milliGRAM(s) Oral daily  dextrose 40% Gel 15 Gram(s) Oral once  dextrose 5%. 1000 milliLiter(s) (50 mL/Hr) IV Continuous <Continuous>  dextrose 5%. 1000 milliLiter(s) (100 mL/Hr) IV Continuous <Continuous>  dextrose 50% Injectable 25 Gram(s) IV Push once  dextrose 50% Injectable 12.5 Gram(s) IV Push once  dextrose 50% Injectable 25 Gram(s) IV Push once  enoxaparin Injectable 40 milliGRAM(s) SubCutaneous daily  gabapentin 100 milliGRAM(s) Oral three times a day  glucagon  Injectable 1 milliGRAM(s) IntraMuscular once  hydrALAZINE 100 milliGRAM(s) Oral three times a day  insulin lispro (ADMELOG) corrective regimen sliding scale   SubCutaneous three times a day before meals  insulin lispro (ADMELOG) corrective regimen sliding scale   SubCutaneous at bedtime  metoprolol succinate  milliGRAM(s) Oral daily  montelukast 10 milliGRAM(s) Oral daily  polyethylene glycol 3350 17 Gram(s) Oral daily      TELEMETRY: NSR 	    ECG:  	  RADIOLOGY:   DIAGNOSTIC TESTING:  [ ] Echocardiogram:  [ ]  Catheterization:  [ ] Stress Test:    OTHER: 	    LABS:	 	    Troponin T, High Sensitivity Result: 24 ng/L (05-29 @ 21:42)  Troponin T, High Sensitivity Result: 25 ng/L (05-29 @ 18:59)                          9.8    9.39  )-----------( 202 ( 03 Jun 2021 07:50 )             33.0     06-03    136  |  92<L>  |  26<H>  ----------------------------<  108<H>  3.8   |  35<H>  |  0.92    Ca    8.8      03 Jun 2021 07:50  Phos  3.2     06-03  Mg     2.0     06-03              
Patient is a 85y old  Female who presents with a chief complaint of hypervolemia (02 Jun 2021 10:37)      INTERVAL HPI/OVERNIGHT EVENTS: seen and examined   T(C): 36.7 (06-02-21 @ 21:15), Max: 36.9 (06-02-21 @ 11:52)  HR: 79 (06-02-21 @ 21:15) (74 - 83)  BP: 155/59 (06-02-21 @ 21:15) (130/60 - 155/59)  RR: 18 (06-02-21 @ 21:15) (16 - 18)  SpO2: 96% (06-02-21 @ 21:15) (94% - 99%)  Wt(kg): --  I&O's Summary    01 Jun 2021 07:01  -  02 Jun 2021 07:00  --------------------------------------------------------  IN: 100 mL / OUT: 2300 mL / NET: -2200 mL    02 Jun 2021 07:01  -  02 Jun 2021 22:00  --------------------------------------------------------  IN: 400 mL / OUT: 1000 mL / NET: -600 mL        PAST MEDICAL & SURGICAL HISTORY:  HTN (hypertension)    HLD (hyperlipidemia)    Asthma    DM (diabetes mellitus)    GERD (gastroesophageal reflux disease)    Lymphedema    S/P knee replacement        SOCIAL HISTORY  Alcohol:  Tobacco:  Illicit substance use:    FAMILY HISTORY:    REVIEW OF SYSTEMS:  CONSTITUTIONAL: No fever, weight loss, or fatigue  EYES: No eye pain, visual disturbances, or discharge  ENMT:  No difficulty hearing, tinnitus, vertigo; No sinus or throat pain  NECK: No pain or stiffness  RESPIRATORY: No cough, wheezing, chills or hemoptysis; No shortness of breath  CARDIOVASCULAR: No chest pain, palpitations, dizziness, or leg swelling  GASTROINTESTINAL: No abdominal or epigastric pain. No nausea, vomiting, or hematemesis; No diarrhea or constipation. No melena or hematochezia.  GENITOURINARY: No dysuria, frequency, hematuria, or incontinence  NEUROLOGICAL: No headaches, memory loss, loss of strength, numbness, or tremors  SKIN: No itching, burning, rashes, or lesions   LYMPH NODES: No enlarged glands  ENDOCRINE: No heat or cold intolerance; No hair loss  MUSCULOSKELETAL: No joint pain or swelling; No muscle, back, or extremity pain  PSYCHIATRIC: No depression, anxiety, mood swings, or difficulty sleeping  HEME/LYMPH: No easy bruising, or bleeding gums  ALLERY AND IMMUNOLOGIC: No hives or eczema    RADIOLOGY & ADDITIONAL TESTS:    Imaging Personally Reviewed:  [ ] YES  [ ] NO    Consultant(s) Notes Reviewed:  [ ] YES  [ ] NO    PHYSICAL EXAM:  GENERAL: NAD, well-groomed, well-developed  HEAD:  Atraumatic, Normocephalic  EYES: EOMI, PERRLA, conjunctiva and sclera clear  ENMT: No tonsillar erythema, exudates, or enlargement; Moist mucous membranes, Good dentition, No lesions  NECK: Supple, No JVD, Normal thyroid  NERVOUS SYSTEM:  Alert & Oriented X3, Good concentration; Motor Strength 5/5 B/L upper and lower extremities; DTRs 2+ intact and symmetric  CHEST/LUNG: Clear to percussion bilaterally; No rales, rhonchi, wheezing, or rubs  HEART: Regular rate and rhythm; No murmurs, rubs, or gallops  ABDOMEN: Soft, Nontender, Nondistended; Bowel sounds present  EXTREMITIES:  2+ Peripheral Pulses, No clubbing, cyanosis, or edema  LYMPH: No lymphadenopathy noted  SKIN: No rashes or lesions    LABS:                        9.7    8.39  )-----------( 210      ( 02 Jun 2021 07:54 )             33.9     06-02    139  |  96<L>  |  30<H>  ----------------------------<  110<H>  4.2   |  34<H>  |  1.01    Ca    9.0      02 Jun 2021 07:54  Phos  3.6     06-02  Mg     2.1     06-02          CAPILLARY BLOOD GLUCOSE      POCT Blood Glucose.: 196 mg/dL (02 Jun 2021 21:10)  POCT Blood Glucose.: 199 mg/dL (02 Jun 2021 17:20)  POCT Blood Glucose.: 156 mg/dL (02 Jun 2021 12:25)  POCT Blood Glucose.: 121 mg/dL (02 Jun 2021 08:41)  POCT Blood Glucose.: 163 mg/dL (02 Jun 2021 04:59)            MEDICATIONS  (STANDING):  ALBUTerol    90 MICROgram(s) HFA Inhaler 2 Puff(s) Inhalation every 6 hours  amLODIPine   Tablet 10 milliGRAM(s) Oral daily  artificial tears (preservative free) Ophthalmic Solution 1 Drop(s) Both EYES two times a day  atorvastatin 40 milliGRAM(s) Oral at bedtime  budesonide 160 MICROgram(s)/formoterol 4.5 MICROgram(s) Inhaler 2 Puff(s) Inhalation two times a day  buMETAnide 1 milliGRAM(s) Oral daily  dextrose 40% Gel 15 Gram(s) Oral once  dextrose 5%. 1000 milliLiter(s) (50 mL/Hr) IV Continuous <Continuous>  dextrose 5%. 1000 milliLiter(s) (100 mL/Hr) IV Continuous <Continuous>  dextrose 50% Injectable 25 Gram(s) IV Push once  dextrose 50% Injectable 12.5 Gram(s) IV Push once  dextrose 50% Injectable 25 Gram(s) IV Push once  enoxaparin Injectable 40 milliGRAM(s) SubCutaneous daily  gabapentin 100 milliGRAM(s) Oral three times a day  glucagon  Injectable 1 milliGRAM(s) IntraMuscular once  hydrALAZINE 100 milliGRAM(s) Oral three times a day  insulin lispro (ADMELOG) corrective regimen sliding scale   SubCutaneous three times a day before meals  insulin lispro (ADMELOG) corrective regimen sliding scale   SubCutaneous at bedtime  metoprolol succinate  milliGRAM(s) Oral daily  montelukast 10 milliGRAM(s) Oral daily  polyethylene glycol 3350 17 Gram(s) Oral daily    MEDICATIONS  (PRN):  albuterol/ipratropium for Nebulization 3 milliLiter(s) Nebulizer every 8 hours PRN Bronchospasm  AQUAPHOR (petrolatum Ointment) 1 Application(s) Topical four times a day PRN nasal dryness  sodium chloride 0.65% Nasal 1 Spray(s) Both Nostrils four times a day PRN Nasal Congestion      Care Discussed with Consultants/Other Providers [ ] YES  [ ] NO
CARDIOLOGY FOLLOW UP - Dr. Bermudez  Date of Service: 6/1/21  CC: denies cp, sob, and palpitations     Review of Systems:  Constitutional: No fever, weight loss, or fatigue  Respiratory: No cough, wheezing, or hemoptysis, no shortness of breath  Cardiovascular: No chest pain, palpitations, passing out, dizziness, +leg swelling  Gastrointestinal: No abd or epigastric pain.  No nausea, vomiting, or hematemesis; no diarrhea or constipation, no melena or hematochezia  Vascular: no edema       PHYSICAL EXAM:  T(C): 36.6 (06-01-21 @ 05:00), Max: 36.9 (05-31-21 @ 11:23)  HR: 76 (06-01-21 @ 10:33) (76 - 92)  BP: 129/77 (06-01-21 @ 05:00) (118/64 - 129/77)  RR: 18 (06-01-21 @ 05:00) (16 - 18)  SpO2: 98% (06-01-21 @ 10:33) (97% - 100%)  Wt(kg): --  I&O's Summary    31 May 2021 07:01  -  01 Jun 2021 07:00  --------------------------------------------------------  IN: 100 mL / OUT: 1650 mL / NET: -1550 mL        Appearance: Normal	  Cardiovascular: Normal S1 S2,RRR, No JVD, No murmurs  Respiratory: Lungs clear to auscultation	  Gastrointestinal:  Soft, Non-tender, + BS	  Extremities: Normal range of motion, No clubbing, cyanosis, + bl le edema      Home Medications:  acetaminophen 325 mg oral tablet: 2 tab(s) orally every 6 hours, As needed, Mild Pain (1 - 3), Moderate Pain (4 - 6) (09 Dec 2020 14:08)  Advair Diskus 250 mcg-50 mcg inhalation powder: 1 puff(s) inhaled 2 times a day (30 Oct 2019 16:54)  alendronate 70 mg oral tablet: 1 tab(s) orally once a week (30 Oct 2019 16:54)  amLODIPine 10 mg oral tablet: 1 tab(s) orally once a day (30 Oct 2019 16:54)  ascorbic acid 500 mg oral capsule: 1 cap(s) orally once a day (30 Oct 2019 16:54)  azelastine 137 mcg/inh (0.1%) nasal spray: 2 spray(s) nasal 2 times a day (30 Oct 2019 16:54)  calcium (as carbonate) 500 mg oral tablet: 0.5 tab(s) orally once a day (30 Oct 2019 16:54)  cholecalciferol 2000 intl units oral tablet: 1 tab(s) orally once a day (30 Oct 2019 16:54)  glimepiride 2 mg oral tablet: 1 tab(s) orally once a day (30 Oct 2019 16:54)  hydrALAZINE 100 mg oral tablet: 1 tab(s) orally 3 times a day (06 Nov 2019 13:41)  ipratropium-albuterol 0.5 mg-2.5 mg/3 mLinhalation solution: 3 milliliter(s) inhaled every 6 hours (06 Nov 2019 14:05)  metoprolol succinate 100 mg oral tablet, extended release: 1 tab(s) orally once a day (30 Oct 2019 16:54)  montelukast 10 mg oral tablet: 1 tab(s) orally once a day (30 Oct 2019 16:54)  Neurontin 100 mg oral capsule: 1 cap(s) orally 3 times a day (29 Nov 2020 18:19)  ocular lubricant ophthalmic solution: 1 drop(s) to each affected eye 3 times a day, As needed, Dry Eyes (06 Nov 2019 13:41)  polyethylene glycol 3350 oral powder for reconstitution: 17 gram(s) orally once a day (06 Nov 2019 13:41)  ProAir HFA 90 mcg/inh inhalation aerosol: 2 puff(s) inhaled 4 times a day, As Needed (30 Oct 2019 16:54)  rosuvastatin 10 mg oral tablet: 1 tab(s) orally once a day (30 Oct 2019 16:54)      MEDICATIONS  (STANDING):  ALBUTerol    90 MICROgram(s) HFA Inhaler 2 Puff(s) Inhalation every 6 hours  amLODIPine   Tablet 10 milliGRAM(s) Oral daily  atorvastatin 40 milliGRAM(s) Oral at bedtime  budesonide 160 MICROgram(s)/formoterol 4.5 MICROgram(s) Inhaler 2 Puff(s) Inhalation two times a day  dextrose 40% Gel 15 Gram(s) Oral once  dextrose 5%. 1000 milliLiter(s) (50 mL/Hr) IV Continuous <Continuous>  dextrose 5%. 1000 milliLiter(s) (100 mL/Hr) IV Continuous <Continuous>  dextrose 50% Injectable 25 Gram(s) IV Push once  dextrose 50% Injectable 12.5 Gram(s) IV Push once  dextrose 50% Injectable 25 Gram(s) IV Push once  enoxaparin Injectable 40 milliGRAM(s) SubCutaneous daily  gabapentin 100 milliGRAM(s) Oral three times a day  glucagon  Injectable 1 milliGRAM(s) IntraMuscular once  hydrALAZINE 100 milliGRAM(s) Oral three times a day  insulin lispro (ADMELOG) corrective regimen sliding scale   SubCutaneous three times a day before meals  insulin lispro (ADMELOG) corrective regimen sliding scale   SubCutaneous at bedtime  metoprolol succinate  milliGRAM(s) Oral daily  montelukast 10 milliGRAM(s) Oral daily      TELEMETRY: NSR 	    ECG:  	  RADIOLOGY:   < from: CT Chest No Cont (05.31.21 @ 22:08) >  FINDINGS:    AIRWAYS, LUNGS and PLEURA: Patent central airways. Mild interlobular septal thickening and groundglass opacities. Bibasilar subsegmental atelectasis/consolidation. No pleural effusion.    MEDIASTINUM AND SOURAV: No lymphadenopathy.    HEART AND VESSELS: Cardiomegaly. Mild coronary calcifications. Mitral annulus calcification. No pericardial effusion. Thoracic aorta normal in diameter. Dilated pulmonary artery measuring 4 cm.    VISUALIZED UPPER ABDOMEN: Right renal cysts..    CHEST WALL AND BONES: No aggressive osseous lesion.    LOWER NECK: Subcentimeter left thyroid nodule.    IMPRESSION:    Mild interlobular septal thickening and groundglass opacities can represent mild edema.    Bibasilar subsegmental atelectasis/consolidations.        DIAGNOSTIC TESTING:  [ ] Echocardiogram:  [ ]  Catheterization:  [ ] Stress Test:    OTHER: 	    LABS:	 	    Troponin T, High Sensitivity Result: 24 ng/L (05-29 @ 21:42)  Troponin T, High Sensitivity Result: 25 ng/L (05-29 @ 18:59)                          9.9    9.10  )-----------( 211      ( 01 Jun 2021 07:11 )             34.9     06-01    139  |  93<L>  |  34<H>  ----------------------------<  155<H>  3.8   |  36<H>  |  1.06    Ca    9.3      01 Jun 2021 07:11  Phos  4.0     06-01  Mg     2.0     06-01    TPro  7.6  /  Alb  3.6  /  TBili  0.2  /  DBili  x   /  AST  16  /  ALT  13  /  AlkPhos  55  05-30    PT/INR - ( 30 May 2021 11:32 )   PT: 12.8 sec;   INR: 1.13 ratio                 
CARDIOLOGY FOLLOW UP - Dr. Bermudez  Date of Service: 6/4/21  CC: denies cp, sob, and palpitations     Review of Systems:  Constitutional: No fever, weight loss, or fatigue  Respiratory: No cough, wheezing, or hemoptysis, no shortness of breath  Cardiovascular: No chest pain, palpitations, passing out, dizziness, +leg swelling  Gastrointestinal: No abd or epigastric pain.  No nausea, vomiting, or hematemesis; no diarrhea or constipation, no melena or hematochezia  Vascular: no edema       PHYSICAL EXAM:  T(C): 36.6 (06-04-21 @ 05:05), Max: 36.6 (06-03-21 @ 12:43)  HR: 69 (06-04-21 @ 07:22) (69 - 80)  BP: 116/57 (06-04-21 @ 05:05) (116/57 - 136/53)  RR: 16 (06-04-21 @ 05:05) (16 - 19)  SpO2: 98% (06-04-21 @ 07:22) (93% - 98%)  Wt(kg): --  I&O's Summary    03 Jun 2021 07:01  -  04 Jun 2021 07:00  --------------------------------------------------------  IN: 0 mL / OUT: 800 mL / NET: -800 mL        Appearance: Normal	  Cardiovascular: Normal S1 S2,RRR, No JVD, No murmurs  Respiratory: Lungs clear to auscultation	  Gastrointestinal:  Soft, Non-tender, + BS	  Extremities: Normal range of motion, No clubbing, cyanosis , + bl le edema      Home Medications:  acetaminophen 325 mg oral tablet: 2 tab(s) orally every 6 hours, As needed, Mild Pain (1 - 3), Moderate Pain (4 - 6) (09 Dec 2020 14:08)  Advair Diskus 250 mcg-50 mcg inhalation powder: 1 puff(s) inhaled 2 times a day (30 Oct 2019 16:54)  alendronate 70 mg oral tablet: 1 tab(s) orally once a week (30 Oct 2019 16:54)  amLODIPine 10 mg oral tablet: 1 tab(s) orally once a day (30 Oct 2019 16:54)  ascorbic acid 500 mg oral capsule: 1 cap(s) orally once a day (30 Oct 2019 16:54)  azelastine 137 mcg/inh (0.1%) nasal spray: 2 spray(s) nasal 2 times a day (30 Oct 2019 16:54)  calcium (as carbonate) 500 mg oral tablet: 0.5 tab(s) orally once a day (30 Oct 2019 16:54)  cholecalciferol 2000 intl units oral tablet: 1 tab(s) orally once a day (30 Oct 2019 16:54)  glimepiride 2 mg oral tablet: 1 tab(s) orally once a day (30 Oct 2019 16:54)  hydrALAZINE 100 mg oral tablet: 1 tab(s) orally 3 times a day (06 Nov 2019 13:41)  ipratropium-albuterol 0.5 mg-2.5 mg/3 mLinhalation solution: 3 milliliter(s) inhaled every 6 hours (06 Nov 2019 14:05)  metoprolol succinate 100 mg oral tablet, extended release: 1 tab(s) orally once a day (30 Oct 2019 16:54)  montelukast 10 mg oral tablet: 1 tab(s) orally once a day (30 Oct 2019 16:54)  Neurontin 100 mg oral capsule: 1 cap(s) orally 3 times a day (29 Nov 2020 18:19)  ocular lubricant ophthalmic solution: 1 drop(s) to each affected eye 3 times a day, As needed, Dry Eyes (06 Nov 2019 13:41)  polyethylene glycol 3350 oral powder for reconstitution: 17 gram(s) orally once a day (06 Nov 2019 13:41)  ProAir HFA 90 mcg/inh inhalation aerosol: 2 puff(s) inhaled 4 times a day, As Needed (30 Oct 2019 16:54)  rosuvastatin 10 mg oral tablet: 1 tab(s) orally once a day (30 Oct 2019 16:54)      MEDICATIONS  (STANDING):  ALBUTerol    90 MICROgram(s) HFA Inhaler 2 Puff(s) Inhalation every 6 hours  amLODIPine   Tablet 10 milliGRAM(s) Oral daily  artificial tears (preservative free) Ophthalmic Solution 1 Drop(s) Both EYES two times a day  atorvastatin 40 milliGRAM(s) Oral at bedtime  budesonide 160 MICROgram(s)/formoterol 4.5 MICROgram(s) Inhaler 2 Puff(s) Inhalation two times a day  buMETAnide 1 milliGRAM(s) Oral daily  dextrose 40% Gel 15 Gram(s) Oral once  dextrose 5%. 1000 milliLiter(s) (50 mL/Hr) IV Continuous <Continuous>  dextrose 5%. 1000 milliLiter(s) (100 mL/Hr) IV Continuous <Continuous>  dextrose 50% Injectable 25 Gram(s) IV Push once  dextrose 50% Injectable 12.5 Gram(s) IV Push once  dextrose 50% Injectable 25 Gram(s) IV Push once  enoxaparin Injectable 40 milliGRAM(s) SubCutaneous daily  gabapentin 100 milliGRAM(s) Oral three times a day  glucagon  Injectable 1 milliGRAM(s) IntraMuscular once  hydrALAZINE 100 milliGRAM(s) Oral three times a day  insulin lispro (ADMELOG) corrective regimen sliding scale   SubCutaneous at bedtime  insulin lispro (ADMELOG) corrective regimen sliding scale   SubCutaneous three times a day before meals  metoprolol succinate  milliGRAM(s) Oral daily  montelukast 10 milliGRAM(s) Oral daily  polyethylene glycol 3350 17 Gram(s) Oral daily      TELEMETRY: NSR  	    ECG:  	  RADIOLOGY:   DIAGNOSTIC TESTING:  [ ] Echocardiogram:  [ ]  Catheterization:  [ ] Stress Test:    OTHER: 	    LABS:	 	    Troponin T, High Sensitivity Result: 24 ng/L (05-29 @ 21:42)  Troponin T, High Sensitivity Result: 25 ng/L (05-29 @ 18:59)                          9.3    7.27  )-----------( 210      ( 04 Jun 2021 08:01 )             33.6     06-04    137  |  93<L>  |  35<H>  ----------------------------<  116<H>  4.2   |  35<H>  |  1.05    Ca    9.0      04 Jun 2021 08:01  Phos  3.6     06-04  Mg     2.2     06-04              
Patient is a 85y old  Female who presents with a chief complaint of hypervolemia (01 Jun 2021 12:30)      INTERVAL HPI/OVERNIGHT EVENTS: seen and examined   T(C): 36.7 (06-01-21 @ 20:10), Max: 37 (06-01-21 @ 12:10)  HR: 86 (06-01-21 @ 20:10) (76 - 91)  BP: 132/67 (06-01-21 @ 20:10) (129/77 - 142/62)  RR: 16 (06-01-21 @ 20:10) (16 - 18)  SpO2: 93% (06-01-21 @ 20:10) (93% - 100%)  Wt(kg): --  I&O's Summary    31 May 2021 07:01  -  01 Jun 2021 07:00  --------------------------------------------------------  IN: 100 mL / OUT: 1650 mL / NET: -1550 mL    01 Jun 2021 07:01  -  01 Jun 2021 21:16  --------------------------------------------------------  IN: 0 mL / OUT: 1800 mL / NET: -1800 mL        PAST MEDICAL & SURGICAL HISTORY:  HTN (hypertension)    HLD (hyperlipidemia)    Asthma    DM (diabetes mellitus)    GERD (gastroesophageal reflux disease)    Lymphedema    S/P knee replacement        SOCIAL HISTORY  Alcohol:  Tobacco:  Illicit substance use:    FAMILY HISTORY:    REVIEW OF SYSTEMS:  CONSTITUTIONAL: No fever, weight loss, or fatigue  EYES: No eye pain, visual disturbances, or discharge  ENMT:  No difficulty hearing, tinnitus, vertigo; No sinus or throat pain  NECK: No pain or stiffness  RESPIRATORY: No cough, wheezing, chills or hemoptysis; No shortness of breath  CARDIOVASCULAR: No chest pain, palpitations, dizziness, or leg swelling  GASTROINTESTINAL: No abdominal or epigastric pain. No nausea, vomiting, or hematemesis; No diarrhea or constipation. No melena or hematochezia.  GENITOURINARY: No dysuria, frequency, hematuria, or incontinence  NEUROLOGICAL: No headaches, memory loss, loss of strength, numbness, or tremors  SKIN: No itching, burning, rashes, or lesions   LYMPH NODES: No enlarged glands  ENDOCRINE: No heat or cold intolerance; No hair loss  MUSCULOSKELETAL: No joint pain or swelling; No muscle, back, or extremity pain  PSYCHIATRIC: No depression, anxiety, mood swings, or difficulty sleeping  HEME/LYMPH: No easy bruising, or bleeding gums  ALLERY AND IMMUNOLOGIC: No hives or eczema    RADIOLOGY & ADDITIONAL TESTS:    Imaging Personally Reviewed:  [ ] YES  [ ] NO    Consultant(s) Notes Reviewed:  [ ] YES  [ ] NO    PHYSICAL EXAM:  GENERAL: NAD, well-groomed, well-developed  HEAD:  Atraumatic, Normocephalic  EYES: EOMI, PERRLA, conjunctiva and sclera clear  ENMT: No tonsillar erythema, exudates, or enlargement; Moist mucous membranes, Good dentition, No lesions  NECK: Supple, No JVD, Normal thyroid  NERVOUS SYSTEM:  Alert & Oriented X3, Good concentration; Motor Strength 5/5 B/L upper and lower extremities; DTRs 2+ intact and symmetric  CHEST/LUNG: Clear to percussion bilaterally; No rales, rhonchi, wheezing, or rubs  HEART: Regular rate and rhythm; No murmurs, rubs, or gallops  ABDOMEN: Soft, Nontender, Nondistended; Bowel sounds present  EXTREMITIES:  2+ Peripheral Pulses, No clubbing, cyanosis, or edema  LYMPH: No lymphadenopathy noted  SKIN: No rashes or lesions    LABS:                        9.9    9.10  )-----------( 211      ( 01 Jun 2021 07:11 )             34.9     06-01    139  |  93<L>  |  34<H>  ----------------------------<  155<H>  3.8   |  36<H>  |  1.06    Ca    9.3      01 Jun 2021 07:11  Phos  4.0     06-01  Mg     2.0     06-01          CAPILLARY BLOOD GLUCOSE      POCT Blood Glucose.: 187 mg/dL (01 Jun 2021 17:25)  POCT Blood Glucose.: 144 mg/dL (01 Jun 2021 08:38)  POCT Blood Glucose.: 207 mg/dL (31 May 2021 21:27)            MEDICATIONS  (STANDING):  ALBUTerol    90 MICROgram(s) HFA Inhaler 2 Puff(s) Inhalation every 6 hours  amLODIPine   Tablet 10 milliGRAM(s) Oral daily  artificial tears (preservative free) Ophthalmic Solution 1 Drop(s) Both EYES two times a day  atorvastatin 40 milliGRAM(s) Oral at bedtime  budesonide 160 MICROgram(s)/formoterol 4.5 MICROgram(s) Inhaler 2 Puff(s) Inhalation two times a day  dextrose 40% Gel 15 Gram(s) Oral once  dextrose 5%. 1000 milliLiter(s) (50 mL/Hr) IV Continuous <Continuous>  dextrose 5%. 1000 milliLiter(s) (100 mL/Hr) IV Continuous <Continuous>  dextrose 50% Injectable 25 Gram(s) IV Push once  dextrose 50% Injectable 12.5 Gram(s) IV Push once  dextrose 50% Injectable 25 Gram(s) IV Push once  enoxaparin Injectable 40 milliGRAM(s) SubCutaneous daily  gabapentin 100 milliGRAM(s) Oral three times a day  glucagon  Injectable 1 milliGRAM(s) IntraMuscular once  hydrALAZINE 100 milliGRAM(s) Oral three times a day  insulin lispro (ADMELOG) corrective regimen sliding scale   SubCutaneous three times a day before meals  insulin lispro (ADMELOG) corrective regimen sliding scale   SubCutaneous at bedtime  metoprolol succinate  milliGRAM(s) Oral daily  montelukast 10 milliGRAM(s) Oral daily  polyethylene glycol 3350 17 Gram(s) Oral daily    MEDICATIONS  (PRN):  albuterol/ipratropium for Nebulization 3 milliLiter(s) Nebulizer every 8 hours PRN Bronchospasm  AQUAPHOR (petrolatum Ointment) 1 Application(s) Topical four times a day PRN nasal dryness  sodium chloride 0.65% Nasal 1 Spray(s) Both Nostrils four times a day PRN Nasal Congestion      Care Discussed with Consultants/Other Providers [ ] YES  [ ] NO
Patient is a 86y old  Female who presents with a chief complaint of hypervolemia (03 Jun 2021 11:01)      INTERVAL HPI/OVERNIGHT EVENTS: seen and examined , no c/o  T(C): 36.5 (06-03-21 @ 22:56), Max: 37 (06-03-21 @ 04:25)  HR: 73 (06-03-21 @ 23:09) (73 - 80)  BP: 121/64 (06-03-21 @ 22:56) (121/64 - 148/61)  RR: 16 (06-03-21 @ 22:56) (16 - 19)  SpO2: 95% (06-03-21 @ 23:09) (92% - 96%)  Wt(kg): --  I&O's Summary    02 Jun 2021 07:01  -  03 Jun 2021 07:00  --------------------------------------------------------  IN: 400 mL / OUT: 1000 mL / NET: -600 mL        PAST MEDICAL & SURGICAL HISTORY:  HTN (hypertension)    HLD (hyperlipidemia)    Asthma    DM (diabetes mellitus)    GERD (gastroesophageal reflux disease)    Lymphedema    S/P knee replacement        SOCIAL HISTORY  Alcohol:  Tobacco:  Illicit substance use:    FAMILY HISTORY:    REVIEW OF SYSTEMS:  CONSTITUTIONAL: No fever, weight loss, or fatigue  EYES: No eye pain, visual disturbances, or discharge  ENMT:  No difficulty hearing, tinnitus, vertigo; No sinus or throat pain  NECK: No pain or stiffness  RESPIRATORY: No cough, wheezing, chills or hemoptysis; No shortness of breath  CARDIOVASCULAR: No chest pain, palpitations, dizziness, or leg swelling  GASTROINTESTINAL: No abdominal or epigastric pain. No nausea, vomiting, or hematemesis; No diarrhea or constipation. No melena or hematochezia.  GENITOURINARY: No dysuria, frequency, hematuria, or incontinence  NEUROLOGICAL: No headaches, memory loss, loss of strength, numbness, or tremors  SKIN: No itching, burning, rashes, or lesions   LYMPH NODES: No enlarged glands  ENDOCRINE: No heat or cold intolerance; No hair loss  MUSCULOSKELETAL: No joint pain or swelling; No muscle, back, or extremity pain  PSYCHIATRIC: No depression, anxiety, mood swings, or difficulty sleeping  HEME/LYMPH: No easy bruising, or bleeding gums  ALLERY AND IMMUNOLOGIC: No hives or eczema    RADIOLOGY & ADDITIONAL TESTS:    Imaging Personally Reviewed:  [ ] YES  [ ] NO    Consultant(s) Notes Reviewed:  [ ] YES  [ ] NO    PHYSICAL EXAM:  GENERAL: NAD, well-groomed, well-developed  HEAD:  Atraumatic, Normocephalic  EYES: EOMI, PERRLA, conjunctiva and sclera clear  ENMT: No tonsillar erythema, exudates, or enlargement; Moist mucous membranes, Good dentition, No lesions  NECK: Supple, No JVD, Normal thyroid  NERVOUS SYSTEM:  Alert & Oriented X3, Good concentration; Motor Strength 5/5 B/L upper and lower extremities; DTRs 2+ intact and symmetric  CHEST/LUNG: Clear to percussion bilaterally; No rales, rhonchi, wheezing, or rubs  HEART: Regular rate and rhythm; No murmurs, rubs, or gallops  ABDOMEN: Soft, Nontender, Nondistended; Bowel sounds present  EXTREMITIES:  2+ Peripheral Pulses, No clubbing, cyanosis, or edema  LYMPH: No lymphadenopathy noted  SKIN: No rashes or lesions    LABS:                        9.8    9.39  )-----------( 202      ( 03 Jun 2021 07:50 )             33.0     06-03    136  |  92<L>  |  26<H>  ----------------------------<  108<H>  3.8   |  35<H>  |  0.92    Ca    8.8      03 Jun 2021 07:50  Phos  3.2     06-03  Mg     2.0     06-03          CAPILLARY BLOOD GLUCOSE      POCT Blood Glucose.: 154 mg/dL (03 Jun 2021 21:52)  POCT Blood Glucose.: 147 mg/dL (03 Jun 2021 17:01)  POCT Blood Glucose.: 199 mg/dL (03 Jun 2021 12:15)  POCT Blood Glucose.: 121 mg/dL (03 Jun 2021 08:26)    ABG - ( 03 Jun 2021 11:06 )  pH, Arterial: 7.34  pH, Blood: x     /  pCO2: 71    /  pO2: 113   / HCO3: 34    / Base Excess: 11.3  /  SaO2: 98.6                    MEDICATIONS  (STANDING):  ALBUTerol    90 MICROgram(s) HFA Inhaler 2 Puff(s) Inhalation every 6 hours  amLODIPine   Tablet 10 milliGRAM(s) Oral daily  artificial tears (preservative free) Ophthalmic Solution 1 Drop(s) Both EYES two times a day  atorvastatin 40 milliGRAM(s) Oral at bedtime  budesonide 160 MICROgram(s)/formoterol 4.5 MICROgram(s) Inhaler 2 Puff(s) Inhalation two times a day  buMETAnide 1 milliGRAM(s) Oral daily  dextrose 40% Gel 15 Gram(s) Oral once  dextrose 5%. 1000 milliLiter(s) (50 mL/Hr) IV Continuous <Continuous>  dextrose 5%. 1000 milliLiter(s) (100 mL/Hr) IV Continuous <Continuous>  dextrose 50% Injectable 25 Gram(s) IV Push once  dextrose 50% Injectable 12.5 Gram(s) IV Push once  dextrose 50% Injectable 25 Gram(s) IV Push once  enoxaparin Injectable 40 milliGRAM(s) SubCutaneous daily  gabapentin 100 milliGRAM(s) Oral three times a day  glucagon  Injectable 1 milliGRAM(s) IntraMuscular once  hydrALAZINE 100 milliGRAM(s) Oral three times a day  insulin lispro (ADMELOG) corrective regimen sliding scale   SubCutaneous three times a day before meals  insulin lispro (ADMELOG) corrective regimen sliding scale   SubCutaneous at bedtime  metoprolol succinate  milliGRAM(s) Oral daily  montelukast 10 milliGRAM(s) Oral daily  polyethylene glycol 3350 17 Gram(s) Oral daily    MEDICATIONS  (PRN):  albuterol/ipratropium for Nebulization 3 milliLiter(s) Nebulizer every 8 hours PRN Bronchospasm  AQUAPHOR (petrolatum Ointment) 1 Application(s) Topical four times a day PRN nasal dryness  sodium chloride 0.65% Nasal 1 Spray(s) Both Nostrils four times a day PRN Nasal Congestion      Care Discussed with Consultants/Other Providers [ ] YES  [ ] NO
CARDIOLOGY FOLLOW UP - Dr. Bermudez  Date of Service: 5/31/21  CC: denies cp, sob, and palpitations, improved le edema     Review of Systems:  Constitutional: No fever, weight loss, or fatigue  Respiratory: No cough, wheezing, or hemoptysis, no shortness of breath  Cardiovascular: No chest pain, palpitations, passing out, dizziness, + leg swelling  Gastrointestinal: No abd or epigastric pain.  No nausea, vomiting, or hematemesis; no diarrhea or constipation, no melena or hematochezia  Vascular: no edema       PHYSICAL EXAM:  T(C): 36.6 (05-31-21 @ 05:00), Max: 37.1 (05-30-21 @ 11:41)  HR: 76 (05-31-21 @ 07:39) (72 - 84)  BP: 119/67 (05-31-21 @ 05:00) (118/52 - 137/63)  RR: 12 (05-31-21 @ 05:00) (12 - 18)  SpO2: 97% (05-31-21 @ 07:39) (95% - 100%)  Wt(kg): --  I&O's Summary    30 May 2021 07:01  -  31 May 2021 07:00  --------------------------------------------------------  IN: 0 mL / OUT: 1600 mL / NET: -1600 mL    31 May 2021 07:01  -  31 May 2021 09:23  --------------------------------------------------------  IN: 0 mL / OUT: 550 mL / NET: -550 mL        Appearance: Normal	  Cardiovascular: Normal S1 S2,RRR, No JVD, No murmurs  Respiratory: Lungs clear to auscultation	  Gastrointestinal:  Soft, Non-tender, + BS	  Extremities: Normal range of motion, No clubbing, cyanosis, + bl le  edema      Home Medications:  acetaminophen 325 mg oral tablet: 2 tab(s) orally every 6 hours, As needed, Mild Pain (1 - 3), Moderate Pain (4 - 6) (09 Dec 2020 14:08)  Advair Diskus 250 mcg-50 mcg inhalation powder: 1 puff(s) inhaled 2 times a day (30 Oct 2019 16:54)  alendronate 70 mg oral tablet: 1 tab(s) orally once a week (30 Oct 2019 16:54)  amLODIPine 10 mg oral tablet: 1 tab(s) orally once a day (30 Oct 2019 16:54)  ascorbic acid 500 mg oral capsule: 1 cap(s) orally once a day (30 Oct 2019 16:54)  azelastine 137 mcg/inh (0.1%) nasal spray: 2 spray(s) nasal 2 times a day (30 Oct 2019 16:54)  calcium (as carbonate) 500 mg oral tablet: 0.5 tab(s) orally once a day (30 Oct 2019 16:54)  cholecalciferol 2000 intl units oral tablet: 1 tab(s) orally once a day (30 Oct 2019 16:54)  glimepiride 2 mg oral tablet: 1 tab(s) orally once a day (30 Oct 2019 16:54)  hydrALAZINE 100 mg oral tablet: 1 tab(s) orally 3 times a day (06 Nov 2019 13:41)  ipratropium-albuterol 0.5 mg-2.5 mg/3 mLinhalation solution: 3 milliliter(s) inhaled every 6 hours (06 Nov 2019 14:05)  metoprolol succinate 100 mg oral tablet, extended release: 1 tab(s) orally once a day (30 Oct 2019 16:54)  montelukast 10 mg oral tablet: 1 tab(s) orally once a day (30 Oct 2019 16:54)  Neurontin 100 mg oral capsule: 1 cap(s) orally 3 times a day (29 Nov 2020 18:19)  ocular lubricant ophthalmic solution: 1 drop(s) to each affected eye 3 times a day, As needed, Dry Eyes (06 Nov 2019 13:41)  polyethylene glycol 3350 oral powder for reconstitution: 17 gram(s) orally once a day (06 Nov 2019 13:41)  ProAir HFA 90 mcg/inh inhalation aerosol: 2 puff(s) inhaled 4 times a day, As Needed (30 Oct 2019 16:54)  rosuvastatin 10 mg oral tablet: 1 tab(s) orally once a day (30 Oct 2019 16:54)      MEDICATIONS  (STANDING):  ALBUTerol    90 MICROgram(s) HFA Inhaler 2 Puff(s) Inhalation every 6 hours  amLODIPine   Tablet 10 milliGRAM(s) Oral daily  atorvastatin 40 milliGRAM(s) Oral at bedtime  budesonide 160 MICROgram(s)/formoterol 4.5 MICROgram(s) Inhaler 2 Puff(s) Inhalation two times a day  dextrose 40% Gel 15 Gram(s) Oral once  dextrose 5%. 1000 milliLiter(s) (50 mL/Hr) IV Continuous <Continuous>  dextrose 5%. 1000 milliLiter(s) (100 mL/Hr) IV Continuous <Continuous>  dextrose 50% Injectable 25 Gram(s) IV Push once  dextrose 50% Injectable 12.5 Gram(s) IV Push once  dextrose 50% Injectable 25 Gram(s) IV Push once  enoxaparin Injectable 40 milliGRAM(s) SubCutaneous daily  furosemide   Injectable 40 milliGRAM(s) IV Push every 12 hours  gabapentin 100 milliGRAM(s) Oral three times a day  glucagon  Injectable 1 milliGRAM(s) IntraMuscular once  hydrALAZINE 100 milliGRAM(s) Oral three times a day  insulin lispro (ADMELOG) corrective regimen sliding scale   SubCutaneous three times a day before meals  insulin lispro (ADMELOG) corrective regimen sliding scale   SubCutaneous at bedtime  metoprolol succinate  milliGRAM(s) Oral daily  montelukast 10 milliGRAM(s) Oral daily      TELEMETRY: NSr  	    ECG:  	  RADIOLOGY:   DIAGNOSTIC TESTING:  [ ] Echocardiogram:  [ ]  Catheterization:  [ ] Stress Test:    OTHER: 	    LABS:	 	    Troponin T, High Sensitivity Result: 24 ng/L (05-29 @ 21:42)  Troponin T, High Sensitivity Result: 25 ng/L (05-29 @ 18:59)                          10.0   7.89  )-----------( 193      ( 30 May 2021 11:32 )             35.8     05-30    139  |  93<L>  |  30<H>  ----------------------------<  161<H>  4.3   |  38<H>  |  1.05    Ca    9.3      30 May 2021 11:32  Phos  3.8     05-30  Mg     1.8     05-30    TPro  7.6  /  Alb  3.6  /  TBili  0.2  /  DBili  x   /  AST  16  /  ALT  13  /  AlkPhos  55  05-30    PT/INR - ( 30 May 2021 11:32 )   PT: 12.8 sec;   INR: 1.13 ratio                 
Date of Service: 06-01-21 @ 12:31    Patient is a 85y old  Female who presents with a chief complaint of hypervolemia (01 Jun 2021 11:15)      Any change in ROS: doing better today : she is alert and nadeen ke: more appropriate in responding to questions!    MEDICATIONS  (STANDING):  ALBUTerol    90 MICROgram(s) HFA Inhaler 2 Puff(s) Inhalation every 6 hours  amLODIPine   Tablet 10 milliGRAM(s) Oral daily  atorvastatin 40 milliGRAM(s) Oral at bedtime  budesonide 160 MICROgram(s)/formoterol 4.5 MICROgram(s) Inhaler 2 Puff(s) Inhalation two times a day  dextrose 40% Gel 15 Gram(s) Oral once  dextrose 5%. 1000 milliLiter(s) (50 mL/Hr) IV Continuous <Continuous>  dextrose 5%. 1000 milliLiter(s) (100 mL/Hr) IV Continuous <Continuous>  dextrose 50% Injectable 25 Gram(s) IV Push once  dextrose 50% Injectable 12.5 Gram(s) IV Push once  dextrose 50% Injectable 25 Gram(s) IV Push once  enoxaparin Injectable 40 milliGRAM(s) SubCutaneous daily  gabapentin 100 milliGRAM(s) Oral three times a day  glucagon  Injectable 1 milliGRAM(s) IntraMuscular once  hydrALAZINE 100 milliGRAM(s) Oral three times a day  insulin lispro (ADMELOG) corrective regimen sliding scale   SubCutaneous three times a day before meals  insulin lispro (ADMELOG) corrective regimen sliding scale   SubCutaneous at bedtime  metoprolol succinate  milliGRAM(s) Oral daily  montelukast 10 milliGRAM(s) Oral daily    MEDICATIONS  (PRN):  albuterol/ipratropium for Nebulization 3 milliLiter(s) Nebulizer every 8 hours PRN Bronchospasm  AQUAPHOR (petrolatum Ointment) 1 Application(s) Topical four times a day PRN nasal dryness  sodium chloride 0.65% Nasal 1 Spray(s) Both Nostrils four times a day PRN Nasal Congestion    Vital Signs Last 24 Hrs  T(C): 37 (01 Jun 2021 12:10), Max: 37 (01 Jun 2021 12:10)  T(F): 98.6 (01 Jun 2021 12:10), Max: 98.6 (01 Jun 2021 12:10)  HR: 80 (01 Jun 2021 12:10) (76 - 92)  BP: 142/62 (01 Jun 2021 12:10) (118/64 - 142/62)  BP(mean): --  RR: 18 (01 Jun 2021 12:10) (16 - 18)  SpO2: 95% (01 Jun 2021 12:10) (95% - 100%)    I&O's Summary    31 May 2021 07:01  -  01 Jun 2021 07:00  --------------------------------------------------------  IN: 100 mL / OUT: 1650 mL / NET: -1550 mL          Physical Exam:   GENERAL: Obese+  HEENT: NANCY/   Atraumatic, Normocephalic  ENMT: No tonsillar erythema, exudates, or enlargement; Moist mucous membranes, Good dentition, No lesions  NECK: Supple, No JVD, Normal thyroid  CHEST/LUNG: no wheezing-  CVS: Regular rate and rhythm; No murmurs, rubs, or gallops  GI: : Soft, Nontender, Nondistended; Bowel sounds present  NERVOUS SYSTEM:  Alert & Oriented X3  EXTREMITIES:  Mild edema  LYMPH: No lymphadenopathy noted  SKIN: No rashes or lesions  ENDOCRINOLOGY: No Thyromegaly  PSYCH: graciela+    Labs:  36, 36                            9.9    9.10  )-----------( 211      ( 01 Jun 2021 07:11 )             34.9                         10.0   7.89  )-----------( 193      ( 30 May 2021 11:32 )             35.8                         10.7   9.25  )-----------( 217      ( 29 May 2021 18:59 )             37.3     06-01    139  |  93<L>  |  34<H>  ----------------------------<  155<H>  3.8   |  36<H>  |  1.06  05-30    139  |  93<L>  |  30<H>  ----------------------------<  161<H>  4.3   |  38<H>  |  1.05  05-29    138  |  95<L>  |  29<H>  ----------------------------<  124<H>  4.9   |  33<H>  |  0.97    Ca    9.3      01 Jun 2021 07:11  Phos  4.0     06-01  Mg     2.0     06-01    TPro  7.6  /  Alb  3.6  /  TBili  0.2  /  DBili  x   /  AST  16  /  ALT  13  /  AlkPhos  55  05-30  TPro  8.5<H>  /  Alb  3.7  /  TBili  0.3  /  DBili  x   /  AST  20  /  ALT  17  /  AlkPhos  65  05-29    CAPILLARY BLOOD GLUCOSE      POCT Blood Glucose.: 144 mg/dL (01 Jun 2021 08:38)  POCT Blood Glucose.: 207 mg/dL (31 May 2021 21:27)  POCT Blood Glucose.: 121 mg/dL (31 May 2021 17:28)            D-Dimer Assay, Quantitative: 228 ng/mL DDU (05-31 @ 13:28)  Serum Pro-Brain Natriuretic Peptide: 322 pg/mL (05-29 @ 18:59)        RECENT CULTURES:  05-29 @ 20:59 .Urine Clean Catch (Midstream)         r< from: CT Chest No Cont (05.31.21 @ 22:08) >  PROCEDURE DATE:  May 31 2021         INTERPRETATION:  INDICATION: Shortness of breath, CHF, COPD, asthma  TECHNIQUE: Unenhanced CT of the chest. Coronal and sagittal images were reconstructed. Maximum intensity projection images were generated.  COMPARISON: No prior chest CT.    FINDINGS:    AIRWAYS, LUNGS and PLEURA: Patent central airways. Mild interlobular septal thickening and groundglass opacities. Bibasilar subsegmental atelectasis/consolidation. No pleural effusion.    MEDIASTINUM AND SOURAV: No lymphadenopathy.    HEART AND VESSELS: Cardiomegaly. Mild coronary calcifications. Mitral annulus calcification. No pericardial effusion. Thoracic aorta normal in diameter. Dilated pulmonary artery measuring 4 cm.    VISUALIZED UPPER ABDOMEN: Right renal cysts..    CHEST WALL AND BONES: No aggressive osseous lesion.    LOWER NECK: Subcentimeter left thyroid nodule.    IMPRESSION:    Mild interlobular septal thickening and groundglass opacities can represent mild edema.    Bibasilar subsegmental atelectasis/consolidations.              CAITLYN FARNSWORTH MD; Attending Radiologist  This document has been electronically signed. Jun 1 2021  8:44AM    < end of copied text >         50,000 - 99,000 CFU/mL Acinetobacter species          RESPIRATORY CULTURES:      < from: Xray Chest 2 Views PA/Lat (05.29.21 @ 19:29) >    EXAM:  XR CHEST PA LAT 2V        PROCEDURE DATE:  May 29 2021         INTERPRETATION:  EXAMINATION: XR CHEST PA AND LATERAL    CLINICAL INDICATION: Leg swelling. Evaluate for pulmonary edema.    TECHNIQUE: Frontal and lateral views of the chest were obtained.    COMPARISON: X-ray chest from 11/29/2020.    FINDINGS:    Cardiomegaly.  Pulmonary edema. There is no pneumothorax. There is no significant pleural effusion.    IMPRESSION:  Pulmonary edema.  Cardiomegaly.            JAVIER ALVARADO MD; Resident Interventional Radiology  This document has been electronically signed.  BEN CONNOR MD; Attending Interventional Radiologist  This document has been electronically signed. May 30 2021 10:48AM    < end of copied text >      Studies  Chest X-RAY  CT SCAN Chest   Venous Dopplers: LE:   CT Abdomen  Others              
CARDIOLOGY FOLLOW UP - Dr. Bermudez  Date of Service: 6/2/21  /CC: denies cp, sob, and palpitations     Review of Systems:  Constitutional: No fever, weight loss, or fatigue  Respiratory: No cough, wheezing, or hemoptysis, no shortness of breath  Cardiovascular: No chest pain, palpitations, passing out, dizziness, or leg swelling  Gastrointestinal: No abd or epigastric pain.  No nausea, vomiting, or hematemesis; no diarrhea or constipation, no melena or hematochezia  Vascular: no edema       PHYSICAL EXAM:  T(C): 36.8 (06-02-21 @ 05:52), Max: 37 (06-01-21 @ 12:10)  HR: 77 (06-02-21 @ 07:30) (74 - 86)  BP: 130/60 (06-02-21 @ 05:52) (130/60 - 142/62)  RR: 16 (06-02-21 @ 05:52) (16 - 18)  SpO2: 98% (06-02-21 @ 07:30) (93% - 99%)  Wt(kg): --  I&O's Summary    01 Jun 2021 07:01  -  02 Jun 2021 07:00  --------------------------------------------------------  IN: 100 mL / OUT: 2300 mL / NET: -2200 mL        Appearance: Normal	  Cardiovascular: Normal S1 S2,RRR, No JVD, No murmurs  Respiratory: Lungs clear to auscultation	  Gastrointestinal:  Soft, Non-tender, + BS	  Extremities: Normal range of motion, No clubbing, cyanosis or edema      Home Medications:  acetaminophen 325 mg oral tablet: 2 tab(s) orally every 6 hours, As needed, Mild Pain (1 - 3), Moderate Pain (4 - 6) (09 Dec 2020 14:08)  Advair Diskus 250 mcg-50 mcg inhalation powder: 1 puff(s) inhaled 2 times a day (30 Oct 2019 16:54)  alendronate 70 mg oral tablet: 1 tab(s) orally once a week (30 Oct 2019 16:54)  amLODIPine 10 mg oral tablet: 1 tab(s) orally once a day (30 Oct 2019 16:54)  ascorbic acid 500 mg oral capsule: 1 cap(s) orally once a day (30 Oct 2019 16:54)  azelastine 137 mcg/inh (0.1%) nasal spray: 2 spray(s) nasal 2 times a day (30 Oct 2019 16:54)  calcium (as carbonate) 500 mg oral tablet: 0.5 tab(s) orally once a day (30 Oct 2019 16:54)  cholecalciferol 2000 intl units oral tablet: 1 tab(s) orally once a day (30 Oct 2019 16:54)  glimepiride 2 mg oral tablet: 1 tab(s) orally once a day (30 Oct 2019 16:54)  hydrALAZINE 100 mg oral tablet: 1 tab(s) orally 3 times a day (06 Nov 2019 13:41)  ipratropium-albuterol 0.5 mg-2.5 mg/3 mLinhalation solution: 3 milliliter(s) inhaled every 6 hours (06 Nov 2019 14:05)  metoprolol succinate 100 mg oral tablet, extended release: 1 tab(s) orally once a day (30 Oct 2019 16:54)  montelukast 10 mg oral tablet: 1 tab(s) orally once a day (30 Oct 2019 16:54)  Neurontin 100 mg oral capsule: 1 cap(s) orally 3 times a day (29 Nov 2020 18:19)  ocular lubricant ophthalmic solution: 1 drop(s) to each affected eye 3 times a day, As needed, Dry Eyes (06 Nov 2019 13:41)  polyethylene glycol 3350 oral powder for reconstitution: 17 gram(s) orally once a day (06 Nov 2019 13:41)  ProAir HFA 90 mcg/inh inhalation aerosol: 2 puff(s) inhaled 4 times a day, As Needed (30 Oct 2019 16:54)  rosuvastatin 10 mg oral tablet: 1 tab(s) orally once a day (30 Oct 2019 16:54)      MEDICATIONS  (STANDING):  ALBUTerol    90 MICROgram(s) HFA Inhaler 2 Puff(s) Inhalation every 6 hours  amLODIPine   Tablet 10 milliGRAM(s) Oral daily  artificial tears (preservative free) Ophthalmic Solution 1 Drop(s) Both EYES two times a day  atorvastatin 40 milliGRAM(s) Oral at bedtime  budesonide 160 MICROgram(s)/formoterol 4.5 MICROgram(s) Inhaler 2 Puff(s) Inhalation two times a day  buMETAnide 1 milliGRAM(s) Oral daily  dextrose 40% Gel 15 Gram(s) Oral once  dextrose 5%. 1000 milliLiter(s) (50 mL/Hr) IV Continuous <Continuous>  dextrose 5%. 1000 milliLiter(s) (100 mL/Hr) IV Continuous <Continuous>  dextrose 50% Injectable 25 Gram(s) IV Push once  dextrose 50% Injectable 12.5 Gram(s) IV Push once  dextrose 50% Injectable 25 Gram(s) IV Push once  enoxaparin Injectable 40 milliGRAM(s) SubCutaneous daily  gabapentin 100 milliGRAM(s) Oral three times a day  glucagon  Injectable 1 milliGRAM(s) IntraMuscular once  hydrALAZINE 100 milliGRAM(s) Oral three times a day  insulin lispro (ADMELOG) corrective regimen sliding scale   SubCutaneous three times a day before meals  insulin lispro (ADMELOG) corrective regimen sliding scale   SubCutaneous at bedtime  metoprolol succinate  milliGRAM(s) Oral daily  montelukast 10 milliGRAM(s) Oral daily  polyethylene glycol 3350 17 Gram(s) Oral daily      TELEMETRY:  NSR 	    ECG:  	  RADIOLOGY:    < from: TTE with Doppler (w/Cont) (06.01.21 @ 13:22) >  ------------------------------------------------------------------------  OBSERVATIONS:  Mitral Valve: Mitral annular calcification, otherwise  normal mitral valve. Minimal mitral regurgitation.  Aortic Root: Normal aortic root.  Aortic Valve: Aortic valve leaflet morphology not well  visualized.  Left Atrium: Normal left atrium.  Left Ventricle: Endocardium not well visualized; grossly  normal left ventricular systolic function. Endocardial  visualization enhanced with intravenous injection of echo  contrast (Definity).  Right Heart: Normal right atrium. Unable to accurately  evaluate right ventricular size or systolic function.  Tricuspid valve not well visualized. Pulmonic valve not  well visualized.  Pericardium/PleuraNormal pericardium with no pericardial  effusion.  ------------------------------------------------------------------------  CONCLUSIONS:  Technically difficult study.  1. Mitral annular calcification, otherwise normal mitral  valve. Minimal mitralregurgitation.  2. Endocardium not well visualized; grossly normal left  ventricular systolic function. Endocardial visualization  enhanced with intravenous injection of echo contrast  (Definity).  3. Unable to accurately evaluate right ventricular size or  systolic function.  *** Compared with echocardiogram of 12/5/2020, no  significant changes noted.    < end of copied text >    DIAGNOSTIC TESTING:  [ ] Echocardiogram:  [ ]  Catheterization:  [ ] Stress Test:    OTHER: 	    LABS:	 	    Troponin T, High Sensitivity Result: 24 ng/L (05-29 @ 21:42)  Troponin T, High Sensitivity Result: 25 ng/L (05-29 @ 18:59)                          9.7    8.39  )-----------( 210      ( 02 Jun 2021 07:54 )             33.9     06-02    139  |  96<L>  |  30<H>  ----------------------------<  110<H>  4.2   |  34<H>  |  1.01    Ca    9.0      02 Jun 2021 07:54  Phos  3.6     06-02  Mg     2.1     06-02              
Date of Service: 06-03-21 @ 10:33    Patient is a 86y old  Female who presents with a chief complaint of hypervolemia (03 Jun 2021 09:57)      Any change in ROS: seems to be doing much better she is alert and awake no SOB : on oxygen : no chest pain: nose os blocked:       MEDICATIONS  (STANDING):  ALBUTerol    90 MICROgram(s) HFA Inhaler 2 Puff(s) Inhalation every 6 hours  amLODIPine   Tablet 10 milliGRAM(s) Oral daily  artificial tears (preservative free) Ophthalmic Solution 1 Drop(s) Both EYES two times a day  atorvastatin 40 milliGRAM(s) Oral at bedtime  budesonide 160 MICROgram(s)/formoterol 4.5 MICROgram(s) Inhaler 2 Puff(s) Inhalation two times a day  buMETAnide 1 milliGRAM(s) Oral daily  dextrose 40% Gel 15 Gram(s) Oral once  dextrose 5%. 1000 milliLiter(s) (50 mL/Hr) IV Continuous <Continuous>  dextrose 5%. 1000 milliLiter(s) (100 mL/Hr) IV Continuous <Continuous>  dextrose 50% Injectable 25 Gram(s) IV Push once  dextrose 50% Injectable 12.5 Gram(s) IV Push once  dextrose 50% Injectable 25 Gram(s) IV Push once  enoxaparin Injectable 40 milliGRAM(s) SubCutaneous daily  gabapentin 100 milliGRAM(s) Oral three times a day  glucagon  Injectable 1 milliGRAM(s) IntraMuscular once  hydrALAZINE 100 milliGRAM(s) Oral three times a day  insulin lispro (ADMELOG) corrective regimen sliding scale   SubCutaneous three times a day before meals  insulin lispro (ADMELOG) corrective regimen sliding scale   SubCutaneous at bedtime  metoprolol succinate  milliGRAM(s) Oral daily  montelukast 10 milliGRAM(s) Oral daily  polyethylene glycol 3350 17 Gram(s) Oral daily  potassium chloride   Powder 20 milliEquivalent(s) Oral once    MEDICATIONS  (PRN):  albuterol/ipratropium for Nebulization 3 milliLiter(s) Nebulizer every 8 hours PRN Bronchospasm  AQUAPHOR (petrolatum Ointment) 1 Application(s) Topical four times a day PRN nasal dryness  sodium chloride 0.65% Nasal 1 Spray(s) Both Nostrils four times a day PRN Nasal Congestion    Vital Signs Last 24 Hrs  T(C): 36.7 (03 Jun 2021 09:08), Max: 37 (03 Jun 2021 04:25)  T(F): 98 (03 Jun 2021 09:08), Max: 98.6 (03 Jun 2021 04:25)  HR: 79 (03 Jun 2021 09:08) (73 - 83)  BP: 129/57 (03 Jun 2021 09:08) (129/57 - 155/59)  BP(mean): --  RR: 18 (03 Jun 2021 09:08) (18 - 18)  SpO2: 96% (03 Jun 2021 09:08) (90% - 98%)    I&O's Summary    02 Jun 2021 07:01  -  03 Jun 2021 07:00  --------------------------------------------------------  IN: 400 mL / OUT: 1000 mL / NET: -600 mL          Physical Exam:   GENERAL: NAD, well-groomed, well-developed  HEENT: NANCY/   Atraumatic, Normocephalic  ENMT: No tonsillar erythema, exudates, or enlargement; Moist mucous membranes, Good dentition, No lesions  NECK: Supple, No JVD, Normal thyroid  CHEST/LUNG: Clear to auscultaion  CVS: Regular rate and rhythm; No murmurs, rubs, or gallops  GI: : Soft, Nontender, Nondistended; Bowel sounds present  NERVOUS SYSTEM:  Alert & Oriented X3  EXTREMITIES:- edema  LYMPH: No lymphadenopathy noted  SKIN: No rashes or lesions  ENDOCRINOLOGY: No Thyromegaly  PSYCH: calm++    Labs:  36, 36                            9.8    9.39  )-----------( 202      ( 03 Jun 2021 07:50 )             33.0                         9.7    8.39  )-----------( 210      ( 02 Jun 2021 07:54 )             33.9                         9.9    9.10  )-----------( 211      ( 01 Jun 2021 07:11 )             34.9                         10.0   7.89  )-----------( 193      ( 30 May 2021 11:32 )             35.8     06-03    136  |  92<L>  |  26<H>  ----------------------------<  108<H>  3.8   |  35<H>  |  0.92  06-02    139  |  96<L>  |  30<H>  ----------------------------<  110<H>  4.2   |  34<H>  |  1.01  06-01    139  |  93<L>  |  34<H>  ----------------------------<  155<H>  3.8   |  36<H>  |  1.06  05-30    139  |  93<L>  |  30<H>  ----------------------------<  161<H>  4.3   |  38<H>  |  1.05    Ca    8.8      03 Jun 2021 07:50  Ca    9.0      02 Jun 2021 07:54  Phos  3.2     06-03  Phos  3.6     06-02  Mg     2.0     06-03  Mg     2.1     06-02    TPro  7.6  /  Alb  3.6  /  TBili  0.2  /  DBili  x   /  AST  16  /  ALT  13  /  AlkPhos  55  05-30    CAPILLARY BLOOD GLUCOSE      POCT Blood Glucose.: 121 mg/dL (03 Jun 2021 08:26)  POCT Blood Glucose.: 196 mg/dL (02 Jun 2021 21:10)  POCT Blood Glucose.: 199 mg/dL (02 Jun 2021 17:20)  POCT Blood Glucose.: 156 mg/dL (02 Jun 2021 12:25)            D-Dimer Assay, Quantitative: 228 ng/mL DDU (05-31 @ 13:28)        RECENT CULTURES:  05-29 @ 18:55 .Urine Clean Catch (Midstream)   KB      Acinetobacter species  Acinetobacter species     50,000 - 99,000 CFU/mL Acinetobacter species      rad< from: CT Chest No Cont (05.31.21 @ 22:08) >    PROCEDURE DATE:  May 31 2021         INTERPRETATION:  INDICATION: Shortness of breath, CHF, COPD, asthma  TECHNIQUE: Unenhanced CT of the chest. Coronal and sagittal images were reconstructed. Maximum intensity projection images were generated.  COMPARISON: No prior chest CT.    FINDINGS:    AIRWAYS, LUNGS and PLEURA: Patent central airways. Mild interlobular septal thickening and groundglass opacities. Bibasilar subsegmental atelectasis/consolidation. No pleural effusion.    MEDIASTINUM AND SOURAV: No lymphadenopathy.    HEART AND VESSELS: Cardiomegaly. Mild coronary calcifications. Mitral annulus calcification. No pericardial effusion. Thoracic aorta normal in diameter. Dilated pulmonary artery measuring 4 cm.    VISUALIZED UPPER ABDOMEN: Right renal cysts..    CHEST WALL AND BONES: No aggressive osseous lesion.    LOWER NECK: Subcentimeter left thyroid nodule.    IMPRESSION:    Mild interlobular septal thickening and groundglass opacities can represent mild edema.    Bibasilar subsegmental atelectasis/consolidations.              CAITLYN FARNSWORTH MD; Attending Radiologist  This document has been electronically signed. Jun 1 2021  8:44AM    < end of copied text >      RESPIRATORY CULTURES:          Studies  Chest X-RAY  CT SCAN Chest   Venous Dopplers: LE:   CT Abdomen  Others              
Date of Service: 06-02-21 @ 10:37    Patient is a 85y old  Female who presents with a chief complaint of hypervolemia (02 Jun 2021 09:42)      Any change in ROS: Doing ok: no SOB : Alert and awake:     MEDICATIONS  (STANDING):  ALBUTerol    90 MICROgram(s) HFA Inhaler 2 Puff(s) Inhalation every 6 hours  amLODIPine   Tablet 10 milliGRAM(s) Oral daily  artificial tears (preservative free) Ophthalmic Solution 1 Drop(s) Both EYES two times a day  atorvastatin 40 milliGRAM(s) Oral at bedtime  budesonide 160 MICROgram(s)/formoterol 4.5 MICROgram(s) Inhaler 2 Puff(s) Inhalation two times a day  buMETAnide 1 milliGRAM(s) Oral daily  dextrose 40% Gel 15 Gram(s) Oral once  dextrose 5%. 1000 milliLiter(s) (50 mL/Hr) IV Continuous <Continuous>  dextrose 5%. 1000 milliLiter(s) (100 mL/Hr) IV Continuous <Continuous>  dextrose 50% Injectable 25 Gram(s) IV Push once  dextrose 50% Injectable 12.5 Gram(s) IV Push once  dextrose 50% Injectable 25 Gram(s) IV Push once  enoxaparin Injectable 40 milliGRAM(s) SubCutaneous daily  gabapentin 100 milliGRAM(s) Oral three times a day  glucagon  Injectable 1 milliGRAM(s) IntraMuscular once  hydrALAZINE 100 milliGRAM(s) Oral three times a day  insulin lispro (ADMELOG) corrective regimen sliding scale   SubCutaneous three times a day before meals  insulin lispro (ADMELOG) corrective regimen sliding scale   SubCutaneous at bedtime  metoprolol succinate  milliGRAM(s) Oral daily  montelukast 10 milliGRAM(s) Oral daily  polyethylene glycol 3350 17 Gram(s) Oral daily    MEDICATIONS  (PRN):  albuterol/ipratropium for Nebulization 3 milliLiter(s) Nebulizer every 8 hours PRN Bronchospasm  AQUAPHOR (petrolatum Ointment) 1 Application(s) Topical four times a day PRN nasal dryness  sodium chloride 0.65% Nasal 1 Spray(s) Both Nostrils four times a day PRN Nasal Congestion    Vital Signs Last 24 Hrs  T(C): 36.8 (02 Jun 2021 05:52), Max: 37 (01 Jun 2021 12:10)  T(F): 98.2 (02 Jun 2021 05:52), Max: 98.6 (01 Jun 2021 12:10)  HR: 80 (02 Jun 2021 10:14) (74 - 86)  BP: 130/60 (02 Jun 2021 05:52) (130/60 - 142/62)  BP(mean): --  RR: 16 (02 Jun 2021 05:52) (16 - 18)  SpO2: 99% (02 Jun 2021 10:14) (93% - 99%)    I&O's Summary    01 Jun 2021 07:01  -  02 Jun 2021 07:00  --------------------------------------------------------  IN: 100 mL / OUT: 2300 mL / NET: -2200 mL          Physical Exam:   GENERAL: Obese+  HEENT: NANCY/   Atraumatic, Normocephalic  ENMT: No tonsillar erythema, exudates, or enlargement; Moist mucous membranes, Good dentition, No lesions  NECK: Supple, No JVD, Normal thyroid  CHEST/LUNG: Clear to auscultaion  CVS: Regular rate and rhythm; No murmurs, rubs, or gallops  GI: : Soft, Nontender, Nondistended; Bowel sounds present  NERVOUS SYSTEM:  Alert & Oriented X3  EXTREMITIES:  -edema  LYMPH: No lymphadenopathy noted  SKIN: No rashes or lesions  ENDOCRINOLOGY: No Thyromegaly  PSYCH: Appropriate    Labs:  36, 36                            9.7    8.39  )-----------( 210      ( 02 Jun 2021 07:54 )             33.9                         9.9    9.10  )-----------( 211      ( 01 Jun 2021 07:11 )             34.9                         10.0   7.89  )-----------( 193      ( 30 May 2021 11:32 )             35.8                         10.7   9.25  )-----------( 217      ( 29 May 2021 18:59 )             37.3     06-02    139  |  96<L>  |  30<H>  ----------------------------<  110<H>  4.2   |  34<H>  |  1.01  06-01    139  |  93<L>  |  34<H>  ----------------------------<  155<H>  3.8   |  36<H>  |  1.06  05-30    139  |  93<L>  |  30<H>  ----------------------------<  161<H>  4.3   |  38<H>  |  1.05  05-29    138  |  95<L>  |  29<H>  ----------------------------<  124<H>  4.9   |  33<H>  |  0.97    Ca    9.0      02 Jun 2021 07:54  Ca    9.3      01 Jun 2021 07:11  Phos  3.6     06-02  Phos  4.0     06-01  Mg     2.1     06-02  Mg     2.0     06-01    TPro  7.6  /  Alb  3.6  /  TBili  0.2  /  DBili  x   /  AST  16  /  ALT  13  /  AlkPhos  55  05-30  TPro  8.5<H>  /  Alb  3.7  /  TBili  0.3  /  DBili  x   /  AST  20  /  ALT  17  /  AlkPhos  65  05-29    CAPILLARY BLOOD GLUCOSE      POCT Blood Glucose.: 121 mg/dL (02 Jun 2021 08:41)  POCT Blood Glucose.: 163 mg/dL (02 Jun 2021 04:59)  POCT Blood Glucose.: 133 mg/dL (01 Jun 2021 21:21)  POCT Blood Glucose.: 187 mg/dL (01 Jun 2021 17:25)            D-Dimer Assay, Quantitative: 228 ng/mL DDU (05-31 @ 13:28)        RECENT CULTURES:  05-29 @ 20:59 .Urine Clean Catch (Midstream)   KB      Acinetobacter species  Acinetobacter species     50,000 - 99,000 CFU/mL Acinetobacter species      r< from: CT Chest No Cont (05.31.21 @ 22:08) >    MEDIASTINUM AND SOURAV: No lymphadenopathy.    HEART AND VESSELS: Cardiomegaly. Mild coronary calcifications. Mitral annulus calcification. No pericardial effusion. Thoracic aorta normal in diameter. Dilated pulmonary artery measuring 4 cm.    VISUALIZED UPPER ABDOMEN: Right renal cysts..    CHEST WALL AND BONES: No aggressive osseous lesion.    LOWER NECK: Subcentimeter left thyroid nodule.    IMPRESSION:    Mild interlobular septal thickening and groundglass opacities can represent mild edema.    Bibasilar subsegmental atelectasis/consolidations.              CAITLYN FARNSWORTH MD; Attending Radiologist  This document has been electronically signed. Jun 1 2021  8:44AM    < end of copied text >      RESPIRATORY CULTURES:          Studies  Chest X-RAY  CT SCAN Chest   Venous Dopplers: LE:   CT Abdomen  Others              
Date of Service: 06-04-21 @ 13:54    Patient is a 86y old  Female who presents with a chief complaint of hypervolemia (04 Jun 2021 09:39)      Any change in ROS: She looks OK: but sleepy today but responds appropriately:     MEDICATIONS  (STANDING):  ALBUTerol    90 MICROgram(s) HFA Inhaler 2 Puff(s) Inhalation every 6 hours  amLODIPine   Tablet 10 milliGRAM(s) Oral daily  artificial tears (preservative free) Ophthalmic Solution 1 Drop(s) Both EYES two times a day  atorvastatin 40 milliGRAM(s) Oral at bedtime  budesonide 160 MICROgram(s)/formoterol 4.5 MICROgram(s) Inhaler 2 Puff(s) Inhalation two times a day  buMETAnide 1 milliGRAM(s) Oral daily  dextrose 40% Gel 15 Gram(s) Oral once  dextrose 5%. 1000 milliLiter(s) (50 mL/Hr) IV Continuous <Continuous>  dextrose 5%. 1000 milliLiter(s) (100 mL/Hr) IV Continuous <Continuous>  dextrose 50% Injectable 25 Gram(s) IV Push once  dextrose 50% Injectable 12.5 Gram(s) IV Push once  dextrose 50% Injectable 25 Gram(s) IV Push once  enoxaparin Injectable 40 milliGRAM(s) SubCutaneous daily  gabapentin 100 milliGRAM(s) Oral three times a day  glucagon  Injectable 1 milliGRAM(s) IntraMuscular once  hydrALAZINE 100 milliGRAM(s) Oral three times a day  insulin lispro (ADMELOG) corrective regimen sliding scale   SubCutaneous three times a day before meals  insulin lispro (ADMELOG) corrective regimen sliding scale   SubCutaneous at bedtime  metoprolol succinate  milliGRAM(s) Oral daily  montelukast 10 milliGRAM(s) Oral daily  polyethylene glycol 3350 17 Gram(s) Oral daily    MEDICATIONS  (PRN):  albuterol/ipratropium for Nebulization 3 milliLiter(s) Nebulizer every 8 hours PRN Bronchospasm  AQUAPHOR (petrolatum Ointment) 1 Application(s) Topical four times a day PRN nasal dryness  sodium chloride 0.65% Nasal 1 Spray(s) Both Nostrils four times a day PRN Nasal Congestion    Vital Signs Last 24 Hrs  T(C): 36.7 (04 Jun 2021 11:52), Max: 36.7 (04 Jun 2021 11:52)  T(F): 98 (04 Jun 2021 11:52), Max: 98 (04 Jun 2021 11:52)  HR: 82 (04 Jun 2021 11:52) (69 - 82)  BP: 154/70 (04 Jun 2021 11:52) (116/57 - 154/70)  BP(mean): --  RR: 18 (04 Jun 2021 11:52) (16 - 19)  SpO2: 96% (04 Jun 2021 11:52) (93% - 98%)    I&O's Summary    03 Jun 2021 07:01  -  04 Jun 2021 07:00  --------------------------------------------------------  IN: 0 mL / OUT: 800 mL / NET: -800 mL          Physical Exam:   GENERAL: Obese  HEENT: NANCY/   Atraumatic, Normocephalic  ENMT: No tonsillar erythema, exudates, or enlargement; Moist mucous membranes, Good dentition, No lesions  NECK: Supple, No JVD, Normal thyroid  CHEST/LUNG: Clear to auscultaion  CVS: Regular rate and rhythm; No murmurs, rubs, or gallops  GI: : Soft, Nontender, Nondistended; Bowel sounds present  NERVOUS SYSTEM:  Alert & Oriented X3  EXTREMITIES:  Mild  edema  LYMPH: No lymphadenopathy noted  SKIN: No rashes or lesions  ENDOCRINOLOGY: No Thyromegaly  PSYCH: Appropriate    Labs:  ABG - ( 03 Jun 2021 11:06 )  pH, Arterial: 7.34  pH, Blood: x     /  pCO2: 71    /  pO2: 113   / HCO3: 34    / Base Excess: 11.3  /  SaO2: 98.6            36, 36                            9.3    7.27  )-----------( 210      ( 04 Jun 2021 08:01 )             33.6                         9.8    9.39  )-----------( 202      ( 03 Jun 2021 07:50 )             33.0                         9.7    8.39  )-----------( 210      ( 02 Jun 2021 07:54 )             33.9                         9.9    9.10  )-----------( 211      ( 01 Jun 2021 07:11 )             34.9     06-04    137  |  93<L>  |  35<H>  ----------------------------<  116<H>  4.2   |  35<H>  |  1.05  06-03    136  |  92<L>  |  26<H>  ----------------------------<  108<H>  3.8   |  35<H>  |  0.92  06-02    139  |  96<L>  |  30<H>  ----------------------------<  110<H>  4.2   |  34<H>  |  1.01  06-01    139  |  93<L>  |  34<H>  ----------------------------<  155<H>  3.8   |  36<H>  |  1.06    Ca    9.0      04 Jun 2021 08:01  Ca    8.8      03 Jun 2021 07:50  Phos  3.6     06-04  Phos  3.2     06-03  Mg     2.2     06-04  Mg     2.0     06-03      CAPILLARY BLOOD GLUCOSE      POCT Blood Glucose.: 184 mg/dL (04 Jun 2021 12:19)  POCT Blood Glucose.: 139 mg/dL (04 Jun 2021 08:18)  POCT Blood Glucose.: 154 mg/dL (03 Jun 2021 21:52)  POCT Blood Glucose.: 147 mg/dL (03 Jun 2021 17:01)            D-Dimer Assay, Quantitative: 228 ng/mL DDU (05-31 @ 13:28)        RECENT CULTURES:  05-29 @ 18:55 .Urine Clean Catch (Midstream)   KB      Acinetobacter species  Acinetobacter species     50,000 - 99,000 CFU/mL Acinetobacter species          RESPIRATORY CULTURES:  rad< from: CT Chest No Cont (05.31.21 @ 22:08) >  WAYS, LUNGS and PLEURA: Patent central airways. Mild interlobular septal thickening and groundglass opacities. Bibasilar subsegmental atelectasis/consolidation. No pleural effusion.    MEDIASTINUM AND SOURAV: No lymphadenopathy.    HEART AND VESSELS: Cardiomegaly. Mild coronary calcifications. Mitral annulus calcification. No pericardial effusion. Thoracic aorta normal in diameter. Dilated pulmonary artery measuring 4 cm.    VISUALIZED UPPER ABDOMEN: Right renal cysts..    CHEST WALL AND BONES: No aggressive osseous lesion.    LOWER NECK: Subcentimeter left thyroid nodule.    IMPRESSION:    Mild interlobular septal thickening and groundglass opacities can represent mild edema.    Bibasilar subsegmental atelectasis/consolidations.              CAITLYN FARNSWORTH MD; Attending Radiologist  This document has been electronically signed. Jun 1 2021  8:44AM    < end of copied text >          Studies  Chest X-RAY  CT SCAN Chest   Venous Dopplers: LE:   CT Abdomen  Others              
Date of Service: 21 @ 11:57    Patient is a 85y old  Female who presents with a chief complaint of hypervolemia (31 May 2021 09:19)      Any change in ROS: doing OK: she is alert and awake: on bipap: when bipap was taken off today , she desaturated on 6 L of oxygen     MEDICATIONS  (STANDING):  ALBUTerol    90 MICROgram(s) HFA Inhaler 2 Puff(s) Inhalation every 6 hours  amLODIPine   Tablet 10 milliGRAM(s) Oral daily  atorvastatin 40 milliGRAM(s) Oral at bedtime  budesonide 160 MICROgram(s)/formoterol 4.5 MICROgram(s) Inhaler 2 Puff(s) Inhalation two times a day  dextrose 40% Gel 15 Gram(s) Oral once  dextrose 5%. 1000 milliLiter(s) (50 mL/Hr) IV Continuous <Continuous>  dextrose 5%. 1000 milliLiter(s) (100 mL/Hr) IV Continuous <Continuous>  dextrose 50% Injectable 25 Gram(s) IV Push once  dextrose 50% Injectable 12.5 Gram(s) IV Push once  dextrose 50% Injectable 25 Gram(s) IV Push once  enoxaparin Injectable 40 milliGRAM(s) SubCutaneous daily  gabapentin 100 milliGRAM(s) Oral three times a day  glucagon  Injectable 1 milliGRAM(s) IntraMuscular once  hydrALAZINE 100 milliGRAM(s) Oral three times a day  insulin lispro (ADMELOG) corrective regimen sliding scale   SubCutaneous three times a day before meals  insulin lispro (ADMELOG) corrective regimen sliding scale   SubCutaneous at bedtime  metoprolol succinate  milliGRAM(s) Oral daily  montelukast 10 milliGRAM(s) Oral daily    MEDICATIONS  (PRN):  albuterol/ipratropium for Nebulization 3 milliLiter(s) Nebulizer every 8 hours PRN Bronchospasm    Vital Signs Last 24 Hrs  T(C): 36.9 (31 May 2021 11:23), Max: 36.9 (31 May 2021 11:23)  T(F): 98.4 (31 May 2021 11:23), Max: 98.4 (31 May 2021 11:23)  HR: 78 (31 May 2021 11:23) (72 - 84)  BP: 119/53 (31 May 2021 11:23) (119/53 - 137/63)  BP(mean): --  RR: 18 (31 May 2021 11:23) (12 - 18)  SpO2: 97% (31 May 2021 11:23) (95% - 100%)    I&O's Summary    30 May 2021 07:01  -  31 May 2021 07:00  --------------------------------------------------------  IN: 0 mL / OUT: 1600 mL / NET: -1600 mL    31 May 2021 07:01  -  31 May 2021 11:57  --------------------------------------------------------  IN: 0 mL / OUT: 550 mL / NET: -550 mL          Physical Exam:   GENERAL: Obese" alert and awake  HEENT: NANCY/   Atraumatic, Normocephalic  ENMT: No tonsillar erythema, exudates, or enlargement; Moist mucous membranes, Good dentition, No lesions  NECK: Supple, No JVD, Normal thyroid  CHEST/LUNG: Clear to auscultaion- Limited exam  CVS: Regular rate and rhythm; No murmurs, rubs, or gallops  GI: : Soft, Nontender, Nondistended; Bowel sounds present  NERVOUS SYSTEM:  Alert & Oriented X3  EXTREMITIES: Mild  edema  LYMPH: No lymphadenopathy noted  SKIN: No rashes or lesions  ENDOCRINOLOGY: No Thyromegaly  PSYCH: Appropriate    Labs:  36, 36                            10.0   7.89  )-----------( 193      ( 30 May 2021 11:32 )             35.8                         10.7   9.25  )-----------( 217      ( 29 May 2021 18:59 )             37.3     05-30    139  |  93<L>  |  30<H>  ----------------------------<  161<H>  4.3   |  38<H>  |  1.05  05-29    138  |  95<L>  |  29<H>  ----------------------------<  124<H>  4.9   |  33<H>  |  0.97    Ca    9.3      30 May 2021 11:32  Ca    9.6      29 May 2021 18:59  Phos  3.8     -  Mg     1.8         TPro  7.6  /  Alb  3.6  /  TBili  0.2  /  DBili  x   /  AST  16  /  ALT  13  /  AlkPhos  55    TPro  8.5<H>  /  Alb  3.7  /  TBili  0.3  /  DBili  x   /  AST  20  /  ALT  17  /  AlkPhos  65  0529    CAPILLARY BLOOD GLUCOSE      POCT Blood Glucose.: 92 mg/dL (31 May 2021 08:46)  POCT Blood Glucose.: 111 mg/dL (31 May 2021 05:26)  POCT Blood Glucose.: 113 mg/dL (30 May 2021 23:36)  POCT Blood Glucose.: 283 mg/dL (30 May 2021 17:03)  POCT Blood Glucose.: 143 mg/dL (30 May 2021 12:20)      LIVER FUNCTIONS - ( 30 May 2021 11:32 )  Alb: 3.6 g/dL / Pro: 7.6 g/dL / ALK PHOS: 55 U/L / ALT: 13 U/L / AST: 16 U/L / GGT: x           PT/INR - ( 30 May 2021 11:32 )   PT: 12.8 sec;   INR: 1.13 ratio           Urinalysis Basic - ( 29 May 2021 18:59 )    Color: Light Yellow / Appearance: Clear / S.010 / pH: x  Gluc: x / Ketone: Negative  / Bili: Negative / Urobili: <2 mg/dL   Blood: x / Protein: Trace / Nitrite: Negative   Leuk Esterase: Large / RBC: 1 /HPF / WBC 32 /HPF   Sq Epi: x / Non Sq Epi: 6 /HPF / Bacteria: Few      Serum Pro-Brain Natriuretic Peptide: 322 pg/mL ( @ 18:59)        RECENT CULTURES:   @ 20:59 .Urine Clean Catch (Midstream)       rd< from: Xray Chest 2 Views PA/Lat (21 @ 19:29) >    INTERPRETATION:  EXAMINATION: XR CHEST PA AND LATERAL    CLINICAL INDICATION: Leg swelling. Evaluate for pulmonary edema.    TECHNIQUE: Frontal and lateral views of the chest were obtained.    COMPARISON: X-ray chest from 2020.    FINDINGS:    Cardiomegaly.  Pulmonary edema. There is no pneumothorax. There is no significant pleural effusion.    IMPRESSION:  Pulmonary edema.  Cardiomegaly.            JAVIER ALVARADO MD; Resident Interventional Radiology  This document has been electronically signed.  BEN CONNOR MD; Attending Interventional Radiologist  This document has been electronically signed. May 30 2021 10:48AM    < end of copied text >           50,000 - 99,000 CFU/mL Gram Negative Rods          RESPIRATORY CULTURES:          Studies  Chest X-RAY  CT SCAN Chest   Venous Dopplers: LE:   CT Abdomen  Others

## 2021-06-04 NOTE — PROGRESS NOTE ADULT - REASON FOR ADMISSION
hypervolemia

## 2021-06-04 NOTE — PROGRESS NOTE ADULT - TIME BILLING
Patient seen and examined.  Agree with above NP note.  85 year old woman with history of COPD/asthma on 3L NC O2, Dm2, HTN, mirela on cpap, lymphedema on bumex who was brought to the ER by daughter initially because she had woken up from a deep sleep and was seemingly very depressed (now denying) but also complaining of worsening LE edema b/l and continued SOB.    Acute on Chronic Diastolic CHF  -clinically improved  -check echo   -cont Lasix IVP, dec to 40 qd  -cont bb  -trend bmp, co2    COPD/asthma  -on home O2  -pulm f/u     HTN  -bp stable  -cont current meds     Abnormal UA  -med eval     dvt ppx
Pt seen and examined, agree with the above assessment and plan by SRIDHAR Lay.  clinically improved  ECHO w grossly nl LVEF  Cont oral bumex  Awaiting insurance approval to reinstate home BIPAP prior to DC  Anticipate discharge tomorrow  dvt ppx
Agree with above NP note.  clinically improved  check echo   change to oral lasix  dvt ppx
Agree with above NP note.  cv stable  cont current tx  diuretics as ordered  await home bipap  med/pulmo f/u
Agree with above NP note.  cv stable  cont current tx  diuretics as ordered  home bipap to be delivered today  med/pulmo f/u  dc home today  outpt followup

## 2021-06-04 NOTE — PROGRESS NOTE ADULT - ASSESSMENT
84 y/o woman with hx of COPD/asthma on 3L NC O2, Dm2, HTN, mirela on cpap, lymphedema on bumex who was brought to the ER by daughter initially because she had woken up from a deep sleep and was seemingly very depressed (now denying) but also complaining of worsening LE edema b/l and continued SOB. found in the ER to have pulmonary edema and b/l LE pitting edema to be admitted for further evaluation.       hypoxic HYPERCAPNIC RESP FAILURE: CHF: DO VBG: CONT DIURETICS:  cont to mantain os sao2 above 90%: she may need bipap at night tie depending upon the ABG :   ASTHMA: ON SYMBICORT, SINGULAIR AS WELL AS bd: SHE IS NOT WHEEZING AT THIS TIE=ME:   obese: counselled  DM : control   HTN: controled  MIRELA: N CPAP; DOES not  REMEMBER THE SEttings  dwa cp      05/31:      hypoxic HYPERCAPNIC RESP FAILURE: CHF: DO VBG: CONT DIURETICS:  cont to maintain os sao2 above 90%: she stil needs bipap in the daytime  as she desats: she has ac on chr hypercaribc resp fialure: likely has MIRELA and oHS too:   ASTHMA: ON SYMBICORT, SINGULAIR : no wheezing  obese: counselled  DM : control   HTN: controlled  MIRELA: N CPAP; DOES not  REMEMBER THE Settings: however at this time she would need bipap at this time as well as dc:   dw  cp    6/1;        hypoxic HYPERCAPNIC RESP FAILURE: CHF: DO VBG: CONT DIURETICS:  cont to maintain os sao2 above 90%: she still  needs bipap in the daytime  as she desats: she has ac on chr hypercarbic resp failure likely has MIRELA and OHS too: she would need bipap at home: she only has oxygen at home: sheis responding appropriately today and seems more awake:   ASTHMA: ON SYMBICORT, SINGULAIR : no wheezing: and BD prn   obese: counselled  DM : control   HTN: controlled  MIRELA: N CPAP; DOES not  REMEMBER THE Settings: however at this time she would need bipap at this time as well as dc:   dw  acp      6/2:    hypoxic HYPERCAPNIC RESP FAILURE: CHF: CONT DIURETICS:  cont to maintain os sao2 above 90%: she still  needs bipap in the daytime  as she desats: she has ac on chr hypercarbic resp failure likely has MIRELA and OHS too: she would need bipap at home: she only has oxygen at home: she is responding appropriately today and seems more awake:   ASTHMA: ON SYMBICORT, SINGULAIR : no wheezing: and BD prn   obese: counselled  DM : control   HTN: controlled  MIRELA: N CPAP; DOES not  REMEMBER THE Settings: however at this time she would need bipap at this time as well as dc:   dw  acp      6/3:  hypoxic HYPERCAPNIC RESP FAILURE: CHF: CONT DIURETICS:  she seems to be better with bipap: she feels better: she is awake and alert: no chest pain:   ASTHMA: ON SYMBICORT, SINGULAIR : no wheezing: and BD prn   obese: counselled  DM : control   HTN: controlled  MIRELA: N CPAP; DOES not  REMEMBER THE Settings: however at this time she would need bipap at this time as well as dc:   dw  acp      6/4:    hypoxic HYPERCAPNIC RESP FAILURE: CHF: CONT DIURETICS:  Her ABG yesterday morning with mild ac on chr hypercapnic resp failure   ASTHMA: ON SYMBICORT, SINGULAIR : no wheezing: and BD prn   obese: counselled  DM : control   HTN: controlled  MIRELA: N CPAP; DOES not  REMEMBER THE Settings: however at this time she would need bipap at this time as well as dc:   dw  acp

## 2021-06-09 ENCOUNTER — APPOINTMENT (OUTPATIENT)
Dept: CARE COORDINATION | Facility: HOME HEALTH | Age: 86
End: 2021-06-09
Payer: MEDICARE

## 2021-06-09 DIAGNOSIS — I50.9 HEART FAILURE, UNSPECIFIED: ICD-10-CM

## 2021-06-09 DIAGNOSIS — J44.1 CHRONIC OBSTRUCTIVE PULMONARY DISEASE WITH (ACUTE) EXACERBATION: ICD-10-CM

## 2021-06-09 DIAGNOSIS — E11.9 TYPE 2 DIABETES MELLITUS W/OUT COMPLICATIONS: ICD-10-CM

## 2021-06-09 DIAGNOSIS — I89.0 LYMPHEDEMA, NOT ELSEWHERE CLASSIFIED: ICD-10-CM

## 2021-06-09 PROCEDURE — 99348 HOME/RES VST EST LOW MDM 30: CPT | Mod: 95

## 2021-06-09 RX ORDER — ASPIRIN ENTERIC COATED TABLETS 81 MG 81 MG/1
81 TABLET, DELAYED RELEASE ORAL DAILY
Refills: 0 | Status: ACTIVE | COMMUNITY
Start: 2021-06-09

## 2021-06-09 RX ORDER — MULTIVIT-MIN/FOLIC/VIT K/LYCOP 400-300MCG
50 MCG TABLET ORAL
Refills: 0 | Status: ACTIVE | COMMUNITY
Start: 2021-06-09

## 2021-06-09 RX ORDER — AZELASTINE HYDROCHLORIDE 137 UG/1
0.1 SPRAY, METERED NASAL TWICE DAILY
Refills: 0 | Status: ACTIVE | COMMUNITY
Start: 2021-06-09

## 2021-06-09 RX ORDER — BUMETANIDE 1 MG/1
1 TABLET ORAL DAILY
Refills: 0 | Status: ACTIVE | COMMUNITY
Start: 2021-06-09

## 2021-06-09 RX ORDER — LORATADINE 10 MG
17 TABLET,DISINTEGRATING ORAL
Refills: 0 | Status: ACTIVE | COMMUNITY
Start: 2021-06-09

## 2021-06-09 RX ORDER — GABAPENTIN 100 MG/1
100 CAPSULE ORAL 3 TIMES DAILY
Refills: 0 | Status: ACTIVE | COMMUNITY
Start: 2021-06-09

## 2021-06-09 RX ORDER — FUROSEMIDE 20 MG/1
20 TABLET ORAL
Refills: 0 | Status: DISCONTINUED | COMMUNITY
Start: 2020-03-16 | End: 2021-06-09

## 2021-06-09 RX ORDER — FOLIC ACID/VIT B COMPLEX AND C 0.8 MG
0.8 TABLET ORAL
Refills: 0 | Status: ACTIVE | COMMUNITY
Start: 2021-06-09

## 2021-06-09 NOTE — HISTORY OF PRESENT ILLNESS
[Home] : at home, [unfilled] , at the time of the visit. [Other Location: e.g. Home (Enter Location, City,State)___] : at [unfilled] [Family Member] : family member [Verbal consent obtained from patient] : the patient, [unfilled] [FreeTextEntry1] : follow up hospitalization for CHF/COPD [de-identified] : 84 y/o woman with hx of COPD/asthma on 3L NC O2, Dm2, HTN, mirela on cpap, lymphedema on bumex who was brought to the ER for worsening LE edema and SOB.  Patient admitted for CHF exac.  PAtient d/c home with private HHA 8hrs daily\par Patient to see pcp 6/15\par COPD: denies worsening s/s, 4LNC, compliant with regimen,  home pulse ox readings 89-96. dtr to make appt with pulm\par CHF: denies CP, SOB, dizziness, denies taking daily weight. PAtient to see cardio tomorrow, hx of lymphadema\par DM: denies worsening s/s, compliant with regimen, not taking FS

## 2021-06-09 NOTE — PHYSICAL EXAM
[No Acute Distress] : no acute distress [Well Nourished] : well nourished [No Respiratory Distress] : no respiratory distress  [No Accessory Muscle Use] : no accessory muscle use [de-identified] : +2 edema

## 2021-06-09 NOTE — PLAN
[FreeTextEntry1] : Reminded of CN/NP role and contact info, advised to call with any questions/concerns, verbalized understanding

## 2021-06-14 ENCOUNTER — APPOINTMENT (OUTPATIENT)
Dept: CARE COORDINATION | Facility: HOME HEALTH | Age: 86
End: 2021-06-14

## 2021-06-18 ENCOUNTER — APPOINTMENT (OUTPATIENT)
Dept: PULMONOLOGY | Facility: CLINIC | Age: 86
End: 2021-06-18
Payer: MEDICARE

## 2021-06-18 VITALS
BODY MASS INDEX: 51.07 KG/M2 | SYSTOLIC BLOOD PRESSURE: 119 MMHG | RESPIRATION RATE: 16 BRPM | HEIGHT: 61.5 IN | HEART RATE: 74 BPM | TEMPERATURE: 97.6 F | OXYGEN SATURATION: 96 % | DIASTOLIC BLOOD PRESSURE: 69 MMHG | WEIGHT: 274 LBS

## 2021-06-18 DIAGNOSIS — R09.81 NASAL CONGESTION: ICD-10-CM

## 2021-06-18 PROCEDURE — 99214 OFFICE O/P EST MOD 30 MIN: CPT

## 2021-06-18 RX ORDER — IPRATROPIUM BROMIDE AND ALBUTEROL SULFATE 2.5; .5 MG/3ML; MG/3ML
0.5-2.5 (3) SOLUTION RESPIRATORY (INHALATION) EVERY 6 HOURS
Qty: 90 | Refills: 3 | Status: ACTIVE | COMMUNITY
Start: 2020-03-16 | End: 1900-01-01

## 2021-06-18 RX ORDER — FLUTICASONE PROPIONATE AND SALMETEROL 250; 50 UG/1; UG/1
250-50 POWDER RESPIRATORY (INHALATION)
Qty: 1 | Refills: 3 | Status: ACTIVE | COMMUNITY
Start: 2020-03-16 | End: 1900-01-01

## 2021-06-18 RX ORDER — BUDESONIDE AND FORMOTEROL FUMARATE DIHYDRATE 160; 4.5 UG/1; UG/1
160-4.5 AEROSOL RESPIRATORY (INHALATION) TWICE DAILY
Qty: 1 | Refills: 3 | Status: ACTIVE | COMMUNITY
Start: 2021-06-18 | End: 1900-01-01

## 2021-06-18 RX ORDER — SODIUM CHLORIDE 0.65 %
0.65 AEROSOL, SPRAY (ML) NASAL TWICE DAILY
Qty: 1 | Refills: 3 | Status: ACTIVE | COMMUNITY
Start: 2021-06-18 | End: 1900-01-01

## 2021-06-18 RX ORDER — ALBUTEROL SULFATE 90 UG/1
108 (90 BASE) INHALANT RESPIRATORY (INHALATION)
Qty: 1 | Refills: 5 | Status: ACTIVE | COMMUNITY
Start: 2020-03-16

## 2021-06-28 ENCOUNTER — APPOINTMENT (OUTPATIENT)
Dept: CARE COORDINATION | Facility: HOME HEALTH | Age: 86
End: 2021-06-28

## 2021-06-28 NOTE — REASON FOR VISIT
[Follow-Up - From Hospitalization] : a follow-up visit after a recent hospitalization [Sleep Apnea] : sleep apnea [COPD] : COPD [Shortness of Breath] : shortness of breath [Family Member] : family member

## 2021-06-28 NOTE — CONSULT LETTER
[Dear  ___] : Dear  [unfilled], [Courtesy Letter:] : I had the pleasure of seeing your patient, [unfilled], in my office today. [Please see my note below.] : Please see my note below. [Consult Closing:] : Thank you very much for allowing me to participate in the care of this patient.  If you have any questions, please do not hesitate to contact me. [Sincerely,] : Sincerely, [FreeTextEntry2] : Dr. Seven Huff [FreeTextEntry3] : Fariba Yao MD\par  \par Division of Pulmonary, Critical Care & Sleep Medicine\par [facility]\par \par \par

## 2021-06-28 NOTE — ASSESSMENT
[FreeTextEntry1] : A/P: SHAD, OHS, moderate pulmonary hypertension (group 2/3), morbid obesity (BMI 49.5), hospitalization for COVID-19 infection in Nov 2020, recently hospitalized for CHF exacerbation..\par \par 1. Chronic respiratory failure with hypoxia and hypercapnia - related to obesity, hypoventilation \par - c/w 3 LNC continuously- ordered portable O2 concentrator. \par -prior on Cpap [id number] - switched to Bilevel on discharge. Will reach out to DME to connect me wirelessly to her account to review data.\par -repeat PFTs and EOT\par -Trial of Trelegy (will stop Symbicort), can c/w BARRINGTON prn\par \par 2. SHAD/OHS\par -Requires Split study with Bilevel titration and TCCO2 monitoring. I explained the importance of this study\par \par 3.Pulmonary hypertension - TTE reviewed - very limited study, unable to eval RV\par c/w diuretic therapy and cardiac optimization per Dr. Bermudez\par \par Above was discussed with the patient and her daughter at length and they appear to understand, all questions answered. \par Follow up after sleep study is completed. \par

## 2021-06-28 NOTE — HISTORY OF PRESENT ILLNESS
[Never] : never [Obstructive Sleep Apnea] : obstructive sleep apnea [TextBox_4] : 85 yo F presents for hospital follow up.\par PMG includes: lifelong nonsmoker, obese (bmi 49), history of COPD(?), supplemental oxygen dependence and SHAD on PAP therapy, CHF, lymphedema\par \par Was hospitalized at [facility] the beginning of Nov 2019 for acute on chronic hypercapnic and hypoxic respiratory failure. Requiring Bilevel therapy and admitted to MICU for close monitoring. Improved and transferred to RCU, discharged to rehab with Bilevel QHS.\par \par Initial office visit 1/28/20: Lifelong nonsmoker, denies cough or chest pain. Chronic dyspnea on exertion- progressive, ambulates with walker and becomes dyspneic with approx 20 feet of ambulation per her daughter, however, is predominantly sedentary.\par Chronic bipedal edema\par Inhaler regimen includes: Advair 250/50 BID, Spiriva, Albuterol. \par Uses oxygen as needed, prescribed by her PCP 2 years ago. \par \par PFTs: Severe restrictive defect, moderately reduced DLCO\par Chest imaging: CXR 11/1/19- limited exam, pulmonary edema. \par NM V/Q scan 11/1/19: very low prob for PE\par \par Echo: 10/31/19: LVEF 65-70%, limited study, Concentric LV remodeling, Hyperdynamic LV. Moderate pulmonary hypertension with est RVSP of 55 mmHg. Mild AS\par \par Follow up visit 2/12/200- accompanied by her daughter\par States symptoms are unchanged - dyspnea on minimal exertion and bipedal edema.\par TTE 3/3/20 - very limited study, unable to eval PA pressures. Grossly normal LV fxn\par \par ABG on RA: 7.35/59.6/58/32/84%\par EOT - unable to complete 2/2 severe hip pain, standing O2 sat on RA 87%\par \par Data download: days 1/4-2/2/2020\par Device: DreamStation Autopap, settings: CPAP 15 cmH2O\par DME: [facility]\par Use 93% of days, average use 23 minutes\par AHI 1.3\par \par 12/15/20:\par Televisit follow up, accompanied by her daughter.\par Recently hospitalized at [facility] hospital 11/29-12/9/20 for COVID 19 infecttion, acute on chronic hypoxic and hypercapnic respiratory failure.\par Was treated with Remdesivir, Decadron, Bilevel and discharge on Xarelto. CXR reviewed- bilateral patchy opacities. \par Overall doing better - minimally ambulates at home, assisted by her HHA. Endorses mild dyspnea and decreased appetite. Denies fever or cough. Using Albuterol prn and Advair 500/50 bid. \par Seen by her PMD Dr. Huff yesterday who narendra blood work and gave her the influenza vaccine. \par \par HFU 6/18/2021:\par Recently admitted for increased LE edema and dyspnea. CXR with e/o pulmonary edema. Treated for CHF exacerbation IV Lasix 40mg bid changed to bumex po 6/1 \par Remains on supplemental O2, 4 LPM\par Dyspneic on minimal exertion and nonproductive cough. \par Hospital meds reviewed including Albuterol and Atrovent MDI 3x a day, Albuterol nebs TID. Bumex 2 mg daily\par Advair 250/50 mg BID was switched to Symbicort 160/4.5 BID which she feels is helping, but still BERG.\par \par Cardiology - Dr. Gutierrez Bermudez\par \par -CT chest 5/31/21 Mild interlobular septal thickening and groundglass opacities can represent mild edema. Bibasilar subsegmental atelectasis/consolidations.  \par -TTE Technically difficult study. Mitral annular calcification, Minimal mitral regurgitation. Unable to accurately evaluate right ventricular size or \par -LE US negative dvt \par Obstructive sleep apnea. \par 3LNC during day and CPAP at night - changed to BIPAP for discharge- DME [facility], unclear on settings.\par \par \par  \par \par \par

## 2021-07-28 NOTE — PROGRESS NOTE ADULT - I WAS PHYSICALLY PRESENT FOR THE KEY PORTIONS OF THE EVALUATION AND MANAGEMENT (E/M) SERVICE PROVIDED.  I AGREE WITH THE ABOVE HISTORY, PHYSICAL, AND PLAN WHICH I HAVE REVIEWED AND EDITED WHERE APPROPRIATE
Statement Selected
Ketoconazole Pregnancy And Lactation Text: This medication is Pregnancy Category C and it isn't know if it is safe during pregnancy. It is also excreted in breast milk and breast feeding isn't recommended.

## 2021-07-29 ENCOUNTER — APPOINTMENT (OUTPATIENT)
Dept: PULMONOLOGY | Facility: CLINIC | Age: 86
End: 2021-07-29
Payer: MEDICARE

## 2021-07-29 VITALS
HEART RATE: 84 BPM | TEMPERATURE: 98.1 F | BODY MASS INDEX: 52.19 KG/M2 | DIASTOLIC BLOOD PRESSURE: 71 MMHG | OXYGEN SATURATION: 96 % | HEIGHT: 61.5 IN | WEIGHT: 280 LBS | SYSTOLIC BLOOD PRESSURE: 125 MMHG

## 2021-07-29 DIAGNOSIS — Z99.81 CHRONIC RESPIRATORY FAILURE WITH HYPOXIA: ICD-10-CM

## 2021-07-29 DIAGNOSIS — E66.2 MORBID (SEVERE) OBESITY WITH ALVEOLAR HYPOVENTILATION: ICD-10-CM

## 2021-07-29 DIAGNOSIS — E66.01 MORBID (SEVERE) OBESITY DUE TO EXCESS CALORIES: ICD-10-CM

## 2021-07-29 DIAGNOSIS — J96.11 CHRONIC RESPIRATORY FAILURE WITH HYPOXIA: ICD-10-CM

## 2021-07-29 DIAGNOSIS — G47.33 OBSTRUCTIVE SLEEP APNEA (ADULT) (PEDIATRIC): ICD-10-CM

## 2021-07-29 PROCEDURE — 94010 BREATHING CAPACITY TEST: CPT

## 2021-07-29 PROCEDURE — 99214 OFFICE O/P EST MOD 30 MIN: CPT | Mod: 25

## 2021-07-29 PROCEDURE — 94618 PULMONARY STRESS TESTING: CPT

## 2021-07-29 PROCEDURE — 82803 BLOOD GASES ANY COMBINATION: CPT

## 2021-07-29 PROCEDURE — 94729 DIFFUSING CAPACITY: CPT

## 2021-07-29 PROCEDURE — ZZZZZ: CPT

## 2021-07-29 PROCEDURE — 94726 PLETHYSMOGRAPHY LUNG VOLUMES: CPT

## 2021-07-29 PROCEDURE — 36600 WITHDRAWAL OF ARTERIAL BLOOD: CPT | Mod: 59

## 2021-07-31 PROBLEM — E66.2 OBESITY HYPOVENTILATION SYNDROME: Status: ACTIVE | Noted: 2020-01-31

## 2021-07-31 PROBLEM — G47.33 OSA (OBSTRUCTIVE SLEEP APNEA): Status: ACTIVE | Noted: 2020-01-31

## 2021-07-31 PROBLEM — E66.01 MORBID OBESITY: Status: ACTIVE | Noted: 2020-01-31

## 2021-07-31 PROBLEM — J96.11 CHRONIC RESPIRATORY FAILURE WITH HYPOXIA, ON HOME O2 THERAPY: Status: ACTIVE | Noted: 2020-01-28

## 2021-07-31 NOTE — REASON FOR VISIT
[Sleep Apnea] : sleep apnea [COPD] : COPD [Shortness of Breath] : shortness of breath [Family Member] : family member [Follow-Up] : a follow-up visit

## 2021-07-31 NOTE — CONSULT LETTER
[Dear  ___] : Dear  [unfilled], [Courtesy Letter:] : I had the pleasure of seeing your patient, [unfilled], in my office today. [Please see my note below.] : Please see my note below. [Consult Closing:] : Thank you very much for allowing me to participate in the care of this patient.  If you have any questions, please do not hesitate to contact me. [Sincerely,] : Sincerely, [FreeTextEntry2] : Dr. Seven Huff [FreeTextEntry3] : Fariba Yao MD\par  \par Division of Pulmonary, Critical Care & Sleep Medicine\par [facility]\par \par \par

## 2021-07-31 NOTE — HISTORY OF PRESENT ILLNESS
[Never] : never [Obstructive Sleep Apnea] : obstructive sleep apnea [TextBox_4] : 87 yo F presents for hospital follow up.\par PMG includes: lifelong nonsmoker, obese (bmi 49), history of COPD(?), supplemental oxygen dependence and SHAD on PAP therapy, CHF, lymphedema\par \par Was hospitalized at [facility] the beginning of Nov 2019 for acute on chronic hypercapnic and hypoxic respiratory failure. Requiring Bilevel therapy and admitted to MICU for close monitoring. Improved and transferred to RCU, discharged to rehab with Bilevel QHS.\par \par Initial office visit 1/28/20: denies cough or chest pain. Chronic dyspnea on exertion- progressive, ambulates with walker and becomes dyspneic with approx 20 feet of ambulation per her daughter, however, is predominantly sedentary.\par Chronic bipedal edema\par Inhaler regimen includes: Advair 250/50 BID, Spiriva, Albuterol. \par Uses oxygen as needed, prescribed by her PCP 2 years ago. \par \par PFTs: Severe restrictive defect, moderately reduced DLCO\par Chest imaging: CXR 11/1/19- limited exam, pulmonary edema. \par NM V/Q scan 11/1/19: very low prob for PE\par \par Echo: 10/31/19: LVEF 65-70%, limited study, Concentric LV remodeling, Hyperdynamic LV. Moderate pulmonary hypertension with est RVSP of 55 mmHg. Mild AS\par \par Follow up visit 2/12/200- accompanied by her daughter\par States symptoms are unchanged - dyspnea on minimal exertion and bipedal edema.\par TTE 3/3/20 - very limited study, unable to eval PA pressures. Grossly normal LV fxn\par \par ABG on RA: 7.35/59.6/58/32/84%\par EOT - unable to complete 2/2 severe hip pain, standing O2 sat on RA 87%\par \par Data download: days 1/4-2/2/2020\par Device: [employer]Station Autopap, settings: CPAP 15 cmH2O\par DME: [facility]\par Use 93% of days, average use 23 minutes\par AHI 1.3\par \par 12/15/20:\par Televisit follow up, accompanied by her daughter.\par Recently hospitalized at [facility] hospital 11/29-12/9/20 for COVID 19 infecttion, acute on chronic hypoxic and hypercapnic respiratory failure.\par Was treated with Remdesivir, Decadron, Bilevel and discharge on Xarelto. CXR reviewed- bilateral patchy opacities. \par Overall doing better - minimally ambulates at home, assisted by her HHA. Endorses mild dyspnea and decreased appetite. Denies fever or cough. Using Albuterol prn and Advair 500/50 bid. \par Seen by her PMD Dr. Huff yesterday who narendra blood work and gave her the influenza vaccine. \par \par HFU 6/18/2021:\par Recently admitted for increased LE edema and dyspnea. CXR with e/o pulmonary edema. Treated for CHF exacerbation IV Lasix 40mg bid changed to bumex po 6/1 \par Remains on supplemental O2, 4 LPM\par Dyspneic on minimal exertion and nonproductive cough. \par Hospital meds reviewed including Albuterol and Atrovent MDI 3x a day, Albuterol nebs TID. Bumex 2 mg daily\par Advair 250/50 mg BID was switched to Symbicort 160/4.5 BID which she feels is helping, but still BEGR.\par \par Cardiology - Dr. Gutierrez Bermudez\par \par -CT chest 5/31/21 Mild interlobular septal thickening and groundglass opacities can represent mild edema. Bibasilar subsegmental atelectasis/consolidations.  \par -TTE Technically difficult study. Mitral annular calcification, Minimal mitral regurgitation. Unable to accurately evaluate right ventricular size or \par -LE US negative dvt \par Obstructive sleep apnea. \par 3LNC during day and CPAP at night - changed to BIPAP for discharge- DME [facility], unclear on settings.\par \par \par Follow up OV 7/29/21:\par Clinically unchanged - has been using Bilevel nightly and 3 LNC ATC. Continues to complain of very limited ability to ambulate due to dyspnea and LE edema. Falling asleep often throughout the day.\par Wireless data from her resmed Bilevel shows settings of 15/5 cmH2O- good compliance, average 4 hours used, excessive mask leak >100 lpm.\par When I discussed this with her, she states she is using her oxygen nasal cannula under her FFM because she feels she can not breath. (and likely why she has such high mask leak). \par PFT:patient had difficulty performing study - restricted pattern\par EOT: 86% at rest on RA, 94% at rest with 3 LNC, 91% with ambulation on 3 LPM\par ABG performed on 3LPM: 7.32/72.6/64/36.5/91%\par \par Quality metrics:\par Tobacco use- never smoker\par ESS 14\par \par Vaccines: \par COVID: completed\par Influenza: receives annually\par Pneumococcal: has received in the past - unsure of year\par  \par  \par \par \par

## 2021-07-31 NOTE — ASSESSMENT
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 \par \par 1. Chronic respiratory failure with hypoxia and hypercapnia - related to obesity, hypoventilation \par -ABG with chronic, mostly compensated respiratory acidosis (similar to her ABG on last discharge)\par - c/w 3 LNC continuously- ordered portable O2 concentrator. \par -prior on Cpap [id number] - switched to Bilevel 15/10. I made additional adjustments to 18/10 cmH2O and encouraged the patient to use nightly and for the majority of time she is sleep. Exaplained that she should bleed in the O2 into her bilevel from her stationary concentrator and fit with new FFM - size Large F&P [employer].\par -c.w  Trelegy , can c/w BARRINGTON prn\par FOLLOW UP WITH REPEAT ABG AT NEXT VISIT\par \par 2. SHAD/OHS\par -Requires Split study with Bilevel titration and TCCO2 monitoring. I explained the importance of this study at length and she is amenable. \par \par 3.Pulmonary hypertension -\par c/w diuretic therapy and cardiac optimization per Dr. Bermudez\par supplemental O2 and treatment of SDB\par \par Above was discussed with the patient and her daughter at length and they appear to understand, all questions answered. \par Follow up after sleep study is completed. \par \par \par

## 2021-07-31 NOTE — PHYSICAL EXAM
[No Acute Distress] : no acute distress [Low Lying Soft Palate] : low lying soft palate [Enlarged Base of the Tongue] : enlarged base of the tongue [IV] : Mallampati Class: IV [Normal Appearance] : normal appearance [No Neck Mass] : no neck mass [Normal Rate/Rhythm] : normal rate/rhythm [Normal S1, S2] : normal s1, s2 [No Murmurs] : no murmurs [No Resp Distress] : no resp distress [No Acc Muscle Use] : no acc muscle use [Clear to Auscultation Bilaterally] : clear to auscultation bilaterally [No Abnormalities] : no abnormalities [Benign] : benign [No Clubbing] : no clubbing [3+ Pitting] : 3+ pitting [TextBox_2] : in a wheelchair, obese, pleasant [TextBox_99] : did not test gait [TextBox_105] : lymphedema

## 2021-07-31 NOTE — REVIEW OF SYSTEMS
[EDS] : EDS [Dyspnea] : dyspnea [A.M. Dry Mouth] : a.m. dry mouth [SOB on Exertion] : sob on exertion [Edema] : edema [Arthralgias] : arthralgias [Obesity] : obesity [Negative] : Psychiatric [Fever] : no fever [Fatigue] : no fatigue [Recent Wt Gain (___ Lbs)] : ~T no recent weight gain [Chills] : no chills [Poor Appetite] : no poor appetite [Recent Wt Loss (___ Lbs)] : ~T no recent weight loss [Cough] : no cough [Hemoptysis] : no hemoptysis [Chest Tightness] : no chest tightness [Frequent URIs] : no frequent URIs [Sputum] : no sputum [Pleuritic Pain] : no pleuritic pain [Wheezing] : no wheezing [Chest Discomfort] : no chest discomfort [Claudication] : no claudication [Orthopnea] : no orthopnea [Palpitations] : no palpitations [Syncope] : no syncope [Diabetes] : diabetes [Thyroid Problem] : no thyroid problem [TextBox_144] : osteoporosis

## 2021-10-02 ENCOUNTER — APPOINTMENT (OUTPATIENT)
Dept: SLEEP CENTER | Facility: CLINIC | Age: 86
End: 2021-10-02

## 2021-10-06 PROBLEM — I10 ESSENTIAL HYPERTENSION: Status: ACTIVE | Noted: 2020-01-31

## 2021-11-18 ENCOUNTER — INPATIENT (INPATIENT)
Facility: HOSPITAL | Age: 86
LOS: 8 days | Discharge: HOME | End: 2021-11-27
Attending: FAMILY MEDICINE | Admitting: FAMILY MEDICINE
Payer: MEDICARE

## 2021-11-18 VITALS
TEMPERATURE: 98 F | HEART RATE: 92 BPM | OXYGEN SATURATION: 99 % | RESPIRATION RATE: 20 BRPM | SYSTOLIC BLOOD PRESSURE: 167 MMHG | HEIGHT: 60 IN | WEIGHT: 270.07 LBS | DIASTOLIC BLOOD PRESSURE: 74 MMHG

## 2021-11-18 DIAGNOSIS — Z96.659 PRESENCE OF UNSPECIFIED ARTIFICIAL KNEE JOINT: Chronic | ICD-10-CM

## 2021-11-18 PROCEDURE — 99285 EMERGENCY DEPT VISIT HI MDM: CPT

## 2021-11-19 DIAGNOSIS — M97.11XA PERIPROSTHETIC FRACTURE AROUND INTERNAL PROSTHETIC RIGHT KNEE JOINT, INITIAL ENCOUNTER: ICD-10-CM

## 2021-11-19 DIAGNOSIS — J44.9 CHRONIC OBSTRUCTIVE PULMONARY DISEASE, UNSPECIFIED: ICD-10-CM

## 2021-11-19 DIAGNOSIS — G47.33 OBSTRUCTIVE SLEEP APNEA (ADULT) (PEDIATRIC): ICD-10-CM

## 2021-11-19 DIAGNOSIS — E66.01 MORBID (SEVERE) OBESITY DUE TO EXCESS CALORIES: ICD-10-CM

## 2021-11-19 DIAGNOSIS — E87.70 FLUID OVERLOAD, UNSPECIFIED: ICD-10-CM

## 2021-11-19 DIAGNOSIS — I10 ESSENTIAL (PRIMARY) HYPERTENSION: ICD-10-CM

## 2021-11-19 DIAGNOSIS — E87.5 HYPERKALEMIA: ICD-10-CM

## 2021-11-19 LAB
ALBUMIN SERPL ELPH-MCNC: 2.6 G/DL — LOW (ref 3.3–5)
ALP SERPL-CCNC: 62 U/L — SIGNIFICANT CHANGE UP (ref 40–120)
ALT FLD-CCNC: 15 U/L — SIGNIFICANT CHANGE UP (ref 12–78)
ANION GAP SERPL CALC-SCNC: 3 MMOL/L — LOW (ref 5–17)
ANION GAP SERPL CALC-SCNC: 5 MMOL/L — SIGNIFICANT CHANGE UP (ref 5–17)
AST SERPL-CCNC: 28 U/L — SIGNIFICANT CHANGE UP (ref 15–37)
BASOPHILS # BLD AUTO: 0.03 K/UL — SIGNIFICANT CHANGE UP (ref 0–0.2)
BASOPHILS NFR BLD AUTO: 0.2 % — SIGNIFICANT CHANGE UP (ref 0–2)
BILIRUB SERPL-MCNC: 0.2 MG/DL — SIGNIFICANT CHANGE UP (ref 0.2–1.2)
BUN SERPL-MCNC: 34 MG/DL — HIGH (ref 7–23)
BUN SERPL-MCNC: 36 MG/DL — HIGH (ref 7–23)
CALCIUM SERPL-MCNC: 8.6 MG/DL — SIGNIFICANT CHANGE UP (ref 8.5–10.1)
CALCIUM SERPL-MCNC: 8.7 MG/DL — SIGNIFICANT CHANGE UP (ref 8.5–10.1)
CHLORIDE SERPL-SCNC: 93 MMOL/L — LOW (ref 96–108)
CHLORIDE SERPL-SCNC: 93 MMOL/L — LOW (ref 96–108)
CK SERPL-CCNC: 117 U/L — SIGNIFICANT CHANGE UP (ref 26–192)
CO2 SERPL-SCNC: 34 MMOL/L — HIGH (ref 22–31)
CO2 SERPL-SCNC: 38 MMOL/L — HIGH (ref 22–31)
CREAT SERPL-MCNC: 1.05 MG/DL — SIGNIFICANT CHANGE UP (ref 0.5–1.3)
CREAT SERPL-MCNC: 1.06 MG/DL — SIGNIFICANT CHANGE UP (ref 0.5–1.3)
EOSINOPHIL # BLD AUTO: 0.07 K/UL — SIGNIFICANT CHANGE UP (ref 0–0.5)
EOSINOPHIL NFR BLD AUTO: 0.5 % — SIGNIFICANT CHANGE UP (ref 0–6)
FLUAV AG NPH QL: SIGNIFICANT CHANGE UP
FLUBV AG NPH QL: SIGNIFICANT CHANGE UP
GLUCOSE BLDC GLUCOMTR-MCNC: 145 MG/DL — HIGH (ref 70–99)
GLUCOSE BLDC GLUCOMTR-MCNC: 146 MG/DL — HIGH (ref 70–99)
GLUCOSE SERPL-MCNC: 151 MG/DL — HIGH (ref 70–99)
GLUCOSE SERPL-MCNC: 82 MG/DL — SIGNIFICANT CHANGE UP (ref 70–99)
HCT VFR BLD CALC: 32.2 % — LOW (ref 34.5–45)
HGB BLD-MCNC: 9.3 G/DL — LOW (ref 11.5–15.5)
IMM GRANULOCYTES NFR BLD AUTO: 0.5 % — SIGNIFICANT CHANGE UP (ref 0–1.5)
LYMPHOCYTES # BLD AUTO: 1.51 K/UL — SIGNIFICANT CHANGE UP (ref 1–3.3)
LYMPHOCYTES # BLD AUTO: 11.4 % — LOW (ref 13–44)
MCHC RBC-ENTMCNC: 26 PG — LOW (ref 27–34)
MCHC RBC-ENTMCNC: 28.9 GM/DL — LOW (ref 32–36)
MCV RBC AUTO: 89.9 FL — SIGNIFICANT CHANGE UP (ref 80–100)
MONOCYTES # BLD AUTO: 0.76 K/UL — SIGNIFICANT CHANGE UP (ref 0–0.9)
MONOCYTES NFR BLD AUTO: 5.7 % — SIGNIFICANT CHANGE UP (ref 2–14)
NEUTROPHILS # BLD AUTO: 10.84 K/UL — HIGH (ref 1.8–7.4)
NEUTROPHILS NFR BLD AUTO: 81.7 % — HIGH (ref 43–77)
NRBC # BLD: 0 /100 WBCS — SIGNIFICANT CHANGE UP (ref 0–0)
NT-PROBNP SERPL-SCNC: 809 PG/ML — HIGH (ref 0–450)
PLATELET # BLD AUTO: 237 K/UL — SIGNIFICANT CHANGE UP (ref 150–400)
POTASSIUM SERPL-MCNC: 5.5 MMOL/L — HIGH (ref 3.5–5.3)
POTASSIUM SERPL-MCNC: 6 MMOL/L — HIGH (ref 3.5–5.3)
POTASSIUM SERPL-SCNC: 5.5 MMOL/L — HIGH (ref 3.5–5.3)
POTASSIUM SERPL-SCNC: 6 MMOL/L — HIGH (ref 3.5–5.3)
PROT SERPL-MCNC: 7.8 GM/DL — SIGNIFICANT CHANGE UP (ref 6–8.3)
RBC # BLD: 3.58 M/UL — LOW (ref 3.8–5.2)
RBC # FLD: 15.8 % — HIGH (ref 10.3–14.5)
SARS-COV-2 RNA SPEC QL NAA+PROBE: SIGNIFICANT CHANGE UP
SODIUM SERPL-SCNC: 132 MMOL/L — LOW (ref 135–145)
SODIUM SERPL-SCNC: 134 MMOL/L — LOW (ref 135–145)
WBC # BLD: 13.27 K/UL — HIGH (ref 3.8–10.5)
WBC # FLD AUTO: 13.27 K/UL — HIGH (ref 3.8–10.5)

## 2021-11-19 PROCEDURE — 99223 1ST HOSP IP/OBS HIGH 75: CPT

## 2021-11-19 PROCEDURE — 73564 X-RAY EXAM KNEE 4 OR MORE: CPT | Mod: 26,50

## 2021-11-19 PROCEDURE — 93010 ELECTROCARDIOGRAM REPORT: CPT

## 2021-11-19 PROCEDURE — 72170 X-RAY EXAM OF PELVIS: CPT | Mod: 26

## 2021-11-19 PROCEDURE — 71045 X-RAY EXAM CHEST 1 VIEW: CPT | Mod: 26

## 2021-11-19 PROCEDURE — 93306 TTE W/DOPPLER COMPLETE: CPT | Mod: 26

## 2021-11-19 RX ORDER — OXYCODONE AND ACETAMINOPHEN 5; 325 MG/1; MG/1
1 TABLET ORAL ONCE
Refills: 0 | Status: DISCONTINUED | OUTPATIENT
Start: 2021-11-19 | End: 2021-11-19

## 2021-11-19 RX ORDER — BUDESONIDE AND FORMOTEROL FUMARATE DIHYDRATE 160; 4.5 UG/1; UG/1
2 AEROSOL RESPIRATORY (INHALATION)
Refills: 0 | Status: DISCONTINUED | OUTPATIENT
Start: 2021-11-19 | End: 2021-11-27

## 2021-11-19 RX ORDER — SODIUM CHLORIDE 9 MG/ML
1000 INJECTION, SOLUTION INTRAVENOUS
Refills: 0 | Status: DISCONTINUED | OUTPATIENT
Start: 2021-11-19 | End: 2021-11-27

## 2021-11-19 RX ORDER — METOPROLOL TARTRATE 50 MG
100 TABLET ORAL DAILY
Refills: 0 | Status: DISCONTINUED | OUTPATIENT
Start: 2021-11-19 | End: 2021-11-27

## 2021-11-19 RX ORDER — DEXTROSE 50 % IN WATER 50 %
15 SYRINGE (ML) INTRAVENOUS ONCE
Refills: 0 | Status: DISCONTINUED | OUTPATIENT
Start: 2021-11-19 | End: 2021-11-27

## 2021-11-19 RX ORDER — MONTELUKAST 4 MG/1
10 TABLET, CHEWABLE ORAL AT BEDTIME
Refills: 0 | Status: DISCONTINUED | OUTPATIENT
Start: 2021-11-19 | End: 2021-11-27

## 2021-11-19 RX ORDER — ALBUTEROL 90 UG/1
2 AEROSOL, METERED ORAL EVERY 6 HOURS
Refills: 0 | Status: DISCONTINUED | OUTPATIENT
Start: 2021-11-19 | End: 2021-11-27

## 2021-11-19 RX ORDER — INSULIN HUMAN 100 [IU]/ML
10 INJECTION, SOLUTION SUBCUTANEOUS ONCE
Refills: 0 | Status: COMPLETED | OUTPATIENT
Start: 2021-11-19 | End: 2021-11-19

## 2021-11-19 RX ORDER — ENOXAPARIN SODIUM 100 MG/ML
40 INJECTION SUBCUTANEOUS EVERY 12 HOURS
Refills: 0 | Status: DISCONTINUED | OUTPATIENT
Start: 2021-11-19 | End: 2021-11-27

## 2021-11-19 RX ORDER — GLUCAGON INJECTION, SOLUTION 0.5 MG/.1ML
1 INJECTION, SOLUTION SUBCUTANEOUS ONCE
Refills: 0 | Status: DISCONTINUED | OUTPATIENT
Start: 2021-11-19 | End: 2021-11-27

## 2021-11-19 RX ORDER — POLYETHYLENE GLYCOL 3350 17 G/17G
17 POWDER, FOR SOLUTION ORAL DAILY
Refills: 0 | Status: DISCONTINUED | OUTPATIENT
Start: 2021-11-19 | End: 2021-11-27

## 2021-11-19 RX ORDER — DEXTROSE 50 % IN WATER 50 %
12.5 SYRINGE (ML) INTRAVENOUS ONCE
Refills: 0 | Status: DISCONTINUED | OUTPATIENT
Start: 2021-11-19 | End: 2021-11-27

## 2021-11-19 RX ORDER — ATORVASTATIN CALCIUM 80 MG/1
40 TABLET, FILM COATED ORAL AT BEDTIME
Refills: 0 | Status: DISCONTINUED | OUTPATIENT
Start: 2021-11-19 | End: 2021-11-27

## 2021-11-19 RX ORDER — DEXTROSE 50 % IN WATER 50 %
25 SYRINGE (ML) INTRAVENOUS ONCE
Refills: 0 | Status: DISCONTINUED | OUTPATIENT
Start: 2021-11-19 | End: 2021-11-27

## 2021-11-19 RX ORDER — GABAPENTIN 400 MG/1
100 CAPSULE ORAL THREE TIMES A DAY
Refills: 0 | Status: DISCONTINUED | OUTPATIENT
Start: 2021-11-19 | End: 2021-11-23

## 2021-11-19 RX ORDER — INSULIN LISPRO 100/ML
VIAL (ML) SUBCUTANEOUS EVERY 6 HOURS
Refills: 0 | Status: DISCONTINUED | OUTPATIENT
Start: 2021-11-19 | End: 2021-11-21

## 2021-11-19 RX ORDER — ACETAMINOPHEN 500 MG
650 TABLET ORAL EVERY 6 HOURS
Refills: 0 | Status: DISCONTINUED | OUTPATIENT
Start: 2021-11-19 | End: 2021-11-27

## 2021-11-19 RX ORDER — AMLODIPINE BESYLATE 2.5 MG/1
10 TABLET ORAL DAILY
Refills: 0 | Status: DISCONTINUED | OUTPATIENT
Start: 2021-11-19 | End: 2021-11-27

## 2021-11-19 RX ORDER — SODIUM BICARBONATE 1 MEQ/ML
50 SYRINGE (ML) INTRAVENOUS ONCE
Refills: 0 | Status: COMPLETED | OUTPATIENT
Start: 2021-11-19 | End: 2021-11-19

## 2021-11-19 RX ORDER — FUROSEMIDE 40 MG
40 TABLET ORAL ONCE
Refills: 0 | Status: COMPLETED | OUTPATIENT
Start: 2021-11-19 | End: 2021-11-19

## 2021-11-19 RX ORDER — LANOLIN ALCOHOL/MO/W.PET/CERES
3 CREAM (GRAM) TOPICAL AT BEDTIME
Refills: 0 | Status: DISCONTINUED | OUTPATIENT
Start: 2021-11-19 | End: 2021-11-27

## 2021-11-19 RX ORDER — DEXTROSE 50 % IN WATER 50 %
50 SYRINGE (ML) INTRAVENOUS ONCE
Refills: 0 | Status: COMPLETED | OUTPATIENT
Start: 2021-11-19 | End: 2021-11-19

## 2021-11-19 RX ORDER — SODIUM POLYSTYRENE SULFONATE 4.1 MEQ/G
15 POWDER, FOR SUSPENSION ORAL ONCE
Refills: 0 | Status: COMPLETED | OUTPATIENT
Start: 2021-11-19 | End: 2021-11-19

## 2021-11-19 RX ORDER — CHOLECALCIFEROL (VITAMIN D3) 125 MCG
2000 CAPSULE ORAL DAILY
Refills: 0 | Status: DISCONTINUED | OUTPATIENT
Start: 2021-11-19 | End: 2021-11-27

## 2021-11-19 RX ORDER — FUROSEMIDE 40 MG
40 TABLET ORAL
Refills: 0 | Status: DISCONTINUED | OUTPATIENT
Start: 2021-11-19 | End: 2021-11-22

## 2021-11-19 RX ORDER — HYDRALAZINE HCL 50 MG
100 TABLET ORAL THREE TIMES A DAY
Refills: 0 | Status: DISCONTINUED | OUTPATIENT
Start: 2021-11-19 | End: 2021-11-27

## 2021-11-19 RX ADMIN — POLYETHYLENE GLYCOL 3350 17 GRAM(S): 17 POWDER, FOR SOLUTION ORAL at 12:45

## 2021-11-19 RX ADMIN — Medication 50 MILLIEQUIVALENT(S): at 07:28

## 2021-11-19 RX ADMIN — OXYCODONE AND ACETAMINOPHEN 1 TABLET(S): 5; 325 TABLET ORAL at 13:45

## 2021-11-19 RX ADMIN — OXYCODONE AND ACETAMINOPHEN 1 TABLET(S): 5; 325 TABLET ORAL at 12:49

## 2021-11-19 RX ADMIN — Medication 40 MILLIGRAM(S): at 17:38

## 2021-11-19 RX ADMIN — ENOXAPARIN SODIUM 40 MILLIGRAM(S): 100 INJECTION SUBCUTANEOUS at 17:38

## 2021-11-19 RX ADMIN — GABAPENTIN 100 MILLIGRAM(S): 400 CAPSULE ORAL at 14:44

## 2021-11-19 RX ADMIN — Medication 2000 UNIT(S): at 12:45

## 2021-11-19 RX ADMIN — GABAPENTIN 100 MILLIGRAM(S): 400 CAPSULE ORAL at 21:51

## 2021-11-19 RX ADMIN — Medication 100 MILLIGRAM(S): at 21:51

## 2021-11-19 RX ADMIN — Medication 100 MILLIGRAM(S): at 14:44

## 2021-11-19 RX ADMIN — OXYCODONE AND ACETAMINOPHEN 1 TABLET(S): 5; 325 TABLET ORAL at 04:30

## 2021-11-19 RX ADMIN — AMLODIPINE BESYLATE 10 MILLIGRAM(S): 2.5 TABLET ORAL at 10:38

## 2021-11-19 RX ADMIN — ATORVASTATIN CALCIUM 40 MILLIGRAM(S): 80 TABLET, FILM COATED ORAL at 21:51

## 2021-11-19 RX ADMIN — Medication 40 MILLIGRAM(S): at 04:30

## 2021-11-19 RX ADMIN — INSULIN HUMAN 10 UNIT(S): 100 INJECTION, SOLUTION SUBCUTANEOUS at 07:27

## 2021-11-19 RX ADMIN — MONTELUKAST 10 MILLIGRAM(S): 4 TABLET, CHEWABLE ORAL at 21:51

## 2021-11-19 RX ADMIN — Medication 50 MILLILITER(S): at 07:28

## 2021-11-19 RX ADMIN — SODIUM POLYSTYRENE SULFONATE 15 GRAM(S): 4.1 POWDER, FOR SUSPENSION ORAL at 14:43

## 2021-11-19 RX ADMIN — OXYCODONE AND ACETAMINOPHEN 1 TABLET(S): 5; 325 TABLET ORAL at 07:32

## 2021-11-19 RX ADMIN — Medication 100 MILLIGRAM(S): at 10:38

## 2021-11-19 NOTE — CONSULT NOTE ADULT - SUBJECTIVE AND OBJECTIVE BOX
CHIEF COMPLAINT:  Patient is a 86y old  Female who presents with a chief complaint of fluid overload, right ankle fracture (19 Nov 2021 11:09)      HPI:  86 years old female with h/o COPD/asthma ( on 3L NC), DM2, HTN, SHAD on CPAP, lymphedema on bumex, morbid obesity (BMI 52.7) present to ED with complain of mechanical fall. Patient said she rolled out of bed this morning and then she complain of right knee pain. Reported always SOB. No chest pain, fever, chills, N/V/D.   Hemodynamically stable, afebrile and sat 94% at 3L NC. WBC 13.27, Hb 9.3, Plt 237. K 6--> 5.5 after cocktails. Cr 1.06, glucose 151, . EKG with NSR, VR 82, QTc 440, no T wave changes of hyperkalemia. CXR image reviewed, appear to have pul congestion. Received IV lasix 40mg       ALLERGIES:  No Known Allergies      Home Medications:  Advair Diskus 250 mcg-50 mcg inhalation powder: 1 puff(s) inhaled 2 times a day (30 Oct 2019 16:54)  alendronate 70 mg oral tablet: 1 tab(s) orally once a week (30 Oct 2019 16:54)  amLODIPine 10 mg oral tablet: 1 tab(s) orally once a day (30 Oct 2019 16:54)  ascorbic acid 500 mg oral capsule: 1 cap(s) orally once a day (30 Oct 2019 16:54)  azelastine 137 mcg/inh (0.1%) nasal spray: 2 spray(s) nasal 2 times a day (30 Oct 2019 16:54)  cholecalciferol 2000 intl units oral tablet: 1 tab(s) orally once a day (30 Oct 2019 16:54)  glimepiride 2 mg oral tablet: 1 tab(s) orally once a day (30 Oct 2019 16:54)  hydrALAZINE 100 mg oral tablet: 1 tab(s) orally 3 times a day (06 Nov 2019 13:41)  ipratropium-albuterol 0.5 mg-2.5 mg/3 mLinhalation solution: 3 milliliter(s) inhaled every 6 hours, As Needed (04 Jun 2021 10:05)  metoprolol succinate 100 mg oral tablet, extended release: 1 tab(s) orally once a day (30 Oct 2019 16:54)  montelukast 10 mg oral tablet: 1 tab(s) orally once a day (30 Oct 2019 16:54)  Neurontin 100 mg oral capsule: 1 cap(s) orally 3 times a day (29 Nov 2020 18:19)  ocular lubricant ophthalmic solution: 1 drop(s) to each affected eye 3 times a day, As needed, Dry Eyes (06 Nov 2019 13:41)  polyethylene glycol 3350 oral powder for reconstitution: 17 gram(s) orally once a day (06 Nov 2019 13:41)  ProAir HFA 90 mcg/inh inhalation aerosol: 2 puff(s) inhaled 4 times a day, As Needed (30 Oct 2019 16:54)  rosuvastatin 10 mg oral tablet: 1 tab(s) orally once a day (30 Oct 2019 16:54)    PAST MEDICAL & SURGICAL HISTORY:  HTN (hypertension)    HLD (hyperlipidemia)    Asthma    DM (diabetes mellitus)    GERD (gastroesophageal reflux disease)    Lymphedema    S/P knee replacement          FAMILY HISTORY:  No pertinent family history in first degree relatives        SOCIAL HISTORY:    REVIEW OF SYSTEMS:  General:  No wt loss, fevers, chills, night sweats  Eyes:  Good vision, no reported pain  ENT:  No sore throat, pain, runny nose, dysphagia  CV:  No pain, palpitations, hypo/hypertension  Resp:  No dyspnea, cough, tachypnea, wheezing  GI:  No pain, nausea, vomiting, diarrhea, constipation  :  No pain, bleeding, incontinence, nocturia  Muscle:  No pain, weakness  Breast:  No pain, abscess, mass, discharge  Neuro:  No weakness, tingling, memory problems  Psych:  No fatigue, insomnia, mood problems, depression  Endocrine:  No polyuria, polydipsia, cold/heat intolerance  Heme:  No petechiae, ecchymosis, easy bruisability  Skin:  No rash, edema    PHYSICAL EXAM:  Vital Signs:  Vital Signs Last 24 Hrs  T(C): 36.7 (19 Nov 2021 10:14), Max: 36.9 (19 Nov 2021 06:40)  T(F): 98.1 (19 Nov 2021 10:14), Max: 98.4 (19 Nov 2021 06:40)  HR: 82 (19 Nov 2021 10:14) (78 - 92)  BP: 171/72 (19 Nov 2021 10:14) (124/67 - 171/72)  RR: 18 (19 Nov 2021 10:14) (16 - 20)  SpO2: 94% (19 Nov 2021 10:14) (90% - 99%)    Tele: SR    Constitutional: well developed, normal appearance, well groomed, well nourished, no deformities and no acute distress.   Eyes: the conjunctiva exhibited no abnormalities and the eyelids demonstrated no xanthelasmas.   HEENT: normal oral mucosa, no oral pallor and no oral cyanosis.   Neck: normal jugular venous A waves present, normal jugular venous V waves present and no jugular venous hernandez A waves.   Pulmonary: no respiratory distress, normal respiratory rhythm and effort, no accessory muscle use and lungs were clear to auscultation bilaterally.   Cardiovascular: heart rate and rhythm were normal, normal S1 and S2 and no murmur, gallop, rub, heave or thrill are present.   Abdomen: soft, non-tender.  Musculoskeletal: the gait could not be assessed.   Extremities: no clubbing of the fingernails, no localized cyanosis, no petechial hemorrhages and no ischemic changes.   Skin: normal skin color and pigmentation, no rash, no venous stasis, no skin lesions, no skin ulcer and no xanthoma was observed.   Psychiatric: oriented to person, place, and time, the affect was normal, the mood was normal and not feeling anxious.      LABORATORY:                          9.3    13.27 )-----------( 237      ( 19 Nov 2021 04:41 )             32.2     11-19    134<L>  |  93<L>  |  34<H>  ----------------------------<  151<H>  5.5<H>   |  38<H>  |  1.06    Ca    8.7      19 Nov 2021 10:15    TPro  7.8  /  Alb  2.6<L>  /  TBili  0.2  /  DBili  x   /  AST  28  /  ALT  15  /  AlkPhos  62  11-19      CARDIAC MARKERS ( 19 Nov 2021 04:41 )  x     / x     / 117 U/L / x     / x            LIVER FUNCTIONS - ( 19 Nov 2021 04:41 )  Alb: 2.6 g/dL / Pro: 7.8 gm/dL / ALK PHOS: 62 U/L / ALT: 15 U/L / AST: 28 U/L / GGT: x               BNPSerum Pro-Brain Natriuretic Peptide: 809 pg/mL (11-19-21 @ 04:41)      IMAGING:    ASSESSMENT:  86 years old female with h/o COPD/asthma ( on 3L NC), DM2, HTN, SHAD on CPAP, lymphedema on bumex, morbid obesity (BMI 52.7) present to ED with complain of mechanical fall. Patient said she rolled out of bed this morning and then she complain of right knee pain. Reported always SOB. No chest pain, fever, chills, N/V/D.   Hemodynamically stable, afebrile and sat 94% at 3L NC. WBC 13.27, Hb 9.3, Plt 237. K 6--> 5.5 after cocktails. Cr 1.06, glucose 151, . EKG with NSR, VR 82, QTc 440, no T wave changes of hyperkalemia. CXR image reviewed, appear to have pul congestion. Received IV lasix 40mg     PLAN:       amLODIPine   Tablet 10 milliGRAM(s) Oral daily  atorvastatin 40 milliGRAM(s) Oral at bedtime  budesonide 160 MICROgram(s)/formoterol 4.5 MICROgram(s) Inhaler 2 Puff(s) Inhalation two times a day  cholecalciferol 2000 Unit(s) Oral daily  enoxaparin Injectable 40 milliGRAM(s) SubCutaneous every 12 hours  furosemide   Injectable 40 milliGRAM(s) IV Push two times a day  gabapentin 100 milliGRAM(s) Oral three times a day  glucagon  Injectable 1 milliGRAM(s) IntraMuscular once  hydrALAZINE 100 milliGRAM(s) Oral three times a day  insulin lispro (ADMELOG) corrective regimen sliding scale   SubCutaneous every 6 hours  metoprolol succinate  milliGRAM(s) Oral daily  montelukast 10 milliGRAM(s) Oral at bedtime  oxycodone    5 mG/acetaminophen 325 mG 1 Tablet(s) Oral once  polyethylene glycol 3350 17 Gram(s) Oral daily  sodium polystyrene sulfonate Suspension 15 Gram(s) Oral once      Erasmo Briceno MD, FACC, FASE, FASNC, FACP  Director, Heart Failure Services  [facility]  , Department of Cardiology  [facility]     CHIEF COMPLAINT:  Patient is a 86y old  Female who presents with a chief complaint of fluid overload, right ankle fracture (19 Nov 2021 11:09)      HPI:  86 years old female with h/o COPD/asthma ( on 3L NC), DM2, HTN, SHAD on CPAP, lymphedema on bumex, morbid obesity (BMI 52.7) present to ED with complain of mechanical fall. Patient said she rolled out of bed this morning and then she complain of right knee pain. Reported always SOB. No chest pain, fever, chills, N/V/D.   Hemodynamically stable, afebrile and sat 94% at 3L NC. WBC 13.27, Hb 9.3, Plt 237. K 6--> 5.5 after cocktails. Cr 1.06, glucose 151, . EKG with NSR, VR 82, QTc 440, no T wave changes of hyperkalemia. CXR image reviewed, appear to have pul congestion. Received IV lasix 40mg       ALLERGIES:  No Known Allergies      Home Medications:  Advair Diskus 250 mcg-50 mcg inhalation powder: 1 puff(s) inhaled 2 times a day (30 Oct 2019 16:54)  alendronate 70 mg oral tablet: 1 tab(s) orally once a week (30 Oct 2019 16:54)  amLODIPine 10 mg oral tablet: 1 tab(s) orally once a day (30 Oct 2019 16:54)  ascorbic acid 500 mg oral capsule: 1 cap(s) orally once a day (30 Oct 2019 16:54)  azelastine 137 mcg/inh (0.1%) nasal spray: 2 spray(s) nasal 2 times a day (30 Oct 2019 16:54)  cholecalciferol 2000 intl units oral tablet: 1 tab(s) orally once a day (30 Oct 2019 16:54)  glimepiride 2 mg oral tablet: 1 tab(s) orally once a day (30 Oct 2019 16:54)  hydrALAZINE 100 mg oral tablet: 1 tab(s) orally 3 times a day (06 Nov 2019 13:41)  ipratropium-albuterol 0.5 mg-2.5 mg/3 mLinhalation solution: 3 milliliter(s) inhaled every 6 hours, As Needed (04 Jun 2021 10:05)  metoprolol succinate 100 mg oral tablet, extended release: 1 tab(s) orally once a day (30 Oct 2019 16:54)  montelukast 10 mg oral tablet: 1 tab(s) orally once a day (30 Oct 2019 16:54)  Neurontin 100 mg oral capsule: 1 cap(s) orally 3 times a day (29 Nov 2020 18:19)  ocular lubricant ophthalmic solution: 1 drop(s) to each affected eye 3 times a day, As needed, Dry Eyes (06 Nov 2019 13:41)  polyethylene glycol 3350 oral powder for reconstitution: 17 gram(s) orally once a day (06 Nov 2019 13:41)  ProAir HFA 90 mcg/inh inhalation aerosol: 2 puff(s) inhaled 4 times a day, As Needed (30 Oct 2019 16:54)  rosuvastatin 10 mg oral tablet: 1 tab(s) orally once a day (30 Oct 2019 16:54)    PAST MEDICAL & SURGICAL HISTORY:  HTN (hypertension)    HLD (hyperlipidemia)    Asthma    DM (diabetes mellitus)    GERD (gastroesophageal reflux disease)    Lymphedema    S/P knee replacement          FAMILY HISTORY:  No pertinent family history in first degree relatives        SOCIAL HISTORY:    REVIEW OF SYSTEMS:  General:  No wt loss, fevers, chills, night sweats  Eyes:  Good vision, no reported pain  ENT:  No sore throat, pain, runny nose, dysphagia  CV:  No pain, palpitations, hypo/hypertension  Resp:  No dyspnea, cough, tachypnea, wheezing  GI:  No pain, nausea, vomiting, diarrhea, constipation  :  No pain, bleeding, incontinence, nocturia  Muscle:  No pain, weakness  Breast:  No pain, abscess, mass, discharge  Neuro:  No weakness, tingling, memory problems  Psych:  No fatigue, insomnia, mood problems, depression  Endocrine:  No polyuria, polydipsia, cold/heat intolerance  Heme:  No petechiae, ecchymosis, easy bruisability  Skin:  No rash, edema    PHYSICAL EXAM:  Vital Signs:  Vital Signs Last 24 Hrs  T(C): 36.7 (19 Nov 2021 10:14), Max: 36.9 (19 Nov 2021 06:40)  T(F): 98.1 (19 Nov 2021 10:14), Max: 98.4 (19 Nov 2021 06:40)  HR: 82 (19 Nov 2021 10:14) (78 - 92)  BP: 171/72 (19 Nov 2021 10:14) (124/67 - 171/72)  RR: 18 (19 Nov 2021 10:14) (16 - 20)  SpO2: 94% (19 Nov 2021 10:14) (90% - 99%)    Tele: SR    Constitutional: well developed, normal appearance, well groomed, well nourished, no deformities and no acute distress.   Eyes: the conjunctiva exhibited no abnormalities and the eyelids demonstrated no xanthelasmas.   HEENT: normal oral mucosa, no oral pallor and no oral cyanosis.   Neck: normal jugular venous A waves present, normal jugular venous V waves present and no jugular venous hernandez A waves.   Pulmonary: no respiratory distress, normal respiratory rhythm and effort, no accessory muscle use and lungs were clear to auscultation bilaterally.   Cardiovascular: heart rate and rhythm were normal, normal S1 and S2 and no murmur, gallop, rub, heave or thrill are present.   Abdomen: soft, non-tender.  Musculoskeletal: the gait could not be assessed.   Extremities: no clubbing of the fingernails, no localized cyanosis, no petechial hemorrhages and no ischemic changes.   Skin: normal skin color and pigmentation, no rash, no venous stasis, no skin lesions, no skin ulcer and no xanthoma was observed.       LABORATORY:                          9.3    13.27 )-----------( 237      ( 19 Nov 2021 04:41 )             32.2     11-19    134<L>  |  93<L>  |  34<H>  ----------------------------<  151<H>  5.5<H>   |  38<H>  |  1.06    Ca    8.7      19 Nov 2021 10:15    TPro  7.8  /  Alb  2.6<L>  /  TBili  0.2  /  DBili  x   /  AST  28  /  ALT  15  /  AlkPhos  62  11-19      CARDIAC MARKERS ( 19 Nov 2021 04:41 )  x     / x     / 117 U/L / x     / x            LIVER FUNCTIONS - ( 19 Nov 2021 04:41 )  Alb: 2.6 g/dL / Pro: 7.8 gm/dL / ALK PHOS: 62 U/L / ALT: 15 U/L / AST: 28 U/L / GGT: x               BNPSerum Pro-Brain Natriuretic Peptide: 809 pg/mL (11-19-21 @ 04:41)      IMAGING:    ASSESSMENT:  86 years old female with h/o COPD/asthma ( on 3L NC), DM2, HTN, SHAD on CPAP, lymphedema on bumex, morbid obesity (BMI 52.7) present to ED with complain of mechanical fall. Patient said she rolled out of bed this morning and then she complain of right knee pain. Reported always SOB. No chest pain, fever, chills, N/V/D.   Hemodynamically stable, afebrile and sat 94% at 3L NC. WBC 13.27, Hb 9.3, Plt 237. K 6--> 5.5 after cocktails. Cr 1.06, glucose 151, . EKG with NSR, VR 82, QTc 440, no T wave changes of hyperkalemia. CXR image reviewed, appear to have pul congestion. Received IV lasix 40mg     PLAN:       amLODIPine   Tablet 10 milliGRAM(s) Oral daily  atorvastatin 40 milliGRAM(s) Oral at bedtime  budesonide 160 MICROgram(s)/formoterol 4.5 MICROgram(s) Inhaler 2 Puff(s) Inhalation two times a day  cholecalciferol 2000 Unit(s) Oral daily  enoxaparin Injectable 40 milliGRAM(s) SubCutaneous every 12 hours  furosemide   Injectable 40 milliGRAM(s) IV Push two times a day  gabapentin 100 milliGRAM(s) Oral three times a day  glucagon  Injectable 1 milliGRAM(s) IntraMuscular once  hydrALAZINE 100 milliGRAM(s) Oral three times a day  insulin lispro (ADMELOG) corrective regimen sliding scale   SubCutaneous every 6 hours  metoprolol succinate  milliGRAM(s) Oral daily  montelukast 10 milliGRAM(s) Oral at bedtime  oxycodone    5 mG/acetaminophen 325 mG 1 Tablet(s) Oral once  polyethylene glycol 3350 17 Gram(s) Oral daily  sodium polystyrene sulfonate Suspension 15 Gram(s) Oral once    Tele  echo,  ortho care.  supportive measures.  f/u labs    Erasmo Briceno MD, FACC, FASE, FASNC, FACP  Director, Heart Failure Services  [facility]  , Department of Cardiology  [facility]     CHIEF COMPLAINT:  Patient is a 86y old  Female who presents with a chief complaint of fluid overload, right ankle fracture (19 Nov 2021 11:09)      HPI:  86-year-old female with obesity, obstructive sleep apnea on CPAP, hypertension, type 2 diabetes, COPD, lymphedema, status post fall and knee fracture.  Seen resting comfortably in bed earlier today no acute distress.  Given IV Lasix for element of pulmonary edema and elevated BNP.    ALLERGIES:  No Known Allergies      Home Medications:  Advair Diskus 250 mcg-50 mcg inhalation powder: 1 puff(s) inhaled 2 times a day (30 Oct 2019 16:54)  alendronate 70 mg oral tablet: 1 tab(s) orally once a week (30 Oct 2019 16:54)  amLODIPine 10 mg oral tablet: 1 tab(s) orally once a day (30 Oct 2019 16:54)  ascorbic acid 500 mg oral capsule: 1 cap(s) orally once a day (30 Oct 2019 16:54)  azelastine 137 mcg/inh (0.1%) nasal spray: 2 spray(s) nasal 2 times a day (30 Oct 2019 16:54)  cholecalciferol 2000 intl units oral tablet: 1 tab(s) orally once a day (30 Oct 2019 16:54)  glimepiride 2 mg oral tablet: 1 tab(s) orally once a day (30 Oct 2019 16:54)  hydrALAZINE 100 mg oral tablet: 1 tab(s) orally 3 times a day (06 Nov 2019 13:41)  ipratropium-albuterol 0.5 mg-2.5 mg/3 mLinhalation solution: 3 milliliter(s) inhaled every 6 hours, As Needed (04 Jun 2021 10:05)  metoprolol succinate 100 mg oral tablet, extended release: 1 tab(s) orally once a day (30 Oct 2019 16:54)  montelukast 10 mg oral tablet: 1 tab(s) orally once a day (30 Oct 2019 16:54)  Neurontin 100 mg oral capsule: 1 cap(s) orally 3 times a day (29 Nov 2020 18:19)  ocular lubricant ophthalmic solution: 1 drop(s) to each affected eye 3 times a day, As needed, Dry Eyes (06 Nov 2019 13:41)  polyethylene glycol 3350 oral powder for reconstitution: 17 gram(s) orally once a day (06 Nov 2019 13:41)  ProAir HFA 90 mcg/inh inhalation aerosol: 2 puff(s) inhaled 4 times a day, As Needed (30 Oct 2019 16:54)  rosuvastatin 10 mg oral tablet: 1 tab(s) orally once a day (30 Oct 2019 16:54)    PAST MEDICAL & SURGICAL HISTORY:  HTN (hypertension)    HLD (hyperlipidemia)    Asthma    DM (diabetes mellitus)    GERD (gastroesophageal reflux disease)    Lymphedema    S/P knee replacement          FAMILY HISTORY:  No pertinent family history in first degree relatives        SOCIAL HISTORY:  no reported tobacco    REVIEW OF SYSTEMS:  General:  No wt loss, fevers, chills, night sweats  Eyes:  Good vision, no reported pain  ENT:  No sore throat, pain, runny nose, dysphagia  CV:  No pain, palpitations, hypo/hypertension  Resp:  No dyspnea, cough, tachypnea, wheezing  GI:  No pain, nausea, vomiting, diarrhea, constipation  :  No pain, bleeding, incontinence, nocturia  Muscle:  No pain, weakness  Breast:  No pain, abscess, mass, discharge  Neuro:  No weakness, tingling, memory problems  Psych:  No fatigue, insomnia, mood problems, depression  Endocrine:  No polyuria, polydipsia, cold/heat intolerance  Heme:  No petechiae, ecchymosis, easy bruisability  Skin:  No rash, edema    PHYSICAL EXAM:  Vital Signs:  Vital Signs Last 24 Hrs  T(C): 36.7 (19 Nov 2021 10:14), Max: 36.9 (19 Nov 2021 06:40)  T(F): 98.1 (19 Nov 2021 10:14), Max: 98.4 (19 Nov 2021 06:40)  HR: 82 (19 Nov 2021 10:14) (78 - 92)  BP: 171/72 (19 Nov 2021 10:14) (124/67 - 171/72)  RR: 18 (19 Nov 2021 10:14) (16 - 20)  SpO2: 94% (19 Nov 2021 10:14) (90% - 99%)    Tele: SR    Constitutional:  no acute distress.   HEENT: normal oral mucosa, no oral pallor and no oral cyanosis.   Neck: normal jugular venous A waves present, normal jugular venous V waves present and no jugular venous hernandez A waves.   Pulmonary: no respiratory distress, normal respiratory rhythm and effort, no accessory muscle use.  Cardiovascular: heart rate and rhythm were normal, normal S1 and S2 and no murmur.  Abdomen: soft, non-tender.  Musculoskeletal: the gait could not be assessed.   Extremities: no clubbing of the fingernails, no localized cyanosis, no petechial hemorrhages and no ischemic changes.   Skin: normal skin color and pigmentation, no rash, no venous stasis, no skin lesions.      LABORATORY:                          9.3    13.27 )-----------( 237      ( 19 Nov 2021 04:41 )             32.2     11-19    134<L>  |  93<L>  |  34<H>  ----------------------------<  151<H>  5.5<H>   |  38<H>  |  1.06    Ca    8.7      19 Nov 2021 10:15    TPro  7.8  /  Alb  2.6<L>  /  TBili  0.2  /  DBili  x   /  AST  28  /  ALT  15  /  AlkPhos  62  11-19      CARDIAC MARKERS ( 19 Nov 2021 04:41 )  x     / x     / 117 U/L / x     / x            LIVER FUNCTIONS - ( 19 Nov 2021 04:41 )  Alb: 2.6 g/dL / Pro: 7.8 gm/dL / ALK PHOS: 62 U/L / ALT: 15 U/L / AST: 28 U/L / GGT: x               BNPSerum Pro-Brain Natriuretic Peptide: 809 pg/mL (11-19-21 @ 04:41)      IMAGING:  < from: TTE Echo Complete w/o Contrast w/ Doppler (11.19.21 @ 13:56) >  Summary:   1. Left ventricular ejection fraction, by visual estimation, is 60 to 65%.   2. Technically difficult study.   3. Normal global left ventricular systolic function.   4. Normal left ventricular internal cavity size.   5. Spectral Doppler shows impaired relaxation pattern of left ventricular myocardial filling (Grade I diastolic dysfunction).   6. There is mild concentric left ventricular hypertrophy.   7. Normal right ventricular size and function.   8. Normal left atrial size.   9. Normal right atrial size.  10. There is no evidence of pericardial effusion.  11. Mild thickening and calcification of the anterior and posterior mitral valve leaflets.  12. Moderate mitral annular calcification.  13. Degenerative tricuspidvalve.  14. Mild aortic valve stenosis.  15. Mitral valve mean gradient is 4.7 mmHg consistent with mild mitral stenosis.  16. Peak transaortic gradient equals 21.7 mmHg, mean transaortic gradient equals 10.9 mmHg, the calculated aortic valve area equals 1.69 cm² by the continuity equation consistent with mild aortic stenosis.  17. There is mild aortic root calcification.    < end of copied text >    ASSESSMENT:  86-year-old female with obesity, obstructive sleep apnea on CPAP, hypertension, type 2 diabetes, COPD, lymphedema, status post fall and knee fracture.  Seen resting comfortably in bed earlier today no acute distress.  Given IV Lasix for element of pulmonary edema and elevated BNP. Possible element of acute on chronic heart failure preserved EF.    PLAN:     Continue telemetry cardiac monitoring.  Echo ordered to assess current LV function and valvular status.  Continue IV Lasix 40 mg twice daily to keep fluid negative balance.  Continue metoprolol  mg daily, hydralazine 100 mg 3 times daily, amlodipine 10 mg daily for blood pressure control.  Keep on glycemic management of type 2 diabetes with insulin.  DVT prevention with Lovenox.  Continue atorvastatin 40 mg daily.  Orthopedic care and supportive measures.  Follow-up labs.    Erasmo Briceno MD, FACC, CHENG, KATHERINE, FACP  Director, Heart Failure Services  [facility]  , Department of Cardiology  [employer] [facility] [employer]     CHIEF COMPLAINT:  Patient is a 86y old  Female who presents with a chief complaint of fluid overload, right ankle fracture (19 Nov 2021 11:09)      HPI:  86-year-old female with obesity, obstructive sleep apnea on CPAP, hypertension, type 2 diabetes, COPD, lymphedema, status post fall and knee fracture. Right knee proximal tibia periprosthetic fracture s/p R TKA ('09): RLE in bulky marshall knee immobilizer.  Seen resting comfortably in bed earlier today no acute distress.  Given IV Lasix for element of pulmonary edema and elevated BNP.    ALLERGIES:  No Known Allergies      Home Medications:  Advair Diskus 250 mcg-50 mcg inhalation powder: 1 puff(s) inhaled 2 times a day (30 Oct 2019 16:54)  alendronate 70 mg oral tablet: 1 tab(s) orally once a week (30 Oct 2019 16:54)  amLODIPine 10 mg oral tablet: 1 tab(s) orally once a day (30 Oct 2019 16:54)  ascorbic acid 500 mg oral capsule: 1 cap(s) orally once a day (30 Oct 2019 16:54)  azelastine 137 mcg/inh (0.1%) nasal spray: 2 spray(s) nasal 2 times a day (30 Oct 2019 16:54)  cholecalciferol 2000 intl units oral tablet: 1 tab(s) orally once a day (30 Oct 2019 16:54)  glimepiride 2 mg oral tablet: 1 tab(s) orally once a day (30 Oct 2019 16:54)  hydrALAZINE 100 mg oral tablet: 1 tab(s) orally 3 times a day (06 Nov 2019 13:41)  ipratropium-albuterol 0.5 mg-2.5 mg/3 mLinhalation solution: 3 milliliter(s) inhaled every 6 hours, As Needed (04 Jun 2021 10:05)  metoprolol succinate 100 mg oral tablet, extended release: 1 tab(s) orally once a day (30 Oct 2019 16:54)  montelukast 10 mg oral tablet: 1 tab(s) orally once a day (30 Oct 2019 16:54)  Neurontin 100 mg oral capsule: 1 cap(s) orally 3 times a day (29 Nov 2020 18:19)  ocular lubricant ophthalmic solution: 1 drop(s) to each affected eye 3 times a day, As needed, Dry Eyes (06 Nov 2019 13:41)  polyethylene glycol 3350 oral powder for reconstitution: 17 gram(s) orally once a day (06 Nov 2019 13:41)  ProAir HFA 90 mcg/inh inhalation aerosol: 2 puff(s) inhaled 4 times a day, As Needed (30 Oct 2019 16:54)  rosuvastatin 10 mg oral tablet: 1 tab(s) orally once a day (30 Oct 2019 16:54)    PAST MEDICAL & SURGICAL HISTORY:  HTN (hypertension)    HLD (hyperlipidemia)    Asthma    DM (diabetes mellitus)    GERD (gastroesophageal reflux disease)    Lymphedema    S/P knee replacement          FAMILY HISTORY:  No pertinent family history in first degree relatives        SOCIAL HISTORY:  no reported tobacco    REVIEW OF SYSTEMS:  General:  No wt loss, fevers, chills, night sweats  Eyes:  Good vision, no reported pain  ENT:  No sore throat, pain, runny nose, dysphagia  CV:  No pain, palpitations, hypo/hypertension  Resp:  No dyspnea, cough, tachypnea, wheezing  GI:  No pain, nausea, vomiting, diarrhea, constipation  :  No pain, bleeding, incontinence, nocturia  Muscle:  No pain, weakness  Breast:  No pain, abscess, mass, discharge  Neuro:  No weakness, tingling, memory problems  Psych:  No fatigue, insomnia, mood problems, depression  Endocrine:  No polyuria, polydipsia, cold/heat intolerance  Heme:  No petechiae, ecchymosis, easy bruisability  Skin:  No rash, chronic lymphedema.    PHYSICAL EXAM:  Vital Signs:  Vital Signs Last 24 Hrs  T(C): 36.7 (19 Nov 2021 10:14), Max: 36.9 (19 Nov 2021 06:40)  T(F): 98.1 (19 Nov 2021 10:14), Max: 98.4 (19 Nov 2021 06:40)  HR: 82 (19 Nov 2021 10:14) (78 - 92)  BP: 171/72 (19 Nov 2021 10:14) (124/67 - 171/72)  RR: 18 (19 Nov 2021 10:14) (16 - 20)  SpO2: 94% (19 Nov 2021 10:14) (90% - 99%)    Tele: SR    Constitutional:  no acute distress.   HEENT: normal oral mucosa, no oral pallor and no oral cyanosis.   Neck: normal jugular venous A waves present, normal jugular venous V waves present and no jugular venous hernandez A waves.   Pulmonary: no respiratory distress, normal respiratory rhythm and effort, no accessory muscle use.  Cardiovascular: heart rate and rhythm were normal, normal S1 and S2 and no murmur.  Abdomen: soft, non-tender.  Musculoskeletal: the gait could not be assessed.   Extremities: no clubbing of the fingernails, no localized cyanosis, no petechial hemorrhages and no ischemic changes.   Skin: Chronic bilateral lymphedema of legs.    LABORATORY:                          9.3    13.27 )-----------( 237      ( 19 Nov 2021 04:41 )             32.2     11-19    134<L>  |  93<L>  |  34<H>  ----------------------------<  151<H>  5.5<H>   |  38<H>  |  1.06    Ca    8.7      19 Nov 2021 10:15    TPro  7.8  /  Alb  2.6<L>  /  TBili  0.2  /  DBili  x   /  AST  28  /  ALT  15  /  AlkPhos  62  11-19      CARDIAC MARKERS ( 19 Nov 2021 04:41 )  x     / x     / 117 U/L / x     / x            LIVER FUNCTIONS - ( 19 Nov 2021 04:41 )  Alb: 2.6 g/dL / Pro: 7.8 gm/dL / ALK PHOS: 62 U/L / ALT: 15 U/L / AST: 28 U/L / GGT: x               BNPSerum Pro-Brain Natriuretic Peptide: 809 pg/mL (11-19-21 @ 04:41)      IMAGING:  < from: TTE Echo Complete w/o Contrast w/ Doppler (11.19.21 @ 13:56) >  Summary:   1. Left ventricular ejection fraction, by visual estimation, is 60 to 65%.   2. Technically difficult study.   3. Normal global left ventricular systolic function.   4. Normal left ventricular internal cavity size.   5. Spectral Doppler shows impaired relaxation pattern of left ventricular myocardial filling (Grade I diastolic dysfunction).   6. There is mild concentric left ventricular hypertrophy.   7. Normal right ventricular size and function.   8. Normal left atrial size.   9. Normal right atrial size.  10. There is no evidence of pericardial effusion.  11. Mild thickening and calcification of the anterior and posterior mitral valve leaflets.  12. Moderate mitral annular calcification.  13. Degenerative tricuspidvalve.  14. Mild aortic valve stenosis.  15. Mitral valve mean gradient is 4.7 mmHg consistent with mild mitral stenosis.  16. Peak transaortic gradient equals 21.7 mmHg, mean transaortic gradient equals 10.9 mmHg, the calculated aortic valve area equals 1.69 cm² by the continuity equation consistent with mild aortic stenosis.  17. There is mild aortic root calcification.    < end of copied text >    ASSESSMENT:  86-year-old female with obesity, obstructive sleep apnea on CPAP, hypertension, type 2 diabetes, COPD, lymphedema, status post fall and knee fracture. Right knee proximal tibia periprosthetic fracture s/p R TKA ('09): RLE in bulky marshall knee immobilizer.  Seen resting comfortably in bed earlier today no acute distress.  Given IV Lasix for element of pulmonary edema and elevated BNP. Given IV Lasix for element of pulmonary edema and elevated BNP. Possible element of acute on chronic heart failure preserved EF.    PLAN:     Continue telemetry cardiac monitoring.  Continue IV Lasix 40 mg twice daily to keep fluid negative balance.  Continue metoprolol  mg daily, hydralazine 100 mg 3 times daily, amlodipine 10 mg daily for blood pressure control.  Keep on glycemic management of type 2 diabetes with insulin.  DVT prevention with Lovenox.  Continue atorvastatin 40 mg daily.  Orthopedic care and supportive measures.  Follow-up labs.    Erasmo Briceno MD, FACC, CHENG, KATHERINE, FACP  Director, Heart Failure Services  [facility]  , Department of Cardiology  [facility]

## 2021-11-19 NOTE — ED PROVIDER NOTE - WR ORDER STATUS 2
Physical Therapy Daily Treatment     Visit Count: 6   Plan of Care Dates: Initial: 8/22/2017 Through: 10/17/2017    Insurance Information: physical and speech therapy combined cap of $1980/$3700 per calendar year  Next Referring Provider Visit: not scheduled    Referred by: Lane Narayanan,*  Medical Diagnosis (from order):  172.9 (ICD-9-CM) - C79.9 (ICD-10-CM) - Metastatic melanoma (CMS/HCC)  Treatment Diagnosis: Shoulder Symptoms with Pain and Impaired Range of Motion  Insurance: 1. MEDICARE  2. [facility]    Date of Onset:7/28/2017 left axillary lymph node dissection  Diagnosis Precautions: history of cancer  Chart reviewed: Relevant co-morbidities, allergies, tests and medications:    Past Medical History:   Diagnosis Date   • Bradycardia 7/29/2017   • COPD (chronic obstructive pulmonary disease) (CMS/HCC) 5/6/2014   • Essential hypertension, benign 1992   • Malignant neoplasm of breast (female), unspecified site 1/6/2015    Right mastectomy 2/2015. Adjuvant chemo [facility] declined Anastrozole 1mg daily 3/1/15    • Osteoarthrosis, unspecified whether generalized or localized, pelvic region and thigh 3/10    Moderate-severe osteoarthritis right hip   • OSTEOPENIA 11/02, 8/05, 10/07,10/09, 10/12.   • Osteoporosis 6/2/2015    started Alendronate June 2015.   • Stroke (CMS/HCC) 1/8/2010   • Tobacco use disorder     1/2 ppd   • Unspecified transient cerebral ischemia 1/14/2010      Past Surgical History:   Procedure Laterality Date   • AXILLARY LYMPHADENECTOMY COMPLETE Left 07/28/2017    Lady   • MALIGNANT SKIN LESION EXCISION  07/06/2017    Left wide excision melanoma back with flap closure.  L axillary node bx x 2- Dr Garza   • MASTECTOMY Right Feb 2015    Right mastectomy   • TOTAL HIP REPLACEMENT  3/3/2011    [facility] Right Hip Replacement, Total        History of Present Illness: Patient is status post Left axillary lymph node dissection on 7/28/17 following melanoma of the back with positive left  axillary sentinel node biopsy.Upper back lesion was first biopsied in June 2017. Patient has decided not to do adjuvant therapy. She has had drains out since about 8/8 without complication. She feels she cannot reach as high, has pulling in left arm when reaching for things. She also has pain in left axilla. She has occasional tightness in upper back where melanoma was removed, \"it's no big deal.\"     SUBJECTIVE   Patient had mild soreness after last visit, but tolerable. She no longer has sharp pains in elbow region with reaching though can still feel it. Her pain continues at 2/10 with activity.     Initial Pain:  Intensity: Now: 0/10; Best: 0/10; Worst: 2/10 (in the last 2 weeks)  Location: intermittent left axillary pain    Functional limitations: reaching overhead, reaching for dog food, grooming hair, carrying things    OBJECTIVE   Hand Dominance: right    Observation:  Incision: axillary- surgical glue starting to fall off, mildly raised scar; no sign of infection or inflammation, no open areas      Back-not observed this date; previously well-healing incision left upper back; mildly dry; no sign of infection or inflammation, no open areas      Range of Motion (degrees) Norm Left Right left left Left   (supine) Left (standing) Left    Shoulder                    Date  Initial Initial 8/25 8/29 9/1 9/7 9/12   Flexion 170-180 112 167 122 133 before, 141 after treatment 135 before  150 active after   135 before, 148 after treatment 140   Abduction 170-180 91 170  115      Adduction 50-75 Within functional limits  Within functional limits         Internal Rotation   To T10 To T12        External  Rotation 80-90 80 75        standard testing positions unless otherwise noted; Key: ranges are reported in active range of motion unless noted as AA=active assistive or P=passive range of motion, * denotes pain         Strength (out of 5) Left Right   Date Initial Initial   Shoulder Flexion 4 4   Shoulder Internal  Rotation 5 5   Shoulder External Rotation 5 5   Elbow Flexion 5 5   Elbow Extension 5 5   standard testing positions unless otherwise noted,* denotes pain         Palpation:  Cording: moderate/marked cording left axilla, mild into upper medial left arm with mild reactivity to all  Upper back incision: mild scar tissue tightness mid scar, overall pretty good mobility      Initial Outcome Measures:   Disabilities of the Arm, Shoulder and Hand (DASH): 30 (scored 0-100; a higher score indicates greater disability)     Treatment     Therapeutic Exercise:   *Supine bilateral cane flexion Active Assisted Range of Motion x 10 with instructions to hold and relax into stretch for 10 seconds  *Star gazer stretch -instructions on techniques for self-scar mobilization in this position  *snow amy x 5-verbal review today  *abduction \"bye bye\" x 5 for cording - moderate reinstruction today to increase wrist active range (was moving from metacarpals more than wrist)      Manual Therapy:   Myofascial Release, cording releases to left axilla, lateral torso, upper arm in various positions of abduction with patient in supine and sidelying.  Most releases from axilla to upper arm.  Cording releases at elbow.  Axillary scar mobilization, minimal tenderness reported  Review of self-scar tissue mobilization left axilla  Inferior glides of glenohumeral joint    Home Program:  * above denotes home program issuance as well  *Supine bilateral cane flexion Active Assisted Range of Motion 5 seconds x 10  *Star gazer stretch x10  *snow amy x 10  *abduction \"bye bye\" x 5  *self scar mobilization as able    Plan for next session: continue with manual releases to promote increased shoulder range of motion.    ASSESSMENT   77 year old female presents to therapy with significant decline in prior level of function due to signs and symptoms consistent with decreased left shoulder range of motion, pain, scar tissue adhesions status post left axillary  lymph node dissection on 7/28/2017. Patient has cording present most prominent in left axilla that is likely contributing to decreased range of motion.     Today: Continued gradual improvement in range of motion left shoulder. Joint discomfort reported end range today versus axillary stretch therefore worked on inferior glides of glenohumeral joint to improve joint mobility. Improving cording at elbow though still present. Patient is approaching goal range of motion.    Goals:  To be obtained by end of this plan of care:  1. Patient independent with modified and progressed home exercise program.  2. Patient will decrease involved shoulder pain to 2/10  to aid in reaching activities.  3. Increase left shoulder flexion to >150 for grooming, overhead reaching.  4. Increase left shoulder abduction to >150 degrees for grooming, washing back of hair.  5. Patient will be educated in lymphedema precautions and skin protection.    PLAN   Frequency/Duration: 2 times per week for 8 weeks with tapering as the patient progresses  Skilled training and instruction for the following interventions:  Manual Therapy (01982)  Therapeutic Activity (16204)  Therapeutic Exercise (98783)  Heat/Cold (07321)    The plan of care and goals were established with the patient who concurs.  Patient has been given attendance policy at time of initial evaluation.      THERAPY DAILY BILLING   Primary Insurance: MEDICARE  Secondary Insurance: [facility]    Evaluation Procedures:  No evaluation codes were used on this date of service    Timed Procedures:  Manual Therapy, 31 minutes  Therapeutic Exercise, 8 minutes    Untimed Procedures:  No untimed codes were used on this date of service    Total Treatment Time: 39 minutes        G-Code:  G-Code Score ABN form  : Current Carrying/Moving/Handling Objects Limitation,  CJ - 20% to 39% impaired, limited or restricted  : Goal Carrying/Moving/Handling Objects Limitation,  CI - 1% to 19%  impaired, limited or restricted  Modifier based on outcome measure(s)/functional testing/clinical judgement as listed above    The referring provider's electronic or written signature on the evaluation authorizes the therapy plan of care and certifies the need for these services, furnished under this plan of care while under their care.  Physician Signature on file.       Performed

## 2021-11-19 NOTE — CONSULT NOTE ADULT - SUBJECTIVE AND OBJECTIVE BOX
HPI:  85 yo F with PMHx of COPD/Asthma (on 3L NC at home), DM2, Morbid Obesity, Lymphedema on Bumex, SHAD on CPAP, L TKA (Dr. Castro '12), R TKA (Dr. Castro, '09) who presents to the ED after sustaining a mechanical fall. Pt states she had a fall out of bed this AM. She had pain of the right knee and was unable to ambulate. Pt is a minimal ambulator per pt daughter, uses walker to get to bathroom. Pt states she has SOB. Denies cp, fever, chills, n/v. Pt denies numbness or tingling. Denies other orthopedic injuries.     PAST MEDICAL & SURGICAL HISTORY:  HTN (hypertension)    HLD (hyperlipidemia)    Asthma    DM (diabetes mellitus)    GERD (gastroesophageal reflux disease)    Lymphedema    S/P knee replacement    Home Medications:  Advair Diskus 250 mcg-50 mcg inhalation powder: 1 puff(s) inhaled 2 times a day (30 Oct 2019 16:54)  alendronate 70 mg oral tablet: 1 tab(s) orally once a week (30 Oct 2019 16:54)  amLODIPine 10 mg oral tablet: 1 tab(s) orally once a day (30 Oct 2019 16:54)  ascorbic acid 500 mg oral capsule: 1 cap(s) orally once a day (30 Oct 2019 16:54)  azelastine 137 mcg/inh (0.1%) nasal spray: 2 spray(s) nasal 2 times a day (30 Oct 2019 16:54)  cholecalciferol 2000 intl units oral tablet: 1 tab(s) orally once a day (30 Oct 2019 16:54)  glimepiride 2 mg oral tablet: 1 tab(s) orally once a day (30 Oct 2019 16:54)  hydrALAZINE 100 mg oral tablet: 1 tab(s) orally 3 times a day (06 Nov 2019 13:41)  ipratropium-albuterol 0.5 mg-2.5 mg/3 mLinhalation solution: 3 milliliter(s) inhaled every 6 hours, As Needed (04 Jun 2021 10:05)  metoprolol succinate 100 mg oral tablet, extended release: 1 tab(s) orally once a day (30 Oct 2019 16:54)  montelukast 10 mg oral tablet: 1 tab(s) orally once a day (30 Oct 2019 16:54)  Neurontin 100 mg oral capsule: 1 cap(s) orally 3 times a day (29 Nov 2020 18:19)  ocular lubricant ophthalmic solution: 1 drop(s) to each affected eye 3 times a day, As needed, Dry Eyes (06 Nov 2019 13:41)  polyethylene glycol 3350 oral powder for reconstitution: 17 gram(s) orally once a day (06 Nov 2019 13:41)  ProAir HFA 90 mcg/inh inhalation aerosol: 2 puff(s) inhaled 4 times a day, As Needed (30 Oct 2019 16:54)  rosuvastatin 10 mg oral tablet: 1 tab(s) orally once a day (30 Oct 2019 16:54)    Allergies    No Known Allergies    Intolerances    Vital Signs Last 24 Hrs  T(C): 36.7 (19 Nov 2021 10:14), Max: 36.9 (19 Nov 2021 06:40)  T(F): 98.1 (19 Nov 2021 10:14), Max: 98.4 (19 Nov 2021 06:40)  HR: 82 (19 Nov 2021 10:14) (78 - 92)  BP: 171/72 (19 Nov 2021 10:14) (124/67 - 171/72)  BP(mean): --  RR: 18 (19 Nov 2021 10:14) (16 - 20)  SpO2: 94% (19 Nov 2021 10:14) (90% - 99%)    PE:  Gen: NAD when not moving, mild dementia  RLE:  Skin intact, surgical scar visible, well healed  Large obese extremity, no obvious deformity noted  Compartments soft and compressible  Severe lymphedema bilaterally  No calf ttp  Severe pain of the RLE, about the knee and proximal tibia  +EHL/FHL/Gsc/TA  SILT L2-S1, mildly diminished secondary to lymphedema  Difficult to appreciate pulses secondary to lymphedema    Secondary Survey:  Unknown head trauma, no obvious signs  No ttp along c/t/l/s spine  No ttp along bony prominences diffusely, other than mentioned above  +A/PROM intact in all joints diffusely, other than mentioned above  SILT/NVI diffusely  Compartments soft and compressible diffusely    XR R Knee: demonstrating R knee proximal tibia periprosthetic fracture, prosthesis in position with no evidence of subsidence

## 2021-11-19 NOTE — ED ADULT NURSE NOTE - OBJECTIVE STATEMENT
pt complain of "too much" pain on the knee and right leg. s/p fall at home. pt states she slipped hit right knee. denies head trauma. h/o R knee replacement. on oxygen at 4 lpm. pt complain of "too much" pain on the knee and right leg. s/p fall at home. pt states she slipped hit right knee. denies head trauma. h/o R knee replacement. on oxygen at 4 lpm. on aspirin.

## 2021-11-19 NOTE — H&P ADULT - HISTORY OF PRESENT ILLNESS
86 years old female with h/o COPD/asthma ( on 3L NC), DM2, HTN, SHAD on CPAP, lymphedema on bumex, morbid obesity (BMI 52.7) present to ED with complain of mechanical fall. Patient said she rolled out of bed this morning and then she complain of right knee pain. Reported always SOB. No chest pain, fever, chills, N/V/D.   Hemodynamically stable, afebrile and sat 94% at 3L NC. WBC 13.27, Hb 9.3, Plt 237. K 6--> 5.5 after cocktails. Cr 1.06, glucose 151, . EKG with NSR, VR 82, QTc 440, no T wave changes of hyperkalemia. CXR image reviewed, appear to have pul congestion. Received IV lasix 40mg     SH: no toxic habits

## 2021-11-19 NOTE — ED PROVIDER NOTE - MUSCULOSKELETAL MINIMAL EXAM
Tender bilateral knees. Has no swelling, no erythema or warmth. FROM. Well healed surgical scars bilaterally.

## 2021-11-19 NOTE — ED PROVIDER NOTE - CLINICAL SUMMARY MEDICAL DECISION MAKING FREE TEXT BOX
Ddx: ro fracture/ pt states unable to walk  Plan: Cbc, cmp, cxr, pelvis, knee xray, pain control, likely admit

## 2021-11-19 NOTE — H&P ADULT - PROBLEM SELECTOR PLAN 2
Mechanical fall with right periprosthetic knee fracture  Ortho is following  Currently still hypervolemic

## 2021-11-19 NOTE — H&P ADULT - NSHPPHYSICALEXAM_GEN_ALL_CORE
CONSTITUTIONAL: Well developed, large body habitus, alert and cooperative, no acute distress. BP-171/72 , HR-82 , RR- 18  EYES: PERRL, EOMI, no scleral icterus  ENT: Mucosa moist, tongue normal.   NECK: Neck supple, trachea midline, non-tender, no masses or thyromegaly.  CARDIAC: Normal S1 and S2. Regular rate and rhythms. Pedal edema++. Peripheral pulses intact  LUNGS: Equal air entry both lungs. Bibasilar crackles +. Increase respiratory effort.   ABDOMEN: Soft, nondistended, nontender. No guarding or rebound tenderness. No hepatomegaly or splenomegaly.   MUSCULOSKELETAL: Normocephalic, atraumatic. No clubbing or cyanosis. Right knee pain  NEUROLOGICAL: No gross motor or sensory deficits.   SKIN: no lesions or eruptions. Normal turgor

## 2021-11-19 NOTE — ED PROVIDER NOTE - OBJECTIVE STATEMENT
Pt is a 87 yo lady with a pmhx of HTn, DM, COPD on 3l NC who presents to the ED with fall. She says she rolled out of bed this morning, and hurt her knees, and now is unable to walk. Denies hitting her head, no chest pain, no hip pain. No abdominal pain. No other injuries. Has swelling of legs, but appears chronic.

## 2021-11-19 NOTE — ED ADULT NURSE NOTE - CHIEF COMPLAINT QUOTE
c/o right leg pain s/p mechanical fall. no LOC. on 4L nasal cannula oxygen at baseline  hx: HTN, DM, COPD, b/l knee replacements

## 2021-11-19 NOTE — H&P ADULT - NSHPREVIEWOFSYSTEMS_GEN_ALL_CORE
CONSTITUTIONAL: No fever, chills   EYES: No eye pain. No vision changes  ENT:  No hearing changes or pain, No nasal congestion.  Cardiovascular:  SOB, orthopnea  Respiratory:  SOB.   Gastrointestinal:  No nausea, vomiting or abdominal pain.  Genitourinary: No dysuria, frequency or hematuria  Musculoskeletal:  right knee pain  Skin:  No skin lesion, rash or pruritis  Neuro:  generalized weakness  Psych:  No hallucination  Heme/Lymph:  No easy bruising or bleeding  Endocrine:  No heat or cold intolerance

## 2021-11-19 NOTE — H&P ADULT - PROBLEM SELECTOR PLAN 1
Elevated BNP of 809  Complain of SOB  CXR image reviewed, appear to have some pulmonary congestion  Bibasilar crackles  Has orthopnea   ECHO in 06/2021 with grossly normal LV systolic function. Endocardium not well visualized. Unable to accurately evaluate right ventricular size or  systolic function  IV lasix 40mg bid  Monitor I/O , daily weight  ECHO  Cardiology consulted-Dr Briceno

## 2021-11-19 NOTE — CONSULT NOTE ADULT - ATTENDING COMMENTS
The family and the patient are not decided yet regarding the surgical versus non surgical treatment options. They are aware about all possible complications and the recovery regarding surgical versus non surgical treatment options.   The family will be contacted again for the final decision.

## 2021-11-19 NOTE — H&P ADULT - ASSESSMENT
86 years old female with h/o COPD/asthma ( on 3L NC), DM2, HTN, SHAD on CPAP, lymphedema on bumex, morbid obesity (BMI 52.7) present to ED with complain of mechanical fall. Patient also complain of SOB  Hemodynamically stable, afebrile and sat 94% at 3L NC. WBC 13.27, Hb 9.3, Plt 237. K 6--> 5.5 after cocktails. Cr 1.06, glucose 151, . EKG with NSR, VR 82, QTc 440, no T wave changes of hyperkalemia. CXR image reviewed, appear to have pul congestion. Received IV lasix 40mg    Admitted with fluid overload, right knee fracture

## 2021-11-19 NOTE — ED ADULT NURSE NOTE - NSFALLRSKOUTCOME_ED_ALL_ED
5/21/2017              Cornell Petrosnathan        [address]         [place] [id number]         Dear Orlando Doan,    It was a pleasure to see you. Your PAP test was normal.  There is no need for further testing at this time.   I look forward to seeing you at you [employer]

## 2021-11-20 LAB
A1C WITH ESTIMATED AVERAGE GLUCOSE RESULT: 6.9 % — HIGH (ref 4–5.6)
ALBUMIN SERPL ELPH-MCNC: 2.4 G/DL — LOW (ref 3.3–5)
ALP SERPL-CCNC: 50 U/L — SIGNIFICANT CHANGE UP (ref 40–120)
ALT FLD-CCNC: 14 U/L — SIGNIFICANT CHANGE UP (ref 12–78)
ANION GAP SERPL CALC-SCNC: 0 MMOL/L — LOW (ref 5–17)
AST SERPL-CCNC: 18 U/L — SIGNIFICANT CHANGE UP (ref 15–37)
BASE EXCESS BLDA CALC-SCNC: 19 MMOL/L — HIGH (ref -2–3)
BILIRUB SERPL-MCNC: 0.3 MG/DL — SIGNIFICANT CHANGE UP (ref 0.2–1.2)
BLOOD GAS COMMENTS: SIGNIFICANT CHANGE UP
BLOOD GAS COMMENTS: SIGNIFICANT CHANGE UP
BLOOD GAS SOURCE: SIGNIFICANT CHANGE UP
BUN SERPL-MCNC: 30 MG/DL — HIGH (ref 7–23)
CALCIUM SERPL-MCNC: 8.6 MG/DL — SIGNIFICANT CHANGE UP (ref 8.5–10.1)
CHLORIDE SERPL-SCNC: 91 MMOL/L — LOW (ref 96–108)
CO2 BLDA-SCNC: 50 MMOL/L — HIGH (ref 19–24)
CO2 SERPL-SCNC: 43 MMOL/L — HIGH (ref 22–31)
COVID-19 NUCLEOCAPSID GAM AB INTERP: POSITIVE
COVID-19 NUCLEOCAPSID TOTAL GAM ANTIBODY RESULT: 219 INDEX — HIGH
COVID-19 SPIKE DOMAIN AB INTERP: POSITIVE
COVID-19 SPIKE DOMAIN ANTIBODY RESULT: >250 U/ML — HIGH
CREAT SERPL-MCNC: 0.98 MG/DL — SIGNIFICANT CHANGE UP (ref 0.5–1.3)
ESTIMATED AVERAGE GLUCOSE: 151 MG/DL — HIGH (ref 68–114)
GLUCOSE BLDC GLUCOMTR-MCNC: 129 MG/DL — HIGH (ref 70–99)
GLUCOSE BLDC GLUCOMTR-MCNC: 146 MG/DL — HIGH (ref 70–99)
GLUCOSE BLDC GLUCOMTR-MCNC: 153 MG/DL — HIGH (ref 70–99)
GLUCOSE BLDC GLUCOMTR-MCNC: 209 MG/DL — HIGH (ref 70–99)
GLUCOSE SERPL-MCNC: 140 MG/DL — HIGH (ref 70–99)
HCO3 BLDA-SCNC: 48 MMOL/L — CRITICAL HIGH (ref 21–28)
HCT VFR BLD CALC: 29.1 % — LOW (ref 34.5–45)
HGB BLD-MCNC: 8.3 G/DL — LOW (ref 11.5–15.5)
HOROWITZ INDEX BLDA+IHG-RTO: 36 — SIGNIFICANT CHANGE UP
MAGNESIUM SERPL-MCNC: 2.5 MG/DL — SIGNIFICANT CHANGE UP (ref 1.6–2.6)
MCHC RBC-ENTMCNC: 25.5 PG — LOW (ref 27–34)
MCHC RBC-ENTMCNC: 28.5 GM/DL — LOW (ref 32–36)
MCV RBC AUTO: 89.3 FL — SIGNIFICANT CHANGE UP (ref 80–100)
NRBC # BLD: 0 /100 WBCS — SIGNIFICANT CHANGE UP (ref 0–0)
PCO2 BLDA: 70 MMHG — HIGH (ref 32–46)
PH BLD: 7.44 — SIGNIFICANT CHANGE UP (ref 7.35–7.45)
PHOSPHATE SERPL-MCNC: 3.1 MG/DL — SIGNIFICANT CHANGE UP (ref 2.5–4.5)
PLATELET # BLD AUTO: 223 K/UL — SIGNIFICANT CHANGE UP (ref 150–400)
PO2 BLDA: 71 MMHG — LOW (ref 83–108)
POTASSIUM SERPL-MCNC: 4.7 MMOL/L — SIGNIFICANT CHANGE UP (ref 3.5–5.3)
POTASSIUM SERPL-SCNC: 4.7 MMOL/L — SIGNIFICANT CHANGE UP (ref 3.5–5.3)
PROT SERPL-MCNC: 7.4 GM/DL — SIGNIFICANT CHANGE UP (ref 6–8.3)
RBC # BLD: 3.26 M/UL — LOW (ref 3.8–5.2)
RBC # FLD: 15.9 % — HIGH (ref 10.3–14.5)
SAO2 % BLDA: 96 % — SIGNIFICANT CHANGE UP (ref 94–98)
SARS-COV-2 IGG+IGM SERPL QL IA: 219 INDEX — HIGH
SARS-COV-2 IGG+IGM SERPL QL IA: >250 U/ML — HIGH
SARS-COV-2 IGG+IGM SERPL QL IA: POSITIVE
SARS-COV-2 IGG+IGM SERPL QL IA: POSITIVE
SODIUM SERPL-SCNC: 134 MMOL/L — LOW (ref 135–145)
WBC # BLD: 8.4 K/UL — SIGNIFICANT CHANGE UP (ref 3.8–10.5)
WBC # FLD AUTO: 8.4 K/UL — SIGNIFICANT CHANGE UP (ref 3.8–10.5)

## 2021-11-20 PROCEDURE — 99222 1ST HOSP IP/OBS MODERATE 55: CPT

## 2021-11-20 PROCEDURE — 99233 SBSQ HOSP IP/OBS HIGH 50: CPT

## 2021-11-20 RX ORDER — OXYCODONE HYDROCHLORIDE 5 MG/1
5 TABLET ORAL EVERY 4 HOURS
Refills: 0 | Status: DISCONTINUED | OUTPATIENT
Start: 2021-11-20 | End: 2021-11-26

## 2021-11-20 RX ADMIN — Medication 100 MILLIGRAM(S): at 06:19

## 2021-11-20 RX ADMIN — Medication 2000 UNIT(S): at 11:25

## 2021-11-20 RX ADMIN — Medication 40 MILLIGRAM(S): at 06:18

## 2021-11-20 RX ADMIN — Medication 40 MILLIGRAM(S): at 17:22

## 2021-11-20 RX ADMIN — Medication 2: at 11:25

## 2021-11-20 RX ADMIN — POLYETHYLENE GLYCOL 3350 17 GRAM(S): 17 POWDER, FOR SOLUTION ORAL at 11:26

## 2021-11-20 RX ADMIN — GABAPENTIN 100 MILLIGRAM(S): 400 CAPSULE ORAL at 13:36

## 2021-11-20 RX ADMIN — BUDESONIDE AND FORMOTEROL FUMARATE DIHYDRATE 2 PUFF(S): 160; 4.5 AEROSOL RESPIRATORY (INHALATION) at 06:19

## 2021-11-20 RX ADMIN — Medication 650 MILLIGRAM(S): at 13:05

## 2021-11-20 RX ADMIN — Medication 1: at 00:40

## 2021-11-20 RX ADMIN — GABAPENTIN 100 MILLIGRAM(S): 400 CAPSULE ORAL at 06:19

## 2021-11-20 RX ADMIN — Medication 650 MILLIGRAM(S): at 11:24

## 2021-11-20 RX ADMIN — BUDESONIDE AND FORMOTEROL FUMARATE DIHYDRATE 2 PUFF(S): 160; 4.5 AEROSOL RESPIRATORY (INHALATION) at 17:23

## 2021-11-20 RX ADMIN — ENOXAPARIN SODIUM 40 MILLIGRAM(S): 100 INJECTION SUBCUTANEOUS at 06:19

## 2021-11-20 RX ADMIN — ENOXAPARIN SODIUM 40 MILLIGRAM(S): 100 INJECTION SUBCUTANEOUS at 17:22

## 2021-11-20 NOTE — CONSULT NOTE ADULT - SUBJECTIVE AND OBJECTIVE BOX
WESTON SETHI    S 2D 268 D    Allergies    No Known Allergies    Intolerances        PAST MEDICAL & SURGICAL HISTORY:  HTN (hypertension)    HLD (hyperlipidemia)    Asthma    DM (diabetes mellitus)    GERD (gastroesophageal reflux disease)    Lymphedema    S/P knee replacement        FAMILY HISTORY:  No pertinent family history in first degree relatives        Home Medications:  Advair Diskus 250 mcg-50 mcg inhalation powder: 1 puff(s) inhaled 2 times a day (30 Oct 2019 16:54)  alendronate 70 mg oral tablet: 1 tab(s) orally once a week (30 Oct 2019 16:54)  amLODIPine 10 mg oral tablet: 1 tab(s) orally once a day (30 Oct 2019 16:54)  ascorbic acid 500 mg oral capsule: 1 cap(s) orally once a day (30 Oct 2019 16:54)  azelastine 137 mcg/inh (0.1%) nasal spray: 2 spray(s) nasal 2 times a day (30 Oct 2019 16:54)  cholecalciferol 2000 intl units oral tablet: 1 tab(s) orally once a day (30 Oct 2019 16:54)  glimepiride 2 mg oral tablet: 1 tab(s) orally once a day (30 Oct 2019 16:54)  hydrALAZINE 100 mg oral tablet: 1 tab(s) orally 3 times a day (06 Nov 2019 13:41)  ipratropium-albuterol 0.5 mg-2.5 mg/3 mLinhalation solution: 3 milliliter(s) inhaled every 6 hours, As Needed (04 Jun 2021 10:05)  metoprolol succinate 100 mg oral tablet, extended release: 1 tab(s) orally once a day (30 Oct 2019 16:54)  montelukast 10 mg oral tablet: 1 tab(s) orally once a day (30 Oct 2019 16:54)  Neurontin 100 mg oral capsule: 1 cap(s) orally 3 times a day (29 Nov 2020 18:19)  ocular lubricant ophthalmic solution: 1 drop(s) to each affected eye 3 times a day, As needed, Dry Eyes (06 Nov 2019 13:41)  polyethylene glycol 3350 oral powder for reconstitution: 17 gram(s) orally once a day (06 Nov 2019 13:41)  ProAir HFA 90 mcg/inh inhalation aerosol: 2 puff(s) inhaled 4 times a day, As Needed (30 Oct 2019 16:54)  rosuvastatin 10 mg oral tablet: 1 tab(s) orally once a day (30 Oct 2019 16:54)      MEDICATIONS  (STANDING):  amLODIPine   Tablet 10 milliGRAM(s) Oral daily  atorvastatin 40 milliGRAM(s) Oral at bedtime  budesonide 160 MICROgram(s)/formoterol 4.5 MICROgram(s) Inhaler 2 Puff(s) Inhalation two times a day  cholecalciferol 2000 Unit(s) Oral daily  dextrose 40% Gel 15 Gram(s) Oral once  dextrose 5%. 1000 milliLiter(s) (50 mL/Hr) IV Continuous <Continuous>  dextrose 5%. 1000 milliLiter(s) (100 mL/Hr) IV Continuous <Continuous>  dextrose 50% Injectable 25 Gram(s) IV Push once  dextrose 50% Injectable 12.5 Gram(s) IV Push once  dextrose 50% Injectable 25 Gram(s) IV Push once  enoxaparin Injectable 40 milliGRAM(s) SubCutaneous every 12 hours  furosemide   Injectable 40 milliGRAM(s) IV Push two times a day  gabapentin 100 milliGRAM(s) Oral three times a day  glucagon  Injectable 1 milliGRAM(s) IntraMuscular once  hydrALAZINE 100 milliGRAM(s) Oral three times a day  insulin lispro (ADMELOG) corrective regimen sliding scale   SubCutaneous every 6 hours  metoprolol succinate  milliGRAM(s) Oral daily  montelukast 10 milliGRAM(s) Oral at bedtime  polyethylene glycol 3350 17 Gram(s) Oral daily    MEDICATIONS  (PRN):  acetaminophen     Tablet .. 650 milliGRAM(s) Oral every 6 hours PRN Mild Pain (1 - 3), Moderate Pain (4 - 6)  ALBUTerol    90 MICROgram(s) HFA Inhaler 2 Puff(s) Inhalation every 6 hours PRN Shortness of Breath and/or Wheezing  melatonin 3 milliGRAM(s) Oral at bedtime PRN Insomnia      Diet, Regular:   Consistent Carbohydrate Evening Snack (11-20-21 @ 08:26) [Active]          Vital Signs Last 24 Hrs  T(C): 37.3 (20 Nov 2021 05:25), Max: 37.4 (19 Nov 2021 12:30)  T(F): 99.1 (20 Nov 2021 05:25), Max: 99.4 (19 Nov 2021 12:30)  HR: 80 (20 Nov 2021 05:25) (78 - 96)  BP: 125/70 (20 Nov 2021 05:25) (125/70 - 171/72)  BP(mean): --  RR: 19 (20 Nov 2021 05:25) (18 - 19)  SpO2: 95% (20 Nov 2021 05:25) (92% - 98%)      11-19-21 @ 07:01  -  11-20-21 @ 07:00  --------------------------------------------------------  IN: 0 mL / OUT: 1750 mL / NET: -1750 mL              LABS:                        8.3    8.40  )-----------( 223      ( 20 Nov 2021 07:14 )             29.1     11-20    134<L>  |  91<L>  |  30<H>  ----------------------------<  140<H>  4.7   |  43<H>  |  0.98    Ca    8.6      20 Nov 2021 07:14  Phos  3.1     11-20  Mg     2.5     11-20    TPro  7.4  /  Alb  2.4<L>  /  TBili  0.3  /  DBili  x   /  AST  18  /  ALT  14  /  AlkPhos  50  11-20              WBC:  WBC Count: 8.40 K/uL (11-20 @ 07:14)  WBC Count: 13.27 K/uL (11-19 @ 04:41)      MICROBIOLOGY:  RECENT CULTURES:        CARDIAC MARKERS ( 19 Nov 2021 04:41 )  x     / x     / 117 U/L / x     / x                Sodium:  Sodium, Serum: 134 mmol/L (11-20 @ 07:14)  Sodium, Serum: 134 mmol/L (11-19 @ 10:15)  Sodium, Serum: 132 mmol/L (11-19 @ 04:41)      0.98 mg/dL 11-20 @ 07:14  1.06 mg/dL 11-19 @ 10:15  1.05 mg/dL 11-19 @ 04:41      Hemoglobin:  Hemoglobin: 8.3 g/dL (11-20 @ 07:14)  Hemoglobin: 9.3 g/dL (11-19 @ 04:41)      Platelets: Platelet Count - Automated: 223 K/uL (11-20 @ 07:14)  Platelet Count - Automated: 237 K/uL (11-19 @ 04:41)      LIVER FUNCTIONS - ( 20 Nov 2021 07:14 )  Alb: 2.4 g/dL / Pro: 7.4 gm/dL / ALK PHOS: 50 U/L / ALT: 14 U/L / AST: 18 U/L / GGT: x                 RADIOLOGY & ADDITIONAL STUDIES:      MICROBIOLOGY:  RECENT CULTURES:

## 2021-11-20 NOTE — PROGRESS NOTE ADULT - SUBJECTIVE AND OBJECTIVE BOX
24H hour events:   resting  no acute events overnight  tele: SR 80s  MEDICATIONS:  amLODIPine   Tablet 10 milliGRAM(s) Oral daily  enoxaparin Injectable 40 milliGRAM(s) SubCutaneous every 12 hours  furosemide   Injectable 40 milliGRAM(s) IV Push two times a day  hydrALAZINE 100 milliGRAM(s) Oral three times a day  metoprolol succinate  milliGRAM(s) Oral daily      ALBUTerol    90 MICROgram(s) HFA Inhaler 2 Puff(s) Inhalation every 6 hours PRN  budesonide 160 MICROgram(s)/formoterol 4.5 MICROgram(s) Inhaler 2 Puff(s) Inhalation two times a day  montelukast 10 milliGRAM(s) Oral at bedtime    acetaminophen     Tablet .. 650 milliGRAM(s) Oral every 6 hours PRN  gabapentin 100 milliGRAM(s) Oral three times a day  melatonin 3 milliGRAM(s) Oral at bedtime PRN    polyethylene glycol 3350 17 Gram(s) Oral daily    atorvastatin 40 milliGRAM(s) Oral at bedtime  dextrose 40% Gel 15 Gram(s) Oral once  dextrose 50% Injectable 25 Gram(s) IV Push once  dextrose 50% Injectable 12.5 Gram(s) IV Push once  dextrose 50% Injectable 25 Gram(s) IV Push once  glucagon  Injectable 1 milliGRAM(s) IntraMuscular once  insulin lispro (ADMELOG) corrective regimen sliding scale   SubCutaneous every 6 hours    cholecalciferol 2000 Unit(s) Oral daily  dextrose 5%. 1000 milliLiter(s) IV Continuous <Continuous>  dextrose 5%. 1000 milliLiter(s) IV Continuous <Continuous>          PHYSICAL EXAM:  T(C): 37.3 (11-20-21 @ 05:25), Max: 37.4 (11-19-21 @ 12:30)  HR: 80 (11-20-21 @ 05:25) (78 - 96)  BP: 125/70 (11-20-21 @ 05:25) (125/70 - 171/72)  RR: 19 (11-20-21 @ 05:25) (18 - 19)  SpO2: 95% (11-20-21 @ 05:25) (92% - 98%)  Wt(kg): --  I&O's Summary    19 Nov 2021 07:01  -  20 Nov 2021 07:00  --------------------------------------------------------  IN: 0 mL / OUT: 1750 mL / NET: -1750 mL        Appearance: Normal	  HEENT:   Normal oral mucosa, PERRL, EOMI	  Lymphatic: No lymphadenopathy  Cardiovascular: Normal S1 S2, No JVD, No murmurs, +2 pitting b/l LE edema  Respiratory: coarse b/s b/l, cpap in place  Psychiatry: A & O x 3, Mood & affect appropriate  Gastrointestinal:  Soft, Non-tender, + BS	  Skin: No rashes, No ecchymoses, No cyanosis	  Neurologic: Non-focal  Extremities: Normal range of motion, No clubbing, cyanosis       LABS:	 	    CBC Full  -  ( 20 Nov 2021 07:14 )  WBC Count : 8.40 K/uL  Hemoglobin : 8.3 g/dL  Hematocrit : 29.1 %  Platelet Count - Automated : 223 K/uL  Mean Cell Volume : 89.3 fl  Mean Cell Hemoglobin : 25.5 pg  Mean Cell Hemoglobin Concentration : 28.5 gm/dL  Auto Neutrophil # : x  Auto Lymphocyte # : x  Auto Monocyte # : x  Auto Eosinophil # : x  Auto Basophil # : x  Auto Neutrophil % : x  Auto Lymphocyte % : x  Auto Monocyte % : x  Auto Eosinophil % : x  Auto Basophil % : x    11-20    134<L>  |  91<L>  |  30<H>  ----------------------------<  140<H>  4.7   |  43<H>  |  0.98  11-19    134<L>  |  93<L>  |  34<H>  ----------------------------<  151<H>  5.5<H>   |  38<H>  |  1.06    Ca    8.6      20 Nov 2021 07:14  Ca    8.7      19 Nov 2021 10:15  Phos  3.1     11-20  Mg     2.5     11-20    TPro  7.4  /  Alb  2.4<L>  /  TBili  0.3  /  DBili  x   /  AST  18  /  ALT  14  /  AlkPhos  50  11-20  TPro  7.8  /  Alb  2.6<L>  /  TBili  0.2  /  DBili  x   /  AST  28  /  ALT  15  /  AlkPhos  62  11-19      proBNP: Serum Pro-Brain Natriuretic Peptide: 809 pg/mL (11-19-21 @ 04:41)    Lipid Profile:   HgA1c:   TSH:       CARDIAC MARKERS:            TELEMETRY: 	    ECG:  	  RADIOLOGY:  OTHER: 	    PREVIOUS DIAGNOSTIC TESTING:    [ ] Echocardiogram:  [ ]  Catheterization:  [ ] Stress Test:  	  	  ASSESSMENT/PLAN:

## 2021-11-20 NOTE — PROGRESS NOTE ADULT - SUBJECTIVE AND OBJECTIVE BOX
Patient is a 86y old  Female who presents with a chief complaint of fluid overload, right ankle fracture (2021 10:50)    INTERVAL HPI/OVERNIGHT EVENTS: Patients seen and examined at bedside this morning. No acute events overnight. Pt reports pain 10/10 on right leg.     MEDICATIONS  (STANDING):  amLODIPine   Tablet 10 milliGRAM(s) Oral daily  atorvastatin 40 milliGRAM(s) Oral at bedtime  budesonide 160 MICROgram(s)/formoterol 4.5 MICROgram(s) Inhaler 2 Puff(s) Inhalation two times a day  cholecalciferol 2000 Unit(s) Oral daily  dextrose 40% Gel 15 Gram(s) Oral once  dextrose 5%. 1000 milliLiter(s) (50 mL/Hr) IV Continuous <Continuous>  dextrose 5%. 1000 milliLiter(s) (100 mL/Hr) IV Continuous <Continuous>  dextrose 50% Injectable 25 Gram(s) IV Push once  dextrose 50% Injectable 12.5 Gram(s) IV Push once  dextrose 50% Injectable 25 Gram(s) IV Push once  enoxaparin Injectable 40 milliGRAM(s) SubCutaneous every 12 hours  furosemide   Injectable 40 milliGRAM(s) IV Push two times a day  gabapentin 100 milliGRAM(s) Oral three times a day  glucagon  Injectable 1 milliGRAM(s) IntraMuscular once  hydrALAZINE 100 milliGRAM(s) Oral three times a day  insulin lispro (ADMELOG) corrective regimen sliding scale   SubCutaneous every 6 hours  metoprolol succinate  milliGRAM(s) Oral daily  montelukast 10 milliGRAM(s) Oral at bedtime  polyethylene glycol 3350 17 Gram(s) Oral daily    MEDICATIONS  (PRN):  acetaminophen     Tablet .. 650 milliGRAM(s) Oral every 6 hours PRN Mild Pain (1 - 3), Moderate Pain (4 - 6)  ALBUTerol    90 MICROgram(s) HFA Inhaler 2 Puff(s) Inhalation every 6 hours PRN Shortness of Breath and/or Wheezing  melatonin 3 milliGRAM(s) Oral at bedtime PRN Insomnia  oxyCODONE    IR 5 milliGRAM(s) Oral every 4 hours PRN Moderate Pain (4 - 6)    Allergies    No Known Allergies    Intolerances      REVIEW OF SYSTEMS:  All other systems reviewed and are negative    Vital Signs Last 24 Hrs  T(C): 37.4 (2021 16:45), Max: 38 (2021 11:12)  T(F): 99.3 (2021 16:45), Max: 100.4 (2021 11:12)  HR: 80 (2021 16:45) (78 - 102)  BP: 123/74 (2021 16:45) (102/58 - 129/68)  BP(mean): --  RR: 20 (2021 16:45) (17 - 20)  SpO2: 96% (2021 16:53) (92% - 98%)  Daily     Daily Weight in k.4 (2021 05:25)  I&O's Summary    2021 07:01  -  2021 07:00  --------------------------------------------------------  IN: 0 mL / OUT: 1750 mL / NET: -1750 mL    2021 07:01  -  2021 18:42  --------------------------------------------------------  IN: 100 mL / OUT: 0 mL / NET: 100 mL      CAPILLARY BLOOD GLUCOSE      POCT Blood Glucose.: 129 mg/dL (2021 17:05)  POCT Blood Glucose.: 209 mg/dL (2021 11:21)  POCT Blood Glucose.: 146 mg/dL (2021 06:09)  POCT Blood Glucose.: 153 mg/dL (2021 00:38)    PHYSICAL EXAM:  GENERAL: NAD, well-groomed, well-developed  HEAD:  Atraumatic, Normocephalic  EYES: EOMI, PERRLA, conjunctiva and sclera clear  ENMT: No tonsillar erythema, exudates, or enlargement; Moist mucous membranes, Good dentition, No lesions  NECK: Supple, No JVD, Normal thyroid  NERVOUS SYSTEM:  Alert & Oriented X3, Good concentration; Motor Strength 5/5 B/L upper and lower extremities; DTRs 2+ intact and symmetric  CHEST/LUNG: Clear to percussion bilaterally; No rales, rhonchi, wheezing, or rubs  HEART: Regular rate and rhythm; No murmurs, rubs, or gallops  ABDOMEN: Soft, Nontender, Nondistended; Bowel sounds present  EXTREMITIES:  2+ Peripheral Pulses, No clubbing, cyanosis, or edema  LYMPH: No lymphadenopathy noted  SKIN: No rashes or lesions    Labs                          8.3    8.40  )-----------( 223      ( 2021 07:14 )             29.1     11-20    134<L>  |  91<L>  |  30<H>  ----------------------------<  140<H>  4.7   |  43<H>  |  0.98    Ca    8.6      2021 07:14  Phos  3.1     11-20  Mg     2.5     11-20    TPro  7.4  /  Alb  2.4<L>  /  TBili  0.3  /  DBili  x   /  AST  18  /  ALT  14  /  AlkPhos  50  11-20      CARDIAC MARKERS ( 2021 04:41 )  x     / x     / 117 U/L / x     / x                        DVT prophylaxis: > Lovenox 40mg SQ daily  > Heparin   > SCD's Patient is a 86y old  Female who presents with a chief complaint of fluid overload, right ankle fracture (2021 10:50)    INTERVAL HPI/OVERNIGHT EVENTS: Patients seen and examined at bedside this morning. No acute events overnight. Pt reports pain 10/10 on right leg.     MEDICATIONS  (STANDING):  amLODIPine   Tablet 10 milliGRAM(s) Oral daily  atorvastatin 40 milliGRAM(s) Oral at bedtime  budesonide 160 MICROgram(s)/formoterol 4.5 MICROgram(s) Inhaler 2 Puff(s) Inhalation two times a day  cholecalciferol 2000 Unit(s) Oral daily  dextrose 40% Gel 15 Gram(s) Oral once  dextrose 5%. 1000 milliLiter(s) (50 mL/Hr) IV Continuous <Continuous>  dextrose 5%. 1000 milliLiter(s) (100 mL/Hr) IV Continuous <Continuous>  dextrose 50% Injectable 25 Gram(s) IV Push once  dextrose 50% Injectable 12.5 Gram(s) IV Push once  dextrose 50% Injectable 25 Gram(s) IV Push once  enoxaparin Injectable 40 milliGRAM(s) SubCutaneous every 12 hours  furosemide   Injectable 40 milliGRAM(s) IV Push two times a day  gabapentin 100 milliGRAM(s) Oral three times a day  glucagon  Injectable 1 milliGRAM(s) IntraMuscular once  hydrALAZINE 100 milliGRAM(s) Oral three times a day  insulin lispro (ADMELOG) corrective regimen sliding scale   SubCutaneous every 6 hours  metoprolol succinate  milliGRAM(s) Oral daily  montelukast 10 milliGRAM(s) Oral at bedtime  polyethylene glycol 3350 17 Gram(s) Oral daily    MEDICATIONS  (PRN):  acetaminophen     Tablet .. 650 milliGRAM(s) Oral every 6 hours PRN Mild Pain (1 - 3), Moderate Pain (4 - 6)  ALBUTerol    90 MICROgram(s) HFA Inhaler 2 Puff(s) Inhalation every 6 hours PRN Shortness of Breath and/or Wheezing  melatonin 3 milliGRAM(s) Oral at bedtime PRN Insomnia  oxyCODONE    IR 5 milliGRAM(s) Oral every 4 hours PRN Moderate Pain (4 - 6)    Allergies    No Known Allergies    Intolerances      REVIEW OF SYSTEMS:  All other systems reviewed and are negative    Vital Signs Last 24 Hrs  T(C): 37.4 (2021 16:45), Max: 38 (2021 11:12)  T(F): 99.3 (2021 16:45), Max: 100.4 (2021 11:12)  HR: 80 (2021 16:45) (78 - 102)  BP: 123/74 (2021 16:45) (102/58 - 129/68)  BP(mean): --  RR: 20 (2021 16:45) (17 - 20)  SpO2: 96% (2021 16:53) (92% - 98%)  Daily     Daily Weight in k.4 (2021 05:25)  I&O's Summary    2021 07:01  -  2021 07:00  --------------------------------------------------------  IN: 0 mL / OUT: 1750 mL / NET: -1750 mL    2021 07:01  -  2021 18:42  --------------------------------------------------------  IN: 100 mL / OUT: 0 mL / NET: 100 mL      CAPILLARY BLOOD GLUCOSE      POCT Blood Glucose.: 129 mg/dL (2021 17:05)  POCT Blood Glucose.: 209 mg/dL (2021 11:21)  POCT Blood Glucose.: 146 mg/dL (2021 06:09)  POCT Blood Glucose.: 153 mg/dL (2021 00:38)    Appearance: Normal	  HEENT:   Normal oral mucosa, PERRL, EOMI	  Lymphatic: No lymphadenopathy  Cardiovascular: Normal S1 S2, No JVD, No murmurs, +2 pitting b/l LE edema  Respiratory: coarse b/s b/l, cpap in place  Psychiatry: A & O x 3, Mood & affect appropriate  Gastrointestinal:  Soft, Non-tender, + BS	  Skin: No rashes, No ecchymoses, No cyanosis	  Neurologic: Non-focal  Extremities: dressing c/d/i  KI in place  Compartments soft and compressible  Severe lymphedema bilaterally  No calf ttp  Severe pain of the RLE, about the knee and proximal tibia  +EHL/FHL/Gsc/TA  SILT L2-S1, mildly diminished secondary to lymphedema  Difficult to appreciate pulses secondary to lymphedema  Labs                          8.3    8.40  )-----------( 223      ( 2021 07:14 )             29.1     11-    134<L>  |  91<L>  |  30<H>  ----------------------------<  140<H>  4.7   |  43<H>  |  0.98    Ca    8.6      2021 07:14  Phos  3.1     11-20  Mg     2.5     -20    TPro  7.4  /  Alb  2.4<L>  /  TBili  0.3  /  DBili  x   /  AST  18  /  ALT  14  /  AlkPhos  50  -20      CARDIAC MARKERS ( 2021 04:41 )  x     / x     / 117 U/L / x     / x                        DVT prophylaxis: > Lovenox

## 2021-11-20 NOTE — PROGRESS NOTE ADULT - ASSESSMENT
86F with HFpEF, obesity, obstructive sleep apnea on CPAP, hypertension, type 2 diabetes, COPD, lymphedema, status post fall and knee fracture.     -Continue IV Lasix 40 mg twice daily for now,  to keep fluid negative balance.    -Continue metoprolol  mg daily, hydralazine 100 mg 3 times daily, amlodipine 10 mg daily for blood pressure control.    -DVT prevention with Lovenox, per ortho.   -will follow with you

## 2021-11-20 NOTE — PROGRESS NOTE ADULT - ASSESSMENT
A/P:  87 yo F s/p MF with Right knee proximal tibia periprosthetic fracture s/p R TKA ('09):  -NWB RLE in bulky marshall knee immobilizer  -pain control  -continue home meds  -medical management per primary team  -recommend dvt ppx  -per hospitalist, pt is a poor surgical candidate, and if surgery recommended will need medical optimization by cardiology/pulmonology  -given the complicated pattern and location of the fracture, and in the setting of multiple severe medical comorbidities, pt is a poor surgical candidate and benefits may not outweigh the risks of operative management  -pt to be transferred to [facility] for further management by her primary orthopedic surgeon, PER FAMILY'S REQUEST  -will discuss with Dr. Dee and update plan accordingly No

## 2021-11-20 NOTE — PROGRESS NOTE ADULT - SUBJECTIVE AND OBJECTIVE BOX
Patient seen and examined at bedside. Patient demented but appears comfortable. Denies N/V/CP/SOB.       VITAL SIGNS:  T(C): 37.3 (11-20-21 @ 05:25), Max: 37.4 (11-19-21 @ 12:30)  HR: 86 (11-20-21 @ 08:50) (78 - 96)  BP: 125/70 (11-20-21 @ 05:25) (125/70 - 154/70)  RR: 19 (11-20-21 @ 05:25) (18 - 19)  SpO2: 93% (11-20-21 @ 08:50) (92% - 98%)      LABS:                        8.3    8.40  )-----------( 223      ( 20 Nov 2021 07:14 )             29.1     11-20    134<L>  |  91<L>  |  30<H>  ----------------------------<  140<H>  4.7   |  43<H>  |  0.98    Ca    8.6      20 Nov 2021 07:14  Phos  3.1     11-20  Mg     2.5     11-20    TPro  7.4  /  Alb  2.4<L>  /  TBili  0.3  /  DBili  x   /  AST  18  /  ALT  14  /  AlkPhos  50  11-20          PE:  Gen: NAD when not moving, mild dementia  RLE:  dressing c/d/i  KI in place  Compartments soft and compressible  Severe lymphedema bilaterally  No calf ttp  Severe pain of the RLE, about the knee and proximal tibia  +EHL/FHL/Gsc/TA  SILT L2-S1, mildly diminished secondary to lymphedema  Difficult to appreciate pulses secondary to lymphedema

## 2021-11-20 NOTE — CONSULT NOTE ADULT - ASSESSMENT
A/P:  87 yo F s/p MF with Right knee proximal tibia periprosthetic fracture s/p R TKA ('09):  -NWB RLE in bulky marshall knee immobilizer  -pain control  -continue home meds  -medical management per primary team  -cardiology and pulmonology to be consulted  -recommend dvt ppx  -per hospitalist, pt is a poor surgical candidate, and if surgery recommended will need medical optimization by cardiology/pulmonology  -given the complicated pattern and location of the fracture, and in the setting of multiple severe medical comorbidities, pt is a poor surgical candidate and benefits may not outweigh the risks of operative management  -pt may be candidate for nonoperative management  -will discuss with Dr. Dee and update plan accordingly
      NAVDEEP ONTIVEROS 86 f 11/19/2021 1935 DR KAT SYKES     Initial evaluation/Pulmonary Critical Care consultation requested on 11/20/2021  by Dr BARRON   from Dr Ceballos   Patient examined chart reviewed    HOSPITAL ADMISSION   PATIENT CAME  FROM (if information available)      NAVDEEP ONTIVEROS 86 f 11/19/2021 1935 DR KAT SYKES     REVIEW OF SYMPTOMS      Able to give ROS  Yes     RELIABLE +/-   CONSTITUTIONAL Weakness Yes  Chills No   ENDOCRINE  No heat or cold intolerance    ALLERGY No hives  Sore throat No stridor  RESP Coughing blood no  Shortness of breath YES   NEURO No Headache  Confusion Pain neck No   CARDIAC No Chest pain No Palpitations   GI  Pain abdomen NO   Vomiting NO     PHYSICAL EXAM    HEENT Unremarkable  atraumatic   RESP Fair air entry EXP prolonged    Harsh breath sound Resp distres mild   CARDIAC S1 S2 No S3     NO JVD    ABDOMEN SOFT BS PRESENT NOT DISTENDED No hepatosplenomegaly PEDAL EDEMA present No calf tenderness  NO rash                                      PATIENT PRESENTATION/COURSE.  86F with HFpEF, obesity, obstructive sleep apnea on CPAP, hypertension, type 2 diabetes, COPD, lymphedema, status post fall and knee fracture.   Pt admitted 11/19  Pulm called for periop followup    NOTABLE HOME MEDS  advair 250 duoneb montelukast                                      PROBLEMS/ISSUES.  GOC.  COVID STATUS. 11/19 scv2 (-)     MECHANICAL FALL 11/19/2021  R KNEE PERIPROS FRACTURE poa 11/19/2021 in setting of R TKA 2009.  PULMONARY OPTIMIZATION FOR ORTHO SURGERY 11/19/2021 ADMISSION  COPD pmh  SHAD pmh  HFPEF pmh  DM pmh   OBESITY  pmh 11/19/2021 BMI 44     ANEMIA 11/19-11/20 Hb 9.3-8.3    pmh HFPEF  pmh LYMPHEDEMA on Bumex   pmh SHAD on CPAP   pmh DM   pmh OBESITY   pmh COPD   pmh L TKA Dr Castro 2012  pmh R TKA Dr Castro 2009   pmh MINIMAL AMBULATOR       PATIENT DATA   VITALS/PO/IO/VENT/DRIPS.     11/20/2021 99F 80 120/70   11/20/2021 3l 93%      BEST PRACTICE ISSUES.                                                  HEAD OF BED ELEVATION. Yes  DVT PROPHYLAXIS.  11/19/2021 lvnx 40.2                                        GOLDSTEIN PROPHYLAXIS.                                                                                         DIET.    11/20/2021 regr                      INFECTION PROPHYLAXIS.      GENERAL ISSUES  [facility] ADVANCED DIRECTIVE.                                         ALLERGY.       nka                     WEIGHT.          11/20/2021 131                              BMI.                    11/20/2021 44     ASSESSMENT/RECOMMENDATIONS.      MECHANICAL FALL 11/19/2021  R KNEE PERIPROS FRACTURE poa 11/19/2021 in setting of R TKA 2009.  PULMONARY OPTIMIZATION FOR ORTHO SURGERY 11/19/2021 ADMISSION  Hb 11/20 Hb 8.4  Cr 11/20 Cr .9  ECHO 11/20/2021 ef 60% [id number] as   a/r 11/20/2021 Patient is above average risk of pulm complications due to obesity SHAD COPD/asthma   She is at high risk of resp failure as well as dvt pe  She is on dvt pplx  Will check abg  She is in optimal pulm status for urgen surgery if benefits appear greater than risks  COPD pmh  11/20/2021 albuterol HFA P   11/20/2021 SYMBICORT 160   11/20/2021 MONTELUKAST 10   Cont rx  SHAD pmh  11/19 BPAP ordered by admitting md 15/5/12/3l   continue for now   May eventually just need cpap   CAD HYPERTENSION pmh  11/20/2021 METOPROLOL 100  11/20/2021 ATORVASTAT 40  11/20/2021 AMLODIPINE 10   cardio on case  HFPEF pmh  ECHO 11/20/2021 ef 60% wk0roow as   11/20/2021 HYDRALAZINE 100.3   11/20/2021 LASIX 40 V.2   cardio on case  DM pmh   11/20/2021 insulin   PAIN.   11/20/2021 tylenol  OBESITY  pmh 11/19/2021 BMI 44   ANEMIA 11/19-11/20 Hb 9.3-8.3  Monitor    OVERALL PLAN  86 obese female with COPD asthma SHAD on home cpap HFPEF admitted 11/19 with mechanical fall and r periprosth knee fx  Pt is in optimal pulm status but is at high risk for periop pulm complications       TIME SPENT   Over 55 minutes aggregate care time spent on encounter; activities included   direct patient care, counseling and/or coordinating care reviewing notes, lab data/ imaging , discussion with multidisciplinary team/ patient  /family and explaining in detail risks, benefits, alternatives  of the recommendations     NAVDEEP ONTIVEROS 86 f 11/19/2021 1935 DR KAT SYKES

## 2021-11-20 NOTE — PROGRESS NOTE ADULT - ASSESSMENT
86F with HFpEF, obesity, obstructive sleep apnea on CPAP, hypertension, type 2 diabetes, COPD, lymphedema, status post fall and knee fracture currently on telemetry.    -Continue IV Lasix 40 mg twice daily for now,  to keep fluid negative balance.    -Continue metoprolol  mg daily, hydralazine 100 mg 3 times daily, amlodipine 10 mg daily for blood pressure control.    -DVT prevention with Lovenox, per ortho. NWB RLE in bulky marshall knee immobilizer  -pain control  -continue home meds  -medical management per primary team  -recommend dvt ppx  - pt is a poor surgical candidate, and if surgery recommended will need medical optimization by cardiology/pulmonology  -given the complicated pattern and location of the fracture, and in the setting of multiple severe medical comorbidities, pt is a poor surgical candidate and benefits may not outweigh the risks of operative management  -pt family requesting to be transferred to [facility] for further management by her primary orthopedic surgeon, PER FAMILY'S REQUEST (Dr. Ariel Sandoval)

## 2021-11-21 LAB
APPEARANCE UR: CLEAR — SIGNIFICANT CHANGE UP
BACTERIA # UR AUTO: ABNORMAL
BILIRUB UR-MCNC: NEGATIVE — SIGNIFICANT CHANGE UP
COD CRY URNS QL: ABNORMAL
COLOR SPEC: YELLOW — SIGNIFICANT CHANGE UP
CULTURE RESULTS: SIGNIFICANT CHANGE UP
DIFF PNL FLD: NEGATIVE — SIGNIFICANT CHANGE UP
EPI CELLS # UR: SIGNIFICANT CHANGE UP
GLUCOSE BLDC GLUCOMTR-MCNC: 102 MG/DL — HIGH (ref 70–99)
GLUCOSE BLDC GLUCOMTR-MCNC: 107 MG/DL — HIGH (ref 70–99)
GLUCOSE BLDC GLUCOMTR-MCNC: 116 MG/DL — HIGH (ref 70–99)
GLUCOSE BLDC GLUCOMTR-MCNC: 117 MG/DL — HIGH (ref 70–99)
GLUCOSE BLDC GLUCOMTR-MCNC: 119 MG/DL — HIGH (ref 70–99)
GLUCOSE UR QL: NEGATIVE MG/DL — SIGNIFICANT CHANGE UP
GRAN CASTS # UR COMP ASSIST: ABNORMAL /LPF
HYALINE CASTS # UR AUTO: ABNORMAL /LPF
KETONES UR-MCNC: NEGATIVE — SIGNIFICANT CHANGE UP
LEUKOCYTE ESTERASE UR-ACNC: ABNORMAL
NITRITE UR-MCNC: NEGATIVE — SIGNIFICANT CHANGE UP
PH UR: 6 — SIGNIFICANT CHANGE UP (ref 5–8)
PROT UR-MCNC: NEGATIVE MG/DL — SIGNIFICANT CHANGE UP
RBC CASTS # UR COMP ASSIST: SIGNIFICANT CHANGE UP /HPF (ref 0–4)
SP GR SPEC: 1.01 — SIGNIFICANT CHANGE UP (ref 1.01–1.02)
SPECIMEN SOURCE: SIGNIFICANT CHANGE UP
UROBILINOGEN FLD QL: NEGATIVE MG/DL — SIGNIFICANT CHANGE UP
WBC UR QL: SIGNIFICANT CHANGE UP

## 2021-11-21 PROCEDURE — 99233 SBSQ HOSP IP/OBS HIGH 50: CPT

## 2021-11-21 PROCEDURE — 99232 SBSQ HOSP IP/OBS MODERATE 35: CPT

## 2021-11-21 RX ORDER — SENNA PLUS 8.6 MG/1
2 TABLET ORAL AT BEDTIME
Refills: 0 | Status: DISCONTINUED | OUTPATIENT
Start: 2021-11-21 | End: 2021-11-27

## 2021-11-21 RX ORDER — INSULIN LISPRO 100/ML
VIAL (ML) SUBCUTANEOUS AT BEDTIME
Refills: 0 | Status: DISCONTINUED | OUTPATIENT
Start: 2021-11-21 | End: 2021-11-27

## 2021-11-21 RX ORDER — OXYCODONE HYDROCHLORIDE 5 MG/1
10 TABLET ORAL EVERY 4 HOURS
Refills: 0 | Status: DISCONTINUED | OUTPATIENT
Start: 2021-11-21 | End: 2021-11-27

## 2021-11-21 RX ORDER — INSULIN LISPRO 100/ML
VIAL (ML) SUBCUTANEOUS
Refills: 0 | Status: DISCONTINUED | OUTPATIENT
Start: 2021-11-21 | End: 2021-11-27

## 2021-11-21 RX ADMIN — Medication 100 MILLIGRAM(S): at 22:55

## 2021-11-21 RX ADMIN — Medication 100 MILLIGRAM(S): at 13:26

## 2021-11-21 RX ADMIN — OXYCODONE HYDROCHLORIDE 5 MILLIGRAM(S): 5 TABLET ORAL at 09:49

## 2021-11-21 RX ADMIN — OXYCODONE HYDROCHLORIDE 5 MILLIGRAM(S): 5 TABLET ORAL at 10:30

## 2021-11-21 RX ADMIN — MONTELUKAST 10 MILLIGRAM(S): 4 TABLET, CHEWABLE ORAL at 00:12

## 2021-11-21 RX ADMIN — SENNA PLUS 2 TABLET(S): 8.6 TABLET ORAL at 22:57

## 2021-11-21 RX ADMIN — ATORVASTATIN CALCIUM 40 MILLIGRAM(S): 80 TABLET, FILM COATED ORAL at 00:05

## 2021-11-21 RX ADMIN — Medication 2000 UNIT(S): at 12:43

## 2021-11-21 RX ADMIN — OXYCODONE HYDROCHLORIDE 10 MILLIGRAM(S): 5 TABLET ORAL at 18:19

## 2021-11-21 RX ADMIN — Medication 40 MILLIGRAM(S): at 06:09

## 2021-11-21 RX ADMIN — ENOXAPARIN SODIUM 40 MILLIGRAM(S): 100 INJECTION SUBCUTANEOUS at 06:09

## 2021-11-21 RX ADMIN — OXYCODONE HYDROCHLORIDE 10 MILLIGRAM(S): 5 TABLET ORAL at 19:12

## 2021-11-21 RX ADMIN — GABAPENTIN 100 MILLIGRAM(S): 400 CAPSULE ORAL at 22:49

## 2021-11-21 RX ADMIN — MONTELUKAST 10 MILLIGRAM(S): 4 TABLET, CHEWABLE ORAL at 22:50

## 2021-11-21 RX ADMIN — Medication 40 MILLIGRAM(S): at 17:21

## 2021-11-21 RX ADMIN — BUDESONIDE AND FORMOTEROL FUMARATE DIHYDRATE 2 PUFF(S): 160; 4.5 AEROSOL RESPIRATORY (INHALATION) at 17:22

## 2021-11-21 RX ADMIN — ENOXAPARIN SODIUM 40 MILLIGRAM(S): 100 INJECTION SUBCUTANEOUS at 17:22

## 2021-11-21 RX ADMIN — GABAPENTIN 100 MILLIGRAM(S): 400 CAPSULE ORAL at 06:13

## 2021-11-21 RX ADMIN — GABAPENTIN 100 MILLIGRAM(S): 400 CAPSULE ORAL at 00:05

## 2021-11-21 RX ADMIN — ATORVASTATIN CALCIUM 40 MILLIGRAM(S): 80 TABLET, FILM COATED ORAL at 22:49

## 2021-11-21 RX ADMIN — BUDESONIDE AND FORMOTEROL FUMARATE DIHYDRATE 2 PUFF(S): 160; 4.5 AEROSOL RESPIRATORY (INHALATION) at 06:09

## 2021-11-21 RX ADMIN — GABAPENTIN 100 MILLIGRAM(S): 400 CAPSULE ORAL at 13:27

## 2021-11-21 RX ADMIN — POLYETHYLENE GLYCOL 3350 17 GRAM(S): 17 POWDER, FOR SOLUTION ORAL at 16:02

## 2021-11-21 NOTE — PROGRESS NOTE ADULT - SUBJECTIVE AND OBJECTIVE BOX
Patient is a 86y old  Female who presents with a chief complaint of fluid overload, right ankle fracture (2021 10:21)    INTERVAL HPI/OVERNIGHT EVENTS: Patients seen and examined at bedside this morning. No acute events overnight. Pt reports pain on the right leg. Seen on Bipap. Requesting to go home.     MEDICATIONS  (STANDING):  amLODIPine   Tablet 10 milliGRAM(s) Oral daily  atorvastatin 40 milliGRAM(s) Oral at bedtime  budesonide 160 MICROgram(s)/formoterol 4.5 MICROgram(s) Inhaler 2 Puff(s) Inhalation two times a day  cholecalciferol 2000 Unit(s) Oral daily  dextrose 40% Gel 15 Gram(s) Oral once  dextrose 5%. 1000 milliLiter(s) (50 mL/Hr) IV Continuous <Continuous>  dextrose 5%. 1000 milliLiter(s) (100 mL/Hr) IV Continuous <Continuous>  dextrose 50% Injectable 25 Gram(s) IV Push once  dextrose 50% Injectable 12.5 Gram(s) IV Push once  dextrose 50% Injectable 25 Gram(s) IV Push once  enoxaparin Injectable 40 milliGRAM(s) SubCutaneous every 12 hours  furosemide   Injectable 40 milliGRAM(s) IV Push two times a day  gabapentin 100 milliGRAM(s) Oral three times a day  glucagon  Injectable 1 milliGRAM(s) IntraMuscular once  hydrALAZINE 100 milliGRAM(s) Oral three times a day  insulin lispro (ADMELOG) corrective regimen sliding scale   SubCutaneous three times a day before meals  insulin lispro (ADMELOG) corrective regimen sliding scale   SubCutaneous at bedtime  metoprolol succinate  milliGRAM(s) Oral daily  montelukast 10 milliGRAM(s) Oral at bedtime  polyethylene glycol 3350 17 Gram(s) Oral daily    MEDICATIONS  (PRN):  acetaminophen     Tablet .. 650 milliGRAM(s) Oral every 6 hours PRN Mild Pain (1 - 3), Moderate Pain (4 - 6)  ALBUTerol    90 MICROgram(s) HFA Inhaler 2 Puff(s) Inhalation every 6 hours PRN Shortness of Breath and/or Wheezing  melatonin 3 milliGRAM(s) Oral at bedtime PRN Insomnia  oxyCODONE    IR 5 milliGRAM(s) Oral every 4 hours PRN Moderate Pain (4 - 6)    Allergies    No Known Allergies    Intolerances      REVIEW OF SYSTEMS:  All other systems reviewed and are negative    Vital Signs Last 24 Hrs  T(C): 37.1 (2021 05:00), Max: 38 (2021 11:12)  T(F): 98.7 (2021 05:00), Max: 100.4 (2021 11:12)  HR: 99 (2021 09:25) (55 - 102)  BP: 101/63 (2021 05:00) (101/63 - 123/74)  BP(mean): --  RR: 25 (2021 05:00) (17 - 25)  SpO2: 93% (2021 09:25) (91% - 96%)  Daily     Daily Weight in k (2021 05:00)  I&O's Summary    2021 07:01  -  2021 07:00  --------------------------------------------------------  IN: 200 mL / OUT: 0 mL / NET: 200 mL      CAPILLARY BLOOD GLUCOSE      POCT Blood Glucose.: 107 mg/dL (2021 07:19)  POCT Blood Glucose.: 116 mg/dL (2021 00:19)  POCT Blood Glucose.: 129 mg/dL (2021 17:05)  POCT Blood Glucose.: 209 mg/dL (2021 11:21)    PHYSICAL EXAM:  GENERAL: NAD, obese, on BIPAP  HEAD:  Atraumatic, Normocephalic  EYES: EOMI, PERRLA, conjunctiva and sclera clear  ENMT: No tonsillar erythema, exudates, or enlargement; Moist mucous membranes, Good dentition, No lesions  NECK: Supple, No JVD, Normal thyroid  NERVOUS SYSTEM:  Alert & Oriented X3, Good concentration; Motor Strength 5/5 B/L upper and lower extremities; DTRs 2+ intact and symmetric  CHEST/LUNG: Clear to percussion bilaterally; diminished lungs throughout. Coarse breath sounds and rales at b/l bases.  HEART: Regular rate and rhythm; No murmurs, rubs, or gallops  ABDOMEN: Soft, Nontender, Nondistended; Bowel sounds present  EXTREMITIES: lymphedema b/l legs. right leg in brace. 2+ edema b/l. pulses difficult to palpate due to body habitus.  LYMPH: No lymphadenopathy noted  SKIN: No rashes or lesions    Labs                          8.3    8.40  )-----------( 223      ( 2021 07:14 )             29.1     11-    134<L>  |  91<L>  |  30<H>  ----------------------------<  140<H>  4.7   |  43<H>  |  0.98    Ca    8.6      2021 07:14  Phos  3.1     11-20  Mg     2.5     -    TPro  7.4  /  Alb  2.4<L>  /  TBili  0.3  /  DBili  x   /  AST  18  /  ALT  14  /  AlkPhos  50  -20                        DVT prophylaxis: > Lovenox

## 2021-11-21 NOTE — PROGRESS NOTE ADULT - SUBJECTIVE AND OBJECTIVE BOX
Patient seen and examined at bedside. Patient demented but appears comfortable. Denies N/V/CP/SOB.       ICU Vital Signs Last 24 Hrs  T(C): 37.1 (21 Nov 2021 05:00), Max: 38 (20 Nov 2021 11:12)  T(F): 98.7 (21 Nov 2021 05:00), Max: 100.4 (20 Nov 2021 11:12)  HR: 94 (21 Nov 2021 05:31) (55 - 102)  BP: 101/63 (21 Nov 2021 05:00) (101/63 - 123/74)  BP(mean): --  ABP: --  ABP(mean): --  RR: 25 (21 Nov 2021 05:00) (17 - 25)  SpO2: 92% (21 Nov 2021 05:31) (91% - 96%)                          8.3    8.40  )-----------( 223      ( 20 Nov 2021 07:14 )             29.1   11-20    134<L>  |  91<L>  |  30<H>  ----------------------------<  140<H>  4.7   |  43<H>  |  0.98    Ca    8.6      20 Nov 2021 07:14  Phos  3.1     11-20  Mg     2.5     11-20    TPro  7.4  /  Alb  2.4<L>  /  TBili  0.3  /  DBili  x   /  AST  18  /  ALT  14  /  AlkPhos  50  11-20      PE:  Gen: NAD when not moving, mild dementia  RLE:  dressing c/d/i  KI in place  Compartments soft and compressible  Severe lymphedema bilaterally  No calf ttp  Severe pain of the RLE, about the knee and proximal tibia  +EHL/FHL/Gsc/TA  SILT L2-S1, mildly diminished secondary to lymphedema  Difficult to appreciate pulses secondary to lymphedema

## 2021-11-21 NOTE — PROGRESS NOTE ADULT - ASSESSMENT
A/P:  85 yo F s/p MF with Right knee proximal tibia periprosthetic fracture s/p R TKA ('09):  -NWB RLE in bulky marshall knee immobilizer  -pain control  -continue home meds  -medical management per primary team  -recommend dvt ppx  -Transfer to [facility] for operative management by primary surgeon per family's request

## 2021-11-21 NOTE — PROGRESS NOTE ADULT - ASSESSMENT
86F with HFpEF, obesity, obstructive sleep apnea on CPAP, hypertension, type 2 diabetes, COPD, lymphedema, status post fall and knee fracture currently on telemetry.    -Continue IV Lasix 40 mg twice daily for now,  to keep fluid negative balance.    -Continue metoprolol  mg daily, hydralazine 100 mg 3 times daily, amlodipine 10 mg daily for blood pressure control.    -DVT prevention with Lovenox, per ortho. NWB RLE in bulky marshall knee immobilizer  -pain control  -continue home meds  -medical management per primary team  -recommend dvt ppx  - pt is a poor surgical candidate, and if surgery recommended will need medical optimization by cardiology/pulmonology  -given the complicated pattern and location of the fracture, and in the setting of multiple severe medical comorbidities, pt is a poor surgical candidate and benefits may not outweigh the risks of operative management  -pt family requesting to be transferred to [facility] for further management by her primary orthopedic surgeon, PER FAMILY'S REQUEST (Dr. Ariel Sandoval) 86F with HFpEF, obesity, obstructive sleep apnea on CPAP, hypertension, type 2 diabetes, COPD, lymphedema, status post fall and knee fracture currently on telemetry.    -Continue IV Lasix 40 mg twice daily for now,  to keep fluid negative balance.    -Continue metoprolol  mg daily, hydralazine 100 mg 3 times daily, amlodipine 10 mg daily for blood pressure control.    -DVT prevention with Lovenox, per ortho. NWB RLE in bulky marshall knee immobilizer  -pain control  -continue home meds  -medical management per primary team  -recommend dvt ppx  - pt is a poor surgical candidate, and if surgery recommended will need medical optimization by cardiology/pulmonology  -given the complicated pattern and location of the fracture, and in the setting of multiple severe medical comorbidities, pt is a poor surgical candidate and benefits may not outweigh the risks of operative management  -pt family requesting to be transferred to [facility] for further management by her primary orthopedic surgeon, PER FAMILY'S REQUEST (Dr. Ariel Sandoval)  - Spoke to primary surgeon regarding request for transfer --> As per surgeon he requested to review the XRAY and will call back. (Dr. Castro) [phone]  - Spoke to daughter regarding transfer attempt and long term plan in case transfer does not happen. Pt's daughter expressed she would like mother to go to [facility] but will discuss with patient.  86F with HFpEF, obesity, obstructive sleep apnea on CPAP, hypertension, type 2 diabetes, COPD, lymphedema, status post fall and knee fracture currently on telemetry.    -Continue IV Lasix 40 mg twice daily for now,  to keep fluid negative balance.    -Continue metoprolol  mg daily, hydralazine 100 mg 3 times daily, amlodipine 10 mg daily for blood pressure control.    -DVT prevention with Lovenox, per ortho. NWB RLE in bulky marshall knee immobilizer  -pain control  -continue home meds  - dvt ppx  - pt is a poor surgical candidate and will need medical optimization by cardiology/pulmonology  -given the complicated pattern and location of the fracture, and in the setting of multiple severe medical comorbidities, pt is a poor surgical candidate and benefits may not outweigh the risks of operative management  -pt family requesting to be transferred to [facility] for further management by her primary orthopedic surgeon, PER FAMILY'S REQUEST (Dr. Ariel Sandoval)  - Spoke to primary surgeon regarding request for transfer --> As per surgeon he requested to review the XRAY and will call back. (Dr. Castro) [phone]  - Spoke to daughter regarding transfer attempt and long term plan in case transfer does not happen. Pt's daughter expressed she would like mother to go to [facility] but will discuss with patient.

## 2021-11-21 NOTE — PROGRESS NOTE ADULT - ASSESSMENT
NAVDEEP ONTIVEROS 86 f 11/19/2021 1935 DR KAT SYKES     REVIEW OF SYMPTOMS      Able to give ROS  Yes     RELIABLE +/-   CONSTITUTIONAL Weakness Yes  Chills No   ENDOCRINE  No heat or cold intolerance    ALLERGY No hives  Sore throat No stridor  RESP Coughing blood no  Shortness of breath YES   NEURO No Headache  Confusion Pain neck No   CARDIAC No Chest pain No Palpitations   GI  Pain abdomen NO   Vomiting NO     PHYSICAL EXAM    HEENT Unremarkable  atraumatic   RESP Fair air entry EXP prolonged    Harsh breath sound Resp distres mild   CARDIAC S1 S2 No S3     NO JVD    ABDOMEN SOFT BS PRESENT NOT DISTENDED No hepatosplenomegaly PEDAL EDEMA present No calf tenderness  NO rash                                      PROBLEMS/ISSUES.  GOC.  COVID STATUS. 11/19 scv2 (-)     MECHANICAL FALL 11/19/2021  R KNEE PERIPROS FRACTURE poa 11/19/2021 in setting of R TKA 2009.  PULMONARY OPTIMIZATION FOR ORTHO SURGERY 11/19/2021 ADMISSION  COPD pmh  MIRELA pmh  HFPEF pmh  DM pmh   OBESITY  pmh 11/19/2021 BMI 44     ANEMIA 11/19-11/20 Hb 9.3-8.3    pmh HFPEF  pmh LYMPHEDEMA on Bumex   pmh MIRELA on CPAP   pmh DM   pmh OBESITY   pmh COPD   pmh L TKA Dr Castro 2012  pmh R TKA Dr Castro 2009   [facility] MINIMAL AMBULATOR       PATIENT DATA   VITALS/PO/IO/VENT/DRIPS.     11/21/2021 99f 89 110/60   11/21/2021 3l 92%   11/21/2021 9p 15/5/12/.34      BEST PRACTICE ISSUES.                                                  HEAD OF BED ELEVATION. Yes  DVT PROPHYLAXIS.  11/19/2021 lvnx 40.2                                        GOLDSTEIN PROPHYLAXIS.                                                                                         DIET.    11/20/2021 regr                      INFECTION PROPHYLAXIS.      GENERAL ISSUES  [facility] ADVANCED DIRECTIVE.                                         ALLERGY.       nka                     WEIGHT.          11/20/2021 131                              BMI.                    11/20/2021 44     ASSESSMENT/RECOMMENDATIONS.      MECHANICAL FALL 11/19/2021  R KNEE PERIPROS FRACTURE poa 11/19/2021 in setting of R TKA 2009.  r leg in bulky marshall knee immobilizer being followed by prtho   Pt to be tr to [facility] for surgery by pts own orth   PULMONARY OPTIMIZATION FOR ORTHO SURGERY 11/19/2021 ADMISSION  Hb 11/20 Hb 8.4  Cr 11/20 Cr .9  ECHO 11/20/2021 ef 60% [id number] as   a/r 11/20/2021 Patient is above average risk of pulm complications due to obesity MIRELA COPD/asthma   She is at high risk of resp failure as well as dvt pe  She is on dvt pplx  11/20/2021 4l 744/70/71   ABG cw onesity hypoventilation syndrome and she will need elective [place] for mirela [facility] copd to plan optimal ventilatory support for the long term   She is in optimal pulm status for urgen surgery if benefits appear greater than risks  COPD pmh  11/20/2021 albuterol HFA P   11/20/2021 SYMBICORT 160   11/20/2021 MONTELUKAST 10   Cont rx  MIRELA pmh  11/19 BPAP ordered by admitting md 15/5/12/3l   continue for now   May eventually just need cpap   CAD HYPERTENSION pmh  11/20/2021 METOPROLOL 100  11/20/2021 ATORVASTAT 40  11/20/2021 AMLODIPINE 10   cardio on case  HFPEF pmh  ECHO 11/20/2021 ef 60% [id number] as   11/20/2021 HYDRALAZINE 100.3   11/20/2021 LASIX 40 V.2   cardio on case  DM pmh   11/20/2021 insulin   PAIN.   11/20/2021 tylenol  OBESITY  pmh 11/19/2021 BMI 44   ANEMIA 11/19-11/20 Hb 9.3-8.3  Monitor    OVERALL PLAN  86 obese female with COPD asthma MIRELA on home cpap HFPEF admitted 11/19 with mechanical fall and r periprosth knee fx  Pt is in optimal pulm status but is at high risk for periop pulm complications including difficulty weaning off vent      TIME SPENT   Over 25 minutes aggregate care time spent on encounter; activities included   direct patient care, counseling and/or coordinating care reviewing notes, lab data/ imaging , discussion with multidisciplinary team/ patient  /family and explaining in detail risks, benefits, alternatives  of the recommendations     NAVDEEP ONTIVEROS 86 f 11/19/2021 1935 DR KAT SYKES

## 2021-11-21 NOTE — PROGRESS NOTE ADULT - SUBJECTIVE AND OBJECTIVE BOX
WESTON SETHI    S 2D 89 D    Allergies    No Known Allergies    Intolerances        PAST MEDICAL & SURGICAL HISTORY:  HTN (hypertension)    HLD (hyperlipidemia)    Asthma    DM (diabetes mellitus)    GERD (gastroesophageal reflux disease)    Lymphedema    S/P knee replacement        FAMILY HISTORY:  No pertinent family history in first degree relatives        Home Medications:  Advair Diskus 250 mcg-50 mcg inhalation powder: 1 puff(s) inhaled 2 times a day (30 Oct 2019 16:54)  alendronate 70 mg oral tablet: 1 tab(s) orally once a week (30 Oct 2019 16:54)  amLODIPine 10 mg oral tablet: 1 tab(s) orally once a day (30 Oct 2019 16:54)  ascorbic acid 500 mg oral capsule: 1 cap(s) orally once a day (30 Oct 2019 16:54)  azelastine 137 mcg/inh (0.1%) nasal spray: 2 spray(s) nasal 2 times a day (30 Oct 2019 16:54)  cholecalciferol 2000 intl units oral tablet: 1 tab(s) orally once a day (30 Oct 2019 16:54)  glimepiride 2 mg oral tablet: 1 tab(s) orally once a day (30 Oct 2019 16:54)  hydrALAZINE 100 mg oral tablet: 1 tab(s) orally 3 times a day (06 Nov 2019 13:41)  ipratropium-albuterol 0.5 mg-2.5 mg/3 mLinhalation solution: 3 milliliter(s) inhaled every 6 hours, As Needed (04 Jun 2021 10:05)  metoprolol succinate 100 mg oral tablet, extended release: 1 tab(s) orally once a day (30 Oct 2019 16:54)  montelukast 10 mg oral tablet: 1 tab(s) orally once a day (30 Oct 2019 16:54)  Neurontin 100 mg oral capsule: 1 cap(s) orally 3 times a day (29 Nov 2020 18:19)  ocular lubricant ophthalmic solution: 1 drop(s) to each affected eye 3 times a day, As needed, Dry Eyes (06 Nov 2019 13:41)  polyethylene glycol 3350 oral powder for reconstitution: 17 gram(s) orally once a day (06 Nov 2019 13:41)  ProAir HFA 90 mcg/inh inhalation aerosol: 2 puff(s) inhaled 4 times a day, As Needed (30 Oct 2019 16:54)  rosuvastatin 10 mg oral tablet: 1 tab(s) orally once a day (30 Oct 2019 16:54)      MEDICATIONS  (STANDING):  amLODIPine   Tablet 10 milliGRAM(s) Oral daily  atorvastatin 40 milliGRAM(s) Oral at bedtime  budesonide 160 MICROgram(s)/formoterol 4.5 MICROgram(s) Inhaler 2 Puff(s) Inhalation two times a day  cholecalciferol 2000 Unit(s) Oral daily  dextrose 40% Gel 15 Gram(s) Oral once  dextrose 5%. 1000 milliLiter(s) (50 mL/Hr) IV Continuous <Continuous>  dextrose 5%. 1000 milliLiter(s) (100 mL/Hr) IV Continuous <Continuous>  dextrose 50% Injectable 25 Gram(s) IV Push once  dextrose 50% Injectable 12.5 Gram(s) IV Push once  dextrose 50% Injectable 25 Gram(s) IV Push once  enoxaparin Injectable 40 milliGRAM(s) SubCutaneous every 12 hours  furosemide   Injectable 40 milliGRAM(s) IV Push two times a day  gabapentin 100 milliGRAM(s) Oral three times a day  glucagon  Injectable 1 milliGRAM(s) IntraMuscular once  hydrALAZINE 100 milliGRAM(s) Oral three times a day  insulin lispro (ADMELOG) corrective regimen sliding scale   SubCutaneous three times a day before meals  insulin lispro (ADMELOG) corrective regimen sliding scale   SubCutaneous at bedtime  metoprolol succinate  milliGRAM(s) Oral daily  montelukast 10 milliGRAM(s) Oral at bedtime  polyethylene glycol 3350 17 Gram(s) Oral daily    MEDICATIONS  (PRN):  acetaminophen     Tablet .. 650 milliGRAM(s) Oral every 6 hours PRN Mild Pain (1 - 3), Moderate Pain (4 - 6)  ALBUTerol    90 MICROgram(s) HFA Inhaler 2 Puff(s) Inhalation every 6 hours PRN Shortness of Breath and/or Wheezing  melatonin 3 milliGRAM(s) Oral at bedtime PRN Insomnia  oxyCODONE    IR 5 milliGRAM(s) Oral every 4 hours PRN Moderate Pain (4 - 6)      Diet, Regular:   Consistent Carbohydrate Evening Snack (11-20-21 @ 08:26) [Active]          Vital Signs Last 24 Hrs  T(C): 37.1 (21 Nov 2021 05:00), Max: 38 (20 Nov 2021 11:12)  T(F): 98.7 (21 Nov 2021 05:00), Max: 100.4 (20 Nov 2021 11:12)  HR: 94 (21 Nov 2021 05:31) (55 - 102)  BP: 101/63 (21 Nov 2021 05:00) (101/63 - 123/74)  BP(mean): --  RR: 25 (21 Nov 2021 05:00) (17 - 25)  SpO2: 92% (21 Nov 2021 05:31) (91% - 96%)      11-20-21 @ 07:01  -  11-21-21 @ 07:00  --------------------------------------------------------  IN: 200 mL / OUT: 0 mL / NET: 200 mL              LABS:                        8.3    8.40  )-----------( 223      ( 20 Nov 2021 07:14 )             29.1     11-20    134<L>  |  91<L>  |  30<H>  ----------------------------<  140<H>  4.7   |  43<H>  |  0.98    Ca    8.6      20 Nov 2021 07:14  Phos  3.1     11-20  Mg     2.5     11-20    TPro  7.4  /  Alb  2.4<L>  /  TBili  0.3  /  DBili  x   /  AST  18  /  ALT  14  /  AlkPhos  50  11-20          ABG - ( 20 Nov 2021 12:00 )  pH, Arterial: x     pH, Blood: 7.44  /  pCO2: 70    /  pO2: 71    / HCO3: 48    / Base Excess: 19.0  /  SaO2: 96.0                WBC:  WBC Count: 8.40 K/uL (11-20 @ 07:14)  WBC Count: 13.27 K/uL (11-19 @ 04:41)      MICROBIOLOGY:  RECENT CULTURES:                  Sodium:  Sodium, Serum: 134 mmol/L (11-20 @ 07:14)  Sodium, Serum: 134 mmol/L (11-19 @ 10:15)  Sodium, Serum: 132 mmol/L (11-19 @ 04:41)      0.98 mg/dL 11-20 @ 07:14  1.06 mg/dL 11-19 @ 10:15  1.05 mg/dL 11-19 @ 04:41      Hemoglobin:  Hemoglobin: 8.3 g/dL (11-20 @ 07:14)  Hemoglobin: 9.3 g/dL (11-19 @ 04:41)      Platelets: Platelet Count - Automated: 223 K/uL (11-20 @ 07:14)  Platelet Count - Automated: 237 K/uL (11-19 @ 04:41)      LIVER FUNCTIONS - ( 20 Nov 2021 07:14 )  Alb: 2.4 g/dL / Pro: 7.4 gm/dL / ALK PHOS: 50 U/L / ALT: 14 U/L / AST: 18 U/L / GGT: x                 RADIOLOGY & ADDITIONAL STUDIES:      MICROBIOLOGY:  RECENT CULTURES:

## 2021-11-21 NOTE — PROGRESS NOTE ADULT - SUBJECTIVE AND OBJECTIVE BOX
24H hour events:   pt resting  no acute events overnight    MEDICATIONS:  amLODIPine   Tablet 10 milliGRAM(s) Oral daily  enoxaparin Injectable 40 milliGRAM(s) SubCutaneous every 12 hours  furosemide   Injectable 40 milliGRAM(s) IV Push two times a day  hydrALAZINE 100 milliGRAM(s) Oral three times a day  metoprolol succinate  milliGRAM(s) Oral daily      ALBUTerol    90 MICROgram(s) HFA Inhaler 2 Puff(s) Inhalation every 6 hours PRN  budesonide 160 MICROgram(s)/formoterol 4.5 MICROgram(s) Inhaler 2 Puff(s) Inhalation two times a day  montelukast 10 milliGRAM(s) Oral at bedtime    acetaminophen     Tablet .. 650 milliGRAM(s) Oral every 6 hours PRN  gabapentin 100 milliGRAM(s) Oral three times a day  melatonin 3 milliGRAM(s) Oral at bedtime PRN  oxyCODONE    IR 5 milliGRAM(s) Oral every 4 hours PRN    polyethylene glycol 3350 17 Gram(s) Oral daily    atorvastatin 40 milliGRAM(s) Oral at bedtime  dextrose 40% Gel 15 Gram(s) Oral once  dextrose 50% Injectable 25 Gram(s) IV Push once  dextrose 50% Injectable 12.5 Gram(s) IV Push once  dextrose 50% Injectable 25 Gram(s) IV Push once  glucagon  Injectable 1 milliGRAM(s) IntraMuscular once  insulin lispro (ADMELOG) corrective regimen sliding scale   SubCutaneous three times a day before meals  insulin lispro (ADMELOG) corrective regimen sliding scale   SubCutaneous at bedtime    cholecalciferol 2000 Unit(s) Oral daily  dextrose 5%. 1000 milliLiter(s) IV Continuous <Continuous>  dextrose 5%. 1000 milliLiter(s) IV Continuous <Continuous>          PHYSICAL EXAM:  T(C): 37.1 (11-21-21 @ 05:00), Max: 38 (11-20-21 @ 11:12)  HR: 99 (11-21-21 @ 09:25) (55 - 102)  BP: 101/63 (11-21-21 @ 05:00) (101/63 - 123/74)  RR: 25 (11-21-21 @ 05:00) (17 - 25)  SpO2: 93% (11-21-21 @ 09:25) (91% - 96%)  Wt(kg): --  I&O's Summary    20 Nov 2021 07:01  -  21 Nov 2021 07:00  --------------------------------------------------------  IN: 200 mL / OUT: 0 mL / NET: 200 mL        Appearance: Normal	  HEENT:   Normal oral mucosa, PERRL, EOMI	  Lymphatic: No lymphadenopathy  Cardiovascular: Normal S1 S2, No JVD, No murmurs, b/l LE edema (pt with elevated BMI)  Respiratory: coarse bs b/l, poor effort. on bipap  Psychiatry: Mood & affect appropriate  Gastrointestinal:  Soft, Non-tender, + BS	  Skin: No rashes, No ecchymoses, No cyanosis	  Neurologic: Non-focal  Extremities: Normal range of motion, No clubbing, cyanosis       LABS:	 	    CBC Full  -  ( 20 Nov 2021 07:14 )  WBC Count : 8.40 K/uL  Hemoglobin : 8.3 g/dL  Hematocrit : 29.1 %  Platelet Count - Automated : 223 K/uL  Mean Cell Volume : 89.3 fl  Mean Cell Hemoglobin : 25.5 pg  Mean Cell Hemoglobin Concentration : 28.5 gm/dL  Auto Neutrophil # : x  Auto Lymphocyte # : x  Auto Monocyte # : x  Auto Eosinophil # : x  Auto Basophil # : x  Auto Neutrophil % : x  Auto Lymphocyte % : x  Auto Monocyte % : x  Auto Eosinophil % : x  Auto Basophil % : x    11-20    134<L>  |  91<L>  |  30<H>  ----------------------------<  140<H>  4.7   |  43<H>  |  0.98    Ca    8.6      20 Nov 2021 07:14  Phos  3.1     11-20  Mg     2.5     11-20    TPro  7.4  /  Alb  2.4<L>  /  TBili  0.3  /  DBili  x   /  AST  18  /  ALT  14  /  AlkPhos  50  11-20      proBNP: Serum Pro-Brain Natriuretic Peptide: 809 pg/mL (11-19-21 @ 04:41)    Lipid Profile:   HgA1c:   TSH:       CARDIAC MARKERS:            TELEMETRY: 	    ECG:  	  RADIOLOGY:  OTHER: 	    PREVIOUS DIAGNOSTIC TESTING:    [ ] Echocardiogram:  [ ]  Catheterization:  [ ] Stress Test:  	  	  ASSESSMENT/PLAN:

## 2021-11-21 NOTE — PROGRESS NOTE ADULT - ASSESSMENT
86F with HFpEF, obesity, obstructive sleep apnea on CPAP, hypertension, type 2 diabetes, COPD, lymphedema, status post fall and knee fracture.     -Continue IV Lasix 40 mg twice daily for now,  to keep fluid negative balance.    -volume assessment severely limited by body habitus  -Continue metoprolol  mg daily, hydralazine 100 mg 3 times daily, amlodipine 10 mg daily for blood pressure control.    -DVT prevention with Lovenox, per ortho.   -will follow with you

## 2021-11-22 LAB
ALBUMIN SERPL ELPH-MCNC: 2.4 G/DL — LOW (ref 3.3–5)
ALP SERPL-CCNC: 45 U/L — SIGNIFICANT CHANGE UP (ref 40–120)
ALT FLD-CCNC: 12 U/L — SIGNIFICANT CHANGE UP (ref 12–78)
ANION GAP SERPL CALC-SCNC: 1 MMOL/L — LOW (ref 5–17)
AST SERPL-CCNC: 19 U/L — SIGNIFICANT CHANGE UP (ref 15–37)
BILIRUB SERPL-MCNC: 0.4 MG/DL — SIGNIFICANT CHANGE UP (ref 0.2–1.2)
BUN SERPL-MCNC: 41 MG/DL — HIGH (ref 7–23)
CALCIUM SERPL-MCNC: 8.8 MG/DL — SIGNIFICANT CHANGE UP (ref 8.5–10.1)
CHLORIDE SERPL-SCNC: 92 MMOL/L — LOW (ref 96–108)
CO2 SERPL-SCNC: 44 MMOL/L — HIGH (ref 22–31)
CREAT SERPL-MCNC: 1.35 MG/DL — HIGH (ref 0.5–1.3)
GLUCOSE BLDC GLUCOMTR-MCNC: 130 MG/DL — HIGH (ref 70–99)
GLUCOSE BLDC GLUCOMTR-MCNC: 151 MG/DL — HIGH (ref 70–99)
GLUCOSE BLDC GLUCOMTR-MCNC: 159 MG/DL — HIGH (ref 70–99)
GLUCOSE BLDC GLUCOMTR-MCNC: 205 MG/DL — HIGH (ref 70–99)
GLUCOSE SERPL-MCNC: 106 MG/DL — HIGH (ref 70–99)
HCT VFR BLD CALC: 28.3 % — LOW (ref 34.5–45)
HGB BLD-MCNC: 8.2 G/DL — LOW (ref 11.5–15.5)
MAGNESIUM SERPL-MCNC: 2.8 MG/DL — HIGH (ref 1.6–2.6)
MCHC RBC-ENTMCNC: 26 PG — LOW (ref 27–34)
MCHC RBC-ENTMCNC: 29 GM/DL — LOW (ref 32–36)
MCV RBC AUTO: 89.8 FL — SIGNIFICANT CHANGE UP (ref 80–100)
NRBC # BLD: 0 /100 WBCS — SIGNIFICANT CHANGE UP (ref 0–0)
PHOSPHATE SERPL-MCNC: 4.6 MG/DL — HIGH (ref 2.5–4.5)
PLATELET # BLD AUTO: 246 K/UL — SIGNIFICANT CHANGE UP (ref 150–400)
POTASSIUM SERPL-MCNC: 4.5 MMOL/L — SIGNIFICANT CHANGE UP (ref 3.5–5.3)
POTASSIUM SERPL-SCNC: 4.5 MMOL/L — SIGNIFICANT CHANGE UP (ref 3.5–5.3)
PROT SERPL-MCNC: 7.6 GM/DL — SIGNIFICANT CHANGE UP (ref 6–8.3)
RBC # BLD: 3.15 M/UL — LOW (ref 3.8–5.2)
RBC # FLD: 16.3 % — HIGH (ref 10.3–14.5)
SODIUM SERPL-SCNC: 137 MMOL/L — SIGNIFICANT CHANGE UP (ref 135–145)
WBC # BLD: 8.96 K/UL — SIGNIFICANT CHANGE UP (ref 3.8–10.5)
WBC # FLD AUTO: 8.96 K/UL — SIGNIFICANT CHANGE UP (ref 3.8–10.5)

## 2021-11-22 PROCEDURE — 99232 SBSQ HOSP IP/OBS MODERATE 35: CPT

## 2021-11-22 PROCEDURE — 99233 SBSQ HOSP IP/OBS HIGH 50: CPT

## 2021-11-22 RX ORDER — SODIUM CHLORIDE 0.65 %
1 AEROSOL, SPRAY (ML) NASAL THREE TIMES A DAY
Refills: 0 | Status: DISCONTINUED | OUTPATIENT
Start: 2021-11-22 | End: 2021-11-26

## 2021-11-22 RX ORDER — BUMETANIDE 0.25 MG/ML
1 INJECTION INTRAMUSCULAR; INTRAVENOUS DAILY
Refills: 0 | Status: DISCONTINUED | OUTPATIENT
Start: 2021-11-22 | End: 2021-11-27

## 2021-11-22 RX ADMIN — Medication 100 MILLIGRAM(S): at 06:18

## 2021-11-22 RX ADMIN — BUDESONIDE AND FORMOTEROL FUMARATE DIHYDRATE 2 PUFF(S): 160; 4.5 AEROSOL RESPIRATORY (INHALATION) at 17:05

## 2021-11-22 RX ADMIN — OXYCODONE HYDROCHLORIDE 5 MILLIGRAM(S): 5 TABLET ORAL at 18:05

## 2021-11-22 RX ADMIN — GABAPENTIN 100 MILLIGRAM(S): 400 CAPSULE ORAL at 21:53

## 2021-11-22 RX ADMIN — ENOXAPARIN SODIUM 40 MILLIGRAM(S): 100 INJECTION SUBCUTANEOUS at 17:05

## 2021-11-22 RX ADMIN — SENNA PLUS 2 TABLET(S): 8.6 TABLET ORAL at 21:53

## 2021-11-22 RX ADMIN — Medication 2000 UNIT(S): at 12:00

## 2021-11-22 RX ADMIN — Medication 2: at 11:48

## 2021-11-22 RX ADMIN — OXYCODONE HYDROCHLORIDE 5 MILLIGRAM(S): 5 TABLET ORAL at 17:04

## 2021-11-22 RX ADMIN — AMLODIPINE BESYLATE 10 MILLIGRAM(S): 2.5 TABLET ORAL at 12:00

## 2021-11-22 RX ADMIN — ATORVASTATIN CALCIUM 40 MILLIGRAM(S): 80 TABLET, FILM COATED ORAL at 21:54

## 2021-11-22 RX ADMIN — GABAPENTIN 100 MILLIGRAM(S): 400 CAPSULE ORAL at 06:17

## 2021-11-22 RX ADMIN — BUMETANIDE 1 MILLIGRAM(S): 0.25 INJECTION INTRAMUSCULAR; INTRAVENOUS at 21:53

## 2021-11-22 RX ADMIN — Medication 2: at 17:04

## 2021-11-22 RX ADMIN — BUDESONIDE AND FORMOTEROL FUMARATE DIHYDRATE 2 PUFF(S): 160; 4.5 AEROSOL RESPIRATORY (INHALATION) at 06:16

## 2021-11-22 RX ADMIN — ENOXAPARIN SODIUM 40 MILLIGRAM(S): 100 INJECTION SUBCUTANEOUS at 06:16

## 2021-11-22 RX ADMIN — MONTELUKAST 10 MILLIGRAM(S): 4 TABLET, CHEWABLE ORAL at 21:53

## 2021-11-22 RX ADMIN — POLYETHYLENE GLYCOL 3350 17 GRAM(S): 17 POWDER, FOR SOLUTION ORAL at 12:01

## 2021-11-22 RX ADMIN — GABAPENTIN 100 MILLIGRAM(S): 400 CAPSULE ORAL at 15:08

## 2021-11-22 RX ADMIN — Medication 40 MILLIGRAM(S): at 06:17

## 2021-11-22 RX ADMIN — Medication 1 SPRAY(S): at 12:03

## 2021-11-22 NOTE — DIETITIAN INITIAL EVALUATION ADULT. - OTHER INFO
Pt admitted w/ fluid overload ; R Tibia Fx & knee Fx ; Obesity ( BMI = 44) ; SHAD  on CPAP ; HTN ; DM2 - 11/20- A1C = 6.9 %); COPD ; Lymphedema; S/P fall. On IV lasix 40 mg 2x/d. Pt is a poor surgical candidate. She lives w/ her daughter whom does the food shopping / cooking. UBW without fluid = 277 #). Pt gets weighed at her PMD 1-2 x / month. She does not eat pork and wants Halal food. Educated and provided pt w/ weight loss tips  & weight management cooking tips. Pt disinterested but took information. Pt consuming 50 - 80& meals including Glucerna Shake 8 oz daily (220 jed, 10 gm pro) x 2. Denies N/V/D/C/Chewing/Swallowing issues, No food allergies. Pt needs to be fed due to difficulty feeding herself, reports her hands shake.

## 2021-11-22 NOTE — DIETITIAN NUTRITION RISK NOTIFICATION - TREATMENT: THE FOLLOWING DIET HAS BEEN RECOMMENDED
Diet, Consistent Carbohydrate/No Snacks:   Low Sodium  Halal  No Pork  Supplement Feeding Modality:  Oral  Glucerna Shake Cans or Servings Per Day:  1       Frequency:  Two Times a day (11-22-21 @ 12:31) [Pending Verification By Attending]  Diet, Regular:   Supplement Feeding Modality:  Oral  Glucerna Shake Cans or Servings Per Day:  1       Frequency:  Two Times a day (11-21-21 @ 15:44) [Active]

## 2021-11-22 NOTE — SWALLOW BEDSIDE ASSESSMENT ADULT - SLP PERTINENT HISTORY OF CURRENT PROBLEM
fluid overload, right ankle fracturecomplain of mechanical fall. Patient said she rolled out of bed this morning and then she complain of right knee pain. Reported always SOB. N fluid overload, right ankle fracture; complain of mechanical fall. Patient said she rolled out of bed this morning and then she complain of right knee pain. Reported always SOB

## 2021-11-22 NOTE — PROGRESS NOTE ADULT - SUBJECTIVE AND OBJECTIVE BOX
Patient seen and examined at bedside this AM. Patient demented but appears comfortable at present. Denies N/V/CP/SOB.     VITAL SIGNS:  T(C): 37 (21 @ 05:16), Max: 37.6 (21 @ 15:46)  HR: 101 (21 @ 05:26) (72 - 106)  BP: 125/71 (21 @ 05:16) (114/54 - 152/73)  RR: 19 (21 @ 05:16) (10 - 22)  SpO2: 93% (21 @ 05:26) (91% - 96%)    LABS:                        8.3    8.40  )-----------( 223      ( 2021 07:14 )             29.1         134<L>  |  91<L>  |  30<H>  ----------------------------<  140<H>  4.7   |  43<H>  |  0.98    Ca    8.6      2021 07:14  Phos  3.1       Mg     2.5         TPro  7.4  /  Alb  2.4<L>  /  TBili  0.3  /  DBili  x   /  AST  18  /  ALT  14  /  AlkPhos  50        Urinalysis Basic - ( 2021 19:52 )    Color: Yellow / Appearance: Clear / S.010 / pH: x  Gluc: x / Ketone: Negative  / Bili: Negative / Urobili: Negative mg/dL   Blood: x / Protein: Negative mg/dL / Nitrite: Negative   Leuk Esterase: Trace / RBC: 0-2 /HPF / WBC 0-2   Sq Epi: x / Non Sq Epi: Few / Bacteria: Moderate        PE:  Gen: NAD when not moving, mild dementia  RLE:  dressing c/d/i  KI in place  Compartments soft and compressible  Severe lymphedema bilaterally  No calf ttp  Severe pain of the RLE, about the knee and proximal tibia  +EHL/FHL/Gsc/TA  SILT L2-S1, mildly diminished secondary to lymphedema  Difficult to appreciate pulses secondary to lymphedema

## 2021-11-22 NOTE — PROGRESS NOTE ADULT - ASSESSMENT
86F with HFpEF, obesity, obstructive sleep apnea on CPAP, hypertension, type 2 diabetes, COPD, lymphedema, Hx R TKA (2009), Pt is status post fall and knee fracture.     -Continue IV Lasix 40 mg twice daily for now,  to keep fluid negative balance.    -volume assessment severely limited by body habitus  -Continue metoprolol  mg daily, hydralazine 100 mg 3 times daily, amlodipine 10 mg daily for blood pressure control.    -DVT prevention with Lovenox, per ortho.   -cont. Pain control  -Per ortho/primary team, Plan to transfer to [facility] for operative management by primary Orthopedic surgeon per family's request     86F with HFpEF, obesity, obstructive sleep apnea on CPAP, hypertension, type 2 diabetes, COPD, lymphedema, Hx R TKA (2009), Pt is admitted for fluid overload, status post fall with R knee fracture.     -Continue IV Lasix 40 mg twice daily for now,  to keep fluid negative balance.    -volume assessment severely limited by body habitus  -Continue metoprolol  mg daily, hydralazine 100 mg 3 times daily, amlodipine 10 mg daily for blood pressure control.    -DVT prevention with Lovenox   -cont. Pain control  -Per ortho/primary team, Plan to transfer to [facility] for operative management by primary Orthopedic surgeon per family's request  -ortho team on board   86F with HFpEF, obesity, obstructive sleep apnea on CPAP, hypertension, type 2 diabetes, COPD, lymphedema, Hx R TKA (2009), a/w with R knee fracture, status post fall  pt also with c/o sob with evidence of fluid overload    -Currently on IV Lasix 40 mg twice daily, will decrease to once a day as BUN/Creat uptrending and bicarb remains elevated  -monitor renal function closely, monitor daily electrolytes   -volume assessment severely limited by body habitus  -cont supplemental O2/BiPAP as needed, COPD management as per pulm   -Continue metoprolol  mg daily, hydralazine 100 mg 3 times daily, amlodipine 10 mg daily for blood pressure control.    -DVT prevention with Lovenox   -cont. Pain control  -ortho team on board   86F with HFpEF on Bumex at home, obesity, obstructive sleep apnea on CPAP, hypertension, type 2 diabetes, COPD, lymphedema, Hx R TKA (2009), a/w with R knee fracture, status post fall  pt also with c/o sob with evidence of fluid overload  Chest xray Mild cardiomegaly and mild central pulmonary venous congestion. Moderate perihilar opacities may represent infiltrate in the correct clinical context. Differential diagnosis includes chronic interstitial lung disease.    Echo  with preserved EF, Grade I DD, mild AS    -Currently on IV Lasix 40 mg twice daily,  BUN/Creat uptrending and bicarb remains elevated, would consider switching to home dose of Bumex 1mg  -monitor renal function closely, monitor daily electrolytes   -volume assessment severely limited by body habitus  -cont supplemental O2/BiPAP as needed, COPD management as per pulm   -Continue metoprolol  mg daily, hydralazine 100 mg 3 times daily, amlodipine 10 mg daily for blood pressure control.    -DVT prevention with Lovenox   -cont. Pain control  -ortho team on board  -last admission to [facility] in May 2021 with DHF, pt follow up with Dr. Gutierrez Bermudez her cardiologist, last visit Sept 30 th 2021

## 2021-11-22 NOTE — PROGRESS NOTE ADULT - SUBJECTIVE AND OBJECTIVE BOX
Patient is a 86y old  Female who presents with a chief complaint of fluid overload, right ankle fracture (2021 10:09)    INTERVAL HPI/OVERNIGHT EVENTS: Patients seen and examined at bedside this morning. No acute events overnight. Pt reports    MEDICATIONS  (STANDING):  amLODIPine   Tablet 10 milliGRAM(s) Oral daily  atorvastatin 40 milliGRAM(s) Oral at bedtime  budesonide 160 MICROgram(s)/formoterol 4.5 MICROgram(s) Inhaler 2 Puff(s) Inhalation two times a day  cholecalciferol 2000 Unit(s) Oral daily  dextrose 40% Gel 15 Gram(s) Oral once  dextrose 5%. 1000 milliLiter(s) (50 mL/Hr) IV Continuous <Continuous>  dextrose 5%. 1000 milliLiter(s) (100 mL/Hr) IV Continuous <Continuous>  dextrose 50% Injectable 25 Gram(s) IV Push once  dextrose 50% Injectable 12.5 Gram(s) IV Push once  dextrose 50% Injectable 25 Gram(s) IV Push once  enoxaparin Injectable 40 milliGRAM(s) SubCutaneous every 12 hours  furosemide   Injectable 40 milliGRAM(s) IV Push two times a day  gabapentin 100 milliGRAM(s) Oral three times a day  glucagon  Injectable 1 milliGRAM(s) IntraMuscular once  hydrALAZINE 100 milliGRAM(s) Oral three times a day  insulin lispro (ADMELOG) corrective regimen sliding scale   SubCutaneous three times a day before meals  insulin lispro (ADMELOG) corrective regimen sliding scale   SubCutaneous at bedtime  metoprolol succinate  milliGRAM(s) Oral daily  montelukast 10 milliGRAM(s) Oral at bedtime  polyethylene glycol 3350 17 Gram(s) Oral daily  senna 2 Tablet(s) Oral at bedtime    MEDICATIONS  (PRN):  acetaminophen     Tablet .. 650 milliGRAM(s) Oral every 6 hours PRN Mild Pain (1 - 3), Moderate Pain (4 - 6)  ALBUTerol    90 MICROgram(s) HFA Inhaler 2 Puff(s) Inhalation every 6 hours PRN Shortness of Breath and/or Wheezing  melatonin 3 milliGRAM(s) Oral at bedtime PRN Insomnia  oxyCODONE    IR 10 milliGRAM(s) Oral every 4 hours PRN Severe Pain (7 - 10)  oxyCODONE    IR 5 milliGRAM(s) Oral every 4 hours PRN Moderate Pain (4 - 6)  sodium chloride 0.65% Nasal 1 Spray(s) Both Nostrils three times a day PRN Nasal Congestion    Allergies    No Known Allergies    Intolerances      REVIEW OF SYSTEMS:  All other systems reviewed and are negative    Vital Signs Last 24 Hrs  T(C): 37 (2021 05:16), Max: 37.6 (2021 15:46)  T(F): 98.6 (2021 05:16), Max: 99.7 (2021 15:46)  HR: 85 (2021 09:34) (85 - 106)  BP: 114/65 (2021 09:34) (114/54 - 152/73)  BP(mean): --  RR: 19 (2021 05:16) (10 - 20)  SpO2: 94% (2021 08:44) (91% - 96%)  Daily     Daily   I&O's Summary    2021 07:01  -  2021 07:00  --------------------------------------------------------  IN: 180 mL / OUT: 1 mL / NET: 179 mL      CAPILLARY BLOOD GLUCOSE      POCT Blood Glucose.: 151 mg/dL (2021 11:23)  POCT Blood Glucose.: 130 mg/dL (2021 07:47)  POCT Blood Glucose.: 119 mg/dL (2021 22:53)  POCT Blood Glucose.: 102 mg/dL (2021 17:00)    PHYSICAL EXAM:  GENERAL: NAD, well-groomed, well-developed  HEAD:  Atraumatic, Normocephalic  EYES: EOMI, PERRLA, conjunctiva and sclera clear  ENMT: No tonsillar erythema, exudates, or enlargement; Moist mucous membranes, Good dentition, No lesions  NECK: Supple, No JVD, Normal thyroid  NERVOUS SYSTEM:  Alert & Oriented X3, Good concentration; Motor Strength 5/5 B/L upper and lower extremities; DTRs 2+ intact and symmetric  CHEST/LUNG: Clear to percussion bilaterally; No rales, rhonchi, wheezing, or rubs  HEART: Regular rate and rhythm; No murmurs, rubs, or gallops  ABDOMEN: Soft, Nontender, Nondistended; Bowel sounds present  EXTREMITIES:  2+ Peripheral Pulses, No clubbing, cyanosis, or edema  LYMPH: No lymphadenopathy noted  SKIN: No rashes or lesions    Labs                          8.2    8.96  )-----------( 246      ( 2021 07:47 )             28.3         137  |  92<L>  |  41<H>  ----------------------------<  106<H>  4.5   |  44<H>  |  1.35<H>    Ca    8.8      2021 07:47  Phos  4.6       Mg     2.8         TPro  7.6  /  Alb  2.4<L>  /  TBili  0.4  /  DBili  x   /  AST  19  /  ALT  12  /  AlkPhos  45            Urinalysis Basic - ( 2021 19:52 )    Color: Yellow / Appearance: Clear / S.010 / pH: x  Gluc: x / Ketone: Negative  / Bili: Negative / Urobili: Negative mg/dL   Blood: x / Protein: Negative mg/dL / Nitrite: Negative   Leuk Esterase: Trace / RBC: 0-2 /HPF / WBC 0-2   Sq Epi: x / Non Sq Epi: Few / Bacteria: Moderate                  DVT prophylaxis: > Lovenox 40mg SQ daily  > Heparin   > SCD's Patient is a 86y old  Female who presents with a chief complaint of fluid overload, right ankle fracture (2021 10:09)    INTERVAL HPI/OVERNIGHT EVENTS: Patients seen and examined at bedside this morning. No acute events overnight. Pt reports she is tired of being in the hospital and sick all the time. Spoke to primary ortho doc. See comments below. Pt expressed pain is controlled.     MEDICATIONS  (STANDING):  amLODIPine   Tablet 10 milliGRAM(s) Oral daily  atorvastatin 40 milliGRAM(s) Oral at bedtime  budesonide 160 MICROgram(s)/formoterol 4.5 MICROgram(s) Inhaler 2 Puff(s) Inhalation two times a day  cholecalciferol 2000 Unit(s) Oral daily  dextrose 40% Gel 15 Gram(s) Oral once  dextrose 5%. 1000 milliLiter(s) (50 mL/Hr) IV Continuous <Continuous>  dextrose 5%. 1000 milliLiter(s) (100 mL/Hr) IV Continuous <Continuous>  dextrose 50% Injectable 25 Gram(s) IV Push once  dextrose 50% Injectable 12.5 Gram(s) IV Push once  dextrose 50% Injectable 25 Gram(s) IV Push once  enoxaparin Injectable 40 milliGRAM(s) SubCutaneous every 12 hours  furosemide   Injectable 40 milliGRAM(s) IV Push two times a day  gabapentin 100 milliGRAM(s) Oral three times a day  glucagon  Injectable 1 milliGRAM(s) IntraMuscular once  hydrALAZINE 100 milliGRAM(s) Oral three times a day  insulin lispro (ADMELOG) corrective regimen sliding scale   SubCutaneous three times a day before meals  insulin lispro (ADMELOG) corrective regimen sliding scale   SubCutaneous at bedtime  metoprolol succinate  milliGRAM(s) Oral daily  montelukast 10 milliGRAM(s) Oral at bedtime  polyethylene glycol 3350 17 Gram(s) Oral daily  senna 2 Tablet(s) Oral at bedtime    MEDICATIONS  (PRN):  acetaminophen     Tablet .. 650 milliGRAM(s) Oral every 6 hours PRN Mild Pain (1 - 3), Moderate Pain (4 - 6)  ALBUTerol    90 MICROgram(s) HFA Inhaler 2 Puff(s) Inhalation every 6 hours PRN Shortness of Breath and/or Wheezing  melatonin 3 milliGRAM(s) Oral at bedtime PRN Insomnia  oxyCODONE    IR 10 milliGRAM(s) Oral every 4 hours PRN Severe Pain (7 - 10)  oxyCODONE    IR 5 milliGRAM(s) Oral every 4 hours PRN Moderate Pain (4 - 6)  sodium chloride 0.65% Nasal 1 Spray(s) Both Nostrils three times a day PRN Nasal Congestion    Allergies    No Known Allergies    Intolerances      REVIEW OF SYSTEMS:  All other systems reviewed and are negative    Vital Signs Last 24 Hrs  T(C): 37 (2021 05:16), Max: 37.6 (2021 15:46)  T(F): 98.6 (2021 05:16), Max: 99.7 (2021 15:46)  HR: 85 (2021 09:34) (85 - 106)  BP: 114/65 (2021 09:34) (114/54 - 152/73)  BP(mean): --  RR: 19 (2021 05:16) (10 - 20)  SpO2: 94% (2021 08:44) (91% - 96%)  Daily     Daily   I&O's Summary    2021 07:01  -  2021 07:00  --------------------------------------------------------  IN: 180 mL / OUT: 1 mL / NET: 179 mL      CAPILLARY BLOOD GLUCOSE      POCT Blood Glucose.: 151 mg/dL (2021 11:23)  POCT Blood Glucose.: 130 mg/dL (2021 07:47)  POCT Blood Glucose.: 119 mg/dL (2021 22:53)  POCT Blood Glucose.: 102 mg/dL (2021 17:00)    PHYSICAL EXAM:  GENERAL: NAD, obese, on BIPAP  HEAD:  Atraumatic, Normocephalic  EYES: EOMI, PERRLA, conjunctiva and sclera clear  ENMT: No tonsillar erythema, exudates, or enlargement; Moist mucous membranes, Good dentition, No lesions  NECK: Supple, No JVD, Normal thyroid  NERVOUS SYSTEM:  Alert & Oriented X3, Good concentration; Motor Strength 5/5 B/L upper and lower extremities; DTRs 2+ intact and symmetric  CHEST/LUNG: Clear to percussion bilaterally; diminished lungs throughout. Coarse breath sounds and rales at b/l bases.  HEART: Regular rate and rhythm; No murmurs, rubs, or gallops  ABDOMEN: Soft, Nontender, Nondistended; Bowel sounds present  EXTREMITIES: lymphedema b/l legs. right leg in brace. 2+ edema b/l. pulses difficult to palpate due to body habitus.  LYMPH: No lymphadenopathy noted  SKIN: No rashes or lesions    Labs                          8.2    8.96  )-----------( 246      ( 2021 07:47 )             28.3         137  |  92<L>  |  41<H>  ----------------------------<  106<H>  4.5   |  44<H>  |  1.35<H>    Ca    8.8      2021 07:47  Phos  4.6       Mg     2.8         TPro  7.6  /  Alb  2.4<L>  /  TBili  0.4  /  DBili  x   /  AST  19  /  ALT  12  /  AlkPhos  45            Urinalysis Basic - ( 2021 19:52 )    Color: Yellow / Appearance: Clear / S.010 / pH: x  Gluc: x / Ketone: Negative  / Bili: Negative / Urobili: Negative mg/dL   Blood: x / Protein: Negative mg/dL / Nitrite: Negative   Leuk Esterase: Trace / RBC: 0-2 /HPF / WBC 0-2   Sq Epi: x / Non Sq Epi: Few / Bacteria: Moderate                  DVT prophylaxis: > Lovenox

## 2021-11-22 NOTE — SWALLOW BEDSIDE ASSESSMENT ADULT - ADDITIONAL RECOMMENDATIONS
PILLS WITH APPLESAUCE FOR SAFETY; FULL ASSIST FOR FEEDING; ALLOW EXTRA TIME BETWEEN SWALLOW; ALTERNATE FOOD WITH LIQUIDS; UPRIGHT POSITION; SMALL BITES/SIPS; SLOW PACE

## 2021-11-22 NOTE — PROGRESS NOTE ADULT - ASSESSMENT
NAVDEEP ONTIVEROS 86 f 11/19/2021 1935 DR KAT SYKES     REVIEW OF SYMPTOMS      Able to give ROS  Yes     RELIABLE +/-   CONSTITUTIONAL Weakness Yes  Chills No   ENDOCRINE  No heat or cold intolerance    ALLERGY No hives  Sore throat No stridor  RESP Coughing blood no  Shortness of breath YES   NEURO No Headache  Confusion Pain neck No   CARDIAC No Chest pain No Palpitations   GI  Pain abdomen NO   Vomiting NO     PHYSICAL EXAM    HEENT Unremarkable  atraumatic   RESP Fair air entry EXP prolonged    Harsh breath sound Resp distres mild   CARDIAC S1 S2 No S3     NO JVD    ABDOMEN SOFT BS PRESENT NOT DISTENDED No hepatosplenomegaly PEDAL EDEMA present No calf tenderness  NO rash                                    PROBLEMS/ISSUES.  GOC.  COVID STATUS. 11/19 scv2 (-)     MECHANICAL FALL 11/19/2021  R KNEE PERIPROS FRACTURE poa 11/19/2021 in setting of R TKA 2009.  PULMONARY OPTIMIZATION FOR ORTHO SURGERY 11/19/2021 ADMISSION  COPD pmh  MIRELA pmh  HFPEF pmh  DM pmh   OBESITY  pmh 11/19/2021 BMI 44     ANEMIA 11/19-11/20 Hb 9.3-8.3  OBESITY HYPOVENTILATION  11/20/2021 4l 744/70/71   DYSPHAGIA   speech 11/22/2021 minced moist      pmh HFPEF  pmh LYMPHEDEMA on Bumex   pmh MIRELA on CPAP   pmh DM   pmh OBESITY   pmh COPD   pmh L TKA Dr Castro 2012  pmh R TKA Dr Castro 2009   pmh MINIMAL AMBULATOR       INTERVAL EVENTS.    11/22/2021 w 8.9 Hb 8.2 Na 137 CO2 44 Cr 1.3     PATIENT DATA   VITALS/PO/IO/VENT/DRIPS.   11/22/2021 100f 80 150/70   11/22/2021 5l 100%         BEST PRACTICE ISSUES.                                                  HEAD OF BED ELEVATION. Yes  DVT PROPHYLAXIS.  11/19/2021 lvnx 40.2                                        GOLDSTEIN PROPHYLAXIS.                                                                                         DIET.    11/20/2021 regr                      INFECTION PROPHYLAXIS.      GENERAL ISSUES  [facility] ADVANCED DIRECTIVE.                                         ALLERGY.       nka                     WEIGHT.          11/20/2021 131                              BMI.                    11/20/2021 44     ASSESSMENT/RECOMMENDATIONS.      MECHANICAL FALL 11/19/2021  R KNEE PERIPROS FRACTURE poa 11/19/2021 in setting of R TKA 2009.  r leg in bulky marshall knee immobilizer being followed by prtho   Pt to be tr to [facility] for surgery by pts own orth   PULMONARY OPTIMIZATION FOR ORTHO SURGERY 11/19/2021 ADMISSION  Hb 11/20 Hb 8.4  Cr 11/20 Cr .9  ECHO 11/20/2021 ef 60% [id number] as   a/r 11/20/2021 Patient is above average risk of pulm complications due to obesity MIRELA COPD/asthma   She is at high risk of resp failure as well as dvt pe  She is on dvt pplx  11/20/2021 4l 744/70/71   ABG cw onesity hypoventilation syndrome and she will need elective coburn for mirela ohs copd to plan optimal ventilatory support for the long term   She is in optimal pulm status for urgen surgery if benefits appear greater than risks  COPD pmh  11/20/2021 albuterol HFA P   11/20/2021 SYMBICORT 160   11/20/2021 MONTELUKAST 10   Cont rx  MIRELA pmh  11/19 BPAP ordered by admitting md 15/5/12/3l   continue for now   May eventually just need cpap   Should see pulm in office but sated that she has no means of making office visits   CAD HYPERTENSION pmh  11/20/2021 METOPROLOL 100  11/20/2021 ATORVASTAT 40  11/20/2021 AMLODIPINE 10   cardio on case  HFPEF pmh  ECHO 11/20/2021 ef 60% [id number] as   11/20/2021 HYDRALAZINE 100.3   11/20/2021 LASIX 40 V.2 DCed 11/22/2021 11/22/2021 bumetanide 1   cardio on case  DM pmh   11/20/2021 insulin   PAIN.   11/20/2021 tylenol  OBESITY  pmh 11/19/2021 BMI 44   ANEMIA 11/19-11/20 Hb 9.3-8.3  Monitor    OVERALL PLAN  86 obese female with COPD asthma MIRELA on home cpap HFPEF admitted 11/19 with mechanical fall and r periprosth knee fx  Pt is in optimal pulm status but is at high risk for periop pulm complications including difficulty weaning off vent      TIME SPENT   Over 25 minutes aggregate care time spent on encounter; activities included   direct patient care, counseling and/or coordinating care reviewing notes, lab data/ imaging , discussion with multidisciplinary team/ patient  /family and explaining in detail risks, benefits, alternatives  of the recommendations     NAVDEEP ONTIVEROS 86 f 11/19/2021 1935 DR KAT SYKES

## 2021-11-22 NOTE — SWALLOW BEDSIDE ASSESSMENT ADULT - SWALLOW EVAL: DIAGNOSIS
Oropharyngeal dysphagia associated with coordination of breathing and swallowing; reduced swallow efficiency with generalized weakness; there are no overt s/s aspiration for modified consistencies; Dysphonia; GERD

## 2021-11-22 NOTE — DIETITIAN INITIAL EVALUATION ADULT. - PERTINENT MEDS FT
MEDICATIONS  (STANDING):  amLODIPine   Tablet 10 milliGRAM(s) Oral daily  atorvastatin 40 milliGRAM(s) Oral at bedtime  budesonide 160 MICROgram(s)/formoterol 4.5 MICROgram(s) Inhaler 2 Puff(s) Inhalation two times a day  cholecalciferol 2000 Unit(s) Oral daily  dextrose 40% Gel 15 Gram(s) Oral once  dextrose 5%. 1000 milliLiter(s) (50 mL/Hr) IV Continuous <Continuous>  dextrose 5%. 1000 milliLiter(s) (100 mL/Hr) IV Continuous <Continuous>  dextrose 50% Injectable 25 Gram(s) IV Push once  dextrose 50% Injectable 12.5 Gram(s) IV Push once  dextrose 50% Injectable 25 Gram(s) IV Push once  enoxaparin Injectable 40 milliGRAM(s) SubCutaneous every 12 hours  furosemide   Injectable 40 milliGRAM(s) IV Push two times a day  gabapentin 100 milliGRAM(s) Oral three times a day  glucagon  Injectable 1 milliGRAM(s) IntraMuscular once  hydrALAZINE 100 milliGRAM(s) Oral three times a day  insulin lispro (ADMELOG) corrective regimen sliding scale   SubCutaneous three times a day before meals  insulin lispro (ADMELOG) corrective regimen sliding scale   SubCutaneous at bedtime  metoprolol succinate  milliGRAM(s) Oral daily  montelukast 10 milliGRAM(s) Oral at bedtime  polyethylene glycol 3350 17 Gram(s) Oral daily  senna 2 Tablet(s) Oral at bedtime    MEDICATIONS  (PRN):  acetaminophen     Tablet .. 650 milliGRAM(s) Oral every 6 hours PRN Mild Pain (1 - 3), Moderate Pain (4 - 6)  ALBUTerol    90 MICROgram(s) HFA Inhaler 2 Puff(s) Inhalation every 6 hours PRN Shortness of Breath and/or Wheezing  melatonin 3 milliGRAM(s) Oral at bedtime PRN Insomnia  oxyCODONE    IR 10 milliGRAM(s) Oral every 4 hours PRN Severe Pain (7 - 10)  oxyCODONE    IR 5 milliGRAM(s) Oral every 4 hours PRN Moderate Pain (4 - 6)  sodium chloride 0.65% Nasal 1 Spray(s) Both Nostrils three times a day PRN Nasal Congestion

## 2021-11-22 NOTE — PROGRESS NOTE ADULT - SUBJECTIVE AND OBJECTIVE BOX
WESTON SETHI    S 2D 268 D    Allergies    No Known Allergies    Intolerances        PAST MEDICAL & SURGICAL HISTORY:  HTN (hypertension)    HLD (hyperlipidemia)    Asthma    DM (diabetes mellitus)    GERD (gastroesophageal reflux disease)    Lymphedema    S/P knee replacement        FAMILY HISTORY:  No pertinent family history in first degree relatives        Home Medications:  Advair Diskus 250 mcg-50 mcg inhalation powder: 1 puff(s) inhaled 2 times a day (30 Oct 2019 16:54)  alendronate 70 mg oral tablet: 1 tab(s) orally once a week (30 Oct 2019 16:54)  amLODIPine 10 mg oral tablet: 1 tab(s) orally once a day (30 Oct 2019 16:54)  ascorbic acid 500 mg oral capsule: 1 cap(s) orally once a day (30 Oct 2019 16:54)  azelastine 137 mcg/inh (0.1%) nasal spray: 2 spray(s) nasal 2 times a day (30 Oct 2019 16:54)  cholecalciferol 2000 intl units oral tablet: 1 tab(s) orally once a day (30 Oct 2019 16:54)  glimepiride 2 mg oral tablet: 1 tab(s) orally once a day (30 Oct 2019 16:54)  hydrALAZINE 100 mg oral tablet: 1 tab(s) orally 3 times a day (2019 13:41)  ipratropium-albuterol 0.5 mg-2.5 mg/3 mLinhalation solution: 3 milliliter(s) inhaled every 6 hours, As Needed (2021 10:05)  metoprolol succinate 100 mg oral tablet, extended release: 1 tab(s) orally once a day (30 Oct 2019 16:54)  montelukast 10 mg oral tablet: 1 tab(s) orally once a day (30 Oct 2019 16:54)  Neurontin 100 mg oral capsule: 1 cap(s) orally 3 times a day (2020 18:19)  ocular lubricant ophthalmic solution: 1 drop(s) to each affected eye 3 times a day, As needed, Dry Eyes (2019 13:41)  polyethylene glycol 3350 oral powder for reconstitution: 17 gram(s) orally once a day (2019 13:41)  ProAir HFA 90 mcg/inh inhalation aerosol: 2 puff(s) inhaled 4 times a day, As Needed (30 Oct 2019 16:54)  rosuvastatin 10 mg oral tablet: 1 tab(s) orally once a day (30 Oct 2019 16:54)      MEDICATIONS  (STANDING):  amLODIPine   Tablet 10 milliGRAM(s) Oral daily  atorvastatin 40 milliGRAM(s) Oral at bedtime  budesonide 160 MICROgram(s)/formoterol 4.5 MICROgram(s) Inhaler 2 Puff(s) Inhalation two times a day  cholecalciferol 2000 Unit(s) Oral daily  dextrose 40% Gel 15 Gram(s) Oral once  dextrose 5%. 1000 milliLiter(s) (50 mL/Hr) IV Continuous <Continuous>  dextrose 5%. 1000 milliLiter(s) (100 mL/Hr) IV Continuous <Continuous>  dextrose 50% Injectable 25 Gram(s) IV Push once  dextrose 50% Injectable 12.5 Gram(s) IV Push once  dextrose 50% Injectable 25 Gram(s) IV Push once  enoxaparin Injectable 40 milliGRAM(s) SubCutaneous every 12 hours  furosemide   Injectable 40 milliGRAM(s) IV Push two times a day  gabapentin 100 milliGRAM(s) Oral three times a day  glucagon  Injectable 1 milliGRAM(s) IntraMuscular once  hydrALAZINE 100 milliGRAM(s) Oral three times a day  insulin lispro (ADMELOG) corrective regimen sliding scale   SubCutaneous three times a day before meals  insulin lispro (ADMELOG) corrective regimen sliding scale   SubCutaneous at bedtime  metoprolol succinate  milliGRAM(s) Oral daily  montelukast 10 milliGRAM(s) Oral at bedtime  polyethylene glycol 3350 17 Gram(s) Oral daily  senna 2 Tablet(s) Oral at bedtime    MEDICATIONS  (PRN):  acetaminophen     Tablet .. 650 milliGRAM(s) Oral every 6 hours PRN Mild Pain (1 - 3), Moderate Pain (4 - 6)  ALBUTerol    90 MICROgram(s) HFA Inhaler 2 Puff(s) Inhalation every 6 hours PRN Shortness of Breath and/or Wheezing  melatonin 3 milliGRAM(s) Oral at bedtime PRN Insomnia  oxyCODONE    IR 10 milliGRAM(s) Oral every 4 hours PRN Severe Pain (7 - 10)  oxyCODONE    IR 5 milliGRAM(s) Oral every 4 hours PRN Moderate Pain (4 - 6)  sodium chloride 0.65% Nasal 1 Spray(s) Both Nostrils three times a day PRN Nasal Congestion      Diet, Regular:   Supplement Feeding Modality:  Oral  Glucerna Shake Cans or Servings Per Day:  1       Frequency:  Two Times a day (21 @ 15:44) [Active]          Vital Signs Last 24 Hrs  T(C): 37 (2021 05:16), Max: 37.6 (2021 15:46)  T(F): 98.6 (2021 05:16), Max: 99.7 (2021 15:46)  HR: 85 (2021 09:34) (72 - 106)  BP: 114/65 (2021 09:34) (114/54 - 152/73)  BP(mean): --  RR: 19 (2021 05:16) (10 - 22)  SpO2: 94% (2021 08:44) (91% - 96%)      21 @ 07:01  -  21 @ 07:00  --------------------------------------------------------  IN: 180 mL / OUT: 1 mL / NET: 179 mL              LABS:                        8.2    8.96  )-----------( 246      ( 2021 07:47 )             28.3         137  |  92<L>  |  41<H>  ----------------------------<  106<H>  4.5   |  44<H>  |  1.35<H>    Ca    8.8      2021 07:47  Phos  4.6       Mg     2.8         TPro  7.6  /  Alb  2.4<L>  /  TBili  0.4  /  DBili  x   /  AST  19  /  ALT  12  /  AlkPhos  45        Urinalysis Basic - ( 2021 19:52 )    Color: Yellow / Appearance: Clear / S.010 / pH: x  Gluc: x / Ketone: Negative  / Bili: Negative / Urobili: Negative mg/dL   Blood: x / Protein: Negative mg/dL / Nitrite: Negative   Leuk Esterase: Trace / RBC: 0-2 /HPF / WBC 0-2   Sq Epi: x / Non Sq Epi: Few / Bacteria: Moderate        ABG - ( 2021 12:00 )  pH, Arterial: x     pH, Blood: 7.44  /  pCO2: 70    /  pO2: 71    / HCO3: 48    / Base Excess: 19.0  /  SaO2: 96.0                WBC:  WBC Count: 8.96 K/uL ( @ 07:47)  WBC Count: 8.40 K/uL ( @ 07:14)  WBC Count: 13.27 K/uL ( @ 04:41)      MICROBIOLOGY:  RECENT CULTURES:   Clean Catch Clean Catch (Midstream) XXXX XXXX   <10,000 CFU/mL Normal Urogenital Elen                    Sodium:  Sodium, Serum: 137 mmol/L ( @ 07:47)  Sodium, Serum: 134 mmol/L ( @ 07:14)  Sodium, Serum: 134 mmol/L ( @ 10:15)  Sodium, Serum: 132 mmol/L ( @ 04:41)      1.35 mg/dL :47  0.98 mg/dL  07:14  1.06 mg/dL  10:15  1.05 mg/dL  @ 04:41      Hemoglobin:  Hemoglobin: 8.2 g/dL ( @ 07:47)  Hemoglobin: 8.3 g/dL ( @ 07:14)  Hemoglobin: 9.3 g/dL ( 04:41)      Platelets: Platelet Count - Automated: 246 K/uL ( @ 07:47)  Platelet Count - Automated: 223 K/uL ( @ 07:14)  Platelet Count - Automated: 237 K/uL ( @ 04:41)      LIVER FUNCTIONS - ( 2021 07:47 )  Alb: 2.4 g/dL / Pro: 7.6 gm/dL / ALK PHOS: 45 U/L / ALT: 12 U/L / AST: 19 U/L / GGT: x             Urinalysis Basic - ( 2021 19:52 )    Color: Yellow / Appearance: Clear / S.010 / pH: x  Gluc: x / Ketone: Negative  / Bili: Negative / Urobili: Negative mg/dL   Blood: x / Protein: Negative mg/dL / Nitrite: Negative   Leuk Esterase: Trace / RBC: 0-2 /HPF / WBC 0-2   Sq Epi: x / Non Sq Epi: Few / Bacteria: Moderate        RADIOLOGY & ADDITIONAL STUDIES:      MICROBIOLOGY:  RECENT CULTURES:  - Clean Catch Clean Catch (Midstream) XXXX XXXX   <10,000 CFU/mL Normal Urogenital Elen

## 2021-11-22 NOTE — DIETITIAN INITIAL EVALUATION ADULT. - PERTINENT LABORATORY DATA
11-22 Na137 mmol/L Glu 106 mg/dL<H> K+ 4.5 mmol/L Cr  1.35 mg/dL<H> BUN 41 mg/dL<H> 11-22 Phos 4.6 mg/dL<H> 11-22 Alb 2.4 g/dL<L>11-22 ALT 12 U/L AST 19 U/L Alkaline Phosphatase 45 [id number] @ 10:49 A1C 6.9

## 2021-11-22 NOTE — SWALLOW BEDSIDE ASSESSMENT ADULT - SLP GENERAL OBSERVATIONS
alert and oriented x 3; verbal and agitated due to c/o pain, however did participate for exam; followed directions; vocal quality was gravelly, raspy with low volume and phonation due to respiratory deficits

## 2021-11-22 NOTE — PROGRESS NOTE ADULT - SUBJECTIVE AND OBJECTIVE BOX
Patient is a 86y old  Female who presents with a chief complaint of fluid overload, right ankle fracture (22 Nov 2021 09:41)    PAST MEDICAL & SURGICAL HISTORY:  HTN (hypertension)    HLD (hyperlipidemia)    Asthma    DM (diabetes mellitus)    GERD (gastroesophageal reflux disease)    Lymphedema    S/P knee replacement    INTERVAL HISTORY:  	  MEDICATIONS:  MEDICATIONS  (STANDING):  amLODIPine   Tablet 10 milliGRAM(s) Oral daily  atorvastatin 40 milliGRAM(s) Oral at bedtime  budesonide 160 MICROgram(s)/formoterol 4.5 MICROgram(s) Inhaler 2 Puff(s) Inhalation two times a day  cholecalciferol 2000 Unit(s) Oral daily  enoxaparin Injectable 40 milliGRAM(s) SubCutaneous every 12 hours  furosemide   Injectable 40 milliGRAM(s) IV Push two times a day  gabapentin 100 milliGRAM(s) Oral three times a day  glucagon  Injectable 1 milliGRAM(s) IntraMuscular once  hydrALAZINE 100 milliGRAM(s) Oral three times a day  insulin lispro (ADMELOG) corrective regimen sliding scale   SubCutaneous three times a day before meals  insulin lispro (ADMELOG) corrective regimen sliding scale   SubCutaneous at bedtime  metoprolol succinate  milliGRAM(s) Oral daily  montelukast 10 milliGRAM(s) Oral at bedtime  polyethylene glycol 3350 17 Gram(s) Oral daily  senna 2 Tablet(s) Oral at bedtime    MEDICATIONS  (PRN):  acetaminophen     Tablet .. 650 milliGRAM(s) Oral every 6 hours PRN Mild Pain (1 - 3), Moderate Pain (4 - 6)  ALBUTerol    90 MICROgram(s) HFA Inhaler 2 Puff(s) Inhalation every 6 hours PRN Shortness of Breath and/or Wheezing  melatonin 3 milliGRAM(s) Oral at bedtime PRN Insomnia  oxyCODONE    IR 10 milliGRAM(s) Oral every 4 hours PRN Severe Pain (7 - 10)  oxyCODONE    IR 5 milliGRAM(s) Oral every 4 hours PRN Moderate Pain (4 - 6)  sodium chloride 0.65% Nasal 1 Spray(s) Both Nostrils three times a day PRN Nasal Congestion    Vitals:  T(F): 98.6 (11-22-21 @ 05:16), Max: 99.7 (11-21-21 @ 15:46)  HR: 85 (11-22-21 @ 09:34) (72 - 106)  BP: 114/65 (11-22-21 @ 09:34) (114/54 - 152/73)  RR: 19 (11-22-21 @ 05:16) (10 - 22)  SpO2: 94% (11-22-21 @ 08:44) (91% - 96%)     11-21 @ 07:01  -  11-22 @ 07:00  --------------------------------------------------------  IN:    Oral Fluid: 180 mL  Total IN: 180 mL    OUT:    Voided (mL): 1 mL  Total OUT: 1 mL    Total NET: 179 mL    Weight (kg): 131.1 (11-19 @ 12:30)  BMI (kg/m2): 44 (11-19 @ 12:30)    PHYSICAL EXAM:  Neuro: Awake, responsive  CV: S1 S2 RRR  Lungs: CTABL  GI: Soft, BS +, ND, NT  Extremities: No edema    TELEMETRY:   	    ECG:  	    RADIOLOGY:   Xray done, official reading in pending.     DIAGNOSTIC TESTING:  [ x ] Echocardiogram:  < from: TTE Echo Complete w/o Contrast w/ Doppler (11.19.21 @ 13:56) >  PHYSICIAN INTERPRETATION:  Left Ventricle: The left ventricle was not well visualized. The left ventricular internal cavity size is normal.  Global LV systolic function was normal. Left ventricular ejection fraction, by visual estimation, is 60 to 65%. Spectral Doppler shows impaired relaxation pattern of left ventricular myocardial filling (Grade I diastolic dysfunction).  Right Ventricle: Normal right ventricular size and function. The right ventricle was not well visualized.  Left Atrium: Normal left atrial size.  Right Atrium: Normal right atrial size.  Pericardium: There is no evidence of pericardial effusion.  Mitral Valve: The mitral valve is not well seen. Mildthickening and calcification of the anterior and posterior mitral valve leaflets. Mitral leaflet mobility is normal. There is moderate mitral annular calcification. Peak transmitral mean gradient equals 4.7 mmHg, calculated mitral valve area by pressure half time equals 5.07 cm² consistent with No evidence of mitral stenosis. Mitral valve mean gradient is 4.7 mmHg consistent with mild mitral stenosis.  Tricuspid Valve: The tricuspid valve is not well seen. The tricuspid valve is degenerative in appearance. Trivial tricuspid regurgitation is visualized. Adequate TR velocity was not obtained to accurately assess RVSP.  Aortic Valve: The aortic valve was not well visualized. Mild aortic stenosis is present. Peak transaortic gradient equals 21.7 mmHg, mean transaortic gradient equals 10.9 mmHg, the calculated aortic valve area equals 1.69 cm² by the continuity equation consistent with mild aortic stenosis. The peak aortic velocity was obtained from the apical view. Trivial aortic valve regurgitation is seen.  Pulmonic Valve: The pulmonic valve was not well visualized. No indication of pulmonic valve regurgitation.  Aorta: Aortic root measured at Sinus of Valsalva is normal. There is mild aortic root calcification.  Venous: IVC not well-visualized.      Summary:   1. Left ventricular ejection fraction, by visual estimation, is 60 to 65%.   2. Technically difficult study.   3. Normal global left ventricular systolic function.   4. Normal left ventricular internal cavity size.   5. Spectral Doppler shows impaired relaxation pattern of left ventricular myocardial filling (Grade I diastolic dysfunction).   6. There is mild concentric left ventricular hypertrophy.   7. Normal right ventricular size and function.   8. Normal left atrial size.   9. Normal right atrial size.  10. There is no evidence of pericardial effusion.  11. Mild thickening and calcification of the anterior and posterior mitral valve leaflets.  12. Moderate mitral annular calcification.  13. Degenerative tricuspidvalve.  14. Mild aortic valve stenosis.  15. Mitral valve mean gradient is 4.7 mmHg consistent with mild mitral stenosis.  16. Peak transaortic gradient equals 21.7 mmHg, mean transaortic gradient equals 10.9 mmHg, the calculated aortic valve area equals 1.69 cm² by the continuity equation consistent with mild aortic stenosis.  17. There is mild aortic root calcification.    < end of copied text >    LABS:	 	  22 Nov 2021 07:47    137    |  92     |  41     ----------------------------<  106    4.5     |  44     |  1.35   20 Nov 2021 07:14    134    |  91     |  30     ----------------------------<  140    4.7     |  43     |  0.98   19 Nov 2021 10:15    134    |  93     |  34     ----------------------------<  151    5.5     |  38     |  1.06     Ca    8.8        22 Nov 2021 07:47  Phos  4.6       22 Nov 2021 07:47  Mg     2.8       22 Nov 2021 07:47    TPro  7.6    /  Alb  2.4    /  TBili  0.4    /  DBili  x      /  AST  19     /  ALT  12     /  AlkPhos  45     22 Nov 2021 07:47                        8.2    8.96  )-----------( 246      ( 22 Nov 2021 07:47 )             28.3 ,                       8.3    8.40  )-----------( 223      ( 20 Nov 2021 07:14 )             29.1     proBNP: Serum Pro-Brain Natriuretic Peptide: 809 pg/mL (11.19.21 @ 04:41)   HgA1c: A1C with Estimated Average Glucose Result: 6.9 (11.20.21 @ 10:49)                Patient is a 86y old  Female who presents with a chief complaint of shortness of breath, Right leg pain s/p fall (22 Nov 2021 09:41)    PAST MEDICAL & SURGICAL HISTORY:  HTN (hypertension)    HLD (hyperlipidemia)    Asthma    DM (diabetes mellitus)    GERD (gastroesophageal reflux disease)    Lymphedema    S/P knee replacement    INTERVAL HISTORY: Seen at bedside, Pt complains of pain to right knee. no other complaints.   	  MEDICATIONS:  MEDICATIONS  (STANDING):  amLODIPine   Tablet 10 milliGRAM(s) Oral daily  atorvastatin 40 milliGRAM(s) Oral at bedtime  budesonide 160 MICROgram(s)/formoterol 4.5 MICROgram(s) Inhaler 2 Puff(s) Inhalation two times a day  cholecalciferol 2000 Unit(s) Oral daily  enoxaparin Injectable 40 milliGRAM(s) SubCutaneous every 12 hours  furosemide   Injectable 40 milliGRAM(s) IV Push two times a day  gabapentin 100 milliGRAM(s) Oral three times a day  glucagon  Injectable 1 milliGRAM(s) IntraMuscular once  hydrALAZINE 100 milliGRAM(s) Oral three times a day  insulin lispro (ADMELOG) corrective regimen sliding scale   SubCutaneous three times a day before meals  insulin lispro (ADMELOG) corrective regimen sliding scale   SubCutaneous at bedtime  metoprolol succinate  milliGRAM(s) Oral daily  montelukast 10 milliGRAM(s) Oral at bedtime  polyethylene glycol 3350 17 Gram(s) Oral daily  senna 2 Tablet(s) Oral at bedtime    MEDICATIONS  (PRN):  acetaminophen     Tablet .. 650 milliGRAM(s) Oral every 6 hours PRN Mild Pain (1 - 3), Moderate Pain (4 - 6)  ALBUTerol    90 MICROgram(s) HFA Inhaler 2 Puff(s) Inhalation every 6 hours PRN Shortness of Breath and/or Wheezing  melatonin 3 milliGRAM(s) Oral at bedtime PRN Insomnia  oxyCODONE    IR 10 milliGRAM(s) Oral every 4 hours PRN Severe Pain (7 - 10)  oxyCODONE    IR 5 milliGRAM(s) Oral every 4 hours PRN Moderate Pain (4 - 6)  sodium chloride 0.65% Nasal 1 Spray(s) Both Nostrils three times a day PRN Nasal Congestion    Vitals:  T(F): 98.6 (11-22-21 @ 05:16), Max: 99.7 (11-21-21 @ 15:46)  HR: 85 (11-22-21 @ 09:34) (72 - 106)  BP: 114/65 (11-22-21 @ 09:34) (114/54 - 152/73)  RR: 19 (11-22-21 @ 05:16) (10 - 22)  SpO2: 94% (11-22-21 @ 08:44) (91% - 96%)     11-21 @ 07:01  -  11-22 @ 07:00  --------------------------------------------------------  IN:    Oral Fluid: 180 mL  Total IN: 180 mL    OUT:    Voided (mL): 1 mL  Total OUT: 1 mL    Total NET: 179 mL    Weight (kg): 131.1 (11-19 @ 12:30)  BMI (kg/m2): 44 (11-19 @ 12:30)    PHYSICAL EXAM:  Neuro: Awake, responsive, Obese  CV: S1 S2 RRR, + SM  Lungs: Poor respiratory effort, + Crackles b/l, Diminished breath sound at bases  GI: Soft, Obese, + Hernia-reducible, BS +, NT  Extremities: B/L Lower leg edema (R>L), + Right knee immobilizer    TELEMETRY: Sinus rhythm  	    RADIOLOGY:   Xray done, official reading in pending.     DIAGNOSTIC TESTING:  [ x ] Echocardiogram:  < from: TTE Echo Complete w/o Contrast w/ Doppler (11.19.21 @ 13:56) >  PHYSICIAN INTERPRETATION:  Left Ventricle: The left ventricle was not well visualized. The left ventricular internal cavity size is normal.  Global LV systolic function was normal. Left ventricular ejection fraction, by visual estimation, is 60 to 65%. Spectral Doppler shows impaired relaxation pattern of left ventricular myocardial filling (Grade I diastolic dysfunction).  Right Ventricle: Normal right ventricular size and function. The right ventricle was not well visualized.  Left Atrium: Normal left atrial size.  Right Atrium: Normal right atrial size.  Pericardium: There is no evidence of pericardial effusion.  Mitral Valve: The mitral valve is not well seen. Mildthickening and calcification of the anterior and posterior mitral valve leaflets. Mitral leaflet mobility is normal. There is moderate mitral annular calcification. Peak transmitral mean gradient equals 4.7 mmHg, calculated mitral valve area by pressure half time equals 5.07 cm² consistent with No evidence of mitral stenosis. Mitral valve mean gradient is 4.7 mmHg consistent with mild mitral stenosis.  Tricuspid Valve: The tricuspid valve is not well seen. The tricuspid valve is degenerative in appearance. Trivial tricuspid regurgitation is visualized. Adequate TR velocity was not obtained to accurately assess RVSP.  Aortic Valve: The aortic valve was not well visualized. Mild aortic stenosis is present. Peak transaortic gradient equals 21.7 mmHg, mean transaortic gradient equals 10.9 mmHg, the calculated aortic valve area equals 1.69 cm² by the continuity equation consistent with mild aortic stenosis. The peak aortic velocity was obtained from the apical view. Trivial aortic valve regurgitation is seen.  Pulmonic Valve: The pulmonic valve was not well visualized. No indication of pulmonic valve regurgitation.  Aorta: Aortic root measured at Sinus of Valsalva is normal. There is mild aortic root calcification.  Venous: IVC not well-visualized.      Summary:   1. Left ventricular ejection fraction, by visual estimation, is 60 to 65%.   2. Technically difficult study.   3. Normal global left ventricular systolic function.   4. Normal left ventricular internal cavity size.   5. Spectral Doppler shows impaired relaxation pattern of left ventricular myocardial filling (Grade I diastolic dysfunction).   6. There is mild concentric left ventricular hypertrophy.   7. Normal right ventricular size and function.   8. Normal left atrial size.   9. Normal right atrial size.  10. There is no evidence of pericardial effusion.  11. Mild thickening and calcification of the anterior and posterior mitral valve leaflets.  12. Moderate mitral annular calcification.  13. Degenerative tricuspidvalve.  14. Mild aortic valve stenosis.  15. Mitral valve mean gradient is 4.7 mmHg consistent with mild mitral stenosis.  16. Peak transaortic gradient equals 21.7 mmHg, mean transaortic gradient equals 10.9 mmHg, the calculated aortic valve area equals 1.69 cm² by the continuity equation consistent with mild aortic stenosis.  17. There is mild aortic root calcification.    < end of copied text >    LABS:	 	  22 Nov 2021 07:47    137    |  92     |  41     ----------------------------<  106    4.5     |  44     |  1.35   20 Nov 2021 07:14    134    |  91     |  30     ----------------------------<  140    4.7     |  43     |  0.98   19 Nov 2021 10:15    134    |  93     |  34     ----------------------------<  151    5.5     |  38     |  1.06     Ca    8.8        22 Nov 2021 07:47  Phos  4.6       22 Nov 2021 07:47  Mg     2.8       22 Nov 2021 07:47    TPro  7.6    /  Alb  2.4    /  TBili  0.4    /  DBili  x      /  AST  19     /  ALT  12     /  AlkPhos  45     22 Nov 2021 07:47                        8.2    8.96  )-----------( 246      ( 22 Nov 2021 07:47 )             28.3 ,                       8.3    8.40  )-----------( 223      ( 20 Nov 2021 07:14 )             29.1     proBNP: Serum Pro-Brain Natriuretic Peptide: 809 pg/mL (11.19.21 @ 04:41)   HgA1c: A1C with Estimated Average Glucose Result: 6.9 (11.20.21 @ 10:49)                Patient is a 86y old  Female who presents with a chief complaint of shortness of breath, Right leg pain s/p fall (22 Nov 2021 09:41)    PAST MEDICAL & SURGICAL HISTORY:  HTN (hypertension)    HLD (hyperlipidemia)    Asthma    DM (diabetes mellitus)    GERD (gastroesophageal reflux disease)    Lymphedema    S/P knee replacement    INTERVAL HISTORY: Seen at bedside, Pt complains of pain to right knee. + sob  MEDICATIONS:  MEDICATIONS  (STANDING):  amLODIPine   Tablet 10 milliGRAM(s) Oral daily  atorvastatin 40 milliGRAM(s) Oral at bedtime  budesonide 160 MICROgram(s)/formoterol 4.5 MICROgram(s) Inhaler 2 Puff(s) Inhalation two times a day  cholecalciferol 2000 Unit(s) Oral daily  enoxaparin Injectable 40 milliGRAM(s) SubCutaneous every 12 hours  furosemide   Injectable 40 milliGRAM(s) IV Push two times a day  gabapentin 100 milliGRAM(s) Oral three times a day  glucagon  Injectable 1 milliGRAM(s) IntraMuscular once  hydrALAZINE 100 milliGRAM(s) Oral three times a day  insulin lispro (ADMELOG) corrective regimen sliding scale   SubCutaneous three times a day before meals  insulin lispro (ADMELOG) corrective regimen sliding scale   SubCutaneous at bedtime  metoprolol succinate  milliGRAM(s) Oral daily  montelukast 10 milliGRAM(s) Oral at bedtime  polyethylene glycol 3350 17 Gram(s) Oral daily  senna 2 Tablet(s) Oral at bedtime    MEDICATIONS  (PRN):  acetaminophen     Tablet .. 650 milliGRAM(s) Oral every 6 hours PRN Mild Pain (1 - 3), Moderate Pain (4 - 6)  ALBUTerol    90 MICROgram(s) HFA Inhaler 2 Puff(s) Inhalation every 6 hours PRN Shortness of Breath and/or Wheezing  melatonin 3 milliGRAM(s) Oral at bedtime PRN Insomnia  oxyCODONE    IR 10 milliGRAM(s) Oral every 4 hours PRN Severe Pain (7 - 10)  oxyCODONE    IR 5 milliGRAM(s) Oral every 4 hours PRN Moderate Pain (4 - 6)  sodium chloride 0.65% Nasal 1 Spray(s) Both Nostrils three times a day PRN Nasal Congestion    Vitals:  T(F): 98.6 (11-22-21 @ 05:16), Max: 99.7 (11-21-21 @ 15:46)  HR: 85 (11-22-21 @ 09:34) (72 - 106)  BP: 114/65 (11-22-21 @ 09:34) (114/54 - 152/73)  RR: 19 (11-22-21 @ 05:16) (10 - 22)  SpO2: 94% (11-22-21 @ 08:44) (91% - 96%)     11-21 @ 07:01  -  11-22 @ 07:00  --------------------------------------------------------  IN:    Oral Fluid: 180 mL  Total IN: 180 mL    OUT:    Voided (mL): 1 mL  Total OUT: 1 mL    Total NET: 179 mL    Weight (kg): 131.1 (11-19 @ 12:30)  BMI (kg/m2): 44 (11-19 @ 12:30)    PHYSICAL EXAM:  Neuro: Awake, responsive, Obese  CV: S1 S2 RRR, + SM  Lungs: Poor respiratory effort, + Crackles b/l, Diminished breath sound at bases  GI: Soft, Obese, + Hernia-reducible, BS +, NT  Extremities: B/L Lower leg edema (R>L), + Right knee immobilizer    TELEMETRY: Sinus rhythm  	    RADIOLOGY:   Xray done, official reading in pending.     DIAGNOSTIC TESTING:  [ x ] Echocardiogram:  < from: TTE Echo Complete w/o Contrast w/ Doppler (11.19.21 @ 13:56) >  PHYSICIAN INTERPRETATION:  Left Ventricle: The left ventricle was not well visualized. The left ventricular internal cavity size is normal.  Global LV systolic function was normal. Left ventricular ejection fraction, by visual estimation, is 60 to 65%. Spectral Doppler shows impaired relaxation pattern of left ventricular myocardial filling (Grade I diastolic dysfunction).  Right Ventricle: Normal right ventricular size and function. The right ventricle was not well visualized.  Left Atrium: Normal left atrial size.  Right Atrium: Normal right atrial size.  Pericardium: There is no evidence of pericardial effusion.  Mitral Valve: The mitral valve is not well seen. Mildthickening and calcification of the anterior and posterior mitral valve leaflets. Mitral leaflet mobility is normal. There is moderate mitral annular calcification. Peak transmitral mean gradient equals 4.7 mmHg, calculated mitral valve area by pressure half time equals 5.07 cm² consistent with No evidence of mitral stenosis. Mitral valve mean gradient is 4.7 mmHg consistent with mild mitral stenosis.  Tricuspid Valve: The tricuspid valve is not well seen. The tricuspid valve is degenerative in appearance. Trivial tricuspid regurgitation is visualized. Adequate TR velocity was not obtained to accurately assess RVSP.  Aortic Valve: The aortic valve was not well visualized. Mild aortic stenosis is present. Peak transaortic gradient equals 21.7 mmHg, mean transaortic gradient equals 10.9 mmHg, the calculated aortic valve area equals 1.69 cm² by the continuity equation consistent with mild aortic stenosis. The peak aortic velocity was obtained from the apical view. Trivial aortic valve regurgitation is seen.  Pulmonic Valve: The pulmonic valve was not well visualized. No indication of pulmonic valve regurgitation.  Aorta: Aortic root measured at Sinus of Valsalva is normal. There is mild aortic root calcification.  Venous: IVC not well-visualized.      Summary:   1. Left ventricular ejection fraction, by visual estimation, is 60 to 65%.   2. Technically difficult study.   3. Normal global left ventricular systolic function.   4. Normal left ventricular internal cavity size.   5. Spectral Doppler shows impaired relaxation pattern of left ventricular myocardial filling (Grade I diastolic dysfunction).   6. There is mild concentric left ventricular hypertrophy.   7. Normal right ventricular size and function.   8. Normal left atrial size.   9. Normal right atrial size.  10. There is no evidence of pericardial effusion.  11. Mild thickening and calcification of the anterior and posterior mitral valve leaflets.  12. Moderate mitral annular calcification.  13. Degenerative tricuspidvalve.  14. Mild aortic valve stenosis.  15. Mitral valve mean gradient is 4.7 mmHg consistent with mild mitral stenosis.  16. Peak transaortic gradient equals 21.7 mmHg, mean transaortic gradient equals 10.9 mmHg, the calculated aortic valve area equals 1.69 cm² by the continuity equation consistent with mild aortic stenosis.  17. There is mild aortic root calcification.    < end of copied text >    LABS:	 	  22 Nov 2021 07:47    137    |  92     |  41     ----------------------------<  106    4.5     |  44     |  1.35   20 Nov 2021 07:14    134    |  91     |  30     ----------------------------<  140    4.7     |  43     |  0.98   19 Nov 2021 10:15    134    |  93     |  34     ----------------------------<  151    5.5     |  38     |  1.06     Ca    8.8        22 Nov 2021 07:47  Phos  4.6       22 Nov 2021 07:47  Mg     2.8       22 Nov 2021 07:47    TPro  7.6    /  Alb  2.4    /  TBili  0.4    /  DBili  x      /  AST  19     /  ALT  12     /  AlkPhos  45     22 Nov 2021 07:47                        8.2    8.96  )-----------( 246      ( 22 Nov 2021 07:47 )             28.3 ,                       8.3    8.40  )-----------( 223      ( 20 Nov 2021 07:14 )             29.1     proBNP: Serum Pro-Brain Natriuretic Peptide: 809 pg/mL (11.19.21 @ 04:41)   HgA1c: A1C with Estimated Average Glucose Result: 6.9 (11.20.21 @ 10:49)                Patient is a 86y old  Female who presents with a chief complaint of Right leg pain s/p fall, sob (22 Nov 2021 09:41)    PAST MEDICAL & SURGICAL HISTORY:  HTN (hypertension)    HLD (hyperlipidemia)    Asthma    DM (diabetes mellitus)    GERD (gastroesophageal reflux disease)    Lymphedema    CHF diastolic     S/P knee replacement    INTERVAL HISTORY: Seen at bedside, Pt complains of pain to right knee. + sob  MEDICATIONS:  MEDICATIONS  (STANDING):  amLODIPine   Tablet 10 milliGRAM(s) Oral daily  atorvastatin 40 milliGRAM(s) Oral at bedtime  budesonide 160 MICROgram(s)/formoterol 4.5 MICROgram(s) Inhaler 2 Puff(s) Inhalation two times a day  cholecalciferol 2000 Unit(s) Oral daily  enoxaparin Injectable 40 milliGRAM(s) SubCutaneous every 12 hours  furosemide   Injectable 40 milliGRAM(s) IV Push two times a day  gabapentin 100 milliGRAM(s) Oral three times a day  glucagon  Injectable 1 milliGRAM(s) IntraMuscular once  hydrALAZINE 100 milliGRAM(s) Oral three times a day  insulin lispro (ADMELOG) corrective regimen sliding scale   SubCutaneous three times a day before meals  insulin lispro (ADMELOG) corrective regimen sliding scale   SubCutaneous at bedtime  metoprolol succinate  milliGRAM(s) Oral daily  montelukast 10 milliGRAM(s) Oral at bedtime  polyethylene glycol 3350 17 Gram(s) Oral daily  senna 2 Tablet(s) Oral at bedtime    MEDICATIONS  (PRN):  acetaminophen     Tablet .. 650 milliGRAM(s) Oral every 6 hours PRN Mild Pain (1 - 3), Moderate Pain (4 - 6)  ALBUTerol    90 MICROgram(s) HFA Inhaler 2 Puff(s) Inhalation every 6 hours PRN Shortness of Breath and/or Wheezing  melatonin 3 milliGRAM(s) Oral at bedtime PRN Insomnia  oxyCODONE    IR 10 milliGRAM(s) Oral every 4 hours PRN Severe Pain (7 - 10)  oxyCODONE    IR 5 milliGRAM(s) Oral every 4 hours PRN Moderate Pain (4 - 6)  sodium chloride 0.65% Nasal 1 Spray(s) Both Nostrils three times a day PRN Nasal Congestion    Vitals:  T(F): 98.6 (11-22-21 @ 05:16), Max: 99.7 (11-21-21 @ 15:46)  HR: 85 (11-22-21 @ 09:34) (72 - 106)  BP: 114/65 (11-22-21 @ 09:34) (114/54 - 152/73)  RR: 19 (11-22-21 @ 05:16) (10 - 22)  SpO2: 94% (11-22-21 @ 08:44) (91% - 96%)     11-21 @ 07:01  -  11-22 @ 07:00  --------------------------------------------------------  IN:    Oral Fluid: 180 mL  Total IN: 180 mL    OUT:    Voided (mL): 1 mL  Total OUT: 1 mL    Total NET: 179 mL    Weight (kg): 131.1 (11-19 @ 12:30)  BMI (kg/m2): 44 (11-19 @ 12:30)    PHYSICAL EXAM:  Neuro: Awake, responsive, Obese  CV: S1 S2 RRR, + SM  Lungs: Poor respiratory effort, + Crackles b/l, Diminished breath sound at bases  GI: Soft, Obese, + Hernia-reducible, BS +, NT  Extremities: B/L Lower leg edema (R>L), + Right knee immobilizer    TELEMETRY: Sinus rhythm  	    RADIOLOGY:   Xray done, official reading in pending.     DIAGNOSTIC TESTING:  [ x ] Echocardiogram:  < from: TTE Echo Complete w/o Contrast w/ Doppler (11.19.21 @ 13:56) >  PHYSICIAN INTERPRETATION:  Left Ventricle: The left ventricle was not well visualized. The left ventricular internal cavity size is normal.  Global LV systolic function was normal. Left ventricular ejection fraction, by visual estimation, is 60 to 65%. Spectral Doppler shows impaired relaxation pattern of left ventricular myocardial filling (Grade I diastolic dysfunction).  Right Ventricle: Normal right ventricular size and function. The right ventricle was not well visualized.  Left Atrium: Normal left atrial size.  Right Atrium: Normal right atrial size.  Pericardium: There is no evidence of pericardial effusion.  Mitral Valve: The mitral valve is not well seen. Mildthickening and calcification of the anterior and posterior mitral valve leaflets. Mitral leaflet mobility is normal. There is moderate mitral annular calcification. Peak transmitral mean gradient equals 4.7 mmHg, calculated mitral valve area by pressure half time equals 5.07 cm² consistent with No evidence of mitral stenosis. Mitral valve mean gradient is 4.7 mmHg consistent with mild mitral stenosis.  Tricuspid Valve: The tricuspid valve is not well seen. The tricuspid valve is degenerative in appearance. Trivial tricuspid regurgitation is visualized. Adequate TR velocity was not obtained to accurately assess RVSP.  Aortic Valve: The aortic valve was not well visualized. Mild aortic stenosis is present. Peak transaortic gradient equals 21.7 mmHg, mean transaortic gradient equals 10.9 mmHg, the calculated aortic valve area equals 1.69 cm² by the continuity equation consistent with mild aortic stenosis. The peak aortic velocity was obtained from the apical view. Trivial aortic valve regurgitation is seen.  Pulmonic Valve: The pulmonic valve was not well visualized. No indication of pulmonic valve regurgitation.  Aorta: Aortic root measured at Sinus of Valsalva is normal. There is mild aortic root calcification.  Venous: IVC not well-visualized.      Summary:   1. Left ventricular ejection fraction, by visual estimation, is 60 to 65%.   2. Technically difficult study.   3. Normal global left ventricular systolic function.   4. Normal left ventricular internal cavity size.   5. Spectral Doppler shows impaired relaxation pattern of left ventricular myocardial filling (Grade I diastolic dysfunction).   6. There is mild concentric left ventricular hypertrophy.   7. Normal right ventricular size and function.   8. Normal left atrial size.   9. Normal right atrial size.  10. There is no evidence of pericardial effusion.  11. Mild thickening and calcification of the anterior and posterior mitral valve leaflets.  12. Moderate mitral annular calcification.  13. Degenerative tricuspidvalve.  14. Mild aortic valve stenosis.  15. Mitral valve mean gradient is 4.7 mmHg consistent with mild mitral stenosis.  16. Peak transaortic gradient equals 21.7 mmHg, mean transaortic gradient equals 10.9 mmHg, the calculated aortic valve area equals 1.69 cm² by the continuity equation consistent with mild aortic stenosis.  17. There is mild aortic root calcification.    < end of copied text >    LABS:	 	  22 Nov 2021 07:47    137    |  92     |  41     ----------------------------<  106    4.5     |  44     |  1.35   20 Nov 2021 07:14    134    |  91     |  30     ----------------------------<  140    4.7     |  43     |  0.98   19 Nov 2021 10:15    134    |  93     |  34     ----------------------------<  151    5.5     |  38     |  1.06     Ca    8.8        22 Nov 2021 07:47  Phos  4.6       22 Nov 2021 07:47  Mg     2.8       22 Nov 2021 07:47    TPro  7.6    /  Alb  2.4    /  TBili  0.4    /  DBili  x      /  AST  19     /  ALT  12     /  AlkPhos  45     22 Nov 2021 07:47                        8.2    8.96  )-----------( 246      ( 22 Nov 2021 07:47 )             28.3 ,                       8.3    8.40  )-----------( 223      ( 20 Nov 2021 07:14 )             29.1     proBNP: Serum Pro-Brain Natriuretic Peptide: 809 pg/mL (11.19.21 @ 04:41)   HgA1c: A1C with Estimated Average Glucose Result: 6.9 (11.20.21 @ 10:49)                Patient is a 86y old  Female who presents with a chief complaint of Right leg pain s/p fall, sob (22 Nov 2021 09:41)    PAST MEDICAL & SURGICAL HISTORY:  HTN (hypertension)    HLD (hyperlipidemia)    Asthma/COPD on home O2/BiPAP    DM (diabetes mellitus)    GERD (gastroesophageal reflux disease)    Lymphedema    CHF diastolic     S/P knee replacement    INTERVAL HISTORY: Seen at bedside, Pt complains of pain to right knee. + sob  MEDICATIONS:  MEDICATIONS  (STANDING):  amLODIPine   Tablet 10 milliGRAM(s) Oral daily  atorvastatin 40 milliGRAM(s) Oral at bedtime  budesonide 160 MICROgram(s)/formoterol 4.5 MICROgram(s) Inhaler 2 Puff(s) Inhalation two times a day  cholecalciferol 2000 Unit(s) Oral daily  enoxaparin Injectable 40 milliGRAM(s) SubCutaneous every 12 hours  furosemide   Injectable 40 milliGRAM(s) IV Push two times a day  gabapentin 100 milliGRAM(s) Oral three times a day  glucagon  Injectable 1 milliGRAM(s) IntraMuscular once  hydrALAZINE 100 milliGRAM(s) Oral three times a day  insulin lispro (ADMELOG) corrective regimen sliding scale   SubCutaneous three times a day before meals  insulin lispro (ADMELOG) corrective regimen sliding scale   SubCutaneous at bedtime  metoprolol succinate  milliGRAM(s) Oral daily  montelukast 10 milliGRAM(s) Oral at bedtime  polyethylene glycol 3350 17 Gram(s) Oral daily  senna 2 Tablet(s) Oral at bedtime    MEDICATIONS  (PRN):  acetaminophen     Tablet .. 650 milliGRAM(s) Oral every 6 hours PRN Mild Pain (1 - 3), Moderate Pain (4 - 6)  ALBUTerol    90 MICROgram(s) HFA Inhaler 2 Puff(s) Inhalation every 6 hours PRN Shortness of Breath and/or Wheezing  melatonin 3 milliGRAM(s) Oral at bedtime PRN Insomnia  oxyCODONE    IR 10 milliGRAM(s) Oral every 4 hours PRN Severe Pain (7 - 10)  oxyCODONE    IR 5 milliGRAM(s) Oral every 4 hours PRN Moderate Pain (4 - 6)  sodium chloride 0.65% Nasal 1 Spray(s) Both Nostrils three times a day PRN Nasal Congestion    Vitals:  T(F): 98.6 (11-22-21 @ 05:16), Max: 99.7 (11-21-21 @ 15:46)  HR: 85 (11-22-21 @ 09:34) (72 - 106)  BP: 114/65 (11-22-21 @ 09:34) (114/54 - 152/73)  RR: 19 (11-22-21 @ 05:16) (10 - 22)  SpO2: 94% (11-22-21 @ 08:44) (91% - 96%)     11-21 @ 07:01  -  11-22 @ 07:00  --------------------------------------------------------  IN:    Oral Fluid: 180 mL  Total IN: 180 mL    OUT:    Voided (mL): 1 mL  Total OUT: 1 mL    Total NET: 179 mL    Weight (kg): 131.1 (11-19 @ 12:30)  BMI (kg/m2): 44 (11-19 @ 12:30)    PHYSICAL EXAM:  Neuro: Awake, responsive, Obese  CV: S1 S2 RRR, + SM  Lungs: Poor respiratory effort, + Crackles b/l, Diminished breath sound at bases  GI: Soft, Obese, + Hernia-reducible, BS +, NT  Extremities: B/L Lower leg edema (R>L), + Right knee immobilizer    TELEMETRY: Sinus rhythm  	    RADIOLOGY: < from: Xray Chest 1 View- PORTABLE-Urgent (Xray Chest 1 View- PORTABLE-Urgent .) (11.19.21 @ 05:36) >    Mild cardiomegaly and mild central pulmonary venous congestion with no lobar consolidation, effusion, pneumothorax or acute osseous finding.    Moderate perihilar opacities may represent infiltrate in the correct clinical context. Differential diagnosis includes chronic interstitial lung disease.    IMPRESSION:  No fracture or pneumothorax    < end of copied text >        DIAGNOSTIC TESTING:  [ x ] Echocardiogram:  < from: TTE Echo Complete w/o Contrast w/ Doppler (11.19.21 @ 13:56) >  PHYSICIAN INTERPRETATION:  Left Ventricle: The left ventricle was not well visualized. The left ventricular internal cavity size is normal.  Global LV systolic function was normal. Left ventricular ejection fraction, by visual estimation, is 60 to 65%. Spectral Doppler shows impaired relaxation pattern of left ventricular myocardial filling (Grade I diastolic dysfunction).  Right Ventricle: Normal right ventricular size and function. The right ventricle was not well visualized.  Left Atrium: Normal left atrial size.  Right Atrium: Normal right atrial size.  Pericardium: There is no evidence of pericardial effusion.  Mitral Valve: The mitral valve is not well seen. Mildthickening and calcification of the anterior and posterior mitral valve leaflets. Mitral leaflet mobility is normal. There is moderate mitral annular calcification. Peak transmitral mean gradient equals 4.7 mmHg, calculated mitral valve area by pressure half time equals 5.07 cm² consistent with No evidence of mitral stenosis. Mitral valve mean gradient is 4.7 mmHg consistent with mild mitral stenosis.  Tricuspid Valve: The tricuspid valve is not well seen. The tricuspid valve is degenerative in appearance. Trivial tricuspid regurgitation is visualized. Adequate TR velocity was not obtained to accurately assess RVSP.  Aortic Valve: The aortic valve was not well visualized. Mild aortic stenosis is present. Peak transaortic gradient equals 21.7 mmHg, mean transaortic gradient equals 10.9 mmHg, the calculated aortic valve area equals 1.69 cm² by the continuity equation consistent with mild aortic stenosis. The peak aortic velocity was obtained from the apical view. Trivial aortic valve regurgitation is seen.  Pulmonic Valve: The pulmonic valve was not well visualized. No indication of pulmonic valve regurgitation.  Aorta: Aortic root measured at Sinus of Valsalva is normal. There is mild aortic root calcification.  Venous: IVC not well-visualized.      Summary:   1. Left ventricular ejection fraction, by visual estimation, is 60 to 65%.   2. Technically difficult study.   3. Normal global left ventricular systolic function.   4. Normal left ventricular internal cavity size.   5. Spectral Doppler shows impaired relaxation pattern of left ventricular myocardial filling (Grade I diastolic dysfunction).   6. There is mild concentric left ventricular hypertrophy.   7. Normal right ventricular size and function.   8. Normal left atrial size.   9. Normal right atrial size.  10. There is no evidence of pericardial effusion.  11. Mild thickening and calcification of the anterior and posterior mitral valve leaflets.  12. Moderate mitral annular calcification.  13. Degenerative tricuspidvalve.  14. Mild aortic valve stenosis.  15. Mitral valve mean gradient is 4.7 mmHg consistent with mild mitral stenosis.  16. Peak transaortic gradient equals 21.7 mmHg, mean transaortic gradient equals 10.9 mmHg, the calculated aortic valve area equals 1.69 cm² by the continuity equation consistent with mild aortic stenosis.  17. There is mild aortic root calcification.    < end of copied text >    LABS:	 	  22 Nov 2021 07:47    137    |  92     |  41     ----------------------------<  106    4.5     |  44     |  1.35   20 Nov 2021 07:14    134    |  91     |  30     ----------------------------<  140    4.7     |  43     |  0.98   19 Nov 2021 10:15    134    |  93     |  34     ----------------------------<  151    5.5     |  38     |  1.06     Ca    8.8        22 Nov 2021 07:47  Phos  4.6       22 Nov 2021 07:47  Mg     2.8       22 Nov 2021 07:47    TPro  7.6    /  Alb  2.4    /  TBili  0.4    /  DBili  x      /  AST  19     /  ALT  12     /  AlkPhos  45     22 Nov 2021 07:47                        8.2    8.96  )-----------( 246      ( 22 Nov 2021 07:47 )             28.3 ,                       8.3    8.40  )-----------( 223      ( 20 Nov 2021 07:14 )             29.1     proBNP: Serum Pro-Brain Natriuretic Peptide: 809 pg/mL (11.19.21 @ 04:41)   HgA1c: A1C with Estimated Average Glucose Result: 6.9 (11.20.21 @ 10:49)                Patient is a 86y old  Female who presents with a chief complaint of Right leg pain s/p fall, sob (22 Nov 2021 09:41)    PAST MEDICAL & SURGICAL HISTORY:  HTN (hypertension)    HLD (hyperlipidemia)    Asthma/COPD on home O2/BiPAP    DM (diabetes mellitus)    GERD (gastroesophageal reflux disease)    Lymphedema    CHF diastolic     S/P knee replacement    INTERVAL HISTORY: Seen at bedside, Pt complains of pain to right knee. + sob  MEDICATIONS:  MEDICATIONS  (STANDING):  amLODIPine   Tablet 10 milliGRAM(s) Oral daily  atorvastatin 40 milliGRAM(s) Oral at bedtime  budesonide 160 MICROgram(s)/formoterol 4.5 MICROgram(s) Inhaler 2 Puff(s) Inhalation two times a day  cholecalciferol 2000 Unit(s) Oral daily  enoxaparin Injectable 40 milliGRAM(s) SubCutaneous every 12 hours  furosemide   Injectable 40 milliGRAM(s) IV Push two times a day  gabapentin 100 milliGRAM(s) Oral three times a day  glucagon  Injectable 1 milliGRAM(s) IntraMuscular once  hydrALAZINE 100 milliGRAM(s) Oral three times a day  insulin lispro (ADMELOG) corrective regimen sliding scale   SubCutaneous three times a day before meals  insulin lispro (ADMELOG) corrective regimen sliding scale   SubCutaneous at bedtime  metoprolol succinate  milliGRAM(s) Oral daily  montelukast 10 milliGRAM(s) Oral at bedtime  polyethylene glycol 3350 17 Gram(s) Oral daily  senna 2 Tablet(s) Oral at bedtime    MEDICATIONS  (PRN):  acetaminophen     Tablet .. 650 milliGRAM(s) Oral every 6 hours PRN Mild Pain (1 - 3), Moderate Pain (4 - 6)  ALBUTerol    90 MICROgram(s) HFA Inhaler 2 Puff(s) Inhalation every 6 hours PRN Shortness of Breath and/or Wheezing  melatonin 3 milliGRAM(s) Oral at bedtime PRN Insomnia  oxyCODONE    IR 10 milliGRAM(s) Oral every 4 hours PRN Severe Pain (7 - 10)  oxyCODONE    IR 5 milliGRAM(s) Oral every 4 hours PRN Moderate Pain (4 - 6)  sodium chloride 0.65% Nasal 1 Spray(s) Both Nostrils three times a day PRN Nasal Congestion    Vitals:  T(F): 98.6 (11-22-21 @ 05:16), Max: 99.7 (11-21-21 @ 15:46)  HR: 85 (11-22-21 @ 09:34) (72 - 106)  BP: 114/65 (11-22-21 @ 09:34) (114/54 - 152/73)  RR: 19 (11-22-21 @ 05:16) (10 - 22)  SpO2: 94% (11-22-21 @ 08:44) (91% - 96%)     11-21 @ 07:01  -  11-22 @ 07:00  --------------------------------------------------------  IN:    Oral Fluid: 180 mL  Total IN: 180 mL    OUT:    Voided (mL): 1 mL  Total OUT: 1 mL    Total NET: 179 mL    Weight (kg): 131.1 (11-19 @ 12:30)  BMI (kg/m2): 44 (11-19 @ 12:30)    PHYSICAL EXAM:  Neuro: Awake, responsive, Obese  CV: S1 S2 RRR, + SM  Lungs: Poor respiratory effort, + Crackles b/l, Diminished breath sound at bases  GI: Soft, Obese, + Hernia-reducible, BS +, NT  Extremities: B/L Lower leg edema (R>L), + Right knee immobilizer    TELEMETRY: Sinus rhythm  	    RADIOLOGY: < from: Xray Chest 1 View- PORTABLE-Urgent (Xray Chest 1 View- PORTABLE-Urgent .) (11.19.21 @ 05:36) >    Mild cardiomegaly and mild central pulmonary venous congestion with no lobar consolidation, effusion, pneumothorax or acute osseous finding.    Moderate perihilar opacities may represent infiltrate in the correct clinical context. Differential diagnosis includes chronic interstitial lung disease.    IMPRESSION:  No fracture or pneumothorax    < end of copied text >  < from: Xray Knee 4 Views, Bilateral (11.19.21 @ 05:36) >  Minimally displaced proximal right tibial shaft/metaphyseal fracture.    Minimally displaced fracture of the right fibular neck.    < end of copied text >    DIAGNOSTIC TESTING:  [ x ] Echocardiogram:  < from: TTE Echo Complete w/o Contrast w/ Doppler (11.19.21 @ 13:56) >  PHYSICIAN INTERPRETATION:  Left Ventricle: The left ventricle was not well visualized. The left ventricular internal cavity size is normal.  Global LV systolic function was normal. Left ventricular ejection fraction, by visual estimation, is 60 to 65%. Spectral Doppler shows impaired relaxation pattern of left ventricular myocardial filling (Grade I diastolic dysfunction).  Right Ventricle: Normal right ventricular size and function. The right ventricle was not well visualized.  Left Atrium: Normal left atrial size.  Right Atrium: Normal right atrial size.  Pericardium: There is no evidence of pericardial effusion.  Mitral Valve: The mitral valve is not well seen. Mildthickening and calcification of the anterior and posterior mitral valve leaflets. Mitral leaflet mobility is normal. There is moderate mitral annular calcification. Peak transmitral mean gradient equals 4.7 mmHg, calculated mitral valve area by pressure half time equals 5.07 cm² consistent with No evidence of mitral stenosis. Mitral valve mean gradient is 4.7 mmHg consistent with mild mitral stenosis.  Tricuspid Valve: The tricuspid valve is not well seen. The tricuspid valve is degenerative in appearance. Trivial tricuspid regurgitation is visualized. Adequate TR velocity was not obtained to accurately assess RVSP.  Aortic Valve: The aortic valve was not well visualized. Mild aortic stenosis is present. Peak transaortic gradient equals 21.7 mmHg, mean transaortic gradient equals 10.9 mmHg, the calculated aortic valve area equals 1.69 cm² by the continuity equation consistent with mild aortic stenosis. The peak aortic velocity was obtained from the apical view. Trivial aortic valve regurgitation is seen.  Pulmonic Valve: The pulmonic valve was not well visualized. No indication of pulmonic valve regurgitation.  Aorta: Aortic root measured at Sinus of Valsalva is normal. There is mild aortic root calcification.  Venous: IVC not well-visualized.      Summary:   1. Left ventricular ejection fraction, by visual estimation, is 60 to 65%.   2. Technically difficult study.   3. Normal global left ventricular systolic function.   4. Normal left ventricular internal cavity size.   5. Spectral Doppler shows impaired relaxation pattern of left ventricular myocardial filling (Grade I diastolic dysfunction).   6. There is mild concentric left ventricular hypertrophy.   7. Normal right ventricular size and function.   8. Normal left atrial size.   9. Normal right atrial size.  10. There is no evidence of pericardial effusion.  11. Mild thickening and calcification of the anterior and posterior mitral valve leaflets.  12. Moderate mitral annular calcification.  13. Degenerative tricuspidvalve.  14. Mild aortic valve stenosis.  15. Mitral valve mean gradient is 4.7 mmHg consistent with mild mitral stenosis.  16. Peak transaortic gradient equals 21.7 mmHg, mean transaortic gradient equals 10.9 mmHg, the calculated aortic valve area equals 1.69 cm² by the continuity equation consistent with mild aortic stenosis.  17. There is mild aortic root calcification.    < end of copied text >    LABS:	 	  22 Nov 2021 07:47    137    |  92     |  41     ----------------------------<  106    4.5     |  44     |  1.35   20 Nov 2021 07:14    134    |  91     |  30     ----------------------------<  140    4.7     |  43     |  0.98   19 Nov 2021 10:15    134    |  93     |  34     ----------------------------<  151    5.5     |  38     |  1.06     Ca    8.8        22 Nov 2021 07:47  Phos  4.6       22 Nov 2021 07:47  Mg     2.8       22 Nov 2021 07:47    TPro  7.6    /  Alb  2.4    /  TBili  0.4    /  DBili  x      /  AST  19     /  ALT  12     /  AlkPhos  45     22 Nov 2021 07:47                        8.2    8.96  )-----------( 246      ( 22 Nov 2021 07:47 )             28.3 ,                       8.3    8.40  )-----------( 223      ( 20 Nov 2021 07:14 )             29.1     proBNP: Serum Pro-Brain Natriuretic Peptide: 809 pg/mL (11.19.21 @ 04:41)   HgA1c: A1C with Estimated Average Glucose Result: 6.9 (11.20.21 @ 10:49)

## 2021-11-22 NOTE — PROGRESS NOTE ADULT - ASSESSMENT
A/P:  87 yo F s/p MF with Right knee proximal tibia periprosthetic fracture s/p R TKA ('09):  -NWB RLE in bulky marshall knee immobilizer  -pain control  -continue home meds  -medical management per primary team  -recommend dvt ppx  -Transfer to [facility] for operative management by primary surgeon per family's request

## 2021-11-22 NOTE — DIETITIAN INITIAL EVALUATION ADULT. - DIET TYPE
Glucerheidy Shake 8 twice per day (440 jed, 20 gm pro) ; Halal/consistent carbohydrate (no snacks)/supplement (specify) Glucerna Shake 8 twice per day (440 jed, 20 gm pro) ; Halal/low sodium/consistent carbohydrate (no snacks)/supplement (specify)

## 2021-11-22 NOTE — SWALLOW BEDSIDE ASSESSMENT ADULT - SWALLOW EVAL: PATIENT/FAMILY GOALS STATEMENT
pt stated " I give up on myself" ; ongoing c/o pain in leg ; stated problem eating and swallowing is because her hands shake so needs help; expressed concerns about going home and who would help at home

## 2021-11-22 NOTE — PROGRESS NOTE ADULT - ASSESSMENT
86F with HFpEF, obesity, obstructive sleep apnea on CPAP, hypertension, type 2 diabetes, COPD, lymphedema, status post fall and knee fracture currently on telemetry stable.     - Spoke to primary surgeon who reviewed recent X-ray. Stated that fracture is non-displaced and has good alignment. It should heal well within 2 months. No operative management recommended Stated pt should have PT and non-weight bearing on that leg. Recommended a Caden brace on right side. Place in 30 degrees flexion. Patient can follow up with him as outpatient. Stated cell # is [phone] for any further questions.   -Will change IV lasix to Bumex as per cardio,  to keep fluid negative balance.    -Continue metoprolol  mg daily, hydralazine 100 mg 3 times daily, amlodipine 10 mg daily for blood pressure control.    -DVT prevention with Lovenox, per ortho. NWB RLE in bulky marshall knee immobilizer  -pain control  -continue home meds  - dvt ppx  - pt is a poor surgical candidate and will need medical optimization by cardiology/pulmonology  -given the complicated pattern and location of the fracture, and in the setting of multiple severe medical comorbidities, pt is a poor surgical candidate and benefits may not outweigh the risks of operative management  -pt family requesting to be transferred to [facility] for further management by her primary orthopedic surgeon, PER FAMILY'S REQUEST (Dr. Ariel Sandoval)  - Pt's daughter expressed she would like mother to go to [facility] but will discuss with patient tonight.

## 2021-11-23 ENCOUNTER — TRANSCRIPTION ENCOUNTER (OUTPATIENT)
Age: 86
End: 2021-11-23

## 2021-11-23 LAB
ALBUMIN SERPL ELPH-MCNC: 2.3 G/DL — LOW (ref 3.3–5)
ALP SERPL-CCNC: 44 U/L — SIGNIFICANT CHANGE UP (ref 40–120)
ALT FLD-CCNC: 13 U/L — SIGNIFICANT CHANGE UP (ref 12–78)
ANION GAP SERPL CALC-SCNC: 0 MMOL/L — LOW (ref 5–17)
ANION GAP SERPL CALC-SCNC: 0 MMOL/L — LOW (ref 5–17)
ANION GAP SERPL CALC-SCNC: 1 MMOL/L — LOW (ref 5–17)
AST SERPL-CCNC: 14 U/L — LOW (ref 15–37)
BASE EXCESS BLDA CALC-SCNC: 17.2 MMOL/L — HIGH (ref -2–3)
BASE EXCESS BLDA CALC-SCNC: 17.2 MMOL/L — HIGH (ref -2–3)
BASE EXCESS BLDA CALC-SCNC: 17.4 MMOL/L — HIGH (ref -2–3)
BILIRUB SERPL-MCNC: 0.4 MG/DL — SIGNIFICANT CHANGE UP (ref 0.2–1.2)
BLOOD GAS COMMENTS: SIGNIFICANT CHANGE UP
BUN SERPL-MCNC: 46 MG/DL — HIGH (ref 7–23)
BUN SERPL-MCNC: 46 MG/DL — HIGH (ref 7–23)
BUN SERPL-MCNC: 47 MG/DL — HIGH (ref 7–23)
CALCIUM SERPL-MCNC: 8.5 MG/DL — SIGNIFICANT CHANGE UP (ref 8.5–10.1)
CALCIUM SERPL-MCNC: 8.7 MG/DL — SIGNIFICANT CHANGE UP (ref 8.5–10.1)
CALCIUM SERPL-MCNC: 9 MG/DL — SIGNIFICANT CHANGE UP (ref 8.5–10.1)
CHLORIDE SERPL-SCNC: 93 MMOL/L — LOW (ref 96–108)
CHLORIDE SERPL-SCNC: 93 MMOL/L — LOW (ref 96–108)
CHLORIDE SERPL-SCNC: 94 MMOL/L — LOW (ref 96–108)
CO2 BLDA-SCNC: 49 MMOL/L — HIGH (ref 19–24)
CO2 BLDA-SCNC: 51 MMOL/L — HIGH (ref 19–24)
CO2 BLDA-SCNC: 52 MMOL/L — HIGH (ref 19–24)
CO2 SERPL-SCNC: 43 MMOL/L — HIGH (ref 22–31)
CO2 SERPL-SCNC: 44 MMOL/L — HIGH (ref 22–31)
CO2 SERPL-SCNC: 44 MMOL/L — HIGH (ref 22–31)
CREAT SERPL-MCNC: 1.29 MG/DL — SIGNIFICANT CHANGE UP (ref 0.5–1.3)
CREAT SERPL-MCNC: 1.31 MG/DL — HIGH (ref 0.5–1.3)
CREAT SERPL-MCNC: 1.33 MG/DL — HIGH (ref 0.5–1.3)
GLUCOSE BLDC GLUCOMTR-MCNC: 127 MG/DL — HIGH (ref 70–99)
GLUCOSE BLDC GLUCOMTR-MCNC: 145 MG/DL — HIGH (ref 70–99)
GLUCOSE BLDC GLUCOMTR-MCNC: 149 MG/DL — HIGH (ref 70–99)
GLUCOSE BLDC GLUCOMTR-MCNC: 151 MG/DL — HIGH (ref 70–99)
GLUCOSE BLDC GLUCOMTR-MCNC: 183 MG/DL — HIGH (ref 70–99)
GLUCOSE SERPL-MCNC: 141 MG/DL — HIGH (ref 70–99)
GLUCOSE SERPL-MCNC: 143 MG/DL — HIGH (ref 70–99)
GLUCOSE SERPL-MCNC: 149 MG/DL — HIGH (ref 70–99)
HCO3 BLDA-SCNC: 47 MMOL/L — CRITICAL HIGH (ref 21–28)
HCO3 BLDA-SCNC: 48 MMOL/L — CRITICAL HIGH (ref 21–28)
HCO3 BLDA-SCNC: 49 MMOL/L — CRITICAL HIGH (ref 21–28)
HCT VFR BLD CALC: 28.4 % — LOW (ref 34.5–45)
HGB BLD-MCNC: 8 G/DL — LOW (ref 11.5–15.5)
HOROWITZ INDEX BLDA+IHG-RTO: 100 — SIGNIFICANT CHANGE UP
HOROWITZ INDEX BLDA+IHG-RTO: 40 — SIGNIFICANT CHANGE UP
HOROWITZ INDEX BLDA+IHG-RTO: 50 — SIGNIFICANT CHANGE UP
MAGNESIUM SERPL-MCNC: 2.8 MG/DL — HIGH (ref 1.6–2.6)
MCHC RBC-ENTMCNC: 26.1 PG — LOW (ref 27–34)
MCHC RBC-ENTMCNC: 28.2 GM/DL — LOW (ref 32–36)
MCV RBC AUTO: 92.5 FL — SIGNIFICANT CHANGE UP (ref 80–100)
NRBC # BLD: 0 /100 WBCS — SIGNIFICANT CHANGE UP (ref 0–0)
PCO2 BLDA: 77 MMHG — CRITICAL HIGH (ref 32–46)
PCO2 BLDA: 91 MMHG — CRITICAL HIGH (ref 32–46)
PCO2 BLDA: 99 MMHG — CRITICAL HIGH (ref 32–46)
PH BLD: 7.3 — LOW (ref 7.35–7.45)
PH BLD: 7.33 — LOW (ref 7.35–7.45)
PH BLD: 7.39 — SIGNIFICANT CHANGE UP (ref 7.35–7.45)
PHOSPHATE SERPL-MCNC: 5.1 MG/DL — HIGH (ref 2.5–4.5)
PLATELET # BLD AUTO: 228 K/UL — SIGNIFICANT CHANGE UP (ref 150–400)
PO2 BLDA: 172 MMHG — HIGH (ref 83–108)
PO2 BLDA: 76 MMHG — LOW (ref 83–108)
PO2 BLDA: 78 MMHG — LOW (ref 83–108)
POTASSIUM SERPL-MCNC: 4.7 MMOL/L — SIGNIFICANT CHANGE UP (ref 3.5–5.3)
POTASSIUM SERPL-MCNC: 4.7 MMOL/L — SIGNIFICANT CHANGE UP (ref 3.5–5.3)
POTASSIUM SERPL-MCNC: 4.9 MMOL/L — SIGNIFICANT CHANGE UP (ref 3.5–5.3)
POTASSIUM SERPL-SCNC: 4.7 MMOL/L — SIGNIFICANT CHANGE UP (ref 3.5–5.3)
POTASSIUM SERPL-SCNC: 4.7 MMOL/L — SIGNIFICANT CHANGE UP (ref 3.5–5.3)
POTASSIUM SERPL-SCNC: 4.9 MMOL/L — SIGNIFICANT CHANGE UP (ref 3.5–5.3)
PROT SERPL-MCNC: 7.5 GM/DL — SIGNIFICANT CHANGE UP (ref 6–8.3)
RBC # BLD: 3.07 M/UL — LOW (ref 3.8–5.2)
RBC # FLD: 16.2 % — HIGH (ref 10.3–14.5)
SAO2 % BLDA: 96.9 % — SIGNIFICANT CHANGE UP (ref 94–98)
SAO2 % BLDA: 97 % — SIGNIFICANT CHANGE UP (ref 94–98)
SAO2 % BLDA: 99.2 % — HIGH (ref 94–98)
SODIUM SERPL-SCNC: 137 MMOL/L — SIGNIFICANT CHANGE UP (ref 135–145)
SODIUM SERPL-SCNC: 137 MMOL/L — SIGNIFICANT CHANGE UP (ref 135–145)
SODIUM SERPL-SCNC: 138 MMOL/L — SIGNIFICANT CHANGE UP (ref 135–145)
WBC # BLD: 12.37 K/UL — HIGH (ref 3.8–10.5)
WBC # FLD AUTO: 12.37 K/UL — HIGH (ref 3.8–10.5)

## 2021-11-23 PROCEDURE — 99233 SBSQ HOSP IP/OBS HIGH 50: CPT

## 2021-11-23 PROCEDURE — 71045 X-RAY EXAM CHEST 1 VIEW: CPT | Mod: 26

## 2021-11-23 PROCEDURE — 93010 ELECTROCARDIOGRAM REPORT: CPT

## 2021-11-23 PROCEDURE — 99232 SBSQ HOSP IP/OBS MODERATE 35: CPT

## 2021-11-23 RX ORDER — METRONIDAZOLE 500 MG
TABLET ORAL
Refills: 0 | Status: DISCONTINUED | OUTPATIENT
Start: 2021-11-23 | End: 2021-11-27

## 2021-11-23 RX ORDER — FUROSEMIDE 40 MG
40 TABLET ORAL ONCE
Refills: 0 | Status: COMPLETED | OUTPATIENT
Start: 2021-11-23 | End: 2021-11-23

## 2021-11-23 RX ORDER — CEFTRIAXONE 500 MG/1
1000 INJECTION, POWDER, FOR SOLUTION INTRAMUSCULAR; INTRAVENOUS EVERY 24 HOURS
Refills: 0 | Status: DISCONTINUED | OUTPATIENT
Start: 2021-11-23 | End: 2021-11-27

## 2021-11-23 RX ORDER — METRONIDAZOLE 500 MG
500 TABLET ORAL EVERY 8 HOURS
Refills: 0 | Status: DISCONTINUED | OUTPATIENT
Start: 2021-11-23 | End: 2021-11-27

## 2021-11-23 RX ORDER — METRONIDAZOLE 500 MG
500 TABLET ORAL ONCE
Refills: 0 | Status: COMPLETED | OUTPATIENT
Start: 2021-11-23 | End: 2021-11-23

## 2021-11-23 RX ADMIN — ENOXAPARIN SODIUM 40 MILLIGRAM(S): 100 INJECTION SUBCUTANEOUS at 17:17

## 2021-11-23 RX ADMIN — ENOXAPARIN SODIUM 40 MILLIGRAM(S): 100 INJECTION SUBCUTANEOUS at 05:36

## 2021-11-23 RX ADMIN — GABAPENTIN 100 MILLIGRAM(S): 400 CAPSULE ORAL at 05:36

## 2021-11-23 RX ADMIN — Medication 100 MILLIGRAM(S): at 05:36

## 2021-11-23 RX ADMIN — Medication 2: at 16:39

## 2021-11-23 RX ADMIN — AMLODIPINE BESYLATE 10 MILLIGRAM(S): 2.5 TABLET ORAL at 05:36

## 2021-11-23 RX ADMIN — BUDESONIDE AND FORMOTEROL FUMARATE DIHYDRATE 2 PUFF(S): 160; 4.5 AEROSOL RESPIRATORY (INHALATION) at 05:36

## 2021-11-23 RX ADMIN — CEFTRIAXONE 100 MILLIGRAM(S): 500 INJECTION, POWDER, FOR SOLUTION INTRAMUSCULAR; INTRAVENOUS at 16:47

## 2021-11-23 RX ADMIN — BUMETANIDE 1 MILLIGRAM(S): 0.25 INJECTION INTRAMUSCULAR; INTRAVENOUS at 06:39

## 2021-11-23 RX ADMIN — Medication 100 MILLIGRAM(S): at 17:18

## 2021-11-23 RX ADMIN — Medication 40 MILLIGRAM(S): at 11:21

## 2021-11-23 RX ADMIN — Medication 100 MILLIGRAM(S): at 21:25

## 2021-11-23 NOTE — DISCHARGE NOTE NURSING/CASE MANAGEMENT/SOCIAL WORK - PATIENT PORTAL LINK FT
You can access the FollowMyHealth Patient Portal offered by [facility] by registering at the following website: [email or web address]. By joining [employer]’s FollowMyHealth portal, you will also be able to view your health information using other applications (apps) compatible with our system.

## 2021-11-23 NOTE — PROGRESS NOTE ADULT - SUBJECTIVE AND OBJECTIVE BOX
Patient is a 86y old  Female who presents with a chief complaint of shortness of breath, right knee pain post fall.  (23 Nov 2021 08:33)    PAST MEDICAL & SURGICAL HISTORY:  HTN (hypertension)    HLD (hyperlipidemia)    Asthma    DM (diabetes mellitus)    GERD (gastroesophageal reflux disease)    Lymphedema    S/P knee replacement    INTERVAL HISTORY: Pt seen lying in bed, hardly arousable on exam, responding to painful stimuli. Found O2 sat 79-83% on NC 3L.   	  MEDICATIONS:  MEDICATIONS  (STANDING):  amLODIPine   Tablet 10 milliGRAM(s) Oral daily  atorvastatin 40 milliGRAM(s) Oral at bedtime  budesonide 160 MICROgram(s)/formoterol 4.5 MICROgram(s) Inhaler 2 Puff(s) Inhalation two times a day  buMETAnide 1 milliGRAM(s) Oral daily  cholecalciferol 2000 Unit(s) Oral daily  enoxaparin Injectable 40 milliGRAM(s) SubCutaneous every 12 hours  gabapentin 100 milliGRAM(s) Oral three times a day  glucagon  Injectable 1 milliGRAM(s) IntraMuscular once  hydrALAZINE 100 milliGRAM(s) Oral three times a day  insulin lispro (ADMELOG) corrective regimen sliding scale   SubCutaneous three times a day before meals  insulin lispro (ADMELOG) corrective regimen sliding scale   SubCutaneous at bedtime  metoprolol succinate  milliGRAM(s) Oral daily  montelukast 10 milliGRAM(s) Oral at bedtime  polyethylene glycol 3350 17 Gram(s) Oral daily  senna 2 Tablet(s) Oral at bedtime    MEDICATIONS  (PRN):  acetaminophen     Tablet .. 650 milliGRAM(s) Oral every 6 hours PRN Mild Pain (1 - 3), Moderate Pain (4 - 6)  ALBUTerol    90 MICROgram(s) HFA Inhaler 2 Puff(s) Inhalation every 6 hours PRN Shortness of Breath and/or Wheezing  melatonin 3 milliGRAM(s) Oral at bedtime PRN Insomnia  oxyCODONE    IR 10 milliGRAM(s) Oral every 4 hours PRN Severe Pain (7 - 10)  oxyCODONE    IR 5 milliGRAM(s) Oral every 4 hours PRN Moderate Pain (4 - 6)  sodium chloride 0.65% Nasal 1 Spray(s) Both Nostrils three times a day PRN Nasal Congestion    Vitals:  T(F): 98.5 (11-23-21 @ 05:07), Max: 100 (11-22-21 @ 17:10)  HR: 75 (11-23-21 @ 05:54) (75 - 85)  BP: 134/67 (11-23-21 @ 05:07) (114/58 - 154/74)  RR: 18 (11-23-21 @ 05:07) (18 - 18)  SpO2: 93% (11-23-21 @ 05:54) (91% - 100%)    11-22 @ 07:01  -  11-23 @ 07:00  --------------------------------------------------------  IN:    Oral Fluid: 830 mL  Total IN: 830 mL    OUT:  Total OUT: 0 mL    Total NET: 830 mL    PHYSICAL EXAM:  Neuro: Lethargic, responding to painful stimuli  CV: S1 S2 RRR, + SM  Lungs: Poor respiratory effort, + Crackles b/l, Diminished breath sound at bases  GI: Soft, Obese, + Hernia-reducible, BS +, NT  Extremities: Obese leg,  B/L LE edema (R>L), + Right knee immobilizer    RADIOLOGY:      < from: Xray Pelvis AP only (11.19.21 @ 05:37) >  IMPRESSION:  No acute findings in this single radiograph, if there is continued clinical concern follow-up CT can be ordered    --- End of Report ---    < end of copied text >  < from: Xray Chest 1 View- PORTABLE-Urgent (Xray Chest 1 View- PORTABLE-Urgent .) (11.19.21 @ 05:36) >  INTERPRETATION:  Clinical history: 86-year-old female, fall.    Single view of the chest is compared to 11/29/2021 and is correlated with the chest CT of 5/31/2021.    Mild cardiomegaly and mild central pulmonary venous congestion with no lobar consolidation, effusion, pneumothorax or acute osseous finding.    Moderate perihilar opacities may represent infiltrate in the correct clinical context. Differential diagnosis includes chronic interstitial lung disease.    IMPRESSION:  No fracture or pneumothorax    < end of copied text >    < from: Xray Knee 4 Views, Bilateral (11.19.21 @ 05:36) >  IMPRESSION:  Minimally displaced proximal right tibial shaft/metaphyseal fracture.    Minimally displaced fracture of the right fibular neck.      < end of copied text >    DIAGNOSTIC TESTING:    [x ] Echocardiogram:  < from: TTE Echo Complete w/o Contrast w/ Doppler (11.19.21 @ 13:56) >  PHYSICIAN INTERPRETATION:  Left Ventricle: The left ventricle was not well visualized. The left ventricular internal cavity size is normal.  Global LV systolic function was normal. Left ventricular ejection fraction, by visual estimation, is 60 to 65%. Spectral Doppler shows impaired relaxation pattern of left ventricular myocardial filling (Grade I diastolic dysfunction).  Right Ventricle: Normal right ventricular size and function. The right ventricle was not well visualized.  Left Atrium: Normal left atrial size.  Right Atrium: Normal right atrial size.  Pericardium: There is no evidence of pericardial effusion.  Mitral Valve: The mitral valve is not well seen. Mildthickening and calcification of the anterior and posterior mitral valve leaflets. Mitral leaflet mobility is normal. There is moderate mitral annular calcification. Peak transmitral mean gradient equals 4.7 mmHg, calculated mitral valve area by pressure half time equals 5.07 cm² consistent with No evidence of mitral stenosis. Mitral valve mean gradient is 4.7 mmHg consistent with mild mitral stenosis.  Tricuspid Valve: The tricuspid valve is not well seen. The tricuspid valve is degenerative in appearance. Trivial tricuspid regurgitation is visualized. Adequate TR velocity was not obtained to accurately assess RVSP.  Aortic Valve: The aortic valve was not well visualized. Mild aortic stenosis is present. Peak transaortic gradient equals 21.7 mmHg, mean transaortic gradient equals 10.9 mmHg, the calculated aortic valve area equals 1.69 cm² by the continuity equation consistent with mild aortic stenosis. The peak aortic velocity was obtained from the apical view. Trivial aortic valve regurgitation is seen.  Pulmonic Valve: The pulmonic valve was not well visualized. No indication of pulmonic valve regurgitation.  Aorta: Aortic root measured at Sinus of Valsalva is normal. There is mild aortic root calcification.  Venous: IVC not well-visualized.    Summary:   1. Left ventricular ejection fraction, by visual estimation, is 60 to 65%.   2. Technically difficult study.   3. Normal global left ventricular systolic function.   4. Normal left ventricular internal cavity size.   5. Spectral Doppler shows impaired relaxation pattern of left ventricular myocardial filling (Grade I diastolic dysfunction).   6. There is mild concentric left ventricular hypertrophy.   7. Normal right ventricular size and function.   8. Normal left atrial size.   9. Normal right atrial size.  10. There is no evidence of pericardial effusion.  11. Mild thickening and calcification of the anterior and posterior mitral valve leaflets.  12. Moderate mitral annular calcification.  13. Degenerative tricuspidvalve.  14. Mild aortic valve stenosis.  15. Mitral valve mean gradient is 4.7 mmHg consistent with mild mitral stenosis.  16. Peak transaortic gradient equals 21.7 mmHg, mean transaortic gradient equals 10.9 mmHg, the calculated aortic valve area equals 1.69 cm² by the continuity equation consistent with mild aortic stenosis.  17. There is mild aortic root calcification.    < end of copied text >    LABS:	 	  Blood Gas Profile - Arterial (11.23.21 @ 10:43)   Blood Gas Comments: or hematoma pt on bipap 15/8 100%   pH, Blood: 7.33   pCO2, Arterial: 91 mmHg   pO2, Arterial: 172 mmHg   HCO3, Arterial: 48 mmol/L   Base Excess, Arterial: 17.2 mmol/L   Oxygen Saturation, Arterial: 99.2 %   Total CO2, Arterial: 51 mmol/L   FIO2, Arterial: 100.0     23 Nov 2021 09:25    137    |  94     |  47     ----------------------------<  149    4.7     |  43     |  1.31     22 Nov 2021 07:47    137    |  92     |  41     ----------------------------<  106    4.5     |  44     |  1.35     Ca    8.5        23 Nov 2021 09:25  Phos  5.1       23 Nov 2021 09:25  Mg     2.8       23 Nov 2021 09:25    TPro  7.5    /  Alb  2.3    /  TBili  0.4    /  DBili  x      /  AST  14     /  ALT  13     /  AlkPhos  44     23 Nov 2021 09:25                          8.0    12.37 )-----------( 228      ( 23 Nov 2021 09:25 )             28.4 ,                       8.2      8.96  )-----------( 246      ( 22 Nov 2021 07:47 )             28.3              Patient is a 86y old  Female who presents with a chief complaint of shortness of breath, right knee pain post fall.  (23 Nov 2021 08:33)    PAST MEDICAL & SURGICAL HISTORY:  HTN (hypertension)    HLD (hyperlipidemia)    Asthma    DM (diabetes mellitus)    GERD (gastroesophageal reflux disease)    Lymphedema    S/P knee replacement    INTERVAL HISTORY: Pt seen lying in bed, hardly arousable on exam, responding to painful stimuli. Found O2 sat 79-83% on NC 3L.   	  MEDICATIONS:  MEDICATIONS  (STANDING):  amLODIPine   Tablet 10 milliGRAM(s) Oral daily  atorvastatin 40 milliGRAM(s) Oral at bedtime  budesonide 160 MICROgram(s)/formoterol 4.5 MICROgram(s) Inhaler 2 Puff(s) Inhalation two times a day  buMETAnide 1 milliGRAM(s) Oral daily  cholecalciferol 2000 Unit(s) Oral daily  enoxaparin Injectable 40 milliGRAM(s) SubCutaneous every 12 hours  gabapentin 100 milliGRAM(s) Oral three times a day  glucagon  Injectable 1 milliGRAM(s) IntraMuscular once  hydrALAZINE 100 milliGRAM(s) Oral three times a day  insulin lispro (ADMELOG) corrective regimen sliding scale   SubCutaneous three times a day before meals  insulin lispro (ADMELOG) corrective regimen sliding scale   SubCutaneous at bedtime  metoprolol succinate  milliGRAM(s) Oral daily  montelukast 10 milliGRAM(s) Oral at bedtime  polyethylene glycol 3350 17 Gram(s) Oral daily  senna 2 Tablet(s) Oral at bedtime    MEDICATIONS  (PRN):  acetaminophen     Tablet .. 650 milliGRAM(s) Oral every 6 hours PRN Mild Pain (1 - 3), Moderate Pain (4 - 6)  ALBUTerol    90 MICROgram(s) HFA Inhaler 2 Puff(s) Inhalation every 6 hours PRN Shortness of Breath and/or Wheezing  melatonin 3 milliGRAM(s) Oral at bedtime PRN Insomnia  oxyCODONE    IR 10 milliGRAM(s) Oral every 4 hours PRN Severe Pain (7 - 10)  oxyCODONE    IR 5 milliGRAM(s) Oral every 4 hours PRN Moderate Pain (4 - 6)  sodium chloride 0.65% Nasal 1 Spray(s) Both Nostrils three times a day PRN Nasal Congestion    Vitals:  T(F): 98.5 (11-23-21 @ 05:07), Max: 100 (11-22-21 @ 17:10)  HR: 75 (11-23-21 @ 05:54) (75 - 85)  BP: 134/67 (11-23-21 @ 05:07) (114/58 - 154/74)  RR: 18 (11-23-21 @ 05:07) (18 - 18)  SpO2: 93% (11-23-21 @ 05:54) (91% - 100%)    11-22 @ 07:01  -  11-23 @ 07:00  --------------------------------------------------------  IN:    Oral Fluid: 830 mL  Total IN: 830 mL    OUT:  Total OUT: 0 mL    Total NET: 830 mL    PHYSICAL EXAM:  Neuro: Lethargic, responding to painful stimuli  CV: S1 S2 RRR, + SM  Lungs: Poor respiratory effort, + Crackles b/l, Diminished breath sound at bases  GI: Soft, Obese, + Hernia-reducible, BS +, NT  Extremities: Obese leg,  B/L LE edema (R>L), + Right knee immobilizer    RADIOLOGY:      < from: Xray Pelvis AP only (11.19.21 @ 05:37) >  IMPRESSION:  No acute findings in this single radiograph, if there is continued clinical concern follow-up CT can be ordered    --- End of Report ---    < end of copied text >  < from: Xray Chest 1 View- PORTABLE-Urgent (Xray Chest 1 View- PORTABLE-Urgent .) (11.19.21 @ 05:36) >  INTERPRETATION:  Clinical history: 86-year-old female, fall.    Single view of the chest is compared to 11/29/2021 and is correlated with the chest CT of 5/31/2021.    Mild cardiomegaly and mild central pulmonary venous congestion with no lobar consolidation, effusion, pneumothorax or acute osseous finding.    Moderate perihilar opacities may represent infiltrate in the correct clinical context. Differential diagnosis includes chronic interstitial lung disease.    IMPRESSION:  No fracture or pneumothorax    < end of copied text >    < from: Xray Knee 4 Views, Bilateral (11.19.21 @ 05:36) >  IMPRESSION:  Minimally displaced proximal right tibial shaft/metaphyseal fracture.    Minimally displaced fracture of the right fibular neck.    < end of copied text >    DIAGNOSTIC TESTING:    [x ] Echocardiogram:  < from: TTE Echo Complete w/o Contrast w/ Doppler (11.19.21 @ 13:56) >  PHYSICIAN INTERPRETATION:  Left Ventricle: The left ventricle was not well visualized. The left ventricular internal cavity size is normal.  Global LV systolic function was normal. Left ventricular ejection fraction, by visual estimation, is 60 to 65%. Spectral Doppler shows impaired relaxation pattern of left ventricular myocardial filling (Grade I diastolic dysfunction).  Right Ventricle: Normal right ventricular size and function. The right ventricle was not well visualized.  Left Atrium: Normal left atrial size.  Right Atrium: Normal right atrial size.  Pericardium: There is no evidence of pericardial effusion.  Mitral Valve: The mitral valve is not well seen. Mildthickening and calcification of the anterior and posterior mitral valve leaflets. Mitral leaflet mobility is normal. There is moderate mitral annular calcification. Peak transmitral mean gradient equals 4.7 mmHg, calculated mitral valve area by pressure half time equals 5.07 cm² consistent with No evidence of mitral stenosis. Mitral valve mean gradient is 4.7 mmHg consistent with mild mitral stenosis.  Tricuspid Valve: The tricuspid valve is not well seen. The tricuspid valve is degenerative in appearance. Trivial tricuspid regurgitation is visualized. Adequate TR velocity was not obtained to accurately assess RVSP.  Aortic Valve: The aortic valve was not well visualized. Mild aortic stenosis is present. Peak transaortic gradient equals 21.7 mmHg, mean transaortic gradient equals 10.9 mmHg, the calculated aortic valve area equals 1.69 cm² by the continuity equation consistent with mild aortic stenosis. The peak aortic velocity was obtained from the apical view. Trivial aortic valve regurgitation is seen.  Pulmonic Valve: The pulmonic valve was not well visualized. No indication of pulmonic valve regurgitation.  Aorta: Aortic root measured at Sinus of Valsalva is normal. There is mild aortic root calcification.  Venous: IVC not well-visualized.    Summary:   1. Left ventricular ejection fraction, by visual estimation, is 60 to 65%.   2. Technically difficult study.   3. Normal global left ventricular systolic function.   4. Normal left ventricular internal cavity size.   5. Spectral Doppler shows impaired relaxation pattern of left ventricular myocardial filling (Grade I diastolic dysfunction).   6. There is mild concentric left ventricular hypertrophy.   7. Normal right ventricular size and function.   8. Normal left atrial size.   9. Normal right atrial size.  10. There is no evidence of pericardial effusion.  11. Mild thickening and calcification of the anterior and posterior mitral valve leaflets.  12. Moderate mitral annular calcification.  13. Degenerative tricuspidvalve.  14. Mild aortic valve stenosis.  15. Mitral valve mean gradient is 4.7 mmHg consistent with mild mitral stenosis.  16. Peak transaortic gradient equals 21.7 mmHg, mean transaortic gradient equals 10.9 mmHg, the calculated aortic valve area equals 1.69 cm² by the continuity equation consistent with mild aortic stenosis.  17. There is mild aortic root calcification.    < end of copied text >    LABS:	 	  Blood Gas Profile - Arterial (11.23.21 @ 10:43)   Blood Gas Comments: or hematoma pt on bipap 15/8 100%   pH, Blood: 7.33   pCO2, Arterial: 91 mmHg   pO2, Arterial: 172 mmHg   HCO3, Arterial: 48 mmol/L   Base Excess, Arterial: 17.2 mmol/L   Oxygen Saturation, Arterial: 99.2 %   Total CO2, Arterial: 51 mmol/L   FIO2, Arterial: 100.0     23 Nov 2021 09:25    137    |  94     |  47     ----------------------------<  149    4.7     |  43     |  1.31     22 Nov 2021 07:47    137    |  92     |  41     ----------------------------<  106    4.5     |  44     |  1.35     Ca    8.5        23 Nov 2021 09:25  Phos  5.1       23 Nov 2021 09:25  Mg     2.8       23 Nov 2021 09:25    TPro  7.5    /  Alb  2.3    /  TBili  0.4    /  DBili  x      /  AST  14     /  ALT  13     /  AlkPhos  44     23 Nov 2021 09:25                          8.0    12.37 )-----------( 228      ( 23 Nov 2021 09:25 )             28.4 ,                       8.2      8.96  )-----------( 246      ( 22 Nov 2021 07:47 )             28.3              Patient is a 86y old  Female who presents with a chief complaint of right knee pain post fall, shortness of breath  (23 Nov 2021 08:33)    PAST MEDICAL & SURGICAL HISTORY:  HTN (hypertension)    HLD (hyperlipidemia)    Asthma    DM (diabetes mellitus)    GERD (gastroesophageal reflux disease)    Lymphedema    CHF    S/P knee replacement    INTERVAL HISTORY: Pt seen lying in bed, hardly arousable on exam, responding to painful stimuli. Found O2 sat 79-83% on NC 3L.   	  MEDICATIONS:  MEDICATIONS  (STANDING):  amLODIPine   Tablet 10 milliGRAM(s) Oral daily  atorvastatin 40 milliGRAM(s) Oral at bedtime  budesonide 160 MICROgram(s)/formoterol 4.5 MICROgram(s) Inhaler 2 Puff(s) Inhalation two times a day  buMETAnide 1 milliGRAM(s) Oral daily  cholecalciferol 2000 Unit(s) Oral daily  enoxaparin Injectable 40 milliGRAM(s) SubCutaneous every 12 hours  gabapentin 100 milliGRAM(s) Oral three times a day  glucagon  Injectable 1 milliGRAM(s) IntraMuscular once  hydrALAZINE 100 milliGRAM(s) Oral three times a day  insulin lispro (ADMELOG) corrective regimen sliding scale   SubCutaneous three times a day before meals  insulin lispro (ADMELOG) corrective regimen sliding scale   SubCutaneous at bedtime  metoprolol succinate  milliGRAM(s) Oral daily  montelukast 10 milliGRAM(s) Oral at bedtime  polyethylene glycol 3350 17 Gram(s) Oral daily  senna 2 Tablet(s) Oral at bedtime    MEDICATIONS  (PRN):  acetaminophen     Tablet .. 650 milliGRAM(s) Oral every 6 hours PRN Mild Pain (1 - 3), Moderate Pain (4 - 6)  ALBUTerol    90 MICROgram(s) HFA Inhaler 2 Puff(s) Inhalation every 6 hours PRN Shortness of Breath and/or Wheezing  melatonin 3 milliGRAM(s) Oral at bedtime PRN Insomnia  oxyCODONE    IR 10 milliGRAM(s) Oral every 4 hours PRN Severe Pain (7 - 10)  oxyCODONE    IR 5 milliGRAM(s) Oral every 4 hours PRN Moderate Pain (4 - 6)  sodium chloride 0.65% Nasal 1 Spray(s) Both Nostrils three times a day PRN Nasal Congestion    Vitals:  T(F): 98.5 (11-23-21 @ 05:07), Max: 100 (11-22-21 @ 17:10)  HR: 75 (11-23-21 @ 05:54) (75 - 85)  BP: 134/67 (11-23-21 @ 05:07) (114/58 - 154/74)  RR: 18 (11-23-21 @ 05:07) (18 - 18)  SpO2: 93% (11-23-21 @ 05:54) (91% - 100%)    11-22 @ 07:01  -  11-23 @ 07:00  --------------------------------------------------------  IN:    Oral Fluid: 830 mL  Total IN: 830 mL    OUT:  Total OUT: 0 mL    Total NET: 830 mL    PHYSICAL EXAM:  Neuro: Lethargic, responding to painful stimuli  CV: S1 S2 RRR, + SM  Lungs: Poor respiratory effort, + Crackles b/l, Diminished breath sound at bases  GI: Soft, Obese, + Hernia-reducible, BS +, NT  Extremities: Obese leg,  B/L LE edema (R>L), + Right knee immobilizer    RADIOLOGY:      < from: Xray Pelvis AP only (11.19.21 @ 05:37) >  IMPRESSION:  No acute findings in this single radiograph, if there is continued clinical concern follow-up CT can be ordered    --- End of Report ---    < end of copied text >  < from: Xray Chest 1 View- PORTABLE-Urgent (Xray Chest 1 View- PORTABLE-Urgent .) (11.19.21 @ 05:36) >  INTERPRETATION:  Clinical history: 86-year-old female, fall.    Single view of the chest is compared to 11/29/2021 and is correlated with the chest CT of 5/31/2021.    Mild cardiomegaly and mild central pulmonary venous congestion with no lobar consolidation, effusion, pneumothorax or acute osseous finding.    Moderate perihilar opacities may represent infiltrate in the correct clinical context. Differential diagnosis includes chronic interstitial lung disease.    IMPRESSION:  No fracture or pneumothorax    < end of copied text >    < from: Xray Knee 4 Views, Bilateral (11.19.21 @ 05:36) >  IMPRESSION:  Minimally displaced proximal right tibial shaft/metaphyseal fracture.    Minimally displaced fracture of the right fibular neck.    < end of copied text >    DIAGNOSTIC TESTING:    [x ] Echocardiogram:  < from: TTE Echo Complete w/o Contrast w/ Doppler (11.19.21 @ 13:56) >  PHYSICIAN INTERPRETATION:  Left Ventricle: The left ventricle was not well visualized. The left ventricular internal cavity size is normal.  Global LV systolic function was normal. Left ventricular ejection fraction, by visual estimation, is 60 to 65%. Spectral Doppler shows impaired relaxation pattern of left ventricular myocardial filling (Grade I diastolic dysfunction).  Right Ventricle: Normal right ventricular size and function. The right ventricle was not well visualized.  Left Atrium: Normal left atrial size.  Right Atrium: Normal right atrial size.  Pericardium: There is no evidence of pericardial effusion.  Mitral Valve: The mitral valve is not well seen. Mildthickening and calcification of the anterior and posterior mitral valve leaflets. Mitral leaflet mobility is normal. There is moderate mitral annular calcification. Peak transmitral mean gradient equals 4.7 mmHg, calculated mitral valve area by pressure half time equals 5.07 cm² consistent with No evidence of mitral stenosis. Mitral valve mean gradient is 4.7 mmHg consistent with mild mitral stenosis.  Tricuspid Valve: The tricuspid valve is not well seen. The tricuspid valve is degenerative in appearance. Trivial tricuspid regurgitation is visualized. Adequate TR velocity was not obtained to accurately assess RVSP.  Aortic Valve: The aortic valve was not well visualized. Mild aortic stenosis is present. Peak transaortic gradient equals 21.7 mmHg, mean transaortic gradient equals 10.9 mmHg, the calculated aortic valve area equals 1.69 cm² by the continuity equation consistent with mild aortic stenosis. The peak aortic velocity was obtained from the apical view. Trivial aortic valve regurgitation is seen.  Pulmonic Valve: The pulmonic valve was not well visualized. No indication of pulmonic valve regurgitation.  Aorta: Aortic root measured at Sinus of Valsalva is normal. There is mild aortic root calcification.  Venous: IVC not well-visualized.    Summary:   1. Left ventricular ejection fraction, by visual estimation, is 60 to 65%.   2. Technically difficult study.   3. Normal global left ventricular systolic function.   4. Normal left ventricular internal cavity size.   5. Spectral Doppler shows impaired relaxation pattern of left ventricular myocardial filling (Grade I diastolic dysfunction).   6. There is mild concentric left ventricular hypertrophy.   7. Normal right ventricular size and function.   8. Normal left atrial size.   9. Normal right atrial size.  10. There is no evidence of pericardial effusion.  11. Mild thickening and calcification of the anterior and posterior mitral valve leaflets.  12. Moderate mitral annular calcification.  13. Degenerative tricuspidvalve.  14. Mild aortic valve stenosis.  15. Mitral valve mean gradient is 4.7 mmHg consistent with mild mitral stenosis.  16. Peak transaortic gradient equals 21.7 mmHg, mean transaortic gradient equals 10.9 mmHg, the calculated aortic valve area equals 1.69 cm² by the continuity equation consistent with mild aortic stenosis.  17. There is mild aortic root calcification.    < end of copied text >    LABS:	 	  Blood Gas Profile - Arterial (11.23.21 @ 10:43)   Blood Gas Comments: or hematoma pt on bipap 15/8 100%   pH, Blood: 7.33   pCO2, Arterial: 91 mmHg   pO2, Arterial: 172 mmHg   HCO3, Arterial: 48 mmol/L   Base Excess, Arterial: 17.2 mmol/L   Oxygen Saturation, Arterial: 99.2 %   Total CO2, Arterial: 51 mmol/L   FIO2, Arterial: 100.0     23 Nov 2021 09:25    137    |  94     |  47     ----------------------------<  149    4.7     |  43     |  1.31     22 Nov 2021 07:47    137    |  92     |  41     ----------------------------<  106    4.5     |  44     |  1.35     Ca    8.5        23 Nov 2021 09:25  Phos  5.1       23 Nov 2021 09:25  Mg     2.8       23 Nov 2021 09:25    TPro  7.5    /  Alb  2.3    /  TBili  0.4    /  DBili  x      /  AST  14     /  ALT  13     /  AlkPhos  44     23 Nov 2021 09:25                          8.0    12.37 )-----------( 228      ( 23 Nov 2021 09:25 )             28.4 ,                       8.2      8.96  )-----------( 246      ( 22 Nov 2021 07:47 )             28.3

## 2021-11-23 NOTE — CHART NOTE - NSCHARTNOTEFT_GEN_A_CORE
Spoke with Medicine Team regarding conversation with patient's primary orthopaedic surgeon Dr. Ariel Farah, states patient does not need to be transferred after reviewing XR images, can place patient in [place]ena brace Locked at 30 deg flex. Will order. Non operative management at this time per surgeon. No acute orthopaedic surgical intervention at this time. Orthopaedically stable for discharge, patient to follow up with Dr. Castro as an outpatient
Based on patients on going issues with deconditioning and generalized weakness secondary to patient's diagnosis of COPD, CHF, and morbid obesity. Patient will require a semi electric hospital bed. This is necessary to achieve positioning of the body in ways not feasible with an ordinary bed. Patient requires frequent body changes and an immediate need for a change in body position. Bed pillow and wedges have been tried and ruled out. Patient needs gel overlay to prevent pressure ulcers. Patient needs a lift because she requires transfer between bed to chair. Patient would be confined without a patient lift.

## 2021-11-23 NOTE — RAPID RESPONSE TEAM SUMMARY - NSSITUATIONBACKGROUNDRRT_GEN_ALL_CORE
RRT called for acute hypoxemia.  patient s/p TKE post op day 1  patient was found severely hypoxemic on 3 liters requiring bipap intervention  vitals stable  patient alert although lethargic RRT called for acute hypoxemia.  patient s/p TKE post op day 1  patient was found severely hypoxemic on 3 liters requiring bipap intervention  vitals stable  patient alert although lethargic  o/e lungs bilateral crackles RRT called for acute hypoxemia.  patient with hx of CHF, asthma admitted for periprosthetic fracture.  patient was found severely hypoxemic on 3 liters requiring bipap intervention  vitals stable  patient alert although lethargic  o/e lungs bilateral crackles

## 2021-11-23 NOTE — PROGRESS NOTE ADULT - SUBJECTIVE AND OBJECTIVE BOX
WESTON SETHI    S 2D 268 D    Allergies    No Known Allergies    Intolerances        PAST MEDICAL & SURGICAL HISTORY:  HTN (hypertension)    HLD (hyperlipidemia)    Asthma    DM (diabetes mellitus)    GERD (gastroesophageal reflux disease)    Lymphedema    S/P knee replacement        FAMILY HISTORY:  No pertinent family history in first degree relatives        Home Medications:  Advair Diskus 250 mcg-50 mcg inhalation powder: 1 puff(s) inhaled 2 times a day (30 Oct 2019 16:54)  alendronate 70 mg oral tablet: 1 tab(s) orally once a week (30 Oct 2019 16:54)  amLODIPine 10 mg oral tablet: 1 tab(s) orally once a day (30 Oct 2019 16:54)  ascorbic acid 500 mg oral capsule: 1 cap(s) orally once a day (30 Oct 2019 16:54)  azelastine 137 mcg/inh (0.1%) nasal spray: 2 spray(s) nasal 2 times a day (30 Oct 2019 16:54)  cholecalciferol 2000 intl units oral tablet: 1 tab(s) orally once a day (30 Oct 2019 16:54)  glimepiride 2 mg oral tablet: 1 tab(s) orally once a day (30 Oct 2019 16:54)  hydrALAZINE 100 mg oral tablet: 1 tab(s) orally 3 times a day (2019 13:41)  ipratropium-albuterol 0.5 mg-2.5 mg/3 mLinhalation solution: 3 milliliter(s) inhaled every 6 hours, As Needed (2021 10:05)  metoprolol succinate 100 mg oral tablet, extended release: 1 tab(s) orally once a day (30 Oct 2019 16:54)  montelukast 10 mg oral tablet: 1 tab(s) orally once a day (30 Oct 2019 16:54)  Neurontin 100 mg oral capsule: 1 cap(s) orally 3 times a day (2020 18:19)  ocular lubricant ophthalmic solution: 1 drop(s) to each affected eye 3 times a day, As needed, Dry Eyes (2019 13:41)  polyethylene glycol 3350 oral powder for reconstitution: 17 gram(s) orally once a day (2019 13:41)  ProAir HFA 90 mcg/inh inhalation aerosol: 2 puff(s) inhaled 4 times a day, As Needed (30 Oct 2019 16:54)  rosuvastatin 10 mg oral tablet: 1 tab(s) orally once a day (30 Oct 2019 16:54)      MEDICATIONS  (STANDING):  amLODIPine   Tablet 10 milliGRAM(s) Oral daily  atorvastatin 40 milliGRAM(s) Oral at bedtime  budesonide 160 MICROgram(s)/formoterol 4.5 MICROgram(s) Inhaler 2 Puff(s) Inhalation two times a day  buMETAnide 1 milliGRAM(s) Oral daily  cholecalciferol 2000 Unit(s) Oral daily  dextrose 40% Gel 15 Gram(s) Oral once  dextrose 5%. 1000 milliLiter(s) (50 mL/Hr) IV Continuous <Continuous>  dextrose 5%. 1000 milliLiter(s) (100 mL/Hr) IV Continuous <Continuous>  dextrose 50% Injectable 25 Gram(s) IV Push once  dextrose 50% Injectable 12.5 Gram(s) IV Push once  dextrose 50% Injectable 25 Gram(s) IV Push once  enoxaparin Injectable 40 milliGRAM(s) SubCutaneous every 12 hours  gabapentin 100 milliGRAM(s) Oral three times a day  glucagon  Injectable 1 milliGRAM(s) IntraMuscular once  hydrALAZINE 100 milliGRAM(s) Oral three times a day  insulin lispro (ADMELOG) corrective regimen sliding scale   SubCutaneous three times a day before meals  insulin lispro (ADMELOG) corrective regimen sliding scale   SubCutaneous at bedtime  metoprolol succinate  milliGRAM(s) Oral daily  montelukast 10 milliGRAM(s) Oral at bedtime  polyethylene glycol 3350 17 Gram(s) Oral daily  senna 2 Tablet(s) Oral at bedtime    MEDICATIONS  (PRN):  acetaminophen     Tablet .. 650 milliGRAM(s) Oral every 6 hours PRN Mild Pain (1 - 3), Moderate Pain (4 - 6)  ALBUTerol    90 MICROgram(s) HFA Inhaler 2 Puff(s) Inhalation every 6 hours PRN Shortness of Breath and/or Wheezing  melatonin 3 milliGRAM(s) Oral at bedtime PRN Insomnia  oxyCODONE    IR 10 milliGRAM(s) Oral every 4 hours PRN Severe Pain (7 - 10)  oxyCODONE    IR 5 milliGRAM(s) Oral every 4 hours PRN Moderate Pain (4 - 6)  sodium chloride 0.65% Nasal 1 Spray(s) Both Nostrils three times a day PRN Nasal Congestion      Diet, Consistent Carbohydrate/No Snacks:   Minced and Moist (MINCEDMOIST)  Low Sodium  Halal  No Pork  Supplement Feeding Modality:  Oral  Glucerna Shake Cans or Servings Per Day:  1       Frequency:  Two Times a day (21 @ 16:48) [Active]          Vital Signs Last 24 Hrs  T(C): 36.9 (2021 05:07), Max: 37.8 (2021 17:10)  T(F): 98.5 (2021 05:07), Max: 100 (2021 17:10)  HR: 75 (2021 05:54) (75 - 86)  BP: 134/67 (2021 05:07) (114/58 - 154/74)  BP(mean): --  RR: 18 (2021 05:07) (18 - 18)  SpO2: 93% (2021 05:54) (91% - 100%)      21 @ 07:01  -  21 @ 07:00  --------------------------------------------------------  IN: 830 mL / OUT: 0 mL / NET: 830 mL              LABS:                        8.2    8.96  )-----------( 246      ( 2021 07:47 )             28.3         137  |  92<L>  |  41<H>  ----------------------------<  106<H>  4.5   |  44<H>  |  1.35<H>    Ca    8.8      2021 07:47  Phos  4.6       Mg     2.8         TPro  7.6  /  Alb  2.4<L>  /  TBili  0.4  /  DBili  x   /  AST  19  /  ALT  12  /  AlkPhos  45        Urinalysis Basic - ( 2021 19:52 )    Color: Yellow / Appearance: Clear / S.010 / pH: x  Gluc: x / Ketone: Negative  / Bili: Negative / Urobili: Negative mg/dL   Blood: x / Protein: Negative mg/dL / Nitrite: Negative   Leuk Esterase: Trace / RBC: 0-2 /HPF / WBC 0-2   Sq Epi: x / Non Sq Epi: Few / Bacteria: Moderate            WBC:  WBC Count: 8.96 K/uL ( @ 07:47)  WBC Count: 8.40 K/uL ( @ 07:14)      MICROBIOLOGY:  RECENT CULTURES:   Clean Catch Clean Catch (Midstream) XXXX XXXX   <10,000 CFU/mL Normal Urogenital Elen                    Sodium:  Sodium, Serum: 137 mmol/L ( @ 07:47)  Sodium, Serum: 134 mmol/L ( @ 07:14)  Sodium, Serum: 134 mmol/L ( @ 10:15)      1.35 mg/dL  07:47  0.98 mg/dL  07:14  1.06 mg/dL  @ 10:15      Hemoglobin:  Hemoglobin: 8.2 g/dL ( @ 07:47)  Hemoglobin: 8.3 g/dL ( @ 07:14)      Platelets: Platelet Count - Automated: 246 K/uL ( @ 07:47)  Platelet Count - Automated: 223 K/uL ( @ 07:14)      LIVER FUNCTIONS - ( 2021 07:47 )  Alb: 2.4 g/dL / Pro: 7.6 gm/dL / ALK PHOS: 45 U/L / ALT: 12 U/L / AST: 19 U/L / GGT: x             Urinalysis Basic - ( 2021 19:52 )    Color: Yellow / Appearance: Clear / S.010 / pH: x  Gluc: x / Ketone: Negative  / Bili: Negative / Urobili: Negative mg/dL   Blood: x / Protein: Negative mg/dL / Nitrite: Negative   Leuk Esterase: Trace / RBC: 0-2 /HPF / WBC 0-2   Sq Epi: x / Non Sq Epi: Few / Bacteria: Moderate        RADIOLOGY & ADDITIONAL STUDIES:      MICROBIOLOGY:  RECENT CULTURES:   Clean Catch Clean Catch (Midstream) XXXX XXXX   <10,000 CFU/mL Normal Urogenital Elen

## 2021-11-23 NOTE — PROGRESS NOTE ADULT - ASSESSMENT
86F with HFpEF on Bumex at home, obesity, obstructive sleep apnea on CPAP, hypertension, type 2 diabetes, COPD, lymphedema, Hx R TKA (2009), a/w with R knee fracture, status post fall  pt also with c/o sob with evidence of fluid overload.   Chest xray Mild cardiomegaly and mild central pulmonary venous congestion. Moderate perihilar opacities may represent infiltrate in the correct clinical context. Differential diagnosis includes chronic interstitial lung disease.    Echo  with preserved EF, Grade I DD, mild AS  Pt with lethargic with pO2 sat 79% this AM, Called Rapid by primary team. BIPAP placed.     -Switched IV lasix to home dose of Bumex 1mg.   BUN/Creat uptrending and bicarb remains elevated.  -monitor renal function closely, monitor daily electrolytes   -volume assessment severely limited by body habitus  -cont supplemental O2/BiPAP as needed, COPD management as per pulm   -Continue metoprolol  mg daily, hydralazine 100 mg 3 times daily, amlodipine 10 mg daily for blood pressure control.    -DVT prevention with Lovenox   -cont. Pain control  -ortho team on board, No acute orthopaedic surgical intervention at this time  -last admission to [facility] in May 2021 with DHF, pt follow up with Dr. Gutierrez Bermudez her cardiologist, last visit Sept 30 th 2021  -d/c plan JUNO   86F with HFpEF on Bumex at home, obesity, obstructive sleep apnea on CPAP, hypertension, type 2 diabetes, COPD, lymphedema, Hx R TKA (2009), a/w with R knee fracture, status post fall  pt also with c/o sob with evidence of fluid overload.   Chest xray Mild cardiomegaly and mild central pulmonary venous congestion. Moderate perihilar opacities may represent infiltrate in the correct clinical context. Differential diagnosis includes chronic interstitial lung disease.    Echo  with preserved EF, Grade I DD, mild AS  Pt  lethargic with pO2 sat 79% this AM, Called Rapid Response by primary team. BIPAP placed.     -Switched IV lasix to home dose of Bumex 1mg.   BUN/Creat uptrending and bicarb remains elevated.  -monitor renal function closely, monitor daily electrolytes   -volume assessment severely limited by body habitus  -cont supplemental O2/BiPAP as needed, COPD management as per pulm   -Continue metoprolol  mg daily, hydralazine 100 mg 3 times daily, amlodipine 10 mg daily for blood pressure control.    -DVT prevention with Lovenox   -cont. Pain control  -ortho team on board, No acute orthopaedic surgical intervention at this time  -last admission to [facility] in May 2021 with DHF, pt follow up with Dr. Gutierrez Bermudez her cardiologist, last visit Sept 30 th 2021  -d/c plan JUNO   86F with HFpEF on Bumex at home, obesity, obstructive sleep apnea on CPAP, hypertension, type 2 diabetes, COPD, lymphedema, Hx R TKA (2009), a/w with R knee fracture, status post fall  pt also with c/o sob with evidence of fluid overload.   Chest xray Mild cardiomegaly and mild central pulmonary venous congestion. Moderate perihilar opacities may represent infiltrate in the correct clinical context. Differential diagnosis includes chronic interstitial lung disease.    Echo  with preserved EF, Grade I DD, mild AS  Pt  lethargic with pO2 sat 79% this AM, Called Rapid Response by primary team. BIPAP placed.     -currently continued on Bumex 1mg.   BUN/Creat  slightly uptrending and bicarb remains elevated.  -monitor renal function closely, monitor daily electrolytes   -volume assessment severely limited by body habitus  -cont supplemental O2/BiPAP as needed, COPD management as per pulm   -Continue metoprolol  mg daily, hydralazine 100 mg 3 times daily, amlodipine 10 mg daily for blood pressure control.    -DVT prevention with Lovenox   -ortho team on board, No acute orthopaedic surgical intervention at this time as per ortho  -pt follow up with Dr. Gutierrez Bermudez her cardiologist, last visit Sept 30 th 2021

## 2021-11-23 NOTE — PROGRESS NOTE ADULT - ASSESSMENT
86F with HFpEF, obesity, obstructive sleep apnea on CPAP, hypertension, type 2 diabetes, COPD, lymphedema, status post fall and knee fracture currently on telemetry stable.     Acute hypoxic respiratory failure 2/2 COPD  Bipap  ABG reviewed with pulmonologist Settings changed on bipap  CXR reviewed from rapid response, EKG reviewed  CXR< from: Xray Chest 1 View-PORTABLE IMMEDIATE (Xray Chest 1 View-PORTABLE IMMEDIATE .) (11.23.21 @ 11:07) >  IMPRESSION: Question increasing left base infiltrate.  Heart enlargement and central vascular prominence again noted. Consider small metastases in the lungs.  - Pt will need Ct chest w/o contrast to further evaluate for metastases   Start on cef/flagl for anaerobic coverage for aspiration pneumonia  Will repeat ABG at 3 pm  BMP to be repeated  F/U with pulmonologist for further recommendations  Pt with guarded prognosis    - Spoke to primary surgeon who reviewed recent X-ray. Stated that fracture is non-displaced and has good alignment. It should heal well within 2 months. No operative management recommended Stated pt should have PT and non-weight bearing on that leg. Recommended a Graham brace on right side. Place in 30 degrees flexion. Patient can follow up with him as outpatient. Stated cell # is [phone] for any further questions.   -Will change IV lasix to Bumex as per cardio,  to keep fluid negative balance.    -Continue metoprolol  mg daily, hydralazine 100 mg 3 times daily, amlodipine 10 mg daily for blood pressure control.    -DVT prevention with Lovenox, per ortho. NWB RLE in bulky marshall knee immobilizer  -pain control  -continue home meds  - dvt ppx  - pt is a poor surgical candidate and will need medical optimization by cardiology/pulmonology  -given the complicated pattern and location of the fracture, and in the setting of multiple severe medical comorbidities, pt is a poor surgical candidate and benefits may not outweigh the risks of operative management  -pt family requesting to be transferred to [facility] for further management by her primary orthopedic surgeon, PER FAMILY'S REQUEST (Dr. Ariel Sandoval)  - Pt's daughter expressed she would like mother to go to [facility] but will discuss with patient tonight.

## 2021-11-23 NOTE — PROGRESS NOTE ADULT - ASSESSMENT
NAVDEEP ONTIVEROS 86 f 11/19/2021 1935 DR KAT SYKES     REVIEW OF SYMPTOMS      Able to give (reliable) ROS  NO     PHYSICAL EXAM    HEENT Unremarkable  atraumatic   RESP Fair air entry EXP prolonged    Harsh breath sound Resp distres mild   CARDIAC S1 S2 No S3     NO JVD    ABDOMEN SOFT BS PRESENT NOT DISTENDED No hepatosplenomegaly   PEDAL EDEMA present No calf tenderness  NO rash                                      PROBLEMS/ISSUES.  GO.  COVID STATUS. 11/19 scv2 (-)     MECHANICAL FALL 11/19/2021  R KNEE PERIPROS FRACTURE poa 11/19/2021 in setting of R TKA 2009.  PULMONARY OPTIMIZATION FOR ORTHO SURGERY 11/19/2021 ADMISSION  COPD pmh  MIRELA pmh  HFPEF pmh  DM pmh   OBESITY  pmh 11/19/2021 BMI 44     ANEMIA 11/19-11/20 Hb 9.3-8.3  OBESITY HYPOVENTILATION  11/20/2021 4l 744/70/71   DYSPHAGIA   speech 11/22/2021 minced moist       WORSENING RESP FAILURE 11/23/2021   BPAP ADJUSTED 11/23/2021     pmh HFPEF  pmh LYMPHEDEMA on Bumex   pmh MIRELA on CPAP   pmh DM   pmh OBESITY   pmh COPD   pmh L TKA Dr Castro 2012  pmh R TKA Dr Castro 2009   pmh MINIMAL AMBULATOR     PATIENT DATA   VITALS/PO/IO/VENT/DRIPS.  11/23/2021 afeb 88 120/60   11/23/2021 15/8/12/50%     INTERVAL EVENTS. 11/23/2021 w 12 Hb 8 Na 137 Cr 1.3        BEST PRACTICE ISSUES.                                                  HEAD OF BED ELEVATION. Yes  DVT PROPHYLAXIS.  11/19/2021 lvnx 40.2                                        GOLDSTEIN PROPHYLAXIS.                                                                                         DIET.    11/20/2021 regr                      INFECTION PROPHYLAXIS.      GENERAL ISSUES  [facility] ADVANCED DIRECTIVE.                                         ALLERGY.       nka                     WEIGHT.          11/20/2021 131                              BMI.                    11/20/2021 44                            ASSESSMENT/RECOMMENDATIONS.    RESP FAILURE  Patient should not be given excess oxygen   That can worsen her resp acidosis   She should be kept at oxygen enough to keep po betw 90 and 95% no higher   Will change bpap settings to improve vent   Dr Krause follows till Thursday 11/25 pm   MECHANICAL FALL 11/19/2021  R KNEE PERIPROS FRACTURE poa 11/19/2021 in setting of R TKA 2009.  r leg in bulky marshall knee immobilizer being followed by prtho   Pt to be tr to [facility] for surgery by pts own orth   PULMONARY OPTIMIZATION FOR ORTHO SURGERY 11/19/2021 ADMISSION  Hb 11/20 Hb 8.4  Cr 11/20 Cr .9  ECHO 11/20/2021 ef 60% [id number] as   a/r 11/20/2021 Patient is above average risk of pulm complications due to obesity MIRELA COPD/asthma   She is at high risk of resp failure as well as dvt pe  She is on dvt pplx  11/20/2021 4l 744/70/71   ABG cw onesity hypoventilation syndrome and she will need elective coburn for mirela [facility] copd to plan optimal ventilatory support for the long term   She is in optimal pulm status for urgen surgery if benefits appear greater than risks  COPD pmh  11/20/2021 albuterol HFA P   11/20/2021 SYMBICORT 160   11/20/2021 MONTELUKAST 10   Cont rx  MIRELA pmh  11/19 BPAP ordered by admitting md 15/5/12/3l   continue for now   May eventually just need cpap   Should see pulm in office but sated that she has no means of making office visits   CAD HYPERTENSION pmh  11/20/2021 METOPROLOL 100  11/20/2021 ATORVASTAT 40  11/20/2021 AMLODIPINE 10   cardio on case  HFPEF pmh  ECHO 11/20/2021 ef 60% [id number] as   11/20/2021 HYDRALAZINE 100.3   11/20/2021 LASIX 40 V.2 DCed 11/22/2021 11/22/2021 bumetanide 1   cardio on case  DM pmh   11/20/2021 insulin   PAIN.   11/20/2021 tylenol  OBESITY  pmh 11/19/2021 BMI 44   ANEMIA 11/19-11/20 Hb 9.3-8.3  Monitor    OVERALL PLAN  86 obese female with COPD asthma MIRELA on home cpap HFPEF admitted 11/19 with mechanical fall and r periprosth knee fx  Pt is in optimal pulm status but is at high risk for periop pulm complications including difficulty weaning off vent      TIME SPENT   Over 25 minutes aggregate care time spent on encounter; activities included   direct patient care, counseling and/or coordinating care reviewing notes, lab data/ imaging , discussion with multidisciplinary team/ patient  /family and explaining in detail risks, benefits, alternatives  of the recommendations     NAVDEEP ONTIVEROS 86 f 11/19/2021 1935 DR KAT SYKES

## 2021-11-23 NOTE — RAPID RESPONSE TEAM SUMMARY - NSOTHERINTERVENTIONSRRT_GEN_ALL_CORE
ICU at bedside. Likely aspiration .   CXR ordered  patient stable for floor   ICU at bedside. Likely acute on chronic chf.    CXR ordered ++ bilateral venous congestion  patient stable for floor

## 2021-11-23 NOTE — PROGRESS NOTE ADULT - SUBJECTIVE AND OBJECTIVE BOX
Patient seen and examined at bedside this AM. Patient demented but appears comfortable at present. Pain controlled with medications at rest. Denies N/V/CP/SOB/numbness/tingling in the RLE    VITAL SIGNS:  T(C): 36.9 (21 @ 05:07), Max: 37.8 (21 @ 17:10)  HR: 75 (21 @ 05:54) (75 - 86)  BP: 134/67 (21 @ 05:07) (114/58 - 154/74)  RR: 18 (21 @ 05:07) (18 - 18)  SpO2: 93% (21 @ 05:54) (91% - 100%)      LABS:                        8.2    8.96  )-----------( 246      ( 2021 07:47 )             28.3         137  |  92<L>  |  41<H>  ----------------------------<  106<H>  4.5   |  44<H>  |  1.35<H>    Ca    8.8      2021 07:47  Phos  4.6       Mg     2.8         TPro  7.6  /  Alb  2.4<L>  /  TBili  0.4  /  DBili  x   /  AST  19  /  ALT  12  /  AlkPhos  45        Urinalysis Basic - ( 2021 19:52 )    Color: Yellow / Appearance: Clear / S.010 / pH: x  Gluc: x / Ketone: Negative  / Bili: Negative / Urobili: Negative mg/dL   Blood: x / Protein: Negative mg/dL / Nitrite: Negative   Leuk Esterase: Trace / RBC: 0-2 /HPF / WBC 0-2   Sq Epi: x / Non Sq Epi: Few / Bacteria: Moderate          PE:  Gen: NAD when not moving, mild dementia  RLE:  dressing c/d/i  KI in place  Compartments soft and compressible  Severe lymphedema bilaterally  No calf ttp  Severe pain of the RLE, about the knee and proximal tibia  +EHL/FHL/Gsc/TA  SILT L2-S1, mildly diminished secondary to lymphedema  Difficult to appreciate pulses secondary to lymphedema  Foot warm/well perfused

## 2021-11-23 NOTE — PROGRESS NOTE ADULT - ASSESSMENT
A/P:  85 yo F s/p MF with Right knee proximal tibia periprosthetic fracture s/p R TKA ('09):  -NWB RLE in bulky marshall knee immobilizer  -pain control  -continue home meds  -medical management per primary team  -recommend dvt ppx  -Spoke with Medicine Team regarding conversation with patient's primary orthopaedic surgeon Dr. Ariel Farah, states patient does not need to be transferred after reviewing XR images, can place patient in Lincoln brace Locked at 30 deg flex  -Brace ordered, to be applied today by custom rep  -Non operative management at this time per surgeon.   -No acute orthopaedic surgical intervention at this time  -Orthopaedically stable for discharge, patient to follow up with Dr. Castro as an outpatient.  -Dr. Dee aware and in agreement with plan  -Will advise if plan changes

## 2021-11-24 LAB
ALBUMIN SERPL ELPH-MCNC: 2.3 G/DL — LOW (ref 3.3–5)
ALP SERPL-CCNC: 49 U/L — SIGNIFICANT CHANGE UP (ref 40–120)
ALT FLD-CCNC: 16 U/L — SIGNIFICANT CHANGE UP (ref 12–78)
ANION GAP SERPL CALC-SCNC: 1 MMOL/L — LOW (ref 5–17)
AST SERPL-CCNC: 16 U/L — SIGNIFICANT CHANGE UP (ref 15–37)
BASE EXCESS BLDA CALC-SCNC: 19.8 MMOL/L — HIGH (ref -2–3)
BASOPHILS # BLD AUTO: 0.02 K/UL — SIGNIFICANT CHANGE UP (ref 0–0.2)
BASOPHILS NFR BLD AUTO: 0.2 % — SIGNIFICANT CHANGE UP (ref 0–2)
BILIRUB SERPL-MCNC: 0.4 MG/DL — SIGNIFICANT CHANGE UP (ref 0.2–1.2)
BLOOD GAS COMMENTS: SIGNIFICANT CHANGE UP
BUN SERPL-MCNC: 49 MG/DL — HIGH (ref 7–23)
CALCIUM SERPL-MCNC: 9 MG/DL — SIGNIFICANT CHANGE UP (ref 8.5–10.1)
CHLORIDE SERPL-SCNC: 95 MMOL/L — LOW (ref 96–108)
CO2 BLDA-SCNC: 50 MMOL/L — HIGH (ref 19–24)
CO2 SERPL-SCNC: 42 MMOL/L — HIGH (ref 22–31)
CREAT SERPL-MCNC: 1.19 MG/DL — SIGNIFICANT CHANGE UP (ref 0.5–1.3)
EOSINOPHIL # BLD AUTO: 0.07 K/UL — SIGNIFICANT CHANGE UP (ref 0–0.5)
EOSINOPHIL NFR BLD AUTO: 0.7 % — SIGNIFICANT CHANGE UP (ref 0–6)
GLUCOSE BLDC GLUCOMTR-MCNC: 125 MG/DL — HIGH (ref 70–99)
GLUCOSE BLDC GLUCOMTR-MCNC: 129 MG/DL — HIGH (ref 70–99)
GLUCOSE BLDC GLUCOMTR-MCNC: 136 MG/DL — HIGH (ref 70–99)
GLUCOSE BLDC GLUCOMTR-MCNC: 136 MG/DL — HIGH (ref 70–99)
GLUCOSE SERPL-MCNC: 119 MG/DL — HIGH (ref 70–99)
HCO3 BLDA-SCNC: 48 MMOL/L — CRITICAL HIGH (ref 21–28)
HCT VFR BLD CALC: 25.1 % — LOW (ref 34.5–45)
HCT VFR BLD CALC: 27.2 % — LOW (ref 34.5–45)
HGB BLD-MCNC: 7.3 G/DL — LOW (ref 11.5–15.5)
HGB BLD-MCNC: 7.8 G/DL — LOW (ref 11.5–15.5)
HOROWITZ INDEX BLDA+IHG-RTO: 40 — SIGNIFICANT CHANGE UP
IMM GRANULOCYTES NFR BLD AUTO: 0.5 % — SIGNIFICANT CHANGE UP (ref 0–1.5)
LDH SERPL L TO P-CCNC: 343 U/L — HIGH (ref 50–242)
LYMPHOCYTES # BLD AUTO: 1.85 K/UL — SIGNIFICANT CHANGE UP (ref 1–3.3)
LYMPHOCYTES # BLD AUTO: 19.7 % — SIGNIFICANT CHANGE UP (ref 13–44)
MCHC RBC-ENTMCNC: 26 PG — LOW (ref 27–34)
MCHC RBC-ENTMCNC: 26.1 PG — LOW (ref 27–34)
MCHC RBC-ENTMCNC: 28.7 GM/DL — LOW (ref 32–36)
MCHC RBC-ENTMCNC: 29.1 GM/DL — LOW (ref 32–36)
MCV RBC AUTO: 89.3 FL — SIGNIFICANT CHANGE UP (ref 80–100)
MCV RBC AUTO: 91 FL — SIGNIFICANT CHANGE UP (ref 80–100)
MONOCYTES # BLD AUTO: 0.71 K/UL — SIGNIFICANT CHANGE UP (ref 0–0.9)
MONOCYTES NFR BLD AUTO: 7.6 % — SIGNIFICANT CHANGE UP (ref 2–14)
NEUTROPHILS # BLD AUTO: 6.67 K/UL — SIGNIFICANT CHANGE UP (ref 1.8–7.4)
NEUTROPHILS NFR BLD AUTO: 71.3 % — SIGNIFICANT CHANGE UP (ref 43–77)
NRBC # BLD: 0 /100 WBCS — SIGNIFICANT CHANGE UP (ref 0–0)
NRBC # BLD: 0 /100 WBCS — SIGNIFICANT CHANGE UP (ref 0–0)
PCO2 BLDA: 71 MMHG — CRITICAL HIGH (ref 32–46)
PH BLD: 7.44 — SIGNIFICANT CHANGE UP (ref 7.35–7.45)
PLATELET # BLD AUTO: 238 K/UL — SIGNIFICANT CHANGE UP (ref 150–400)
PLATELET # BLD AUTO: 245 K/UL — SIGNIFICANT CHANGE UP (ref 150–400)
PO2 BLDA: 73 MMHG — LOW (ref 83–108)
POTASSIUM SERPL-MCNC: 4.6 MMOL/L — SIGNIFICANT CHANGE UP (ref 3.5–5.3)
POTASSIUM SERPL-SCNC: 4.6 MMOL/L — SIGNIFICANT CHANGE UP (ref 3.5–5.3)
PROCALCITONIN SERPL-MCNC: 0.18 NG/ML — HIGH (ref 0.02–0.1)
PROT SERPL-MCNC: 7.6 GM/DL — SIGNIFICANT CHANGE UP (ref 6–8.3)
RBC # BLD: 2.81 M/UL — LOW (ref 3.8–5.2)
RBC # BLD: 2.99 M/UL — LOW (ref 3.8–5.2)
RBC # FLD: 16.2 % — HIGH (ref 10.3–14.5)
RBC # FLD: 16.4 % — HIGH (ref 10.3–14.5)
SAO2 % BLDA: 95 % — SIGNIFICANT CHANGE UP (ref 94–98)
SODIUM SERPL-SCNC: 138 MMOL/L — SIGNIFICANT CHANGE UP (ref 135–145)
WBC # BLD: 9.37 K/UL — SIGNIFICANT CHANGE UP (ref 3.8–10.5)
WBC # BLD: 9.96 K/UL — SIGNIFICANT CHANGE UP (ref 3.8–10.5)
WBC # FLD AUTO: 9.37 K/UL — SIGNIFICANT CHANGE UP (ref 3.8–10.5)
WBC # FLD AUTO: 9.96 K/UL — SIGNIFICANT CHANGE UP (ref 3.8–10.5)

## 2021-11-24 PROCEDURE — 71260 CT THORAX DX C+: CPT | Mod: 26

## 2021-11-24 PROCEDURE — 99233 SBSQ HOSP IP/OBS HIGH 50: CPT

## 2021-11-24 RX ORDER — ACETAMINOPHEN 500 MG
650 TABLET ORAL EVERY 6 HOURS
Refills: 0 | Status: DISCONTINUED | OUTPATIENT
Start: 2021-11-24 | End: 2021-11-27

## 2021-11-24 RX ORDER — NYSTATIN CREAM 100000 [USP'U]/G
1 CREAM TOPICAL
Refills: 0 | Status: DISCONTINUED | OUTPATIENT
Start: 2021-11-24 | End: 2021-11-27

## 2021-11-24 RX ADMIN — BUDESONIDE AND FORMOTEROL FUMARATE DIHYDRATE 2 PUFF(S): 160; 4.5 AEROSOL RESPIRATORY (INHALATION) at 06:13

## 2021-11-24 RX ADMIN — Medication 650 MILLIGRAM(S): at 23:45

## 2021-11-24 RX ADMIN — Medication 100 MILLIGRAM(S): at 13:08

## 2021-11-24 RX ADMIN — OXYCODONE HYDROCHLORIDE 5 MILLIGRAM(S): 5 TABLET ORAL at 16:46

## 2021-11-24 RX ADMIN — POLYETHYLENE GLYCOL 3350 17 GRAM(S): 17 POWDER, FOR SOLUTION ORAL at 11:56

## 2021-11-24 RX ADMIN — ATORVASTATIN CALCIUM 40 MILLIGRAM(S): 80 TABLET, FILM COATED ORAL at 21:55

## 2021-11-24 RX ADMIN — Medication 1 SPRAY(S): at 06:12

## 2021-11-24 RX ADMIN — MONTELUKAST 10 MILLIGRAM(S): 4 TABLET, CHEWABLE ORAL at 21:55

## 2021-11-24 RX ADMIN — ENOXAPARIN SODIUM 40 MILLIGRAM(S): 100 INJECTION SUBCUTANEOUS at 06:13

## 2021-11-24 RX ADMIN — NYSTATIN CREAM 1 APPLICATION(S): 100000 CREAM TOPICAL at 17:07

## 2021-11-24 RX ADMIN — BUDESONIDE AND FORMOTEROL FUMARATE DIHYDRATE 2 PUFF(S): 160; 4.5 AEROSOL RESPIRATORY (INHALATION) at 17:07

## 2021-11-24 RX ADMIN — CEFTRIAXONE 100 MILLIGRAM(S): 500 INJECTION, POWDER, FOR SOLUTION INTRAMUSCULAR; INTRAVENOUS at 16:48

## 2021-11-24 RX ADMIN — ALBUTEROL 2 PUFF(S): 90 AEROSOL, METERED ORAL at 06:13

## 2021-11-24 RX ADMIN — ENOXAPARIN SODIUM 40 MILLIGRAM(S): 100 INJECTION SUBCUTANEOUS at 17:06

## 2021-11-24 RX ADMIN — Medication 2000 UNIT(S): at 11:57

## 2021-11-24 RX ADMIN — BUMETANIDE 1 MILLIGRAM(S): 0.25 INJECTION INTRAMUSCULAR; INTRAVENOUS at 06:12

## 2021-11-24 RX ADMIN — NYSTATIN CREAM 1 APPLICATION(S): 100000 CREAM TOPICAL at 06:12

## 2021-11-24 RX ADMIN — OXYCODONE HYDROCHLORIDE 5 MILLIGRAM(S): 5 TABLET ORAL at 16:16

## 2021-11-24 RX ADMIN — SENNA PLUS 2 TABLET(S): 8.6 TABLET ORAL at 21:55

## 2021-11-24 RX ADMIN — Medication 100 MILLIGRAM(S): at 21:54

## 2021-11-24 RX ADMIN — Medication 100 MILLIGRAM(S): at 06:16

## 2021-11-24 NOTE — PHYSICAL THERAPY INITIAL EVALUATION ADULT - LIVES WITH, PROFILE
Pt states she lives in pvt house with 4 family members, has 5 steps with rails to enter, stays on main floor.

## 2021-11-24 NOTE — PROGRESS NOTE ADULT - SUBJECTIVE AND OBJECTIVE BOX
Patient is a 86y old  Female who presents with a chief complaint of right knee pain post fall, shortness of breath  (23 Nov 2021 08:33)   (24 Nov 2021 13:50)    PAST MEDICAL & SURGICAL HISTORY:  HTN (hypertension)    HLD (hyperlipidemia)    Asthma, COPD on home O2    DM (diabetes mellitus)    GERD (gastroesophageal reflux disease)    Lymphedema    S/P knee replacement    INTERVAL HISTORY:   	  MEDICATIONS:  MEDICATIONS  (STANDING):  amLODIPine   Tablet 10 milliGRAM(s) Oral daily  atorvastatin 40 milliGRAM(s) Oral at bedtime  budesonide 160 MICROgram(s)/formoterol 4.5 MICROgram(s) Inhaler 2 Puff(s) Inhalation two times a day  buMETAnide 1 milliGRAM(s) Oral daily  cefTRIAXone   IVPB 1000 milliGRAM(s) IV Intermittent every 24 hours  cholecalciferol 2000 Unit(s) Oral daily  enoxaparin Injectable 40 milliGRAM(s) SubCutaneous every 12 hours  hydrALAZINE 100 milliGRAM(s) Oral three times a day  insulin lispro (ADMELOG) corrective regimen sliding scale   SubCutaneous three times a day before meals  insulin lispro (ADMELOG) corrective regimen sliding scale   SubCutaneous at bedtime  metoprolol succinate  milliGRAM(s) Oral daily  metroNIDAZOLE  IVPB      metroNIDAZOLE  IVPB 500 milliGRAM(s) IV Intermittent every 8 hours  montelukast 10 milliGRAM(s) Oral at bedtime  nystatin Powder 1 Application(s) Topical two times a day  polyethylene glycol 3350 17 Gram(s) Oral daily  senna 2 Tablet(s) Oral at bedtime    MEDICATIONS  (PRN):  acetaminophen     Tablet .. 650 milliGRAM(s) Oral every 6 hours PRN Mild Pain (1 - 3), Moderate Pain (4 - 6)  ALBUTerol    90 MICROgram(s) HFA Inhaler 2 Puff(s) Inhalation every 6 hours PRN Shortness of Breath and/or Wheezing  melatonin 3 milliGRAM(s) Oral at bedtime PRN Insomnia  oxyCODONE    IR 10 milliGRAM(s) Oral every 4 hours PRN Severe Pain (7 - 10)  oxyCODONE    IR 5 milliGRAM(s) Oral every 4 hours PRN Moderate Pain (4 - 6)  sodium chloride 0.65% Nasal 1 Spray(s) Both Nostrils three times a day PRN Nasal Congestion    Vitals:  T(F): 98.8 (11-24-21 @ 10:34), Max: 99.6 (11-24-21 @ 05:38)  HR: 88 (11-24-21 @ 13:51) (65 - 92)  BP: 154/72 (11-24-21 @ 13:07) (113/69 - 154/72)  RR: 19 (11-24-21 @ 10:34) (18 - 20)  SpO2: 94% (11-24-21 @ 13:51) (91% - 100%)    PHYSICAL EXAM:  Neuro: Awake, responsive  CV: S1 S2 RRR  Lungs: diminished to bases  GI: Soft, BS +, ND, NT  Extremities: Rt LE edema, + Right knee immobilizer    RADIOLOGY:   < from: Xray Chest 1 View-PORTABLE IMMEDIATE (Xray Chest 1 View-PORTABLE IMMEDIATE .) (11.23.21 @ 11:07) >  Gross heart enlargement again noted.    Central hilar vascular prominence again noted.    There are increasing infiltrates possibly congestive particularly in the left lower lung field.    On chest film of November 19 and May been small nodules in the lungs. Consider metastatic disease.    IMPRESSION: Question increasing left base infiltrate.    Heart enlargement and central vascular prominence again noted. Consider small metastases in the lungs.    < end of copied text >    DIAGNOSTIC TESTING:    [ x] Echocardiogram: < from: TTE Echo Complete w/o Contrast w/ Doppler (11.19.21 @ 13:56) >  Left Ventricle: The left ventricle was not well visualized. The left ventricular internal cavity size is normal.  Global LV systolic function was normal. Left ventricular ejection fraction, by visual estimation, is 60 to 65%. Spectral Doppler shows impaired relaxation pattern of left ventricular myocardial filling (Grade I diastolic dysfunction).  Right Ventricle: Normal right ventricular size and function. The right ventricle was not well visualized.  Left Atrium: Normal left atrial size.  Right Atrium: Normal right atrial size.  Pericardium: There is no evidence of pericardial effusion.  Mitral Valve: The mitral valve is not well seen. Mildthickening and calcification of the anterior and posterior mitral valve leaflets. Mitral leaflet mobility is normal. There is moderate mitral annular calcification. Peak transmitral mean gradient equals 4.7 mmHg, calculated mitral valve area by pressure half time equals 5.07 cm² consistent with No evidence of mitral stenosis. Mitral valve mean gradient is 4.7 mmHg consistent with mild mitral stenosis.  Tricuspid Valve: The tricuspid valve is not well seen. The tricuspid valve is degenerative in appearance. Trivial tricuspid regurgitation is visualized. Adequate TR velocity was not obtained to accurately assess RVSP.  Aortic Valve: The aortic valve was not well visualized. Mild aortic stenosis is present. Peak transaortic gradient equals 21.7 mmHg, mean transaortic gradient equals 10.9 mmHg, the calculated aortic valve area equals 1.69 cm² by the continuity equation consistent with mild aortic stenosis. The peak aortic velocity was obtained from the apical view. Trivial aortic valve regurgitation is seen.  Pulmonic Valve: The pulmonic valve was not well visualized. No indication of pulmonic valve regurgitation.  Aorta: Aortic root measured at Sinus of Valsalva is normal. There is mild aortic root calcification.  Venous: IVC not well-visualized.      Summary:   1. Left ventricular ejection fraction, by visual estimation, is 60 to 65%.   2. Technically difficult study.   3. Normal global left ventricular systolic function.   4. Normal left ventricular internal cavity size.   5. Spectral Doppler shows impaired relaxation pattern of left ventricular myocardial filling (Grade I diastolic dysfunction).   6. There is mild concentric left ventricular hypertrophy.   7. Normal right ventricular size and function.   8. Normal left atrial size.   9. Normal right atrial size.  10. There is no evidence of pericardial effusion.  11. Mild thickening and calcification of the anterior and posterior mitral valve leaflets.  12. Moderate mitral annular calcification.  13. Degenerative tricuspidvalve.  14. Mild aortic valve stenosis.  15. Mitral valve mean gradient is 4.7 mmHg consistent with mild mitral stenosis.  16. Peak transaortic gradient equals 21.7 mmHg, mean transaortic gradient equals 10.9 mmHg, the calculated aortic valve area equals 1.69 cm² by the continuity equation consistent with mild aortic stenosis.  17. There is mild aortic root calcification.    < end of copied text >    LABS:	 	    24 Nov 2021 07:35    138    |  95     |  49     ----------------------------<  119    4.6     |  42     |  1.19   23 Nov 2021 15:29    138    |  93     |  46     ----------------------------<  141    4.9     |  44     |  1.29   23 Nov 2021 11:05    137    |  93     |  46     ----------------------------<  143    4.7     |  44     |  1.33     Ca    9.0        24 Nov 2021 07:35  Phos  5.1       23 Nov 2021 09:25  Mg     2.8       23 Nov 2021 09:25    TPro  7.6    /  Alb  2.3    /  TBili  0.4    /  DBili  x      /  AST  16     /  ALT  16     /  AlkPhos  49     24 Nov 2021 07:35                        7.8    9.96  )-----------( 245      ( 24 Nov 2021 07:35 )             27.2 ,                       8.0    12.37 )-----------( 228      ( 23 Nov 2021 09:25 )             28.4 ,                       8.2    8.96  )-----------( 246      ( 22 Nov 2021 07:47 )             28.3                  Patient is a 86y old  Female who presents with a chief complaint of right knee pain post fall, shortness of breath  (23 Nov 2021 08:33)   (24 Nov 2021 13:50)    PAST MEDICAL & SURGICAL HISTORY:  HTN (hypertension)    HLD (hyperlipidemia)    Asthma, COPD on home O2    DM (diabetes mellitus)    GERD (gastroesophageal reflux disease)    Lymphedema    S/P knee replacement    INTERVAL HISTORY: awake, responsive, on nasal O2, c/o both knee pain   	  MEDICATIONS:  MEDICATIONS  (STANDING):  amLODIPine   Tablet 10 milliGRAM(s) Oral daily  atorvastatin 40 milliGRAM(s) Oral at bedtime  budesonide 160 MICROgram(s)/formoterol 4.5 MICROgram(s) Inhaler 2 Puff(s) Inhalation two times a day  buMETAnide 1 milliGRAM(s) Oral daily  cefTRIAXone   IVPB 1000 milliGRAM(s) IV Intermittent every 24 hours  cholecalciferol 2000 Unit(s) Oral daily  enoxaparin Injectable 40 milliGRAM(s) SubCutaneous every 12 hours  hydrALAZINE 100 milliGRAM(s) Oral three times a day  insulin lispro (ADMELOG) corrective regimen sliding scale   SubCutaneous three times a day before meals  insulin lispro (ADMELOG) corrective regimen sliding scale   SubCutaneous at bedtime  metoprolol succinate  milliGRAM(s) Oral daily  metroNIDAZOLE  IVPB      metroNIDAZOLE  IVPB 500 milliGRAM(s) IV Intermittent every 8 hours  montelukast 10 milliGRAM(s) Oral at bedtime  nystatin Powder 1 Application(s) Topical two times a day  polyethylene glycol 3350 17 Gram(s) Oral daily  senna 2 Tablet(s) Oral at bedtime    MEDICATIONS  (PRN):  acetaminophen     Tablet .. 650 milliGRAM(s) Oral every 6 hours PRN Mild Pain (1 - 3), Moderate Pain (4 - 6)  ALBUTerol    90 MICROgram(s) HFA Inhaler 2 Puff(s) Inhalation every 6 hours PRN Shortness of Breath and/or Wheezing  melatonin 3 milliGRAM(s) Oral at bedtime PRN Insomnia  oxyCODONE    IR 10 milliGRAM(s) Oral every 4 hours PRN Severe Pain (7 - 10)  oxyCODONE    IR 5 milliGRAM(s) Oral every 4 hours PRN Moderate Pain (4 - 6)  sodium chloride 0.65% Nasal 1 Spray(s) Both Nostrils three times a day PRN Nasal Congestion    Vitals:  T(F): 98.8 (11-24-21 @ 10:34), Max: 99.6 (11-24-21 @ 05:38)  HR: 88 (11-24-21 @ 13:51) (65 - 92)  BP: 154/72 (11-24-21 @ 13:07) (113/69 - 154/72)  RR: 19 (11-24-21 @ 10:34) (18 - 20)  SpO2: 94% (11-24-21 @ 13:51) (91% - 100%)    PHYSICAL EXAM:  Neuro: Awake, responsive  CV: S1 S2 RRR + SM  Lungs: diminished to bases  GI: Soft, BS +, ND, NT  Extremities: Rt LE edema, + Right knee immobilizer    RADIOLOGY:   < from: Xray Chest 1 View-PORTABLE IMMEDIATE (Xray Chest 1 View-PORTABLE IMMEDIATE .) (11.23.21 @ 11:07) >  Gross heart enlargement again noted.    Central hilar vascular prominence again noted.    There are increasing infiltrates possibly congestive particularly in the left lower lung field.    On chest film of November 19 and May been small nodules in the lungs. Consider metastatic disease.    IMPRESSION: Question increasing left base infiltrate.    Heart enlargement and central vascular prominence again noted. Consider small metastases in the lungs.    < end of copied text >    DIAGNOSTIC TESTING:    [ x] Echocardiogram: < from: TTE Echo Complete w/o Contrast w/ Doppler (11.19.21 @ 13:56) >  Left Ventricle: The left ventricle was not well visualized. The left ventricular internal cavity size is normal.  Global LV systolic function was normal. Left ventricular ejection fraction, by visual estimation, is 60 to 65%. Spectral Doppler shows impaired relaxation pattern of left ventricular myocardial filling (Grade I diastolic dysfunction).  Right Ventricle: Normal right ventricular size and function. The right ventricle was not well visualized.  Left Atrium: Normal left atrial size.  Right Atrium: Normal right atrial size.  Pericardium: There is no evidence of pericardial effusion.  Mitral Valve: The mitral valve is not well seen. Mildthickening and calcification of the anterior and posterior mitral valve leaflets. Mitral leaflet mobility is normal. There is moderate mitral annular calcification. Peak transmitral mean gradient equals 4.7 mmHg, calculated mitral valve area by pressure half time equals 5.07 cm² consistent with No evidence of mitral stenosis. Mitral valve mean gradient is 4.7 mmHg consistent with mild mitral stenosis.  Tricuspid Valve: The tricuspid valve is not well seen. The tricuspid valve is degenerative in appearance. Trivial tricuspid regurgitation is visualized. Adequate TR velocity was not obtained to accurately assess RVSP.  Aortic Valve: The aortic valve was not well visualized. Mild aortic stenosis is present. Peak transaortic gradient equals 21.7 mmHg, mean transaortic gradient equals 10.9 mmHg, the calculated aortic valve area equals 1.69 cm² by the continuity equation consistent with mild aortic stenosis. The peak aortic velocity was obtained from the apical view. Trivial aortic valve regurgitation is seen.  Pulmonic Valve: The pulmonic valve was not well visualized. No indication of pulmonic valve regurgitation.  Aorta: Aortic root measured at Sinus of Valsalva is normal. There is mild aortic root calcification.  Venous: IVC not well-visualized.      Summary:   1. Left ventricular ejection fraction, by visual estimation, is 60 to 65%.   2. Technically difficult study.   3. Normal global left ventricular systolic function.   4. Normal left ventricular internal cavity size.   5. Spectral Doppler shows impaired relaxation pattern of left ventricular myocardial filling (Grade I diastolic dysfunction).   6. There is mild concentric left ventricular hypertrophy.   7. Normal right ventricular size and function.   8. Normal left atrial size.   9. Normal right atrial size.  10. There is no evidence of pericardial effusion.  11. Mild thickening and calcification of the anterior and posterior mitral valve leaflets.  12. Moderate mitral annular calcification.  13. Degenerative tricuspidvalve.  14. Mild aortic valve stenosis.  15. Mitral valve mean gradient is 4.7 mmHg consistent with mild mitral stenosis.  16. Peak transaortic gradient equals 21.7 mmHg, mean transaortic gradient equals 10.9 mmHg, the calculated aortic valve area equals 1.69 cm² by the continuity equation consistent with mild aortic stenosis.  17. There is mild aortic root calcification.    < end of copied text >    LABS:	 	    24 Nov 2021 07:35    138    |  95     |  49     ----------------------------<  119    4.6     |  42     |  1.19   23 Nov 2021 15:29    138    |  93     |  46     ----------------------------<  141    4.9     |  44     |  1.29   23 Nov 2021 11:05    137    |  93     |  46     ----------------------------<  143    4.7     |  44     |  1.33     Ca    9.0        24 Nov 2021 07:35  Phos  5.1       23 Nov 2021 09:25  Mg     2.8       23 Nov 2021 09:25    TPro  7.6    /  Alb  2.3    /  TBili  0.4    /  DBili  x      /  AST  16     /  ALT  16     /  AlkPhos  49     24 Nov 2021 07:35                        7.8    9.96  )-----------( 245      ( 24 Nov 2021 07:35 )             27.2 ,                       8.0    12.37 )-----------( 228      ( 23 Nov 2021 09:25 )             28.4 ,                       8.2    8.96  )-----------( 246      ( 22 Nov 2021 07:47 )             28.3

## 2021-11-24 NOTE — PROGRESS NOTE ADULT - SUBJECTIVE AND OBJECTIVE BOX
INTERVAL HPI:            Covering Dr. Ceballos.    OVERNIGHT EVENTS:  86 year female with HTN, DM, COPD/Asthma ( non smoker ), On  nasal O2,  Morbid Obesity, SHAD on CPAP, Lymph edema.  Admitted status post fall.  Awake, responsive and comfortable.    Vital Signs Last 24 Hrs  T(C): 37.1 (24 Nov 2021 10:34), Max: 37.6 (24 Nov 2021 05:38)  T(F): 98.8 (24 Nov 2021 10:34), Max: 99.6 (24 Nov 2021 05:38)  HR: 92 (24 Nov 2021 13:07) (65 - 92)  BP: 154/72 (24 Nov 2021 13:07) (113/69 - 154/72)  BP(mean): --  RR: 19 (24 Nov 2021 10:34) (18 - 20)  SpO2: 97% (24 Nov 2021 10:34) (91% - 100%)    PHYSICAL EXAM:  GEN:         Awake, responsive and comfortable.  HEENT:    Normal.    RESP:        no wheezing.  CVS:         Regular rate and rhythm.     MEDICATIONS  (STANDING):  amLODIPine   Tablet 10 milliGRAM(s) Oral daily  atorvastatin 40 milliGRAM(s) Oral at bedtime  budesonide 160 MICROgram(s)/formoterol 4.5 MICROgram(s) Inhaler 2 Puff(s) Inhalation two times a day  buMETAnide 1 milliGRAM(s) Oral daily  cefTRIAXone   IVPB 1000 milliGRAM(s) IV Intermittent every 24 hours  cholecalciferol 2000 Unit(s) Oral daily  dextrose 40% Gel 15 Gram(s) Oral once  dextrose 5%. 1000 milliLiter(s) (50 mL/Hr) IV Continuous <Continuous>  dextrose 5%. 1000 milliLiter(s) (100 mL/Hr) IV Continuous <Continuous>  dextrose 50% Injectable 25 Gram(s) IV Push once  dextrose 50% Injectable 25 Gram(s) IV Push once  dextrose 50% Injectable 12.5 Gram(s) IV Push once  enoxaparin Injectable 40 milliGRAM(s) SubCutaneous every 12 hours  glucagon  Injectable 1 milliGRAM(s) IntraMuscular once  hydrALAZINE 100 milliGRAM(s) Oral three times a day  insulin lispro (ADMELOG) corrective regimen sliding scale   SubCutaneous three times a day before meals  insulin lispro (ADMELOG) corrective regimen sliding scale   SubCutaneous at bedtime  metoprolol succinate  milliGRAM(s) Oral daily  metroNIDAZOLE  IVPB      metroNIDAZOLE  IVPB 500 milliGRAM(s) IV Intermittent every 8 hours  montelukast 10 milliGRAM(s) Oral at bedtime  nystatin Powder 1 Application(s) Topical two times a day  polyethylene glycol 3350 17 Gram(s) Oral daily  senna 2 Tablet(s) Oral at bedtime    MEDICATIONS  (PRN):  acetaminophen     Tablet .. 650 milliGRAM(s) Oral every 6 hours PRN Mild Pain (1 - 3), Moderate Pain (4 - 6)  ALBUTerol    90 MICROgram(s) HFA Inhaler 2 Puff(s) Inhalation every 6 hours PRN Shortness of Breath and/or Wheezing  melatonin 3 milliGRAM(s) Oral at bedtime PRN Insomnia  oxyCODONE    IR 10 milliGRAM(s) Oral every 4 hours PRN Severe Pain (7 - 10)  oxyCODONE    IR 5 milliGRAM(s) Oral every 4 hours PRN Moderate Pain (4 - 6)  sodium chloride 0.65% Nasal 1 Spray(s) Both Nostrils three times a day PRN Nasal Congestion    LABS:                        7.8    9.96  )-----------( 245      ( 24 Nov 2021 07:35 )             27.2     11-24    138  |  95<L>  |  49<H>  ----------------------------<  119<H>  4.6   |  42<H>  |  1.19    Ca    9.0      24 Nov 2021 07:35  Phos  5.1     11-23  Mg     2.8     11-23    TPro  7.6  /  Alb  2.3<L>  /  TBili  0.4  /  DBili  x   /  AST  16  /  ALT  16  /  AlkPhos  49  11-24 11-24 @ 05:46  pH: 7.44  pCO2: 71  pO2: 73  SaO2: 95.0  11-23 @ 15:53  pH: 7.39  pCO2: 77  pO2: 78  SaO2: 97.0  11-23 @ 13:16  pH: 7.30  pCO2: 99  pO2: 76  SaO2: 96.9  11-23 @ 10:43  pH: 7.33  pCO2: 91  pO2: 172  SaO2: 99.2  11-20 @ 12:00  pH: 7.44  pCO2: 70  pO2: 71  SaO2: 96.0    RADIOLOGY & ADDITIONAL STUDIES:  < from: Xray Chest 1 View-PORTABLE IMMEDIATE (Xray Chest 1 View-PORTABLE IMMEDIATE .) (11.23.21 @ 11:07) >  EXAM:  XR CHEST PORTABLE IMMED 1V                          PROCEDURE DATE:  11/23/2021      INTERPRETATION:  AP semierect chest on November 23, 2021 at 10:50 AM. Patient is desaturating.    Gross heart enlargement again noted.    Central hilar vascular prominence again noted.    There are increasing infiltrates possibly congestive particularly in the left lower lung field.    On chest film of November 19 and May been small nodules in the lungs. Consider metastatic disease.    IMPRESSION: Question increasing left base infiltrate.    Heart enlargement and central vascular prominence again noted. Consider small metastases in the lungs.    BLADE CHANG MD; Attending Radiologist  This document has been electronically signed. Nov 23 2021 11:31AM    ASSESSMENT AND PLAN:  ·	Chronic hypoxic hypercarbic Respiratory failure.  ·	Morbid Obesity.  ·	SHAD.  ·	Asthma/COPD.  ·	HTN.  ·	DM2    ABG noted.  Continue O2, keep SPO2 92-94%.  Nocturnal and PRN BIPAP.  Continue Symbicort and as needed Albuterol.  DVT prophylaxis.

## 2021-11-24 NOTE — PROGRESS NOTE ADULT - SUBJECTIVE AND OBJECTIVE BOX
Patient seen and examined at bedside this AM. Patient demented but appears comfortable at present.    ICU Vital Signs Last 24 Hrs  T(C): 37.6 (24 Nov 2021 05:38), Max: 37.6 (24 Nov 2021 05:38)  T(F): 99.6 (24 Nov 2021 05:38), Max: 99.6 (24 Nov 2021 05:38)  HR: 83 (24 Nov 2021 06:10) (65 - 89)  BP: 113/69 (24 Nov 2021 06:10) (113/69 - 138/58)  BP(mean): --  ABP: --  ABP(mean): --  RR: 20 (24 Nov 2021 05:38) (18 - 20)  SpO2: 91% (24 Nov 2021 05:38) (80% - 100%)            PE:  Gen: NAD   RLE:  KI in place  Compartments soft and compressible  Severe lymphedema bilaterally  No calf ttp  Severe pain of the RLE, about the knee and proximal tibia  +EHL/FHL/Gsc/TA  SILT L2-S1, mildly diminished secondary to lymphedema  Difficult to appreciate pulses secondary to lymphedema  Foot warm/well perfused

## 2021-11-24 NOTE — PHYSICAL THERAPY INITIAL EVALUATION ADULT - STRENGTHENING, PT EVAL
Improve strength all extremities to 4+/5 or 5/5 and improve overall functional ability in bed mobility, transfers and ambulation .

## 2021-11-24 NOTE — PHYSICAL THERAPY INITIAL EVALUATION ADULT - DIAGNOSIS, PT EVAL
C/o severe pain in the RLE upon movt and exertion, max A in bed mobility, unable to tolerate sitting, standing and ambulation this session.

## 2021-11-24 NOTE — PROGRESS NOTE ADULT - ASSESSMENT
86F with HFpEF, obesity, obstructive sleep apnea on CPAP, hypertension, type 2 diabetes, COPD, lymphedema, status post fall and knee fracture currently on telemetry stable.     Acute hypoxic respiratory failure 2/2 COPD  obstructive sleep apnea on CPAP at home  DM2  Obesity  Lymphedema  Hypertension   Continue Bipap  ABG reviewed with pulmonologist Settings changed on bipap  CXR showing left base inflitrate with ? mets in lungs  -Will perform CT Of chest w/o contrast to further evaluate     -Continue symbicort and albuter as needed as well  -Nacturnal and PRN BIPAP   -Continue cef/flagl for anaerobic coverage for aspiration pneumonia    -Echo  with preserved EF, Grade I DD, mild AS  -continued Bumex 1mg  -HTN-Continue metoprolol  mg daily, hydralazine 100 mg 3 times daily, amlodipine 10 mg daily for blood pressure control.      R knee fracture  -as per ortho no surgery for now   -reviewed and appreciated Ortho note-   -continue knee immobilizer  -continue pain meds prn  -    dvt ppx-Lovenox

## 2021-11-24 NOTE — PROGRESS NOTE ADULT - SUBJECTIVE AND OBJECTIVE BOX
Patient is a 86y old  Female who presents with a chief complaint of right ankle fracture (24 Nov 2021 14:25)      INTERVAL HPI/OVERNIGHT EVENTS: Pt on BIPAP machine, as per nurse desating at night, AO stating shes very thirsty    MEDICATIONS  (STANDING):  amLODIPine   Tablet 10 milliGRAM(s) Oral daily  atorvastatin 40 milliGRAM(s) Oral at bedtime  budesonide 160 MICROgram(s)/formoterol 4.5 MICROgram(s) Inhaler 2 Puff(s) Inhalation two times a day  buMETAnide 1 milliGRAM(s) Oral daily  cefTRIAXone   IVPB 1000 milliGRAM(s) IV Intermittent every 24 hours  cholecalciferol 2000 Unit(s) Oral daily  dextrose 40% Gel 15 Gram(s) Oral once  dextrose 5%. 1000 milliLiter(s) (50 mL/Hr) IV Continuous <Continuous>  dextrose 5%. 1000 milliLiter(s) (100 mL/Hr) IV Continuous <Continuous>  dextrose 50% Injectable 25 Gram(s) IV Push once  dextrose 50% Injectable 25 Gram(s) IV Push once  dextrose 50% Injectable 12.5 Gram(s) IV Push once  enoxaparin Injectable 40 milliGRAM(s) SubCutaneous every 12 hours  glucagon  Injectable 1 milliGRAM(s) IntraMuscular once  hydrALAZINE 100 milliGRAM(s) Oral three times a day  insulin lispro (ADMELOG) corrective regimen sliding scale   SubCutaneous three times a day before meals  insulin lispro (ADMELOG) corrective regimen sliding scale   SubCutaneous at bedtime  metoprolol succinate  milliGRAM(s) Oral daily  metroNIDAZOLE  IVPB      metroNIDAZOLE  IVPB 500 milliGRAM(s) IV Intermittent every 8 hours  montelukast 10 milliGRAM(s) Oral at bedtime  nystatin Powder 1 Application(s) Topical two times a day  polyethylene glycol 3350 17 Gram(s) Oral daily  senna 2 Tablet(s) Oral at bedtime    MEDICATIONS  (PRN):  acetaminophen     Tablet .. 650 milliGRAM(s) Oral every 6 hours PRN Mild Pain (1 - 3), Moderate Pain (4 - 6)  ALBUTerol    90 MICROgram(s) HFA Inhaler 2 Puff(s) Inhalation every 6 hours PRN Shortness of Breath and/or Wheezing  melatonin 3 milliGRAM(s) Oral at bedtime PRN Insomnia  oxyCODONE    IR 10 milliGRAM(s) Oral every 4 hours PRN Severe Pain (7 - 10)  oxyCODONE    IR 5 milliGRAM(s) Oral every 4 hours PRN Moderate Pain (4 - 6)  sodium chloride 0.65% Nasal 1 Spray(s) Both Nostrils three times a day PRN Nasal Congestion      Allergies    No Known Allergies    Intolerances        REVIEW OF SYSTEMS:  CONSTITUTIONAL: No fever, weight loss, or fatigue  EYES: No eye pain, visual disturbances, or discharge  ENMT:  No difficulty hearing, tinnitus, vertigo; No sinus or throat pain  NECK: No pain or stiffness  BREASTS: No pain, masses, or nipple discharge  RESPIRATORY: No cough, wheezing, chills or hemoptysis; No shortness of breath  CARDIOVASCULAR: No chest pain, palpitations, dizziness, or leg swelling  GASTROINTESTINAL: No abdominal or epigastric pain. No nausea, vomiting, or hematemesis; No diarrhea or constipation. No melena or hematochezia.  GENITOURINARY: No dysuria, frequency, hematuria, or incontinence  NEUROLOGICAL: No headaches, memory loss, loss of strength, numbness, or tremors  SKIN: No itching, burning, rashes, or lesions   LYMPH NODES: No enlarged glands  ENDOCRINE: No heat or cold intolerance; No hair loss  MUSCULOSKELETAL: No joint pain or swelling; No muscle, back, or extremity pain  PSYCHIATRIC: No depression, anxiety, mood swings, or difficulty sleeping  HEME/LYMPH: No easy bruising, or bleeding gums  ALLERGY AND IMMUNOLOGIC: No hives or eczema    Vital Signs Last 24 Hrs  T(C): 37.1 (24 Nov 2021 10:34), Max: 37.6 (24 Nov 2021 05:38)  T(F): 98.8 (24 Nov 2021 10:34), Max: 99.6 (24 Nov 2021 05:38)  HR: 88 (24 Nov 2021 13:51) (65 - 92)  BP: 154/72 (24 Nov 2021 13:07) (113/69 - 154/72)  BP(mean): --  RR: 19 (24 Nov 2021 10:34) (18 - 20)  SpO2: 94% (24 Nov 2021 13:51) (91% - 100%)    PHYSICAL EXAM:  GENERAL: NAD, obese, on BIPAP  HEAD:  Atraumatic, Normocephalic  EYES: EOMI, PERRLA, conjunctiva and sclera clear  ENMT: No tonsillar erythema, exudates, or enlargement; Moist mucous membranes, Good dentition, No lesions  NECK: Supple, No JVD, Normal thyroid  NERVOUS SYSTEM:  Alert & Oriented X2, poor concentration;   CHEST/LUNG: Clear to percussion bilaterally; diminished lungs throughout. On BIPAP   HEART: Regular rate and rhythm; No murmurs, rubs, or gallops  ABDOMEN: Soft, Nontender, Nondistended; Bowel sounds present  EXTREMITIES: lymphedema b/l legs. right leg in brace. 2+ edema b/l. pulses difficult to palpate due to body habitus.  LYMPH: No lymphadenopathy noted  SKIN: No rashes or lesions    LABS:                        7.8    9.96  )-----------( 245      ( 24 Nov 2021 07:35 )             27.2     11-24    138  |  95<L>  |  49<H>  ----------------------------<  119<H>  4.6   |  42<H>  |  1.19    Ca    9.0      24 Nov 2021 07:35  Phos  5.1     11-23  Mg     2.8     11-23    TPro  7.6  /  Alb  2.3<L>  /  TBili  0.4  /  DBili  x   /  AST  16  /  ALT  16  /  AlkPhos  49  11-24        CAPILLARY BLOOD GLUCOSE      POCT Blood Glucose.: 129 mg/dL (24 Nov 2021 11:54)  POCT Blood Glucose.: 136 mg/dL (24 Nov 2021 07:56)  POCT Blood Glucose.: 127 mg/dL (23 Nov 2021 21:17)  POCT Blood Glucose.: 151 mg/dL (23 Nov 2021 16:28)      RADIOLOGY & ADDITIONAL TESTS:    Imaging Personally Reviewed:  [ ] YES  [ ] NO    Consultant(s) Notes Reviewed:  [ ] YES  [ ] NO    Care Discussed with Consultants/Other Providers [ ] YES  [ ] NO

## 2021-11-24 NOTE — PROGRESS NOTE ADULT - ASSESSMENT
86F with HFpEF on Bumex at home, obesity, obstructive sleep apnea on CPAP, hypertension, type 2 diabetes, COPD, lymphedema, Hx R TKA (2009), a/w with R knee fracture, status post fall  pt also with c/o sob   Chest xray 11/23/21 reported ? increasing left base infiltrate, Heart enlargement and central vascular prominence again noted. Consider small metastases in the lungs    Echo  with preserved EF, Grade I DD, mild AS  Pt was found lethargic with O2 sat 79% 11/23/21 AM, RRT was called, ABG with significant resp acidosis, responded to BIPAP     -currently continued on Bumex 1mg.   BUN/ Bicarb remains elevated, Creat normalized   -monitor renal function closely, monitor daily electrolytes   -volume assessment severely limited by body habitus  -cont supplemental O2/BiPAP as needed, COPD management as per pulm   -Continue metoprolol  mg daily, hydralazine 100 mg 3 times daily, amlodipine 10 mg daily for blood pressure control.    -DVT prevention with Lovenox   -ortho team on board, No acute orthopaedic surgical intervention at this time as per ortho  -pt follow up with Dr. Gutierrez Bermudez her cardiologist, last visit Sept 30 th 2021     86F with HFpEF on Bumex at home, obesity, obstructive sleep apnea on CPAP, hypertension, type 2 diabetes, COPD, lymphedema, Hx R TKA (2009), a/w with R knee fracture, status post fall  pt also with c/o sob     Echo  with preserved EF, Grade I DD, mild AS  Pt was found lethargic with O2 sat 79% 11/23/21 AM, RRT was called, ABG with significant resp acidosis, responded to BIPAP   Now awake and responsive, on Nasal O2    -currently continued on Bumex 1mg.   BUN/ Bicarb remains elevated, Creat normalized   -monitor renal function closely, monitor daily electrolytes   -volume assessment severely limited by body habitus  -cont supplemental O2/BiPAP as needed, COPD management as per pulm   -Continue metoprolol  mg daily, hydralazine 100 mg 3 times daily, amlodipine 10 mg daily for blood pressure control.    -DVT prevention with Lovenox   -ortho team on board, No acute orthopaedic surgical intervention at this time as per ortho  -pt follow up with Dr. Gutierrez Bermudez her cardiologist, last visit Sept 30 th 2021

## 2021-11-24 NOTE — PROGRESS NOTE ADULT - ASSESSMENT
A/P:  85 yo F s/p MF with Right knee proximal tibia periprosthetic fracture s/p R TKA ('09):  -NWB RLE in bulky [place] knee immobilizer  -pain control  -continue home meds  -medical management per primary team  -recommend dvt ppx  -Spoke with Medicine Team regarding conversation with patient's primary orthopaedic surgeon Dr. Ariel Farah, states patient does not need to be transferred after reviewing XR images, can place patient in [place] brace Locked at 30 deg flex  -Brace ordered, to be applied today by custom rep  -Non operative management at this time per surgeon.   -No acute orthopaedic surgical intervention at this time  -Orthopaedically stable for discharge, patient to follow up with Dr. Castro as an outpatient.  -Dr. Dee aware and in agreement with plan  -Will advise if plan changes

## 2021-11-24 NOTE — PHYSICAL THERAPY INITIAL EVALUATION ADULT - GENERAL OBSERVATIONS, REHAB EVAL
Pt is alert, oriented x 4, follow directions, on CPAP, has RLE LLB in full extension, immobilized c/o pain in RLE even with slightest movt.

## 2021-11-25 LAB
ANION GAP SERPL CALC-SCNC: 1 MMOL/L — LOW (ref 5–17)
APPEARANCE UR: CLEAR — SIGNIFICANT CHANGE UP
BACTERIA # UR AUTO: ABNORMAL
BILIRUB UR-MCNC: NEGATIVE — SIGNIFICANT CHANGE UP
BUN SERPL-MCNC: 43 MG/DL — HIGH (ref 7–23)
CALCIUM SERPL-MCNC: 9.1 MG/DL — SIGNIFICANT CHANGE UP (ref 8.5–10.1)
CHLORIDE SERPL-SCNC: 93 MMOL/L — LOW (ref 96–108)
CO2 SERPL-SCNC: 42 MMOL/L — HIGH (ref 22–31)
COLOR SPEC: YELLOW — SIGNIFICANT CHANGE UP
CREAT SERPL-MCNC: 1.12 MG/DL — SIGNIFICANT CHANGE UP (ref 0.5–1.3)
DIFF PNL FLD: NEGATIVE — SIGNIFICANT CHANGE UP
EPI CELLS # UR: SIGNIFICANT CHANGE UP
GLUCOSE BLDC GLUCOMTR-MCNC: 147 MG/DL — HIGH (ref 70–99)
GLUCOSE BLDC GLUCOMTR-MCNC: 170 MG/DL — HIGH (ref 70–99)
GLUCOSE BLDC GLUCOMTR-MCNC: 186 MG/DL — HIGH (ref 70–99)
GLUCOSE BLDC GLUCOMTR-MCNC: 215 MG/DL — HIGH (ref 70–99)
GLUCOSE SERPL-MCNC: 135 MG/DL — HIGH (ref 70–99)
GLUCOSE UR QL: NEGATIVE MG/DL — SIGNIFICANT CHANGE UP
HCT VFR BLD CALC: 26.2 % — LOW (ref 34.5–45)
HGB BLD-MCNC: 7.4 G/DL — LOW (ref 11.5–15.5)
KETONES UR-MCNC: NEGATIVE — SIGNIFICANT CHANGE UP
LACTATE SERPL-SCNC: 0.8 MMOL/L — SIGNIFICANT CHANGE UP (ref 0.7–2)
LEUKOCYTE ESTERASE UR-ACNC: ABNORMAL
MAGNESIUM SERPL-MCNC: 3.2 MG/DL — HIGH (ref 1.6–2.6)
MCHC RBC-ENTMCNC: 25.4 PG — LOW (ref 27–34)
MCHC RBC-ENTMCNC: 28.2 GM/DL — LOW (ref 32–36)
MCV RBC AUTO: 90 FL — SIGNIFICANT CHANGE UP (ref 80–100)
NITRITE UR-MCNC: NEGATIVE — SIGNIFICANT CHANGE UP
NRBC # BLD: 0 /100 WBCS — SIGNIFICANT CHANGE UP (ref 0–0)
PH UR: 6.5 — SIGNIFICANT CHANGE UP (ref 5–8)
PLATELET # BLD AUTO: 241 K/UL — SIGNIFICANT CHANGE UP (ref 150–400)
POTASSIUM SERPL-MCNC: 4.1 MMOL/L — SIGNIFICANT CHANGE UP (ref 3.5–5.3)
POTASSIUM SERPL-SCNC: 4.1 MMOL/L — SIGNIFICANT CHANGE UP (ref 3.5–5.3)
PROCALCITONIN SERPL-MCNC: 0.17 NG/ML — HIGH (ref 0.02–0.1)
PROT UR-MCNC: 30 MG/DL
RAPID RVP RESULT: SIGNIFICANT CHANGE UP
RBC # BLD: 2.91 M/UL — LOW (ref 3.8–5.2)
RBC # FLD: 16.4 % — HIGH (ref 10.3–14.5)
SARS-COV-2 RNA SPEC QL NAA+PROBE: SIGNIFICANT CHANGE UP
SODIUM SERPL-SCNC: 136 MMOL/L — SIGNIFICANT CHANGE UP (ref 135–145)
SP GR SPEC: 1 — LOW (ref 1.01–1.02)
UROBILINOGEN FLD QL: NEGATIVE MG/DL — SIGNIFICANT CHANGE UP
WBC # BLD: 8.46 K/UL — SIGNIFICANT CHANGE UP (ref 3.8–10.5)
WBC # FLD AUTO: 8.46 K/UL — SIGNIFICANT CHANGE UP (ref 3.8–10.5)
WBC UR QL: SIGNIFICANT CHANGE UP

## 2021-11-25 PROCEDURE — 99233 SBSQ HOSP IP/OBS HIGH 50: CPT

## 2021-11-25 RX ORDER — LACTULOSE 10 G/15ML
10 SOLUTION ORAL EVERY 6 HOURS
Refills: 0 | Status: COMPLETED | OUTPATIENT
Start: 2021-11-25 | End: 2021-11-26

## 2021-11-25 RX ADMIN — MONTELUKAST 10 MILLIGRAM(S): 4 TABLET, CHEWABLE ORAL at 22:45

## 2021-11-25 RX ADMIN — Medication 2: at 08:16

## 2021-11-25 RX ADMIN — BUMETANIDE 1 MILLIGRAM(S): 0.25 INJECTION INTRAMUSCULAR; INTRAVENOUS at 05:30

## 2021-11-25 RX ADMIN — Medication 100 MILLIGRAM(S): at 22:46

## 2021-11-25 RX ADMIN — ATORVASTATIN CALCIUM 40 MILLIGRAM(S): 80 TABLET, FILM COATED ORAL at 22:41

## 2021-11-25 RX ADMIN — Medication 100 MILLIGRAM(S): at 13:41

## 2021-11-25 RX ADMIN — Medication 100 MILLIGRAM(S): at 05:30

## 2021-11-25 RX ADMIN — NYSTATIN CREAM 1 APPLICATION(S): 100000 CREAM TOPICAL at 05:31

## 2021-11-25 RX ADMIN — BUDESONIDE AND FORMOTEROL FUMARATE DIHYDRATE 2 PUFF(S): 160; 4.5 AEROSOL RESPIRATORY (INHALATION) at 05:31

## 2021-11-25 RX ADMIN — POLYETHYLENE GLYCOL 3350 17 GRAM(S): 17 POWDER, FOR SOLUTION ORAL at 11:48

## 2021-11-25 RX ADMIN — Medication 100 MILLIGRAM(S): at 22:42

## 2021-11-25 RX ADMIN — BUDESONIDE AND FORMOTEROL FUMARATE DIHYDRATE 2 PUFF(S): 160; 4.5 AEROSOL RESPIRATORY (INHALATION) at 17:01

## 2021-11-25 RX ADMIN — LACTULOSE 10 GRAM(S): 10 SOLUTION ORAL at 17:00

## 2021-11-25 RX ADMIN — ENOXAPARIN SODIUM 40 MILLIGRAM(S): 100 INJECTION SUBCUTANEOUS at 05:31

## 2021-11-25 RX ADMIN — CEFTRIAXONE 100 MILLIGRAM(S): 500 INJECTION, POWDER, FOR SOLUTION INTRAMUSCULAR; INTRAVENOUS at 15:11

## 2021-11-25 RX ADMIN — NYSTATIN CREAM 1 APPLICATION(S): 100000 CREAM TOPICAL at 17:01

## 2021-11-25 RX ADMIN — OXYCODONE HYDROCHLORIDE 5 MILLIGRAM(S): 5 TABLET ORAL at 05:31

## 2021-11-25 RX ADMIN — Medication 4: at 11:48

## 2021-11-25 RX ADMIN — Medication 2000 UNIT(S): at 11:48

## 2021-11-25 RX ADMIN — ENOXAPARIN SODIUM 40 MILLIGRAM(S): 100 INJECTION SUBCUTANEOUS at 17:00

## 2021-11-25 NOTE — PROGRESS NOTE ADULT - SUBJECTIVE AND OBJECTIVE BOX
Patient is a 86y old  Female who presents with a chief complaint of right ankle fracture (24 Nov 2021 14:25)    INTERVAL HPI/OVERNIGHT EVENTS: Pt on oxygen when I came in this morning, states she's still tired and has no energy, difficulties breathing. Otherwise denies coughing, chest pain, abdominal pain.     MEDICATIONS  (STANDING):  amLODIPine   Tablet 10 milliGRAM(s) Oral daily  atorvastatin 40 milliGRAM(s) Oral at bedtime  budesonide 160 MICROgram(s)/formoterol 4.5 MICROgram(s) Inhaler 2 Puff(s) Inhalation two times a day  buMETAnide 1 milliGRAM(s) Oral daily  cefTRIAXone   IVPB 1000 milliGRAM(s) IV Intermittent every 24 hours  cholecalciferol 2000 Unit(s) Oral daily  dextrose 40% Gel 15 Gram(s) Oral once  dextrose 5%. 1000 milliLiter(s) (50 mL/Hr) IV Continuous <Continuous>  dextrose 5%. 1000 milliLiter(s) (100 mL/Hr) IV Continuous <Continuous>  dextrose 50% Injectable 25 Gram(s) IV Push once  dextrose 50% Injectable 12.5 Gram(s) IV Push once  dextrose 50% Injectable 25 Gram(s) IV Push once  enoxaparin Injectable 40 milliGRAM(s) SubCutaneous every 12 hours  glucagon  Injectable 1 milliGRAM(s) IntraMuscular once  hydrALAZINE 100 milliGRAM(s) Oral three times a day  insulin lispro (ADMELOG) corrective regimen sliding scale   SubCutaneous three times a day before meals  insulin lispro (ADMELOG) corrective regimen sliding scale   SubCutaneous at bedtime  metoprolol succinate  milliGRAM(s) Oral daily  metroNIDAZOLE  IVPB      metroNIDAZOLE  IVPB 500 milliGRAM(s) IV Intermittent every 8 hours  montelukast 10 milliGRAM(s) Oral at bedtime  nystatin Powder 1 Application(s) Topical two times a day  polyethylene glycol 3350 17 Gram(s) Oral daily  senna 2 Tablet(s) Oral at bedtime    MEDICATIONS  (PRN):  acetaminophen     Tablet .. 650 milliGRAM(s) Oral every 6 hours PRN Mild Pain (1 - 3), Moderate Pain (4 - 6)  acetaminophen     Tablet .. 650 milliGRAM(s) Oral every 6 hours PRN Temp greater or equal to 38C (100.4F)  ALBUTerol    90 MICROgram(s) HFA Inhaler 2 Puff(s) Inhalation every 6 hours PRN Shortness of Breath and/or Wheezing  melatonin 3 milliGRAM(s) Oral at bedtime PRN Insomnia  oxyCODONE    IR 10 milliGRAM(s) Oral every 4 hours PRN Severe Pain (7 - 10)  oxyCODONE    IR 5 milliGRAM(s) Oral every 4 hours PRN Moderate Pain (4 - 6)  sodium chloride 0.65% Nasal 1 Spray(s) Both Nostrils three times a day PRN Nasal Congestion              Allergies    No Known Allergies    Intolerances        REVIEW OF SYSTEMS:  CONSTITUTIONAL: No fever, weight loss, or fatigue  EYES: No eye pain, visual disturbances, or discharge  ENMT:  No difficulty hearing, tinnitus, vertigo; No sinus or throat pain  NECK: No pain or stiffness  BREASTS: No pain, masses, or nipple discharge  RESPIRATORY: No cough, wheezing, chills or hemoptysis; No shortness of breath  CARDIOVASCULAR: No chest pain, palpitations, dizziness, or leg swelling  GASTROINTESTINAL: No abdominal or epigastric pain. No nausea, vomiting, or hematemesis; No diarrhea or constipation. No melena or hematochezia.  GENITOURINARY: No dysuria, frequency, hematuria, or incontinence  NEUROLOGICAL: No headaches, memory loss, loss of strength, numbness, or tremors  SKIN: No itching, burning, rashes, or lesions   LYMPH NODES: No enlarged glands  ENDOCRINE: No heat or cold intolerance; No hair loss  MUSCULOSKELETAL: No joint pain or swelling; No muscle, back, or extremity pain  PSYCHIATRIC: No depression, anxiety, mood swings, or difficulty sleeping  HEME/LYMPH: No easy bruising, or bleeding gums  ALLERGY AND IMMUNOLOGIC: No hives or eczema    Vital Signs Last 24 Hrs  ICU Vital Signs Last 24 Hrs  T(C): 37 (25 Nov 2021 10:25), Max: 38.1 (24 Nov 2021 21:46)  T(F): 98.6 (25 Nov 2021 10:25), Max: 100.5 (24 Nov 2021 21:46)  HR: 89 (25 Nov 2021 13:41) (75 - 98)  BP: 111/64 (25 Nov 2021 13:41) (111/64 - 145/65)  BP(mean): --  ABP: --  ABP(mean): --  RR: 17 (25 Nov 2021 10:25) (17 - 20)  SpO2: 93% (25 Nov 2021 10:25) (91% - 97%)      PHYSICAL EXAM:  GENERAL: NAD, obese,   HEAD:  Atraumatic, Normocephalic  EYES: EOMI, PERRLA, conjunctiva and sclera clear  ENMT: No tonsillar erythema, exudates, or enlargement; Moist mucous membranes, Good dentition, No lesions  NECK: Supple, No JVD, Normal thyroid  NERVOUS SYSTEM:  Alert & Oriented X3,  CHEST/LUNG: Clear to percussion bilaterally; diminished lungs throughout. On BIPAP   HEART: Regular rate and rhythm; No murmurs, rubs, or gallops  ABDOMEN: Obese, Soft, Nontender, Nondistended; Bowel sounds present  EXTREMITIES: lymphedema b/l legs. right leg in brace. 2+ edema b/l. pulses difficult to palpate due to body habitus.  LYMPH: No lymphadenopathy noted  SKIN: No rashes or lesions    LABS:                                     7.4    8.46  )-----------( 241      ( 25 Nov 2021 06:19 )             26.2     11-25    136  |  93<L>  |  43<H>  ----------------------------<  135<H>  4.1   |  42<H>  |  1.12    Ca    9.1      25 Nov 2021 06:19  Mg     3.2     11-25    TPro  7.6  /  Alb  2.3<L>  /  TBili  0.4  /  DBili  x   /  AST  16  /  ALT  16  /  AlkPhos  49  11-24

## 2021-11-25 NOTE — PROGRESS NOTE ADULT - SUBJECTIVE AND OBJECTIVE BOX
24H hour events:   pt resting  no complaints  states breathing is improved from yesterday.   MEDICATIONS:  amLODIPine   Tablet 10 milliGRAM(s) Oral daily  buMETAnide 1 milliGRAM(s) Oral daily  enoxaparin Injectable 40 milliGRAM(s) SubCutaneous every 12 hours  hydrALAZINE 100 milliGRAM(s) Oral three times a day  metoprolol succinate  milliGRAM(s) Oral daily    cefTRIAXone   IVPB 1000 milliGRAM(s) IV Intermittent every 24 hours  metroNIDAZOLE  IVPB      metroNIDAZOLE  IVPB 500 milliGRAM(s) IV Intermittent every 8 hours    ALBUTerol    90 MICROgram(s) HFA Inhaler 2 Puff(s) Inhalation every 6 hours PRN  budesonide 160 MICROgram(s)/formoterol 4.5 MICROgram(s) Inhaler 2 Puff(s) Inhalation two times a day  montelukast 10 milliGRAM(s) Oral at bedtime    acetaminophen     Tablet .. 650 milliGRAM(s) Oral every 6 hours PRN  acetaminophen     Tablet .. 650 milliGRAM(s) Oral every 6 hours PRN  melatonin 3 milliGRAM(s) Oral at bedtime PRN  oxyCODONE    IR 10 milliGRAM(s) Oral every 4 hours PRN  oxyCODONE    IR 5 milliGRAM(s) Oral every 4 hours PRN    polyethylene glycol 3350 17 Gram(s) Oral daily  senna 2 Tablet(s) Oral at bedtime    atorvastatin 40 milliGRAM(s) Oral at bedtime  dextrose 40% Gel 15 Gram(s) Oral once  dextrose 50% Injectable 25 Gram(s) IV Push once  dextrose 50% Injectable 12.5 Gram(s) IV Push once  dextrose 50% Injectable 25 Gram(s) IV Push once  glucagon  Injectable 1 milliGRAM(s) IntraMuscular once  insulin lispro (ADMELOG) corrective regimen sliding scale   SubCutaneous three times a day before meals  insulin lispro (ADMELOG) corrective regimen sliding scale   SubCutaneous at bedtime    cholecalciferol 2000 Unit(s) Oral daily  dextrose 5%. 1000 milliLiter(s) IV Continuous <Continuous>  dextrose 5%. 1000 milliLiter(s) IV Continuous <Continuous>  nystatin Powder 1 Application(s) Topical two times a day  sodium chloride 0.65% Nasal 1 Spray(s) Both Nostrils three times a day PRN          PHYSICAL EXAM:  T(C): 37 (11-25-21 @ 10:25), Max: 38.1 (11-24-21 @ 21:46)  HR: 89 (11-25-21 @ 13:41) (75 - 98)  BP: 111/64 (11-25-21 @ 13:41) (111/64 - 145/65)  RR: 17 (11-25-21 @ 10:25) (17 - 20)  SpO2: 93% (11-25-21 @ 10:25) (91% - 97%)  Wt(kg): --  I&O's Summary    24 Nov 2021 07:01  -  25 Nov 2021 07:00  --------------------------------------------------------  IN: 400 mL / OUT: 700 mL / NET: -300 mL    25 Nov 2021 07:01  -  25 Nov 2021 16:13  --------------------------------------------------------  IN: 300 mL / OUT: 0 mL / NET: 300 mL        Appearance: Normal	  HEENT:   Normal oral mucosa, PERRL, EOMI	  Lymphatic: No lymphadenopathy  Cardiovascular: Normal S1 S2, No JVD, No murmurs, No edema  Respiratory: coarse bs b/l  Psychiatry: A & O x 3, Mood & affect appropriate  Gastrointestinal:  Soft, Non-tender, + BS	  Skin: No rashes, No ecchymoses, No cyanosis	  Neurologic: Non-focal  Extremities: Normal range of motion, No clubbing, cyanosis       LABS:	 	    CBC Full  -  ( 25 Nov 2021 06:19 )  WBC Count : 8.46 K/uL  Hemoglobin : 7.4 g/dL  Hematocrit : 26.2 %  Platelet Count - Automated : 241 K/uL  Mean Cell Volume : 90.0 fl  Mean Cell Hemoglobin : 25.4 pg  Mean Cell Hemoglobin Concentration : 28.2 gm/dL  Auto Neutrophil # : x  Auto Lymphocyte # : x  Auto Monocyte # : x  Auto Eosinophil # : x  Auto Basophil # : x  Auto Neutrophil % : x  Auto Lymphocyte % : x  Auto Monocyte % : x  Auto Eosinophil % : x  Auto Basophil % : x    11-25    136  |  93<L>  |  43<H>  ----------------------------<  135<H>  4.1   |  42<H>  |  1.12  11-24    138  |  95<L>  |  49<H>  ----------------------------<  119<H>  4.6   |  42<H>  |  1.19    Ca    9.1      25 Nov 2021 06:19  Ca    9.0      24 Nov 2021 07:35  Mg     3.2     11-25    TPro  7.6  /  Alb  2.3<L>  /  TBili  0.4  /  DBili  x   /  AST  16  /  ALT  16  /  AlkPhos  49  11-24      proBNP:   Lipid Profile:   HgA1c:   TSH:       CARDIAC MARKERS:            TELEMETRY: 	    ECG:  	  RADIOLOGY:  OTHER: 	    PREVIOUS DIAGNOSTIC TESTING:    [ ] Echocardiogram:  [ ]  Catheterization:  [ ] Stress Test:  	  	  ASSESSMENT/PLAN:

## 2021-11-25 NOTE — PROGRESS NOTE ADULT - SUBJECTIVE AND OBJECTIVE BOX
Orthopedics    Patient seen and examined at bedside this AM. Patient remains confused, unchanged from previous exam    Vital Signs Last 24 Hrs  T(C): 37.1 (25 Nov 2021 05:11), Max: 38.1 (24 Nov 2021 21:46)  T(F): 98.7 (25 Nov 2021 05:11), Max: 100.5 (24 Nov 2021 21:46)  HR: 95 (25 Nov 2021 05:57) (75 - 98)  BP: 117/67 (25 Nov 2021 05:28) (112/63 - 154/72)  BP(mean): --  RR: 19 (25 Nov 2021 05:11) (19 - 20)  SpO2: 96% (25 Nov 2021 05:57) (90% - 97%)      PE:  Gen: NAD   RLE:  KI in place  Compartments soft and compressible  Severe lymphedema bilaterally  No calf ttp  Severe pain of the RLE, about the knee and proximal tibia  +EHL/FHL/Gsc/TA  SILT L2-S1, mildly diminished secondary to lymphedema  Difficult to appreciate pulses secondary to lymphedema  Foot warm/well perfused     Orthopedics    Patient seen and examined at bedside this AM. Patient remains confused, unchanged from previous exam. Currently in NAD. Pain controlled at rest.     Vital Signs Last 24 Hrs  T(C): 37.1 (25 Nov 2021 05:11), Max: 38.1 (24 Nov 2021 21:46)  T(F): 98.7 (25 Nov 2021 05:11), Max: 100.5 (24 Nov 2021 21:46)  HR: 95 (25 Nov 2021 05:57) (75 - 98)  BP: 117/67 (25 Nov 2021 05:28) (112/63 - 154/72)  BP(mean): --  RR: 19 (25 Nov 2021 05:11) (19 - 20)  SpO2: 96% (25 Nov 2021 05:57) (90% - 97%)      PE:  Gen: NAD   RLE:  [place]aden locked in 30 deg flexion in place  Compartments soft and compressible  Severe lymphedema bilaterally  No calf ttp  Severe pain of the RLE, about the knee and proximal tibia  +EHL/FHL/Gsc/TA  SILT L2-S1, mildly diminished secondary to lymphedema  Difficult to appreciate pulses secondary to lymphedema  Foot warm/well perfused

## 2021-11-25 NOTE — PROGRESS NOTE ADULT - ASSESSMENT
86F with HFpEF on Bumex at home, obesity, obstructive sleep apnea on CPAP, hypertension, type 2 diabetes, COPD, lymphedema, Hx R TKA (2009), a/w with R knee fracture, status post fall. also with c/o sob       SOB  -Echo with preserved EF, Grade I DD, mild AS  -hospital course c/b episode of lethargy and hypoxia to 70s on 11/23/21 AM, found to have resp acidosis, responded to BIPAP. now off bipap currently.   -currently continued on Bumex 1mg po.     -monitor renal function closely, monitor daily electrolytes   -volume assessment severely limited by body habitus  -cont supplemental O2/BiPAP as needed, COPD management as per pulm   -Continue metoprolol  mg daily, hydralazine 100 mg 3 times daily, amlodipine 10 mg daily for blood pressure control.    -DVT prevention with Lovenox   -ortho team on board, No acute orthopaedic surgical intervention at this time as per ortho  -pt follow up with Dr. Gutierrez Bermudez her cardiologist, last visit Sept 30 th 2021

## 2021-11-25 NOTE — PROGRESS NOTE ADULT - SUBJECTIVE AND OBJECTIVE BOX
INTERVAL HPI:  86 year female with HTN, DM, COPD/Asthma ( non smoker ), On  nasal O2,  Morbid Obesity, SHAD on CPAP, Lymph edema.  Admitted status post fall.  Awake, responsive and comfortable.    OVERNIGHT EVENTS:  Feels better.    Vital Signs Last 24 Hrs  T(C): 37 (25 Nov 2021 10:25), Max: 38.1 (24 Nov 2021 21:46)  T(F): 98.6 (25 Nov 2021 10:25), Max: 100.5 (24 Nov 2021 21:46)  HR: 89 (25 Nov 2021 13:41) (75 - 98)  BP: 111/64 (25 Nov 2021 13:41) (111/64 - 145/65)  BP(mean): --  RR: 17 (25 Nov 2021 10:25) (17 - 20)  SpO2: 93% (25 Nov 2021 10:25) (91% - 97%)    PHYSICAL EXAM:  GEN:         Awake, responsive and comfortable.  HEENT:    Normal.    RESP:       no wheezing.  CVS:          Regular rate and rhythm.     MEDICATIONS  (STANDING):  amLODIPine   Tablet 10 milliGRAM(s) Oral daily  atorvastatin 40 milliGRAM(s) Oral at bedtime  budesonide 160 MICROgram(s)/formoterol 4.5 MICROgram(s) Inhaler 2 Puff(s) Inhalation two times a day  buMETAnide 1 milliGRAM(s) Oral daily  cefTRIAXone   IVPB 1000 milliGRAM(s) IV Intermittent every 24 hours  cholecalciferol 2000 Unit(s) Oral daily  dextrose 40% Gel 15 Gram(s) Oral once  dextrose 5%. 1000 milliLiter(s) (50 mL/Hr) IV Continuous <Continuous>  dextrose 5%. 1000 milliLiter(s) (100 mL/Hr) IV Continuous <Continuous>  dextrose 50% Injectable 25 Gram(s) IV Push once  dextrose 50% Injectable 12.5 Gram(s) IV Push once  dextrose 50% Injectable 25 Gram(s) IV Push once  enoxaparin Injectable 40 milliGRAM(s) SubCutaneous every 12 hours  glucagon  Injectable 1 milliGRAM(s) IntraMuscular once  hydrALAZINE 100 milliGRAM(s) Oral three times a day  insulin lispro (ADMELOG) corrective regimen sliding scale   SubCutaneous three times a day before meals  insulin lispro (ADMELOG) corrective regimen sliding scale   SubCutaneous at bedtime  metoprolol succinate  milliGRAM(s) Oral daily  metroNIDAZOLE  IVPB      metroNIDAZOLE  IVPB 500 milliGRAM(s) IV Intermittent every 8 hours  montelukast 10 milliGRAM(s) Oral at bedtime  nystatin Powder 1 Application(s) Topical two times a day  polyethylene glycol 3350 17 Gram(s) Oral daily  senna 2 Tablet(s) Oral at bedtime    MEDICATIONS  (PRN):  acetaminophen     Tablet .. 650 milliGRAM(s) Oral every 6 hours PRN Mild Pain (1 - 3), Moderate Pain (4 - 6)  acetaminophen     Tablet .. 650 milliGRAM(s) Oral every 6 hours PRN Temp greater or equal to 38C (100.4F)  ALBUTerol    90 MICROgram(s) HFA Inhaler 2 Puff(s) Inhalation every 6 hours PRN Shortness of Breath and/or Wheezing  melatonin 3 milliGRAM(s) Oral at bedtime PRN Insomnia  oxyCODONE    IR 10 milliGRAM(s) Oral every 4 hours PRN Severe Pain (7 - 10)  oxyCODONE    IR 5 milliGRAM(s) Oral every 4 hours PRN Moderate Pain (4 - 6)  sodium chloride 0.65% Nasal 1 Spray(s) Both Nostrils three times a day PRN Nasal Congestion    LABS:                        7.4    8.46  )-----------( 241      ( 25 Nov 2021 06:19 )             26.2     11-25    136  |  93<L>  |  43<H>  ----------------------------<  135<H>  4.1   |  42<H>  |  1.12    Ca    9.1      25 Nov 2021 06:19  Mg     3.2     11-25    TPro  7.6  /  Alb  2.3<L>  /  TBili  0.4  /  DBili  x   /  AST  16  /  ALT  16  /  AlkPhos  49  11-24 11-24 @ 05:46  pH: 7.44  pCO2: 71  pO2: 73  SaO2: 95.0  11-23 @ 15:53  pH: 7.39  pCO2: 77  pO2: 78  SaO2: 97.0  11-23 @ 13:16  pH: 7.30  pCO2: 99  pO2: 76  SaO2: 96.9  11-23 @ 10:43  pH: 7.33  pCO2: 91  pO2: 172  SaO2: 99.2  11-20 @ 12:00  pH: 7.44  pCO2: 70  pO2: 71  SaO2: 96.0    ASSESSMENT AND PLAN:  ·	Chronic hypoxic hypercarbic Respiratory failure.  ·	Morbid Obesity.  ·	SHAD.  ·	Asthma/COPD.  ·	HTN.  ·	DM2    Feels better today.  Continue O2, keep SPO2 92-94%.  Nocturnal and PRN BIPAP.  Continue Symbicort and as needed Albuterol.  DVT prophylaxis.

## 2021-11-25 NOTE — PROGRESS NOTE ADULT - ASSESSMENT
A/P:  85 yo F s/p MF with Right knee proximal tibia periprosthetic fracture s/p R TKA ('09):    -NWB RLE in bulky [place] knee immobilizer  -pain control  -continue home meds  -medical management per primary team  -recommend dvt ppx  -Spoke with Medicine Team regarding conversation with patient's primary orthopaedic surgeon Dr. Ariel Farah, states patient does not need to be transferred after reviewing XR images, can place patient in [place] brace Locked at 30 deg flex  -Brace ordered, to be applied today by custom rep  -Non operative management at this time per surgeon.   -No further orthopaedic surgical intervention at this time, reconsult with any changes in clinical status  -Continue medical management for fevers and infection workup  -Orthopaedically stable for discharge, patient to follow up with Dr. Castro as an outpatient.  -Dr. Dee aware and in agreement with plan     A/P:  87 yo F s/p MF with Right knee proximal tibia periprosthetic fracture s/p R TKA ('09):    -NWB RLE in [place] Locked in 30 deg flexion  -Multimodal pain control prn per primary  -continue home meds  -medical management per primary team  -recommend dvt ppx per primary, rec SCDs b/l   -Spoke with Medicine Team regarding conversation with patient's primary orthopaedic surgeon Dr. Ariel Farah, states patient does not need to be transferred after reviewing XR images, can place patient in [place] brace Locked at 30 deg flex  -Non operative management at this time per surgeon.   -No further orthopaedic surgical intervention at this time, reconsult with any changes in clinical status  -Continue medical management for fevers and infection workup  -Orthopaedically stable for discharge, patient to follow up with Dr. Castro as an outpatient.  -Dr. Dee aware and in agreement with plan

## 2021-11-25 NOTE — PROGRESS NOTE ADULT - ASSESSMENT
86F with HFpEF, obesity, obstructive sleep apnea on CPAP, hypertension, type 2 diabetes, COPD, lymphedema, status post fall and knee fracture currently on telemetry stable.     Acute hypoxic respiratory failure 2/2 COPD/Asthma  obstructive sleep apnea on CPAP at home  Obesity    Continue Bipap at night and intermittently as needed  ABG reviewed with pulmonologist Settings changed on bipap- seems to be doing better today  CXR showing left base inflitrate with ? mets in lungs  -CT of chest - New right apex 0.6 cm nodule of uncertain etiology or significance. 3 month follow-up is recommended to assess for further change. Questionable mass in the left kidney, partially included. Further evaluation with contrast-enhanced CT of the abdomen is recommended.    -Continue symbicort and albuter as needed as well  -Nacturnal and PRN BIPAP   -Continue cef/flagl for anaerobic coverage for aspiration pneumonia    -Pulmonary following    Diastolic CHF  Lymphedema  -Echo  with preserved EF, Grade I DD, mild AS  -continued Bumex 1mg    Hypertension   --Continue metoprolol  mg daily, hydralazine 100 mg 3 times daily, amlodipine 10 mg daily for blood pressure control.      R knee fracture  -as per ortho no surgery for now   -reviewed and appreciated Ortho note-   -continue knee immobilizer  -continue pain meds prn    DM2  -conitnue ss, insulin     Anemia  -will perform workup     dvt ppx-Lovenox

## 2021-11-26 LAB
ALBUMIN SERPL ELPH-MCNC: 2.2 G/DL — LOW (ref 3.3–5)
ALP SERPL-CCNC: 46 U/L — SIGNIFICANT CHANGE UP (ref 40–120)
ALT FLD-CCNC: 12 U/L — SIGNIFICANT CHANGE UP (ref 12–78)
ANION GAP SERPL CALC-SCNC: 4 MMOL/L — LOW (ref 5–17)
AST SERPL-CCNC: 15 U/L — SIGNIFICANT CHANGE UP (ref 15–37)
BILIRUB SERPL-MCNC: 0.4 MG/DL — SIGNIFICANT CHANGE UP (ref 0.2–1.2)
BLD GP AB SCN SERPL QL: SIGNIFICANT CHANGE UP
BUN SERPL-MCNC: 32 MG/DL — HIGH (ref 7–23)
CALCIUM SERPL-MCNC: 8.9 MG/DL — SIGNIFICANT CHANGE UP (ref 8.5–10.1)
CHLORIDE SERPL-SCNC: 96 MMOL/L — SIGNIFICANT CHANGE UP (ref 96–108)
CO2 SERPL-SCNC: 40 MMOL/L — HIGH (ref 22–31)
CREAT SERPL-MCNC: 1.03 MG/DL — SIGNIFICANT CHANGE UP (ref 0.5–1.3)
FERRITIN SERPL-MCNC: 186 NG/ML — HIGH (ref 15–150)
FOLATE SERPL-MCNC: >20 NG/ML — SIGNIFICANT CHANGE UP
GLUCOSE BLDC GLUCOMTR-MCNC: 149 MG/DL — HIGH (ref 70–99)
GLUCOSE BLDC GLUCOMTR-MCNC: 151 MG/DL — HIGH (ref 70–99)
GLUCOSE BLDC GLUCOMTR-MCNC: 177 MG/DL — HIGH (ref 70–99)
GLUCOSE BLDC GLUCOMTR-MCNC: 186 MG/DL — HIGH (ref 70–99)
GLUCOSE SERPL-MCNC: 137 MG/DL — HIGH (ref 70–99)
HCT VFR BLD CALC: 28.1 % — LOW (ref 34.5–45)
HGB BLD-MCNC: 7.8 G/DL — LOW (ref 11.5–15.5)
IRON SATN MFR SERPL: 12 % — LOW (ref 14–50)
IRON SATN MFR SERPL: 26 UG/DL — LOW (ref 30–160)
MCHC RBC-ENTMCNC: 25.4 PG — LOW (ref 27–34)
MCHC RBC-ENTMCNC: 27.8 GM/DL — LOW (ref 32–36)
MCV RBC AUTO: 91.5 FL — SIGNIFICANT CHANGE UP (ref 80–100)
NRBC # BLD: 0 /100 WBCS — SIGNIFICANT CHANGE UP (ref 0–0)
PLATELET # BLD AUTO: 262 K/UL — SIGNIFICANT CHANGE UP (ref 150–400)
POTASSIUM SERPL-MCNC: 4 MMOL/L — SIGNIFICANT CHANGE UP (ref 3.5–5.3)
POTASSIUM SERPL-SCNC: 4 MMOL/L — SIGNIFICANT CHANGE UP (ref 3.5–5.3)
PROT SERPL-MCNC: 7.6 GM/DL — SIGNIFICANT CHANGE UP (ref 6–8.3)
RBC # BLD: 3.07 M/UL — LOW (ref 3.8–5.2)
RBC # FLD: 16.3 % — HIGH (ref 10.3–14.5)
SODIUM SERPL-SCNC: 140 MMOL/L — SIGNIFICANT CHANGE UP (ref 135–145)
TIBC SERPL-MCNC: 215 UG/DL — LOW (ref 220–430)
UIBC SERPL-MCNC: 189 UG/DL — SIGNIFICANT CHANGE UP (ref 110–370)
VIT B12 SERPL-MCNC: 581 PG/ML — SIGNIFICANT CHANGE UP (ref 232–1245)
WBC # BLD: 8.79 K/UL — SIGNIFICANT CHANGE UP (ref 3.8–10.5)
WBC # FLD AUTO: 8.79 K/UL — SIGNIFICANT CHANGE UP (ref 3.8–10.5)

## 2021-11-26 PROCEDURE — 99233 SBSQ HOSP IP/OBS HIGH 50: CPT

## 2021-11-26 PROCEDURE — 99232 SBSQ HOSP IP/OBS MODERATE 35: CPT

## 2021-11-26 RX ORDER — FERROUS SULFATE 325(65) MG
325 TABLET ORAL DAILY
Refills: 0 | Status: DISCONTINUED | OUTPATIENT
Start: 2021-11-26 | End: 2021-11-27

## 2021-11-26 RX ORDER — SODIUM CHLORIDE 0.65 %
2 AEROSOL, SPRAY (ML) NASAL EVERY 6 HOURS
Refills: 0 | Status: DISCONTINUED | OUTPATIENT
Start: 2021-11-26 | End: 2021-11-27

## 2021-11-26 RX ADMIN — ATORVASTATIN CALCIUM 40 MILLIGRAM(S): 80 TABLET, FILM COATED ORAL at 22:11

## 2021-11-26 RX ADMIN — BUMETANIDE 1 MILLIGRAM(S): 0.25 INJECTION INTRAMUSCULAR; INTRAVENOUS at 05:11

## 2021-11-26 RX ADMIN — CEFTRIAXONE 100 MILLIGRAM(S): 500 INJECTION, POWDER, FOR SOLUTION INTRAMUSCULAR; INTRAVENOUS at 15:08

## 2021-11-26 RX ADMIN — OXYCODONE HYDROCHLORIDE 5 MILLIGRAM(S): 5 TABLET ORAL at 16:43

## 2021-11-26 RX ADMIN — SENNA PLUS 2 TABLET(S): 8.6 TABLET ORAL at 22:10

## 2021-11-26 RX ADMIN — Medication 325 MILLIGRAM(S): at 17:22

## 2021-11-26 RX ADMIN — Medication 100 MILLIGRAM(S): at 11:29

## 2021-11-26 RX ADMIN — ENOXAPARIN SODIUM 40 MILLIGRAM(S): 100 INJECTION SUBCUTANEOUS at 05:12

## 2021-11-26 RX ADMIN — BUDESONIDE AND FORMOTEROL FUMARATE DIHYDRATE 2 PUFF(S): 160; 4.5 AEROSOL RESPIRATORY (INHALATION) at 05:11

## 2021-11-26 RX ADMIN — NYSTATIN CREAM 1 APPLICATION(S): 100000 CREAM TOPICAL at 05:12

## 2021-11-26 RX ADMIN — MONTELUKAST 10 MILLIGRAM(S): 4 TABLET, CHEWABLE ORAL at 22:10

## 2021-11-26 RX ADMIN — Medication 2 SPRAY(S): at 11:30

## 2021-11-26 RX ADMIN — Medication 100 MILLIGRAM(S): at 15:06

## 2021-11-26 RX ADMIN — Medication 2: at 11:36

## 2021-11-26 RX ADMIN — ENOXAPARIN SODIUM 40 MILLIGRAM(S): 100 INJECTION SUBCUTANEOUS at 17:22

## 2021-11-26 RX ADMIN — POLYETHYLENE GLYCOL 3350 17 GRAM(S): 17 POWDER, FOR SOLUTION ORAL at 11:30

## 2021-11-26 RX ADMIN — Medication 100 MILLIGRAM(S): at 22:18

## 2021-11-26 RX ADMIN — OXYCODONE HYDROCHLORIDE 5 MILLIGRAM(S): 5 TABLET ORAL at 06:16

## 2021-11-26 RX ADMIN — NYSTATIN CREAM 1 APPLICATION(S): 100000 CREAM TOPICAL at 17:23

## 2021-11-26 RX ADMIN — Medication 2000 UNIT(S): at 11:29

## 2021-11-26 RX ADMIN — Medication 2: at 08:31

## 2021-11-26 RX ADMIN — Medication 100 MILLIGRAM(S): at 05:12

## 2021-11-26 RX ADMIN — OXYCODONE HYDROCHLORIDE 5 MILLIGRAM(S): 5 TABLET ORAL at 17:06

## 2021-11-26 RX ADMIN — AMLODIPINE BESYLATE 10 MILLIGRAM(S): 2.5 TABLET ORAL at 05:11

## 2021-11-26 RX ADMIN — Medication 2 SPRAY(S): at 17:23

## 2021-11-26 RX ADMIN — BUDESONIDE AND FORMOTEROL FUMARATE DIHYDRATE 2 PUFF(S): 160; 4.5 AEROSOL RESPIRATORY (INHALATION) at 17:23

## 2021-11-26 NOTE — PROGRESS NOTE ADULT - ASSESSMENT
86F with HFpEF on Bumex at home, obesity, obstructive sleep apnea on CPAP, hypertension, type 2 diabetes, COPD, lymphedema, Hx R TKA (2009), a/w with R knee fracture, status post fall. also with c/o sob.       SOB  -Echo with preserved EF, Grade I DD, mild AS  -hospital course c/b episode of lethargy and hypoxia to 70s on 11/23/21 AM, found to have resp acidosis, responded to BIPAP. now off bipap currently.   -currently continued on Bumex 1mg po.     -monitor renal function closely, monitor daily electrolytes   -volume assessment severely limited by body habitus  -cont supplemental O2/BiPAP as needed, COPD management as per pulm, Oxygen humidified to relieve nasal dryness  -Continue metoprolol  mg daily, hydralazine 100 mg 3 times daily, amlodipine 10 mg daily for blood pressure control.    -DVT prevention with Lovenox   -Anemia, Hgb 7.8-Medicine following  -ortho team on board, No acute orthopaedic surgical intervention at this time as per ortho  -pt follow up with Dr. Gutierrez Bermudez her cardiologist, last visit Sept 30 th 2021

## 2021-11-26 NOTE — PROGRESS NOTE ADULT - ASSESSMENT
86F with HFpEF, obesity, obstructive sleep apnea on CPAP, hypertension, type 2 diabetes, COPD, lymphedema, status post fall and knee fracture currently on telemetry stable.     Acute hypoxic respiratory failure 2/2 COPD/Asthma  obstructive sleep apnea on CPAP at home  Obesity    Continue Bipap at night and intermittently as needed  ABG reviewed with pulmonologist Settings changed on bipap- seems to be doing better today  CXR showing left base inflitrate with ? mets in lungs  -CT of chest - New right apex 0.6 cm nodule of uncertain etiology or significance. 3 month follow-up is recommended to assess for further change. Questionable mass in the left kidney, partially included. Further evaluation with contrast-enhanced CT of the abdomen is recommended.    -Continue symbicort and albuter as needed as well  -Nacturnal and PRN BIPAP   -Continue cef/flagl for anaerobic coverage for aspiration pneumonia    -Pulmonary following    Diastolic CHF  Lymphedema  -Echo  with preserved EF, Grade I DD, mild AS  -continued Bumex 1mg    Hypertension   --Continue metoprolol  mg daily, hydralazine 100 mg 3 times daily, amlodipine 10 mg daily for blood pressure control.      R knee fracture  -as per ortho no surgery for now   -reviewed and appreciated Ortho note-   -continue knee immobilizer  -continue pain meds prn    DM2  -conitnue ss, insulin     Anemia  -will perform workup     dvt ppx-Lovenox     86F with HFpEF, obesity, obstructive sleep apnea on CPAP, hypertension, type 2 diabetes, COPD, lymphedema, status post fall and knee fracture currently on telemetry stable.     Acute hypoxic respiratory failure 2/2 COPD/Asthma  obstructive sleep apnea on CPAP at home  Obesity    Continue Bipap at night and intermittently as needed  ABG reviewed with pulmonologist Settings changed on bipap- seems to be doing better today  CXR showing left base inflitrate with ? mets in lungs  -CT of chest - New right apex 0.6 cm nodule of uncertain etiology or significance. 3 month follow-up is recommended to assess for further change. Questionable mass in the left kidney, partially included. Further evaluation with contrast-enhanced CT of the abdomen is recommended.    -Continue symbicort and albuter as needed as well  -Nacturnal and PRN BIPAP   -Continue cef/flagl for anaerobic coverage for aspiration pneumonia--spoke with dr. Ceballos- can switch to PO tomorrow to complete 7day course.     -Pulmonary following    Diastolic CHF  Lymphedema  -Echo  with preserved EF, Grade I DD, mild AS  -continued Bumex 1mg    Hypertension   --Continue metoprolol  mg daily, hydralazine 100 mg 3 times daily, amlodipine 10 mg daily for blood pressure control.      R knee fracture  -as per ortho no surgery for now   -reviewed and appreciated Ortho note-   -continue knee immobilizer  -continue pain meds prn    DM2  -conitnue ss, insulin     Anemia  -Iron def anemia-will supplement with iron   -fobt pending    Incidental Left kidney mass  -will ask Nephrology to follow, most likely outpatient follow up    dvt ppx-Lovenox     86F with HFpEF, obesity, obstructive sleep apnea on CPAP, hypertension, type 2 diabetes, COPD, lymphedema, status post fall and knee fracture currently on telemetry stable.     Acute hypoxic respiratory failure 2/2 COPD/Asthma  obstructive sleep apnea on CPAP at home  Obesity    Continue Bipap at night and intermittently as needed  ABG reviewed with pulmonologist Settings changed on bipap- seems to be doing better today  CXR showing left base inflitrate with ? mets in lungs  -CT of chest - New right apex 0.6 cm nodule of uncertain etiology or significance. 3 month follow-up is recommended to assess for further change. Questionable mass in the left kidney, partially included. Further evaluation with contrast-enhanced CT of the abdomen is recommended.    -Continue symbicort and albuter as needed as well  -Nacturnal and PRN BIPAP   -Continue cef/flagl for anaerobic coverage for aspiration pneumonia--spoke with dr. Ceballos- can switch to PO tomorrow to complete 7day course.     -Pulmonary following    Diastolic CHF  Lymphedema  -Echo  with preserved EF, Grade I DD, mild AS  -continued Bumex 1mg    Hypertension   --Continue metoprolol  mg daily, hydralazine 100 mg 3 times daily, amlodipine 10 mg daily for blood pressure control.      R knee fracture  -as per ortho no surgery for now   -reviewed and appreciated Ortho note-   -continue knee immobilizer  -continue pain meds prn    DM2  -conitnue ss, insulin     Anemia  -Iron def anemia-will supplement with iron   -fobt pending    Incidental Left kidney mass  -will ask Nephrology to follow, most likely outpatient follow up    dvt ppx-Lovenox    Updated daughter regarding potential discharge tomorrow. Daughter stated she is still awaiting equipment to be delivered to the house.

## 2021-11-26 NOTE — PROGRESS NOTE ADULT - SUBJECTIVE AND OBJECTIVE BOX
WESTON SETHI    S 2D 268 D    Allergies    No Known Allergies    Intolerances        PAST MEDICAL & SURGICAL HISTORY:  HTN (hypertension)    HLD (hyperlipidemia)    Asthma    DM (diabetes mellitus)    GERD (gastroesophageal reflux disease)    Lymphedema    S/P knee replacement        FAMILY HISTORY:  No pertinent family history in first degree relatives        Home Medications:  Advair Diskus 250 mcg-50 mcg inhalation powder: 1 puff(s) inhaled 2 times a day (2021 14:10)  alendronate 70 mg oral tablet: 1 tab(s) orally once a week (2021 14:10)  amLODIPine 10 mg oral tablet: 1 tab(s) orally once a day (2021 14:10)  ascorbic acid 500 mg oral capsule: 1 cap(s) orally once a day (30 Oct 2019 16:54)  azelastine 137 mcg/inh (0.1%) nasal spray: 2 spray(s) nasal 2 times a day (30 Oct 2019 16:54)  cholecalciferol 2000 intl units oral tablet: 1 tab(s) orally once a day (30 Oct 2019 16:54)  glimepiride 2 mg oral tablet: 1 tab(s) orally once a day (30 Oct 2019 16:54)  hydrALAZINE 100 mg oral tablet: 1 tab(s) orally 3 times a day (2019 13:41)  ipratropium-albuterol 0.5 mg-2.5 mg/3 mLinhalation solution: 3 milliliter(s) inhaled every 6 hours, As Needed (2021 10:05)  metoprolol succinate 100 mg oral tablet, extended release: 1 tab(s) orally once a day (30 Oct 2019 16:54)  montelukast 10 mg oral tablet: 1 tab(s) orally once a day (30 Oct 2019 16:54)  Neurontin 100 mg oral capsule: 1 cap(s) orally 3 times a day (2020 18:19)  ocular lubricant ophthalmic solution: 1 drop(s) to each affected eye 3 times a day, As needed, Dry Eyes (2019 13:41)  polyethylene glycol 3350 oral powder for reconstitution: 17 gram(s) orally once a day (2021 14:10)  ProAir HFA 90 mcg/inh inhalation aerosol: 2 puff(s) inhaled 4 times a day, As Needed (2021 14:10)  rosuvastatin 10 mg oral tablet: 1 tab(s) orally once a day (2021 14:10)      MEDICATIONS  (STANDING):  amLODIPine   Tablet 10 milliGRAM(s) Oral daily  atorvastatin 40 milliGRAM(s) Oral at bedtime  budesonide 160 MICROgram(s)/formoterol 4.5 MICROgram(s) Inhaler 2 Puff(s) Inhalation two times a day  buMETAnide 1 milliGRAM(s) Oral daily  cefTRIAXone   IVPB 1000 milliGRAM(s) IV Intermittent every 24 hours  cholecalciferol 2000 Unit(s) Oral daily  dextrose 40% Gel 15 Gram(s) Oral once  dextrose 5%. 1000 milliLiter(s) (50 mL/Hr) IV Continuous <Continuous>  dextrose 5%. 1000 milliLiter(s) (100 mL/Hr) IV Continuous <Continuous>  dextrose 50% Injectable 25 Gram(s) IV Push once  dextrose 50% Injectable 12.5 Gram(s) IV Push once  dextrose 50% Injectable 25 Gram(s) IV Push once  enoxaparin Injectable 40 milliGRAM(s) SubCutaneous every 12 hours  glucagon  Injectable 1 milliGRAM(s) IntraMuscular once  hydrALAZINE 100 milliGRAM(s) Oral three times a day  insulin lispro (ADMELOG) corrective regimen sliding scale   SubCutaneous three times a day before meals  insulin lispro (ADMELOG) corrective regimen sliding scale   SubCutaneous at bedtime  metoprolol succinate  milliGRAM(s) Oral daily  metroNIDAZOLE  IVPB      metroNIDAZOLE  IVPB 500 milliGRAM(s) IV Intermittent every 8 hours  montelukast 10 milliGRAM(s) Oral at bedtime  nystatin Powder 1 Application(s) Topical two times a day  polyethylene glycol 3350 17 Gram(s) Oral daily  senna 2 Tablet(s) Oral at bedtime    MEDICATIONS  (PRN):  acetaminophen     Tablet .. 650 milliGRAM(s) Oral every 6 hours PRN Mild Pain (1 - 3), Moderate Pain (4 - 6)  acetaminophen     Tablet .. 650 milliGRAM(s) Oral every 6 hours PRN Temp greater or equal to 38C (100.4F)  ALBUTerol    90 MICROgram(s) HFA Inhaler 2 Puff(s) Inhalation every 6 hours PRN Shortness of Breath and/or Wheezing  melatonin 3 milliGRAM(s) Oral at bedtime PRN Insomnia  oxyCODONE    IR 10 milliGRAM(s) Oral every 4 hours PRN Severe Pain (7 - 10)  oxyCODONE    IR 5 milliGRAM(s) Oral every 4 hours PRN Moderate Pain (4 - 6)  sodium chloride 0.65% Nasal 1 Spray(s) Both Nostrils three times a day PRN Nasal Congestion      Diet, Consistent Carbohydrate/No Snacks:   Minced and Moist (MINCEDMOIST)  Low Sodium  Halal  No Pork  Supplement Feeding Modality:  Oral  Glucerna Shake Cans or Servings Per Day:  1       Frequency:  Two Times a day (21 @ 16:48) [Active]          Vital Signs Last 24 Hrs  T(C): 37.4 (2021 05:11), Max: 37.4 (2021 05:11)  T(F): 99.3 (2021 05:11), Max: 99.3 (2021 05:11)  HR: 93 (2021 09:23) (83 - 93)  BP: 140/65 (2021 05:11) (111/64 - 145/65)  BP(mean): --  RR: 18 (2021 05:11) (17 - 18)  SpO2: 96% (2021 09:23) (91% - 97%)      21 @ 07:01  -  21 @ 07:00  --------------------------------------------------------  IN: 600 mL / OUT: 400 mL / NET: 200 mL              LABS:                        7.8    8.79  )-----------( 262      ( 2021 06:19 )             28.1         140  |  96  |  32<H>  ----------------------------<  137<H>  4.0   |  40<H>  |  1.03    Ca    8.9      2021 06:19  Mg     3.2         TPro  7.6  /  Alb  2.2<L>  /  TBili  0.4  /  DBili  x   /  AST  15  /  ALT  12  /  AlkPhos  46        Urinalysis Basic - ( 2021 19:31 )    Color: Yellow / Appearance: Clear / S.005 / pH: x  Gluc: x / Ketone: Negative  / Bili: Negative / Urobili: Negative mg/dL   Blood: x / Protein: 30 mg/dL / Nitrite: Negative   Leuk Esterase: Trace / RBC: x / WBC 0-2   Sq Epi: x / Non Sq Epi: Few / Bacteria: Few            WBC:  WBC Count: 8.79 K/uL ( @ 06:19)  WBC Count: 8.46 K/uL ( @ 06:19)  WBC Count: 9.37 K/uL ( 23:51)  WBC Count: 9.96 K/uL ( @ 07:35)  WBC Count: 12.37 K/uL ( @ 09:25)      MICROBIOLOGY:  RECENT CULTURES:   Clean Catch Clean Catch (Midstream) XXXX XXXX   <10,000 CFU/mL Normal Urogenital Elen                    Sodium:  Sodium, Serum: 140 mmol/L ( @ 06:19)  Sodium, Serum: 136 mmol/L ( @ 06:19)  Sodium, Serum: 138 mmol/L ( @ 07:35)  Sodium, Serum: 138 mmol/L ( @ 15:29)  Sodium, Serum: 137 mmol/L ( @ 11:05)  Sodium, Serum: 137 mmol/L (:25)      1.03 mg/dL :19  1.12 mg/dL  @ 06:19  1.19 mg/dL  07:35  1.29 mg/dL  15:29  1.33 mg/dL  @ 11:05  1.31 mg/dL  @ :25      Hemoglobin:  Hemoglobin: 7.8 g/dL ( @ 06:19)  Hemoglobin: 7.4 g/dL ( @ 06:19)  Hemoglobin: 7.3 g/dL ( @ 23:51)  Hemoglobin: 7.8 g/dL ( @ 07:35)  Hemoglobin: 8.0 g/dL ( @ 09:25)      Platelets: Platelet Count - Automated: 262 K/uL ( @ 06:19)  Platelet Count - Automated: 241 K/uL ( @ 06:19)  Platelet Count - Automated: 238 K/uL ( @ 23:51)  Platelet Count - Automated: 245 K/uL ( @ 07:35)  Platelet Count - Automated: 228 K/uL ( @ 09:25)      LIVER FUNCTIONS - ( 2021 06:19 )  Alb: 2.2 g/dL / Pro: 7.6 gm/dL / ALK PHOS: 46 U/L / ALT: 12 U/L / AST: 15 U/L / GGT: x             Urinalysis Basic - ( 2021 19:31 )    Color: Yellow / Appearance: Clear / S.005 / pH: x  Gluc: x / Ketone: Negative  / Bili: Negative / Urobili: Negative mg/dL   Blood: x / Protein: 30 mg/dL / Nitrite: Negative   Leuk Esterase: Trace / RBC: x / WBC 0-2   Sq Epi: x / Non Sq Epi: Few / Bacteria: Few        RADIOLOGY & ADDITIONAL STUDIES:      MICROBIOLOGY:  RECENT CULTURES:   Clean Catch Clean Catch (Midstream) XXXX XXXX   <10,000 CFU/mL Normal Urogenital Elen

## 2021-11-26 NOTE — PROGRESS NOTE ADULT - ASSESSMENT
NAVDEEP ONTIVEROS 86 f 11/19/2021 1935 DR KAT SYKES     REVIEW OF SYMPTOMS      Able to give (reliable) ROS  NO     PHYSICAL EXAM    HEENT Unremarkable  atraumatic   RESP Fair air entry EXP prolonged    Harsh breath sound Resp distres mild   CARDIAC S1 S2 No S3     NO JVD    ABDOMEN SOFT BS PRESENT NOT DISTENDED No hepatosplenomegaly   PEDAL EDEMA present No calf tenderness  NO rash                                    PROBLEMS/ISSUES.  GOC.  COVID STATUS. 11/19 scv2 (-)     MECHANICAL FALL 11/19/2021  R KNEE PERIPROS FRACTURE poa 11/19/2021 in setting of R TKA 2009.  PULMONARY OPTIMIZATION FOR ORTHO SURGERY 11/19/2021 ADMISSION  COPD pmh  MIRELA pmh  HFPEF pmh  DM pmh   OBESITY  pmh 11/19/2021 BMI 44     ANEMIA 11/19-11/20 Hb 9.3-8.3  OBESITY HYPOVENTILATION  11/20/2021 4l 744/70/71   DYSPHAGIA   speech 11/22/2021 minced moist       WORSENING RESP FAILURE 11/23/2021   BPAP ADJUSTED 11/23/2021     pmh HFPEF  pmh LYMPHEDEMA on Bumex   pmh MIRELA on CPAP   pmh DM   pmh OBESITY   pmh COPD   pmh L TKA Dr Castro 2012  pmh R TKA Dr Castro 2009   pmh MINIMAL AMBULATOR     INTERVAL EVENTS.  11/26/2021 w 8.7 Hb 7.8 Cr 1     ABG.   11/24 avaps 450/epap 8 ipap 30 10 fio2 40 744/71/73     PATIENT DATA   VITALS/PO/IO/VENT/DRIPS.  11/26/2021 99f 85 140/60   11/26/2021 12a avaps fio2 40 11/26/2021 9a 4l po 92%       BEST PRACTICE ISSUES.                                                  HEAD OF BED ELEVATION. Yes  DVT PROPHYLAXIS.  11/19/2021 lvnx 40.2                                        GOLDSTEIN PROPHYLAXIS.                                                                                         DIET.    11/20/2021 regr                      INFECTION PROPHYLAXIS.      GENERAL ISSUES  [facility] ADVANCED DIRECTIVE.                                         ALLERGY.       nka                     WEIGHT.          11/20/2021 131                              BMI.                    11/20/2021 44     ASSESSMENT/RECOMMENDATIONS.    RESP FAILURE  Patient should not be given excess oxygen   That can worsen her resp acidosis   She should be kept at oxygen enough to keep po betw 90 and 95% no higher   Will change bpap settings to improve vent   Dr Krause followed 11/23 till Thursday 11/25 pm   11/26/2021 On avaps at night   MECHANICAL FALL 11/19/2021  R KNEE PERIPROS FRACTURE poa 11/19/2021 in setting of R TKA 2009.  r leg in bulky marshall knee immobilizer being followed by prtho   Pt to be tr to [facility] for surgery by pts own orth   PULMONARY OPTIMIZATION FOR ORTHO SURGERY 11/19/2021 ADMISSION  Hb 11/20 Hb 8.4  Cr 11/20 Cr .9  ECHO 11/20/2021 ef 60% [id number] as   a/r 11/20/2021 Patient is above average risk of pulm complications due to obesity MIRELA COPD/asthma   She is at high risk of resp failure as well as dvt pe  She is on dvt pplx  11/20/2021 4l 744/70/71   ABG cw onesity hypoventilation syndrome and she will need elective coburn for mirela [facility] copd to plan optimal ventilatory support for the long term   She is in optimal pulm status for urgen surgery if benefits appear greater than risks  POSSIBLE ASP PNEUM   w 11/26/2021 w 8.7  11/24 Rocephin   11/24 flagyl   a/r Doubt infection May chane to pos ceftin flagyl to complete 5d   COPD pmh  11/20/2021 albuterol HFA P   11/20/2021 SYMBICORT 160   11/20/2021 MONTELUKAST 10   Cont rx  MIRELA pmh  11/19 BPAP ordered by admitting md 15/5/12/3l   continue for now   May eventually just need cpap   Should see pulm in office but sated that she has no means of making office visits   CAD HYPERTENSION pmh  11/20/2021 METOPROLOL 100  11/20/2021 ATORVASTAT 40  11/20/2021 AMLODIPINE 10   cardio on case  HFPEF pmh  ECHO 11/20/2021 ef 60% [id number] as   11/20/2021 HYDRALAZINE 100.3   11/20/2021 LASIX 40 V.2 DCed 11/22/2021 11/22/2021 bumetanide 1   cardio on case  DM pmh   11/20/2021 insulin   PAIN.   11/20/2021 tylenol  OBESITY  pmh 11/19/2021 BMI 44   ANEMIA 11/19-11/20 Hb 9.3-8.3  Monitor    OVERALL PLAN  86 obese female with COPD asthma MIRELA on home cpap HFPEF admitted 11/19 with mechanical fall and r periprosth knee fx  Pt is in optimal pulm status but is at high risk for periop pulm complications including difficulty weaning off vent  pt fam ortho have dcided on conservative rx  Pt has obesity hypoventilation and will benefit from home niv like avaps for nocturnal use      TIME SPENT   Over 25 minutes aggregate care time spent on encounter; activities included   direct patient care, counseling and/or coordinating care reviewing notes, lab data/ imaging , discussion with multidisciplinary team/ patient  /family and explaining in detail risks, benefits, alternatives  of the recommendations     NAVDEEP ONTIVEROS 86 f 11/19/2021 1935 DR KAT SYKES

## 2021-11-26 NOTE — PROGRESS NOTE ADULT - SUBJECTIVE AND OBJECTIVE BOX
Patient is a 86y old  Female who presents with a chief complaint of right ankle fracture (2021 14:25)    INTERVAL HPI/OVERNIGHT EVENTS: Pt on oxygen when I came in this morning, states she's still tired and has no energy, difficulties breathing. Otherwise denies coughing, chest pain, abdominal pain.     MEDICATIONS  (STANDING):  amLODIPine   Tablet 10 milliGRAM(s) Oral daily  atorvastatin 40 milliGRAM(s) Oral at bedtime  budesonide 160 MICROgram(s)/formoterol 4.5 MICROgram(s) Inhaler 2 Puff(s) Inhalation two times a day  buMETAnide 1 milliGRAM(s) Oral daily  cefTRIAXone   IVPB 1000 milliGRAM(s) IV Intermittent every 24 hours  cholecalciferol 2000 Unit(s) Oral daily  dextrose 40% Gel 15 Gram(s) Oral once  dextrose 5%. 1000 milliLiter(s) (50 mL/Hr) IV Continuous <Continuous>  dextrose 5%. 1000 milliLiter(s) (100 mL/Hr) IV Continuous <Continuous>  dextrose 50% Injectable 25 Gram(s) IV Push once  dextrose 50% Injectable 12.5 Gram(s) IV Push once  dextrose 50% Injectable 25 Gram(s) IV Push once  enoxaparin Injectable 40 milliGRAM(s) SubCutaneous every 12 hours  glucagon  Injectable 1 milliGRAM(s) IntraMuscular once  hydrALAZINE 100 milliGRAM(s) Oral three times a day  insulin lispro (ADMELOG) corrective regimen sliding scale   SubCutaneous three times a day before meals  insulin lispro (ADMELOG) corrective regimen sliding scale   SubCutaneous at bedtime  metoprolol succinate  milliGRAM(s) Oral daily  metroNIDAZOLE  IVPB      metroNIDAZOLE  IVPB 500 milliGRAM(s) IV Intermittent every 8 hours  montelukast 10 milliGRAM(s) Oral at bedtime  nystatin Powder 1 Application(s) Topical two times a day  polyethylene glycol 3350 17 Gram(s) Oral daily  senna 2 Tablet(s) Oral at bedtime    MEDICATIONS  (PRN):  acetaminophen     Tablet .. 650 milliGRAM(s) Oral every 6 hours PRN Mild Pain (1 - 3), Moderate Pain (4 - 6)  acetaminophen     Tablet .. 650 milliGRAM(s) Oral every 6 hours PRN Temp greater or equal to 38C (100.4F)  ALBUTerol    90 MICROgram(s) HFA Inhaler 2 Puff(s) Inhalation every 6 hours PRN Shortness of Breath and/or Wheezing  melatonin 3 milliGRAM(s) Oral at bedtime PRN Insomnia  oxyCODONE    IR 10 milliGRAM(s) Oral every 4 hours PRN Severe Pain (7 - 10)  oxyCODONE    IR 5 milliGRAM(s) Oral every 4 hours PRN Moderate Pain (4 - 6)  sodium chloride 0.65% Nasal 1 Spray(s) Both Nostrils three times a day PRN Nasal Congestion              Allergies    No Known Allergies    Intolerances        REVIEW OF SYSTEMS:  CONSTITUTIONAL: No fever, weight loss, or fatigue  EYES: No eye pain, visual disturbances, or discharge  ENMT:  No difficulty hearing, tinnitus, vertigo; No sinus or throat pain  NECK: No pain or stiffness  BREASTS: No pain, masses, or nipple discharge  RESPIRATORY: No cough, wheezing, chills or hemoptysis; No shortness of breath  CARDIOVASCULAR: No chest pain, palpitations, dizziness, or leg swelling  GASTROINTESTINAL: No abdominal or epigastric pain. No nausea, vomiting, or hematemesis; No diarrhea or constipation. No melena or hematochezia.  GENITOURINARY: No dysuria, frequency, hematuria, or incontinence  NEUROLOGICAL: No headaches, memory loss, loss of strength, numbness, or tremors  SKIN: No itching, burning, rashes, or lesions   LYMPH NODES: No enlarged glands  ENDOCRINE: No heat or cold intolerance; No hair loss  MUSCULOSKELETAL: No joint pain or swelling; No muscle, back, or extremity pain  PSYCHIATRIC: No depression, anxiety, mood swings, or difficulty sleeping  HEME/LYMPH: No easy bruising, or bleeding gums  ALLERGY AND IMMUNOLOGIC: No hives or eczema    ICU Vital Signs Last 24 Hrs  T(C): 37.4 (2021 05:11), Max: 37.4 (2021 05:11)  T(F): 99.3 (2021 05:11), Max: 99.3 (2021 05:11)  HR: 93 (2021 09:23) (83 - 93)  BP: 140/65 (2021 05:11) (111/64 - 145/65)  BP(mean): --  ABP: --  ABP(mean): --  RR: 18 (2021 05:11) (17 - 18)  SpO2: 96% (2021 09:23) (91% - 97%)      PHYSICAL EXAM:  GENERAL: NAD, obese,   HEAD:  Atraumatic, Normocephalic  EYES: EOMI, PERRLA, conjunctiva and sclera clear  ENMT: No tonsillar erythema, exudates, or enlargement; Moist mucous membranes, Good dentition, No lesions  NECK: Supple, No JVD, Normal thyroid  NERVOUS SYSTEM:  Alert & Oriented X3,  CHEST/LUNG: Clear to percussion bilaterally; diminished lungs throughout. On BIPAP   HEART: Regular rate and rhythm; No murmurs, rubs, or gallops  ABDOMEN: Obese, Soft, Nontender, Nondistended; Bowel sounds present  EXTREMITIES: lymphedema b/l legs. right leg in brace. 2+ edema b/l. pulses difficult to palpate due to body habitus.  LYMPH: No lymphadenopathy noted  SKIN: No rashes or lesions    LABS:                                                           7.8    8.79  )-----------( 262      ( 2021 06:19 )             28.1     -    140  |  96  |  32<H>  ----------------------------<  137<H>  4.0   |  40<H>  |  1.03    Ca    8.9      2021 06:19  Mg     3.2     11-    TPro  7.6  /  Alb  2.2<L>  /  TBili  0.4  /  DBili  x   /  AST  15  /  ALT  12  /  AlkPhos  46  11-      Urinalysis Basic - ( 2021 19:31 )    Color: Yellow / Appearance: Clear / S.005 / pH: x  Gluc: x / Ketone: Negative  / Bili: Negative / Urobili: Negative mg/dL   Blood: x / Protein: 30 mg/dL / Nitrite: Negative   Leuk Esterase: Trace / RBC: x / WBC 0-2   Sq Epi: x / Non Sq Epi: Few / Bacteria: Few           Patient is a 86y old  Female who presents with a chief complaint of right ankle fracture (2021 14:25)    INTERVAL HPI/OVERNIGHT EVENTS: Continues to be on oxygen, looks better comparing to previous days.     MEDICATIONS  (STANDING):  amLODIPine   Tablet 10 milliGRAM(s) Oral daily  atorvastatin 40 milliGRAM(s) Oral at bedtime  budesonide 160 MICROgram(s)/formoterol 4.5 MICROgram(s) Inhaler 2 Puff(s) Inhalation two times a day  buMETAnide 1 milliGRAM(s) Oral daily  cefTRIAXone   IVPB 1000 milliGRAM(s) IV Intermittent every 24 hours  cholecalciferol 2000 Unit(s) Oral daily  dextrose 40% Gel 15 Gram(s) Oral once  dextrose 5%. 1000 milliLiter(s) (50 mL/Hr) IV Continuous <Continuous>  dextrose 5%. 1000 milliLiter(s) (100 mL/Hr) IV Continuous <Continuous>  dextrose 50% Injectable 25 Gram(s) IV Push once  dextrose 50% Injectable 12.5 Gram(s) IV Push once  dextrose 50% Injectable 25 Gram(s) IV Push once  enoxaparin Injectable 40 milliGRAM(s) SubCutaneous every 12 hours  glucagon  Injectable 1 milliGRAM(s) IntraMuscular once  hydrALAZINE 100 milliGRAM(s) Oral three times a day  insulin lispro (ADMELOG) corrective regimen sliding scale   SubCutaneous three times a day before meals  insulin lispro (ADMELOG) corrective regimen sliding scale   SubCutaneous at bedtime  metoprolol succinate  milliGRAM(s) Oral daily  metroNIDAZOLE  IVPB      metroNIDAZOLE  IVPB 500 milliGRAM(s) IV Intermittent every 8 hours  montelukast 10 milliGRAM(s) Oral at bedtime  nystatin Powder 1 Application(s) Topical two times a day  polyethylene glycol 3350 17 Gram(s) Oral daily  senna 2 Tablet(s) Oral at bedtime    MEDICATIONS  (PRN):  acetaminophen     Tablet .. 650 milliGRAM(s) Oral every 6 hours PRN Mild Pain (1 - 3), Moderate Pain (4 - 6)  acetaminophen     Tablet .. 650 milliGRAM(s) Oral every 6 hours PRN Temp greater or equal to 38C (100.4F)  ALBUTerol    90 MICROgram(s) HFA Inhaler 2 Puff(s) Inhalation every 6 hours PRN Shortness of Breath and/or Wheezing  melatonin 3 milliGRAM(s) Oral at bedtime PRN Insomnia  oxyCODONE    IR 10 milliGRAM(s) Oral every 4 hours PRN Severe Pain (7 - 10)  oxyCODONE    IR 5 milliGRAM(s) Oral every 4 hours PRN Moderate Pain (4 - 6)  sodium chloride 0.65% Nasal 1 Spray(s) Both Nostrils three times a day PRN Nasal Congestion              Allergies    No Known Allergies    Intolerances        REVIEW OF SYSTEMS:  CONSTITUTIONAL: No fever, weight loss, or fatigue  EYES: No eye pain, visual disturbances, or discharge  ENMT:  No difficulty hearing, tinnitus, vertigo; No sinus or throat pain  NECK: No pain or stiffness  BREASTS: No pain, masses, or nipple discharge  RESPIRATORY: No cough, wheezing, chills or hemoptysis; No shortness of breath  CARDIOVASCULAR: No chest pain, palpitations, dizziness, or leg swelling  GASTROINTESTINAL: No abdominal or epigastric pain. No nausea, vomiting, or hematemesis; No diarrhea or constipation. No melena or hematochezia.  GENITOURINARY: No dysuria, frequency, hematuria, or incontinence  NEUROLOGICAL: No headaches, memory loss, loss of strength, numbness, or tremors  SKIN: No itching, burning, rashes, or lesions   LYMPH NODES: No enlarged glands  ENDOCRINE: No heat or cold intolerance; No hair loss  MUSCULOSKELETAL: No joint pain or swelling; No muscle, back, or extremity pain  PSYCHIATRIC: No depression, anxiety, mood swings, or difficulty sleeping  HEME/LYMPH: No easy bruising, or bleeding gums  ALLERGY AND IMMUNOLOGIC: No hives or eczema    ICU Vital Signs Last 24 Hrs  T(C): 37.4 (2021 05:11), Max: 37.4 (2021 05:11)  T(F): 99.3 (2021 05:11), Max: 99.3 (2021 05:11)  HR: 93 (2021 09:23) (83 - 93)  BP: 140/65 (2021 05:11) (111/64 - 145/65)  BP(mean): --  ABP: --  ABP(mean): --  RR: 18 (2021 05:11) (17 - 18)  SpO2: 96% (2021 09:23) (91% - 97%)      PHYSICAL EXAM:  GENERAL: NAD, obese,   HEAD:  Atraumatic, Normocephalic  EYES: EOMI, PERRLA, conjunctiva and sclera clear  ENMT: No tonsillar erythema, exudates, or enlargement; Moist mucous membranes, Good dentition, No lesions  NECK: Supple, No JVD, Normal thyroid  NERVOUS SYSTEM:  Alert & Oriented X3,  CHEST/LUNG: Clear to percussion bilaterally; diminished lungs throughout. On BIPAP   HEART: Regular rate and rhythm; No murmurs, rubs, or gallops  ABDOMEN: Obese, Soft, Nontender, Nondistended; Bowel sounds present  EXTREMITIES: lymphedema b/l legs. right leg in brace. 2+ edema b/l. pulses difficult to palpate due to body habitus.  LYMPH: No lymphadenopathy noted  SKIN: No rashes or lesions    LABS:                                                           7.8    8.79  )-----------( 262      ( 2021 06:19 )             28.1     11-    140  |  96  |  32<H>  ----------------------------<  137<H>  4.0   |  40<H>  |  1.03    Ca    8.9      2021 06:19  Mg     3.2     11-    TPro  7.6  /  Alb  2.2<L>  /  TBili  0.4  /  DBili  x   /  AST  15  /  ALT  12  /  AlkPhos  46  -      Urinalysis Basic - ( 2021 19:31 )    Color: Yellow / Appearance: Clear / S.005 / pH: x  Gluc: x / Ketone: Negative  / Bili: Negative / Urobili: Negative mg/dL   Blood: x / Protein: 30 mg/dL / Nitrite: Negative   Leuk Esterase: Trace / RBC: x / WBC 0-2   Sq Epi: x / Non Sq Epi: Few / Bacteria: Few

## 2021-11-27 ENCOUNTER — TRANSCRIPTION ENCOUNTER (OUTPATIENT)
Age: 86
End: 2021-11-27

## 2021-11-27 VITALS
TEMPERATURE: 98 F | SYSTOLIC BLOOD PRESSURE: 120 MMHG | OXYGEN SATURATION: 95 % | RESPIRATION RATE: 18 BRPM | DIASTOLIC BLOOD PRESSURE: 77 MMHG | HEART RATE: 76 BPM

## 2021-11-27 LAB
BASE EXCESS BLDA CALC-SCNC: 16.9 MMOL/L — HIGH (ref -2–3)
BLOOD GAS COMMENTS: SIGNIFICANT CHANGE UP
BLOOD GAS COMMENTS: SIGNIFICANT CHANGE UP
CO2 BLDA-SCNC: 48 MMOL/L — HIGH (ref 19–24)
GLUCOSE BLDC GLUCOMTR-MCNC: 147 MG/DL — HIGH (ref 70–99)
GLUCOSE BLDC GLUCOMTR-MCNC: 184 MG/DL — HIGH (ref 70–99)
HCO3 BLDA-SCNC: 46 MMOL/L — CRITICAL HIGH (ref 21–28)
HOROWITZ INDEX BLDA+IHG-RTO: 40 — SIGNIFICANT CHANGE UP
PCO2 BLDA: 76 MMHG — CRITICAL HIGH (ref 32–46)
PH BLD: 7.39 — SIGNIFICANT CHANGE UP (ref 7.35–7.45)
PO2 BLDA: 81 MMHG — LOW (ref 83–108)
SAO2 % BLDA: 99.2 % — HIGH (ref 94–98)

## 2021-11-27 PROCEDURE — 99239 HOSP IP/OBS DSCHRG MGMT >30: CPT

## 2021-11-27 PROCEDURE — 99232 SBSQ HOSP IP/OBS MODERATE 35: CPT

## 2021-11-27 RX ORDER — OXYCODONE HYDROCHLORIDE 5 MG/1
1 TABLET ORAL
Qty: 28 | Refills: 0
Start: 2021-11-27 | End: 2021-12-03

## 2021-11-27 RX ORDER — CEFPODOXIME PROXETIL 100 MG
1 TABLET ORAL
Qty: 8 | Refills: 0
Start: 2021-11-27 | End: 2021-11-30

## 2021-11-27 RX ORDER — METOPROLOL TARTRATE 50 MG
1 TABLET ORAL
Qty: 30 | Refills: 0
Start: 2021-11-27 | End: 2021-12-26

## 2021-11-27 RX ORDER — METRONIDAZOLE 500 MG
1 TABLET ORAL
Qty: 12 | Refills: 0
Start: 2021-11-27 | End: 2021-11-30

## 2021-11-27 RX ORDER — AMLODIPINE BESYLATE 2.5 MG/1
1 TABLET ORAL
Qty: 30 | Refills: 0
Start: 2021-11-27 | End: 2021-12-26

## 2021-11-27 RX ORDER — SENNA PLUS 8.6 MG/1
2 TABLET ORAL
Qty: 60 | Refills: 0
Start: 2021-11-27 | End: 2021-12-26

## 2021-11-27 RX ORDER — FERROUS SULFATE 325(65) MG
1 TABLET ORAL
Qty: 30 | Refills: 0
Start: 2021-11-27 | End: 2021-12-26

## 2021-11-27 RX ORDER — AMLODIPINE BESYLATE 2.5 MG/1
1 TABLET ORAL
Qty: 0 | Refills: 0 | DISCHARGE
Start: 2021-11-27

## 2021-11-27 RX ORDER — POLYETHYLENE GLYCOL 3350 17 G/17G
17 POWDER, FOR SOLUTION ORAL
Qty: 510 | Refills: 0
Start: 2021-11-27 | End: 2021-12-26

## 2021-11-27 RX ORDER — ACETAMINOPHEN 500 MG
2 TABLET ORAL
Qty: 240 | Refills: 0
Start: 2021-11-27 | End: 2021-12-26

## 2021-11-27 RX ORDER — ROSUVASTATIN CALCIUM 5 MG/1
1 TABLET ORAL
Qty: 30 | Refills: 0
Start: 2021-11-27 | End: 2021-12-26

## 2021-11-27 RX ORDER — HYDRALAZINE HCL 50 MG
1 TABLET ORAL
Qty: 90 | Refills: 0
Start: 2021-11-27 | End: 2021-12-26

## 2021-11-27 RX ORDER — METOPROLOL TARTRATE 50 MG
1 TABLET ORAL
Qty: 0 | Refills: 0 | DISCHARGE
Start: 2021-11-27

## 2021-11-27 RX ORDER — BUMETANIDE 0.25 MG/ML
1 INJECTION INTRAMUSCULAR; INTRAVENOUS
Qty: 30 | Refills: 0
Start: 2021-11-27 | End: 2021-12-26

## 2021-11-27 RX ADMIN — Medication 2: at 11:39

## 2021-11-27 RX ADMIN — Medication 2 SPRAY(S): at 11:40

## 2021-11-27 RX ADMIN — Medication 325 MILLIGRAM(S): at 11:40

## 2021-11-27 RX ADMIN — Medication 2000 UNIT(S): at 11:40

## 2021-11-27 RX ADMIN — Medication 2 SPRAY(S): at 05:30

## 2021-11-27 RX ADMIN — BUDESONIDE AND FORMOTEROL FUMARATE DIHYDRATE 2 PUFF(S): 160; 4.5 AEROSOL RESPIRATORY (INHALATION) at 05:31

## 2021-11-27 RX ADMIN — Medication 100 MILLIGRAM(S): at 05:34

## 2021-11-27 RX ADMIN — NYSTATIN CREAM 1 APPLICATION(S): 100000 CREAM TOPICAL at 05:36

## 2021-11-27 RX ADMIN — POLYETHYLENE GLYCOL 3350 17 GRAM(S): 17 POWDER, FOR SOLUTION ORAL at 11:40

## 2021-11-27 RX ADMIN — ENOXAPARIN SODIUM 40 MILLIGRAM(S): 100 INJECTION SUBCUTANEOUS at 05:31

## 2021-11-27 RX ADMIN — BUMETANIDE 1 MILLIGRAM(S): 0.25 INJECTION INTRAMUSCULAR; INTRAVENOUS at 05:29

## 2021-11-27 NOTE — PROGRESS NOTE ADULT - SUBJECTIVE AND OBJECTIVE BOX
Patient is a 86y old  Female who presents with a chief complaint of fluid overload, right ankle fracture (26 Nov 2021 09:57)    PAST MEDICAL & SURGICAL HISTORY:  HTN (hypertension)    HLD (hyperlipidemia)    Asthma    DM (diabetes mellitus)    GERD (gastroesophageal reflux disease)    Lymphedema    S/P knee replacement    INTERVAL HISTORY: Patient in bed, c/o nasal congestion. Denies chest pain. Oxygen 2L NC in use.  	  meMEDICATIONS  (STANDING):  amLODIPine   Tablet 10 milliGRAM(s) Oral daily  atorvastatin 40 milliGRAM(s) Oral at bedtime  budesonide 160 MICROgram(s)/formoterol 4.5 MICROgram(s) Inhaler 2 Puff(s) Inhalation two times a day  buMETAnide 1 milliGRAM(s) Oral daily  cefTRIAXone   IVPB 1000 milliGRAM(s) IV Intermittent every 24 hours  cholecalciferol 2000 Unit(s) Oral daily  enoxaparin Injectable 40 milliGRAM(s) SubCutaneous every 12 hours  ferrous    sulfate 325 milliGRAM(s) Oral daily  glucagon  Injectable 1 milliGRAM(s) IntraMuscular once  hydrALAZINE 100 milliGRAM(s) Oral three times a day  metoprolol succinate  milliGRAM(s) Oral daily  metroNIDAZOLE  IVPB      metroNIDAZOLE  IVPB 500 milliGRAM(s) IV Intermittent every 8 hours  montelukast 10 milliGRAM(s) Oral at bedtime  nystatin Powder 1 Application(s) Topical two times a day  polyethylene glycol 3350 17 Gram(s) Oral daily  senna 2 Tablet(s) Oral at bedtime  sodium chloride 0.65% Nasal 2 Spray(s) Both Nostrils every 6 hours    `Vital Signs Last 24 Hrs  T(C): 37.1 (27 Nov 2021 04:30), Max: 37.6 (26 Nov 2021 10:41)  T(F): 98.7 (27 Nov 2021 04:30), Max: 99.7 (26 Nov 2021 10:41)  HR: 87 (27 Nov 2021 04:30) (79 - 97)  BP: 109/67 (27 Nov 2021 05:38) (103/57 - 122/69)  BP(mean): 72 (26 Nov 2021 10:41) (72 - 72)  RR: 18 (27 Nov 2021 04:30) (17 - 19)  SpO2: 97% (27 Nov 2021 04:30) (92% - 98%)    PHYSICAL EXAM:  Neuro: Awake, responsive  CV: S1 S2 RRR  Lungs: CTABL  GI: Soft, BS +, ND, NT  Extremities: improving edema to LE, right leg in brace    TELEMETRY: Continuous O2 sat monitoring.  	 	    RADIOLOGY: < from: CT Chest w/ IV Cont (11.24.21 @ 16:34) >  FINDINGS:    Lungs, airways and pleura: No endobronchial lesion. Mosaic attenuation in both lungs, likely air trapping. Dependent atelectasis in bilateral upper and lower lobes. New right apex 0.6 cm solid nodule (4-28).    Lymph nodes, mediastinum and lower neck: Unremarkable thyroid. No enlarged lymph nodes.    Heart, pericardium and vasculature: Cardiomegaly. No pericardial effusion. Enlarged main pulmonary artery which can be seen with pulmonary hypertension. Normal caliber aorta. Subjectively mild amount of coronary calcified plaque. Calcified mitral annulus.    Bones and soft tissues: Degenerative changes spine.    Other: Limited assessment of the upper abdomen. Small right kidney cyst. Partially included possible mass in the left mid kidney (3-162).        IMPRESSION:    New right apex 0.6 cm nodule of uncertain etiology or significance. 3 month follow-up is recommended to assess for further change.    Questionable mass in the left kidney, partially included. Further evaluation with contrast-enhanced CT of the abdomen is recommended.      < end of copied text >      DIAGNOSTIC TESTING:    [x ] Echocardiogram: < from: TTE Echo Complete w/o Contrast w/ Doppler (11.19.21 @ 13:56) >  Summary:   1. Left ventricular ejection fraction, by visual estimation, is 60 to 65%.   2. Technically difficult study.   3. Normal global left ventricular systolic function.   4. Normal left ventricular internal cavity size.   5. Spectral Doppler shows impaired relaxation pattern of left ventricular myocardial filling (Grade I diastolic dysfunction).   6. There is mild concentric left ventricular hypertrophy.   7. Normal right ventricular size and function.   8. Normal left atrial size.   9. Normal right atrial size.  10. There is no evidence of pericardial effusion.  11. Mild thickening and calcification of the anterior and posterior mitral valve leaflets.  12. Moderate mitral annular calcification.  13. Degenerative tricuspidvalve.  14. Mild aortic valve stenosis.  15. Mitral valve mean gradient is 4.7 mmHg consistent with mild mitral stenosis.  16. Peak transaortic gradient equals 21.7 mmHg, mean transaortic gradient equals 10.9 mmHg, the calculated aortic valve area equals 1.69 cm² by the continuity equation consistent with mild aortic stenosis.  17. There is mild aortic root calcification.    < end of copied text >      LABS:	 	    26 Nov 2021 06:19    140    |  96     |  32     ----------------------------<  137    4.0     |  40     |  1.03                     7.8    8.79  )-----------( 262      ( 26 Nov 2021 06:19 )             28.1 ,

## 2021-11-27 NOTE — DISCHARGE NOTE PROVIDER - NSDCCPCAREPLAN_GEN_ALL_CORE_FT
PRINCIPAL DISCHARGE DIAGNOSIS  Diagnosis: Hyperkalemia  Assessment and Plan of Treatment:       SECONDARY DISCHARGE DIAGNOSES  Diagnosis: Unsteady gait  Assessment and Plan of Treatment:

## 2021-11-27 NOTE — PROGRESS NOTE ADULT - ATTENDING COMMENTS
86-year-old with hypertension, SHAD, obesity, COPD, lymphedema, admitted with right knee fracture status post fall and had also shortness of breath with volume overload.  Continue metoprolol and hydralazine along with amlodipine for blood pressure management.  Continue orthopedic care.  Monitor renal function and continue with Bumex for heart failure preserved ejection fraction elements.  Agree with above note and plan.
86-year-old with hypertension, SHAD, obesity, COPD, lymphedema, admitted with right knee fracture status post fall and had also shortness of breath with volume overload.  Continue metoprolol and hydralazine along with amlodipine for blood pressure management.  Continue orthopedic care.  Monitor renal function and continue with Bumex for heart failure preserved ejection fraction elements.  Agree with above note and plan.
86F with HFpEF, obesity, obstructive sleep apnea on CPAP, hypertension, type 2 diabetes, COPD, lymphedema, Hx R TKA (2009), a/w with R knee fracture, status post fall  Probable history of chronic diastolic heart failure.  Change to oral diuretics, mostly right heart failure by history. Symptoms may be mostly COPD, restrictive lung disease, SHAD. Monitor for wheezing, may need to titrate bbl down.  No absolute contraindication to orthopedic surgery, if needed.  As per ortho notes, they are planning transfer to [facility] for further care.

## 2021-11-27 NOTE — PROGRESS NOTE ADULT - ASSESSMENT
86F with HFpEF on Bumex at home, obesity, obstructive sleep apnea on CPAP, hypertension, type 2 diabetes, COPD, chronic lymphedema, Hx R TKA (2009), a/w with R knee fracture, status post fall.     Acute hypoxemic respiratory failure sec to COPD and HFpEF  Hypertension  Chronic lymphedema    Hospital course c/b episode of lethargy and hypoxia sec to resp acidosis, responded to BIPAP. now off bipap currently.   More euvolemic on Bumex 1mg po, monitor renal function and daily electrolytes. Her volume assessment severely limited by body habitus, probably close to baseline.  Cont supplemental O2/BiPAP as needed, COPD management as per pulm.  Continue metoprolol  mg daily, hydralazine 100 mg 3 times daily, amlodipine 10 mg daily for blood pressure control.    DVT prevention with Lovenox   Iron def anemia, Hgb 7.8-Medicine following, GI evaluation?  s/p knee fracture, ortho team on board, No acute orthopaedic surgical intervention at this time as per ortho  On d/c pt follow up with Dr. Gutierrez Bermudez her cardiologist, within 7 days of discharge.

## 2021-11-27 NOTE — DISCHARGE NOTE PROVIDER - PROVIDER TOKENS
PROVIDER:[TOKEN:[[id number]],FOLLOWUP:[2 weeks]],PROVIDER:[TOKEN:[[id number]],FOLLOWUP:[2 weeks]],PROVIDER:[TOKEN:[[id number]],FOLLOWUP:[1 week]],PROVIDER:[TOKEN:[[id number]],FOLLOWUP:[1 week]],FREE:[LAST:[PCP],PHONE:[(   )    -],FAX:[(   )    -]]

## 2021-11-27 NOTE — DISCHARGE NOTE PROVIDER - NSDCMRMEDTOKEN_GEN_ALL_CORE_FT
Actamin 325 mg oral tablet: 2 tab(s) orally every 6 hours, As needed, Mild Pain (1 - 3), Moderate Pain (4 - 6)  Advair Diskus 250 mcg-50 mcg inhalation powder: 1 puff(s) inhaled 2 times a day  alendronate 70 mg oral tablet: 1 tab(s) orally once a week  amLODIPine 10 mg oral tablet: 1 tab(s) orally once a day  ascorbic acid 500 mg oral capsule: 1 cap(s) orally once a day  azelastine 137 mcg/inh (0.1%) nasal spray: 2 spray(s) nasal 2 times a day  BIPAP : BIPAP IP 15 EP 5 backup rate 12 FIO2 34% target saturation 88%-97%   Caden brace: Caden brace to be locked in 30 degrees of flexion on right knee for periprosthetic proximal tibia fracture    ICD 10   M97. 11XA  bumetanide 1 mg oral tablet: 1 tab(s) orally once a day  cefpodoxime 200 mg oral tablet: 1 tab(s) orally 2 times a day   Disposable bed chucks: 1 application subcutaneously 4 times a day   ferrous sulfate 325 mg (65 mg elemental iron) oral tablet: 1 tab(s) orally once a day  glimepiride 2 mg oral tablet: 1 tab(s) orally once a day  hydrALAZINE 100 mg oral tablet: 1 tab(s) orally 3 times a day  ipratropium-albuterol 0.5 mg-2.5 mg/3 mLinhalation solution: 3 milliliter(s) inhaled every 6 hours, As Needed  metoprolol succinate 100 mg oral tablet, extended release: 1 tab(s) orally once a day  metroNIDAZOLE 500 mg oral tablet: 1 tab(s) orally every 8 hours   montelukast 10 mg oral tablet: 1 tab(s) orally once a day  Neurontin 100 mg oral capsule: 1 cap(s) orally 3 times a day  ocular lubricant ophthalmic solution: 1 drop(s) to each affected eye 3 times a day, As needed, Dry Eyes  oxyCODONE 5 mg oral tablet: 1 tab(s) orally every 6 hours, As Needed -severe pain (8-10) as needed MDD:4  polyethylene glycol 3350 oral powder for reconstitution: 17 gram(s) orally once a day  ProAir HFA 90 mcg/inh inhalation aerosol: 2 puff(s) inhaled 4 times a day, As Needed  rosuvastatin 10 mg oral tablet: 1 tab(s) orally once a day  senna oral tablet: 2 tab(s) orally once a day (at bedtime) as needed for constipation   Actamin 325 mg oral tablet: 2 tab(s) orally every 6 hours, As needed, Mild Pain (1 - 3), Moderate Pain (4 - 6)  Advair Diskus 250 mcg-50 mcg inhalation powder: 1 puff(s) inhaled 2 times a day  alendronate 70 mg oral tablet: 1 tab(s) orally once a week  amLODIPine 10 mg oral tablet: 1 tab(s) orally once a day  ascorbic acid 500 mg oral capsule: 1 cap(s) orally once a day  azelastine 137 mcg/inh (0.1%) nasal spray: 2 spray(s) nasal 2 times a day  BIPAP : BIPAP IP 15 EP 5 backup rate 12 FIO2 34% target saturation 88%-97%   Caden brace: Caden brace to be locked in 30 degrees of flexion on right knee for periprosthetic proximal tibia fracture    ICD 10   M97. 11XA  bumetanide 1 mg oral tablet: 1 tab(s) orally once a day  cefpodoxime 200 mg oral tablet: 1 tab(s) orally 2 times a day   Disposable bed chucks: Disposable Bed Chucks   1 application subcutaneously 4 times a day   ferrous sulfate 325 mg (65 mg elemental iron) oral tablet: 1 tab(s) orally once a day  glimepiride 2 mg oral tablet: 1 tab(s) orally once a day  hydrALAZINE 100 mg oral tablet: 1 tab(s) orally 3 times a day  ipratropium-albuterol 0.5 mg-2.5 mg/3 mLinhalation solution: 3 milliliter(s) inhaled every 6 hours, As Needed  metoprolol succinate 100 mg oral tablet, extended release: 1 tab(s) orally once a day  metroNIDAZOLE 500 mg oral tablet: 1 tab(s) orally every 8 hours   montelukast 10 mg oral tablet: 1 tab(s) orally once a day  Neurontin 100 mg oral capsule: 1 cap(s) orally 3 times a day  ocular lubricant ophthalmic solution: 1 drop(s) to each affected eye 3 times a day, As needed, Dry Eyes  oxyCODONE 5 mg oral tablet: 1 tab(s) orally every 6 hours, As Needed -severe pain (8-10) as needed MDD:4  polyethylene glycol 3350 oral powder for reconstitution: 17 gram(s) orally once a day  ProAir HFA 90 mcg/inh inhalation aerosol: 2 puff(s) inhaled 4 times a day, As Needed  rosuvastatin 10 mg oral tablet: 1 tab(s) orally once a day  senna oral tablet: 2 tab(s) orally once a day (at bedtime) as needed for constipation

## 2021-11-27 NOTE — CONSULT NOTE ADULT - SUBJECTIVE AND OBJECTIVE BOX
HPI:  Patient is a 86y old  Female who was admitted on  for management of fluid overload, right ankle fracture. Has had partial improvement on IV lasix. Still on/off BiPAP.   Renal fx has remained stable. However, noted progressive rise in serum CO2. In addition, abd CTA on  revealed L renal mass.   Hence Nephrological consult requested.           PAST MEDICAL & SURGICAL HISTORY:  HTN (hypertension)    HLD (hyperlipidemia)    Asthma    DM (diabetes mellitus)    GERD (gastroesophageal reflux disease)    Lymphedema    S/P knee replacement          FAMILY HISTORY:  No pertinent family history in first degree relatives        Allergies    No Known Allergies            MEDICATIONS  (STANDING):  amLODIPine   Tablet 10 milliGRAM(s) Oral daily  atorvastatin 40 milliGRAM(s) Oral at bedtime  budesonide 160 MICROgram(s)/formoterol 4.5 MICROgram(s) Inhaler 2 Puff(s) Inhalation two times a day  buMETAnide 1 milliGRAM(s) Oral daily  cefTRIAXone   IVPB 1000 milliGRAM(s) IV Intermittent every 24 hours  cholecalciferol 2000 Unit(s) Oral daily  dextrose 40% Gel 15 Gram(s) Oral once  dextrose 5%. 1000 milliLiter(s) (50 mL/Hr) IV Continuous <Continuous>  dextrose 5%. 1000 milliLiter(s) (100 mL/Hr) IV Continuous <Continuous>  dextrose 50% Injectable 25 Gram(s) IV Push once  dextrose 50% Injectable 12.5 Gram(s) IV Push once  dextrose 50% Injectable 25 Gram(s) IV Push once  enoxaparin Injectable 40 milliGRAM(s) SubCutaneous every 12 hours  ferrous    sulfate 325 milliGRAM(s) Oral daily  glucagon  Injectable 1 milliGRAM(s) IntraMuscular once  hydrALAZINE 100 milliGRAM(s) Oral three times a day  insulin lispro (ADMELOG) corrective regimen sliding scale   SubCutaneous three times a day before meals  insulin lispro (ADMELOG) corrective regimen sliding scale   SubCutaneous at bedtime  metoprolol succinate  milliGRAM(s) Oral daily  metroNIDAZOLE  IVPB      metroNIDAZOLE  IVPB 500 milliGRAM(s) IV Intermittent every 8 hours  montelukast 10 milliGRAM(s) Oral at bedtime  nystatin Powder 1 Application(s) Topical two times a day  polyethylene glycol 3350 17 Gram(s) Oral daily  senna 2 Tablet(s) Oral at bedtime  sodium chloride 0.65% Nasal 2 Spray(s) Both Nostrils every 6 hours    MEDICATIONS  (PRN):  acetaminophen     Tablet .. 650 milliGRAM(s) Oral every 6 hours PRN Mild Pain (1 - 3), Moderate Pain (4 - 6)  acetaminophen     Tablet .. 650 milliGRAM(s) Oral every 6 hours PRN Temp greater or equal to 38C (100.4F)  ALBUTerol    90 MICROgram(s) HFA Inhaler 2 Puff(s) Inhalation every 6 hours PRN Shortness of Breath and/or Wheezing  melatonin 3 milliGRAM(s) Oral at bedtime PRN Insomnia  oxyCODONE    IR 5 milliGRAM(s) Oral every 4 hours PRN Moderate Pain (4 - 6)  oxyCODONE    IR 10 milliGRAM(s) Oral every 4 hours PRN Severe Pain (7 - 10)      Daily     Daily     Drug Dosing Weight  Height (cm): 165.1 (2021 12:22)  Weight (kg): 131.1 (2021 12:30)  BMI (kg/m2): 48.1 (2021 12:22)  BSA (m2): 2.31 (2021 12:22)      REVIEW OF SYSTEMS:    SOB, CHF  Renal mass  Met alkalosis            I&O's Detail    2021 07:  -  2021 07:00  --------------------------------------------------------  IN:    Oral Fluid: 100 mL  Total IN: 100 mL    OUT:    Voided (mL): 700 mL  Total OUT: 700 mL    Total NET: -600 mL           @ 07:  -   @ 07:00  --------------------------------------------------------  IN: 100 mL / OUT: 700 mL / NET: -600 mL        PHYSICAL EXAM:    GENERAL: mildly dyspneic at rest, morbidly obese  ENMT: moist mucous membranes.   NECK: Supple. No increase in JVP  CHEST/LUNG: Clear to auscultation bilaterally  HEART: Regular rate and rhythm. No murmurs, rubs, or gallops  ABDOMEN: Soft, Nontender, Nondistended. POS BS  EXTREMITIES:  pos edema    LABS:  CBC Full  -  ( 2021 06:19 )  WBC Count : 8.79 K/uL  RBC Count : 3.07 M/uL  Hemoglobin : 7.8 g/dL  Hematocrit : 28.1 %  Platelet Count - Automated : 262 K/uL  Mean Cell Volume : 91.5 fl  Mean Cell Hemoglobin : 25.4 pg  Mean Cell Hemoglobin Concentration : 27.8 gm/dL  Auto Neutrophil # : x  Auto Lymphocyte # : x  Auto Monocyte # : x  Auto Eosinophil # : x  Auto Basophil # : x  Auto Neutrophil % : x  Auto Lymphocyte % : x  Auto Monocyte % : x  Auto Eosinophil % : x  Auto Basophil % : x    11-    140  |  96  |  32<H>  ----------------------------<  137<H>  4.0   |  40<H>  |  1.03    Ca    8.9      2021 06:19    TPro  7.6  /  Alb  2.2<L>  /  TBili  0.4  /  DBili  x   /  AST  15  /  ALT  12  /  AlkPhos  46  11-26    CAPILLARY BLOOD GLUCOSE      POCT Blood Glucose.: 147 mg/dL (2021 07:51)      Urinalysis Basic - ( 2021 19:31 )    Color: Yellow / Appearance: Clear / S.005 / pH: x  Gluc: x / Ketone: Negative  / Bili: Negative / Urobili: Negative mg/dL   Blood: x / Protein: 30 mg/dL / Nitrite: Negative   Leuk Esterase: Trace / RBC: x / WBC 0-2   Sq Epi: x / Non Sq Epi: Few / Bacteria: Few          Impression:  * Met alkalosis -- contraction, post-hypercapnic  * Incidental finding of a renal mass on CTA    Recommendations:   * Check ABG. If PH is > 7.5, trial of acetazolemide  * Urology eval evaluation of the renal mass.

## 2021-11-27 NOTE — DISCHARGE NOTE PROVIDER - CARE PROVIDER_API CALL
Naa Ray)  Urology  [address] [place] [address]  Phone: [phone]  Fax: [phone]  Follow Up Time: 2 weeks    Ariel Castro  ORTHOPAEDIC SURGERY  [address] [place] [address]  Phone: [phone]  Fax: [phone]  Follow Up Time: 2 weeks    Jose Miramontes)  Cardiology; Cardiovascular Disease; Nuclear Cardiology  [address] [place] [address]  Phone: [phone]  Fax: [phone]  Follow Up Time: 1 week    Anthony Ceballos)  Critical Care Medicine; Internal Medicine; Pulmonary Disease  [address] [place] [address]  Phone: [phone]  Fax: [phone]  Follow Up Time: 1 week    PCP,   Phone: (   )    -  Fax: (   )    -  Follow Up Time:

## 2021-11-27 NOTE — PROGRESS NOTE ADULT - REASON FOR ADMISSION
fluid overload, right ankle fracture
right ankle fracture
fluid overload, right ankle fracture

## 2021-11-27 NOTE — DISCHARGE NOTE PROVIDER - CARE PROVIDERS DIRECT ADDRESSES
[email or web address],[email or web address]

## 2021-11-27 NOTE — CONSULT NOTE ADULT - REASON FOR ADMISSION
fluid overload, right ankle fracture

## 2021-11-27 NOTE — DISCHARGE NOTE PROVIDER - DETAILS OF MALNUTRITION DIAGNOSIS/DIAGNOSES
This patient has been assessed with a concern for Malnutrition and was treated during this hospitalization for the following Nutrition diagnosis/diagnoses:     -  11/22/2021: Morbid obesity (BMI > 40)

## 2021-11-27 NOTE — DISCHARGE NOTE PROVIDER - HOSPITAL COURSE
86F with HFpEF, obesity, obstructive sleep apnea on CPAP, hypertension, type 2 diabetes, COPD, lymphedema, status post fall and knee fracture currently on telemetry stable.     Acute hypoxic respiratory failure 2/2 COPD/Asthma  obstructive sleep apnea on CPAP at home  Obesity    Continue Bipap at night and intermittently as needed  ABG reviewed with pulmonologist Settings changed on bipap- seems improved  CXR showing left base inflitrate with ? mets in lungs  -CT of chest - New right apex 0.6 cm nodule of uncertain etiology or significance. 3 month follow-up is recommended to assess for further change. Questionable mass in the left kidney, partially included. Further evaluation with contrast-enhanced CT of the abdomen is recommended. Family aware. Pt needs close follow up with Pulmonologist and Urologist     -Continue symbicort and albuter as needed as well  -Continue Nacturnal and PRN BIPAP at home  -Continue cef/flagl for anaerobic coverage for aspiration pneumonia--spoke with dr. Ceballos- less likely PNA- however will complete course with PO meds at home for total of 7days.       Diastolic CHF  Lymphedema  -Echo  with preserved EF, Grade I DD, mild AS  -continued Bumex 1mg at home    Hypertension   --Continue metoprolol  mg daily, hydralazine 100 mg 3 times daily, amlodipine 10 mg daily for blood pressure control.      R knee fracture  -as per ortho no surgery for now   -reviewed and appreciated Ortho note-   -continue knee immobilizer  -continue pain meds prn  -continue to follow with own Orthopedic surgeon pt already follows with outpatient     DM2  -continue home meds    Anemia  -Iron def anemia-continue iron   -fobt pending    Incidental Left kidney mass  -Urology outpatient management

## 2021-11-27 NOTE — PROGRESS NOTE ADULT - PROVIDER SPECIALTY LIST ADULT
Hospitalist
Hospitalist
Orthopedics
Cardiology
Cardiology
Hospitalist
Hospitalist
Orthopedics
Orthopedics
Pulmonology
Cardiology
Hospitalist
Hospitalist
Orthopedics
Pulmonology
Cardiology
Hospitalist
Cardiology
Cardiology

## 2021-11-27 NOTE — PROGRESS NOTE ADULT - SUBJECTIVE AND OBJECTIVE BOX
WESTON SETHI    S 2D 268 D    Allergies    No Known Allergies    Intolerances        PAST MEDICAL & SURGICAL HISTORY:  HTN (hypertension)    HLD (hyperlipidemia)    Asthma    DM (diabetes mellitus)    GERD (gastroesophageal reflux disease)    Lymphedema    S/P knee replacement        FAMILY HISTORY:  No pertinent family history in first degree relatives        Home Medications:  Advair Diskus 250 mcg-50 mcg inhalation powder: 1 puff(s) inhaled 2 times a day (2021 14:10)  alendronate 70 mg oral tablet: 1 tab(s) orally once a week (2021 14:10)  amLODIPine 10 mg oral tablet: 1 tab(s) orally once a day (2021 14:10)  ascorbic acid 500 mg oral capsule: 1 cap(s) orally once a day (30 Oct 2019 16:54)  azelastine 137 mcg/inh (0.1%) nasal spray: 2 spray(s) nasal 2 times a day (30 Oct 2019 16:54)  cholecalciferol 2000 intl units oral tablet: 1 tab(s) orally once a day (30 Oct 2019 16:54)  glimepiride 2 mg oral tablet: 1 tab(s) orally once a day (30 Oct 2019 16:54)  hydrALAZINE 100 mg oral tablet: 1 tab(s) orally 3 times a day (2019 13:41)  ipratropium-albuterol 0.5 mg-2.5 mg/3 mLinhalation solution: 3 milliliter(s) inhaled every 6 hours, As Needed (2021 10:05)  metoprolol succinate 100 mg oral tablet, extended release: 1 tab(s) orally once a day (30 Oct 2019 16:54)  montelukast 10 mg oral tablet: 1 tab(s) orally once a day (30 Oct 2019 16:54)  Neurontin 100 mg oral capsule: 1 cap(s) orally 3 times a day (2020 18:19)  ocular lubricant ophthalmic solution: 1 drop(s) to each affected eye 3 times a day, As needed, Dry Eyes (2019 13:41)  polyethylene glycol 3350 oral powder for reconstitution: 17 gram(s) orally once a day (2021 14:10)  ProAir HFA 90 mcg/inh inhalation aerosol: 2 puff(s) inhaled 4 times a day, As Needed (2021 14:10)  rosuvastatin 10 mg oral tablet: 1 tab(s) orally once a day (2021 14:10)      MEDICATIONS  (STANDING):  amLODIPine   Tablet 10 milliGRAM(s) Oral daily  atorvastatin 40 milliGRAM(s) Oral at bedtime  budesonide 160 MICROgram(s)/formoterol 4.5 MICROgram(s) Inhaler 2 Puff(s) Inhalation two times a day  buMETAnide 1 milliGRAM(s) Oral daily  cefTRIAXone   IVPB 1000 milliGRAM(s) IV Intermittent every 24 hours  cholecalciferol 2000 Unit(s) Oral daily  dextrose 40% Gel 15 Gram(s) Oral once  dextrose 5%. 1000 milliLiter(s) (50 mL/Hr) IV Continuous <Continuous>  dextrose 5%. 1000 milliLiter(s) (100 mL/Hr) IV Continuous <Continuous>  dextrose 50% Injectable 25 Gram(s) IV Push once  dextrose 50% Injectable 12.5 Gram(s) IV Push once  dextrose 50% Injectable 25 Gram(s) IV Push once  enoxaparin Injectable 40 milliGRAM(s) SubCutaneous every 12 hours  ferrous    sulfate 325 milliGRAM(s) Oral daily  glucagon  Injectable 1 milliGRAM(s) IntraMuscular once  hydrALAZINE 100 milliGRAM(s) Oral three times a day  insulin lispro (ADMELOG) corrective regimen sliding scale   SubCutaneous three times a day before meals  insulin lispro (ADMELOG) corrective regimen sliding scale   SubCutaneous at bedtime  metoprolol succinate  milliGRAM(s) Oral daily  metroNIDAZOLE  IVPB      metroNIDAZOLE  IVPB 500 milliGRAM(s) IV Intermittent every 8 hours  montelukast 10 milliGRAM(s) Oral at bedtime  nystatin Powder 1 Application(s) Topical two times a day  polyethylene glycol 3350 17 Gram(s) Oral daily  senna 2 Tablet(s) Oral at bedtime  sodium chloride 0.65% Nasal 2 Spray(s) Both Nostrils every 6 hours    MEDICATIONS  (PRN):  acetaminophen     Tablet .. 650 milliGRAM(s) Oral every 6 hours PRN Mild Pain (1 - 3), Moderate Pain (4 - 6)  acetaminophen     Tablet .. 650 milliGRAM(s) Oral every 6 hours PRN Temp greater or equal to 38C (100.4F)  ALBUTerol    90 MICROgram(s) HFA Inhaler 2 Puff(s) Inhalation every 6 hours PRN Shortness of Breath and/or Wheezing  melatonin 3 milliGRAM(s) Oral at bedtime PRN Insomnia  oxyCODONE    IR 5 milliGRAM(s) Oral every 4 hours PRN Moderate Pain (4 - 6)  oxyCODONE    IR 10 milliGRAM(s) Oral every 4 hours PRN Severe Pain (7 - 10)      Diet, Consistent Carbohydrate/No Snacks:   Minced and Moist (MINCEDMOIST)  Low Sodium  Halal  No Pork  Supplement Feeding Modality:  Oral  Glucerna Shake Cans or Servings Per Day:  1       Frequency:  Two Times a day (21 @ 16:48) [Active]          Vital Signs Last 24 Hrs  T(C): 37.1 (2021 04:30), Max: 37.6 (2021 10:41)  T(F): 98.7 (2021 04:30), Max: 99.7 (2021 10:41)  HR: 87 (2021 04:30) (79 - 97)  BP: 109/67 (2021 05:38) (103/57 - 122/69)  BP(mean): 72 (2021 10:41) (72 - 72)  RR: 18 (2021 04:30) (17 - 19)  SpO2: 97% (2021 04:30) (92% - 98%)      21 @ 07:01  -  21 @ 07:00  --------------------------------------------------------  IN: 100 mL / OUT: 700 mL / NET: -600 mL              LABS:                        7.8    8.79  )-----------( 262      ( 2021 06:19 )             28.1         140  |  96  |  32<H>  ----------------------------<  137<H>  4.0   |  40<H>  |  1.03    Ca    8.9      2021 06:19    TPro  7.6  /  Alb  2.2<L>  /  TBili  0.4  /  DBili  x   /  AST  15  /  ALT  12  /  AlkPhos  46        Urinalysis Basic - ( 2021 19:31 )    Color: Yellow / Appearance: Clear / S.005 / pH: x  Gluc: x / Ketone: Negative  / Bili: Negative / Urobili: Negative mg/dL   Blood: x / Protein: 30 mg/dL / Nitrite: Negative   Leuk Esterase: Trace / RBC: x / WBC 0-2   Sq Epi: x / Non Sq Epi: Few / Bacteria: Few            WBC:  WBC Count: 8.79 K/uL ( @ 06:19)  WBC Count: 8.46 K/uL ( @ 06:19)  WBC Count: 9.37 K/uL ( @ 23:51)  WBC Count: 9.96 K/uL ( @ 07:35)      MICROBIOLOGY:  RECENT CULTURES:   .Blood Blood-Peripheral, aerobic bottle XXXX XXXX   No growth to date.                    Sodium:  Sodium, Serum: 140 mmol/L ( @ 06:19)  Sodium, Serum: 136 mmol/L ( @ 06:19)  Sodium, Serum: 138 mmol/L ( @ 07:35)  Sodium, Serum: 138 mmol/L ( @ 15:29)  Sodium, Serum: 137 mmol/L ( @ 11:05)      1.03 mg/dL  @ 06:19  1.12 mg/dL :19  1.19 mg/dL  @ 07:35  1.29 mg/dL  @ 15:29  1.33 mg/dL  @ 11:05      Hemoglobin:  Hemoglobin: 7.8 g/dL ( @ 06:19)  Hemoglobin: 7.4 g/dL ( @ 06:19)  Hemoglobin: 7.3 g/dL ( @ 23:51)  Hemoglobin: 7.8 g/dL ( @ 07:35)      Platelets: Platelet Count - Automated: 262 K/uL ( @ 06:19)  Platelet Count - Automated: 241 K/uL ( @ 06:19)  Platelet Count - Automated: 238 K/uL ( @ 23:51)  Platelet Count - Automated: 245 K/uL ( @ 07:35)      LIVER FUNCTIONS - ( 2021 06:19 )  Alb: 2.2 g/dL / Pro: 7.6 gm/dL / ALK PHOS: 46 U/L / ALT: 12 U/L / AST: 15 U/L / GGT: x             Urinalysis Basic - ( 2021 19:31 )    Color: Yellow / Appearance: Clear / S.005 / pH: x  Gluc: x / Ketone: Negative  / Bili: Negative / Urobili: Negative mg/dL   Blood: x / Protein: 30 mg/dL / Nitrite: Negative   Leuk Esterase: Trace / RBC: x / WBC 0-2   Sq Epi: x / Non Sq Epi: Few / Bacteria: Few        RADIOLOGY & ADDITIONAL STUDIES:      MICROBIOLOGY:  RECENT CULTURES:   .Blood Blood-Peripheral, aerobic bottle XXXX XXXX   No growth to date.

## 2021-11-30 LAB
CULTURE RESULTS: SIGNIFICANT CHANGE UP
CULTURE RESULTS: SIGNIFICANT CHANGE UP
SPECIMEN SOURCE: SIGNIFICANT CHANGE UP
SPECIMEN SOURCE: SIGNIFICANT CHANGE UP

## 2021-12-02 ENCOUNTER — INPATIENT (INPATIENT)
Facility: HOSPITAL | Age: 86
LOS: 14 days | Discharge: NURSING, REHAB OR LONG-TERM CARE | End: 2021-12-17
Attending: INTERNAL MEDICINE | Admitting: INTERNAL MEDICINE
Payer: MEDICARE

## 2021-12-02 VITALS
HEART RATE: 71 BPM | RESPIRATION RATE: 14 BRPM | OXYGEN SATURATION: 99 % | DIASTOLIC BLOOD PRESSURE: 88 MMHG | SYSTOLIC BLOOD PRESSURE: 151 MMHG | HEIGHT: 65 IN

## 2021-12-02 DIAGNOSIS — R91.1 SOLITARY PULMONARY NODULE: ICD-10-CM

## 2021-12-02 DIAGNOSIS — E66.01 MORBID (SEVERE) OBESITY DUE TO EXCESS CALORIES: ICD-10-CM

## 2021-12-02 DIAGNOSIS — R26.81 UNSTEADINESS ON FEET: ICD-10-CM

## 2021-12-02 DIAGNOSIS — J45.909 UNSPECIFIED ASTHMA, UNCOMPLICATED: ICD-10-CM

## 2021-12-02 DIAGNOSIS — Z91.81 HISTORY OF FALLING: ICD-10-CM

## 2021-12-02 DIAGNOSIS — Z96.651 PRESENCE OF RIGHT ARTIFICIAL KNEE JOINT: ICD-10-CM
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
## 2021-12-02 DIAGNOSIS — Z96.659 PRESENCE OF UNSPECIFIED ARTIFICIAL KNEE JOINT: Chronic | ICD-10-CM
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
## 2021-12-02 DIAGNOSIS — E87.3 ALKALOSIS: ICD-10-CM

## 2021-12-02 DIAGNOSIS — I89.0 LYMPHEDEMA, NOT ELSEWHERE CLASSIFIED: ICD-10-CM
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
## 2021-12-02 DIAGNOSIS — I35.0 NONRHEUMATIC AORTIC (VALVE) STENOSIS: ICD-10-CM

## 2021-12-02 DIAGNOSIS — E78.5 HYPERLIPIDEMIA, UNSPECIFIED: ICD-10-CM

## 2021-12-02 DIAGNOSIS — Z99.81 DEPENDENCE ON SUPPLEMENTAL OXYGEN: ICD-10-CM

## 2021-12-02 DIAGNOSIS — R26.2 DIFFICULTY IN WALKING, NOT ELSEWHERE CLASSIFIED: ICD-10-CM

## 2021-12-02 DIAGNOSIS — K21.9 GASTRO-ESOPHAGEAL REFLUX DISEASE WITHOUT ESOPHAGITIS: ICD-10-CM

## 2021-12-02 DIAGNOSIS — E11.9 TYPE 2 DIABETES MELLITUS WITHOUT COMPLICATIONS: ICD-10-CM

## 2021-12-02 DIAGNOSIS — I50.32 CHRONIC DIASTOLIC (CONGESTIVE) HEART FAILURE: ICD-10-CM

## 2021-12-02 DIAGNOSIS — E87.5 HYPERKALEMIA: ICD-10-CM

## 2021-12-02 DIAGNOSIS — I25.10 ATHEROSCLEROTIC HEART DISEASE OF NATIVE CORONARY ARTERY WITHOUT ANGINA PECTORIS: ICD-10-CM

## 2021-12-02 DIAGNOSIS — T84.012A BROKEN INTERNAL RIGHT KNEE PROSTHESIS, INITIAL ENCOUNTER: ICD-10-CM

## 2021-12-02 DIAGNOSIS — G47.33 OBSTRUCTIVE SLEEP APNEA (ADULT) (PEDIATRIC): ICD-10-CM

## 2021-12-02 DIAGNOSIS — E87.70 FLUID OVERLOAD, UNSPECIFIED: ICD-10-CM

## 2021-12-02 DIAGNOSIS — D50.9 IRON DEFICIENCY ANEMIA, UNSPECIFIED: ICD-10-CM

## 2021-12-02 DIAGNOSIS — J44.9 CHRONIC OBSTRUCTIVE PULMONARY DISEASE, UNSPECIFIED: ICD-10-CM

## 2021-12-02 DIAGNOSIS — N28.89 OTHER SPECIFIED DISORDERS OF KIDNEY AND URETER: ICD-10-CM

## 2021-12-02 DIAGNOSIS — I11.0 HYPERTENSIVE HEART DISEASE WITH HEART FAILURE: ICD-10-CM
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
## 2021-12-02 DIAGNOSIS — J96.92 RESPIRATORY FAILURE, UNSPECIFIED WITH HYPERCAPNIA: ICD-10-CM

## 2021-12-02 LAB
ALBUMIN SERPL ELPH-MCNC: 3.3 G/DL — SIGNIFICANT CHANGE UP (ref 3.3–5)
ALP SERPL-CCNC: 59 U/L — SIGNIFICANT CHANGE UP (ref 40–120)
ALT FLD-CCNC: 13 U/L — SIGNIFICANT CHANGE UP (ref 4–33)
ANION GAP SERPL CALC-SCNC: 5 MMOL/L — LOW (ref 7–14)
ANION GAP SERPL CALC-SCNC: 7 MMOL/L — SIGNIFICANT CHANGE UP (ref 7–14)
AST SERPL-CCNC: 38 U/L — HIGH (ref 4–32)
BASE EXCESS BLDV CALC-SCNC: 12.8 MMOL/L — HIGH (ref -2–3)
BASOPHILS # BLD AUTO: 0.02 K/UL — SIGNIFICANT CHANGE UP (ref 0–0.2)
BASOPHILS NFR BLD AUTO: 0.2 % — SIGNIFICANT CHANGE UP (ref 0–2)
BILIRUB SERPL-MCNC: 0.3 MG/DL — SIGNIFICANT CHANGE UP (ref 0.2–1.2)
BLOOD GAS ARTERIAL COMPREHENSIVE RESULT: SIGNIFICANT CHANGE UP
BLOOD GAS VENOUS COMPREHENSIVE RESULT: SIGNIFICANT CHANGE UP
BUN SERPL-MCNC: 62 MG/DL — HIGH (ref 7–23)
BUN SERPL-MCNC: 63 MG/DL — HIGH (ref 7–23)
CALCIUM SERPL-MCNC: 9 MG/DL — SIGNIFICANT CHANGE UP (ref 8.4–10.5)
CALCIUM SERPL-MCNC: 9.3 MG/DL — SIGNIFICANT CHANGE UP (ref 8.4–10.5)
CHLORIDE BLDV-SCNC: 97 MMOL/L — SIGNIFICANT CHANGE UP (ref 96–108)
CHLORIDE SERPL-SCNC: 93 MMOL/L — LOW (ref 98–107)
CHLORIDE SERPL-SCNC: 95 MMOL/L — LOW (ref 98–107)
CO2 BLDV-SCNC: 46 MMOL/L — HIGH (ref 22–26)
CO2 SERPL-SCNC: 37 MMOL/L — HIGH (ref 22–31)
CO2 SERPL-SCNC: 40 MMOL/L — HIGH (ref 22–31)
CREAT SERPL-MCNC: 2.03 MG/DL — HIGH (ref 0.5–1.3)
CREAT SERPL-MCNC: 2.18 MG/DL — HIGH (ref 0.5–1.3)
EOSINOPHIL # BLD AUTO: 0.03 K/UL — SIGNIFICANT CHANGE UP (ref 0–0.5)
EOSINOPHIL NFR BLD AUTO: 0.3 % — SIGNIFICANT CHANGE UP (ref 0–6)
GAS PNL BLDV: 137 MMOL/L — SIGNIFICANT CHANGE UP (ref 136–145)
GLUCOSE BLDV-MCNC: 144 MG/DL — HIGH (ref 70–99)
GLUCOSE SERPL-MCNC: 143 MG/DL — HIGH (ref 70–99)
GLUCOSE SERPL-MCNC: 193 MG/DL — HIGH (ref 70–99)
HCO3 BLDV-SCNC: 43 MMOL/L — HIGH (ref 22–29)
HCT VFR BLD CALC: 34.6 % — SIGNIFICANT CHANGE UP (ref 34.5–45)
HCT VFR BLDA CALC: 28 % — LOW (ref 34.5–46.5)
HGB BLD CALC-MCNC: 9.4 G/DL — LOW (ref 11.5–15.5)
HGB BLD-MCNC: 9 G/DL — LOW (ref 11.5–15.5)
IANC: 7.57 K/UL — SIGNIFICANT CHANGE UP (ref 1.5–8.5)
IMM GRANULOCYTES NFR BLD AUTO: 1.2 % — SIGNIFICANT CHANGE UP (ref 0–1.5)
LACTATE BLDV-MCNC: 1.2 MMOL/L — SIGNIFICANT CHANGE UP (ref 0.5–2)
LYMPHOCYTES # BLD AUTO: 1.3 K/UL — SIGNIFICANT CHANGE UP (ref 1–3.3)
LYMPHOCYTES # BLD AUTO: 13.5 % — SIGNIFICANT CHANGE UP (ref 13–44)
MCHC RBC-ENTMCNC: 26 GM/DL — LOW (ref 32–36)
MCHC RBC-ENTMCNC: 26.1 PG — LOW (ref 27–34)
MCV RBC AUTO: 100.3 FL — HIGH (ref 80–100)
MONOCYTES # BLD AUTO: 0.57 K/UL — SIGNIFICANT CHANGE UP (ref 0–0.9)
MONOCYTES NFR BLD AUTO: 5.9 % — SIGNIFICANT CHANGE UP (ref 2–14)
NEUTROPHILS # BLD AUTO: 7.57 K/UL — HIGH (ref 1.8–7.4)
NEUTROPHILS NFR BLD AUTO: 78.9 % — HIGH (ref 43–77)
NRBC # BLD: 0 /100 WBCS — SIGNIFICANT CHANGE UP
NRBC # FLD: 0.04 K/UL — HIGH
NT-PROBNP SERPL-SCNC: 1192 PG/ML — HIGH
PCO2 BLDV: 100 MMHG — HIGH (ref 39–42)
PH BLDV: 7.24 — LOW (ref 7.32–7.43)
PLATELET # BLD AUTO: 347 K/UL — SIGNIFICANT CHANGE UP (ref 150–400)
PO2 BLDV: 31 MMHG — SIGNIFICANT CHANGE UP
POTASSIUM BLDV-SCNC: 6 MMOL/L — HIGH (ref 3.5–5.1)
POTASSIUM SERPL-MCNC: 5.9 MMOL/L — HIGH (ref 3.5–5.3)
POTASSIUM SERPL-MCNC: 6.9 MMOL/L — CRITICAL HIGH (ref 3.5–5.3)
POTASSIUM SERPL-SCNC: 5.9 MMOL/L — HIGH (ref 3.5–5.3)
POTASSIUM SERPL-SCNC: 6.9 MMOL/L — CRITICAL HIGH (ref 3.5–5.3)
PROT SERPL-MCNC: 8.3 G/DL — SIGNIFICANT CHANGE UP (ref 6–8.3)
RBC # BLD: 3.45 M/UL — LOW (ref 3.8–5.2)
RBC # FLD: 16.2 % — HIGH (ref 10.3–14.5)
SAO2 % BLDV: 57.5 % — SIGNIFICANT CHANGE UP
SARS-COV-2 RNA SPEC QL NAA+PROBE: SIGNIFICANT CHANGE UP
SODIUM SERPL-SCNC: 137 MMOL/L — SIGNIFICANT CHANGE UP (ref 135–145)
SODIUM SERPL-SCNC: 140 MMOL/L — SIGNIFICANT CHANGE UP (ref 135–145)
TROPONIN T, HIGH SENSITIVITY RESULT: 42 NG/L — SIGNIFICANT CHANGE UP
TROPONIN T, HIGH SENSITIVITY RESULT: 44 NG/L — SIGNIFICANT CHANGE UP
WBC # BLD: 9.61 K/UL — SIGNIFICANT CHANGE UP (ref 3.8–10.5)
WBC # FLD AUTO: 9.61 K/UL — SIGNIFICANT CHANGE UP (ref 3.8–10.5)

## 2021-12-02 PROCEDURE — 99285 EMERGENCY DEPT VISIT HI MDM: CPT | Mod: 25,GC

## 2021-12-02 PROCEDURE — 76937 US GUIDE VASCULAR ACCESS: CPT | Mod: 26,59

## 2021-12-02 PROCEDURE — 99223 1ST HOSP IP/OBS HIGH 75: CPT | Mod: GC

## 2021-12-02 PROCEDURE — 71045 X-RAY EXAM CHEST 1 VIEW: CPT | Mod: 26

## 2021-12-02 PROCEDURE — 93010 ELECTROCARDIOGRAM REPORT: CPT

## 2021-12-02 PROCEDURE — 36569 INSJ PICC 5 YR+ W/O IMAGING: CPT

## 2021-12-02 RX ORDER — ATORVASTATIN CALCIUM 80 MG/1
40 TABLET, FILM COATED ORAL AT BEDTIME
Refills: 0 | Status: DISCONTINUED | OUTPATIENT
Start: 2021-12-02 | End: 2021-12-17

## 2021-12-02 RX ORDER — DEXMEDETOMIDINE HYDROCHLORIDE IN 0.9% SODIUM CHLORIDE 4 UG/ML
0.05 INJECTION INTRAVENOUS
Qty: 400 | Refills: 0 | Status: DISCONTINUED | OUTPATIENT
Start: 2021-12-02 | End: 2021-12-03

## 2021-12-02 RX ORDER — PIPERACILLIN AND TAZOBACTAM 4; .5 G/20ML; G/20ML
3.38 INJECTION, POWDER, LYOPHILIZED, FOR SOLUTION INTRAVENOUS ONCE
Refills: 0 | Status: COMPLETED | OUTPATIENT
Start: 2021-12-02 | End: 2021-12-02

## 2021-12-02 RX ORDER — SENNA PLUS 8.6 MG/1
2 TABLET ORAL AT BEDTIME
Refills: 0 | Status: DISCONTINUED | OUTPATIENT
Start: 2021-12-02 | End: 2021-12-17

## 2021-12-02 RX ORDER — HYDRALAZINE HCL 50 MG
100 TABLET ORAL THREE TIMES A DAY
Refills: 0 | Status: DISCONTINUED | OUTPATIENT
Start: 2021-12-02 | End: 2021-12-17

## 2021-12-02 RX ORDER — POLYETHYLENE GLYCOL 3350 17 G/17G
17 POWDER, FOR SOLUTION ORAL DAILY
Refills: 0 | Status: DISCONTINUED | OUTPATIENT
Start: 2021-12-02 | End: 2021-12-16

## 2021-12-02 RX ORDER — SODIUM CHLORIDE 9 MG/ML
500 INJECTION INTRAMUSCULAR; INTRAVENOUS; SUBCUTANEOUS ONCE
Refills: 0 | Status: COMPLETED | OUTPATIENT
Start: 2021-12-02 | End: 2021-12-02

## 2021-12-02 RX ORDER — PIPERACILLIN AND TAZOBACTAM 4; .5 G/20ML; G/20ML
3.38 INJECTION, POWDER, LYOPHILIZED, FOR SOLUTION INTRAVENOUS EVERY 12 HOURS
Refills: 0 | Status: DISCONTINUED | OUTPATIENT
Start: 2021-12-02 | End: 2021-12-03

## 2021-12-02 RX ORDER — GABAPENTIN 400 MG/1
100 CAPSULE ORAL THREE TIMES A DAY
Refills: 0 | Status: DISCONTINUED | OUTPATIENT
Start: 2021-12-02 | End: 2021-12-17

## 2021-12-02 RX ORDER — IPRATROPIUM/ALBUTEROL SULFATE 18-103MCG
3 AEROSOL WITH ADAPTER (GRAM) INHALATION EVERY 6 HOURS
Refills: 0 | Status: DISCONTINUED | OUTPATIENT
Start: 2021-12-02 | End: 2021-12-11

## 2021-12-02 RX ORDER — AMLODIPINE BESYLATE 2.5 MG/1
10 TABLET ORAL DAILY
Refills: 0 | Status: DISCONTINUED | OUTPATIENT
Start: 2021-12-02 | End: 2021-12-17

## 2021-12-02 RX ORDER — CALCIUM GLUCONATE 100 MG/ML
2 VIAL (ML) INTRAVENOUS ONCE
Refills: 0 | Status: COMPLETED | OUTPATIENT
Start: 2021-12-02 | End: 2021-12-02

## 2021-12-02 RX ORDER — FUROSEMIDE 40 MG
40 TABLET ORAL ONCE
Refills: 0 | Status: COMPLETED | OUTPATIENT
Start: 2021-12-02 | End: 2021-12-02

## 2021-12-02 RX ORDER — METOPROLOL TARTRATE 50 MG
100 TABLET ORAL DAILY
Refills: 0 | Status: DISCONTINUED | OUTPATIENT
Start: 2021-12-02 | End: 2021-12-17

## 2021-12-02 RX ORDER — DEXTROSE 50 % IN WATER 50 %
50 SYRINGE (ML) INTRAVENOUS ONCE
Refills: 0 | Status: COMPLETED | OUTPATIENT
Start: 2021-12-02 | End: 2021-12-02

## 2021-12-02 RX ORDER — INSULIN HUMAN 100 [IU]/ML
5 INJECTION, SOLUTION SUBCUTANEOUS ONCE
Refills: 0 | Status: COMPLETED | OUTPATIENT
Start: 2021-12-02 | End: 2021-12-02

## 2021-12-02 RX ORDER — CALCIUM GLUCONATE 100 MG/ML
1 VIAL (ML) INTRAVENOUS ONCE
Refills: 0 | Status: COMPLETED | OUTPATIENT
Start: 2021-12-02 | End: 2021-12-02

## 2021-12-02 RX ORDER — HEPARIN SODIUM 5000 [USP'U]/ML
5000 INJECTION INTRAVENOUS; SUBCUTANEOUS EVERY 8 HOURS
Refills: 0 | Status: DISCONTINUED | OUTPATIENT
Start: 2021-12-02 | End: 2021-12-03

## 2021-12-02 RX ORDER — MONTELUKAST 4 MG/1
10 TABLET, CHEWABLE ORAL DAILY
Refills: 0 | Status: DISCONTINUED | OUTPATIENT
Start: 2021-12-02 | End: 2021-12-17

## 2021-12-02 RX ORDER — HYDROMORPHONE HYDROCHLORIDE 2 MG/ML
0.5 INJECTION INTRAMUSCULAR; INTRAVENOUS; SUBCUTANEOUS ONCE
Refills: 0 | Status: DISCONTINUED | OUTPATIENT
Start: 2021-12-02 | End: 2021-12-02

## 2021-12-02 RX ORDER — VANCOMYCIN HCL 1 G
1000 VIAL (EA) INTRAVENOUS ONCE
Refills: 0 | Status: DISCONTINUED | OUTPATIENT
Start: 2021-12-02 | End: 2021-12-03

## 2021-12-02 RX ADMIN — Medication 3 MILLILITER(S): at 22:57

## 2021-12-02 RX ADMIN — INSULIN HUMAN 5 UNIT(S): 100 INJECTION, SOLUTION SUBCUTANEOUS at 13:48

## 2021-12-02 RX ADMIN — Medication 40 MILLIGRAM(S): at 16:01

## 2021-12-02 RX ADMIN — Medication 200 GRAM(S): at 13:46

## 2021-12-02 RX ADMIN — Medication 50 MILLILITER(S): at 13:42

## 2021-12-02 RX ADMIN — HYDROMORPHONE HYDROCHLORIDE 0.5 MILLIGRAM(S): 2 INJECTION INTRAMUSCULAR; INTRAVENOUS; SUBCUTANEOUS at 15:23

## 2021-12-02 RX ADMIN — SODIUM CHLORIDE 500 MILLILITER(S): 9 INJECTION INTRAMUSCULAR; INTRAVENOUS; SUBCUTANEOUS at 14:11

## 2021-12-02 RX ADMIN — Medication 100 GRAM(S): at 16:01

## 2021-12-02 RX ADMIN — HYDROMORPHONE HYDROCHLORIDE 0.5 MILLIGRAM(S): 2 INJECTION INTRAMUSCULAR; INTRAVENOUS; SUBCUTANEOUS at 15:08

## 2021-12-02 NOTE — ED ADULT NURSE REASSESSMENT NOTE - NS ED NURSE REASSESS COMMENT FT1
Break RN: Pt currently on BiPAP, tolerating well. VSS as noted, breathing is non-labored, pt appears comfortable at this time. Primafit placed to continuous suction. Pt had upper dentures, placed in secured bag with pt's belongings. Will continue to monitor

## 2021-12-02 NOTE — ED PROVIDER NOTE - CLINICAL SUMMARY MEDICAL DECISION MAKING FREE TEXT BOX
85y/o F w/ h/o HFpEF, obesity, SHAD on CPAP, HTN, T2DM, COPD, lymphedema, s/p fall and knee fracture p/w AMS and facial droop, hypoxic and unresponsive at nursing home AAOx1. Code stroke initiated but more likely respiratory hypercapnic vc hypoxic respiratory failure. labs, bipap, CTH and admission ICU.

## 2021-12-02 NOTE — H&P ADULT - ASSESSMENT
85y/o F w/ h/o HFpEF, obesity, SHAD on CPAP, HTN, T2DM, COPD, lymphedema, s/p fall and knee fracture p/w AMS and facial droop, MICU consulted for increased work of breathing on Bipap and elevated pCO2. Pt appears to have component of chronic hypercapnia c/f OHS with history of COPD and OS, Bipap dependency    NEURO:  #Mental status: unable to assess accurately due to SOB, A&O x 2  -Monitor mental status  -If declining mental status    CV:  #HLD  -continue home atorvastatin    #HFpEF: TTE on 11/21 shows EF 60-65%, grade 1 diastolic dysfn  -Bblocker continue home metoprolol  -Diuretic: on home bumex, will give PRN lasix for now 40mg IV BID   -Trend I/Os and daily weights, and Cr    PULM:  #COPD:  -Continue duonebs, albuterol PRN, monteleukast, chest PT  -AVAPs settings 16/6    RENAL:  #Cr 2.0, doubled from baseline, send urine lytes although may be inaccurate since pt received lasix in ED  -UO: strict monitoring  -Humphrey: none    GI:  #Transaminitis: Elevated LFTs  #Diet: NPO while on bipap  #Bowel regiment: senna/miralax    ENDO:  #DM2: HbA1c 6.9, no issues  - Insulin Sliding Scale    HEMATOLOGIC:  #CBC results: no leukocytosis, rest appear to be at baseline  #Coag panel pending  #DVT prophylaxis with lovenox QD    ID:  #Unclear reason for COPD/OHS exacerbation, will order empiric levaquin 750 IV QD for 7 day course for empiric coverage    SKIN:  #Lines: PIV, arrow  #Decubitus ulcers: none

## 2021-12-02 NOTE — H&P ADULT - HISTORY OF PRESENT ILLNESS
87y/o F w/ h/o HFpEF, COPD/asthma ( on 3L NC), DM2, HTN, SHAD on CPAP, lymphedema on bumex, morbid obesity (BMI 52.7), s/p fall and knee fracture at assisted living facility p/w AMS and facial droop. Daughter states pt has been using her BiPAP but has been more "tired" since yesterday, this morning was more unresponsive and so visiting nurse and EMS was called. Pt was hypoxic and unresponsive at nursing home with noted facial droop. Pt minimally verbal but alert/oriented x1, complaining of pain in left leg at site of fracture, and complaining of SOB. Code stroke called from triage due to AMS and possible right facial droop, last known baseline was last night at unknown time;  pt somnolent but arousal to voice, no obvious facial droop ; presentation and pt's history more consistent with possible hypercapnia vs. metabolic issue, less concerning for acute CVA, pt not in tPA window;  Hemodynamically stable. EKG with NSR, VR 82, QTc 440, no T wave changes of hyperkalemia. CXR image reviewed, appear to have pulm congestion.

## 2021-12-02 NOTE — ED ADULT NURSE NOTE - OBJECTIVE STATEMENT
87 y/o female awake and alert, received to Trauma B for left facial droop, code stroke called. Pt placed on bi-pap machine, oxygen stat at 98%, FiO2 at 50%. Palpable hernia mass located to upper right quadrant of abdomen. Brace and ACE bandage on right knee, from home. Bilateral swelling to lower extremities. NSR on cardiac monitor. 18G ultrasound guided IV placed to right AC. Labs collected and sent off. Pt medicated as per MD orders.

## 2021-12-02 NOTE — ED ADULT NURSE NOTE - NSIMPLEMENTINTERV_GEN_ALL_ED
Implemented All Fall Risk Interventions:  [place] to call system. Call bell, personal items and telephone within reach. Instruct patient to call for assistance. Room bathroom lighting operational. Non-slip footwear when patient is off stretcher. Physically safe environment: no spills, clutter or unnecessary equipment. Stretcher in lowest position, wheels locked, appropriate side rails in place. Provide visual cue, wrist band, yellow gown, etc. Monitor gait and stability. Monitor for mental status changes and reorient to person, place, and time. Review medications for side effects contributing to fall risk. Reinforce activity limits and safety measures with patient and family.

## 2021-12-02 NOTE — STROKE CODE NOTE - DISPOSITION
The management and treatment decisions which include alteplase and mechanical thrombectomy were discussed with stroke fellow Dr. Regan Hernadez and with neurovascular attending Dr. Yves Tobar.

## 2021-12-02 NOTE — ED PROVIDER NOTE - CARE PLAN
1 Principal Discharge DX:	Hypercapnic respiratory failure   Principal Discharge DX:	Hypercapnic respiratory failure  Secondary Diagnosis:	Hyperkalemia

## 2021-12-02 NOTE — H&P ADULT - NSHPPHYSICALEXAM_GEN_ALL_CORE
PE:  GENERAL: NAD, resting in bed, RR 20, does not seem to use accessory muscles of breathing at this time  HEAD:  Atraumatic, Normocephalic  EYES: EOMI, conjunctiva and sclera clear  ENT: Dry mucous membranes around lips and nares  NECK: Supple, No JVD noted  CHEST/LUNG: Decreased breath sounds b/l 2/2 body habitus. No rales, rhonchi, wheezing, or rubs heard. Labored respirations, using accessory muscles of breathing  HEART: Limited exam 2/2 Bipap sounds. Regular rate and rhythm; No murmurs, rubs, or gallops, consistent with EKG  ABDOMEN: Bowel sounds present; Soft, Nontender, Nondistended.  EXTREMITIES:  2+ Peripheral Pulses, brisk capillary refill. No clubbing, cyanosis. +pitting edema left leg +4, right leg +2  NERVOUS SYSTEM:  Alert & Oriented X1-2, speech clear. Moving all ext  MSK: FROM all 4 extremities, full and equal strength  SKIN: No rashes or lesions

## 2021-12-02 NOTE — H&P ADULT - NSHPLABSRESULTS_GEN_ALL_CORE
9.0    9.61  )-----------( 347      ( 02 Dec 2021 12:21 )             34.6     12-02    140  |  95<L>  |  62<H>  ----------------------------<  193<H>  5.9<H>   |  40<H>  |  2.03<H>    Ca    9.3      02 Dec 2021 15:39    TPro  8.3  /  Alb  3.3  /  TBili  0.3  /  DBili  x   /  AST  38<H>  /  ALT  13  /  AlkPhos  59  12-02          ABG - ( 02 Dec 2021 12:21 )  pH, Arterial: 7.19  pH, Blood: x     /  pCO2: 117   /  pO2: 136   / HCO3: 45    / Base Excess: 13.7  /  SaO2: 98.7      Lactate Trend    CAPILLARY BLOOD GLUCOSE  POCT Blood Glucose.: 201 mg/dL (02 Dec 2021 15:22)    Culture Results:   No Growth Final (11-25 @ 09:03)  Culture Results:   No Growth Final (11-25 @ 09:03)  Culture Results:   <10,000 CFU/mL Normal Urogenital Elen (11-19 @ 11:41)

## 2021-12-02 NOTE — CONSULT NOTE ADULT - SUBJECTIVE AND OBJECTIVE BOX
HPI:  on aspirin at home, did not take 12/1  LWK 2:30a 12/2 (daughter sleeps near patient in living room)  woke up at 7a and per home nurse and per daughter (who also saw patient) this morning and was not communicating that much today.     Baseline: recent fracture  can communicate about superficial topics but not in depth  lives with daughter who takes care of her  goes to bathroom with walker, felt "weaker" before going to hospital and then fell  (Stroke only)  NIHSS:   MRS:   ICH:     REVIEW OF SYSTEMS    A 10-system ROS was performed and is negative except for those items noted above and/or in the HPI.    PAST MEDICAL & SURGICAL HISTORY:  HTN (hypertension)    HLD (hyperlipidemia)    Asthma    DM (diabetes mellitus)    GERD (gastroesophageal reflux disease)    Lymphedema    S/P knee replacement    MEDICATIONS (HOME):  Home Medications:  Advair Diskus 250 mcg-50 mcg inhalation powder: 1 puff(s) inhaled 2 times a day (23 Nov 2021 14:10)  alendronate 70 mg oral tablet: 1 tab(s) orally once a week (23 Nov 2021 14:10)  ascorbic acid 500 mg oral capsule: 1 cap(s) orally once a day (30 Oct 2019 16:54)  azelastine 137 mcg/inh (0.1%) nasal spray: 2 spray(s) nasal 2 times a day (30 Oct 2019 16:54)  glimepiride 2 mg oral tablet: 1 tab(s) orally once a day (30 Oct 2019 16:54)  ipratropium-albuterol 0.5 mg-2.5 mg/3 mLinhalation solution: 3 milliliter(s) inhaled every 6 hours, As Needed (04 Jun 2021 10:05)  montelukast 10 mg oral tablet: 1 tab(s) orally once a day (30 Oct 2019 16:54)  Neurontin 100 mg oral capsule: 1 cap(s) orally 3 times a day (29 Nov 2020 18:19)  ocular lubricant ophthalmic solution: 1 drop(s) to each affected eye 3 times a day, As needed, Dry Eyes (06 Nov 2019 13:41)  ProAir HFA 90 mcg/inh inhalation aerosol: 2 puff(s) inhaled 4 times a day, As Needed (23 Nov 2021 14:10)    MEDICATIONS  (STANDING):    MEDICATIONS  (PRN):    ALLERGIES/INTOLERANCES:  Allergies  No Known Allergies    VITALS & EXAMINATION:  Vital Signs Last 24 Hrs  T(C): --  T(F): --  HR: 71 (02 Dec 2021 11:41) (71 - 71)  BP: 151/88 (02 Dec 2021 11:41) (151/88 - 151/88)  BP(mean): --  RR: 14 (02 Dec 2021 11:41) (14 - 14)  SpO2: 99% (02 Dec 2021 11:41) (99% - 99%)     Neurological (>12):  INCOMPLETE    LABORATORY:  CBC   Chem       LFTs   Coagulopathy   Lipid Panel   A1c   Cardiac enzymes     U/A   CSF  Immunological  Other    STUDIES & IMAGING:  Studies (EKG, EEG, EMG, etc):     Radiology (XR, CT, MR, U/S, TTE/SHIVAM): HPI:  Pt is an 87 y/o female w/ a PMH of asthma/COPD, CHF, HTN, lymphedema, DM2, and past surgical history of knee surgery, presents to the ED after daughter activated EMS due to patient's lack of responsiveness. Patient lives at home with daughter who helps her w/ ADLs in addition to a home nurse. Per pt's daughter, at baseline she can communicate about superficial topics but not in depth. She uses a walker to go to the bathroom. She had a recent right knee fracture which has limited her movement more than normal. At home she is on aspirin, daughter reports that she missed her dose yesterday (12/1). Daughter states pt was at her baseline last night. She reports that she last spoke w/ pt at 2:30 am. This morning (12/2) the home nurse and daughter both noted the pt to be less communicative and activated EMS.    In ED, pt was lethargic and difficult to arouse. She was arousable w/ verbal stimuli, however she was unable to follow simple commands-- when asked to raise 2 fingers on her right hand she raised 4 on her left. She is responsive to noxious stimuli in 3 extremities (RLE not tested 2/2 to lymphedema and recent fracture).      (Stroke only)  NIHSS:   MRS:   ICH:     REVIEW OF SYSTEMS    A 10-system ROS was performed and is negative except for those items noted above and/or in the HPI.    PAST MEDICAL & SURGICAL HISTORY:  HTN (hypertension)    HLD (hyperlipidemia)    Asthma    DM (diabetes mellitus)    GERD (gastroesophageal reflux disease)    Lymphedema    S/P knee replacement    MEDICATIONS (HOME):  Home Medications:  Advair Diskus 250 mcg-50 mcg inhalation powder: 1 puff(s) inhaled 2 times a day (23 Nov 2021 14:10)  alendronate 70 mg oral tablet: 1 tab(s) orally once a week (23 Nov 2021 14:10)  ascorbic acid 500 mg oral capsule: 1 cap(s) orally once a day (30 Oct 2019 16:54)  azelastine 137 mcg/inh (0.1%) nasal spray: 2 spray(s) nasal 2 times a day (30 Oct 2019 16:54)  glimepiride 2 mg oral tablet: 1 tab(s) orally once a day (30 Oct 2019 16:54)  ipratropium-albuterol 0.5 mg-2.5 mg/3 mLinhalation solution: 3 milliliter(s) inhaled every 6 hours, As Needed (04 Jun 2021 10:05)  montelukast 10 mg oral tablet: 1 tab(s) orally once a day (30 Oct 2019 16:54)  Neurontin 100 mg oral capsule: 1 cap(s) orally 3 times a day (29 Nov 2020 18:19)  ocular lubricant ophthalmic solution: 1 drop(s) to each affected eye 3 times a day, As needed, Dry Eyes (06 Nov 2019 13:41)  ProAir HFA 90 mcg/inh inhalation aerosol: 2 puff(s) inhaled 4 times a day, As Needed (23 Nov 2021 14:10)    MEDICATIONS  (STANDING):    MEDICATIONS  (PRN):    ALLERGIES/INTOLERANCES:  Allergies  No Known Allergies    VITALS & EXAMINATION:  Vital Signs Last 24 Hrs  T(C): --  T(F): --  HR: 71 (02 Dec 2021 11:41) (71 - 71)  BP: 151/88 (02 Dec 2021 11:41) (151/88 - 151/88)  BP(mean): --  RR: 14 (02 Dec 2021 11:41) (14 - 14)  SpO2: 99% (02 Dec 2021 11:41) (99% - 99%)     Neurological (>12):  INCOMPLETE    LABORATORY:  CBC   Chem       LFTs   Coagulopathy   Lipid Panel   A1c   Cardiac enzymes     U/A   CSF  Immunological  Other    STUDIES & IMAGING:  Studies (EKG, EEG, EMG, etc):     Radiology (XR, CT, MR, U/S, TTE/SHIVAM): HPI:  Pt is an 85 y/o woman w/ a PMH of asthma/COPD, CHF, on aspirin, HTN, lymphedema, DM2, and past surgical history of knee surgery, presents to the [facility] ED as a code stroke after home aide/daughter activated EMS due to patient's lack of responsiveness with LWK 2:30a 12/2/21. Patient lives at home with daughter who helps her w/ ADLs in addition to a home nurse. She had a recent right knee fracture which has limited her movement more than normal. At home she is on aspirin, daughter reports that she missed her dose yesterday (12/1). Daughter states pt was at her baseline last night. She reports that she last spoke w/ pt at 2:30 am. This morning (12/2) the home nurse and daughter both noted the pt to be less communicative and activated EMS.    Per pt's daughter, at baseline she can communicate about superficial topics but not in depth. She uses a walker to go to the bathroom.     In ED, pt was lethargic and difficult to arouse. She was arousable w/ verbal stimuli, however she was unable to follow simple commands-- when asked to raise 2 fingers on her right hand she raised 4 on her left. She is responsive to noxious stimuli in 3 extremities (RLE not tested 2/2 to lymphedema and recent fracture).    (Stroke only)  NIHSS: 13  MRS: 4    REVIEW OF SYSTEMS    A 10-system ROS was performed and is negative except for those items noted above and/or in the HPI.    PAST MEDICAL & SURGICAL HISTORY:  HTN (hypertension)    HLD (hyperlipidemia)    Asthma    DM (diabetes mellitus)    GERD (gastroesophageal reflux disease)    Lymphedema    S/P knee replacement    MEDICATIONS (HOME):  Home Medications:  Advair Diskus 250 mcg-50 mcg inhalation powder: 1 puff(s) inhaled 2 times a day (23 Nov 2021 14:10)  alendronate 70 mg oral tablet: 1 tab(s) orally once a week (23 Nov 2021 14:10)  ascorbic acid 500 mg oral capsule: 1 cap(s) orally once a day (30 Oct 2019 16:54)  azelastine 137 mcg/inh (0.1%) nasal spray: 2 spray(s) nasal 2 times a day (30 Oct 2019 16:54)  glimepiride 2 mg oral tablet: 1 tab(s) orally once a day (30 Oct 2019 16:54)  ipratropium-albuterol 0.5 mg-2.5 mg/3 mLinhalation solution: 3 milliliter(s) inhaled every 6 hours, As Needed (04 Jun 2021 10:05)  montelukast 10 mg oral tablet: 1 tab(s) orally once a day (30 Oct 2019 16:54)  Neurontin 100 mg oral capsule: 1 cap(s) orally 3 times a day (29 Nov 2020 18:19)  ocular lubricant ophthalmic solution: 1 drop(s) to each affected eye 3 times a day, As needed, Dry Eyes (06 Nov 2019 13:41)  ProAir HFA 90 mcg/inh inhalation aerosol: 2 puff(s) inhaled 4 times a day, As Needed (23 Nov 2021 14:10)    MEDICATIONS  (STANDING):    MEDICATIONS  (PRN):    ALLERGIES/INTOLERANCES:  Allergies  No Known Allergies    VITALS & EXAMINATION:  Vital Signs Last 24 Hrs  T(C): --  T(F): --  HR: 71 (02 Dec 2021 11:41) (71 - 71)  BP: 151/88 (02 Dec 2021 11:41) (151/88 - 151/88)  BP(mean): --  RR: 14 (02 Dec 2021 11:41) (14 - 14)  SpO2: 99% (02 Dec 2021 11:41) (99% - 99%)     Neurological (>12):  MS: eyes closed, opens eyes to voice, oriented to self, month, could not tell me her age, speech mildly dysarthric, perseverates her name when trying to assess language, can follow some commands.  CN: PERRL, EOMI no nystagmus, no facial asymmetry  Motor: does not keep extremities in air for more than 5 seconds, right knee in brace limited evaluation, but withdraws briskly to noxious stim in b/l UE and LLE    LABORATORY:    STUDIES & IMAGING:  Studies (EKG, EEG, EMG, etc):     Radiology (XR, CT, MR, U/S, TTE/SHIVAM):    pending [facility] noncon

## 2021-12-02 NOTE — ED ADULT TRIAGE NOTE - CCCP TRG CHIEF CMPLNT
as per visiting nurse pt was unresponsive  EMS arrived to scene responds to tactile/painful stimuli arrives with NRB/facial drop

## 2021-12-02 NOTE — H&P ADULT - ATTENDING COMMENTS
87y/o F w/ h/o HFpEF, obesity, SHAD on CPAP, HTN, T2DM, COPD, lymphedema, s/p fall and knee fracture p/w AMS, found to have acute on chronic hypercapnia. Pt with worsening blood gas despite Bipap and AVAPS trial in the ED  continue AVAPS, serial blood gas  COPD exacerbation, nebs, Levaquin, chest PT  holman strict I and O   Full code  DVT ppx lovenox sq  POCUS difficult windows, unable to asses cardiac function, A lines b/l 85y/o F w/ h/o HFpEF, obesity, SHAD on CPAP, HTN, T2DM, COPD, lymphedema, s/p fall and knee fracture p/w AMS, found to have acute on chronic hypercapnia. Pt with worsening blood gas despite Bipap and AVAPS trial in the ED  continue AVAPS, serial blood gas  COPD exacerbation, nebs, chest PT  pancx, ABx  holman strict I and O   Full code  DVT ppx lovenox sq  POCUS difficult windows, unable to asses cardiac function, A lines b/l 85y/o F w/ h/o HFpEF, obesity, SHAD on CPAP, HTN, T2DM, COPD, lymphedema, s/p fall and knee fracture p/w AMS, found to have acute on chronic hypercapnia. Pt with worsening blood gas despite Bipap and AVAPS trial in the ED  continue AVAPS, serial blood gas  COPD exacerbation, nebs, chest PT  pancx, ABx to cover for possible PNA  holman strict I and O   Full code  DVT ppx lovenox sq  POCUS difficult windows, unable to asses cardiac function, A lines b/l

## 2021-12-02 NOTE — ED PROVIDER NOTE - OBJECTIVE STATEMENT
85y/o F w/ h/o HFpEF, obesity, SHAD on CPAP, HTN, T2DM, COPD, lymphedema, s/p fall and knee fracture p/w AMS and facial droop. Pt wa hypoxic and unresponsive at nursing home. 87y/o F w/ h/o HFpEF, obesity, SHAD on CPAP, HTN, T2DM, COPD, lymphedema, s/p fall and knee fracture p/w AMS and facial droop. Pt was hypoxic and unresponsive at nursing home with noted facial droop. Unable to obtain history from pt AAOx1-2.

## 2021-12-02 NOTE — CHART NOTE - NSCHARTNOTEFT_GEN_A_CORE
CHIEF COMPLAINT: SOB    HPI: 85y/o F w/ h/o HFpEF, obesity, SHAD on CPAP, HTN, T2DM, COPD, lymphedema, s/p fall and knee fracture p/w AMS and facial droop.  daughter states pt has been using her BiPAP but has been more "tired" since yesterday, this morning was more unresponsive and so visiting nurse and EMS was called.  Pt was hypoxic and unresponsive at nursing home with noted facial droop. Unable to obtain history from pt AAOx1-2. code stroke called from triage due to AMS and possible right facial droop, last known baseline was last night at unknown time;  pt somnolent but arousal to voice, no obvious facial droop ; presentation and pt's history more consistent with possible hypercapnia vs. metabolic issue, less concerning for acute CVA, pt not in tPA window;    FAMILY HISTORY:     SOCIAL HISTORY:  Smoking: __ packs x ___ years  EtOH Use:  Marital Status:  Occupation:  Recent Travel:  Country of Birth:  Advance Directives:    Allergies  No Known Allergies  Intolerances    HOME MEDICATIONS:    REVIEW OF SYSTEMS:  CONSTITUTIONAL: No weakness, fevers or chills  EYES/ENT: No visual changes;  No vertigo or throat pain   NECK: No pain or stiffness  RESPIRATORY: No cough, wheezing, hemoptysis; No shortness of breath  CARDIOVASCULAR: No chest pain or palpitations  GASTROINTESTINAL: No abdominal or epigastric pain. No nausea, vomiting, or hematemesis; No diarrhea or constipation. No melena or hematochezia.  GENITOURINARY: No dysuria, frequency or hematuria  NEUROLOGICAL: No numbness or weakness  SKIN: No itching, rashes    OBJECTIVE:  ICU Vital Signs Last 24 Hrs  HR: 80 (02 Dec 2021 14:08) (71 - 80)  BP: 151/88 (02 Dec 2021 11:41) (151/88 - 151/88)  RR: 14 (02 Dec 2021 11:41) (14 - 14)  SpO2: 96% (02 Dec 2021 14:08) (96% - 99%)    CAPILLARY BLOOD GLUCOSE  POCT Blood Glucose.: 152 mg/dL (02 Dec 2021 11:43)    PE:  GENERAL: NAD, lying in bed comfortably  HEAD:  Atraumatic, Normocephalic  EYES: EOMI, PERRLA, conjunctiva and sclera clear  ENT: Moist mucous membranes  NECK: Supple, No JVD  CHEST/LUNG: Clear to auscultation bilaterally; No rales, rhonchi, wheezing, or rubs. Unlabored respirations  HEART: Regular rate and rhythm; No murmurs, rubs, or gallops  ABDOMEN: Bowel sounds present; Soft, Nontender, Nondistended.  EXTREMITIES:  2+ Peripheral Pulses, brisk capillary refill. No clubbing, cyanosis, or edema  NERVOUS SYSTEM:  Alert & Oriented X3, speech clear. No deficits   MSK: FROM all 4 extremities, full and equal strength  SKIN: No rashes or lesions    HOSPITAL MEDICATIONS:  MEDICATIONS  (STANDING):    MEDICATIONS  (PRN):    LABS:                        9.0    9.61  )-----------( 347      ( 02 Dec 2021 12:21 )             34.6     12-02    137  |  93<L>  |  63<H>  ----------------------------<  143<H>  6.9<HH>   |  37<H>  |  2.18<H>    Ca    9.0      02 Dec 2021 12:21    TPro  8.3  /  Alb  3.3  /  TBili  0.3  /  DBili  x   /  AST  38<H>  /  ALT  13  /  AlkPhos  59  12-02    Arterial Blood Gas:  12-02 @ 12:21  7.19/117/136/45/98.7/13.7    Venous Blood Gas:  12-02 @ 13:30  7.21/106/27/42/47.6  VBG Lactate: 0.9    MICROBIOLOGY:     RADIOLOGY:  [ ] Reviewed and interpreted by me    EKG:    MICU recommendations:  85y/o F w/ h/o HFpEF, obesity, SHAD on CPAP, HTN, T2DM, COPD, lymphedema, s/p fall and knee fracture p/w AMS and facial droop.   -recommend to change Bipap settings to 16/6, and repeat another VBG, BMP, and EKG in 30 minutes    ***incomplete note*** CHIEF COMPLAINT: SOB    HPI: 85y/o F w/ h/o HFpEF, COPD/asthma ( on 3L NC), DM2, HTN, SHAD on CPAP, lymphedema on bumex, morbid obesity (BMI 52.7), s/p fall and knee fracture at assisted living facility p/w AMS and facial droop. Daughter states pt has been using her BiPAP but has been more "tired" since yesterday, this morning was more unresponsive and so visiting nurse and EMS was called. Pt was hypoxic and unresponsive at nursing home with noted facial droop. Pt minimally verbal but alert/oriented x1, complaining of pain in left leg at site of fracture, and complaining of SOB. Code stroke called from triage due to AMS and possible right facial droop, last known baseline was last night at unknown time;  pt somnolent but arousal to voice, no obvious facial droop ; presentation and pt's history more consistent with possible hypercapnia vs. metabolic issue, less concerning for acute CVA, pt not in tPA window;  Hemodynamically stable. EKG with NSR, VR 82, QTc 440, no T wave changes of hyperkalemia. CXR image reviewed, appear to have pul congestion.     FAMILY HISTORY: cannot answer at this time    SOCIAL HISTORY:  Smoking: denies  EtOH Use: denies    Allergies  No Known Allergies  Intolerances    HOME MEDICATIONS:    REVIEW OF SYSTEMS:  CONSTITUTIONAL: No weakness, fevers or chills  EYES/ENT: No visual changes;  No vertigo or throat pain   NECK: No pain or stiffness  RESPIRATORY: No cough, wheezing, hemoptysis; No shortness of breath  CARDIOVASCULAR: No chest pain or palpitations  GASTROINTESTINAL: No abdominal or epigastric pain. No nausea, vomiting, or hematemesis; No diarrhea or constipation. No melena or hematochezia.  GENITOURINARY: No dysuria, frequency or hematuria  NEUROLOGICAL: No numbness or weakness  SKIN: No itching, rashes    OBJECTIVE:  ICU Vital Signs Last 24 Hrs  HR: 80 (02 Dec 2021 14:08) (71 - 80)  BP: 151/88 (02 Dec 2021 11:41) (151/88 - 151/88)  RR: 14 (02 Dec 2021 11:41) (14 - 14)  SpO2: 96% (02 Dec 2021 14:08) (96% - 99%)    CAPILLARY BLOOD GLUCOSE  POCT Blood Glucose.: 152 mg/dL (02 Dec 2021 11:43)    PE:  GENERAL: NAD, tachypneic, using accessory muscles for breathing  HEAD:  Atraumatic, Normocephalic  EYES: EOMI, conjunctiva and sclera clear  ENT: Dry mucous membranes around lips and nares  NECK: Supple, No JVD noted  CHEST/LUNG: Decreased breath sounds b/l 2/2 body habitus. No rales, rhonchi, wheezing, or rubs heard. Labored respirations, using accessory muscles of breathing  HEART: Regular rate and rhythm; No murmurs, rubs, or gallops  ABDOMEN: Bowel sounds present; Soft, Nontender, Nondistended.  EXTREMITIES:  2+ Peripheral Pulses, brisk capillary refill. No clubbing, cyanosis. +pitting edema left leg +4, right leg +2  NERVOUS SYSTEM:  Alert & Oriented X1-2, speech clear. Moving all ext  MSK: FROM all 4 extremities, full and equal strength  SKIN: No rashes or lesions    HOSPITAL MEDICATIONS:  MEDICATIONS  (STANDING):    MEDICATIONS  (PRN):    LABS:                        9.0    9.61  )-----------( 347      ( 02 Dec 2021 12:21 )             34.6     12-02    137  |  93<L>  |  63<H>  ----------------------------<  143<H>  6.9<HH>   |  37<H>  |  2.18<H>    Ca    9.0      02 Dec 2021 12:21    TPro  8.3  /  Alb  3.3  /  TBili  0.3  /  DBili  x   /  AST  38<H>  /  ALT  13  /  AlkPhos  59  12-02    Arterial Blood Gas:  12-02 @ 12:21  7.19/117/136/45/98.7/13.7    Venous Blood Gas:  12-02 @ 13:30  7.21/106/27/42/47.6  VBG Lactate: 0.9    MICROBIOLOGY:     RADIOLOGY:  [ ] Reviewed and interpreted by me    EKG:    MICU recommendations:  85y/o F w/ h/o HFpEF, obesity, SHAD on CPAP, HTN, T2DM, COPD, lymphedema, s/p fall and knee fracture p/w AMS and facial droop.   -recommend to change Bipap settings to 16/6, and repeat another VBG, BMP, and EKG in 30 minutes    ***incomplete note*** CHIEF COMPLAINT: SOB    HPI: 87y/o F w/ h/o HFpEF, COPD/asthma ( on 3L NC), DM2, HTN, SHAD on CPAP, lymphedema on bumex, morbid obesity (BMI 52.7), s/p fall and knee fracture at assisted living facility p/w AMS and facial droop. Daughter states pt has been using her BiPAP but has been more "tired" since yesterday, this morning was more unresponsive and so visiting nurse and EMS was called. Pt was hypoxic and unresponsive at nursing home with noted facial droop. Pt minimally verbal but alert/oriented x1, complaining of pain in left leg at site of fracture, and complaining of SOB. Code stroke called from triage due to AMS and possible right facial droop, last known baseline was last night at unknown time;  pt somnolent but arousal to voice, no obvious facial droop ; presentation and pt's history more consistent with possible hypercapnia vs. metabolic issue, less concerning for acute CVA, pt not in tPA window;  Hemodynamically stable. EKG with NSR, VR 82, QTc 440, no T wave changes of hyperkalemia. CXR image reviewed, appear to have pulm congestion.     FAMILY HISTORY: cannot answer at this time    SOCIAL HISTORY:  Smoking: denies  EtOH Use: denies    Allergies  No Known Allergies  Intolerances    HOME MEDICATIONS:    REVIEW OF SYSTEMS:  CONSTITUTIONAL: No weakness, fevers or chills  EYES/ENT: No visual changes;  No vertigo or throat pain   NECK: No pain or stiffness  RESPIRATORY: No cough, wheezing, hemoptysis; No shortness of breath  CARDIOVASCULAR: No chest pain or palpitations  GASTROINTESTINAL: No abdominal or epigastric pain. No nausea, vomiting, or hematemesis; No diarrhea or constipation. No melena or hematochezia.  GENITOURINARY: No dysuria, frequency or hematuria  NEUROLOGICAL: No numbness or weakness  SKIN: No itching, rashes    OBJECTIVE:  ICU Vital Signs Last 24 Hrs  HR: 80 (02 Dec 2021 14:08) (71 - 80)  BP: 151/88 (02 Dec 2021 11:41) (151/88 - 151/88)  RR: 14 (02 Dec 2021 11:41) (14 - 14)  SpO2: 96% (02 Dec 2021 14:08) (96% - 99%)    CAPILLARY BLOOD GLUCOSE  POCT Blood Glucose.: 152 mg/dL (02 Dec 2021 11:43)    PE:  GENERAL: NAD, resting in bed, RR 20, does not seem to use accessory muscles of breathing at this time  HEAD:  Atraumatic, Normocephalic  EYES: EOMI, conjunctiva and sclera clear  ENT: Dry mucous membranes around lips and nares  NECK: Supple, No JVD noted  CHEST/LUNG: Decreased breath sounds b/l 2/2 body habitus. No rales, rhonchi, wheezing, or rubs heard. Labored respirations, using accessory muscles of breathing  HEART: Limited exam 2/2 Bipap sounds. Regular rate and rhythm; No murmurs, rubs, or gallops, consistent with EKG  ABDOMEN: Bowel sounds present; Soft, Nontender, Nondistended.  EXTREMITIES:  2+ Peripheral Pulses, brisk capillary refill. No clubbing, cyanosis. +pitting edema left leg +4, right leg +2  NERVOUS SYSTEM:  Alert & Oriented X1-2, speech clear. Moving all ext  MSK: FROM all 4 extremities, full and equal strength  SKIN: No rashes or lesions    HOSPITAL MEDICATIONS:  MEDICATIONS  (STANDING):    MEDICATIONS  (PRN):    LABS:                        9.0    9.61  )-----------( 347      ( 02 Dec 2021 12:21 )             34.6     12-02    137  |  93<L>  |  63<H>  ----------------------------<  143<H>  6.9<HH>   |  37<H>  |  2.18<H>    Ca    9.0      02 Dec 2021 12:21    TPro  8.3  /  Alb  3.3  /  TBili  0.3  /  DBili  x   /  AST  38<H>  /  ALT  13  /  AlkPhos  59  12-02    Arterial Blood Gas:  12-02 @ 12:21  7.19/117/136/45/98.7/13.7    Venous Blood Gas:  12-02 @ 13:30  7.21/106/27/42/47.6  VBG Lactate: 0.9    MICROBIOLOGY:     RADIOLOGY:  [ ] Reviewed and interpreted by me    EKG:    MICU recommendations:  87y/o F w/ h/o HFpEF, obesity, SHAD on CPAP, HTN, T2DM, COPD, lymphedema, s/p fall and knee fracture p/w AMS and facial droop, MICU consulted for increased work of breathing on Bipap and elevated pCO2. Pt appears to have component of chronic hypercapnia c/f OHS with history of COPD and OS, Bipap dependency    -Recommend goal O2 sat 88-94% to avoid depressing respiratory rate in the setting of chronic hypercapnia  -recommend to change to AVAPs settings to 16/6, and repeat another VBG, BMP, and EKG in 30 minutes  -Bronchodilators to cover for possible COPD exacerbation  -Levaquin to cover for pneumonia  -Avoid opioids and other sedating medications to preserve respiratory status    Maria Esther Ceballos MD  MICU Resident  #[id number] CHIEF COMPLAINT: SOB    HPI: 85y/o F w/ h/o HFpEF, COPD/asthma ( on 3L NC), DM2, HTN, SHAD on CPAP, lymphedema on bumex, morbid obesity (BMI 52.7), s/p fall and knee fracture at assisted living facility p/w AMS and facial droop. Daughter states pt has been using her BiPAP but has been more "tired" since yesterday, this morning was more unresponsive and so visiting nurse and EMS was called. Pt was hypoxic and unresponsive at nursing home with noted facial droop. Pt minimally verbal but alert/oriented x1, complaining of pain in left leg at site of fracture, and complaining of SOB. Code stroke called from triage due to AMS and possible right facial droop, last known baseline was last night at unknown time;  pt somnolent but arousal to voice, no obvious facial droop ; presentation and pt's history more consistent with possible hypercapnia vs. metabolic issue, less concerning for acute CVA, pt not in tPA window;  Hemodynamically stable. EKG with NSR, VR 82, QTc 440, no T wave changes of hyperkalemia. CXR image reviewed, appear to have pulm congestion.     FAMILY HISTORY: cannot answer at this time    SOCIAL HISTORY:  Smoking: denies  EtOH Use: denies    Allergies  No Known Allergies  Intolerances    HOME MEDICATIONS:    REVIEW OF SYSTEMS:  CONSTITUTIONAL: No weakness, fevers or chills  EYES/ENT: No visual changes;  No vertigo or throat pain   NECK: No pain or stiffness  RESPIRATORY: No cough, wheezing, hemoptysis; No shortness of breath  CARDIOVASCULAR: No chest pain or palpitations  GASTROINTESTINAL: No abdominal or epigastric pain. No nausea, vomiting, or hematemesis; No diarrhea or constipation. No melena or hematochezia.  GENITOURINARY: No dysuria, frequency or hematuria  NEUROLOGICAL: No numbness or weakness  SKIN: No itching, rashes    OBJECTIVE:  ICU Vital Signs Last 24 Hrs  HR: 80 (02 Dec 2021 14:08) (71 - 80)  BP: 151/88 (02 Dec 2021 11:41) (151/88 - 151/88)  RR: 14 (02 Dec 2021 11:41) (14 - 14)  SpO2: 96% (02 Dec 2021 14:08) (96% - 99%)    CAPILLARY BLOOD GLUCOSE  POCT Blood Glucose.: 152 mg/dL (02 Dec 2021 11:43)    PE:  GENERAL: NAD, resting in bed, RR 20, does not seem to use accessory muscles of breathing at this time  HEAD:  Atraumatic, Normocephalic  EYES: EOMI, conjunctiva and sclera clear  ENT: Dry mucous membranes around lips and nares  NECK: Supple, No JVD noted  CHEST/LUNG: Decreased breath sounds b/l 2/2 body habitus. No rales, rhonchi, wheezing, or rubs heard. Labored respirations, using accessory muscles of breathing  HEART: Limited exam 2/2 Bipap sounds. Regular rate and rhythm; No murmurs, rubs, or gallops, consistent with EKG  ABDOMEN: Bowel sounds present; Soft, Nontender, Nondistended.  EXTREMITIES:  2+ Peripheral Pulses, brisk capillary refill. No clubbing, cyanosis. +pitting edema left leg +4, right leg +2  NERVOUS SYSTEM:  Alert & Oriented X1-2, speech clear. Moving all ext  MSK: FROM all 4 extremities, full and equal strength  SKIN: No rashes or lesions    HOSPITAL MEDICATIONS:  MEDICATIONS  (STANDING):    MEDICATIONS  (PRN):    LABS:                        9.0    9.61  )-----------( 347      ( 02 Dec 2021 12:21 )             34.6     12-02    137  |  93<L>  |  63<H>  ----------------------------<  143<H>  6.9<HH>   |  37<H>  |  2.18<H>    Ca    9.0      02 Dec 2021 12:21    TPro  8.3  /  Alb  3.3  /  TBili  0.3  /  DBili  x   /  AST  38<H>  /  ALT  13  /  AlkPhos  59  12-02    Arterial Blood Gas:  12-02 @ 12:21  7.19/117/136/45/98.7/13.7    Venous Blood Gas:  12-02 @ 13:30  7.21/106/27/42/47.6  VBG Lactate: 0.9    MICROBIOLOGY:     RADIOLOGY:  [ ] Reviewed and interpreted by me    EKG:    MICU recommendations:  85y/o F w/ h/o HFpEF, obesity, SHAD on CPAP, HTN, T2DM, COPD, lymphedema, s/p fall and knee fracture p/w AMS and facial droop, MICU consulted for increased work of breathing on Bipap and elevated pCO2. Pt appears to have component of chronic hypercapnia c/f OHS with history of COPD and OS, Bipap dependency    -Recommend goal O2 sat 88-94% to avoid depressing respiratory rate in the setting of chronic hypercapnia  -recommend to change to AVAPs settings to 16/6, and repeat another VBG, BMP, and EKG in 30 minutes  -Bronchodilators to cover for possible COPD exacerbation  -Levaquin to cover for pneumonia  -Avoid opioids and other sedating medications to preserve respiratory status    Maria Esther Ceballos MD  MICU Resident  #[id number]    Attending Note: Agree with above. Patient seen and examined.  86 year old woman with obesity, SHAD, HTN, DM, lymphedema brought in from NH with alteration in mental status found to have hypercapnia improved with BiPAP changed to AVAPS for better minute ventilation follow up ABG and will treat for COPD exacerbation.

## 2021-12-02 NOTE — CONSULT NOTE ADULT - ATTENDING COMMENTS
pt seen and evaluated at bedside  briefly pt with recent knee surgery immobilization presents with ams, hypercapnia   now improved after bipap   obesity hypoventilation   cannot r/o sequelae of immobility   ct head neg  nonfocal exam   plan as above

## 2021-12-02 NOTE — CONSULT NOTE ADULT - ASSESSMENT
INCOMPLETE Assessment: Pt is an 87 y/o woman w/ a PMH of asthma/COPD, CHF, on aspirin, HTN, lymphedema, DM2, and past surgical history of knee surgery, presents to the [facility] ED as a code stroke after home aide/daughter activated EMS due to patient's lack of responsiveness with LWK 2:30a 12/2/21. Neurological exam does not demonstrate focality. CTH noncon pending. Neurovascular imaging deferred given JOSEPH and low suspicion of LVO at this time. Patient out of window for tpa. Patient not a candidate for mechanical thrombectomy given low suspicion of LVO.    Impression: toxic metabolic encephalopathy likely in setting of acidosis and hypercapnea    Recommendations:  [ ] f/u CTH noncon  [ ] a1c, lipid profile  [ ] c/w aspirin 81mg daily  [ ] c/w rosuvastatin 10mg daily  [ ] infectious and metabolic workup per primary team  [ ] no need for permissive HTN at this time  [ ] if patient does not show improvement, may consider routine EEG    pt case to be d/w Neurology Attending Dr. Mars Assessment: Pt is an 85 y/o woman w/ a PMH of asthma/COPD, CHF, on aspirin, HTN, lymphedema, DM2, and past surgical history of knee surgery, presents to the [facility] ED as a code stroke after home aide/daughter activated EMS due to patient's lack of responsiveness with LWK 2:30a 12/2/21. Neurological exam does not demonstrate focality. CTH noncon pending. Neurovascular imaging deferred given JOSEPH and low suspicion of LVO at this time. Patient out of window for tpa. Patient not a candidate for mechanical thrombectomy given low suspicion of LVO.    Impression: toxic metabolic encephalopathy likely in setting of acidosis and hypercapnea    Recommendations:  [ ] f/u CTH noncon  [ ] a1c, lipid profile  [ ] c/w aspirin 81mg daily  [ ] c/w rosuvastatin 10mg daily  [ ] infectious and metabolic workup per primary team  [ ] no need for permissive HTN at this time  [ ] if patient does not show improvement, may consider routine EEG

## 2021-12-02 NOTE — CONSULT NOTE ADULT - ASSESSMENT
Echo 11/19/21: EF 60-65%, nl lv sys fx, mild AS, mild Ms   Echo 6/2/21: Min MR, grossly nl lv sys fx   Echo 12/5/20: grossly nl lv sys fx    a/p  5 y/o female w pmhx of HFpEF, obesity, SHAD on CPAP, HTN, T2DM, COPD, lymphedema, s/p fall and knee fracture s/p  knee surgery, presents to the ED with AMS and facial droop.    #AMS  -likely in setting of hypercapnic resp failure  -pending CTH   -Neuro eval    #Hypercapnic respiratory failure  -ABG noted  -on bipap  -mgmt per ED    #Chronic Diastolic CHF  -doesnt appear overtly overloaded on exam  -check CXR  -would hold home bumex given JOSEPH   -recent Echo w grossly nl lv sys fx   -hsT indeterminate, no acs on EKG   -eventual resume bb - pt NPO    #Hyperkalemia  -tx per ED       dvt ppx    Echo 11/19/21: EF 60-65%, nl lv sys fx, mild AS, mild Ms   Echo 6/2/21: Min MR, grossly nl lv sys fx   Echo 12/5/20: grossly nl lv sys fx    a/p  5 y/o female w pmhx of HFpEF, obesity, SHAD on CPAP, HTN, T2DM, COPD, lymphedema, s/p fall and knee fracture s/p  knee surgery, presents to the ED with AMS and facial droop.    #AMS  -likely in setting of hypercapnic resp failure  -pending CTH   -Neuro eval  -MICU eval    #Hypercapnic respiratory failure  -ABG noted  -on bipap  -mgmt per ED    #Chronic Diastolic CHF  -doesnt appear overtly overloaded on exam  -check CXR  -would hold home bumex given JOSEPH   -recent Echo w grossly nl lv sys fx   -hsT indeterminate, no acs on EKG   -eventual resume bb - pt NPO    #Hyperkalemia  -tx per ED       dvt ppx    Echo 11/19/21: EF 60-65%, nl lv sys fx, mild AS, mild Ms   Echo 6/2/21: Min MR, grossly nl lv sys fx   Echo 12/5/20: grossly nl lv sys fx    a/p  7 y/o female w pmhx of HFpEF, obesity, SHAD on CPAP, HTN, T2DM, COPD, lymphedema, s/p fall and knee fracture s/p  knee surgery, presents to the ED with AMS and facial droop.    #AMS  -likely in setting of hypercapnic resp failure  -pending CTH   -Neuro eval  -MICU eval    #Hypercapnic respiratory failure  -ABG noted  -on bipap  -mgmt per ED    #Chronic Diastolic CHF  -doesnt appear overtly overloaded on exam - pt w chronic Lymphedema  -check CXR  -would hold home bumex given JOSEPH   -recent Echo w grossly nl lv sys fx   -hsT indeterminate, no acs on EKG   -eventual resume bb - pt NPO    #Hyperkalemia  -tx per ED       dvt ppx

## 2021-12-02 NOTE — ED PROVIDER NOTE - ST/T WAVE
The EKG shows no ST segment elevations or depressions. The EKG shows no ST segment elevations or depressions.  No signs of hyperkalemia.

## 2021-12-02 NOTE — ED PROVIDER NOTE - PHYSICAL EXAMINATION
Gen: respiratory distress, non-toxic appearing  Head: normal appearing  HEENT: normal conjunctiva, oral mucosa moist  Lung: respiratory distress, CTA b/l     CV: regular rate and rhythm, no murmurs  Abd: obesity soft, distended, non tender   MSK: R LE brace  Neuro: No focal deficits, AAOx1  Skin: Warm

## 2021-12-02 NOTE — ED PROVIDER NOTE - PROGRESS NOTE DETAILS
Dr. Hoyos: code stroke called from triage due to AMS and possible right facial droop, last known baseline was last night at unknown time; on my eval pt somnolent but awakens to voice, no obvious facial droop for me; presentation and pt's history more consistent with possible hypercapnia vs. metabolic issue, less concerning for acute CVA, pt not in tPA window; initial plan in discussion with neurology was for CT/CTA, but pt with very difficult IV access and noted ot be hypoxic to 82% on room air--decision made to move pt to trauma B for IV access, addressing of hypoxia and then will get imaging when pt more stable Valente, PGY3: pt stablized on Bipap SpO2 mid 90s. likely CO2 narcosis. CT perfusion study canceled in coordination with neurology. no facial droop noted on exam. Dr. Hoyos: spoke to daughter, states pt has been using her BiPAP but has been more "tired" since yesterday, this morning was more unresponsive and so visiting nurse and EMS was called Valente, PGY3: icu consulted for hypercarbic respiratory failure. bipap settings adjusted and will see pt in ED. qrs >120 with 6.9 K, treated with insulin and dextrose/calcium. Valente, PGY3: pt stabilized on Bipap SpO2 mid 90s. likely CO2 narcosis. CT perfusion study canceled in coordination with neurology. no facial droop noted on exam. Valente, PGY3: icu consulted for hypercarbic respiratory failure. bipap settings adjusted and will see pt in ED. qrs >120 with 6.9 K, treated with insulin and dextrose/calcium and will repeat Dr. Hoyos: pt stable, in NAD; awaiting repeat K, will re-dose meds

## 2021-12-02 NOTE — CONSULT NOTE ADULT - TIME BILLING
Patient seen and examined.  Agree with above NP note.      Echo 11/19/21: EF 60-65%, nl lv sys fx, mild AS, mild Ms   Echo 6/2/21: Min MR, grossly nl lv sys fx   Echo 12/5/20: grossly nl lv sys fx    a/p  85 y/o female w pmhx of HFpEF, obesity, SHAD on CPAP, HTN, T2DM, COPD, lymphedema, s/p fall and knee fracture s/p  knee surgery, presents to the ED with AMS and facial droop.    #AMS  -in setting of hypercapnic resp failure  -CVA r/o  -MICU eval noted    #Hypercapnic respiratory failure  -ABG noted, bipap per icu/med  -trend abg    #Chronic Diastolic CHF  -vol status stable  -pt w chronic Lymphedema  -hold home bumex given JOSEPH   -recent Echo w grossly nl lv sys fx   -hsT indeterminate, no acs on EKG     #Hyperkalemia  -tx per ED     dvt ppx

## 2021-12-02 NOTE — ED ADULT TRIAGE NOTE - CHIEF COMPLAINT QUOTE
left side facial droop O2 sat in field 88% left side facial droop O2 sat in field 88% CoDE StRoKe CaLLed

## 2021-12-02 NOTE — ED PROVIDER NOTE - ATTENDING CONTRIBUTION TO CARE
Dr. Hoyos: 85 yo female with heart failure with preserved EF, SHAD with BMI 52, on CPAP, HTN, DM, COPD, s/p recent inpatient admission for fall with right knee fracture s/p surgery, in ED with AMS/somnolence and possible right facial droop.  Last known baseline normal was last night at unknown time.  Stroke code called from triage but clinical presentation felt to be more likely due to hypercarbic narcosis and so stroke code de-escalated.  On exam pt unwell appearing, in moderate distress, initially hypoxic to 82% but improved to 100% with O2 NRB and then BiPAP, heart RRR, lungs CTAB, abd NTND, right LE in post-surgical dressing, left LE with mild edema, strength grossly intact in all extremities (limited in right LE due to post surgical) and skin without rash. Dr. Hoyos: 87 yo female with heart failure with preserved EF, SHAD with BMI 47, on CPAP, HTN, DM, COPD, s/p recent inpatient admission for fall with right knee fracture s/p surgery, in ED with AMS/somnolence and possible right facial droop.  Last known baseline normal was last night at unknown time.  Stroke code called from triage but clinical presentation felt to be more likely due to hypercarbic narcosis and so stroke code de-escalated.  On exam pt unwell appearing, in moderate distress, initially hypoxic to 82% but improved to 100% with O2 NRB and then BiPAP, heart RRR, lungs CTAB, abd NTND, right LE in post-surgical dressing, left LE with mild edema, strength grossly intact in all extremities (limited in right LE due to post surgical) and skin without rash.

## 2021-12-02 NOTE — CONSULT NOTE ADULT - SUBJECTIVE AND OBJECTIVE BOX
CARDIOLOGY CONSULT - Dr. Bermudez         HPI:    85y/o F w/ h/o HFpEF, obesity, SHAD on CPAP, HTN, T2DM, COPD, lymphedema, s/p fall and knee fracture p/w AMS and facial droop.  daughter states pt has been using her BiPAP but has been more "tired" since yesterday, this morning was more unresponsive and so visiting nurse and EMS was called.  Pt was hypoxic and unresponsive at nursing home with noted facial droop. Unable to obtain history from pt AAOx1-2.   code stroke called from triage due to AMS and possible right facial droop, last known baseline was last night at unknown time;  pt somnolent but arousal to voice, no obvious facial droop ; presentation and pt's history more consistent with possible hypercapnia vs. metabolic issue, less concerning for acute CVA, pt not in tPA window;      PAST MEDICAL & SURGICAL HISTORY:  HTN (hypertension)    HLD (hyperlipidemia)    Asthma    DM (diabetes mellitus)    GERD (gastroesophageal reflux disease)    Lymphedema    S/P knee replacement            PREVIOUS DIAGNOSTIC TESTING:    [ ] Echocardiogram:  [ ]  Catheterization:  [ ] Stress Test:  	    MEDICATIONS:  Home Medications:  Advair Diskus 250 mcg-50 mcg inhalation powder: 1 puff(s) inhaled 2 times a day (23 Nov 2021 14:10)  alendronate 70 mg oral tablet: 1 tab(s) orally once a week (23 Nov 2021 14:10)  ascorbic acid 500 mg oral capsule: 1 cap(s) orally once a day (30 Oct 2019 16:54)  azelastine 137 mcg/inh (0.1%) nasal spray: 2 spray(s) nasal 2 times a day (30 Oct 2019 16:54)  glimepiride 2 mg oral tablet: 1 tab(s) orally once a day (30 Oct 2019 16:54)  ipratropium-albuterol 0.5 mg-2.5 mg/3 mLinhalation solution: 3 milliliter(s) inhaled every 6 hours, As Needed (04 Jun 2021 10:05)  montelukast 10 mg oral tablet: 1 tab(s) orally once a day (30 Oct 2019 16:54)  Neurontin 100 mg oral capsule: 1 cap(s) orally 3 times a day (29 Nov 2020 18:19)  ocular lubricant ophthalmic solution: 1 drop(s) to each affected eye 3 times a day, As needed, Dry Eyes (06 Nov 2019 13:41)  ProAir HFA 90 mcg/inh inhalation aerosol: 2 puff(s) inhaled 4 times a day, As Needed (23 Nov 2021 14:10)      MEDICATIONS  (STANDING):  sodium chloride 0.9% Bolus 500 milliLiter(s) IV Bolus once      FAMILY HISTORY:      SOCIAL HISTORY:    [ ] Non-smoker  [ ] Smoker  [ ] Alcohol    Allergies    No Known Allergies    Intolerances    	    REVIEW OF SYSTEMS:  CONSTITUTIONAL: No fever, weight loss, or fatigue  EYES: No eye pain, visual disturbances, or discharge  ENMT:  No difficulty hearing, tinnitus, vertigo; No sinus or throat pain  NECK: No pain or stiffness  RESPIRATORY: No cough, wheezing, chills or hemoptysis; No Shortness of Breath  CARDIOVASCULAR: No chest pain, palpitations, passing out, dizziness, or leg swelling  GASTROINTESTINAL: No abdominal or epigastric pain. No nausea, vomiting, or hematemesis; No diarrhea or constipation. No melena or hematochezia.  GENITOURINARY: No dysuria, frequency, hematuria, or incontinence  NEUROLOGICAL: No headaches, memory loss, loss of strength, numbness, or tremors  SKIN: No itching, burning, rashes, or lesions   	    [ ] All others negative	  [ ] Unable to obtain    PHYSICAL EXAM:  T(C): --  HR: 74 (12-02-21 @ 12:18) (71 - 74)  BP: 151/88 (12-02-21 @ 11:41) (151/88 - 151/88)  RR: 14 (12-02-21 @ 11:41) (14 - 14)  SpO2: 98% (12-02-21 @ 12:18) (98% - 99%)  Wt(kg): --  I&O's Summary      Appearance: Normal	  Psychiatry: A & O x 3, Mood & affect appropriate  HEENT:   Normal oral mucosa, PERRL, EOMI	  Lymphatic: No lymphadenopathy  Cardiovascular: Normal S1 S2,RRR, No JVD, No murmurs  Respiratory: Lungs clear to auscultation	  Gastrointestinal:  Soft, Non-tender, + BS	  Skin: No rashes, No ecchymoses, No cyanosis	  Neurologic: Non-focal  Extremities: Normal range of motion, No clubbing, cyanosis or edema  Vascular: Peripheral pulses palpable 2+ bilaterally    TELEMETRY: 	    ECG:  	  RADIOLOGY:  OTHER: 	  	  LABS:	 	    CARDIAC MARKERS:  Troponin T, High Sensitivity Result: 42 ng/L (12-02 @ 12:21)                                  9.0    9.61  )-----------( 347      ( 02 Dec 2021 12:21 )             34.6     12-02    137  |  93<L>  |  63<H>  ----------------------------<  143<H>  6.9<HH>   |  37<H>  |  2.18<H>    Ca    9.0      02 Dec 2021 12:21    TPro  8.3  /  Alb  3.3  /  TBili  0.3  /  DBili  x   /  AST  38<H>  /  ALT  13  /  AlkPhos  59  12-02      proBNP: Serum Pro-Brain Natriuretic Peptide: 1192 pg/mL (12-02 @ 12:21)    Lipid Profile:   HgA1c:   TSH:        CARDIOLOGY CONSULT - Dr. Bermudez         HPI:   87 y/o female w pmhx of HFpEF, obesity, SHAD on CPAP, HTN, T2DM, COPD, lymphedema, s/p fall and knee fracture s/p  knee surgery, presents to the ED with AMS and facial droop. Patient lives at home with daughter who helps her w/ ADLs in addition to a home nurse. Per pt's daughter, at baseline she can communicate about superficial topics but not in depth. She uses a walker to go to the bathroom. She had a recent right knee fracture which has limited her movement more than normal. At home she is on aspirin, daughter reports that she missed her dose yesterday (12/1). Daughter states pt was at her baseline last night. She reports that she last spoke w/ pt at 2:30 am. This morning (12/2) the home nurse and daughter both noted the pt to be less communicative and activated EMS.  Unable to obtain history from pt AAOx1-2.   code stroke called from triage due to AMS and possible right facial droop, last known baseline was last night at unknown time;  pt somnolent but arousal to voice, no obvious facial droop ; presentation and pt's history more consistent with possible hypercapnia vs. metabolic issue, less concerning for acute CVA, pt not in tPA window        PAST MEDICAL & SURGICAL HISTORY:  HTN (hypertension)    HLD (hyperlipidemia)    Asthma    DM (diabetes mellitus)    GERD (gastroesophageal reflux disease)    Lymphedema    S/P knee replacement            PREVIOUS DIAGNOSTIC TESTING:    [ ] Echocardiogram: < from: TTE Echo Complete w/o Contrast w/ Doppler (11.19.21 @ 13:56) >      PHYSICIAN INTERPRETATION:  Left Ventricle: The left ventricle was not well visualized. The left ventricular internal cavity size is normal.  Global LV systolic function was normal. Left ventricular ejection fraction, by visual estimation, is 60 to 65%. Spectral Doppler shows impaired relaxation pattern of left ventricular myocardial filling (Grade I diastolic dysfunction).  Right Ventricle: Normal right ventricular size and function. The right ventricle was not well visualized.  Left Atrium: Normal left atrial size.  Right Atrium: Normal right atrial size.  Pericardium: There is no evidence of pericardial effusion.  Mitral Valve: The mitral valve is not well seen. Mildthickening and calcification of the anterior and posterior mitral valve leaflets. Mitral leaflet mobility is normal. There is moderate mitral annular calcification. Peak transmitral mean gradient equals 4.7 mmHg, calculated mitral valve area by pressure half time equals 5.07 cm² consistent with No evidence of mitral stenosis. Mitral valve mean gradient is 4.7 mmHg consistent with mild mitral stenosis.  Tricuspid Valve: The tricuspid valve is not well seen. The tricuspid valve is degenerative in appearance. Trivial tricuspid regurgitation is visualized. Adequate TR velocity was not obtained to accurately assess RVSP.  Aortic Valve: The aortic valve was not well visualized. Mild aortic stenosis is present. Peak transaortic gradient equals 21.7 mmHg, mean transaortic gradient equals 10.9 mmHg, the calculated aortic valve area equals 1.69 cm² by the continuity equation consistent with mild aortic stenosis. The peak aortic velocity was obtained from the apical view. Trivial aortic valve regurgitation is seen.  Pulmonic Valve: The pulmonic valve was not well visualized. No indication of pulmonic valve regurgitation.  Aorta: Aortic root measured at Sinus of Valsalva is normal. There is mild aortic root calcification.  Venous: IVC not well-visualized.      Summary:   1. Left ventricular ejection fraction, by visual estimation, is 60 to 65%.   2. Technically difficult study.   3. Normal global left ventricular systolic function.   4. Normal left ventricular internal cavity size.   5. Spectral Doppler shows impaired relaxation pattern of left ventricular myocardial filling (Grade I diastolic dysfunction).   6. There is mild concentric left ventricular hypertrophy.   7. Normal right ventricular size and function.   8. Normal left atrial size.   9. Normal right atrial size.  10. There is no evidence of pericardial effusion.  11. Mild thickening and calcification of the anterior and posterior mitral valve leaflets.  12. Moderate mitral annular calcification.  13. Degenerative tricuspidvalve.  14. Mild aortic valve stenosis.  15. Mitral valve mean gradient is 4.7 mmHg consistent with mild mitral stenosis.  16. Peak transaortic gradient equals 21.7 mmHg, mean transaortic gradient equals 10.9 mmHg, the calculated aortic valve area equals 1.69 cm² by the continuity equation consistent with mild aortic stenosis.  17. There is mild aortic root calcification.    < end of copied text >    [ ]  Catheterization:  [ ] Stress Test:  	    MEDICATIONS:  Home Medications:  Advair Diskus 250 mcg-50 mcg inhalation powder: 1 puff(s) inhaled 2 times a day (23 Nov 2021 14:10)  alendronate 70 mg oral tablet: 1 tab(s) orally once a week (23 Nov 2021 14:10)  ascorbic acid 500 mg oral capsule: 1 cap(s) orally once a day (30 Oct 2019 16:54)  azelastine 137 mcg/inh (0.1%) nasal spray: 2 spray(s) nasal 2 times a day (30 Oct 2019 16:54)  glimepiride 2 mg oral tablet: 1 tab(s) orally once a day (30 Oct 2019 16:54)  ipratropium-albuterol 0.5 mg-2.5 mg/3 mLinhalation solution: 3 milliliter(s) inhaled every 6 hours, As Needed (04 Jun 2021 10:05)  montelukast 10 mg oral tablet: 1 tab(s) orally once a day (30 Oct 2019 16:54)  Neurontin 100 mg oral capsule: 1 cap(s) orally 3 times a day (29 Nov 2020 18:19)  ocular lubricant ophthalmic solution: 1 drop(s) to each affected eye 3 times a day, As needed, Dry Eyes (06 Nov 2019 13:41)  ProAir HFA 90 mcg/inh inhalation aerosol: 2 puff(s) inhaled 4 times a day, As Needed (23 Nov 2021 14:10)      MEDICATIONS  (STANDING):  sodium chloride 0.9% Bolus 500 milliLiter(s) IV Bolus once      FAMILY HISTORY:      SOCIAL HISTORY:    [ ] Non-smoker  [ ] Smoker  [ ] Alcohol    Allergies    No Known Allergies    Intolerances    	    REVIEW OF SYSTEMS:  CONSTITUTIONAL: No fever, weight loss, or fatigue  EYES: No eye pain, visual disturbances, or discharge  ENMT:  No difficulty hearing, tinnitus, vertigo; No sinus or throat pain  NECK: No pain or stiffness  RESPIRATORY: No cough, wheezing, chills or hemoptysis; No Shortness of Breath  CARDIOVASCULAR: No chest pain, palpitations, passing out, dizziness, or leg swelling  GASTROINTESTINAL: No abdominal or epigastric pain. No nausea, vomiting, or hematemesis; No diarrhea or constipation. No melena or hematochezia.  GENITOURINARY: No dysuria, frequency, hematuria, or incontinence  NEUROLOGICAL: No headaches, memory loss, loss of strength, numbness, or tremors  SKIN: No itching, burning, rashes, or lesions   	    [x ] All others negative	see hpi   [ ] Unable to obtain    PHYSICAL EXAM:  T(C): --  HR: 74 (12-02-21 @ 12:18) (71 - 74)  BP: 151/88 (12-02-21 @ 11:41) (151/88 - 151/88)  RR: 14 (12-02-21 @ 11:41) (14 - 14)  SpO2: 98% (12-02-21 @ 12:18) (98% - 99%)  Wt(kg): --  I&O's Summary      Appearance: Normal	  Psychiatry: A & O x 3, Mood & affect appropriate  HEENT:   Normal oral mucosa, PERRL, EOMI	  Lymphatic: No lymphadenopathy  Cardiovascular: Normal S1 S2,RRR, No JVD, No murmurs  Respiratory: Lungs clear to auscultation	  Gastrointestinal:  Soft, Non-tender, + BS	  Skin: No rashes, No ecchymoses, No cyanosis	  Neurologic: Non-focal  Extremities: Normal range of motion, No clubbing, cyanosis or edema  Vascular: Peripheral pulses palpable 2+ bilaterally    TELEMETRY: 	NSR    ECG:  	NSR - no acute ischemic changes  RADIOLOGY:    OTHER: 	  	  LABS:	 	    CARDIAC MARKERS:  Troponin T, High Sensitivity Result: 42 ng/L (12-02 @ 12:21)                                  9.0    9.61  )-----------( 347      ( 02 Dec 2021 12:21 )             34.6     12-02    137  |  93<L>  |  63<H>  ----------------------------<  143<H>  6.9<HH>   |  37<H>  |  2.18<H>    Ca    9.0      02 Dec 2021 12:21    TPro  8.3  /  Alb  3.3  /  TBili  0.3  /  DBili  x   /  AST  38<H>  /  ALT  13  /  AlkPhos  59  12-02      proBNP: Serum Pro-Brain Natriuretic Peptide: 1192 pg/mL (12-02 @ 12:21)    Lipid Profile:   HgA1c:   TSH:

## 2021-12-03 LAB
ALBUMIN SERPL ELPH-MCNC: 3.2 G/DL — LOW (ref 3.3–5)
ALP SERPL-CCNC: 48 U/L — SIGNIFICANT CHANGE UP (ref 40–120)
ALT FLD-CCNC: 8 U/L — SIGNIFICANT CHANGE UP (ref 4–33)
ANION GAP SERPL CALC-SCNC: 7 MMOL/L — SIGNIFICANT CHANGE UP (ref 7–14)
AST SERPL-CCNC: 14 U/L — SIGNIFICANT CHANGE UP (ref 4–32)
BASE EXCESS BLDV CALC-SCNC: 14.6 MMOL/L — HIGH (ref -2–3)
BILIRUB SERPL-MCNC: 0.3 MG/DL — SIGNIFICANT CHANGE UP (ref 0.2–1.2)
BLOOD GAS ARTERIAL COMPREHENSIVE RESULT: SIGNIFICANT CHANGE UP
BLOOD GAS ARTERIAL COMPREHENSIVE RESULT: SIGNIFICANT CHANGE UP
BLOOD GAS VENOUS COMPREHENSIVE RESULT: SIGNIFICANT CHANGE UP
BUN SERPL-MCNC: 62 MG/DL — HIGH (ref 7–23)
CALCIUM SERPL-MCNC: 9.1 MG/DL — SIGNIFICANT CHANGE UP (ref 8.4–10.5)
CHLORIDE BLDV-SCNC: 98 MMOL/L — SIGNIFICANT CHANGE UP (ref 96–108)
CHLORIDE SERPL-SCNC: 97 MMOL/L — LOW (ref 98–107)
CO2 BLDV-SCNC: 46.1 MMOL/L — HIGH (ref 22–26)
CO2 SERPL-SCNC: 38 MMOL/L — HIGH (ref 22–31)
CREAT SERPL-MCNC: 1.55 MG/DL — HIGH (ref 0.5–1.3)
GAS PNL BLDV: 139 MMOL/L — SIGNIFICANT CHANGE UP (ref 136–145)
GLUCOSE BLDC GLUCOMTR-MCNC: 111 MG/DL — HIGH (ref 70–99)
GLUCOSE BLDC GLUCOMTR-MCNC: 116 MG/DL — HIGH (ref 70–99)
GLUCOSE BLDC GLUCOMTR-MCNC: 137 MG/DL — HIGH (ref 70–99)
GLUCOSE BLDC GLUCOMTR-MCNC: 147 MG/DL — HIGH (ref 70–99)
GLUCOSE BLDC GLUCOMTR-MCNC: 165 MG/DL — HIGH (ref 70–99)
GLUCOSE BLDC GLUCOMTR-MCNC: 237 MG/DL — HIGH (ref 70–99)
GLUCOSE BLDC GLUCOMTR-MCNC: 246 MG/DL — HIGH (ref 70–99)
GLUCOSE BLDV-MCNC: 135 MG/DL — HIGH (ref 70–99)
GLUCOSE SERPL-MCNC: 149 MG/DL — HIGH (ref 70–99)
HCO3 BLDV-SCNC: 43 MMOL/L — HIGH (ref 22–29)
HCT VFR BLD CALC: 27.7 % — LOW (ref 34.5–45)
HCT VFR BLDA CALC: 24 % — LOW (ref 34.5–46.5)
HGB BLD CALC-MCNC: 7.9 G/DL — LOW (ref 11.5–15.5)
HGB BLD-MCNC: 7.5 G/DL — LOW (ref 11.5–15.5)
LACTATE BLDV-MCNC: 0.6 MMOL/L — SIGNIFICANT CHANGE UP (ref 0.5–2)
LEGIONELLA AG UR QL: NEGATIVE — SIGNIFICANT CHANGE UP
MAGNESIUM SERPL-MCNC: 3 MG/DL — HIGH (ref 1.6–2.6)
MCHC RBC-ENTMCNC: 26.5 PG — LOW (ref 27–34)
MCHC RBC-ENTMCNC: 27.1 GM/DL — LOW (ref 32–36)
MCV RBC AUTO: 97.9 FL — SIGNIFICANT CHANGE UP (ref 80–100)
NRBC # BLD: 0 /100 WBCS — SIGNIFICANT CHANGE UP
NRBC # FLD: 0.02 K/UL — HIGH
PCO2 BLDV: 90 MMHG — HIGH (ref 39–42)
PH BLDV: 7.29 — LOW (ref 7.32–7.43)
PHOSPHATE SERPL-MCNC: 4.4 MG/DL — SIGNIFICANT CHANGE UP (ref 2.5–4.5)
PLATELET # BLD AUTO: 348 K/UL — SIGNIFICANT CHANGE UP (ref 150–400)
PO2 BLDV: 71 MMHG — SIGNIFICANT CHANGE UP
POTASSIUM BLDV-SCNC: 5.7 MMOL/L — HIGH (ref 3.5–5.1)
POTASSIUM SERPL-MCNC: 5.6 MMOL/L — HIGH (ref 3.5–5.3)
POTASSIUM SERPL-SCNC: 5.6 MMOL/L — HIGH (ref 3.5–5.3)
PROT SERPL-MCNC: 7.2 G/DL — SIGNIFICANT CHANGE UP (ref 6–8.3)
RBC # BLD: 2.83 M/UL — LOW (ref 3.8–5.2)
RBC # FLD: 16 % — HIGH (ref 10.3–14.5)
SAO2 % BLDV: 96.2 % — SIGNIFICANT CHANGE UP
SODIUM SERPL-SCNC: 142 MMOL/L — SIGNIFICANT CHANGE UP (ref 135–145)
WBC # BLD: 9.13 K/UL — SIGNIFICANT CHANGE UP (ref 3.8–10.5)
WBC # FLD AUTO: 9.13 K/UL — SIGNIFICANT CHANGE UP (ref 3.8–10.5)

## 2021-12-03 PROCEDURE — 76604 US EXAM CHEST: CPT | Mod: 26,GC

## 2021-12-03 PROCEDURE — 99291 CRITICAL CARE FIRST HOUR: CPT | Mod: 25

## 2021-12-03 PROCEDURE — 93308 TTE F-UP OR LMTD: CPT | Mod: 26,GC

## 2021-12-03 RX ORDER — HYDRALAZINE HCL 50 MG
10 TABLET ORAL ONCE
Refills: 0 | Status: DISCONTINUED | OUTPATIENT
Start: 2021-12-03 | End: 2021-12-03

## 2021-12-03 RX ORDER — OXYCODONE AND ACETAMINOPHEN 5; 325 MG/1; MG/1
1 TABLET ORAL EVERY 6 HOURS
Refills: 0 | Status: DISCONTINUED | OUTPATIENT
Start: 2021-12-03 | End: 2021-12-08

## 2021-12-03 RX ORDER — PIPERACILLIN AND TAZOBACTAM 4; .5 G/20ML; G/20ML
3.38 INJECTION, POWDER, LYOPHILIZED, FOR SOLUTION INTRAVENOUS EVERY 8 HOURS
Refills: 0 | Status: DISCONTINUED | OUTPATIENT
Start: 2021-12-03 | End: 2021-12-04

## 2021-12-03 RX ORDER — FUROSEMIDE 40 MG
40 TABLET ORAL DAILY
Refills: 0 | Status: DISCONTINUED | OUTPATIENT
Start: 2021-12-03 | End: 2021-12-03

## 2021-12-03 RX ORDER — FUROSEMIDE 40 MG
40 TABLET ORAL ONCE
Refills: 0 | Status: COMPLETED | OUTPATIENT
Start: 2021-12-03 | End: 2021-12-03

## 2021-12-03 RX ORDER — INSULIN HUMAN 100 [IU]/ML
5 INJECTION, SOLUTION SUBCUTANEOUS ONCE
Refills: 0 | Status: COMPLETED | OUTPATIENT
Start: 2021-12-03 | End: 2021-12-03

## 2021-12-03 RX ORDER — DEXTROSE 50 % IN WATER 50 %
50 SYRINGE (ML) INTRAVENOUS ONCE
Refills: 0 | Status: COMPLETED | OUTPATIENT
Start: 2021-12-03 | End: 2021-12-03

## 2021-12-03 RX ORDER — AZITHROMYCIN 500 MG/1
TABLET, FILM COATED ORAL
Refills: 0 | Status: COMPLETED | OUTPATIENT
Start: 2021-12-03 | End: 2021-12-07

## 2021-12-03 RX ORDER — AZITHROMYCIN 500 MG/1
500 TABLET, FILM COATED ORAL EVERY 24 HOURS
Refills: 0 | Status: COMPLETED | OUTPATIENT
Start: 2021-12-04 | End: 2021-12-05

## 2021-12-03 RX ORDER — HEPARIN SODIUM 5000 [USP'U]/ML
7500 INJECTION INTRAVENOUS; SUBCUTANEOUS EVERY 8 HOURS
Refills: 0 | Status: DISCONTINUED | OUTPATIENT
Start: 2021-12-03 | End: 2021-12-17

## 2021-12-03 RX ORDER — PIPERACILLIN AND TAZOBACTAM 4; .5 G/20ML; G/20ML
3.38 INJECTION, POWDER, LYOPHILIZED, FOR SOLUTION INTRAVENOUS ONCE
Refills: 0 | Status: DISCONTINUED | OUTPATIENT
Start: 2021-12-03 | End: 2021-12-03

## 2021-12-03 RX ORDER — AZITHROMYCIN 500 MG/1
500 TABLET, FILM COATED ORAL ONCE
Refills: 0 | Status: COMPLETED | OUTPATIENT
Start: 2021-12-03 | End: 2021-12-03

## 2021-12-03 RX ADMIN — HEPARIN SODIUM 7500 UNIT(S): 5000 INJECTION INTRAVENOUS; SUBCUTANEOUS at 23:52

## 2021-12-03 RX ADMIN — HEPARIN SODIUM 7500 UNIT(S): 5000 INJECTION INTRAVENOUS; SUBCUTANEOUS at 13:38

## 2021-12-03 RX ADMIN — PIPERACILLIN AND TAZOBACTAM 25 GRAM(S): 4; .5 INJECTION, POWDER, LYOPHILIZED, FOR SOLUTION INTRAVENOUS at 08:01

## 2021-12-03 RX ADMIN — PIPERACILLIN AND TAZOBACTAM 200 GRAM(S): 4; .5 INJECTION, POWDER, LYOPHILIZED, FOR SOLUTION INTRAVENOUS at 00:58

## 2021-12-03 RX ADMIN — GABAPENTIN 100 MILLIGRAM(S): 400 CAPSULE ORAL at 13:38

## 2021-12-03 RX ADMIN — Medication 3 MILLILITER(S): at 21:03

## 2021-12-03 RX ADMIN — Medication 3 MILLILITER(S): at 04:16

## 2021-12-03 RX ADMIN — INSULIN HUMAN 5 UNIT(S): 100 INJECTION, SOLUTION SUBCUTANEOUS at 05:02

## 2021-12-03 RX ADMIN — Medication 100 MILLIGRAM(S): at 23:53

## 2021-12-03 RX ADMIN — HEPARIN SODIUM 5000 UNIT(S): 5000 INJECTION INTRAVENOUS; SUBCUTANEOUS at 00:58

## 2021-12-03 RX ADMIN — Medication 40 MILLIGRAM(S): at 12:27

## 2021-12-03 RX ADMIN — OXYCODONE AND ACETAMINOPHEN 1 TABLET(S): 5; 325 TABLET ORAL at 13:38

## 2021-12-03 RX ADMIN — AZITHROMYCIN 500 MILLIGRAM(S): 500 TABLET, FILM COATED ORAL at 00:58

## 2021-12-03 RX ADMIN — Medication 100 MILLIGRAM(S): at 13:38

## 2021-12-03 RX ADMIN — Medication 50 MILLILITER(S): at 05:02

## 2021-12-03 RX ADMIN — SENNA PLUS 2 TABLET(S): 8.6 TABLET ORAL at 23:52

## 2021-12-03 RX ADMIN — OXYCODONE AND ACETAMINOPHEN 1 TABLET(S): 5; 325 TABLET ORAL at 20:38

## 2021-12-03 RX ADMIN — OXYCODONE AND ACETAMINOPHEN 1 TABLET(S): 5; 325 TABLET ORAL at 14:38

## 2021-12-03 RX ADMIN — Medication 3 MILLILITER(S): at 15:10

## 2021-12-03 RX ADMIN — Medication 3 MILLILITER(S): at 10:36

## 2021-12-03 RX ADMIN — OXYCODONE AND ACETAMINOPHEN 1 TABLET(S): 5; 325 TABLET ORAL at 20:08

## 2021-12-03 RX ADMIN — GABAPENTIN 100 MILLIGRAM(S): 400 CAPSULE ORAL at 23:53

## 2021-12-03 RX ADMIN — PIPERACILLIN AND TAZOBACTAM 25 GRAM(S): 4; .5 INJECTION, POWDER, LYOPHILIZED, FOR SOLUTION INTRAVENOUS at 13:37

## 2021-12-03 RX ADMIN — ATORVASTATIN CALCIUM 40 MILLIGRAM(S): 80 TABLET, FILM COATED ORAL at 23:53

## 2021-12-03 RX ADMIN — PIPERACILLIN AND TAZOBACTAM 25 GRAM(S): 4; .5 INJECTION, POWDER, LYOPHILIZED, FOR SOLUTION INTRAVENOUS at 23:52

## 2021-12-03 RX ADMIN — Medication 40 MILLIGRAM(S): at 05:40

## 2021-12-03 RX ADMIN — HEPARIN SODIUM 5000 UNIT(S): 5000 INJECTION INTRAVENOUS; SUBCUTANEOUS at 08:01

## 2021-12-03 NOTE — PROGRESS NOTE ADULT - SUBJECTIVE AND OBJECTIVE BOX
CARDIOLOGY FOLLOW UP - Dr. Bermudez  Date of Service: 12/3/21  CC: remains on bipap, resting comfortably     Review of Systems:  LEOLA pt A&O x 1-2    PHYSICAL EXAM:  T(C): 35.6 (12-03-21 @ 07:00), Max: 36.7 (12-02-21 @ 16:11)  HR: 76 (12-03-21 @ 12:00) (53 - 80)  BP: 164/81 (12-03-21 @ 12:00) (109/49 - 164/81)  RR: 21 (12-03-21 @ 12:00) (16 - 24)  SpO2: 90% (12-03-21 @ 12:00) (90% - 100%)  Wt(kg): --  I&O's Summary    02 Dec 2021 07:01  -  03 Dec 2021 07:00  --------------------------------------------------------  IN: 463.1 mL / OUT: 400 mL / NET: 63.1 mL    03 Dec 2021 07:01  -  03 Dec 2021 12:17  --------------------------------------------------------  IN: 45 mL / OUT: 0 mL / NET: 45 mL        Appearance: Normal	  Cardiovascular: Normal S1 S2,RRR, No JVD, No murmurs  Respiratory: Lungs clear to auscultation	  Gastrointestinal:  Soft, Non-tender, + BS	  Extremities: Normal range of motion, No clubbing, cyanosis or edema      Home Medications:  Advair Diskus 250 mcg-50 mcg inhalation powder: 1 puff(s) inhaled 2 times a day (23 Nov 2021 14:10)  alendronate 70 mg oral tablet: 1 tab(s) orally once a week (23 Nov 2021 14:10)  ascorbic acid 500 mg oral capsule: 1 cap(s) orally once a day (30 Oct 2019 16:54)  azelastine 137 mcg/inh (0.1%) nasal spray: 2 spray(s) nasal 2 times a day (30 Oct 2019 16:54)  glimepiride 2 mg oral tablet: 1 tab(s) orally once a day (30 Oct 2019 16:54)  ipratropium-albuterol 0.5 mg-2.5 mg/3 mLinhalation solution: 3 milliliter(s) inhaled every 6 hours, As Needed (04 Jun 2021 10:05)  montelukast 10 mg oral tablet: 1 tab(s) orally once a day (30 Oct 2019 16:54)  Neurontin 100 mg oral capsule: 1 cap(s) orally 3 times a day (29 Nov 2020 18:19)  ocular lubricant ophthalmic solution: 1 drop(s) to each affected eye 3 times a day, As needed, Dry Eyes (06 Nov 2019 13:41)  ProAir HFA 90 mcg/inh inhalation aerosol: 2 puff(s) inhaled 4 times a day, As Needed (23 Nov 2021 14:10)      MEDICATIONS  (STANDING):  albuterol/ipratropium for Nebulization 3 milliLiter(s) Nebulizer every 6 hours  amLODIPine   Tablet 10 milliGRAM(s) Oral daily  atorvastatin 40 milliGRAM(s) Oral at bedtime  azithromycin  IVPB      dexMEDEtomidine Infusion 0.05 MICROgram(s)/kG/Hr (1.61 mL/Hr) IV Continuous <Continuous>  furosemide   Injectable 40 milliGRAM(s) IV Push once  gabapentin 100 milliGRAM(s) Oral three times a day  heparin   Injectable 7500 Unit(s) SubCutaneous every 8 hours  hydrALAZINE 100 milliGRAM(s) Oral three times a day  metoprolol succinate  milliGRAM(s) Oral daily  montelukast 10 milliGRAM(s) Oral daily  piperacillin/tazobactam IVPB.. 3.375 Gram(s) IV Intermittent every 8 hours  polyethylene glycol 3350 17 Gram(s) Oral daily  senna 2 Tablet(s) Oral at bedtime      TELEMETRY: 	NSR     ECG:  	  RADIOLOGY:    < from: Xray Chest 1 View- PORTABLE-Urgent (Xray Chest 1 View- PORTABLE-Urgent .) (12.02.21 @ 12:44) >  EXAM: AP portable chest    COMPARISON: Chest radiograph from 11/23/2021    FINDINGS:    The cardiac silhouette appears similar in size.    Right midlung peripheral patchy opacity. No pleural effusions or pneumothorax.    Degenerative changes of the bones.    IMPRESSION:    Right midlung peripheral patchy opacity, may represent infection.    --- End of Report ---    < end of copied text >    DIAGNOSTIC TESTING:  [ ] Echocardiogram:  [ ]  Catheterization:  [ ] Stress Test:    OTHER: 	    LABS:	 	    Troponin T, High Sensitivity Result: 44 ng/L (12-02 @ 14:36)  Troponin T, High Sensitivity Result: 42 ng/L (12-02 @ 12:21)                          7.5    9.13  )-----------( 348      ( 03 Dec 2021 03:40 )             27.7     12-03    142  |  97<L>  |  62<H>  ----------------------------<  149<H>  5.6<H>   |  38<H>  |  1.55<H>    Ca    9.1      03 Dec 2021 03:40  Phos  4.4     12-03  Mg     3.00     12-03    TPro  7.2  /  Alb  3.2<L>  /  TBili  0.3  /  DBili  x   /  AST  14  /  ALT  8   /  AlkPhos  48  12-03

## 2021-12-03 NOTE — PROGRESS NOTE ADULT - ASSESSMENT
85y/o F w/ h/o HFpEF, obesity, SHAD on CPAP, HTN, T2DM, COPD, lymphedema, s/p fall and knee fracture p/w AMS and facial droop, MICU consulted for increased work of breathing on Bipap and elevated pCO2. Pt appears to have component of chronic hypercapnia c/f OHS with history of COPD and OS, Bipap dependency    NEURO:  #Mental status: unable to assess accurately due to SOB, A&O x 2  -Monitor mental status  -If declining mental status    CV:  #HLD  -continue home atorvastatin    #HFpEF: TTE on 11/21 shows EF 60-65%, grade 1 diastolic dysfn  -Bblocker continue home metoprolol  -Diuretic: on home bumex, will give PRN lasix for now 40mg IV BID   -Trend I/Os and daily weights, and Cr    PULM:  #COPD:  -Continue duonebs, albuterol PRN, monteleukast, chest PT  -AVAPs settings 16/6    RENAL:  #Cr 2.0, doubled from baseline, send urine lytes although may be inaccurate since pt received lasix in ED  -UO: strict monitoring  -Humphrey: none    GI:  #Transaminitis: Elevated LFTs  #Diet: NPO while on bipap  #Bowel regiment: senna/miralax    ENDO:  #DM2: HbA1c 6.9, no issues  - Insulin Sliding Scale    HEMATOLOGIC:  #CBC results: no leukocytosis, rest appear to be at baseline  #Coag panel pending  #DVT prophylaxis with lovenox QD    ID:  #Unclear reason for COPD/OHS exacerbation, will order empiric levaquin 750 IV QD for 7 day course for empiric coverage    SKIN:  #Lines: PIV, arrow  #Decubitus ulcers: none 87y/o F w/ h/o HFpEF, obesity, SHAD on CPAP, HTN, T2DM, COPD, lymphedema, s/p fall and knee fracture p/w AMS and facial droop, MICU consulted for increased work of breathing on Bipap and elevated pCO2. Pt appears to have component of chronic hypercapnia c/f OHS with history of COPD and OS, Bipap dependency    NEURO:  #Mental status: A&O x 1-2 at baseline  -Monitor mental status  -If declining mental status, place back on Bipap    CV:  #HLD  -continue home atorvastatin    #HFpEF: TTE on 11/21 shows EF 60-65%, grade 1 diastolic dysfn  -Bblocker continue home metoprolol  -Diuretic: on home bumex, will give PRN lasix for now 40mg IV BID   -Trend I/Os and daily weights, and Cr    PULM:  #COPD:  -Continue duonebs, albuterol PRN, monteleukast, chest PT  -AVAPs settings 16/8, 40%, R20    RENAL:  #Cr 2.0, doubled from baseline, send urine lytes although may be inaccurate since pt received lasix in ED  -UO: strict monitoring  -Humphrey: none    GI:  #Diet: regular diet while off bipap (only at night)  #Bowel regiment: senna/miralax    ENDO:  #DM2: HbA1c 6.9, no issues  - Insulin Sliding Scale    HEMATOLOGIC:  #CBC results: no leukocytosis, rest appear to be at baseline  #Coag panel pending  #DVT prophylaxis with lovenox QD    ID:  #Unclear reason for COPD/OHS exacerbation, will order empiric levaquin 750 IV QD for 7 day course for empiric coverage    SKIN:  #Lines: PIV, arrow  #Decubitus ulcers: none

## 2021-12-03 NOTE — PROGRESS NOTE ADULT - ASSESSMENT
Echo 11/19/21: EF 60-65%, nl lv sys fx, mild AS, mild Ms   Echo 6/2/21: Min MR, grossly nl lv sys fx   Echo 12/5/20: grossly nl lv sys fx    a/p  5 y/o female w pmhx of HFpEF, obesity, SHAD on CPAP, HTN, T2DM, COPD, lymphedema, s/p fall and knee fracture s/p  knee surgery, presents to the ED with AMS and facial droop.    #AMS  -likely in setting of hypercapnic resp failure  -CVA r/o  -pending CTH  -neuro f/u     #Hypercapnic respiratory failure  -ABG noted  -remains on bipap  -CXR noted     #Chronic Diastolic CHF  -doesnt appear overtly overloaded on exam - pt w chronic Lymphedema  -trial of IV lasix today   -recent Echo w grossly nl lv sys fx   -hsT indeterminate, no acs on EKG   -cont bb     #HTN  -stable  -cont current meds     #Hyperkalemia  -tx per MICU     dvt ppx   Care per MICU

## 2021-12-03 NOTE — PROGRESS NOTE ADULT - SUBJECTIVE AND OBJECTIVE BOX
[facility]      Seen earlier today, and examined.  - Today, patient is without complaints.           *****MEDICATIONS: Current medication reviewed and documented.    MEDICATIONS  (STANDING):  albuterol/ipratropium for Nebulization 3 milliLiter(s) Nebulizer every 6 hours  amLODIPine   Tablet 10 milliGRAM(s) Oral daily  atorvastatin 40 milliGRAM(s) Oral at bedtime  azithromycin  IVPB      dexMEDEtomidine Infusion 0.05 MICROgram(s)/kG/Hr (1.61 mL/Hr) IV Continuous <Continuous>  gabapentin 100 milliGRAM(s) Oral three times a day  heparin   Injectable 7500 Unit(s) SubCutaneous every 8 hours  hydrALAZINE 100 milliGRAM(s) Oral three times a day  metoprolol succinate  milliGRAM(s) Oral daily  montelukast 10 milliGRAM(s) Oral daily  piperacillin/tazobactam IVPB.. 3.375 Gram(s) IV Intermittent every 8 hours  polyethylene glycol 3350 17 Gram(s) Oral daily  senna 2 Tablet(s) Oral at bedtime    MEDICATIONS  (PRN):  oxycodone    5 mG/acetaminophen 325 mG 1 Tablet(s) Oral every 6 hours PRN Moderate Pain (4 - 6)          ***** VITAL SIGNS:  T(F): 96 (21 @ 07:00), Max: 98.1 (21 @ 16:11)  HR: 72 (21 @ 14:00) (53 - 79)  BP: 163/70 (21 @ 14:00) (109/49 - 164/81)  RR: 23 (21 @ 14:00) (16 - 24)  SpO2: 90% (21 @ 14:00) (90% - 100%)  Wt(kg): --  ,   I&O's Summary    02 Dec 2021 07:01  -  03 Dec 2021 07:00  --------------------------------------------------------  IN: 463.1 mL / OUT: 400 mL / NET: 63.1 mL    03 Dec 2021 07:01  -  03 Dec 2021 14:19  --------------------------------------------------------  IN: 45 mL / OUT: 1300 mL / NET: -1255 mL             *****PHYSICAL EXAM:   alert oriented x 2 attention comprehension are fair.  Able to name, repeat.   EOmi fundi not visualized   no nystagmus VFF to confrontation  Tongue is midline  Palate elevates symmetrically   Moving all 4 ext spontaneously no drift appreciated  limited rom of the RLE due to brace   Gait not assessed.            *****LAB AND IMAGIN.5    9.13  )-----------( 348      ( 03 Dec 2021 03:40 )             27.7               12-    142  |  97<L>  |  62<H>  ----------------------------<  149<H>  5.6<H>   |  38<H>  |  1.55<H>    Ca    9.1      03 Dec 2021 03:40  Phos  4.4     12-  Mg     3.00     12-    TPro  7.2  /  Alb  3.2<L>  /  TBili  0.3  /  DBili  x   /  AST  14  /  ALT  8   /  AlkPhos  48  12-                     ABG - ( 03 Dec 2021 11:52 )  pH, Arterial: 7.46  pH, Blood: x     /  pCO2: 63    /  pO2: 60    / HCO3: 45    / Base Excess: 18.8  /  SaO2: 93.6                [All pertinent recent Imaging/Reports reviewed]           *****A S S E S S M E N T   A N D   P L A N :  Pt is an 87 y/o woman w/ a PMH of asthma/COPD, CHF, on aspirin, HTN, lymphedema, DM2, and past surgical history of knee surgery, presents to the [facility] ED as a code stroke after home aide/daughter activated EMS due to patient's lack of responsiveness with LWK 2:30a 21. Neurological exam does not demonstrate focality. CTH noncon pending. Neurovascular imaging deferred given JOSEPH and low suspicion of LVO at this time. Patient out of window for tpa. Patient not a candidate for mechanical thrombectomy given low suspicion of LVO.    Impression: toxic metabolic encephalopathy likely in setting of acidosis and hypercapnea    Recommendations:  [ ] f/u CTH noncon when stable   [ ] a1c, lipid profile  [ ] c/w aspirin 81mg daily  [ ] c/w rosuvastatin 10mg daily  [ ] infectious and metabolic workup per primary team  [ ] no need for permissive HTN at this time  [ ] if patient does not show improvement, may consider routine EEG          pt seen and evaluated at bedside  briefly pt with recent knee surgery immobilization presents with ams, hypercapnia   now improved after bipap   obesity hypoventilation   cannot r/o sequelae of immobility   ct head neg  nonfocal exam   plan as above .  appreciate care per micu         Thank you for allowing me to participate in the care of this patient. Will continue to follow patient periodically. Please do not hesitate to call me if you have any  questions or if there has been a change in patients neurological status     ________________  Constance Mars MD  [facility] ([facility]  [phone]      33 minutes spent on total encounter; more than 50 % of the visit was  spent counseling about plan of care, compliance to diet/exercise and medication regimen and or  coordinating care by the attending physician.      It is advised that stroke patients follow up with SRIDHAR Judd @ [phone] in 1- 2 weeks.   Others please follow up with Dr. Michael Nissenbaum [phone]

## 2021-12-03 NOTE — PROGRESS NOTE ADULT - ATTENDING COMMENTS
86 year old woman with COPD, SHAD, HTN, DM, lymph edema and recent knee fracture with knee brace admitted for management of acute on chronic hypercapnic respiratory failure     - mental status much improved with improvement on hypercapnia  - will start diet  - continue bronchodilators and ABx for COPD exacerbation  - Ortho evaluation for right knee fracture  - JOSEPH improving continue to monitor  - continue with AVAPS at night and supplemental oxygen during the day titrate to keep saturation over 88%    eligible for transfer to floor

## 2021-12-03 NOTE — PROGRESS NOTE ADULT - SUBJECTIVE AND OBJECTIVE BOX
COVERING RESIDENT: Maria Esther Ceballos    INTERVAL HPI/OVERNIGHT EVENTS: serial ABG/VBG improved from last night. Pt on continuous Bipap. Respiratory status improving. No longer using accessory muscles of breathing.   SUBJECTIVE: Patient seen and examined at bedside. More verbal than yesterday. Complains of right leg pain    CONSTITUTIONAL: No weakness, fevers or chills. +hunger/thirst  EYES/ENT: No visual changes;  No vertigo or throat pain   NECK: No pain or stiffness  RESPIRATORY: No cough, wheezing, hemoptysis; +shortness of breath  CARDIOVASCULAR: No chest pain or palpitations  GASTROINTESTINAL: No abdominal or epigastric pain. No nausea, vomiting, or hematemesis; No diarrhea or constipation. No melena or hematochezia.  GENITOURINARY: No dysuria, frequency or hematuria  MSK: +right leg pain in fracture site  NEUROLOGICAL: No numbness or weakness  SKIN: No itching, rashes    OBJECTIVE:    VITAL SIGNS:  ICU Vital Signs Last 24 Hrs  T(C): 35.6 (03 Dec 2021 07:00), Max: 36.7 (02 Dec 2021 16:11)  T(F): 96 (03 Dec 2021 07:00), Max: 98.1 (02 Dec 2021 16:11)  HR: 66 (03 Dec 2021 07:00) (53 - 80)  BP: 139/68 (03 Dec 2021 07:00) (109/49 - 151/88)  BP(mean): 83 (03 Dec 2021 07:00) (64 - 94)  RR: 20 (03 Dec 2021 07:00) (14 - 24)  SpO2: 95% (03 Dec 2021 07:00) (95% - 100%)    Mode: NIV (Noninvasive Ventilation), RR (machine): 16, TV (machine): 500, FiO2: 40, PEEP: 8, ITime: 1, P-High: 30, P-Low: 14, PIP: 24    12-02 @ 07:01  -  12-03 @ 07:00  --------------------------------------------------------  IN: 463.1 mL / OUT: 400 mL / NET: 63.1 mL    CAPILLARY BLOOD GLUCOSE  POCT Blood Glucose.: 237 mg/dL (03 Dec 2021 05:38)    PHYSICAL EXAM:    General: NAD  HEENT: NC/AT; PERRL, clear conjunctiva  Neck: supple  Respiratory: CTA b/l  Cardiovascular: +S1/S2; RRR  Abdomen: soft, NT/ND; +BS x4  Extremities: WWP, 2+ peripheral pulses b/l; +4 pitting edema in right leg wrapped in cast for knee fracture, +2 pitting edema in left leg  Skin: normal color and turgor; no rash  Neurological: A/Ox2-3 at baseline, moving all ext except right leg (in brace for fracture)    MEDICATIONS:  MEDICATIONS  (STANDING):  albuterol/ipratropium for Nebulization 3 milliLiter(s) Nebulizer every 6 hours  amLODIPine   Tablet 10 milliGRAM(s) Oral daily  atorvastatin 40 milliGRAM(s) Oral at bedtime  azithromycin  IVPB      dexMEDEtomidine Infusion 0.05 MICROgram(s)/kG/Hr (1.61 mL/Hr) IV Continuous <Continuous>  gabapentin 100 milliGRAM(s) Oral three times a day  heparin   Injectable 5000 Unit(s) SubCutaneous every 8 hours  hydrALAZINE 100 milliGRAM(s) Oral three times a day  metoprolol succinate  milliGRAM(s) Oral daily  montelukast 10 milliGRAM(s) Oral daily  piperacillin/tazobactam IVPB.. 3.375 Gram(s) IV Intermittent every 8 hours  polyethylene glycol 3350 17 Gram(s) Oral daily  senna 2 Tablet(s) Oral at bedtime    MEDICATIONS  (PRN):    ALLERGIES:  Allergies  No Known Allergies  Intolerances    LABS:                        7.5    9.13  )-----------( 348      ( 03 Dec 2021 03:40 )             27.7     12-03    142  |  97<L>  |  62<H>  ----------------------------<  149<H>  5.6<H>   |  38<H>  |  1.55<H>    Ca    9.1      03 Dec 2021 03:40  Phos  4.4     12-03  Mg     3.00     12-03    TPro  7.2  /  Alb  3.2<L>  /  TBili  0.3  /  DBili  x   /  AST  14  /  ALT  8   /  AlkPhos  48  12-03    RADIOLOGY & ADDITIONAL TESTS: Reviewed. COVERING RESIDENT: Maria Esther Ceballos    INTERVAL HPI/OVERNIGHT EVENTS: serial ABG/VBG improved from last night. Pt on continuous Bipap. Respiratory status improving. No longer using accessory muscles of breathing.   SUBJECTIVE: Patient seen and examined at bedside. More verbal than yesterday. Complains of right leg pain.    CONSTITUTIONAL: No weakness, fevers or chills. +hunger/thirst  EYES/ENT: No visual changes;  No vertigo or throat pain   NECK: No pain or stiffness  RESPIRATORY: No cough, wheezing, hemoptysis; +shortness of breath  CARDIOVASCULAR: No chest pain or palpitations  GASTROINTESTINAL: No abdominal or epigastric pain. No nausea, vomiting, or hematemesis; No diarrhea or constipation. No melena or hematochezia.  GENITOURINARY: No dysuria, frequency or hematuria  MSK: +right leg pain in fracture site  NEUROLOGICAL: No numbness or weakness  SKIN: No itching, rashes    OBJECTIVE:    VITAL SIGNS:  ICU Vital Signs Last 24 Hrs  T(C): 35.6 (03 Dec 2021 07:00), Max: 36.7 (02 Dec 2021 16:11)  T(F): 96 (03 Dec 2021 07:00), Max: 98.1 (02 Dec 2021 16:11)  HR: 66 (03 Dec 2021 07:00) (53 - 80)  BP: 139/68 (03 Dec 2021 07:00) (109/49 - 151/88)  BP(mean): 83 (03 Dec 2021 07:00) (64 - 94)  RR: 20 (03 Dec 2021 07:00) (14 - 24)  SpO2: 95% (03 Dec 2021 07:00) (95% - 100%)    Mode: NIV (Noninvasive Ventilation), RR (machine): 16, TV (machine): 500, FiO2: 40, PEEP: 8, ITime: 1, P-High: 30, P-Low: 14, PIP: 24    12-02 @ 07:01  -  12-03 @ 07:00  --------------------------------------------------------  IN: 463.1 mL / OUT: 400 mL / NET: 63.1 mL    CAPILLARY BLOOD GLUCOSE  POCT Blood Glucose.: 237 mg/dL (03 Dec 2021 05:38)    PHYSICAL EXAM:    General: NAD  HEENT: NC/AT; PERRL, clear conjunctiva  Neck: supple  Respiratory: CTA b/l  Cardiovascular: +S1/S2; RRR  Abdomen: soft, NT/ND; +BS x4  Extremities: WWP, 2+ peripheral pulses b/l; +4 pitting edema in right leg wrapped in cast for knee fracture, +2 pitting edema in left leg  Skin: normal color and turgor; no rash  Neurological: A/Ox0-1, alert at baseline, moving all ext except right leg (in brace for fracture)    MEDICATIONS:  MEDICATIONS  (STANDING):  albuterol/ipratropium for Nebulization 3 milliLiter(s) Nebulizer every 6 hours  amLODIPine   Tablet 10 milliGRAM(s) Oral daily  atorvastatin 40 milliGRAM(s) Oral at bedtime  azithromycin  IVPB      dexMEDEtomidine Infusion 0.05 MICROgram(s)/kG/Hr (1.61 mL/Hr) IV Continuous <Continuous>  gabapentin 100 milliGRAM(s) Oral three times a day  heparin   Injectable 5000 Unit(s) SubCutaneous every 8 hours  hydrALAZINE 100 milliGRAM(s) Oral three times a day  metoprolol succinate  milliGRAM(s) Oral daily  montelukast 10 milliGRAM(s) Oral daily  piperacillin/tazobactam IVPB.. 3.375 Gram(s) IV Intermittent every 8 hours  polyethylene glycol 3350 17 Gram(s) Oral daily  senna 2 Tablet(s) Oral at bedtime    MEDICATIONS  (PRN):    ALLERGIES:  Allergies  No Known Allergies  Intolerances    LABS:                        7.5    9.13  )-----------( 348      ( 03 Dec 2021 03:40 )             27.7     12-03    142  |  97<L>  |  62<H>  ----------------------------<  149<H>  5.6<H>   |  38<H>  |  1.55<H>    Ca    9.1      03 Dec 2021 03:40  Phos  4.4     12-03  Mg     3.00     12-03    TPro  7.2  /  Alb  3.2<L>  /  TBili  0.3  /  DBili  x   /  AST  14  /  ALT  8   /  AlkPhos  48  12-03    RADIOLOGY & ADDITIONAL TESTS: Reviewed.

## 2021-12-03 NOTE — CHART NOTE - NSCHARTNOTEFT_GEN_A_CORE
Transfer MICU -> 5S  Patient seen and examined - no readily discernible cotnraindication to transfer from MICU to general medical floors  Oriented x 1 which is baseline. WOB mildly increased but stable    87y/o F w/ h/o HFpEF, obesity, 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 Pt appears to have component of chronic hypercapnia c/f OHS with history of COPD and OS, Bipap dependency. Admitted to MICU for montoring on Bipap. Blood gas improved on bipap settings 16/8, 40%, 20, to be continued at night or during naps. Pt currently stable on 4L NC. Monitoring on empiric zosyn and azithromycin for empiric coverage for possible COPD exacerbation.    FOR FOLLOW UP  [ ] AVAPS at night or with naps, or if AMS  [ ] assess daily for diuresis, 40mg IV Lasix given 12/3  [ ] f/u neuro recs, cards recs  [ ] consider pulm consult in am    ASSESSMENT & PLAN:   87y/o F w/ h/o HFpEF, obesity, SHAD on CPAP, HTN, T2DM, COPD, lymphedema, s/p fall and knee fracture p/w AMS and facial droop, MICU consulted for increased work of breathing on Bipap and elevated pCO2. Pt appears to have component of chronic hypercapnia c/f OHS with history of COPD and OS, Bipap dependency    NEURO:  #Mental status: A&O x 1-2 at baseline  -Monitor mental status  -If declining mental status, place back on Bipap    CV:  #HLD  -continue home atorvastatin    #HFpEF: TTE on 11/21 shows EF 60-65%, grade 1 diastolic dysfn  -Bblocker continue home metoprolol  -Diuretic: on home bumex, can restart if tolerating, for now will give PRN lasix 40mg IV  -Trend I/Os and daily weights, and Cr    PULM:  #COPD:  -Continue duonebs, albuterol PRN, monteleukast, chest PT  -AVAPs settings 16/8, 40%, R20    RENAL:  #Cr 2.0, doubled from baseline, send urine lytes although may be inaccurate since pt received lasix in ED  -UO: strict monitoring  -Humphrey: none    GI:  #Diet: regular diet while off bipap (only at night)  #Bowel regiment: senna/miralax    ENDO:  #DM2: HbA1c 6.9, no issues  - Insulin Sliding Scale    HEMATOLOGIC:  #CBC results: no leukocytosis, rest appear to be at baseline  #Coag panel pending  #DVT prophylaxis with lovenox QD    ID:  #Unclear reason for COPD/OHS exacerbation, will order empiric levaquin 750 IV QD for 7 day course for empiric coverage    SKIN:  #Lines: PIV, arrow  #Decubitus ulcers: none Transfer MICU -> 5S  Patient seen and examined - no readily discernible contraindication to transfer from MICU to general medical floors  Oriented x 1 which is baseline. WOB mildly increased but stable  Signout provided direct from MICU team to 5S ACP.    85y/o F w/ h/o HFpEF, obesity, 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 Pt appears to have component of chronic hypercapnia c/f OHS with history of COPD and OS, Bipap dependency. Admitted to MICU for montoring on Bipap. Blood gas improved on bipap settings 16/8, 40%, 20, to be continued at night or during naps. Pt currently stable on 4L NC. Monitoring on empiric zosyn and azithromycin for empiric coverage for possible COPD exacerbation.    FOR FOLLOW UP  [ ] AVAPS at night or with naps, or if AMS  [ ] assess daily for diuresis, 40mg IV Lasix given 12/3  [ ] f/u neuro recs, cards recs  [ ] consider pulm consult in am    ASSESSMENT & PLAN:   85y/o F w/ h/o HFpEF, obesity, SHAD on CPAP, HTN, T2DM, COPD, lymphedema, s/p fall and knee fracture p/w AMS and facial droop, MICU consulted for increased work of breathing on Bipap and elevated pCO2. Pt appears to have component of chronic hypercapnia c/f OHS with history of COPD and OS, Bipap dependency    NEURO:  #Mental status: A&O x 1-2 at baseline  -Monitor mental status  -If declining mental status, place back on Bipap    CV:  #HLD  -continue home atorvastatin    #HFpEF: TTE on 11/21 shows EF 60-65%, grade 1 diastolic dysfn  -Bblocker continue home metoprolol  -Diuretic: on home bumex, can restart if tolerating, for now will give PRN lasix 40mg IV  -Trend I/Os and daily weights, and Cr    PULM:  #COPD:  -Continue duonebs, albuterol PRN, monteleukast, chest PT  -AVAPs settings 16/8, 40%, R20    RENAL:  #Cr 2.0, doubled from baseline, send urine lytes although may be inaccurate since pt received lasix in ED  -UO: strict monitoring  -Humphrey: none    GI:  #Diet: regular diet while off bipap (only at night)  #Bowel regiment: senna/miralax    ENDO:  #DM2: HbA1c 6.9, no issues  - Insulin Sliding Scale    HEMATOLOGIC:  #CBC results: no leukocytosis, rest appear to be at baseline  #Coag panel pending  #DVT prophylaxis with lovenox QD    ID:  #Unclear reason for COPD/OHS exacerbation, will order empiric levaquin 750 IV QD for 7 day course for empiric coverage    SKIN:  #Lines: PIV, arrow  #Decubitus ulcers: none

## 2021-12-03 NOTE — CHART NOTE - NSCHARTNOTEFT_GEN_A_CORE
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 Pt appears to have component of chronic hypercapnia c/f OHS with history of COPD and OS, Bipap dependency. Admitted to MICU for montoring on Bipap. Blood gas improved on bipap settings 16/8, 40%, 20, to be continued at night or during naps. Pt currently stable on 4L NC. Monitoring on empiric zosyn and azithromycin for empiric coverage for possible COPD exacerbation.     OBJECTIVE --  Vital Signs Last 24 Hrs  T(C): 35.6 (03 Dec 2021 07:00), Max: 36.7 (02 Dec 2021 16:11)  T(F): 96 (03 Dec 2021 07:00), Max: 98.1 (02 Dec 2021 16:11)  HR: 72 (03 Dec 2021 14:00) (53 - 79)  BP: 163/70 (03 Dec 2021 14:00) (109/49 - 164/81)  BP(mean): 90 (03 Dec 2021 14:00) (64 - 100)  RR: 23 (03 Dec 2021 14:00) (16 - 24)  SpO2: 90% (03 Dec 2021 14:00) (90% - 100%)    I&O's Summary    02 Dec 2021 07:01  -  03 Dec 2021 07:00  --------------------------------------------------------  IN: 463.1 mL / OUT: 400 mL / NET: 63.1 mL    03 Dec 2021 07:01  -  03 Dec 2021 14:54  --------------------------------------------------------  IN: 45 mL / OUT: 1300 mL / NET: -1255 mL    MEDICATIONS  (STANDING):  albuterol/ipratropium for Nebulization 3 milliLiter(s) Nebulizer every 6 hours  amLODIPine   Tablet 10 milliGRAM(s) Oral daily  atorvastatin 40 milliGRAM(s) Oral at bedtime  azithromycin  IVPB      gabapentin 100 milliGRAM(s) Oral three times a day  heparin   Injectable 7500 Unit(s) SubCutaneous every 8 hours  hydrALAZINE 100 milliGRAM(s) Oral three times a day  metoprolol succinate  milliGRAM(s) Oral daily  montelukast 10 milliGRAM(s) Oral daily  piperacillin/tazobactam IVPB.. 3.375 Gram(s) IV Intermittent every 8 hours  polyethylene glycol 3350 17 Gram(s) Oral daily  senna 2 Tablet(s) Oral at bedtime    MEDICATIONS  (PRN):  oxycodone    5 mG/acetaminophen 325 mG 1 Tablet(s) Oral every 6 hours PRN Moderate Pain (4 - 6)    LABS                                            7.5                   Neurophils% (auto):   x      (12-03 @ 03:40):    9.13 )-----------(348          Lymphocytes% (auto):  x                                             27.7                   Eosinphils% (auto):   x        Manual%: Neutrophils x    ; Lymphocytes x    ; Eosinophils x    ; Bands%: x    ; Blasts x                                    142    |  97     |  62                  Calcium: 9.1   / iCa: x      (12-03 @ 03:40)    ----------------------------<  149       Magnesium: 3.00                             5.6     |  38     |  1.55             Phosphorous: 4.4      TPro  7.2    /  Alb  3.2    /  TBili  0.3    /  DBili  x      /  AST  14     /  ALT  8      /  AlkPhos  48     03 Dec 2021 03:40    ASSESSMENT & PLAN:     85y/o F w/ h/o HFpEF, obesity, SHAD on CPAP, HTN, T2DM, COPD, lymphedema, s/p fall and knee fracture p/w AMS and facial droop, MICU consulted for increased work of breathing on Bipap and elevated pCO2. Pt appears to have component of chronic hypercapnia c/f OHS with history of COPD and OS, Bipap dependency    NEURO:  #Mental status: A&O x 1-2 at baseline  -Monitor mental status  -If declining mental status, place back on Bipap    CV:  #HLD  -continue home atorvastatin    #HFpEF: TTE on 11/21 shows EF 60-65%, grade 1 diastolic dysfn  -Bblocker continue home metoprolol  -Diuretic: on home bumex, will give PRN lasix for now 40mg IV BID   -Trend I/Os and daily weights, and Cr    PULM:  #COPD:  -Continue duonebs, albuterol PRN, monteleukast, chest PT  -AVAPs settings 16/8, 40%, R20    RENAL:  #Cr 2.0, doubled from baseline, send urine lytes although may be inaccurate since pt received lasix in ED  -UO: strict monitoring  -Humphrey: none    GI:  #Diet: regular diet while off bipap (only at night)  #Bowel regiment: senna/miralax    ENDO:  #DM2: HbA1c 6.9, no issues  - Insulin Sliding Scale    HEMATOLOGIC:  #CBC results: no leukocytosis, rest appear to be at baseline  #Coag panel pending  #DVT prophylaxis with lovenox QD    ID:  #Unclear reason for COPD/OHS exacerbation, will order empiric levaquin 750 IV QD for 7 day course for empiric coverage    SKIN:  #Lines: PIV, arrow  #Decubitus ulcers: none    For Follow-Up:  [ ] AVAPs at night or with naps, or if AMS  [ ] assess daily for diuresis, 40mg IV lasix given today MICU Transfer Note  ---------------------------    Transfer from: MICU  Transfer to:  (  ) Medicine    (  ) Telemetry    (  ) RCU    (  ) Palliative    (  ) Stroke Unit    (X)continuous pulse ox floor  Accepting Physician:     MICU COURSE:  85y/o F w/ h/o HFpEF, obesity, 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 Pt appears to have component of chronic hypercapnia c/f OHS with history of COPD and OS, Bipap dependency. Admitted to MICU for montoring on Bipap. Blood gas improved on bipap settings 16/8, 40%, 20, to be continued at night or during naps. Pt currently stable on 4L NC. Monitoring on empiric zosyn and azithromycin for empiric coverage for possible COPD exacerbation.     OBJECTIVE --  Vital Signs Last 24 Hrs  T(C): 35.6 (03 Dec 2021 07:00), Max: 36.7 (02 Dec 2021 16:11)  T(F): 96 (03 Dec 2021 07:00), Max: 98.1 (02 Dec 2021 16:11)  HR: 72 (03 Dec 2021 14:00) (53 - 79)  BP: 163/70 (03 Dec 2021 14:00) (109/49 - 164/81)  BP(mean): 90 (03 Dec 2021 14:00) (64 - 100)  RR: 23 (03 Dec 2021 14:00) (16 - 24)  SpO2: 90% (03 Dec 2021 14:00) (90% - 100%)    I&O's Summary    02 Dec 2021 07:01  -  03 Dec 2021 07:00  --------------------------------------------------------  IN: 463.1 mL / OUT: 400 mL / NET: 63.1 mL    03 Dec 2021 07:01  -  03 Dec 2021 14:54  --------------------------------------------------------  IN: 45 mL / OUT: 1300 mL / NET: -1255 mL    MEDICATIONS  (STANDING):  albuterol/ipratropium for Nebulization 3 milliLiter(s) Nebulizer every 6 hours  amLODIPine   Tablet 10 milliGRAM(s) Oral daily  atorvastatin 40 milliGRAM(s) Oral at bedtime  azithromycin  IVPB      gabapentin 100 milliGRAM(s) Oral three times a day  heparin   Injectable 7500 Unit(s) SubCutaneous every 8 hours  hydrALAZINE 100 milliGRAM(s) Oral three times a day  metoprolol succinate  milliGRAM(s) Oral daily  montelukast 10 milliGRAM(s) Oral daily  piperacillin/tazobactam IVPB.. 3.375 Gram(s) IV Intermittent every 8 hours  polyethylene glycol 3350 17 Gram(s) Oral daily  senna 2 Tablet(s) Oral at bedtime    MEDICATIONS  (PRN):  oxycodone    5 mG/acetaminophen 325 mG 1 Tablet(s) Oral every 6 hours PRN Moderate Pain (4 - 6)    LABS                                            7.5                   Neurophils% (auto):   x      (12-03 @ 03:40):    9.13 )-----------(348          Lymphocytes% (auto):  x                                             27.7                   Eosinphils% (auto):   x        Manual%: Neutrophils x    ; Lymphocytes x    ; Eosinophils x    ; Bands%: x    ; Blasts x                                    142    |  97     |  62                  Calcium: 9.1   / iCa: x      (12-03 @ 03:40)    ----------------------------<  149       Magnesium: 3.00                             5.6     |  38     |  1.55             Phosphorous: 4.4      TPro  7.2    /  Alb  3.2    /  TBili  0.3    /  DBili  x      /  AST  14     /  ALT  8      /  AlkPhos  48     03 Dec 2021 03:40    ASSESSMENT & PLAN:     85y/o F w/ h/o HFpEF, obesity, SHAD on CPAP, HTN, T2DM, COPD, lymphedema, s/p fall and knee fracture p/w AMS and facial droop, MICU consulted for increased work of breathing on Bipap and elevated pCO2. Pt appears to have component of chronic hypercapnia c/f OHS with history of COPD and OS, Bipap dependency    NEURO:  #Mental status: A&O x 1-2 at baseline  -Monitor mental status  -If declining mental status, place back on Bipap    CV:  #HLD  -continue home atorvastatin    #HFpEF: TTE on 11/21 shows EF 60-65%, grade 1 diastolic dysfn  -Bblocker continue home metoprolol  -Diuretic: on home bumex, can restart if tolerating, for now will give PRN lasix 40mg IV  -Trend I/Os and daily weights, and Cr    PULM:  #COPD:  -Continue duonebs, albuterol PRN, monteleukast, chest PT  -AVAPs settings 16/8, 40%, R20    RENAL:  #Cr 2.0, doubled from baseline, send urine lytes although may be inaccurate since pt received lasix in ED  -UO: strict monitoring  -Humphrey: none    GI:  #Diet: regular diet while off bipap (only at night)  #Bowel regiment: senna/miralax    ENDO:  #DM2: HbA1c 6.9, no issues  - Insulin Sliding Scale    HEMATOLOGIC:  #CBC results: no leukocytosis, rest appear to be at baseline  #Coag panel pending  #DVT prophylaxis with lovenox QD    ID:  #Unclear reason for COPD/OHS exacerbation, will order empiric levaquin 750 IV QD for 7 day course for empiric coverage    SKIN:  #Lines: PIV, arrow  #Decubitus ulcers: none    For Follow-Up:  [ ] AVAPs at night or with naps, or if AMS  [ ] assess daily for diuresis, 40mg IV lasix given today MICU Transfer Note  ---------------------------    Transfer from: MICU  Transfer to:  (  ) Medicine    (  ) Telemetry    (  ) RCU    (  ) Palliative    (  ) Stroke Unit    (X)continuous pulse ox floor  Accepting Physician: Dr. David Yañez    MICU COURSE:  87y/o F w/ h/o HFpEF, obesity, 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 Pt appears to have component of chronic hypercapnia c/f OHS with history of COPD and OS, Bipap dependency. Admitted to MICU for montoring on Bipap. Blood gas improved on bipap settings 16/8, 40%, 20, to be continued at night or during naps. Pt currently stable on 4L NC. Monitoring on empiric zosyn and azithromycin for empiric coverage for possible COPD exacerbation.     OBJECTIVE --  Vital Signs Last 24 Hrs  T(C): 35.6 (03 Dec 2021 07:00), Max: 36.7 (02 Dec 2021 16:11)  T(F): 96 (03 Dec 2021 07:00), Max: 98.1 (02 Dec 2021 16:11)  HR: 72 (03 Dec 2021 14:00) (53 - 79)  BP: 163/70 (03 Dec 2021 14:00) (109/49 - 164/81)  BP(mean): 90 (03 Dec 2021 14:00) (64 - 100)  RR: 23 (03 Dec 2021 14:00) (16 - 24)  SpO2: 90% (03 Dec 2021 14:00) (90% - 100%)    I&O's Summary    02 Dec 2021 07:01  -  03 Dec 2021 07:00  --------------------------------------------------------  IN: 463.1 mL / OUT: 400 mL / NET: 63.1 mL    03 Dec 2021 07:01  -  03 Dec 2021 14:54  --------------------------------------------------------  IN: 45 mL / OUT: 1300 mL / NET: -1255 mL    MEDICATIONS  (STANDING):  albuterol/ipratropium for Nebulization 3 milliLiter(s) Nebulizer every 6 hours  amLODIPine   Tablet 10 milliGRAM(s) Oral daily  atorvastatin 40 milliGRAM(s) Oral at bedtime  azithromycin  IVPB      gabapentin 100 milliGRAM(s) Oral three times a day  heparin   Injectable 7500 Unit(s) SubCutaneous every 8 hours  hydrALAZINE 100 milliGRAM(s) Oral three times a day  metoprolol succinate  milliGRAM(s) Oral daily  montelukast 10 milliGRAM(s) Oral daily  piperacillin/tazobactam IVPB.. 3.375 Gram(s) IV Intermittent every 8 hours  polyethylene glycol 3350 17 Gram(s) Oral daily  senna 2 Tablet(s) Oral at bedtime    MEDICATIONS  (PRN):  oxycodone    5 mG/acetaminophen 325 mG 1 Tablet(s) Oral every 6 hours PRN Moderate Pain (4 - 6)    LABS                                            7.5                   Neurophils% (auto):   x      (12-03 @ 03:40):    9.13 )-----------(348          Lymphocytes% (auto):  x                                             27.7                   Eosinphils% (auto):   x        Manual%: Neutrophils x    ; Lymphocytes x    ; Eosinophils x    ; Bands%: x    ; Blasts x                                    142    |  97     |  62                  Calcium: 9.1   / iCa: x      (12-03 @ 03:40)    ----------------------------<  149       Magnesium: 3.00                             5.6     |  38     |  1.55             Phosphorous: 4.4      TPro  7.2    /  Alb  3.2    /  TBili  0.3    /  DBili  x      /  AST  14     /  ALT  8      /  AlkPhos  48     03 Dec 2021 03:40    ASSESSMENT & PLAN:     87y/o F w/ h/o HFpEF, obesity, SHAD on CPAP, HTN, T2DM, COPD, lymphedema, s/p fall and knee fracture p/w AMS and facial droop, MICU consulted for increased work of breathing on Bipap and elevated pCO2. Pt appears to have component of chronic hypercapnia c/f OHS with history of COPD and OS, Bipap dependency    NEURO:  #Mental status: A&O x 1-2 at baseline  -Monitor mental status  -If declining mental status, place back on Bipap    CV:  #HLD  -continue home atorvastatin    #HFpEF: TTE on 11/21 shows EF 60-65%, grade 1 diastolic dysfn  -Bblocker continue home metoprolol  -Diuretic: on home bumex, can restart if tolerating, for now will give PRN lasix 40mg IV  -Trend I/Os and daily weights, and Cr    PULM:  #COPD:  -Continue duonebs, albuterol PRN, monteleukast, chest PT  -AVAPs settings 16/8, 40%, R20    RENAL:  #Cr 2.0, doubled from baseline, send urine lytes although may be inaccurate since pt received lasix in ED  -UO: strict monitoring  -Humphrey: none    GI:  #Diet: regular diet while off bipap (only at night)  #Bowel regiment: senna/miralax    ENDO:  #DM2: HbA1c 6.9, no issues  - Insulin Sliding Scale    HEMATOLOGIC:  #CBC results: no leukocytosis, rest appear to be at baseline  #Coag panel pending  #DVT prophylaxis with lovenox QD    ID:  #Unclear reason for COPD/OHS exacerbation, will order empiric levaquin 750 IV QD for 7 day course for empiric coverage    SKIN:  #Lines: PIV, arrow  #Decubitus ulcers: none    For Follow-Up:  [ ] AVAPs at night or with naps, or if AMS  [ ] assess daily for diuresis, 40mg IV lasix given today

## 2021-12-03 NOTE — CHART NOTE - NSCHARTNOTEFT_GEN_A_CORE
: Alexys Moore    INDICATION:    PROCEDURE:  [x] LIMITED ECHO  [x] LIMITED CHEST  [ ] LIMITED RETROPERITONEAL  [ ] LIMITED ABDOMINAL  [ ] LIMITED DVT  [ ] NEEDLE GUIDANCE VASCULAR  [ ] NEEDLE GUIDANCE THORACENTESIS  [ ] NEEDLE GUIDANCE PARACENTESIS  [ ] NEEDLE GUIDANCE PERICARDIOCENTESIS  [ ] OTHER    FINDINGS:  Diffuse B-lines anteriorly. Basilar consolidations.   Grossly normal LV systolic function. RV equal in size to LV. IVC 16-25.    INTERPRETATION:  Lung exam consistent with pulmonary edema vs pneumonia.  Normal LV systolic function. Moderate RV dilation.    Images uploaded on [employer] : Adi Moore Tsegaye    INDICATION: Respiratory failure    PROCEDURE:  [x] LIMITED ECHO  [x] LIMITED CHEST  [ ] LIMITED RETROPERITONEAL  [ ] LIMITED ABDOMINAL  [ ] LIMITED DVT  [ ] NEEDLE GUIDANCE VASCULAR  [ ] NEEDLE GUIDANCE THORACENTESIS  [ ] NEEDLE GUIDANCE PARACENTESIS  [ ] NEEDLE GUIDANCE PERICARDIOCENTESIS  [ ] OTHER    FINDINGS:  Diffuse B-lines anteriorly. Basilar consolidations.   Grossly normal LV systolic function. RV equal in size to LV. IVC 16-25.    INTERPRETATION:  Lung exam consistent with pulmonary edema vs pneumonia.  Normal LV systolic function. Moderate RV dilation.    Images uploaded on [employer]    Attending Note: Agree with above. I was present through out the scan.

## 2021-12-03 NOTE — PATIENT PROFILE ADULT - FALL HARM RISK - HARM RISK INTERVENTIONS

## 2021-12-04 DIAGNOSIS — I50.32 CHRONIC DIASTOLIC (CONGESTIVE) HEART FAILURE: ICD-10-CM

## 2021-12-04 DIAGNOSIS — E11.9 TYPE 2 DIABETES MELLITUS WITHOUT COMPLICATIONS: ICD-10-CM

## 2021-12-04 DIAGNOSIS — R77.8 OTHER SPECIFIED ABNORMALITIES OF PLASMA PROTEINS: ICD-10-CM

## 2021-12-04 DIAGNOSIS — N17.9 ACUTE KIDNEY FAILURE, UNSPECIFIED: ICD-10-CM

## 2021-12-04 DIAGNOSIS — J44.1 CHRONIC OBSTRUCTIVE PULMONARY DISEASE WITH (ACUTE) EXACERBATION: ICD-10-CM

## 2021-12-04 DIAGNOSIS — R41.82 ALTERED MENTAL STATUS, UNSPECIFIED: ICD-10-CM

## 2021-12-04 DIAGNOSIS — J18.9 PNEUMONIA, UNSPECIFIED ORGANISM: ICD-10-CM

## 2021-12-04 DIAGNOSIS — Z29.9 ENCOUNTER FOR PROPHYLACTIC MEASURES, UNSPECIFIED: ICD-10-CM

## 2021-12-04 DIAGNOSIS — J96.22 ACUTE AND CHRONIC RESPIRATORY FAILURE WITH HYPERCAPNIA: ICD-10-CM

## 2021-12-04 DIAGNOSIS — E87.5 HYPERKALEMIA: ICD-10-CM

## 2021-12-04 DIAGNOSIS — G47.33 OBSTRUCTIVE SLEEP APNEA (ADULT) (PEDIATRIC): ICD-10-CM

## 2021-12-04 LAB
ANION GAP SERPL CALC-SCNC: 10 MMOL/L — SIGNIFICANT CHANGE UP (ref 7–14)
BASOPHILS # BLD AUTO: 0.03 K/UL — SIGNIFICANT CHANGE UP (ref 0–0.2)
BASOPHILS NFR BLD AUTO: 0.3 % — SIGNIFICANT CHANGE UP (ref 0–2)
BLD GP AB SCN SERPL QL: NEGATIVE — SIGNIFICANT CHANGE UP
BLOOD GAS ARTERIAL COMPREHENSIVE RESULT: SIGNIFICANT CHANGE UP
BUN SERPL-MCNC: 43 MG/DL — HIGH (ref 7–23)
CALCIUM SERPL-MCNC: 9.4 MG/DL — SIGNIFICANT CHANGE UP (ref 8.4–10.5)
CHLORIDE SERPL-SCNC: 93 MMOL/L — LOW (ref 98–107)
CHLORIDE UR-SCNC: 102 MMOL/L — SIGNIFICANT CHANGE UP
CO2 SERPL-SCNC: 39 MMOL/L — HIGH (ref 22–31)
CREAT ?TM UR-MCNC: 21 MG/DL — SIGNIFICANT CHANGE UP
CREAT SERPL-MCNC: 1.17 MG/DL — SIGNIFICANT CHANGE UP (ref 0.5–1.3)
EOSINOPHIL # BLD AUTO: 0.04 K/UL — SIGNIFICANT CHANGE UP (ref 0–0.5)
EOSINOPHIL NFR BLD AUTO: 0.4 % — SIGNIFICANT CHANGE UP (ref 0–6)
FERRITIN SERPL-MCNC: 139 NG/ML — SIGNIFICANT CHANGE UP (ref 15–150)
GLUCOSE BLDC GLUCOMTR-MCNC: 110 MG/DL — HIGH (ref 70–99)
GLUCOSE BLDC GLUCOMTR-MCNC: 123 MG/DL — HIGH (ref 70–99)
GLUCOSE BLDC GLUCOMTR-MCNC: 138 MG/DL — HIGH (ref 70–99)
GLUCOSE BLDC GLUCOMTR-MCNC: 173 MG/DL — HIGH (ref 70–99)
GLUCOSE BLDC GLUCOMTR-MCNC: 195 MG/DL — HIGH (ref 70–99)
GLUCOSE SERPL-MCNC: 149 MG/DL — HIGH (ref 70–99)
HCT VFR BLD CALC: 30.4 % — LOW (ref 34.5–45)
HCT VFR BLD CALC: 30.6 % — LOW (ref 34.5–45)
HGB BLD-MCNC: 8.4 G/DL — LOW (ref 11.5–15.5)
HGB BLD-MCNC: 8.8 G/DL — LOW (ref 11.5–15.5)
IANC: 8.13 K/UL — SIGNIFICANT CHANGE UP (ref 1.5–8.5)
IMM GRANULOCYTES NFR BLD AUTO: 0.6 % — SIGNIFICANT CHANGE UP (ref 0–1.5)
IRON SATN MFR SERPL: 15 % — SIGNIFICANT CHANGE UP (ref 14–50)
IRON SATN MFR SERPL: 33 UG/DL — SIGNIFICANT CHANGE UP (ref 30–160)
LYMPHOCYTES # BLD AUTO: 1.18 K/UL — SIGNIFICANT CHANGE UP (ref 1–3.3)
LYMPHOCYTES # BLD AUTO: 12.2 % — LOW (ref 13–44)
MAGNESIUM SERPL-MCNC: 2.4 MG/DL — SIGNIFICANT CHANGE UP (ref 1.6–2.6)
MCHC RBC-ENTMCNC: 25.9 PG — LOW (ref 27–34)
MCHC RBC-ENTMCNC: 26 PG — LOW (ref 27–34)
MCHC RBC-ENTMCNC: 27.5 GM/DL — LOW (ref 32–36)
MCHC RBC-ENTMCNC: 28.9 GM/DL — LOW (ref 32–36)
MCV RBC AUTO: 89.4 FL — SIGNIFICANT CHANGE UP (ref 80–100)
MCV RBC AUTO: 94.7 FL — SIGNIFICANT CHANGE UP (ref 80–100)
MONOCYTES # BLD AUTO: 0.26 K/UL — SIGNIFICANT CHANGE UP (ref 0–0.9)
MONOCYTES NFR BLD AUTO: 2.7 % — SIGNIFICANT CHANGE UP (ref 2–14)
NEUTROPHILS # BLD AUTO: 8.13 K/UL — HIGH (ref 1.8–7.4)
NEUTROPHILS NFR BLD AUTO: 83.8 % — HIGH (ref 43–77)
NRBC # BLD: 0 /100 WBCS — SIGNIFICANT CHANGE UP
NRBC # BLD: 0 /100 WBCS — SIGNIFICANT CHANGE UP
NRBC # FLD: 0 K/UL — SIGNIFICANT CHANGE UP
NRBC # FLD: 0.03 K/UL — HIGH
PHOSPHATE SERPL-MCNC: 2.5 MG/DL — SIGNIFICANT CHANGE UP (ref 2.5–4.5)
PLATELET # BLD AUTO: 387 K/UL — SIGNIFICANT CHANGE UP (ref 150–400)
PLATELET # BLD AUTO: 417 K/UL — HIGH (ref 150–400)
POTASSIUM SERPL-MCNC: 4.3 MMOL/L — SIGNIFICANT CHANGE UP (ref 3.5–5.3)
POTASSIUM SERPL-SCNC: 4.3 MMOL/L — SIGNIFICANT CHANGE UP (ref 3.5–5.3)
POTASSIUM UR-SCNC: 17.9 MMOL/L — SIGNIFICANT CHANGE UP
RBC # BLD: 3.23 M/UL — LOW (ref 3.8–5.2)
RBC # BLD: 3.4 M/UL — LOW (ref 3.8–5.2)
RBC # FLD: 16.3 % — HIGH (ref 10.3–14.5)
RBC # FLD: 16.5 % — HIGH (ref 10.3–14.5)
RH IG SCN BLD-IMP: POSITIVE — SIGNIFICANT CHANGE UP
SODIUM SERPL-SCNC: 142 MMOL/L — SIGNIFICANT CHANGE UP (ref 135–145)
SODIUM UR-SCNC: 120 MMOL/L — SIGNIFICANT CHANGE UP
TIBC SERPL-MCNC: 219 UG/DL — LOW (ref 220–430)
UIBC SERPL-MCNC: 186 UG/DL — SIGNIFICANT CHANGE UP (ref 110–370)
UUN UR-MCNC: 339 MG/DL — SIGNIFICANT CHANGE UP
WBC # BLD: 8.01 K/UL — SIGNIFICANT CHANGE UP (ref 3.8–10.5)
WBC # BLD: 9.7 K/UL — SIGNIFICANT CHANGE UP (ref 3.8–10.5)
WBC # FLD AUTO: 8.01 K/UL — SIGNIFICANT CHANGE UP (ref 3.8–10.5)
WBC # FLD AUTO: 9.7 K/UL — SIGNIFICANT CHANGE UP (ref 3.8–10.5)

## 2021-12-04 PROCEDURE — 70450 CT HEAD/BRAIN W/O DYE: CPT | Mod: 26

## 2021-12-04 PROCEDURE — 76770 US EXAM ABDO BACK WALL COMP: CPT | Mod: 26

## 2021-12-04 PROCEDURE — 99233 SBSQ HOSP IP/OBS HIGH 50: CPT

## 2021-12-04 PROCEDURE — 71045 X-RAY EXAM CHEST 1 VIEW: CPT | Mod: 26

## 2021-12-04 RX ORDER — DEXTROSE 50 % IN WATER 50 %
25 SYRINGE (ML) INTRAVENOUS ONCE
Refills: 0 | Status: DISCONTINUED | OUTPATIENT
Start: 2021-12-04 | End: 2021-12-17

## 2021-12-04 RX ORDER — SODIUM CHLORIDE 9 MG/ML
1000 INJECTION, SOLUTION INTRAVENOUS
Refills: 0 | Status: DISCONTINUED | OUTPATIENT
Start: 2021-12-04 | End: 2021-12-17

## 2021-12-04 RX ORDER — PIPERACILLIN AND TAZOBACTAM 4; .5 G/20ML; G/20ML
3.38 INJECTION, POWDER, LYOPHILIZED, FOR SOLUTION INTRAVENOUS EVERY 12 HOURS
Refills: 0 | Status: DISCONTINUED | OUTPATIENT
Start: 2021-12-04 | End: 2021-12-04

## 2021-12-04 RX ORDER — DEXTROSE 50 % IN WATER 50 %
15 SYRINGE (ML) INTRAVENOUS ONCE
Refills: 0 | Status: DISCONTINUED | OUTPATIENT
Start: 2021-12-04 | End: 2021-12-17

## 2021-12-04 RX ORDER — FUROSEMIDE 40 MG
40 TABLET ORAL DAILY
Refills: 0 | Status: DISCONTINUED | OUTPATIENT
Start: 2021-12-04 | End: 2021-12-04

## 2021-12-04 RX ORDER — PIPERACILLIN AND TAZOBACTAM 4; .5 G/20ML; G/20ML
3.38 INJECTION, POWDER, LYOPHILIZED, FOR SOLUTION INTRAVENOUS EVERY 8 HOURS
Refills: 0 | Status: COMPLETED | OUTPATIENT
Start: 2021-12-04 | End: 2021-12-08

## 2021-12-04 RX ORDER — INSULIN LISPRO 100/ML
VIAL (ML) SUBCUTANEOUS
Refills: 0 | Status: DISCONTINUED | OUTPATIENT
Start: 2021-12-04 | End: 2021-12-06

## 2021-12-04 RX ORDER — INSULIN LISPRO 100/ML
VIAL (ML) SUBCUTANEOUS AT BEDTIME
Refills: 0 | Status: DISCONTINUED | OUTPATIENT
Start: 2021-12-04 | End: 2021-12-06

## 2021-12-04 RX ORDER — GLUCAGON INJECTION, SOLUTION 0.5 MG/.1ML
1 INJECTION, SOLUTION SUBCUTANEOUS ONCE
Refills: 0 | Status: DISCONTINUED | OUTPATIENT
Start: 2021-12-04 | End: 2021-12-17

## 2021-12-04 RX ORDER — FUROSEMIDE 40 MG
40 TABLET ORAL
Refills: 0 | Status: DISCONTINUED | OUTPATIENT
Start: 2021-12-04 | End: 2021-12-08

## 2021-12-04 RX ORDER — DEXTROSE 50 % IN WATER 50 %
12.5 SYRINGE (ML) INTRAVENOUS ONCE
Refills: 0 | Status: DISCONTINUED | OUTPATIENT
Start: 2021-12-04 | End: 2021-12-17

## 2021-12-04 RX ADMIN — Medication 3 MILLILITER(S): at 21:40

## 2021-12-04 RX ADMIN — HEPARIN SODIUM 7500 UNIT(S): 5000 INJECTION INTRAVENOUS; SUBCUTANEOUS at 22:52

## 2021-12-04 RX ADMIN — Medication 40 MILLIGRAM(S): at 12:58

## 2021-12-04 RX ADMIN — Medication 1: at 18:09

## 2021-12-04 RX ADMIN — AZITHROMYCIN 255 MILLIGRAM(S): 500 TABLET, FILM COATED ORAL at 03:10

## 2021-12-04 RX ADMIN — Medication 100 MILLIGRAM(S): at 06:20

## 2021-12-04 RX ADMIN — PIPERACILLIN AND TAZOBACTAM 25 GRAM(S): 4; .5 INJECTION, POWDER, LYOPHILIZED, FOR SOLUTION INTRAVENOUS at 06:19

## 2021-12-04 RX ADMIN — HEPARIN SODIUM 7500 UNIT(S): 5000 INJECTION INTRAVENOUS; SUBCUTANEOUS at 14:33

## 2021-12-04 RX ADMIN — HEPARIN SODIUM 7500 UNIT(S): 5000 INJECTION INTRAVENOUS; SUBCUTANEOUS at 08:33

## 2021-12-04 RX ADMIN — Medication 3 MILLILITER(S): at 04:15

## 2021-12-04 RX ADMIN — AMLODIPINE BESYLATE 10 MILLIGRAM(S): 2.5 TABLET ORAL at 06:20

## 2021-12-04 RX ADMIN — Medication 3 MILLILITER(S): at 15:40

## 2021-12-04 RX ADMIN — PIPERACILLIN AND TAZOBACTAM 25 GRAM(S): 4; .5 INJECTION, POWDER, LYOPHILIZED, FOR SOLUTION INTRAVENOUS at 13:02

## 2021-12-04 RX ADMIN — PIPERACILLIN AND TAZOBACTAM 25 GRAM(S): 4; .5 INJECTION, POWDER, LYOPHILIZED, FOR SOLUTION INTRAVENOUS at 22:50

## 2021-12-04 RX ADMIN — Medication 40 MILLIGRAM(S): at 12:59

## 2021-12-04 NOTE — CONSULT NOTE ADULT - PROBLEM SELECTOR RECOMMENDATION 9
Acute on chronic hypercapnic/hypoxemic respiratory failure 2/2 COPD exacerbation vs CAP vs sedating medations in setting of SHAD/OHS. Recent ABG 7.49/63/72.  - Diuresis as per primary team  - Antibiotics and steroids for PNA/COPD exacerbation as per primary team  - Recommend changing to AVAPS RR 16, , Min IP 14, Max IP 30, EP 8, 40%  - Supplemental O2 to keep SO2 > 88%  - c/w bronchodilators    Turner Joshi MD  Pulmonary & Critical Care Fellow  [phone]

## 2021-12-04 NOTE — PROGRESS NOTE ADULT - ASSESSMENT
87y/o F w/ h/o HFpEF, obesity, SHAD on CPAP, HTN, T2DM, COPD, lymphedema, s/p fall and knee fracture p/w AMS and facial droop, MICU consulted for increased work of breathing on Bipap and elevated pCO2, found with hypercapnic respiratory failure c/b AMS improved on BIPAP.

## 2021-12-04 NOTE — CONSULT NOTE ADULT - ASSESSMENT
Patient is a 85 yo F w/ HFpEF, morbid obesity, SHAD/OHS (3L home O2 during day and NIPPV at night), HTN, T2DM, COPD, lymphedema, s/p recent fall and knee fracture who was initially admitted to MICU on 12/2 after p/w AMS and facial droop. Stroke code was initially called but symptoms thought to be secondary to acute on chronic hypercapnic/hypoxemic respiratory failure. Patient was placed on BIPAP 12/6 and then BIPAP 16/6 in ED with no improvement. Patient was then changed to AVAPS 16/500/IP 14 to 30/EP 8 with subsequent improvement in mental status and hypercapnia, able to tolerate NC 4 to 6 L during daytime. Patient was treated empirically for possible CAP/COPD with antibiotics (Zosyn/Azithro then changed to Levaquin) and ADHF with diuretics. Pulmonary called for followup.

## 2021-12-04 NOTE — PROGRESS NOTE ADULT - PROBLEM SELECTOR PLAN 3
-recent echo with HFpEF and grade I DD  - s/p lasix BID 12/3  - c/w lasix 40 qD for now  - strict Is/Os monitor output  - cards following: recent echo with grade 1 diastolic dysfunction  - c/w lasix -recent echo with HFpEF and grade I DD  - s/p lasix BID 12/3  - c/w lasix 40 qD for now  - strict Is/Os monitor output  - cards following: recent echo with grade 1 diastolic dysfunction  - c/w lasix  - c/w metoprolol, amlodipine, statin. on home bumex but IV lasix inpatient, f/u Is/Os and cardiology recs

## 2021-12-04 NOTE — PROGRESS NOTE ADULT - SUBJECTIVE AND OBJECTIVE BOX
Gwyn Lanier MD Hospitalist  Pager: [phone]  After 7: Night Team pager    Patient is a 86y old  Female who presents with a chief complaint of SOB (03 Dec 2021 14:19)      SUBJECTIVE / OVERNIGHT EVENTS: Overnight, no acute events. Patient seen and examined at bedside this AM.    MEDICATIONS  (STANDING):  albuterol/ipratropium for Nebulization 3 milliLiter(s) Nebulizer every 6 hours  amLODIPine   Tablet 10 milliGRAM(s) Oral daily  atorvastatin 40 milliGRAM(s) Oral at bedtime  azithromycin  IVPB      azithromycin  IVPB 500 milliGRAM(s) IV Intermittent every 24 hours  furosemide   Injectable 40 milliGRAM(s) IV Push daily  gabapentin 100 milliGRAM(s) Oral three times a day  heparin   Injectable 7500 Unit(s) SubCutaneous every 8 hours  hydrALAZINE 100 milliGRAM(s) Oral three times a day  metoprolol succinate  milliGRAM(s) Oral daily  montelukast 10 milliGRAM(s) Oral daily  piperacillin/tazobactam IVPB.. 3.375 Gram(s) IV Intermittent every 8 hours  polyethylene glycol 3350 17 Gram(s) Oral daily  senna 2 Tablet(s) Oral at bedtime    MEDICATIONS  (PRN):  oxycodone    5 mG/acetaminophen 325 mG 1 Tablet(s) Oral every 6 hours PRN Moderate Pain (4 - 6)      Vital Signs Last 24 Hrs  T(C): 36.8 (04 Dec 2021 06:15), Max: 37.2 (03 Dec 2021 20:00)  T(F): 98.2 (04 Dec 2021 06:15), Max: 98.9 (03 Dec 2021 20:00)  HR: 82 (04 Dec 2021 06:15) (57 - 90)  BP: 156/72 (04 Dec 2021 06:15) (134/56 - 164/81)  BP(mean): 85 (03 Dec 2021 17:00) (75 - 100)  RR: 20 (04 Dec 2021 06:15) (20 - 28)  SpO2: 97% (04 Dec 2021 06:15) (90% - 100%)  CAPILLARY BLOOD GLUCOSE      POCT Blood Glucose.: 123 mg/dL (04 Dec 2021 06:44)  POCT Blood Glucose.: 110 mg/dL (04 Dec 2021 01:08)  POCT Blood Glucose.: 116 mg/dL (03 Dec 2021 22:15)  POCT Blood Glucose.: 137 mg/dL (03 Dec 2021 17:41)  POCT Blood Glucose.: 165 mg/dL (03 Dec 2021 10:28)    I&O's Summary    03 Dec 2021 07:01  -  04 Dec 2021 07:00  --------------------------------------------------------  IN: 145 mL / OUT: 3500 mL / NET: -3355 mL        PHYSICAL EXAM:  GENERAL: NAD  EYES: EOMI, PERRLA, conjunctiva and sclera clear  NECK:  No JVD  CHEST/LUNG: basilar crackles. increased work of breathing  HEART: RRR; No murmurs  ABDOMEN: Soft, Nontender, Nondistended; Bowel sounds present  : No Humphrey  EXTREMITIES:  2+ Peripheral Pulses, No edema  PSYCH: AAOx2  NEUROLOGY: alert and oriented  SKIN: No rashes or lesions. No sacral ulcer    LABS:                        7.5    9.13  )-----------( 348      ( 03 Dec 2021 03:40 )             27.7     12-03    142  |  97<L>  |  62<H>  ----------------------------<  149<H>  5.6<H>   |  38<H>  |  1.55<H>    Ca    9.1      03 Dec 2021 03:40  Phos  4.4     12-03  Mg     3.00     12-03    TPro  7.2  /  Alb  3.2<L>  /  TBili  0.3  /  DBili  x   /  AST  14  /  ALT  8   /  AlkPhos  48  12-03              Microbiology;        RADIOLOGY & ADDITIONAL TESTS:    < from: Xray Chest 1 View- PORTABLE-Urgent (Xray Chest 1 View- PORTABLE-Urgent .) (12.02.21 @ 12:44) >  FINDINGS:    The cardiac silhouette appears similar in size.    Right midlung peripheral patchy opacity. No pleural effusions or pneumothorax.    Degenerative changes of the bones.    IMPRESSION:    Right midlung peripheral patchy opacity, may represent infection.    --- End of Report ---    < end of copied text >  < from: TTE Echo Complete w/o Contrast w/ Doppler (11.19.21 @ 13:56) >   1. Left ventricular ejection fraction, by visual estimation, is 60 to 65%.   2. Technically difficult study.   3. Normal global left ventricular systolic function.   4. Normal left ventricular internal cavity size.   5. Spectral Doppler shows impaired relaxation pattern of left ventricular myocardial filling (Grade I diastolic dysfunction).   6. There is mild concentric left ventricular hypertrophy.   7. Normal right ventricular size and function.   8. Normal left atrial size.   9. Normal right atrial size.  10. There is no evidence of pericardial effusion.  11. Mild thickening and calcification of the anterior and posterior mitral valve leaflets.  12. Moderate mitral annular calcification.  13. Degenerative tricuspidvalve.  14. Mild aortic valve stenosis.  15. Mitral valve mean gradient is 4.7 mmHg consistent with mild mitral stenosis.  16. Peak transaortic gradient equals 21.7 mmHg, mean transaortic gradient equals 10.9 mmHg, the calculated aortic valve area equals 1.69 cm² by the continuity equation consistent with mild aortic stenosis.  17. There is mild aortic root calcification.    < end of copied text >      Imaging Personally Reviewed:          Consultant(s) Notes Reviewed: neurology, cardiology    Care Discussed with Consultants/Other Providers: neurology, cardiology

## 2021-12-04 NOTE — CONSULT NOTE ADULT - SUBJECTIVE AND OBJECTIVE BOX
CHIEF COMPLAINT:    HPI: Patient is a 85 yo F w/ HFpEF, morbid obesity, SHAD/OHS (3L home O2 during day and NIPPV at night), HTN, T2DM, COPD, lymphedema, s/p recent fall and knee fracture who was initially admitted to MICU on 12/2 after p/w AMS and facial droop. Stroke code was initially called but symptoms thought to be secondary to acute on chronic hypercapnic/hypoxemic respiratory failure. Patient was placed on BIPAP 12/6 and then BIPAP 16/6 in ED with no improvement. Patient was then changed to AVAPS 16/500/IP 14 to 30/EP 8 with subsequent improvement in mental status and hypercapnia, able to tolerate NC 4 to 6 L during daytime. Patient was treated empirically for possible CAP/COPD with antibiotics (Zosyn/Azithro then changed to Levaquin) and ADHF with diuretics. Pulmonary called for followup.    Labs notable today for no leukocytosis, Hb 8.4, Bicarb 39, Creatinine 1.17. Patient being treated with Zosyn and Azithro to complete empiric abx, Lasix 40mg IV BID, and Solumedrol 40mg IV daily.     PAST MEDICAL & SURGICAL HISTORY:  HTN (hypertension)    HLD (hyperlipidemia)    Asthma    DM (diabetes mellitus)    GERD (gastroesophageal reflux disease)    Lymphedema    S/P knee replacement        FAMILY HISTORY:      SOCIAL HISTORY:  Smoking: [ ] Never Smoked [ ] Former Smoker (__ packs x ___ years) [ ] Current Smoker  (__ packs x ___ years)  Substance Use: [ ] Never Used [ ] Used ____  EtOH Use:  Marital Status: [ ] Single [ ]  [ ]  [ ]   Sexual History:   Occupation:  Recent Travel:  Country of Birth:  Advance Directives:    Allergies    No Known Allergies    Intolerances        HOME MEDICATIONS:  Home Medications:  Advair Diskus 250 mcg-50 mcg inhalation powder: 1 puff(s) inhaled 2 times a day (23 Nov 2021 14:10)  alendronate 70 mg oral tablet: 1 tab(s) orally once a week (23 Nov 2021 14:10)  ascorbic acid 500 mg oral capsule: 1 cap(s) orally once a day (30 Oct 2019 16:54)  azelastine 137 mcg/inh (0.1%) nasal spray: 2 spray(s) nasal 2 times a day (30 Oct 2019 16:54)  glimepiride 2 mg oral tablet: 1 tab(s) orally once a day (30 Oct 2019 16:54)  ipratropium-albuterol 0.5 mg-2.5 mg/3 mLinhalation solution: 3 milliliter(s) inhaled every 6 hours, As Needed (04 Jun 2021 10:05)  montelukast 10 mg oral tablet: 1 tab(s) orally once a day (30 Oct 2019 16:54)  Neurontin 100 mg oral capsule: 1 cap(s) orally 3 times a day (29 Nov 2020 18:19)  ocular lubricant ophthalmic solution: 1 drop(s) to each affected eye 3 times a day, As needed, Dry Eyes (06 Nov 2019 13:41)  ProAir HFA 90 mcg/inh inhalation aerosol: 2 puff(s) inhaled 4 times a day, As Needed (23 Nov 2021 14:10)      REVIEW OF SYSTEMS:  Constitutional: [ ] negative [ ] fevers [ ] chills [ ] weight loss [ ] weight gain  HEENT: [ ] negative [ ] dry eyes [ ] eye irritation [ ] postnasal drip [ ] nasal congestion  CV: [ ] negative  [ ] chest pain [ ] orthopnea [ ] palpitations [ ] murmur  Resp: [ ] negative [ ] cough [ ] shortness of breath [ ] dyspnea [ ] wheezing [ ] sputum [ ] hemoptysis  GI: [ ] negative [ ] nausea [ ] vomiting [ ] diarrhea [ ] constipation [ ] abd pain [ ] dysphagia   : [ ] negative [ ] dysuria [ ] nocturia [ ] hematuria [ ] increased urinary frequency  Musculoskeletal: [ ] negative [ ] back pain [ ] myalgias [ ] arthralgias [ ] fracture  Skin: [ ] negative [ ] rash [ ] itch  Neurological: [ ] negative [ ] headache [ ] dizziness [ ] syncope [ ] weakness [ ] numbness  Psychiatric: [ ] negative [ ] anxiety [ ] depression  Endocrine: [ ] negative [ ] diabetes [ ] thyroid problem  Hematologic/Lymphatic: [ ] negative [ ] anemia [ ] bleeding problem  Allergic/Immunologic: [ ] negative [ ] itchy eyes [ ] nasal discharge [ ] hives [ ] angioedema  [ ] All other systems negative  [ ] Unable to assess ROS because ________    OBJECTIVE:  ICU Vital Signs Last 24 Hrs  T(C): 36.8 (04 Dec 2021 06:15), Max: 37.2 (03 Dec 2021 20:00)  T(F): 98.2 (04 Dec 2021 06:15), Max: 98.9 (03 Dec 2021 20:00)  HR: 75 (04 Dec 2021 11:15) (64 - 90)  BP: 156/72 (04 Dec 2021 06:15) (134/56 - 163/78)  BP(mean): 85 (03 Dec 2021 17:00) (75 - 90)  ABP: --  ABP(mean): --  RR: 20 (04 Dec 2021 06:15) (20 - 28)  SpO2: 94% (04 Dec 2021 11:15) (90% - 100%)        12-03 @ 07:01  -  12-04 @ 07:00  --------------------------------------------------------  IN: 145 mL / OUT: 3500 mL / NET: -3355 mL      CAPILLARY BLOOD GLUCOSE      POCT Blood Glucose.: 138 mg/dL (04 Dec 2021 11:57)      PHYSICAL EXAM:  General:   HEENT:   Lymph Nodes:  Neck:   Respiratory:   Cardiovascular:   Abdomen:   Extremities:   Skin:   Neurological:  Psychiatry:    LINES:     HOSPITAL MEDICATIONS:  Standing Meds:  albuterol/ipratropium for Nebulization 3 milliLiter(s) Nebulizer every 6 hours  amLODIPine   Tablet 10 milliGRAM(s) Oral daily  atorvastatin 40 milliGRAM(s) Oral at bedtime  azithromycin  IVPB      azithromycin  IVPB 500 milliGRAM(s) IV Intermittent every 24 hours  dextrose 40% Gel 15 Gram(s) Oral once  dextrose 5%. 1000 milliLiter(s) IV Continuous <Continuous>  dextrose 5%. 1000 milliLiter(s) IV Continuous <Continuous>  dextrose 50% Injectable 25 Gram(s) IV Push once  dextrose 50% Injectable 12.5 Gram(s) IV Push once  dextrose 50% Injectable 25 Gram(s) IV Push once  furosemide   Injectable 40 milliGRAM(s) IV Push two times a day  gabapentin 100 milliGRAM(s) Oral three times a day  glucagon  Injectable 1 milliGRAM(s) IntraMuscular once  heparin   Injectable 7500 Unit(s) SubCutaneous every 8 hours  hydrALAZINE 100 milliGRAM(s) Oral three times a day  insulin lispro (ADMELOG) corrective regimen sliding scale   SubCutaneous three times a day before meals  insulin lispro (ADMELOG) corrective regimen sliding scale   SubCutaneous at bedtime  methylPREDNISolone sodium succinate Injectable 40 milliGRAM(s) IV Push daily  metoprolol succinate  milliGRAM(s) Oral daily  montelukast 10 milliGRAM(s) Oral daily  piperacillin/tazobactam IVPB.. 3.375 Gram(s) IV Intermittent every 8 hours  polyethylene glycol 3350 17 Gram(s) Oral daily  senna 2 Tablet(s) Oral at bedtime      PRN Meds:  oxycodone    5 mG/acetaminophen 325 mG 1 Tablet(s) Oral every 6 hours PRN      LABS:                        8.4    8.01  )-----------( 387      ( 04 Dec 2021 11:41 )             30.6     Hgb Trend: 8.4<--, 7.5<--, 9.0<--  12-04    142  |  93<L>  |  43<H>  ----------------------------<  149<H>  4.3   |  39<H>  |  1.17    Ca    9.4      04 Dec 2021 11:41  Phos  2.5     12-04  Mg     2.40     12-04    TPro  7.2  /  Alb  3.2<L>  /  TBili  0.3  /  DBili  x   /  AST  14  /  ALT  8   /  AlkPhos  48  12-03    Creatinine Trend: 1.17<--, 1.55<--, 2.03<--, 2.18<--, 1.03<--, 1.12<--      Arterial Blood Gas:  12-03 @ 11:52  7.46/63/60/45/93.6/18.8  ABG lactate: --  Arterial Blood Gas:  12-03 @ 00:05  7.31/81/156/41/98.6/12.8  ABG lactate: --    Venous Blood Gas:  12-03 @ 03:40  7.29/90/71/43/96.2  VBG Lactate: 0.6  Venous Blood Gas:  12-02 @ 17:56  7.24/100/31/43/57.5  VBG Lactate: 1.2  Venous Blood Gas:  12-02 @ 15:39  7.21/102/27/41/47.8  VBG Lactate: 1.0  Venous Blood Gas:  12-02 @ 13:30  7.21/106/27/42/47.6  VBG Lactate: 0.9      MICROBIOLOGY:       RADIOLOGY:  [ ] Reviewed and interpreted by me    PULMONARY FUNCTION TESTS:    EKG:   CHIEF COMPLAINT:    HPI: Patient is a 87 yo F w/ HFpEF, morbid obesity, SHAD/OHS (3L home O2 during day and NIPPV at night), HTN, T2DM, COPD, lymphedema, s/p recent fall and knee fracture who was initially admitted to MICU on 12/2 after p/w AMS and facial droop. Stroke code was initially called but symptoms thought to be secondary to acute on chronic hypercapnic/hypoxemic respiratory failure. Patient was placed on BIPAP 12/6 and then BIPAP 16/6 in ED with no improvement. Patient was then changed to AVAPS 16/500/IP 14 to 30/EP 8 with subsequent improvement in mental status and hypercapnia, able to tolerate NC 4 to 6 L during daytime. Patient was treated empirically for possible CAP/COPD with antibiotics (Zosyn/Azithro then changed to Levaquin) and ADHF with diuretics. Pulmonary called for followup.    Labs notable today for no leukocytosis, Hb 8.4, Bicarb 39, Creatinine 1.17. Patient being treated with Zosyn and Azithro to complete empiric abx, Lasix 40mg IV BID, and Solumedrol 40mg IV daily.     PAST MEDICAL & SURGICAL HISTORY:  HTN (hypertension)    HLD (hyperlipidemia)    Asthma    DM (diabetes mellitus)    GERD (gastroesophageal reflux disease)    Lymphedema    S/P knee replacement        FAMILY HISTORY:      SOCIAL HISTORY:  Smoking: [ ] Never Smoked [ ] Former Smoker (__ packs x ___ years) [ ] Current Smoker  (__ packs x ___ years)  Substance Use: [ ] Never Used [ ] Used ____  EtOH Use:  Marital Status: [ ] Single [ ]  [ ]  [ ]   Sexual History:   Occupation:  Recent Travel:  Country of Birth:  Advance Directives:    Allergies    No Known Allergies    Intolerances        HOME MEDICATIONS:  Home Medications:  Advair Diskus 250 mcg-50 mcg inhalation powder: 1 puff(s) inhaled 2 times a day (23 Nov 2021 14:10)  alendronate 70 mg oral tablet: 1 tab(s) orally once a week (23 Nov 2021 14:10)  ascorbic acid 500 mg oral capsule: 1 cap(s) orally once a day (30 Oct 2019 16:54)  azelastine 137 mcg/inh (0.1%) nasal spray: 2 spray(s) nasal 2 times a day (30 Oct 2019 16:54)  glimepiride 2 mg oral tablet: 1 tab(s) orally once a day (30 Oct 2019 16:54)  ipratropium-albuterol 0.5 mg-2.5 mg/3 mLinhalation solution: 3 milliliter(s) inhaled every 6 hours, As Needed (04 Jun 2021 10:05)  montelukast 10 mg oral tablet: 1 tab(s) orally once a day (30 Oct 2019 16:54)  Neurontin 100 mg oral capsule: 1 cap(s) orally 3 times a day (29 Nov 2020 18:19)  ocular lubricant ophthalmic solution: 1 drop(s) to each affected eye 3 times a day, As needed, Dry Eyes (06 Nov 2019 13:41)  ProAir HFA 90 mcg/inh inhalation aerosol: 2 puff(s) inhaled 4 times a day, As Needed (23 Nov 2021 14:10)      REVIEW OF SYSTEMS:  Constitutional: [ ] negative [ ] fevers [ ] chills [ ] weight loss [ ] weight gain  HEENT: [ ] negative [ ] dry eyes [ ] eye irritation [ ] postnasal drip [ ] nasal congestion  CV: [ ] negative  [ ] chest pain [ ] orthopnea [ ] palpitations [ ] murmur  Resp: [ ] negative [ ] cough [ ] shortness of breath [ ] dyspnea [ ] wheezing [ ] sputum [ ] hemoptysis  GI: [ ] negative [ ] nausea [ ] vomiting [ ] diarrhea [ ] constipation [ ] abd pain [ ] dysphagia   : [ ] negative [ ] dysuria [ ] nocturia [ ] hematuria [ ] increased urinary frequency  Musculoskeletal: [ ] negative [ ] back pain [ ] myalgias [ ] arthralgias [ ] fracture  Skin: [ ] negative [ ] rash [ ] itch  Neurological: [ ] negative [ ] headache [ ] dizziness [ ] syncope [ ] weakness [ ] numbness  Psychiatric: [ ] negative [ ] anxiety [ ] depression  Endocrine: [ ] negative [ ] diabetes [ ] thyroid problem  Hematologic/Lymphatic: [ ] negative [ ] anemia [ ] bleeding problem  Allergic/Immunologic: [ ] negative [ ] itchy eyes [ ] nasal discharge [ ] hives [ ] angioedema  [ ] All other systems negative  [X] Unable to assess ROS     OBJECTIVE:  ICU Vital Signs Last 24 Hrs  T(C): 36.8 (04 Dec 2021 06:15), Max: 37.2 (03 Dec 2021 20:00)  T(F): 98.2 (04 Dec 2021 06:15), Max: 98.9 (03 Dec 2021 20:00)  HR: 75 (04 Dec 2021 11:15) (64 - 90)  BP: 156/72 (04 Dec 2021 06:15) (134/56 - 163/78)  BP(mean): 85 (03 Dec 2021 17:00) (75 - 90)  ABP: --  ABP(mean): --  RR: 20 (04 Dec 2021 06:15) (20 - 28)  SpO2: 94% (04 Dec 2021 11:15) (90% - 100%)        12-03 @ 07:01  -  12-04 @ 07:00  --------------------------------------------------------  IN: 145 mL / OUT: 3500 mL / NET: -3355 mL      CAPILLARY BLOOD GLUCOSE      POCT Blood Glucose.: 138 mg/dL (04 Dec 2021 11:57)      PHYSICAL EXAM:  General: NAD  HEENT: EOMI, pERRLA  Respiratory: CTAB  Cardiovascular: S1/S2; RRR  Abdomen: Soft, NTND  Extremities: +4 pitting edema in right leg wrapped in cast for knee fracture, +2 pitting edema in left leg  Neurological: Alert      LINES:     HOSPITAL MEDICATIONS:  Standing Meds:  albuterol/ipratropium for Nebulization 3 milliLiter(s) Nebulizer every 6 hours  amLODIPine   Tablet 10 milliGRAM(s) Oral daily  atorvastatin 40 milliGRAM(s) Oral at bedtime  azithromycin  IVPB      azithromycin  IVPB 500 milliGRAM(s) IV Intermittent every 24 hours  dextrose 40% Gel 15 Gram(s) Oral once  dextrose 5%. 1000 milliLiter(s) IV Continuous <Continuous>  dextrose 5%. 1000 milliLiter(s) IV Continuous <Continuous>  dextrose 50% Injectable 25 Gram(s) IV Push once  dextrose 50% Injectable 12.5 Gram(s) IV Push once  dextrose 50% Injectable 25 Gram(s) IV Push once  furosemide   Injectable 40 milliGRAM(s) IV Push two times a day  gabapentin 100 milliGRAM(s) Oral three times a day  glucagon  Injectable 1 milliGRAM(s) IntraMuscular once  heparin   Injectable 7500 Unit(s) SubCutaneous every 8 hours  hydrALAZINE 100 milliGRAM(s) Oral three times a day  insulin lispro (ADMELOG) corrective regimen sliding scale   SubCutaneous three times a day before meals  insulin lispro (ADMELOG) corrective regimen sliding scale   SubCutaneous at bedtime  methylPREDNISolone sodium succinate Injectable 40 milliGRAM(s) IV Push daily  metoprolol succinate  milliGRAM(s) Oral daily  montelukast 10 milliGRAM(s) Oral daily  piperacillin/tazobactam IVPB.. 3.375 Gram(s) IV Intermittent every 8 hours  polyethylene glycol 3350 17 Gram(s) Oral daily  senna 2 Tablet(s) Oral at bedtime      PRN Meds:  oxycodone    5 mG/acetaminophen 325 mG 1 Tablet(s) Oral every 6 hours PRN      LABS:                        8.4    8.01  )-----------( 387      ( 04 Dec 2021 11:41 )             30.6     Hgb Trend: 8.4<--, 7.5<--, 9.0<--  12-04    142  |  93<L>  |  43<H>  ----------------------------<  149<H>  4.3   |  39<H>  |  1.17    Ca    9.4      04 Dec 2021 11:41  Phos  2.5     12-04  Mg     2.40     12-04    TPro  7.2  /  Alb  3.2<L>  /  TBili  0.3  /  DBili  x   /  AST  14  /  ALT  8   /  AlkPhos  48  12-03    Creatinine Trend: 1.17<--, 1.55<--, 2.03<--, 2.18<--, 1.03<--, 1.12<--      Arterial Blood Gas:  12-03 @ 11:52  7.46/63/60/45/93.6/18.8  ABG lactate: --  Arterial Blood Gas:  12-03 @ 00:05  7.31/81/156/41/98.6/12.8  ABG lactate: --    Venous Blood Gas:  12-03 @ 03:40  7.29/90/71/43/96.2  VBG Lactate: 0.6  Venous Blood Gas:  12-02 @ 17:56  7.24/100/31/43/57.5  VBG Lactate: 1.2  Venous Blood Gas:  12-02 @ 15:39  7.21/102/27/41/47.8  VBG Lactate: 1.0  Venous Blood Gas:  12-02 @ 13:30  7.21/106/27/42/47.6  VBG Lactate: 0.9      MICROBIOLOGY:       RADIOLOGY:  [ ] Reviewed and interpreted by me    PULMONARY FUNCTION TESTS:    EKG:

## 2021-12-04 NOTE — PROGRESS NOTE ADULT - SUBJECTIVE AND OBJECTIVE BOX
CARDIOLOGY FOLLOW UP NOTE - DR. ORANTES    Patient Name: WESTON SETHI  Date of Service: 12-04-21 @ 12:24    Patient seen and examined  feels better  less dyspnea      Subjective:    cv: denies chest pain, dyspnea, palpitations, dizziness  pulmonary: denies cough  GI: denies abdominal pain, nausea, vomiting  vascular/legs: no edema   skin: no rash  ROS: otherwise negative   overnight events:      PHYSICAL EXAM:  T(C): 36.8 (12-04-21 @ 06:15), Max: 37.2 (12-03-21 @ 20:00)  HR: 75 (12-04-21 @ 11:15) (64 - 90)  BP: 156/72 (12-04-21 @ 06:15) (134/56 - 163/78)  RR: 20 (12-04-21 @ 06:15) (20 - 28)  SpO2: 94% (12-04-21 @ 11:15) (90% - 100%)  Wt(kg): --  I&O's Summary    03 Dec 2021 07:01  -  04 Dec 2021 07:00  --------------------------------------------------------  IN: 145 mL / OUT: 3500 mL / NET: -3355 mL      Daily Height in cm: 165.1 (03 Dec 2021 20:00)    Daily     Appearance: Normal	  Cardiovascular: Normal S1 S2,RRR, No JVD, No murmurs  Respiratory: Lungs clear to auscultation	  Gastrointestinal:  Soft, Non-tender, + BS	  Extremities: Normal range of motion, No clubbing, + edema       Home Medications:  Advair Diskus 250 mcg-50 mcg inhalation powder: 1 puff(s) inhaled 2 times a day (23 Nov 2021 14:10)  alendronate 70 mg oral tablet: 1 tab(s) orally once a week (23 Nov 2021 14:10)  ascorbic acid 500 mg oral capsule: 1 cap(s) orally once a day (30 Oct 2019 16:54)  azelastine 137 mcg/inh (0.1%) nasal spray: 2 spray(s) nasal 2 times a day (30 Oct 2019 16:54)  glimepiride 2 mg oral tablet: 1 tab(s) orally once a day (30 Oct 2019 16:54)  ipratropium-albuterol 0.5 mg-2.5 mg/3 mLinhalation solution: 3 milliliter(s) inhaled every 6 hours, As Needed (04 Jun 2021 10:05)  montelukast 10 mg oral tablet: 1 tab(s) orally once a day (30 Oct 2019 16:54)  Neurontin 100 mg oral capsule: 1 cap(s) orally 3 times a day (29 Nov 2020 18:19)  ocular lubricant ophthalmic solution: 1 drop(s) to each affected eye 3 times a day, As needed, Dry Eyes (06 Nov 2019 13:41)  ProAir HFA 90 mcg/inh inhalation aerosol: 2 puff(s) inhaled 4 times a day, As Needed (23 Nov 2021 14:10)      MEDICATIONS  (STANDING):  albuterol/ipratropium for Nebulization 3 milliLiter(s) Nebulizer every 6 hours  amLODIPine   Tablet 10 milliGRAM(s) Oral daily  atorvastatin 40 milliGRAM(s) Oral at bedtime  azithromycin  IVPB      azithromycin  IVPB 500 milliGRAM(s) IV Intermittent every 24 hours  dextrose 40% Gel 15 Gram(s) Oral once  dextrose 5%. 1000 milliLiter(s) (50 mL/Hr) IV Continuous <Continuous>  dextrose 5%. 1000 milliLiter(s) (100 mL/Hr) IV Continuous <Continuous>  dextrose 50% Injectable 25 Gram(s) IV Push once  dextrose 50% Injectable 12.5 Gram(s) IV Push once  dextrose 50% Injectable 25 Gram(s) IV Push once  furosemide   Injectable 40 milliGRAM(s) IV Push daily  gabapentin 100 milliGRAM(s) Oral three times a day  glucagon  Injectable 1 milliGRAM(s) IntraMuscular once  heparin   Injectable 7500 Unit(s) SubCutaneous every 8 hours  hydrALAZINE 100 milliGRAM(s) Oral three times a day  insulin lispro (ADMELOG) corrective regimen sliding scale   SubCutaneous three times a day before meals  insulin lispro (ADMELOG) corrective regimen sliding scale   SubCutaneous at bedtime  metoprolol succinate  milliGRAM(s) Oral daily  montelukast 10 milliGRAM(s) Oral daily  piperacillin/tazobactam IVPB.. 3.375 Gram(s) IV Intermittent every 12 hours  polyethylene glycol 3350 17 Gram(s) Oral daily  senna 2 Tablet(s) Oral at bedtime      TELEMETRY: 	    ECG:  	  RADIOLOGY:   DIAGNOSTIC TESTING:  [ ] Echocardiogram:  [ ] Catheterization:  [ ] Stress Test:    OTHER: 	    LABS:	 	    CARDIAC MARKERS:        Troponin T, High Sensitivity Result: 44 ng/L (12-02 @ 14:36)  Troponin T, High Sensitivity Result: 42 ng/L (12-02 @ 12:21)                                7.5    9.13  )-----------( 348      ( 03 Dec 2021 03:40 )             27.7     12-04    142  |  93<L>  |  43<H>  ----------------------------<  149<H>  4.3   |  39<H>  |  1.17    Ca    9.4      04 Dec 2021 11:41  Phos  2.5     12-04  Mg     2.40     12-04    TPro  7.2  /  Alb  3.2<L>  /  TBili  0.3  /  DBili  x   /  AST  14  /  ALT  8   /  AlkPhos  48  12-03    proBNP:     Lipid Profile:   HgA1c:     Creatinine, Serum: 1.17 mg/dL (12-04-21 @ 11:41)  Creatinine, Serum: 1.55 mg/dL (12-03-21 @ 03:40)  Creatinine, Serum: 2.03 mg/dL (12-02-21 @ 15:39)  Creatinine, Serum: 2.18 mg/dL (12-02-21 @ 12:21)

## 2021-12-04 NOTE — PROGRESS NOTE ADULT - ASSESSMENT
Echo 11/19/21: EF 60-65%, nl lv sys fx, mild AS, mild Ms   Echo 6/2/21: Min MR, grossly nl lv sys fx   Echo 12/5/20: grossly nl lv sys fx    a/p  5 y/o female w pmhx of HFpEF, obesity, SHAD on CPAP, HTN, T2DM, COPD, lymphedema, s/p fall and knee fracture s/p  knee surgery, presents to the ED with AMS and facial droop.    #AMS  -likely in setting of hypercapnic resp failure  -CVA r/o  -improved  -neuro f/u     #Hypercapnic respiratory failure  -bipap as needed    #acute/Chronic Diastolic CHF  -pt w chronic Lymphedema  -iv lasix daily   -recent Echo w grossly nl lv sys fx   -cont bb     #HTN  -stable  -cont current meds     dvt ppx       35 minutes spent on total encounter; more than 50% of the visit was spent counseling and/or coordinating care by the attending physician.

## 2021-12-05 LAB
ANION GAP SERPL CALC-SCNC: 6 MMOL/L — LOW (ref 7–14)
APPEARANCE UR: CLEAR — SIGNIFICANT CHANGE UP
BACTERIA # UR AUTO: NEGATIVE — SIGNIFICANT CHANGE UP
BILIRUB UR-MCNC: NEGATIVE — SIGNIFICANT CHANGE UP
BLOOD GAS ARTERIAL COMPREHENSIVE RESULT: SIGNIFICANT CHANGE UP
BUN SERPL-MCNC: 40 MG/DL — HIGH (ref 7–23)
CALCIUM SERPL-MCNC: 9.5 MG/DL — SIGNIFICANT CHANGE UP (ref 8.4–10.5)
CHLORIDE SERPL-SCNC: 93 MMOL/L — LOW (ref 98–107)
CO2 SERPL-SCNC: 43 MMOL/L — HIGH (ref 22–31)
COLOR SPEC: YELLOW — SIGNIFICANT CHANGE UP
CREAT SERPL-MCNC: 1.14 MG/DL — SIGNIFICANT CHANGE UP (ref 0.5–1.3)
DIFF PNL FLD: NEGATIVE — SIGNIFICANT CHANGE UP
EPI CELLS # UR: 1 /HPF — SIGNIFICANT CHANGE UP (ref 0–5)
GLUCOSE BLDC GLUCOMTR-MCNC: 146 MG/DL — HIGH (ref 70–99)
GLUCOSE BLDC GLUCOMTR-MCNC: 151 MG/DL — HIGH (ref 70–99)
GLUCOSE BLDC GLUCOMTR-MCNC: 180 MG/DL — HIGH (ref 70–99)
GLUCOSE BLDC GLUCOMTR-MCNC: 195 MG/DL — HIGH (ref 70–99)
GLUCOSE SERPL-MCNC: 135 MG/DL — HIGH (ref 70–99)
GLUCOSE UR QL: NEGATIVE — SIGNIFICANT CHANGE UP
HCT VFR BLD CALC: 32.2 % — LOW (ref 34.5–45)
HGB BLD-MCNC: 9.1 G/DL — LOW (ref 11.5–15.5)
HYALINE CASTS # UR AUTO: 0 /LPF — SIGNIFICANT CHANGE UP (ref 0–7)
KETONES UR-MCNC: NEGATIVE — SIGNIFICANT CHANGE UP
LEUKOCYTE ESTERASE UR-ACNC: NEGATIVE — SIGNIFICANT CHANGE UP
MAGNESIUM SERPL-MCNC: 2.6 MG/DL — SIGNIFICANT CHANGE UP (ref 1.6–2.6)
MCHC RBC-ENTMCNC: 25.6 PG — LOW (ref 27–34)
MCHC RBC-ENTMCNC: 28.3 GM/DL — LOW (ref 32–36)
MCV RBC AUTO: 90.4 FL — SIGNIFICANT CHANGE UP (ref 80–100)
NITRITE UR-MCNC: NEGATIVE — SIGNIFICANT CHANGE UP
NRBC # BLD: 0 /100 WBCS — SIGNIFICANT CHANGE UP
NRBC # FLD: 0 K/UL — SIGNIFICANT CHANGE UP
PH UR: 7 — SIGNIFICANT CHANGE UP (ref 5–8)
PHOSPHATE SERPL-MCNC: 3.4 MG/DL — SIGNIFICANT CHANGE UP (ref 2.5–4.5)
PLATELET # BLD AUTO: 397 K/UL — SIGNIFICANT CHANGE UP (ref 150–400)
POTASSIUM SERPL-MCNC: 4.3 MMOL/L — SIGNIFICANT CHANGE UP (ref 3.5–5.3)
POTASSIUM SERPL-SCNC: 4.3 MMOL/L — SIGNIFICANT CHANGE UP (ref 3.5–5.3)
PROT UR-MCNC: ABNORMAL
RBC # BLD: 3.56 M/UL — LOW (ref 3.8–5.2)
RBC # FLD: 16.3 % — HIGH (ref 10.3–14.5)
RBC CASTS # UR COMP ASSIST: 0 /HPF — SIGNIFICANT CHANGE UP (ref 0–4)
SODIUM SERPL-SCNC: 142 MMOL/L — SIGNIFICANT CHANGE UP (ref 135–145)
SP GR SPEC: 1.01 — SIGNIFICANT CHANGE UP (ref 1–1.05)
UROBILINOGEN FLD QL: SIGNIFICANT CHANGE UP
WBC # BLD: 6.51 K/UL — SIGNIFICANT CHANGE UP (ref 3.8–10.5)
WBC # FLD AUTO: 6.51 K/UL — SIGNIFICANT CHANGE UP (ref 3.8–10.5)
WBC UR QL: 0 /HPF — SIGNIFICANT CHANGE UP (ref 0–5)

## 2021-12-05 PROCEDURE — 99233 SBSQ HOSP IP/OBS HIGH 50: CPT

## 2021-12-05 RX ADMIN — Medication 3 MILLILITER(S): at 21:11

## 2021-12-05 RX ADMIN — Medication 40 MILLIGRAM(S): at 06:41

## 2021-12-05 RX ADMIN — HEPARIN SODIUM 7500 UNIT(S): 5000 INJECTION INTRAVENOUS; SUBCUTANEOUS at 14:28

## 2021-12-05 RX ADMIN — PIPERACILLIN AND TAZOBACTAM 25 GRAM(S): 4; .5 INJECTION, POWDER, LYOPHILIZED, FOR SOLUTION INTRAVENOUS at 06:40

## 2021-12-05 RX ADMIN — Medication 3 MILLILITER(S): at 15:41

## 2021-12-05 RX ADMIN — Medication 40 MILLIGRAM(S): at 18:01

## 2021-12-05 RX ADMIN — HEPARIN SODIUM 7500 UNIT(S): 5000 INJECTION INTRAVENOUS; SUBCUTANEOUS at 06:41

## 2021-12-05 RX ADMIN — AZITHROMYCIN 255 MILLIGRAM(S): 500 TABLET, FILM COATED ORAL at 23:59

## 2021-12-05 RX ADMIN — Medication 1: at 18:00

## 2021-12-05 RX ADMIN — Medication 40 MILLIGRAM(S): at 06:40

## 2021-12-05 RX ADMIN — Medication 3 MILLILITER(S): at 03:27

## 2021-12-05 RX ADMIN — PIPERACILLIN AND TAZOBACTAM 25 GRAM(S): 4; .5 INJECTION, POWDER, LYOPHILIZED, FOR SOLUTION INTRAVENOUS at 14:24

## 2021-12-05 RX ADMIN — PIPERACILLIN AND TAZOBACTAM 25 GRAM(S): 4; .5 INJECTION, POWDER, LYOPHILIZED, FOR SOLUTION INTRAVENOUS at 22:40

## 2021-12-05 RX ADMIN — Medication 1: at 12:56

## 2021-12-05 RX ADMIN — Medication 3 MILLILITER(S): at 09:39

## 2021-12-05 RX ADMIN — HEPARIN SODIUM 7500 UNIT(S): 5000 INJECTION INTRAVENOUS; SUBCUTANEOUS at 22:40

## 2021-12-05 RX ADMIN — AZITHROMYCIN 255 MILLIGRAM(S): 500 TABLET, FILM COATED ORAL at 00:11

## 2021-12-05 NOTE — PROGRESS NOTE ADULT - SUBJECTIVE AND OBJECTIVE BOX
CARDIOLOGY FOLLOW UP NOTE - DR. ORANTES    Patient Name: WESTON STEHI  Date of Service: 12-05-21     Patient seen and examined  events noted  hypoxia overnight       Subjective:    cv: denies chest pain,  inc dyspnea, palpitations, dizziness  pulmonary: denies cough  GI: denies abdominal pain, nausea, vomiting  vascular/legs: + edema   skin: no rash  ROS: otherwise negative   overnight events:      PHYSICAL EXAM:  T(C): 36.7 (12-05-21 @ 06:40), Max: 37.1 (12-04-21 @ 20:54)  HR: 68 (12-05-21 @ 10:22) (64 - 88)  BP: 141/69 (12-05-21 @ 06:40) (139/68 - 153/62)  RR: 18 (12-05-21 @ 06:40) (18 - 19)  SpO2: 99% (12-05-21 @ 10:22) (97% - 100%)  Wt(kg): --  I&O's Summary    04 Dec 2021 07:01  -  05 Dec 2021 07:00  --------------------------------------------------------  IN: 0 mL / OUT: 1900 mL / NET: -1900 mL      Daily     Daily     Appearance: Normal	  Cardiovascular: Normal S1 S2,RRR, No JVD, No murmurs  Respiratory: Lungs clear to auscultation	  Gastrointestinal:  Soft, Non-tender, + BS	  Extremities: Normal range of motion, No clubbing, ++edema      Home Medications:  Advair Diskus 250 mcg-50 mcg inhalation powder: 1 puff(s) inhaled 2 times a day (23 Nov 2021 14:10)  alendronate 70 mg oral tablet: 1 tab(s) orally once a week (23 Nov 2021 14:10)  ascorbic acid 500 mg oral capsule: 1 cap(s) orally once a day (30 Oct 2019 16:54)  azelastine 137 mcg/inh (0.1%) nasal spray: 2 spray(s) nasal 2 times a day (30 Oct 2019 16:54)  glimepiride 2 mg oral tablet: 1 tab(s) orally once a day (30 Oct 2019 16:54)  ipratropium-albuterol 0.5 mg-2.5 mg/3 mLinhalation solution: 3 milliliter(s) inhaled every 6 hours, As Needed (04 Jun 2021 10:05)  montelukast 10 mg oral tablet: 1 tab(s) orally once a day (30 Oct 2019 16:54)  Neurontin 100 mg oral capsule: 1 cap(s) orally 3 times a day (29 Nov 2020 18:19)  ocular lubricant ophthalmic solution: 1 drop(s) to each affected eye 3 times a day, As needed, Dry Eyes (06 Nov 2019 13:41)  ProAir HFA 90 mcg/inh inhalation aerosol: 2 puff(s) inhaled 4 times a day, As Needed (23 Nov 2021 14:10)      MEDICATIONS  (STANDING):  albuterol/ipratropium for Nebulization 3 milliLiter(s) Nebulizer every 6 hours  amLODIPine   Tablet 10 milliGRAM(s) Oral daily  atorvastatin 40 milliGRAM(s) Oral at bedtime  azithromycin  IVPB      azithromycin  IVPB 500 milliGRAM(s) IV Intermittent every 24 hours  dextrose 40% Gel 15 Gram(s) Oral once  dextrose 5%. 1000 milliLiter(s) (50 mL/Hr) IV Continuous <Continuous>  dextrose 5%. 1000 milliLiter(s) (100 mL/Hr) IV Continuous <Continuous>  dextrose 50% Injectable 25 Gram(s) IV Push once  dextrose 50% Injectable 12.5 Gram(s) IV Push once  dextrose 50% Injectable 25 Gram(s) IV Push once  furosemide   Injectable 40 milliGRAM(s) IV Push two times a day  gabapentin 100 milliGRAM(s) Oral three times a day  glucagon  Injectable 1 milliGRAM(s) IntraMuscular once  heparin   Injectable 7500 Unit(s) SubCutaneous every 8 hours  hydrALAZINE 100 milliGRAM(s) Oral three times a day  insulin lispro (ADMELOG) corrective regimen sliding scale   SubCutaneous three times a day before meals  insulin lispro (ADMELOG) corrective regimen sliding scale   SubCutaneous at bedtime  methylPREDNISolone sodium succinate Injectable 40 milliGRAM(s) IV Push daily  metoprolol succinate  milliGRAM(s) Oral daily  montelukast 10 milliGRAM(s) Oral daily  piperacillin/tazobactam IVPB.. 3.375 Gram(s) IV Intermittent every 8 hours  polyethylene glycol 3350 17 Gram(s) Oral daily  senna 2 Tablet(s) Oral at bedtime      TELEMETRY: 	    ECG:  	  RADIOLOGY:   DIAGNOSTIC TESTING:  [ ] Echocardiogram:  [ ] Catheterization:  [ ] Stress Test:    OTHER: 	    LABS:	 	    CARDIAC MARKERS:        Troponin T, High Sensitivity Result: 44 ng/L (12-02 @ 14:36)  Troponin T, High Sensitivity Result: 42 ng/L (12-02 @ 12:21)                                9.1    6.51  )-----------( 397      ( 05 Dec 2021 07:02 )             32.2     12-05    142  |  93<L>  |  40<H>  ----------------------------<  135<H>  4.3   |  43<H>  |  1.14    Ca    9.5      05 Dec 2021 07:02  Phos  3.4     12-05  Mg     2.60     12-05      proBNP:     Lipid Profile:   HgA1c:     Creatinine, Serum: 1.14 mg/dL (12-05-21 @ 07:02)  Creatinine, Serum: 1.17 mg/dL (12-04-21 @ 11:41)  Creatinine, Serum: 1.55 mg/dL (12-03-21 @ 03:40)  Creatinine, Serum: 2.03 mg/dL (12-02-21 @ 15:39)

## 2021-12-05 NOTE — PROGRESS NOTE ADULT - ASSESSMENT
Echo 11/19/21: EF 60-65%, nl lv sys fx, mild AS, mild Ms   Echo 6/2/21: Min MR, grossly nl lv sys fx   Echo 12/5/20: grossly nl lv sys fx    a/p  7 y/o female w pmhx of HFpEF, obesity, SHAD on CPAP, HTN, T2DM, COPD, lymphedema, s/p fall and knee fracture s/p  knee surgery, presents to the ED with AMS and facial droop.    #AMS  -in setting of hypercapnic resp failure  -improved    #Hypercapnic respiratory failure, hypoxic resp failure  -some component of pulmonary edema  -bipap per med/icu  -continue with effective iv diuresis    #acute/Chronic Diastolic CHF  -pt w chronic Lymphedema  -on iv lasix, still with hypoxia although clinically volume status appears sig improved with bmp/electrolytes suggesting effective diuresis, intravascular hypovolemia  -icu eval, bipap per icu/med  -recent Echo w grossly nl lv sys fx   -cont bb     #HTN  -stable  -cont current meds     dvt ppx       35 minutes spent on total encounter; more than 50% of the visit was spent counseling and/or coordinating care by the attending physician.

## 2021-12-05 NOTE — PROGRESS NOTE ADULT - ASSESSMENT
85y/o F w/ h/o HFpEF, obesity, SHAD on CPAP, HTN, T2DM, COPD, lymphedema, s/p fall and knee fracture p/w AMS and facial droop, MICU consulted for increased work of breathing on Bipap and elevated pCO2, found with hypercapnic respiratory failure c/b AMS improved on BIPAP.

## 2021-12-05 NOTE — PROGRESS NOTE ADULT - PROBLEM SELECTOR PLAN 3
-recent echo with HFpEF and grade I DD  - s/p lasix BID 12/3  - c/w lasix 40 IV BID for now  - strict Is/Os monitor output  - cards following: recent echo with grade 1 diastolic dysfunction  - c/w lasix  - c/w metoprolol, amlodipine, statin. on home bumex but IV lasix inpatient, f/u Is/Os and cardiology recs

## 2021-12-05 NOTE — PROGRESS NOTE ADULT - SUBJECTIVE AND OBJECTIVE BOX
Gwyn Lanier MD Hospitalist  Pager: [phone]  After 7: Night Team pager    Patient is a 86y old  Female who presents with a chief complaint of SOB (04 Dec 2021 13:23)      SUBJECTIVE / OVERNIGHT EVENTS: This AM, patient hypoxic on continuous pulse oximetry on AVAPs. Otherwise no acute events overnight. Patient seen and examined at bedside this AM.     MEDICATIONS  (STANDING):  albuterol/ipratropium for Nebulization 3 milliLiter(s) Nebulizer every 6 hours  amLODIPine   Tablet 10 milliGRAM(s) Oral daily  atorvastatin 40 milliGRAM(s) Oral at bedtime  azithromycin  IVPB      azithromycin  IVPB 500 milliGRAM(s) IV Intermittent every 24 hours  dextrose 40% Gel 15 Gram(s) Oral once  dextrose 5%. 1000 milliLiter(s) (50 mL/Hr) IV Continuous <Continuous>  dextrose 5%. 1000 milliLiter(s) (100 mL/Hr) IV Continuous <Continuous>  dextrose 50% Injectable 25 Gram(s) IV Push once  dextrose 50% Injectable 12.5 Gram(s) IV Push once  dextrose 50% Injectable 25 Gram(s) IV Push once  furosemide   Injectable 40 milliGRAM(s) IV Push two times a day  gabapentin 100 milliGRAM(s) Oral three times a day  glucagon  Injectable 1 milliGRAM(s) IntraMuscular once  heparin   Injectable 7500 Unit(s) SubCutaneous every 8 hours  hydrALAZINE 100 milliGRAM(s) Oral three times a day  insulin lispro (ADMELOG) corrective regimen sliding scale   SubCutaneous three times a day before meals  insulin lispro (ADMELOG) corrective regimen sliding scale   SubCutaneous at bedtime  methylPREDNISolone sodium succinate Injectable 40 milliGRAM(s) IV Push daily  metoprolol succinate  milliGRAM(s) Oral daily  montelukast 10 milliGRAM(s) Oral daily  piperacillin/tazobactam IVPB.. 3.375 Gram(s) IV Intermittent every 8 hours  polyethylene glycol 3350 17 Gram(s) Oral daily  senna 2 Tablet(s) Oral at bedtime    MEDICATIONS  (PRN):  oxycodone    5 mG/acetaminophen 325 mG 1 Tablet(s) Oral every 6 hours PRN Moderate Pain (4 - 6)      Vital Signs Last 24 Hrs  T(C): 36.7 (05 Dec 2021 06:40), Max: 37.1 (04 Dec 2021 20:54)  T(F): 98.1 (05 Dec 2021 06:40), Max: 98.8 (04 Dec 2021 20:54)  HR: 66 (05 Dec 2021 07:28) (64 - 88)  BP: 141/69 (05 Dec 2021 06:40) (139/68 - 153/62)  BP(mean): --  RR: 18 (05 Dec 2021 06:40) (18 - 19)  SpO2: 98% (05 Dec 2021 07:28) (94% - 100%)  CAPILLARY BLOOD GLUCOSE      POCT Blood Glucose.: 146 mg/dL (05 Dec 2021 08:21)  POCT Blood Glucose.: 195 mg/dL (04 Dec 2021 21:53)  POCT Blood Glucose.: 173 mg/dL (04 Dec 2021 17:18)  POCT Blood Glucose.: 138 mg/dL (04 Dec 2021 11:57)    I&O's Summary    04 Dec 2021 07:01  -  05 Dec 2021 07:00  --------------------------------------------------------  IN: 0 mL / OUT: 1900 mL / NET: -1900 mL        PHYSICAL EXAM:  GENERAL: morbidly obese female with labored respiration  EYES: EOMI, PERRLA, conjunctiva and sclera clear  NECK:  No JVD  CHEST/LUNG: bibasilar crackles  HEART: RRR; No murmurs  ABDOMEN: Soft, Nontender, Nondistended; Bowel sounds present  : No Humphrey  EXTREMITIES: bilateral pitting edema  PSYCH: AAOx3  NEUROLOGY: alert and oriented  SKIN: No rashes or lesions. No sacral ulcer    LABS:                        9.1    6.51  )-----------( 397      ( 05 Dec 2021 07:02 )             32.2     12-05    142  |  93<L>  |  40<H>  ----------------------------<  135<H>  4.3   |  43<H>  |  1.14    Ca    9.5      05 Dec 2021 07:02  Phos  3.4     12-05  Mg     2.60     12-05                Microbiology;        RADIOLOGY & ADDITIONAL TESTS:    Imaging Personally Reviewed:          Consultant(s) Notes Reviewed: pulmonology, neurology, cardiology, MICU    Care Discussed with Consultants/Other Providers: pulmonology, neurology, cardiology, MICU

## 2021-12-06 LAB
ANION GAP SERPL CALC-SCNC: 7 MMOL/L — SIGNIFICANT CHANGE UP (ref 7–14)
BUN SERPL-MCNC: 44 MG/DL — HIGH (ref 7–23)
CALCIUM SERPL-MCNC: 9.6 MG/DL — SIGNIFICANT CHANGE UP (ref 8.4–10.5)
CHLORIDE SERPL-SCNC: 95 MMOL/L — LOW (ref 98–107)
CO2 SERPL-SCNC: 42 MMOL/L — HIGH (ref 22–31)
CREAT SERPL-MCNC: 1.29 MG/DL — SIGNIFICANT CHANGE UP (ref 0.5–1.3)
GLUCOSE BLDC GLUCOMTR-MCNC: 117 MG/DL — HIGH (ref 70–99)
GLUCOSE BLDC GLUCOMTR-MCNC: 127 MG/DL — HIGH (ref 70–99)
GLUCOSE BLDC GLUCOMTR-MCNC: 202 MG/DL — HIGH (ref 70–99)
GLUCOSE BLDC GLUCOMTR-MCNC: 218 MG/DL — HIGH (ref 70–99)
GLUCOSE SERPL-MCNC: 113 MG/DL — HIGH (ref 70–99)
HCT VFR BLD CALC: 31.6 % — LOW (ref 34.5–45)
HGB BLD-MCNC: 8.8 G/DL — LOW (ref 11.5–15.5)
MAGNESIUM SERPL-MCNC: 2.6 MG/DL — SIGNIFICANT CHANGE UP (ref 1.6–2.6)
MCHC RBC-ENTMCNC: 25.4 PG — LOW (ref 27–34)
MCHC RBC-ENTMCNC: 27.8 GM/DL — LOW (ref 32–36)
MCV RBC AUTO: 91.1 FL — SIGNIFICANT CHANGE UP (ref 80–100)
NRBC # BLD: 0 /100 WBCS — SIGNIFICANT CHANGE UP
NRBC # FLD: 0 K/UL — SIGNIFICANT CHANGE UP
PHOSPHATE SERPL-MCNC: 2.6 MG/DL — SIGNIFICANT CHANGE UP (ref 2.5–4.5)
PLATELET # BLD AUTO: 365 K/UL — SIGNIFICANT CHANGE UP (ref 150–400)
POTASSIUM SERPL-MCNC: 4.2 MMOL/L — SIGNIFICANT CHANGE UP (ref 3.5–5.3)
POTASSIUM SERPL-SCNC: 4.2 MMOL/L — SIGNIFICANT CHANGE UP (ref 3.5–5.3)
RBC # BLD: 3.47 M/UL — LOW (ref 3.8–5.2)
RBC # FLD: 16.4 % — HIGH (ref 10.3–14.5)
SODIUM SERPL-SCNC: 144 MMOL/L — SIGNIFICANT CHANGE UP (ref 135–145)
WBC # BLD: 7.07 K/UL — SIGNIFICANT CHANGE UP (ref 3.8–10.5)
WBC # FLD AUTO: 7.07 K/UL — SIGNIFICANT CHANGE UP (ref 3.8–10.5)

## 2021-12-06 PROCEDURE — 99233 SBSQ HOSP IP/OBS HIGH 50: CPT

## 2021-12-06 PROCEDURE — 99233 SBSQ HOSP IP/OBS HIGH 50: CPT | Mod: GC

## 2021-12-06 PROCEDURE — 71045 X-RAY EXAM CHEST 1 VIEW: CPT | Mod: 26

## 2021-12-06 RX ORDER — INSULIN LISPRO 100/ML
VIAL (ML) SUBCUTANEOUS
Refills: 0 | Status: DISCONTINUED | OUTPATIENT
Start: 2021-12-06 | End: 2021-12-17

## 2021-12-06 RX ORDER — INSULIN LISPRO 100/ML
VIAL (ML) SUBCUTANEOUS EVERY 6 HOURS
Refills: 0 | Status: DISCONTINUED | OUTPATIENT
Start: 2021-12-06 | End: 2021-12-06

## 2021-12-06 RX ORDER — INSULIN LISPRO 100/ML
VIAL (ML) SUBCUTANEOUS AT BEDTIME
Refills: 0 | Status: DISCONTINUED | OUTPATIENT
Start: 2021-12-06 | End: 2021-12-17

## 2021-12-06 RX ORDER — BUDESONIDE AND FORMOTEROL FUMARATE DIHYDRATE 160; 4.5 UG/1; UG/1
2 AEROSOL RESPIRATORY (INHALATION)
Refills: 0 | Status: DISCONTINUED | OUTPATIENT
Start: 2021-12-06 | End: 2021-12-17

## 2021-12-06 RX ADMIN — HEPARIN SODIUM 7500 UNIT(S): 5000 INJECTION INTRAVENOUS; SUBCUTANEOUS at 13:36

## 2021-12-06 RX ADMIN — GABAPENTIN 100 MILLIGRAM(S): 400 CAPSULE ORAL at 21:46

## 2021-12-06 RX ADMIN — BUDESONIDE AND FORMOTEROL FUMARATE DIHYDRATE 2 PUFF(S): 160; 4.5 AEROSOL RESPIRATORY (INHALATION) at 21:58

## 2021-12-06 RX ADMIN — OXYCODONE AND ACETAMINOPHEN 1 TABLET(S): 5; 325 TABLET ORAL at 22:17

## 2021-12-06 RX ADMIN — Medication 40 MILLIGRAM(S): at 17:31

## 2021-12-06 RX ADMIN — HEPARIN SODIUM 7500 UNIT(S): 5000 INJECTION INTRAVENOUS; SUBCUTANEOUS at 21:48

## 2021-12-06 RX ADMIN — ATORVASTATIN CALCIUM 40 MILLIGRAM(S): 80 TABLET, FILM COATED ORAL at 21:48

## 2021-12-06 RX ADMIN — Medication 3 MILLILITER(S): at 15:36

## 2021-12-06 RX ADMIN — Medication 40 MILLIGRAM(S): at 06:09

## 2021-12-06 RX ADMIN — Medication 100 MILLIGRAM(S): at 21:47

## 2021-12-06 RX ADMIN — Medication 2: at 11:40

## 2021-12-06 RX ADMIN — PIPERACILLIN AND TAZOBACTAM 25 GRAM(S): 4; .5 INJECTION, POWDER, LYOPHILIZED, FOR SOLUTION INTRAVENOUS at 13:37

## 2021-12-06 RX ADMIN — Medication 3 MILLILITER(S): at 08:06

## 2021-12-06 RX ADMIN — PIPERACILLIN AND TAZOBACTAM 25 GRAM(S): 4; .5 INJECTION, POWDER, LYOPHILIZED, FOR SOLUTION INTRAVENOUS at 06:09

## 2021-12-06 RX ADMIN — OXYCODONE AND ACETAMINOPHEN 1 TABLET(S): 5; 325 TABLET ORAL at 21:47

## 2021-12-06 RX ADMIN — PIPERACILLIN AND TAZOBACTAM 25 GRAM(S): 4; .5 INJECTION, POWDER, LYOPHILIZED, FOR SOLUTION INTRAVENOUS at 21:47

## 2021-12-06 RX ADMIN — Medication 3 MILLILITER(S): at 21:58

## 2021-12-06 RX ADMIN — Medication 40 MILLIGRAM(S): at 06:08

## 2021-12-06 RX ADMIN — HEPARIN SODIUM 7500 UNIT(S): 5000 INJECTION INTRAVENOUS; SUBCUTANEOUS at 06:09

## 2021-12-06 RX ADMIN — Medication 3 MILLILITER(S): at 03:18

## 2021-12-06 RX ADMIN — SENNA PLUS 2 TABLET(S): 8.6 TABLET ORAL at 21:49

## 2021-12-06 RX ADMIN — Medication 2: at 17:32

## 2021-12-06 NOTE — PROGRESS NOTE ADULT - SUBJECTIVE AND OBJECTIVE BOX
CARDIOLOGY FOLLOW UP - Dr. Bermudez  DATE OF SERVICE:12/6/21    CC s/p RRT called for hypoxia to 40s      REVIEW OF SYSTEMS:  CONSTITUTIONAL: No fever, weight loss, or fatigue  RESPIRATORY: No cough, wheezing, chills or hemoptysis; No Shortness of Breath  CARDIOVASCULAR: No chest pain, palpitations, passing out, dizziness, or leg swelling  GASTROINTESTINAL: No abdominal or epigastric pain. No nausea, vomiting, or hematemesis; No diarrhea or constipation. No melena or hematochezia.    PHYSICAL EXAM:  T(C): 36.7 (12-06-21 @ 05:31), Max: 37 (12-05-21 @ 20:53)  HR: 67 (12-06-21 @ 08:06) (62 - 72)  BP: 131/54 (12-06-21 @ 05:31) (126/57 - 135/51)  RR: 18 (12-06-21 @ 05:31) (17 - 19)  SpO2: 96% (12-06-21 @ 08:06) (96% - 100%)  Wt(kg): --  I&O's Summary    05 Dec 2021 07:01  -  06 Dec 2021 07:00  --------------------------------------------------------  IN: 0 mL / OUT: 900 mL / NET: -900 mL        Appearance: Normal	  Cardiovascular: Normal S1 S2,RRR,  Respiratory:  diminished   Gastrointestinal:  Soft, Non-tender, + BS	  Extremities: Normal range of motion, No clubbing, cyanosis or edema      Home Medications:  Advair Diskus 250 mcg-50 mcg inhalation powder: 1 puff(s) inhaled 2 times a day (23 Nov 2021 14:10)  alendronate 70 mg oral tablet: 1 tab(s) orally once a week (23 Nov 2021 14:10)  ascorbic acid 500 mg oral capsule: 1 cap(s) orally once a day (30 Oct 2019 16:54)  azelastine 137 mcg/inh (0.1%) nasal spray: 2 spray(s) nasal 2 times a day (30 Oct 2019 16:54)  glimepiride 2 mg oral tablet: 1 tab(s) orally once a day (30 Oct 2019 16:54)  ipratropium-albuterol 0.5 mg-2.5 mg/3 mLinhalation solution: 3 milliliter(s) inhaled every 6 hours, As Needed (04 Jun 2021 10:05)  montelukast 10 mg oral tablet: 1 tab(s) orally once a day (30 Oct 2019 16:54)  Neurontin 100 mg oral capsule: 1 cap(s) orally 3 times a day (29 Nov 2020 18:19)  ocular lubricant ophthalmic solution: 1 drop(s) to each affected eye 3 times a day, As needed, Dry Eyes (06 Nov 2019 13:41)  ProAir HFA 90 mcg/inh inhalation aerosol: 2 puff(s) inhaled 4 times a day, As Needed (23 Nov 2021 14:10)      MEDICATIONS  (STANDING):  albuterol/ipratropium for Nebulization 3 milliLiter(s) Nebulizer every 6 hours  amLODIPine   Tablet 10 milliGRAM(s) Oral daily  atorvastatin 40 milliGRAM(s) Oral at bedtime  dextrose 40% Gel 15 Gram(s) Oral once  dextrose 5%. 1000 milliLiter(s) (50 mL/Hr) IV Continuous <Continuous>  dextrose 5%. 1000 milliLiter(s) (100 mL/Hr) IV Continuous <Continuous>  dextrose 50% Injectable 12.5 Gram(s) IV Push once  dextrose 50% Injectable 25 Gram(s) IV Push once  dextrose 50% Injectable 25 Gram(s) IV Push once  furosemide   Injectable 40 milliGRAM(s) IV Push two times a day  gabapentin 100 milliGRAM(s) Oral three times a day  glucagon  Injectable 1 milliGRAM(s) IntraMuscular once  heparin   Injectable 7500 Unit(s) SubCutaneous every 8 hours  hydrALAZINE 100 milliGRAM(s) Oral three times a day  insulin lispro (ADMELOG) corrective regimen sliding scale   SubCutaneous every 6 hours  methylPREDNISolone sodium succinate Injectable 40 milliGRAM(s) IV Push daily  metoprolol succinate  milliGRAM(s) Oral daily  montelukast 10 milliGRAM(s) Oral daily  piperacillin/tazobactam IVPB.. 3.375 Gram(s) IV Intermittent every 8 hours  polyethylene glycol 3350 17 Gram(s) Oral daily  senna 2 Tablet(s) Oral at bedtime      TELEMETRY: 	    ECG:  	  RADIOLOGY:   < from: Xray Chest 1 View-PORTABLE IMMEDIATE (Xray Chest 1 View-PORTABLE IMMEDIATE .) (12.06.21 @ 11:07) >  IMPRESSION:  Mild vascular congestion and cardiomegaly but no acute pulmonary pathology.    --- End of Report ---          < end of copied text >    DIAGNOSTIC TESTING:  [ ] Echocardiogram:  [ ]  Catheterization:  [ ] Stress Test:    OTHER: 	    LABS:	 	    Troponin T, High Sensitivity Result: 44 ng/L (12-02 @ 14:36)  Troponin T, High Sensitivity Result: 42 ng/L (12-02 @ 12:21)                          8.8    7.07  )-----------( 365      ( 06 Dec 2021 07:43 )             31.6     12-06    144  |  95<L>  |  44<H>  ----------------------------<  113<H>  4.2   |  42<H>  |  1.29    Ca    9.6      06 Dec 2021 07:43  Phos  2.6     12-06  Mg     2.60     12-06

## 2021-12-06 NOTE — PROGRESS NOTE ADULT - PROBLEM SELECTOR PROBLEM 3
Chronic heart failure with preserved ejection fraction (HFpEF)

## 2021-12-06 NOTE — PROGRESS NOTE ADULT - PROBLEM SELECTOR PLAN 6
- Cr initially elevated to 2s, baseline in 1s  - workup: renal lytes FeUrea c/w intrinsic renal disease, making good outputs -1.9 L O/N 12/4-12/5. Check UA and renal/bladder US  - suspect JOSEPH due to underlying medical condition: HFpEF exacerbation and fluid overload worsening renal perfusion. Now improved s/p diuresis and oxygenation on AVAPs  - hyperkalemia was treated with insulin and D50, with improvement of K  - Cr downtrended to 1s with diuresis and medical treatment
- Cr elevated to 2s, baseline in 1s  - workup: renal lytes, renal/bladder US, strict Is/Os, bladder scan  - hyperkalemia was treated with insulin and D50, f/u K  - consider renal c/s  - Cr downtrended to 1.5s with diuresis and medical treatment
- Cr initially elevated to 2s, baseline in 1s  - workup: renal lytes FeUrea c/w intrinsic renal disease, making good outputs -1.9 L O/N 12/4-12/5. Check UA and renal/bladder US  - suspect JOSEPH due to underlying medical condition: HFpEF exacerbation and fluid overload worsening renal perfusion. Now improved s/p diuresis and oxygenation on AVAPs  - hyperkalemia was treated with insulin and D50, with improvement of K  - Cr downtrended to 1s with diuresis and medical treatment

## 2021-12-06 NOTE — PROGRESS NOTE ADULT - ASSESSMENT
85 yo F w/ HFpEF, morbid obesity, SHAD/OHS (3L home O2 during day and NIPPV at night), HTN, T2DM, COPD, lymphedema, s/p recent fall and knee fracture who was initially admitted for AMS 2/2 hypercapneic/hypoxemic respiratory failure now improved on AVAPS    - would wean O2 as tolerated, avoid hyperoxia; goal 88-92%  - c/w AVAPS for now, can consider transition to home BiPAP settings when stable  - on empiric zosyn  - Continue albuterol-ipratropium nebs q6h  - would start symbicort 160 for COPD  - c/w diuresis  - recommend transfer to RCU     Jorge Adamson MD  PGY-5  PCCM Fellow  Pager [phone]

## 2021-12-06 NOTE — PROGRESS NOTE ADULT - PROBLEM SELECTOR PLAN 10
- DVT PPx: HSQ  - Diet: NPO on continuous AVAPs  - Dispo: pending improvement of respiratory status and PT eval
- DVT PPx: HSQ  - Dispo: Transfer to RCU
- DVT PPx: HSQ  - Diet: pureed DASH/TLC/CC, S&S eval  - Dispo: pending improvement of respiratory status and PT eval

## 2021-12-06 NOTE — PROGRESS NOTE ADULT - PROBLEM SELECTOR PROBLEM 2
AMS (altered mental status)
Acute on chronic respiratory failure with hypercapnia
Acute on chronic respiratory failure with hypercapnia

## 2021-12-06 NOTE — RAPID RESPONSE TEAM SUMMARY - NSSITUATIONBACKGROUNDRRT_GEN_ALL_CORE
85y/o F w/ h/o HFpEF, obesity, SHAD on CPAP, HTN, T2DM, COPD, lymphedema, s/p fall and knee fracture p/w AMS and facial droop, MICU consulted for increased work of breathing on Bipap and elevated pCO2, found with hypercapnic respiratory failure c/b AMS improved on AVAPS. RRT called for hypoxia to 40s. Patient appeared lethargic, not following commands. O2 sat 48% with good waveform. Pulm team at bedside. Lung exam s/f reduced breath sounds PAVAN. Secretions around mouth - possible aspiration. Suspicion for PTX. RT arrived and pt noted to be connected incorrectly to biPAP. Xray obtained - No PTX. BiPAP circuit was fixed and sat improved to 100%. Mental status also improved. MICU made aware - not MICU candidate given improved mental status. Pt to remain on floor, pending RCU bed availability.

## 2021-12-06 NOTE — PROGRESS NOTE ADULT - SUBJECTIVE AND OBJECTIVE BOX
[facility]      Seen earlier today, and examined.  - Today, patient is without complaints.  events noted, bipap disconnected earlier            *****MEDICATIONS: Current medication reviewed and documented.    MEDICATIONS  (STANDING):  albuterol/ipratropium for Nebulization 3 milliLiter(s) Nebulizer every 6 hours  amLODIPine   Tablet 10 milliGRAM(s) Oral daily  atorvastatin 40 milliGRAM(s) Oral at bedtime  dextrose 40% Gel 15 Gram(s) Oral once  dextrose 5%. 1000 milliLiter(s) (50 mL/Hr) IV Continuous <Continuous>  dextrose 5%. 1000 milliLiter(s) (100 mL/Hr) IV Continuous <Continuous>  dextrose 50% Injectable 25 Gram(s) IV Push once  dextrose 50% Injectable 12.5 Gram(s) IV Push once  dextrose 50% Injectable 25 Gram(s) IV Push once  furosemide   Injectable 40 milliGRAM(s) IV Push two times a day  gabapentin 100 milliGRAM(s) Oral three times a day  glucagon  Injectable 1 milliGRAM(s) IntraMuscular once  heparin   Injectable 7500 Unit(s) SubCutaneous every 8 hours  hydrALAZINE 100 milliGRAM(s) Oral three times a day  insulin lispro (ADMELOG) corrective regimen sliding scale   SubCutaneous three times a day before meals  insulin lispro (ADMELOG) corrective regimen sliding scale   SubCutaneous at bedtime  methylPREDNISolone sodium succinate Injectable 40 milliGRAM(s) IV Push daily  metoprolol succinate  milliGRAM(s) Oral daily  montelukast 10 milliGRAM(s) Oral daily  piperacillin/tazobactam IVPB.. 3.375 Gram(s) IV Intermittent every 8 hours  polyethylene glycol 3350 17 Gram(s) Oral daily  senna 2 Tablet(s) Oral at bedtime    MEDICATIONS  (PRN):  oxycodone    5 mG/acetaminophen 325 mG 1 Tablet(s) Oral every 6 hours PRN Moderate Pain (4 - 6)          ***** VITAL SIGNS:  T(F): 98.9 (21 @ 13:40), Max: 98.9 (21 @ 13:40)  HR: 84 (21 @ 15:39) (62 - 86)  BP: 123/55 (21 @ 13:40) (123/55 - 135/51)  RR: 18 (21 @ 13:40) (17 - 19)  SpO2: 94% (21 @ 15:39) (93% - 100%)  Wt(kg): --  ,   I&O's Summary    05 Dec 2021 07:01  -  06 Dec 2021 07:00  --------------------------------------------------------  IN: 0 mL / OUT: 900 mL / NET: -900 mL             *****PHYSICAL EXAM:    alert oriented x 2 attention comprehension are fair.  Able to name, repeat.   EOmi fundi not visualized   no nystagmus VFF to confrontation  Tongue is midline  Palate elevates symmetrically   Moving all 4 ext spontaneously no drift appreciated  limited rom of the RLE due to brace   Gait not assessed.            *****LAB AND IMAGIN.8    7.07  )-----------( 365      ( 06 Dec 2021 07:43 )             31.6               12-06    144  |  95<L>  |  44<H>  ----------------------------<  113<H>  4.2   |  42<H>  |  1.29    Ca    9.6      06 Dec 2021 07:43  Phos  2.6     12-  Mg     2.60     12-06                       ABG - ( 05 Dec 2021 11:27 )  pH, Arterial: 7.44  pH, Blood: x     /  pCO2: 75    /  pO2: 150   / HCO3: 51    / Base Excess: 23.5  /  SaO2: 97.7              Urinalysis Basic - ( 05 Dec 2021 09:18 )    Color: Yellow / Appearance: Clear / S.015 / pH: x  Gluc: x / Ketone: Negative  / Bili: Negative / Urobili: <2 mg/dL   Blood: x / Protein: Trace / Nitrite: Negative   Leuk Esterase: Negative / RBC: 0 /HPF / WBC 0 /HPF   Sq Epi: x / Non Sq Epi: 1 /HPF / Bacteria: Negative      [All pertinent recent Imaging/Reports reviewed]           *****A S S E S S M E N T   A N D   P L A N :  Pt is an 85 y/o woman w/ a PMH of asthma/COPD, CHF, on aspirin, HTN, lymphedema, DM2, and past surgical history of knee surgery, presents to the [facility] ED as a code stroke after home aide/daughter activated EMS due to patient's lack of responsiveness with LWK 2:30a 21. Neurological exam does not demonstrate focality. CTH noncon pending. Neurovascular imaging deferred given JOSEPH and low suspicion of LVO at this time. Patient out of window for tpa. Patient not a candidate for mechanical thrombectomy given low suspicion of LVO.    Impression: toxic metabolic encephalopathy likely in setting of acidosis and hypercapnea    Recommendations:  [ ] f/u CTH noncon when stable   [ ] a1c, lipid profile  [ ] c/w aspirin 81mg daily  [ ] c/w rosuvastatin 10mg daily  [ ] infectious and metabolic workup per primary team             pt seen and evaluated at bedside  briefly pt with recent knee surgery immobilization presents with ams, hypercapnia   now improved after bipap   obesity hypoventilation   cannot r/o sequelae of immobility   ct head neg  nonfocal exam                Thank you for allowing me to participate in the care of this patient. Will continue to follow patient periodically. Please do not hesitate to call me if you have any  questions or if there has been a change in patients neurological status     ________________  Constance Mars MD  [facility] ([facility]  [phone]      33 minutes spent on total encounter; more than 50 % of the visit was  spent counseling about plan of care, compliance to diet/exercise and medication regimen and or  coordinating care by the attending physician.      It is advised that stroke patients follow up with SRIDHAR Judd @ [phone] in 1- 2 weeks.   Others please follow up with Dr. Michael Nissenbaum [phone]

## 2021-12-06 NOTE — PROGRESS NOTE ADULT - ATTENDING COMMENTS
Acute on chronic hypoxic and hypercapnic respiratory failure in the setting of underlying SHAD/OHS + pulmonary hypertension + severe restrictive lung disease. This morning, she had an RRT for obtundation and hypoxemia while on AVAPS. She was found to be improperly connected to AVAPS and not receiving any volume with rebreathing of CO2. When connection was adjusted, her oxygenation recovered and she returned to her baseline mental status.    - Currently awake and alert, normally interactive  - Trial of NC@3-4 LPM, goal SpO2>88%  - Avoid hyperoxia  - Continue AVAPS at night (Vt 450, EPAP 8, IPAPmin 14, IPAPmax 30, FiO2 30-40%)  - Continue empiric Zosyn for hospital-acquired pneumonia  - Continue albuterol-ipratropium nebs q6h  - Start Symbicort 160-4.5 mcg 2 puffs twice daily, rinse after use  - Diuresis with furosemide 40 mg IV q12h  - BP control  - Discussed with patient and family at bedside

## 2021-12-06 NOTE — PROGRESS NOTE ADULT - ASSESSMENT
Echo 11/19/21: EF 60-65%, nl lv sys fx, mild AS, mild Ms   Echo 6/2/21: Min MR, grossly nl lv sys fx   Echo 12/5/20: grossly nl lv sys fx    a/p  5 y/o female w pmhx of HFpEF, obesity, SHAD on CPAP, HTN, T2DM, COPD, lymphedema, s/p fall and knee fracture s/p  knee surgery, presents to the ED with AMS and facial droop.    #AMS  -in setting of hypercapnic resp failure  -events today noted s/p RRT this am for hypoxia, AMS- pt noted to be connected incorrectly to biPAP. Xray - No PTX. BiPAP circuit was fixed and  mental status/ sat improved to 100%.     #Hypercapnic respiratory failure, hypoxic resp failure  -some component of pulmonary edema  -bipap per med  -continue with effective iv diuresis    #acute/Chronic Diastolic CHF  -pt w chronic Lymphedema  -on iv lasix, still with hypoxia although clinically volume status appears sig improved with bmp/electrolytes suggesting effective diuresis, intravascular hypovolemia  -chest xray 12.6 with Mild vascular congestion   -c/w bipap per pulm/med  -recent Echo w grossly nl lv sys fx   -cont bb     #HTN  -stable  -cont current meds     dvt ppx    Echo 11/19/21: EF 60-65%, nl lv sys fx, mild AS, mild Ms   Echo 6/2/21: Min MR, grossly nl lv sys fx   Echo 12/5/20: grossly nl lv sys fx    a/p  5 y/o female w pmhx of HFpEF, obesity, SHAD on CPAP, HTN, T2DM, COPD, lymphedema, s/p fall and knee fracture s/p  knee surgery, presents to the ED with AMS and facial droop.    #AMS  -in setting of hypercapnic resp failure  -events today noted s/p RRT this am for hypoxia, AMS- pt noted to be connected incorrectly to biPAP. Xray - No PTX. BiPAP circuit was fixed and  mental status/ sat improved to 100%.     #Hypercapnic respiratory failure, hypoxic resp failure  -some component of pulmonary edema  -bipap per med  -continue with effective iv diuresis    #acute/Chronic Diastolic CHF  -pt w chronic Lymphedema  -continue iv lasix   -clinically volume status appears sig improved   -chest xray 12.6 with Mild vascular congestion   -c/w bipap per pulm/med  -recent Echo w grossly nl lv sys fx   -cont bb     #HTN  -stable  -cont current meds     dvt ppx

## 2021-12-06 NOTE — PROGRESS NOTE ADULT - PROBLEM SELECTOR PLAN 2
Resolved w/improved resp status  - likely 2/2 acute on chronic hypercapnic respiratory failure  - neuro following; f/u CT head, other recs appreciated c.w statin  - recent echo wnl
- acute on chronic hypercapnic respiratory failure likely in setting of COPD/SHAD overlap exacerbation with possible PNA on CXR, HFpEF exacerbation given fluid overload on imaging (MICU POCUS with b-lines)  - HFpEF: c/w lasix, strict Is/OS. cards following recent echo with HFpEF and grade I DD  - COPD exacerbation: c/w AVAPs qHS. will consider steroids as well. c/w duonebs q6. c/w ivanro. Pulmonology c/s  - PNA: c/w zosyn and becky for now
- acute on chronic hypercapnic respiratory failure likely in setting of COPD/SHAD overlap exacerbation with possible PNA on CXR, HFpEF exacerbation given fluid overload on imaging (MICU POCUS with b-lines)  - HFpEF: c/w lasix, strict Is/OS. cards following recent echo with HFpEF and grade I DD  - COPD exacerbation: c/w AVAPs qHS. will consider steroids as well. c/w duonebs q6. c/w azithro. Pulmonology c/s 12/4: AVAPs settings adjusted but persistently hypoxic  - PNA: c/w zosyn and azithro for now  - 12/5: reconsulted MICU given persistently hypoxic on max settings of AVAPS, on BIPAP/AVAPS from 12/2-12/5 likely requires higher level of care

## 2021-12-06 NOTE — PROGRESS NOTE ADULT - PROBLEM SELECTOR PLAN 4
- c/w AVAPS qHS and PRN for AMS  - NC during day to maintain O2 sat 89-92 given COPD  - c/w duonebs standing, azithro, zosyn for possible PNA  - pulmonary c/s: AVAPS adjusted
- c/w AVAPS qHS and PRN for AMS  - NC during day to maintain O2 sat 89-92 given COPD  - c/w duonebs standing, azithro, zosyn for possible PNA  - pulmonary c/s
- c/w AVAPS qHS and PRN for AMS  - NC during day to maintain O2 sat 89-92 given COPD  - c/w duonebs standing, azithro, zosyn for possible PNA  - pulmonary c/s: AVAPS adjusted  - MICU reconsulted 12/5 given persistent hypoxia requiring max AVAPs settings

## 2021-12-06 NOTE — PROGRESS NOTE ADULT - PROBLEM SELECTOR PLAN 9
- a1c 6.9 11/2021  - c/w AISS premeal and qHS  - CC diet

## 2021-12-06 NOTE — PROGRESS NOTE ADULT - PROBLEM SELECTOR PROBLEM 1
AMS (altered mental status)
Acute on chronic respiratory failure with hypercapnia
AMS (altered mental status)

## 2021-12-06 NOTE — PROGRESS NOTE ADULT - SUBJECTIVE AND OBJECTIVE BOX
[facility]  Fabrice Rivers DO  Pager (M-F, 8A-5P): [phone]      Patient is a 86y old  Female who presents with a chief complaint of SOB (06 Dec 2021 16:19)      SUBJECTIVE / OVERNIGHT EVENTS: RRT called around 10:30AM this morning for hypoxia to 40-50% d/t bipap being disconnected. Pulm at bedside, Pt back to baseline once bipap reconnected and hypoxia resolved.     Pt seen later in afternoon again, completely off bipap, breathing well on NC. Able to speak in full sentences, states she doesn't recall the event this morning, but overall feels ok.   ADDITIONAL REVIEW OF SYSTEMS: no cp/sob    MEDICATIONS  (STANDING):  albuterol/ipratropium for Nebulization 3 milliLiter(s) Nebulizer every 6 hours  amLODIPine   Tablet 10 milliGRAM(s) Oral daily  atorvastatin 40 milliGRAM(s) Oral at bedtime  budesonide 160 MICROgram(s)/formoterol 4.5 MICROgram(s) Inhaler 2 Puff(s) Inhalation two times a day  dextrose 40% Gel 15 Gram(s) Oral once  dextrose 5%. 1000 milliLiter(s) (50 mL/Hr) IV Continuous <Continuous>  dextrose 5%. 1000 milliLiter(s) (100 mL/Hr) IV Continuous <Continuous>  dextrose 50% Injectable 25 Gram(s) IV Push once  dextrose 50% Injectable 12.5 Gram(s) IV Push once  dextrose 50% Injectable 25 Gram(s) IV Push once  furosemide   Injectable 40 milliGRAM(s) IV Push two times a day  gabapentin 100 milliGRAM(s) Oral three times a day  glucagon  Injectable 1 milliGRAM(s) IntraMuscular once  heparin   Injectable 7500 Unit(s) SubCutaneous every 8 hours  hydrALAZINE 100 milliGRAM(s) Oral three times a day  insulin lispro (ADMELOG) corrective regimen sliding scale   SubCutaneous three times a day before meals  insulin lispro (ADMELOG) corrective regimen sliding scale   SubCutaneous at bedtime  methylPREDNISolone sodium succinate Injectable 40 milliGRAM(s) IV Push daily  metoprolol succinate  milliGRAM(s) Oral daily  montelukast 10 milliGRAM(s) Oral daily  piperacillin/tazobactam IVPB.. 3.375 Gram(s) IV Intermittent every 8 hours  polyethylene glycol 3350 17 Gram(s) Oral daily  senna 2 Tablet(s) Oral at bedtime    MEDICATIONS  (PRN):  oxycodone    5 mG/acetaminophen 325 mG 1 Tablet(s) Oral every 6 hours PRN Moderate Pain (4 - 6)      CAPILLARY BLOOD GLUCOSE      POCT Blood Glucose.: 218 mg/dL (06 Dec 2021 17:09)  POCT Blood Glucose.: 202 mg/dL (06 Dec 2021 10:42)  POCT Blood Glucose.: 117 mg/dL (06 Dec 2021 07:21)  POCT Blood Glucose.: 151 mg/dL (05 Dec 2021 22:00)    I&O's Summary    05 Dec 2021 07:01  -  06 Dec 2021 07:00  --------------------------------------------------------  IN: 0 mL / OUT: 900 mL / NET: -900 mL        PHYSICAL EXAM:  Vital Signs Last 24 Hrs  T(C): 37.2 (06 Dec 2021 13:40), Max: 37.2 (06 Dec 2021 13:40)  T(F): 98.9 (06 Dec 2021 13:40), Max: 98.9 (06 Dec 2021 13:40)  HR: 82 (06 Dec 2021 17:31) (62 - 86)  BP: 125/70 (06 Dec 2021 17:31) (123/55 - 135/51)  BP(mean): --  RR: 18 (06 Dec 2021 13:40) (17 - 19)  SpO2: 94% (06 Dec 2021 15:39) (93% - 100%)  CONSTITUTIONAL: NAD, chronically ill appearing   EYES: EOMI; conjunctiva and sclera clear  ENMT: Moist oral mucosa, poor dentition   NECK: Supple  RESPIRATORY: Normal respiratory effort; decreased breath sounds overall   CARDIOVASCULAR: Regular rate and rhythm, normal S1 and S2, no murmur/rub/gallop; +LE edema   ABDOMEN: obese, soft, nontender   MUSKULOSKELETAL:  +R knee in immobilizer   PSYCH: A+O to person, place, and time; affect appropriate  NEUROLOGY: CN 2-12 are intact and symmetric; no gross sensory deficits      LABS:                        8.8    7.07  )-----------( 365      ( 06 Dec 2021 07:43 )             31.6     12-06    144  |  95<L>  |  44<H>  ----------------------------<  113<H>  4.2   |  42<H>  |  1.29    Ca    9.6      06 Dec 2021 07:43  Phos  2.6       Mg     2.60                 Urinalysis Basic - ( 05 Dec 2021 09:18 )    Color: Yellow / Appearance: Clear / S.015 / pH: x  Gluc: x / Ketone: Negative  / Bili: Negative / Urobili: <2 mg/dL   Blood: x / Protein: Trace / Nitrite: Negative   Leuk Esterase: Negative / RBC: 0 /HPF / WBC 0 /HPF   Sq Epi: x / Non Sq Epi: 1 /HPF / Bacteria: Negative          RADIOLOGY & ADDITIONAL TESTS:  Results Reviewed:   Imaging Personally Reviewed:  Electrocardiogram Personally Reviewed:    COORDINATION OF CARE:  Care Discussed with Consultants/Other Providers [Y/N]: Y  Prior or Outpatient Records Reviewed [Y/N]: Y

## 2021-12-06 NOTE — PROGRESS NOTE ADULT - PROBLEM SELECTOR PLAN 7
- s/p insulin and D50 with calcium gluconate for K in 5.6-5.9 (was at 6.9 on admission but this was hemolyzed)  - monitor K  - lasix for fluid overload will assist with hyperkalemia  - monitor K, monitor on telemetry
resolved
- s/p insulin and D50 with calcium gluconate for K in 5.6-5.9 (was at 6.9 on admission but this was hemolyzed)  - monitor K. Likely elevated in setting of JOSEPH which is now improved  - lasix for fluid overload will assist with hyperkalemia  - monitor K, monitor on telemetry  - 12/5: hyperkalemia resolved

## 2021-12-06 NOTE — PROGRESS NOTE ADULT - PROBLEM SELECTOR PLAN 5
- c/w zosyn, azithro for now  - monitor JOSEPH on zosyn  - f/u pulmonary recs

## 2021-12-06 NOTE — PROGRESS NOTE ADULT - PROBLEM SELECTOR PLAN 8
- initital trops 42 - 44  - cardiology following  - recent echo with grade I DD  - c/w diuresis, likely type II NSTEMI

## 2021-12-06 NOTE — PROGRESS NOTE ADULT - PROBLEM SELECTOR PLAN 1
- acute on chronic hypercapnic respiratory failure likely in setting of COPD/SHAD overlap exacerbation with possible PNA on CXR, HFpEF exacerbation given fluid overload on imaging (MICU POCUS with b-lines)  - HFpEF: c/w lasix, strict Is/OS. cards following recent echo with HFpEF and grade I DD  - COPD exacerbation: c/w AVAPs qHS. will consider steroids as well. c/w duonebs q6. c/w azithro. Pulmonology c/s 12/4: AVAPs settings adjusted but persistently hypoxic  - PNA: c/w zosyn and azithro for now    ***RRT called AM of 12/6, for ams 2/2 severe hypoxia (40-50s) d/t bipap being disconnected.   Discussed GOC with pts daughter, Marina, at the time, who wanted everything to be done, including intubation if needed. Pt is FULL CODE.    Transferred to [facility]
- likely 2/2 acute on chronic hypercapnic respiratory failure  - c/w AVAPS qHS,, NC during day  - on 3L NC O2 at home, wean down from 6L  - neuro following; f/u CT head, other recs appreciated c.w statin  - recent echo wnl  - plan as below for hypercapnia
- likely 2/2 acute on chronic hypercapnic respiratory failure  - c/w AVAPS qHS,, NC during day  - on 3L NC O2 at home, wean down from 6L  - neuro following; f/u CT head, other recs appreciated c.w statin  - recent echo wnl  - plan as below for hypercapnia

## 2021-12-06 NOTE — PROGRESS NOTE ADULT - SUBJECTIVE AND OBJECTIVE BOX
CHIEF COMPLAINT:    Interval Events:    REVIEW OF SYSTEMS:  Constitutional: [ ] negative [ ] fevers [ ] chills [ ] weight loss [ ] weight gain  HEENT: [ ] negative [ ] dry eyes [ ] eye irritation [ ] postnasal drip [ ] nasal congestion  CV: [ ] negative  [ ] chest pain [ ] orthopnea [ ] palpitations [ ] murmur  Resp: [ ] negative [ ] cough [ ] shortness of breath [ ] dyspnea [ ] wheezing [ ] sputum [ ] hemoptysis  GI: [ ] negative [ ] nausea [ ] vomiting [ ] diarrhea [ ] constipation [ ] abd pain [ ] dysphagia   : [ ] negative [ ] dysuria [ ] nocturia [ ] hematuria [ ] increased urinary frequency  Musculoskeletal: [ ] negative [ ] back pain [ ] myalgias [ ] arthralgias [ ] fracture  Skin: [ ] negative [ ] rash [ ] itch  Neurological: [ ] negative [ ] headache [ ] dizziness [ ] syncope [ ] weakness [ ] numbness  Psychiatric: [ ] negative [ ] anxiety [ ] depression  Endocrine: [ ] negative [ ] diabetes [ ] thyroid problem  Hematologic/Lymphatic: [ ] negative [ ] anemia [ ] bleeding problem  Allergic/Immunologic: [ ] negative [ ] itchy eyes [ ] nasal discharge [ ] hives [ ] angioedema  [ ] All other systems negative  [ ] Unable to assess ROS because ________    OBJECTIVE:  ICU Vital Signs Last 24 Hrs  T(C): 36.7 (06 Dec 2021 05:31), Max: 37 (05 Dec 2021 20:53)  T(F): 98.1 (06 Dec 2021 05:31), Max: 98.6 (05 Dec 2021 20:53)  HR: 67 (06 Dec 2021 08:06) (62 - 72)  BP: 131/54 (06 Dec 2021 05:31) (126/57 - 135/51)  BP(mean): --  ABP: --  ABP(mean): --  RR: 18 (06 Dec 2021 05:31) (17 - 19)  SpO2: 96% (06 Dec 2021 08:06) (96% - 100%)    Mode: NIV (Noninvasive Ventilation), RR (machine): 16, TV (machine): 450, FiO2: 40, PEEP: 8, ITime: 1, P-High: 30, P-Low: 14, PIP: 16    12-05 @ 07:01  -   @ 07:00  --------------------------------------------------------  IN: 0 mL / OUT: 900 mL / NET: -900 mL      CAPILLARY BLOOD GLUCOSE      POCT Blood Glucose.: 202 mg/dL (06 Dec 2021 10:42)      PHYSICAL EXAM:  General:   HEENT:   Lymph Nodes:  Neck:   Respiratory:   Cardiovascular:   Abdomen:   Extremities:   Skin:   Neurological:  Psychiatry:    HOSPITAL MEDICATIONS:  MEDICATIONS  (STANDING):  albuterol/ipratropium for Nebulization 3 milliLiter(s) Nebulizer every 6 hours  amLODIPine   Tablet 10 milliGRAM(s) Oral daily  atorvastatin 40 milliGRAM(s) Oral at bedtime  dextrose 40% Gel 15 Gram(s) Oral once  dextrose 5%. 1000 milliLiter(s) (50 mL/Hr) IV Continuous <Continuous>  dextrose 5%. 1000 milliLiter(s) (100 mL/Hr) IV Continuous <Continuous>  dextrose 50% Injectable 12.5 Gram(s) IV Push once  dextrose 50% Injectable 25 Gram(s) IV Push once  dextrose 50% Injectable 25 Gram(s) IV Push once  furosemide   Injectable 40 milliGRAM(s) IV Push two times a day  gabapentin 100 milliGRAM(s) Oral three times a day  glucagon  Injectable 1 milliGRAM(s) IntraMuscular once  heparin   Injectable 7500 Unit(s) SubCutaneous every 8 hours  hydrALAZINE 100 milliGRAM(s) Oral three times a day  insulin lispro (ADMELOG) corrective regimen sliding scale   SubCutaneous every 6 hours  methylPREDNISolone sodium succinate Injectable 40 milliGRAM(s) IV Push daily  metoprolol succinate  milliGRAM(s) Oral daily  montelukast 10 milliGRAM(s) Oral daily  piperacillin/tazobactam IVPB.. 3.375 Gram(s) IV Intermittent every 8 hours  polyethylene glycol 3350 17 Gram(s) Oral daily  senna 2 Tablet(s) Oral at bedtime    MEDICATIONS  (PRN):  oxycodone    5 mG/acetaminophen 325 mG 1 Tablet(s) Oral every 6 hours PRN Moderate Pain (4 - 6)      LABS:                        8.8    7.07  )-----------( 365      ( 06 Dec 2021 07:43 )             31.6     Hgb Trend: 8.8<--, 9.1<--, 8.8<--, 8.4<--, 7.5<--  12-    144  |  95<L>  |  44<H>  ----------------------------<  113<H>  4.2   |  42<H>  |  1.29    Ca    9.6      06 Dec 2021 07:43  Phos  2.6       Mg     2.60           Creatinine Trend: 1.29<--, 1.14<--, 1.17<--, 1.55<--, 2.03<--, 2.18<--    Urinalysis Basic - ( 05 Dec 2021 09:18 )    Color: Yellow / Appearance: Clear / S.015 / pH: x  Gluc: x / Ketone: Negative  / Bili: Negative / Urobili: <2 mg/dL   Blood: x / Protein: Trace / Nitrite: Negative   Leuk Esterase: Negative / RBC: 0 /HPF / WBC 0 /HPF   Sq Epi: x / Non Sq Epi: 1 /HPF / Bacteria: Negative      Arterial Blood Gas:   @ 11:27  7.44/75/150/51/97.7/23.5  ABG lactate: --  Arterial Blood Gas:   @ 15:07  7.49/63/72/48/95.4/21.6  ABG lactate: --        MICROBIOLOGY:       RADIOLOGY:  [ ] Reviewed and interpreted by me    PULMONARY FUNCTION TESTS:    EKG: CHIEF COMPLAINT: AMS    Interval Events:  S/p RRT this AM for hypoxemia/AMS iso disconnected BiPAP. Post normalization able to communicate and wean to     REVIEW OF SYSTEMS:  Constitutional: [ ] negative [ ] fevers [ ] chills [ ] weight loss [ ] weight gain  HEENT: [ ] negative [ ] dry eyes [ ] eye irritation [ ] postnasal drip [ ] nasal congestion  CV: [ ] negative  [ ] chest pain [ ] orthopnea [ ] palpitations [ ] murmur  Resp: [ ] negative [ ] cough [ ] shortness of breath [ ] dyspnea [ ] wheezing [ ] sputum [ ] hemoptysis  GI: [ ] negative [ ] nausea [ ] vomiting [ ] diarrhea [ ] constipation [ ] abd pain [ ] dysphagia   : [ ] negative [ ] dysuria [ ] nocturia [ ] hematuria [ ] increased urinary frequency  Musculoskeletal: [ ] negative [ ] back pain [ ] myalgias [ ] arthralgias [ ] fracture  Skin: [ ] negative [ ] rash [ ] itch  Neurological: [ ] negative [ ] headache [ ] dizziness [ ] syncope [ ] weakness [ ] numbness  Psychiatric: [ ] negative [ ] anxiety [ ] depression  Endocrine: [ ] negative [ ] diabetes [ ] thyroid problem  Hematologic/Lymphatic: [ ] negative [ ] anemia [ ] bleeding problem  Allergic/Immunologic: [ ] negative [ ] itchy eyes [ ] nasal discharge [ ] hives [ ] angioedema  [ ] All other systems negative  [ ] Unable to assess ROS because ________    OBJECTIVE:  ICU Vital Signs Last 24 Hrs  T(C): 36.7 (06 Dec 2021 05:31), Max: 37 (05 Dec 2021 20:53)  T(F): 98.1 (06 Dec 2021 05:31), Max: 98.6 (05 Dec 2021 20:53)  HR: 67 (06 Dec 2021 08:06) (62 - 72)  BP: 131/54 (06 Dec 2021 05:31) (126/57 - 135/51)  BP(mean): --  ABP: --  ABP(mean): --  RR: 18 (06 Dec 2021 05:31) (17 - 19)  SpO2: 96% (06 Dec 2021 08:06) (96% - 100%)    Mode: NIV (Noninvasive Ventilation), RR (machine): 16, TV (machine): 450, FiO2: 40, PEEP: 8, ITime: 1, P-High: 30, P-Low: 14, PIP: 16     @ 07:01  -   @ 07:00  --------------------------------------------------------  IN: 0 mL / OUT: 900 mL / NET: -900 mL      CAPILLARY BLOOD GLUCOSE      POCT Blood Glucose.: 202 mg/dL (06 Dec 2021 10:42)      PHYSICAL EXAM:  General:   HEENT:   Lymph Nodes:  Neck:   Respiratory:   Cardiovascular:   Abdomen:   Extremities:   Skin:   Neurological:  Psychiatry:    HOSPITAL MEDICATIONS:  MEDICATIONS  (STANDING):  albuterol/ipratropium for Nebulization 3 milliLiter(s) Nebulizer every 6 hours  amLODIPine   Tablet 10 milliGRAM(s) Oral daily  atorvastatin 40 milliGRAM(s) Oral at bedtime  dextrose 40% Gel 15 Gram(s) Oral once  dextrose 5%. 1000 milliLiter(s) (50 mL/Hr) IV Continuous <Continuous>  dextrose 5%. 1000 milliLiter(s) (100 mL/Hr) IV Continuous <Continuous>  dextrose 50% Injectable 12.5 Gram(s) IV Push once  dextrose 50% Injectable 25 Gram(s) IV Push once  dextrose 50% Injectable 25 Gram(s) IV Push once  furosemide   Injectable 40 milliGRAM(s) IV Push two times a day  gabapentin 100 milliGRAM(s) Oral three times a day  glucagon  Injectable 1 milliGRAM(s) IntraMuscular once  heparin   Injectable 7500 Unit(s) SubCutaneous every 8 hours  hydrALAZINE 100 milliGRAM(s) Oral three times a day  insulin lispro (ADMELOG) corrective regimen sliding scale   SubCutaneous every 6 hours  methylPREDNISolone sodium succinate Injectable 40 milliGRAM(s) IV Push daily  metoprolol succinate  milliGRAM(s) Oral daily  montelukast 10 milliGRAM(s) Oral daily  piperacillin/tazobactam IVPB.. 3.375 Gram(s) IV Intermittent every 8 hours  polyethylene glycol 3350 17 Gram(s) Oral daily  senna 2 Tablet(s) Oral at bedtime    MEDICATIONS  (PRN):  oxycodone    5 mG/acetaminophen 325 mG 1 Tablet(s) Oral every 6 hours PRN Moderate Pain (4 - 6)      LABS:                        8.8    7.07  )-----------( 365      ( 06 Dec 2021 07:43 )             31.6     Hgb Trend: 8.8<--, 9.1<--, 8.8<--, 8.4<--, 7.5<--  12-06    144  |  95<L>  |  44<H>  ----------------------------<  113<H>  4.2   |  42<H>  |  1.29    Ca    9.6      06 Dec 2021 07:43  Phos  2.6       Mg     2.60           Creatinine Trend: 1.29<--, 1.14<--, 1.17<--, 1.55<--, 2.03<--, 2.18<--    Urinalysis Basic - ( 05 Dec 2021 09:18 )    Color: Yellow / Appearance: Clear / S.015 / pH: x  Gluc: x / Ketone: Negative  / Bili: Negative / Urobili: <2 mg/dL   Blood: x / Protein: Trace / Nitrite: Negative   Leuk Esterase: Negative / RBC: 0 /HPF / WBC 0 /HPF   Sq Epi: x / Non Sq Epi: 1 /HPF / Bacteria: Negative      Arterial Blood Gas:   @ 11:27  7.44/75/150/51/97.7/23.5  ABG lactate: --  Arterial Blood Gas:   @ 15:07  7.49/63/72/48/95.4/21.6  ABG lactate: --        MICROBIOLOGY:       RADIOLOGY:  [ ] Reviewed and interpreted by me    PULMONARY FUNCTION TESTS:    EKG: CHIEF COMPLAINT: AMS    Interval Events:  S/p RRT this AM for hypoxemia/AMS iso disconnected BiPAP. Post normalization able to communicate and wean to 5LNC    REVIEW OF SYSTEMS:  Constitutional: [ ] negative [ ] fevers [ ] chills [ ] weight loss [ ] weight gain  HEENT: [ ] negative [ ] dry eyes [ ] eye irritation [ ] postnasal drip [ ] nasal congestion  CV: [ ] negative  [ ] chest pain [ ] orthopnea [ ] palpitations [ ] murmur  Resp: [ ] negative [ ] cough [ ] shortness of breath [ ] dyspnea [ ] wheezing [ ] sputum [ ] hemoptysis  GI: [ ] negative [ ] nausea [ ] vomiting [ ] diarrhea [ ] constipation [ ] abd pain [ ] dysphagia   : [ ] negative [ ] dysuria [ ] nocturia [ ] hematuria [ ] increased urinary frequency  Musculoskeletal: [ ] negative [ ] back pain [ ] myalgias [ ] arthralgias [ ] fracture  Skin: [ ] negative [ ] rash [ ] itch  Neurological: [ ] negative [ ] headache [ ] dizziness [ ] syncope [ ] weakness [ ] numbness  Psychiatric: [ ] negative [ ] anxiety [ ] depression  Endocrine: [ ] negative [ ] diabetes [ ] thyroid problem  Hematologic/Lymphatic: [ ] negative [ ] anemia [ ] bleeding problem  Allergic/Immunologic: [ ] negative [ ] itchy eyes [ ] nasal discharge [ ] hives [ ] angioedema  [x] All other systems negative  [ ] Unable to assess ROS because ________    OBJECTIVE:  ICU Vital Signs Last 24 Hrs  T(C): 36.7 (06 Dec 2021 05:31), Max: 37 (05 Dec 2021 20:53)  T(F): 98.1 (06 Dec 2021 05:31), Max: 98.6 (05 Dec 2021 20:53)  HR: 67 (06 Dec 2021 08:06) (62 - 72)  BP: 131/54 (06 Dec 2021 05:31) (126/57 - 135/51)  BP(mean): --  ABP: --  ABP(mean): --  RR: 18 (06 Dec 2021 05:31) (17 - 19)  SpO2: 96% (06 Dec 2021 08:06) (96% - 100%)    Mode: NIV (Noninvasive Ventilation), RR (machine): 16, TV (machine): 450, FiO2: 40, PEEP: 8, ITime: 1, P-High: 30, P-Low: 14, PIP: 16     @ 07:01  -   @ 07:00  --------------------------------------------------------  IN: 0 mL / OUT: 900 mL / NET: -900 mL      CAPILLARY BLOOD GLUCOSE      POCT Blood Glucose.: 202 mg/dL (06 Dec 2021 10:42)      PHYSICAL EXAM:  General: Female, NAD  HEENT: EOMI  Respiratory: No wheezes or increased WOB  Cardiovascular: RR no mrg  Abdomen: Soft  Extremities: WWP  Skin: Intact  Neurological: AOx3 post bipap  Psychiatry: Normal    HOSPITAL MEDICATIONS:  MEDICATIONS  (STANDING):  albuterol/ipratropium for Nebulization 3 milliLiter(s) Nebulizer every 6 hours  amLODIPine   Tablet 10 milliGRAM(s) Oral daily  atorvastatin 40 milliGRAM(s) Oral at bedtime  dextrose 40% Gel 15 Gram(s) Oral once  dextrose 5%. 1000 milliLiter(s) (50 mL/Hr) IV Continuous <Continuous>  dextrose 5%. 1000 milliLiter(s) (100 mL/Hr) IV Continuous <Continuous>  dextrose 50% Injectable 12.5 Gram(s) IV Push once  dextrose 50% Injectable 25 Gram(s) IV Push once  dextrose 50% Injectable 25 Gram(s) IV Push once  furosemide   Injectable 40 milliGRAM(s) IV Push two times a day  gabapentin 100 milliGRAM(s) Oral three times a day  glucagon  Injectable 1 milliGRAM(s) IntraMuscular once  heparin   Injectable 7500 Unit(s) SubCutaneous every 8 hours  hydrALAZINE 100 milliGRAM(s) Oral three times a day  insulin lispro (ADMELOG) corrective regimen sliding scale   SubCutaneous every 6 hours  methylPREDNISolone sodium succinate Injectable 40 milliGRAM(s) IV Push daily  metoprolol succinate  milliGRAM(s) Oral daily  montelukast 10 milliGRAM(s) Oral daily  piperacillin/tazobactam IVPB.. 3.375 Gram(s) IV Intermittent every 8 hours  polyethylene glycol 3350 17 Gram(s) Oral daily  senna 2 Tablet(s) Oral at bedtime    MEDICATIONS  (PRN):  oxycodone    5 mG/acetaminophen 325 mG 1 Tablet(s) Oral every 6 hours PRN Moderate Pain (4 - 6)      LABS:                        8.8    7.07  )-----------( 365      ( 06 Dec 2021 07:43 )             31.6     Hgb Trend: 8.8<--, 9.1<--, 8.8<--, 8.4<--, 7.5<--  12-06    144  |  95<L>  |  44<H>  ----------------------------<  113<H>  4.2   |  42<H>  |  1.29    Ca    9.6      06 Dec 2021 07:43  Phos  2.6       Mg     2.60           Creatinine Trend: 1.29<--, 1.14<--, 1.17<--, 1.55<--, 2.03<--, 2.18<--    Urinalysis Basic - ( 05 Dec 2021 09:18 )    Color: Yellow / Appearance: Clear / S.015 / pH: x  Gluc: x / Ketone: Negative  / Bili: Negative / Urobili: <2 mg/dL   Blood: x / Protein: Trace / Nitrite: Negative   Leuk Esterase: Negative / RBC: 0 /HPF / WBC 0 /HPF   Sq Epi: x / Non Sq Epi: 1 /HPF / Bacteria: Negative      Arterial Blood Gas:   @ 11:27  7.44/75/150/51/97.7/23.5  ABG lactate: --  Arterial Blood Gas:   @ 15:07  7.49/63/72/48/95.4/21.6  ABG lactate: --        MICROBIOLOGY:       RADIOLOGY:  [ ] Reviewed and interpreted by me    PULMONARY FUNCTION TESTS:    EKG:

## 2021-12-06 NOTE — PHYSICAL THERAPY INITIAL EVALUATION ADULT - GENERAL OBSERVATIONS, REHAB EVAL
Patient was received & left supine with head of bed elevated, call bell in reach, bed alarm activated & Bipap in place.

## 2021-12-06 NOTE — PHYSICAL THERAPY INITIAL EVALUATION ADULT - PATIENT PROFILE REVIEW, REHAB EVAL
Bedrest 12/2 - Confirmed with RN Sita, who asked that dangling be deferred, but patient was ok for PT/ROM./yes

## 2021-12-06 NOTE — PHYSICAL THERAPY INITIAL EVALUATION ADULT - DIAGNOSIS, PT EVAL
R/O CVA (No acute infarct noted on 12/4 Head CT); Fracture of Right Lower Extremity Fibular Neck; Hypercapnic Respiratory Failure; Hypercapnia

## 2021-12-07 LAB
ANION GAP SERPL CALC-SCNC: 9 MMOL/L — SIGNIFICANT CHANGE UP (ref 7–14)
BUN SERPL-MCNC: 42 MG/DL — HIGH (ref 7–23)
CALCIUM SERPL-MCNC: 9.6 MG/DL — SIGNIFICANT CHANGE UP (ref 8.4–10.5)
CHLORIDE SERPL-SCNC: 95 MMOL/L — LOW (ref 98–107)
CO2 SERPL-SCNC: 41 MMOL/L — HIGH (ref 22–31)
CREAT SERPL-MCNC: 1.34 MG/DL — HIGH (ref 0.5–1.3)
GLUCOSE BLDC GLUCOMTR-MCNC: 117 MG/DL — HIGH (ref 70–99)
GLUCOSE BLDC GLUCOMTR-MCNC: 275 MG/DL — HIGH (ref 70–99)
GLUCOSE BLDC GLUCOMTR-MCNC: 287 MG/DL — HIGH (ref 70–99)
GLUCOSE BLDC GLUCOMTR-MCNC: 296 MG/DL — HIGH (ref 70–99)
GLUCOSE SERPL-MCNC: 92 MG/DL — SIGNIFICANT CHANGE UP (ref 70–99)
HCT VFR BLD CALC: 33.1 % — LOW (ref 34.5–45)
HGB BLD-MCNC: 9.6 G/DL — LOW (ref 11.5–15.5)
MAGNESIUM SERPL-MCNC: 2.8 MG/DL — HIGH (ref 1.6–2.6)
MCHC RBC-ENTMCNC: 26.4 PG — LOW (ref 27–34)
MCHC RBC-ENTMCNC: 29 GM/DL — LOW (ref 32–36)
MCV RBC AUTO: 90.9 FL — SIGNIFICANT CHANGE UP (ref 80–100)
NRBC # BLD: 0 /100 WBCS — SIGNIFICANT CHANGE UP
NRBC # FLD: 0 K/UL — SIGNIFICANT CHANGE UP
PHOSPHATE SERPL-MCNC: 4.6 MG/DL — HIGH (ref 2.5–4.5)
PLATELET # BLD AUTO: 365 K/UL — SIGNIFICANT CHANGE UP (ref 150–400)
POTASSIUM SERPL-MCNC: 4.3 MMOL/L — SIGNIFICANT CHANGE UP (ref 3.5–5.3)
POTASSIUM SERPL-SCNC: 4.3 MMOL/L — SIGNIFICANT CHANGE UP (ref 3.5–5.3)
RBC # BLD: 3.64 M/UL — LOW (ref 3.8–5.2)
RBC # FLD: 16.6 % — HIGH (ref 10.3–14.5)
SODIUM SERPL-SCNC: 145 MMOL/L — SIGNIFICANT CHANGE UP (ref 135–145)
WBC # BLD: 8.62 K/UL — SIGNIFICANT CHANGE UP (ref 3.8–10.5)
WBC # FLD AUTO: 8.62 K/UL — SIGNIFICANT CHANGE UP (ref 3.8–10.5)

## 2021-12-07 PROCEDURE — 99233 SBSQ HOSP IP/OBS HIGH 50: CPT | Mod: GC

## 2021-12-07 RX ADMIN — Medication 3 MILLILITER(S): at 15:29

## 2021-12-07 RX ADMIN — HEPARIN SODIUM 7500 UNIT(S): 5000 INJECTION INTRAVENOUS; SUBCUTANEOUS at 06:20

## 2021-12-07 RX ADMIN — BUDESONIDE AND FORMOTEROL FUMARATE DIHYDRATE 2 PUFF(S): 160; 4.5 AEROSOL RESPIRATORY (INHALATION) at 21:33

## 2021-12-07 RX ADMIN — HEPARIN SODIUM 7500 UNIT(S): 5000 INJECTION INTRAVENOUS; SUBCUTANEOUS at 21:45

## 2021-12-07 RX ADMIN — MONTELUKAST 10 MILLIGRAM(S): 4 TABLET, CHEWABLE ORAL at 11:34

## 2021-12-07 RX ADMIN — Medication 100 MILLIGRAM(S): at 06:21

## 2021-12-07 RX ADMIN — OXYCODONE AND ACETAMINOPHEN 1 TABLET(S): 5; 325 TABLET ORAL at 16:45

## 2021-12-07 RX ADMIN — Medication 40 MILLIGRAM(S): at 06:23

## 2021-12-07 RX ADMIN — POLYETHYLENE GLYCOL 3350 17 GRAM(S): 17 POWDER, FOR SOLUTION ORAL at 11:34

## 2021-12-07 RX ADMIN — Medication 3 MILLILITER(S): at 08:48

## 2021-12-07 RX ADMIN — PIPERACILLIN AND TAZOBACTAM 25 GRAM(S): 4; .5 INJECTION, POWDER, LYOPHILIZED, FOR SOLUTION INTRAVENOUS at 21:39

## 2021-12-07 RX ADMIN — Medication 3 MILLILITER(S): at 03:23

## 2021-12-07 RX ADMIN — Medication 100 MILLIGRAM(S): at 06:17

## 2021-12-07 RX ADMIN — Medication 40 MILLIGRAM(S): at 17:14

## 2021-12-07 RX ADMIN — OXYCODONE AND ACETAMINOPHEN 1 TABLET(S): 5; 325 TABLET ORAL at 09:15

## 2021-12-07 RX ADMIN — OXYCODONE AND ACETAMINOPHEN 1 TABLET(S): 5; 325 TABLET ORAL at 21:46

## 2021-12-07 RX ADMIN — Medication 100 MILLIGRAM(S): at 21:45

## 2021-12-07 RX ADMIN — SENNA PLUS 2 TABLET(S): 8.6 TABLET ORAL at 21:38

## 2021-12-07 RX ADMIN — OXYCODONE AND ACETAMINOPHEN 1 TABLET(S): 5; 325 TABLET ORAL at 08:31

## 2021-12-07 RX ADMIN — Medication 100 MILLIGRAM(S): at 13:14

## 2021-12-07 RX ADMIN — AMLODIPINE BESYLATE 10 MILLIGRAM(S): 2.5 TABLET ORAL at 06:22

## 2021-12-07 RX ADMIN — ATORVASTATIN CALCIUM 40 MILLIGRAM(S): 80 TABLET, FILM COATED ORAL at 21:46

## 2021-12-07 RX ADMIN — Medication 1: at 21:44

## 2021-12-07 RX ADMIN — PIPERACILLIN AND TAZOBACTAM 25 GRAM(S): 4; .5 INJECTION, POWDER, LYOPHILIZED, FOR SOLUTION INTRAVENOUS at 13:18

## 2021-12-07 RX ADMIN — Medication 3: at 11:35

## 2021-12-07 RX ADMIN — BUDESONIDE AND FORMOTEROL FUMARATE DIHYDRATE 2 PUFF(S): 160; 4.5 AEROSOL RESPIRATORY (INHALATION) at 08:49

## 2021-12-07 RX ADMIN — Medication 3: at 16:50

## 2021-12-07 RX ADMIN — GABAPENTIN 100 MILLIGRAM(S): 400 CAPSULE ORAL at 06:22

## 2021-12-07 RX ADMIN — GABAPENTIN 100 MILLIGRAM(S): 400 CAPSULE ORAL at 21:45

## 2021-12-07 RX ADMIN — OXYCODONE AND ACETAMINOPHEN 1 TABLET(S): 5; 325 TABLET ORAL at 16:06

## 2021-12-07 RX ADMIN — Medication 3 MILLILITER(S): at 21:32

## 2021-12-07 RX ADMIN — OXYCODONE AND ACETAMINOPHEN 1 TABLET(S): 5; 325 TABLET ORAL at 22:16

## 2021-12-07 RX ADMIN — GABAPENTIN 100 MILLIGRAM(S): 400 CAPSULE ORAL at 13:14

## 2021-12-07 RX ADMIN — HEPARIN SODIUM 7500 UNIT(S): 5000 INJECTION INTRAVENOUS; SUBCUTANEOUS at 13:14

## 2021-12-07 RX ADMIN — PIPERACILLIN AND TAZOBACTAM 25 GRAM(S): 4; .5 INJECTION, POWDER, LYOPHILIZED, FOR SOLUTION INTRAVENOUS at 06:18

## 2021-12-07 NOTE — SWALLOW BEDSIDE ASSESSMENT ADULT - SWALLOW EVAL: DIAGNOSIS
1) Functional oral phase for thin, mildly and moderately thick and puree marked by adequate oral aperture, bolus formation and A-P transport with adequate oral clearance after the primary swallow. 2) Mild oral dysphagia for minced and moist solids marked by reduced mastication with delayed A-P transport. Trace residue noted on lingual surface after primary swallow. 3) Moderate pharyngeal dysphagia for thin and mildly thick liquids and minced and moist solids, marked by initiation of pharyngeal swallow, with hyolaryngeal elevation and excursion upon digital palpation.  Immediate throat clear noted after thin and mildly thick liquid trials and immediate cough after minced and moist trials, indicative of laryngeal penetration/ aspiration. 4) Functional pharyngeal phase for moderately thick and pureed solids marked by initiation of pharyngeal swallow with hyolaryngeal elevation and excursion upon digital palpation with no evidence of airway invasion.
1) Functional oral phase for thin, mildly and moderately thick liquids, as well as pureed marked  by adequate stripping of utensil, bolus collection and posterior transport. 2) Moderate oral dysphagia for minced and moist solids marked by reduced mastication, poor bolus formation and reduced A-P transport. There was moderate residue on lingual surface after the primary swallow, requiring multiple dry swallows to clear. 3) Functional pharyngeal phase for mildly and moderately thick liquids, puree, and minced and moist solids marked by initiation of pharyngeal swallow with hyolaryngeal elevation and excursion upon digital palpation without evidence of airway invasion. 4) Moderate pharyngeal dysphagia for thin liquids marked by initiation of pharyngeal swallow with hyolaryngeal elevation and excursion upon digital palpation. There was a prolonged cough response indicative of laryngeal penetration/aspiration. Patient with subsequent desaturation to 90% SPO2 however quickly returned to 96

## 2021-12-07 NOTE — PROGRESS NOTE ADULT - SUBJECTIVE AND OBJECTIVE BOX
CHIEF COMPLAINT:    INTERVAL EVENTS:     ROS: Seen by bedside during AM rounds     OBJECTIVE:  ICU Vital Signs Last 24 Hrs  T(C): 36.4 (07 Dec 2021 05:53), Max: 37.2 (06 Dec 2021 13:40)  T(F): 97.6 (07 Dec 2021 05:53), Max: 98.9 (06 Dec 2021 13:40)  HR: 64 (07 Dec 2021 07:14) (61 - 92)  BP: 135/58 (07 Dec 2021 07:14) (119/54 - 136/60)  BP(mean): --  ABP: --  ABP(mean): --  RR: 17 (07 Dec 2021 07:14) (17 - 20)  SpO2: 100% (07 Dec 2021 07:14) (93% - 100%)    Mode: [facility]     @ 07:01  -   @ 07:00  --------------------------------------------------------  IN: 0 mL / OUT: 600 mL / NET: -600 mL      CAPILLARY BLOOD GLUCOSE      POCT Blood Glucose.: 127 mg/dL (06 Dec 2021 21:56)      PHYSICAL EXAM:  General:   HEENT:   Lymph Nodes:  Neck:   Respiratory:   Cardiovascular:   Abdomen:   Extremities:   Skin:   Neurological:  Psychiatry:    Mode: [facility] MEDICATIONS:  MEDICATIONS  (STANDING):  albuterol/ipratropium for Nebulization 3 milliLiter(s) Nebulizer every 6 hours  amLODIPine   Tablet 10 milliGRAM(s) Oral daily  atorvastatin 40 milliGRAM(s) Oral at bedtime  budesonide 160 MICROgram(s)/formoterol 4.5 MICROgram(s) Inhaler 2 Puff(s) Inhalation two times a day  dextrose 40% Gel 15 Gram(s) Oral once  dextrose 5%. 1000 milliLiter(s) (50 mL/Hr) IV Continuous <Continuous>  dextrose 5%. 1000 milliLiter(s) (100 mL/Hr) IV Continuous <Continuous>  dextrose 50% Injectable 12.5 Gram(s) IV Push once  dextrose 50% Injectable 25 Gram(s) IV Push once  dextrose 50% Injectable 25 Gram(s) IV Push once  furosemide   Injectable 40 milliGRAM(s) IV Push two times a day  gabapentin 100 milliGRAM(s) Oral three times a day  glucagon  Injectable 1 milliGRAM(s) IntraMuscular once  heparin   Injectable 7500 Unit(s) SubCutaneous every 8 hours  hydrALAZINE 100 milliGRAM(s) Oral three times a day  insulin lispro (ADMELOG) corrective regimen sliding scale   SubCutaneous three times a day before meals  insulin lispro (ADMELOG) corrective regimen sliding scale   SubCutaneous at bedtime  methylPREDNISolone sodium succinate Injectable 40 milliGRAM(s) IV Push daily  metoprolol succinate  milliGRAM(s) Oral daily  montelukast 10 milliGRAM(s) Oral daily  piperacillin/tazobactam IVPB.. 3.375 Gram(s) IV Intermittent every 8 hours  polyethylene glycol 3350 17 Gram(s) Oral daily  senna 2 Tablet(s) Oral at bedtime    MEDICATIONS  (PRN):  oxycodone    5 mG/acetaminophen 325 mG 1 Tablet(s) Oral every 6 hours PRN Moderate Pain (4 - 6)      LABS:                        9.6    8.62  )-----------( 365      ( 07 Dec 2021 07:06 )             33.1     12-06    144  |  95<L>  |  44<H>  ----------------------------<  113<H>  4.2   |  42<H>  |  1.29    Ca    9.6      06 Dec 2021 07:43  Phos  2.6     12-  Mg     2.60     12-        Urinalysis Basic - ( 05 Dec 2021 09:18 )    Color: Yellow / Appearance: Clear / S.015 / pH: x  Gluc: x / Ketone: Negative  / Bili: Negative / Urobili: <2 mg/dL   Blood: x / Protein: Trace / Nitrite: Negative   Leuk Esterase: Negative / RBC: 0 /HPF / WBC 0 /HPF   Sq Epi: x / Non Sq Epi: 1 /HPF / Bacteria: Negative      Arterial Blood Gas:   @ 11:27  7.44/75/150/51/97.7/23.5  ABG lactate: --     CHIEF COMPLAINT:Patient is a 86y old  Female who presents with a chief complaint of SOB (07 Dec 2021 11:58)      INTERVAL EVENTS: no overnight events. Transferred from Medicine to RCU yesterday.     ROS: Seen by bedside during AM rounds     OBJECTIVE:  ICU Vital Signs Last 24 Hrs  T(C): 36.4 (07 Dec 2021 05:53), Max: 37.2 (06 Dec 2021 13:40)  T(F): 97.6 (07 Dec 2021 05:53), Max: 98.9 (06 Dec 2021 13:40)  HR: 64 (07 Dec 2021 07:14) (61 - 92)  BP: 135/58 (07 Dec 2021 07:14) (119/54 - 136/60)  BP(mean): --  ABP: --  ABP(mean): --  RR: 17 (07 Dec 2021 07:14) (17 - 20)  SpO2: 100% (07 Dec 2021 07:14) (93% - 100%)    Mode: [facility]     @ 07:01  -   @ 07:00  --------------------------------------------------------  IN: 0 mL / OUT: 600 mL / NET: -600 mL      CAPILLARY BLOOD GLUCOSE      POCT Blood Glucose.: 127 mg/dL (06 Dec 2021 21:56)      PHYSICAL EXAM:  General: NAD   Cards: S1/S2, no murmurs   Pulm: Mildly diminished b/l but without wheezing.   Abdomen: Obese abdomen. Soft, NTND.  Extremities: No pedal edema. RLE in external knee immobilizer. LEXY of BL upper and lower extremities.  Neurology: Sleeping but easily arousable. Complaining of RLE pain. Follows commands.   Skin: refer to Rn assessment.     Mode: [facility]      HOSPITAL MEDICATIONS:  MEDICATIONS  (STANDING):  albuterol/ipratropium for Nebulization 3 milliLiter(s) Nebulizer every 6 hours  amLODIPine   Tablet 10 milliGRAM(s) Oral daily  atorvastatin 40 milliGRAM(s) Oral at bedtime  budesonide 160 MICROgram(s)/formoterol 4.5 MICROgram(s) Inhaler 2 Puff(s) Inhalation two times a day  dextrose 40% Gel 15 Gram(s) Oral once  dextrose 5%. 1000 milliLiter(s) (50 mL/Hr) IV Continuous <Continuous>  dextrose 5%. 1000 milliLiter(s) (100 mL/Hr) IV Continuous <Continuous>  dextrose 50% Injectable 12.5 Gram(s) IV Push once  dextrose 50% Injectable 25 Gram(s) IV Push once  dextrose 50% Injectable 25 Gram(s) IV Push once  furosemide   Injectable 40 milliGRAM(s) IV Push two times a day  gabapentin 100 milliGRAM(s) Oral three times a day  glucagon  Injectable 1 milliGRAM(s) IntraMuscular once  heparin   Injectable 7500 Unit(s) SubCutaneous every 8 hours  hydrALAZINE 100 milliGRAM(s) Oral three times a day  insulin lispro (ADMELOG) corrective regimen sliding scale   SubCutaneous three times a day before meals  insulin lispro (ADMELOG) corrective regimen sliding scale   SubCutaneous at bedtime  methylPREDNISolone sodium succinate Injectable 40 milliGRAM(s) IV Push daily  metoprolol succinate  milliGRAM(s) Oral daily  montelukast 10 milliGRAM(s) Oral daily  piperacillin/tazobactam IVPB.. 3.375 Gram(s) IV Intermittent every 8 hours  polyethylene glycol 3350 17 Gram(s) Oral daily  senna 2 Tablet(s) Oral at bedtime    MEDICATIONS  (PRN):  oxycodone    5 mG/acetaminophen 325 mG 1 Tablet(s) Oral every 6 hours PRN Moderate Pain (4 - 6)      LABS:                        9.6    8.62  )-----------( 365      ( 07 Dec 2021 07:06 )             33.1     12-    144  |  95<L>  |  44<H>  ----------------------------<  113<H>  4.2   |  42<H>  |  1.29    Ca    9.6      06 Dec 2021 07:43  Phos  2.6     12  Mg     2.60     12        Urinalysis Basic - ( 05 Dec 2021 09:18 )    Color: Yellow / Appearance: Clear / S.015 / pH: x  Gluc: x / Ketone: Negative  / Bili: Negative / Urobili: <2 mg/dL   Blood: x / Protein: Trace / Nitrite: Negative   Leuk Esterase: Negative / RBC: 0 /HPF / WBC 0 /HPF   Sq Epi: x / Non Sq Epi: 1 /HPF / Bacteria: Negative      Arterial Blood Gas:  -05 @ 11:27  7.44/75/150/51/97.7/23.5  ABG lactate: --

## 2021-12-07 NOTE — SWALLOW BEDSIDE ASSESSMENT ADULT - COMMENTS
As per Progress Note Adult- Pulmonology Fellow "85 yo F w/ HFpEF, morbid obesity, SHAD/OHS (3L home O2 during day and NIPPV at night), HTN, T2DM, COPD, lymphedema, s/p recent fall and knee fracture who was initially admitted for AMS 2/2 hypercapneic/hypoxemic respiratory failure now improved on AVAPS"    CXR 12/6/21 " Mild vascular congestion and cardiomegaly but no acute pulmonary pathology"  CTH 12/4/21 "Age-appropriate involutional change and microvascular ischemic disease. No evidence of acute infarct. No evidence of intracranial hemorrhage"    As per charting, patient with RRT this AM as per note "Patient appeared lethargic, not following commands. O2 sat 48% with good waveform. Pulm team at bedside. Lung exam s/f reduced breath sounds PAVAN. Secretions around mouth - possible aspiration. Suspicion for PTX. RT arrived and pt noted to be connected incorrectly to biPAP. Xray obtained - No PTX. BiPAP circuit was fixed and sat improved to 100%."    At the time of the assessment, patient with 97% Spo2 on 4 LPM o2 via nasal cannula. Patient cooperative throughout assessment and followed all necessary directions. Strangled vocal quality noted.
As per progress note, pt is a “87y/o F w/ h/o HFpEF, obesity, SHAD on CPAP, HTN, T2DM, COPD, lymphedema, s/p fall and knee fracture p/w AMS and facial droop, MICU consulted for increased work of breathing on Bipap and elevated pCO2, found with hypercapnic respiratory failure c/b AMS improved on BIPAP.”    WBC is WFL. CXR 12/2/21 revealed “Right midlung peripheral patchy opacity, may represent infection."    Pt on BiPAP upon SLP arrival, for clinical swallow evaluation this PM. SLP spoke with RN who reported pt is unable to be weaned to nasal cannula at this time. Given pt's need for increased respiratory support, PO trials deferred given high risk for aspiration. Medicine Team to reconsult this service when pt is noted with improvement in respiratory status, and able to tolerate nasal cannula or room air.
As per Pulmonologist Progress Note 12/7/21 "87y/o F w/ h/o HFpEF, obesity, SHAD on CPAP, HTN, T2DM, COPD, lymphedema, s/p fall and knee fracture p/w AMS and facial droop, MICU consulted for increased work of breathing on Bipap and elevated pCO2, found with hypercapnic respiratory failure c/b AMS improved on BIPAP."    CXR 12/6/21 "Mild vascular congestion and cardiomegaly but no acute pulmonary pathology."  CTH 12/4/21 "Age-appropriate involutional change and microvascular ischemic disease. No evidence of acute infarct. No evidence of intracranial hemorrhage"    Patient known to this service seen 12/6/21 for bedside swallow assessment with recommendations for puree with moderately thick liquids. See report for details. Patient seen for follow up on this date in order to assess for candidacy for upgrade. Patient seen in RCU, received upright in bed, 4 LPM O2 via nasal cannula, baseline SPO2 99%. Patient able to follow all necessary directives.

## 2021-12-07 NOTE — PROGRESS NOTE ADULT - ASSESSMENT
87y/o F w/ h/o HFpEF, obesity, SHAD on CPAP, HTN, T2DM, COPD, lymphedema, s/p fall and knee fracture p/w AMS and facial droop, MICU consulted for increased work of breathing on Bipap and elevated pCO2, found with hypercapnic respiratory failure c/b AMS improved on BIPAP.      Problem/Plan - 1:  ·  Problem: Acute on chronic respiratory failure with hypercapnia.   ·  Plan: - acute on chronic hypercapnic respiratory failure likely in setting of COPD/SHAD overlap exacerbation with possible PNA on CXR, HFpEF exacerbation given fluid overload on imaging (MICU POCUS with b-lines)  - HFpEF: c/w lasix, strict Is/OS. cards following recent echo with HFpEF and grade I DD  - COPD exacerbation: c/w AVAPs qHS. will consider steroids as well. c/w duonebs q6. c/w azithro. Pulmonology c/s 12/4: AVAPs settings adjusted but persistently hypoxic  - PNA: c/w zosyn and azithro for now    ***RRT called AM of 12/6, for ams 2/2 severe hypoxia (40-50s) d/t bipap being disconnected.   Discussed GOC with pts daughter, Marina, at the time, who wanted everything to be done, including intubation if needed. Pt is FULL CODE.    Transferred to RCU.     Problem/Plan - 2:  ·  Problem: AMS (altered mental status).   ·  Plan: Resolved w/improved resp status  - likely 2/2 acute on chronic hypercapnic respiratory failure  - neuro following; f/u CT head, other recs appreciated c.w statin  - recent echo wnl.     Problem/Plan - 3:  ·  Problem: Chronic heart failure with preserved ejection fraction (HFpEF).   ·  Plan: -recent echo with HFpEF and grade I DD  - s/p lasix BID 12/3  - c/w lasix 40 IV BID for now  - strict Is/Os monitor output  - cards following: recent echo with grade 1 diastolic dysfunction  - c/w lasix  - c/w metoprolol, amlodipine, statin. on home bumex but IV lasix inpatient, f/u Is/Os and cardiology recs.     Problem/Plan - 4:  ·  Problem: SHAD and COPD overlap syndrome.   ·  Plan: - c/w AVAPS qHS and PRN for AMS  - NC during day to maintain O2 sat 89-92 given COPD  - c/w duonebs standing, azithro, zosyn for possible PNA  - pulmonary c/s: AVAPS adjusted.     Problem/Plan - 5:  ·  Problem: Pneumonia.   ·  Plan: - c/w zosyn, azithro for now  - monitor JOSEPH on zosyn  - f/u pulmonary recs.     Problem/Plan - 6:  ·  Problem: Acute-on-chronic kidney injury.   ·  Plan: - Cr initially elevated to 2s, baseline in 1s  - workup: renal lytes FeUrea c/w intrinsic renal disease, making good outputs -1.9 L O/N 12/4-12/5. Check UA and renal/bladder US  - suspect JOSEPH due to underlying medical condition: HFpEF exacerbation and fluid overload worsening renal perfusion. Now improved s/p diuresis and oxygenation on AVAPs  - hyperkalemia was treated with insulin and D50, with improvement of K  - Cr downtrended to 1s with diuresis and medical treatment.     Problem/Plan - 7:  ·  Problem: Hyperkalemia.   ·  Plan: resolved.     Problem/Plan - 8:  ·  Problem: Elevated troponin level.   ·  Plan: - initital trops 42 - 44  - cardiology following  - recent echo with grade I DD  - c/w diuresis, likely type II NSTEMI.     Problem/Plan - 9:  ·  Problem: T2DM (type 2 diabetes mellitus).   ·  Plan: - a1c 6.9 11/2021  - c/w AISS premeal and qHS  - CC diet.     Problem/Plan - 10:  ·  Problem: Prophylactic measure.   ·  Plan; - DVT PPx: HSQ  - Dispo: Transfer to RCU 85y/o F w/ h/o HFpEF, obesity, SHAD on CPAP, HTN, T2DM, COPD, lymphedema, s/p fall and knee fracture p/w AMS and facial droop, MICU consulted for increased work of breathing on Bipap and elevated pCO2, found with hypercapnic respiratory failure c/b AMS improved on BIPAP.      Problem/Plan - 1:  ·  Problem: Acute on chronic respiratory failure with hypercapnia.   ·  Plan: - acute on chronic hypercapnic respiratory failure likely in setting of COPD/SHAD overlap exacerbation with possible PNA on CXR, HFpEF exacerbation given fluid overload on imaging (MICU POCUS with b-lines)  - HFpEF: c/w lasix, strict Is/OS. cards following recent echo with HFpEF and grade I DD  - COPD exacerbation: c/w AVAPs qHS. will consider steroids as well. c/w duonebs q6. c/w azithro. Pulmonology c/s 12/4: AVAPs settings adjusted but persistently hypoxic  - PNA: c/w zosyn and azithro for now    ***RRT called AM of 12/6, for ams 2/2 severe hypoxia (40-50s) d/t bipap being disconnected.   Discussed GOC with pts daughter, Marina, at the time, who wanted everything to be done, including intubation if needed. Pt is FULL CODE.    Transferred to RCU from Medicine 12/6     Problem/Plan - 2:  ·  Problem: AMS (altered mental status).   ·  Plan: Resolved w/improved resp status  - likely 2/2 acute on chronic hypercapnic respiratory failure  - neuro following; f/u CT head, other recs appreciated c.w statin  - recent echo wnl.     Problem/Plan - 3:  ·  Problem: Chronic heart failure with preserved ejection fraction (HFpEF).   ·  Plan: -recent echo with HFpEF and grade I DD  - s/p lasix BID 12/3  - c/w lasix 40 IV BID for now  - strict Is/Os monitor output  - cards following: recent echo with grade 1 diastolic dysfunction  - c/w lasix  - c/w metoprolol, amlodipine, statin. on home bumex but IV lasix inpatient, f/u Is/Os and cardiology recs.     Problem/Plan - 4:  ·  Problem: SHAD and COPD overlap syndrome.   ·  Plan: - c/w AVAPS qHS and PRN for AMS  - NC during day to maintain O2 sat 89-92 given COPD  - c/w duonebs standing, azithro, zosyn for possible PNA  - pulmonary c/s: AVAPS adjusted.     Problem/Plan - 5:  ·  Problem: Pneumonia.   ·  Plan: - c/w zosyn, azithro for now  - monitor JOSEPH on zosyn  - f/u pulmonary recs.     Problem/Plan - 6:  ·  Problem: Acute-on-chronic kidney injury.   ·  Plan: - Cr initially elevated to 2s, baseline in 1s  - workup: renal lytes FeUrea c/w intrinsic renal disease, making good outputs -1.9 L O/N 12/4-12/5. Check UA and renal/bladder US  - suspect JOSEPH due to underlying medical condition: HFpEF exacerbation and fluid overload worsening renal perfusion. Now improved s/p diuresis and oxygenation on AVAPs  - hyperkalemia was treated with insulin and D50, with improvement of K  - Cr downtrended to 1s with diuresis and medical treatment.     Problem/Plan - 7:  ·  Problem: Hyperkalemia.   ·  Plan: resolved.     Problem/Plan - 8:  ·  Problem: Elevated troponin level.   ·  Plan: - initital trops 42 - 44  - cardiology following  - recent echo with grade I DD  - c/w diuresis, likely type II NSTEMI.     Problem/Plan - 9:  ·  Problem: T2DM (type 2 diabetes mellitus).   ·  Plan: - a1c 6.9 11/2021  - c/w AISS premeal and qHS  - CC diet.     Problem/Plan - 10:  ·  Problem: Prophylactic measure.   ·  Plan; - DVT PPx: HSQ  - Dispo: To be determined 87y/o F w/ h/o HFpEF, obesity, SHAD on CPAP, HTN, T2DM, COPD, lymphedema, s/p fall and knee fracture p/w AMS and facial droop, MICU consulted for increased work of breathing on Bipap and elevated pCO2, found with hypercapnic respiratory failure c/b AMS improved on BIPAP.      Problem/Plan - 1:  ·  Problem: Acute on chronic respiratory failure with hypercapnia.   ·  Plan: - acute on chronic hypercapnic respiratory failure likely in setting of COPD/SHAD overlap exacerbation with possible PNA on CXR, HFpEF exacerbation given fluid overload on imaging (MICU POCUS with b-lines)  - HFpEF: c/w lasix, strict Is/OS. cards following recent echo with HFpEF and grade I DD  - COPD exacerbation: c/w AVAPs qHS. will consider steroids as well. c/w duonebs q6. c/w azithro. Pulmonology c/s 12/4: AVAPs settings adjusted but persistently hypoxic  - PNA: c/w zosyn. S/P Azithromycin    ***RRT called AM of 12/6, for ams 2/2 severe hypoxia (40-50s) d/t bipap being disconnected.   Discussed GOC with pts daughter, Mraina, at the time, who wanted everything to be done, including intubation if needed. Pt is FULL CODE.    Transferred to RCU from Medicine 12/6     Problem/Plan - 2:  ·  Problem: AMS (altered mental status).   ·  Plan: Resolved w/improved resp status  - likely 2/2 acute on chronic hypercapnic respiratory failure  - neuro following  - CTH negative for acute ICH or infarct  - c.w statin  - recent echo wnl.     Problem/Plan - 3:  ·  Problem: Chronic heart failure with preserved ejection fraction (HFpEF).   ·  Plan: -recent echo with HFpEF and grade I DD  - s/p lasix BID 12/3  - c/w lasix 40 IV BID for now  - strict Is/Os monitor output  - cards following: recent echo with grade 1 diastolic dysfunction  - c/w lasix with plan to transition to PO  - c/w metoprolol, amlodipine, statin. on home bumex but IV lasix inpatient, f/u Is/O  - Cardiology recs appreciated     Problem/Plan - 4:  ·  Problem: SHAD and COPD overlap syndrome.   ·  Plan: - c/w AVAPS qHS and PRN for AMS  - NC during day to maintain O2 sat 88-92 given COPD  - c/w duonebs standing,  zosyn for possible PNA     Problem/Plan - 5:  ·  Problem: Pneumonia.   ·  Plan: - c/w zosyn,   - s/p Azithromycin  - monitor JOSEPH on zosyn  - f/u pulmonary recs.     Problem/Plan - 6:  ·  Problem: Acute-on-chronic kidney injury.   ·  Plan: - Cr initially elevated to 2s, baseline in 1s  - workup: renal lytes FeUrea c/w intrinsic renal disease, making good outputs -1.9 L O/N 12/4-12/5. Check UA and renal/bladder US  - suspect JOSEPH due to underlying medical condition: HFpEF exacerbation and fluid overload worsening renal perfusion. Now improved s/p diuresis and oxygenation on AVAPs  - hyperkalemia was treated with insulin and D50, with improvement of K  - Cr downtrended to 1s with diuresis and medical treatment.     Problem/Plan - 7:  ·  Problem: Hyperkalemia.   ·  Plan: resolved.     Problem/Plan - 8:  ·  Problem: Elevated troponin level.   ·  Plan: - initital trops 42 - 44  - cardiology following  - recent echo with grade I DD  - c/w diuresis, likely type II NSTEMI.     Problem/Plan - 9:  ·  Problem: T2DM (type 2 diabetes mellitus).   ·  Plan: - a1c 6.9 11/2021  - c/w AISS premeal and qHS  - CC diet.     Problem/Plan - 10:  ·  Problem: Prophylactic measure.   ·  Plan; - DVT PPx: HSQ  - Dispo: To be determined

## 2021-12-07 NOTE — SWALLOW BEDSIDE ASSESSMENT ADULT - SPECIFY REASON(S)
to assess swallow function
to assess oropharyngeal swallow mechanism
assess oropharyngeal swallow mechanism

## 2021-12-07 NOTE — PROGRESS NOTE ADULT - SUBJECTIVE AND OBJECTIVE BOX
CARDIOLOGY FOLLOW UP - Dr. Bermudez  DATE OF SERVICE: 12/7/21    CC no cp or sob       REVIEW OF SYSTEMS:  CONSTITUTIONAL: No fever, weight loss, or fatigue  RESPIRATORY: No cough, wheezing, chills or hemoptysis; No Shortness of Breath  CARDIOVASCULAR: No chest pain, palpitations, passing out, dizziness, or leg swelling  GASTROINTESTINAL: No abdominal or epigastric pain. No nausea, vomiting, or hematemesis; No diarrhea or constipation. No melena or hematochezia.      PHYSICAL EXAM:  T(C): 36.7 (12-07-21 @ 10:00), Max: 37.2 (12-06-21 @ 13:40)  HR: 77 (12-07-21 @ 11:16) (61 - 92)  BP: 128/62 (12-07-21 @ 10:00) (119/54 - 136/60)  RR: 18 (12-07-21 @ 10:00) (17 - 20)  SpO2: 97% (12-07-21 @ 11:16) (93% - 100%)  Wt(kg): --  I&O's Summary    06 Dec 2021 07:01  -  07 Dec 2021 07:00  --------------------------------------------------------  IN: 0 mL / OUT: 600 mL / NET: -600 mL        Appearance: Normal	  Cardiovascular: Normal S1 S2,RRR, No JVD, No murmurs  Respiratory:  diminished 	  Gastrointestinal:  Soft, Non-tender, + BS	  Extremities:  edema  +    Home Medications:  Advair Diskus 250 mcg-50 mcg inhalation powder: 1 puff(s) inhaled 2 times a day (23 Nov 2021 14:10)  alendronate 70 mg oral tablet: 1 tab(s) orally once a week (23 Nov 2021 14:10)  ascorbic acid 500 mg oral capsule: 1 cap(s) orally once a day (30 Oct 2019 16:54)  azelastine 137 mcg/inh (0.1%) nasal spray: 2 spray(s) nasal 2 times a day (30 Oct 2019 16:54)  glimepiride 2 mg oral tablet: 1 tab(s) orally once a day (30 Oct 2019 16:54)  ipratropium-albuterol 0.5 mg-2.5 mg/3 mLinhalation solution: 3 milliliter(s) inhaled every 6 hours, As Needed (04 Jun 2021 10:05)  montelukast 10 mg oral tablet: 1 tab(s) orally once a day (30 Oct 2019 16:54)  Neurontin 100 mg oral capsule: 1 cap(s) orally 3 times a day (29 Nov 2020 18:19)  ocular lubricant ophthalmic solution: 1 drop(s) to each affected eye 3 times a day, As needed, Dry Eyes (06 Nov 2019 13:41)  ProAir HFA 90 mcg/inh inhalation aerosol: 2 puff(s) inhaled 4 times a day, As Needed (23 Nov 2021 14:10)      MEDICATIONS  (STANDING):  albuterol/ipratropium for Nebulization 3 milliLiter(s) Nebulizer every 6 hours  amLODIPine   Tablet 10 milliGRAM(s) Oral daily  atorvastatin 40 milliGRAM(s) Oral at bedtime  budesonide 160 MICROgram(s)/formoterol 4.5 MICROgram(s) Inhaler 2 Puff(s) Inhalation two times a day  dextrose 40% Gel 15 Gram(s) Oral once  dextrose 5%. 1000 milliLiter(s) (50 mL/Hr) IV Continuous <Continuous>  dextrose 5%. 1000 milliLiter(s) (100 mL/Hr) IV Continuous <Continuous>  dextrose 50% Injectable 25 Gram(s) IV Push once  dextrose 50% Injectable 12.5 Gram(s) IV Push once  dextrose 50% Injectable 25 Gram(s) IV Push once  furosemide   Injectable 40 milliGRAM(s) IV Push two times a day  gabapentin 100 milliGRAM(s) Oral three times a day  glucagon  Injectable 1 milliGRAM(s) IntraMuscular once  heparin   Injectable 7500 Unit(s) SubCutaneous every 8 hours  hydrALAZINE 100 milliGRAM(s) Oral three times a day  insulin lispro (ADMELOG) corrective regimen sliding scale   SubCutaneous three times a day before meals  insulin lispro (ADMELOG) corrective regimen sliding scale   SubCutaneous at bedtime  methylPREDNISolone sodium succinate Injectable 40 milliGRAM(s) IV Push daily  metoprolol succinate  milliGRAM(s) Oral daily  montelukast 10 milliGRAM(s) Oral daily  piperacillin/tazobactam IVPB.. 3.375 Gram(s) IV Intermittent every 8 hours  polyethylene glycol 3350 17 Gram(s) Oral daily  senna 2 Tablet(s) Oral at bedtime      TELEMETRY: 	    ECG:  	  RADIOLOGY: < from: Xray Chest 1 View-PORTABLE IMMEDIATE (Xray Chest 1 View-PORTABLE IMMEDIATE .) (12.06.21 @ 11:07) >  INTERPRETATION:    Pacer pad overlies the right hemithorax.    The heart appears enlarged despite projection but stable. Vascular congestion is present but no effusions or acute airspace opacities to indicate pulmonary edema.      COMPARISON:  December 4, 2021      IMPRESSION:  Mild vascular congestion and cardiomegaly but no acute pulmonary pathology.    --- End of Report ---          < end of copied text >    DIAGNOSTIC TESTING:  [ ] Echocardiogram:  [ ]  Catheterization:  [ ] Stress Test:    OTHER: 	    LABS:	 	    Troponin T, High Sensitivity Result: 44 ng/L (12-02 @ 14:36)  Troponin T, High Sensitivity Result: 42 ng/L (12-02 @ 12:21)                          9.6    8.62  )-----------( 365      ( 07 Dec 2021 07:06 )             33.1     12-07    145  |  95<L>  |  42<H>  ----------------------------<  92  4.3   |  41<H>  |  1.34<H>    Ca    9.6      07 Dec 2021 07:06  Phos  4.6     12-07  Mg     2.80     12-07

## 2021-12-07 NOTE — SWALLOW BEDSIDE ASSESSMENT ADULT - ASR SWALLOW RECOMMEND DIAG
Cinesophagram NOT indicated due to clinical presentation as described
Cinesophagram NOT indicated due to overt signs and symptoms of laryngeal penetration/aspiration

## 2021-12-07 NOTE — PROGRESS NOTE ADULT - ASSESSMENT
Echo 11/19/21: EF 60-65%, nl lv sys fx, mild AS, mild Ms   Echo 6/2/21: Min MR, grossly nl lv sys fx   Echo 12/5/20: grossly nl lv sys fx    a/p  5 y/o female w pmhx of HFpEF, obesity, SHAD on CPAP, HTN, T2DM, COPD, lymphedema, s/p fall and knee fracture s/p  knee surgery, presents to the ED with AMS and facial droop.    #AMS  -in setting of hypercapnic resp failure  -s/p RRT 12/6 for hypoxia, AMS- pt noted to be connected incorrectly to biPAP. Xray - No PTX. BiPAP circuit was fixed and  mental status/ sat improved to 100%.   -now NC   -in RCU- management per RCU team  -repeat chest xray noted- c/w diuretics     #Hypercapnic respiratory failure, hypoxic resp failure  -some component of pulmonary edema  -bipap per primary team  -continue with effective iv diuresis    #acute/Chronic Diastolic CHF  -pt w chronic Lymphedema  -volume status conitnue to improve   -chest xray 12.6 with Mild vascular congestion   -creat elevated- now on NC -- will consider change lasix to PO tomorow   -s/p bipap per pulm/med  -recent Echo w grossly nl lv sys fx   -cont bb     #HTN  -stable  -cont current meds     dvt ppx

## 2021-12-08 LAB
ALBUMIN SERPL ELPH-MCNC: 3.8 G/DL — SIGNIFICANT CHANGE UP (ref 3.3–5)
ALP SERPL-CCNC: 56 U/L — SIGNIFICANT CHANGE UP (ref 40–120)
ALT FLD-CCNC: 39 U/L — HIGH (ref 4–33)
ANION GAP SERPL CALC-SCNC: 10 MMOL/L — SIGNIFICANT CHANGE UP (ref 7–14)
AST SERPL-CCNC: 30 U/L — SIGNIFICANT CHANGE UP (ref 4–32)
BASOPHILS # BLD AUTO: 0.01 K/UL — SIGNIFICANT CHANGE UP (ref 0–0.2)
BASOPHILS NFR BLD AUTO: 0.1 % — SIGNIFICANT CHANGE UP (ref 0–2)
BILIRUB SERPL-MCNC: 0.4 MG/DL — SIGNIFICANT CHANGE UP (ref 0.2–1.2)
BUN SERPL-MCNC: 46 MG/DL — HIGH (ref 7–23)
CALCIUM SERPL-MCNC: 8.9 MG/DL — SIGNIFICANT CHANGE UP (ref 8.4–10.5)
CHLORIDE SERPL-SCNC: 95 MMOL/L — LOW (ref 98–107)
CO2 SERPL-SCNC: 39 MMOL/L — HIGH (ref 22–31)
CREAT SERPL-MCNC: 1.29 MG/DL — SIGNIFICANT CHANGE UP (ref 0.5–1.3)
EOSINOPHIL # BLD AUTO: 0.01 K/UL — SIGNIFICANT CHANGE UP (ref 0–0.5)
EOSINOPHIL NFR BLD AUTO: 0.1 % — SIGNIFICANT CHANGE UP (ref 0–6)
GLUCOSE BLDC GLUCOMTR-MCNC: 177 MG/DL — HIGH (ref 70–99)
GLUCOSE BLDC GLUCOMTR-MCNC: 241 MG/DL — HIGH (ref 70–99)
GLUCOSE BLDC GLUCOMTR-MCNC: 260 MG/DL — HIGH (ref 70–99)
GLUCOSE BLDC GLUCOMTR-MCNC: 97 MG/DL — SIGNIFICANT CHANGE UP (ref 70–99)
GLUCOSE SERPL-MCNC: 102 MG/DL — HIGH (ref 70–99)
HCT VFR BLD CALC: 36.6 % — SIGNIFICANT CHANGE UP (ref 34.5–45)
HGB BLD-MCNC: 10.4 G/DL — LOW (ref 11.5–15.5)
IANC: 6.65 K/UL — SIGNIFICANT CHANGE UP (ref 1.5–8.5)
IMM GRANULOCYTES NFR BLD AUTO: 0.2 % — SIGNIFICANT CHANGE UP (ref 0–1.5)
LYMPHOCYTES # BLD AUTO: 1.88 K/UL — SIGNIFICANT CHANGE UP (ref 1–3.3)
LYMPHOCYTES # BLD AUTO: 20.3 % — SIGNIFICANT CHANGE UP (ref 13–44)
MAGNESIUM SERPL-MCNC: 2.7 MG/DL — HIGH (ref 1.6–2.6)
MCHC RBC-ENTMCNC: 26.1 PG — LOW (ref 27–34)
MCHC RBC-ENTMCNC: 28.4 GM/DL — LOW (ref 32–36)
MCV RBC AUTO: 91.7 FL — SIGNIFICANT CHANGE UP (ref 80–100)
MONOCYTES # BLD AUTO: 0.68 K/UL — SIGNIFICANT CHANGE UP (ref 0–0.9)
MONOCYTES NFR BLD AUTO: 7.4 % — SIGNIFICANT CHANGE UP (ref 2–14)
NEUTROPHILS # BLD AUTO: 6.65 K/UL — SIGNIFICANT CHANGE UP (ref 1.8–7.4)
NEUTROPHILS NFR BLD AUTO: 71.9 % — SIGNIFICANT CHANGE UP (ref 43–77)
NRBC # BLD: 0 /100 WBCS — SIGNIFICANT CHANGE UP
NRBC # FLD: 0 K/UL — SIGNIFICANT CHANGE UP
PHOSPHATE SERPL-MCNC: 4.8 MG/DL — HIGH (ref 2.5–4.5)
PLATELET # BLD AUTO: 322 K/UL — SIGNIFICANT CHANGE UP (ref 150–400)
POTASSIUM SERPL-MCNC: 4.9 MMOL/L — SIGNIFICANT CHANGE UP (ref 3.5–5.3)
POTASSIUM SERPL-SCNC: 4.9 MMOL/L — SIGNIFICANT CHANGE UP (ref 3.5–5.3)
PROT SERPL-MCNC: 8.1 G/DL — SIGNIFICANT CHANGE UP (ref 6–8.3)
RBC # BLD: 3.99 M/UL — SIGNIFICANT CHANGE UP (ref 3.8–5.2)
RBC # FLD: 16.2 % — HIGH (ref 10.3–14.5)
SODIUM SERPL-SCNC: 144 MMOL/L — SIGNIFICANT CHANGE UP (ref 135–145)
WBC # BLD: 9.25 K/UL — SIGNIFICANT CHANGE UP (ref 3.8–10.5)
WBC # FLD AUTO: 9.25 K/UL — SIGNIFICANT CHANGE UP (ref 3.8–10.5)

## 2021-12-08 PROCEDURE — 99233 SBSQ HOSP IP/OBS HIGH 50: CPT | Mod: GC

## 2021-12-08 RX ORDER — FUROSEMIDE 40 MG
40 TABLET ORAL DAILY
Refills: 0 | Status: DISCONTINUED | OUTPATIENT
Start: 2021-12-09 | End: 2021-12-09

## 2021-12-08 RX ORDER — CHLORHEXIDINE GLUCONATE 213 G/1000ML
1 SOLUTION TOPICAL DAILY
Refills: 0 | Status: DISCONTINUED | OUTPATIENT
Start: 2021-12-08 | End: 2021-12-17

## 2021-12-08 RX ORDER — ACETAMINOPHEN 500 MG
650 TABLET ORAL EVERY 6 HOURS
Refills: 0 | Status: DISCONTINUED | OUTPATIENT
Start: 2021-12-08 | End: 2021-12-17

## 2021-12-08 RX ADMIN — Medication 3 MILLILITER(S): at 05:05

## 2021-12-08 RX ADMIN — Medication 2: at 17:13

## 2021-12-08 RX ADMIN — Medication 100 MILLIGRAM(S): at 21:26

## 2021-12-08 RX ADMIN — HEPARIN SODIUM 7500 UNIT(S): 5000 INJECTION INTRAVENOUS; SUBCUTANEOUS at 06:38

## 2021-12-08 RX ADMIN — PIPERACILLIN AND TAZOBACTAM 25 GRAM(S): 4; .5 INJECTION, POWDER, LYOPHILIZED, FOR SOLUTION INTRAVENOUS at 06:48

## 2021-12-08 RX ADMIN — POLYETHYLENE GLYCOL 3350 17 GRAM(S): 17 POWDER, FOR SOLUTION ORAL at 11:21

## 2021-12-08 RX ADMIN — ATORVASTATIN CALCIUM 40 MILLIGRAM(S): 80 TABLET, FILM COATED ORAL at 21:26

## 2021-12-08 RX ADMIN — GABAPENTIN 100 MILLIGRAM(S): 400 CAPSULE ORAL at 06:38

## 2021-12-08 RX ADMIN — Medication 650 MILLIGRAM(S): at 12:10

## 2021-12-08 RX ADMIN — Medication 100 MILLIGRAM(S): at 14:17

## 2021-12-08 RX ADMIN — Medication 40 MILLIGRAM(S): at 06:44

## 2021-12-08 RX ADMIN — HEPARIN SODIUM 7500 UNIT(S): 5000 INJECTION INTRAVENOUS; SUBCUTANEOUS at 21:25

## 2021-12-08 RX ADMIN — BUDESONIDE AND FORMOTEROL FUMARATE DIHYDRATE 2 PUFF(S): 160; 4.5 AEROSOL RESPIRATORY (INHALATION) at 21:27

## 2021-12-08 RX ADMIN — Medication 40 MILLIGRAM(S): at 06:40

## 2021-12-08 RX ADMIN — SENNA PLUS 2 TABLET(S): 8.6 TABLET ORAL at 21:29

## 2021-12-08 RX ADMIN — Medication 3 MILLILITER(S): at 21:28

## 2021-12-08 RX ADMIN — Medication 3 MILLILITER(S): at 17:17

## 2021-12-08 RX ADMIN — Medication 100 MILLIGRAM(S): at 06:40

## 2021-12-08 RX ADMIN — MONTELUKAST 10 MILLIGRAM(S): 4 TABLET, CHEWABLE ORAL at 11:19

## 2021-12-08 RX ADMIN — GABAPENTIN 100 MILLIGRAM(S): 400 CAPSULE ORAL at 14:16

## 2021-12-08 RX ADMIN — AMLODIPINE BESYLATE 10 MILLIGRAM(S): 2.5 TABLET ORAL at 06:40

## 2021-12-08 RX ADMIN — Medication 100 MILLIGRAM(S): at 06:48

## 2021-12-08 RX ADMIN — GABAPENTIN 100 MILLIGRAM(S): 400 CAPSULE ORAL at 21:25

## 2021-12-08 RX ADMIN — BUDESONIDE AND FORMOTEROL FUMARATE DIHYDRATE 2 PUFF(S): 160; 4.5 AEROSOL RESPIRATORY (INHALATION) at 10:09

## 2021-12-08 RX ADMIN — Medication 3 MILLILITER(S): at 10:10

## 2021-12-08 RX ADMIN — Medication 650 MILLIGRAM(S): at 11:19

## 2021-12-08 RX ADMIN — CHLORHEXIDINE GLUCONATE 1 APPLICATION(S): 213 SOLUTION TOPICAL at 14:19

## 2021-12-08 RX ADMIN — HEPARIN SODIUM 7500 UNIT(S): 5000 INJECTION INTRAVENOUS; SUBCUTANEOUS at 14:16

## 2021-12-08 RX ADMIN — Medication 3: at 12:04

## 2021-12-08 NOTE — PROGRESS NOTE ADULT - ASSESSMENT
85y/o F w/ h/o HFpEF, obesity, SHAD on CPAP, HTN, T2DM, COPD, lymphedema, s/p fall and knee fracture p/w AMS and facial droop, MICU consulted for increased work of breathing on Bipap and elevated pCO2, found with hypercapnic respiratory failure c/b AMS improved on BIPAP.      Problem/Plan - 1:  ·  Problem: Acute on chronic respiratory failure with hypercapnia.   ·  Plan: - acute on chronic hypercapnic respiratory failure likely in setting of COPD/SHAD overlap exacerbation with possible PNA on CXR, HFpEF exacerbation given fluid overload on imaging (MICU POCUS with b-lines)  - HFpEF: c/w lasix, strict Is/OS. cards following recent echo with HFpEF and grade I DD  - COPD exacerbation: c/w AVAPs qHS. will consider steroids as well. c/w duonebs q6. c/w azithro. Pulmonology c/s 12/4: AVAPs settings adjusted but persistently hypoxic  - PNA: c/w zosyn. S/P Azithromycin    ***RRT called AM of 12/6, for ams 2/2 severe hypoxia (40-50s) d/t bipap being disconnected.   Discussed GOC with pts daughter, Marina, at the time, who wanted everything to be done, including intubation if needed. Pt is FULL CODE.    Transferred to RCU from Medicine 12/6     Problem/Plan - 2:  ·  Problem: AMS (altered mental status).   ·  Plan: Resolved w/improved resp status  - likely 2/2 acute on chronic hypercapnic respiratory failure  - neuro following  - CTH negative for acute ICH or infarct  - c.w statin  - recent echo wnl.     Problem/Plan - 3:  ·  Problem: Chronic heart failure with preserved ejection fraction (HFpEF).   ·  Plan: -recent echo with HFpEF and grade I DD  - s/p lasix BID 12/3  - c/w lasix 40 IV BID for now  - strict Is/Os monitor output  - cards following: recent echo with grade 1 diastolic dysfunction  - c/w lasix with plan to transition to PO  - c/w metoprolol, amlodipine, statin. on home bumex but IV lasix inpatient, f/u Is/O  - Cardiology recs appreciated     Problem/Plan - 4:  ·  Problem: SHAD and COPD overlap syndrome.   ·  Plan: - c/w AVAPS qHS and PRN for AMS  - NC during day to maintain O2 sat 88-92 given COPD  - c/w duonebs standing,  zosyn for possible PNA     Problem/Plan - 5:  ·  Problem: Pneumonia.   ·  Plan: - c/w zosyn,   - s/p Azithromycin  - monitor JOSEPH on zosyn  - f/u pulmonary recs.     Problem/Plan - 6:  ·  Problem: Acute-on-chronic kidney injury.   ·  Plan: - Cr initially elevated to 2s, baseline in 1s  - workup: renal lytes FeUrea c/w intrinsic renal disease, making good outputs -1.9 L O/N 12/4-12/5. Check UA and renal/bladder US  - suspect JOSEPH due to underlying medical condition: HFpEF exacerbation and fluid overload worsening renal perfusion. Now improved s/p diuresis and oxygenation on AVAPs  - hyperkalemia was treated with insulin and D50, with improvement of K  - Cr downtrended to 1s with diuresis and medical treatment.     Problem/Plan - 7:  ·  Problem: Hyperkalemia.   ·  Plan: resolved.     Problem/Plan - 8:  ·  Problem: Elevated troponin level.   ·  Plan: - initital trops 42 - 44  - cardiology following  - recent echo with grade I DD  - c/w diuresis, likely type II NSTEMI.     Problem/Plan - 9:  ·  Problem: T2DM (type 2 diabetes mellitus).   ·  Plan: - a1c 6.9 11/2021  - c/w AISS premeal and qHS  - CC diet.     Problem/Plan - 10:  ·  Problem: Prophylactic measure.   ·  Plan; - DVT PPx: HSQ  - Dispo: To be determined 85y/o F w/ h/o HFpEF, obesity, SHAD on CPAP, HTN, T2DM, COPD, lymphedema, s/p fall and knee fracture p/w AMS and facial droop, MICU consulted for increased work of breathing on Bipap and elevated pCO2, found with hypercapnic respiratory failure c/b AMS improved on BIPAP.      Problem/Plan - 1:  ·  Problem: Acute on chronic respiratory failure with hypercapnia.   ·  Plan: - acute on chronic hypercapnic respiratory failure likely in setting of COPD/SHAD overlap exacerbation with possible PNA on CXR, HFpEF exacerbation given fluid overload on imaging (MICU POCUS with b-lines)  - HFpEF: c/w lasix, strict Is/OS. cards following recent echo with HFpEF and grade I DD  - COPD exacerbation: c/w AVAPs qHS. will consider steroids as well. c/w duonebs q6. c/w azithro. Pulmonology c/s 12/4: AVAPs settings adjusted but persistently hypoxic  - PNA: c/w zosyn. S/P Azithromycin  ***RRT called AM of 12/6, for ams 2/2 severe hypoxia (40-50s) d/t bipap being disconnected.   Discussed GOC with pts daughter, Marina, at the time, who wanted everything to be done, including intubation if needed. Pt is FULL CODE.  Transferred to RCU from Medicine 12/6     Problem/Plan - 2:  ·  Problem: AMS (altered mental status).   ·  Plan: Resolved w/improved resp status  - likely 2/2 acute on chronic hypercapnic respiratory failure  - neuro following  - CTH negative for acute ICH or infarct  - c.w statin  - recent echo wnl.     Problem/Plan - 3:  ·  Problem: Chronic heart failure with preserved ejection fraction (HFpEF).   ·  Plan: -recent echo with HFpEF and grade I DD  - s/p lasix BID 12/3  - c/w lasix 40 IV BID for now  - strict Is/Os monitor output  - cards following: recent echo with grade 1 diastolic dysfunction  - c/w lasix with plan to transition to PO  - c/w metoprolol, amlodipine, statin. on home bumex but IV lasix inpatient, f/u Is/O  - Cardiology recs appreciated  - 12/8/2021 IV Lasix 40 mg bid discontinued, PO Lasix 40 mg QD started     Problem/Plan - 4:  ·  Problem: SHAD and COPD overlap syndrome.   ·  Plan: - c/w AVAPS qHS and PRN for AMS  - NC during day to maintain O2 sat 88-92 given COPD  - c/w duonebs standing,  zosyn for possible PNA  - 12/8/2021: s/p 5 days of IV Solu-medrol. No further steroids indicated at this time.      Problem/Plan - 5:  ·  Problem: Pneumonia.   ·  Plan: - c/w zosyn,   - s/p Azithromycin  - monitor JOSEPH on zosyn  - f/u pulmonary recs.     Problem/Plan - 6:  ·  Problem: Acute-on-chronic kidney injury.   ·  Plan: - Cr initially elevated to 2s, baseline in 1s  - workup: renal lytes FeUrea c/w intrinsic renal disease, making good outputs -1.9 L O/N 12/4-12/5. Check UA and renal/bladder US  - suspect JOSEPH due to underlying medical condition: HFpEF exacerbation and fluid overload worsening renal perfusion. Now improved s/p diuresis and oxygenation on AVAPs  - hyperkalemia was treated with insulin and D50, with improvement of K  - Cr downtrended to 1s with diuresis and medical treatment.  - improved     Problem/Plan - 7:  ·  Problem: Hyperkalemia.   ·  Plan: resolved.     Problem/Plan - 8:  ·  Problem: Elevated troponin level.   ·  Plan: - initital trops 42 - 44  - cardiology following  - recent echo with grade I DD  - c/w diuresis, likely type II NSTEMI.     Problem/Plan - 9:  ·  Problem: T2DM (type 2 diabetes mellitus).   ·  Plan: - a1c 6.9 11/2021  - c/w AISS premeal and qHS  - CC diet.     Problem/Plan - 10:  ·  Problem: Prophylactic measure.   ·  Plan; - DVT PPx: HSQ  - Dispo: To be determined

## 2021-12-08 NOTE — PROGRESS NOTE ADULT - SUBJECTIVE AND OBJECTIVE BOX
CARDIOLOGY FOLLOW UP - Dr. Bermudez  DATE OF SERVICE: 12/8/21    CC no cp or sob      REVIEW OF SYSTEMS:  CONSTITUTIONAL: No fever, weight loss, or fatigue  RESPIRATORY: No cough, wheezing, chills or hemoptysis; No Shortness of Breath  CARDIOVASCULAR: No chest pain, palpitations, passing out, dizziness, or leg swelling  GASTROINTESTINAL: No abdominal or epigastric pain. No nausea, vomiting, or hematemesis; No diarrhea or constipation. No melena or hematochezia.  VASCULAR: No edema     PHYSICAL EXAM:  T(C): 36.2 (12-08-21 @ 06:33), Max: 36.9 (12-07-21 @ 16:25)  HR: 79 (12-08-21 @ 10:08) (64 - 90)  BP: 137/71 (12-08-21 @ 06:33) (133/57 - 137/71)  RR: 19 (12-08-21 @ 06:33) (18 - 20)  SpO2: 100% (12-08-21 @ 10:08) (96% - 100%)  Wt(kg): --  I&O's Summary    07 Dec 2021 07:01  -  08 Dec 2021 07:00  --------------------------------------------------------  IN: 0 mL / OUT: 1000 mL / NET: -1000 mL        Appearance: Normal	  Cardiovascular: Normal S1 S2,RRR, No JVD, No murmurs  Respiratory: Lungs clear to auscultation	  Gastrointestinal:  Soft, Non-tender, + BS	  Extremities: Normal range of motion, No clubbing, cyanosis or edema      Home Medications:  Advair Diskus 250 mcg-50 mcg inhalation powder: 1 puff(s) inhaled 2 times a day (23 Nov 2021 14:10)  alendronate 70 mg oral tablet: 1 tab(s) orally once a week (23 Nov 2021 14:10)  ascorbic acid 500 mg oral capsule: 1 cap(s) orally once a day (30 Oct 2019 16:54)  azelastine 137 mcg/inh (0.1%) nasal spray: 2 spray(s) nasal 2 times a day (30 Oct 2019 16:54)  glimepiride 2 mg oral tablet: 1 tab(s) orally once a day (30 Oct 2019 16:54)  ipratropium-albuterol 0.5 mg-2.5 mg/3 mLinhalation solution: 3 milliliter(s) inhaled every 6 hours, As Needed (04 Jun 2021 10:05)  montelukast 10 mg oral tablet: 1 tab(s) orally once a day (30 Oct 2019 16:54)  Neurontin 100 mg oral capsule: 1 cap(s) orally 3 times a day (29 Nov 2020 18:19)  ocular lubricant ophthalmic solution: 1 drop(s) to each affected eye 3 times a day, As needed, Dry Eyes (06 Nov 2019 13:41)  ProAir HFA 90 mcg/inh inhalation aerosol: 2 puff(s) inhaled 4 times a day, As Needed (23 Nov 2021 14:10)      MEDICATIONS  (STANDING):  albuterol/ipratropium for Nebulization 3 milliLiter(s) Nebulizer every 6 hours  amLODIPine   Tablet 10 milliGRAM(s) Oral daily  atorvastatin 40 milliGRAM(s) Oral at bedtime  budesonide 160 MICROgram(s)/formoterol 4.5 MICROgram(s) Inhaler 2 Puff(s) Inhalation two times a day  dextrose 40% Gel 15 Gram(s) Oral once  dextrose 5%. 1000 milliLiter(s) (50 mL/Hr) IV Continuous <Continuous>  dextrose 5%. 1000 milliLiter(s) (100 mL/Hr) IV Continuous <Continuous>  dextrose 50% Injectable 25 Gram(s) IV Push once  dextrose 50% Injectable 12.5 Gram(s) IV Push once  dextrose 50% Injectable 25 Gram(s) IV Push once  gabapentin 100 milliGRAM(s) Oral three times a day  glucagon  Injectable 1 milliGRAM(s) IntraMuscular once  heparin   Injectable 7500 Unit(s) SubCutaneous every 8 hours  hydrALAZINE 100 milliGRAM(s) Oral three times a day  insulin lispro (ADMELOG) corrective regimen sliding scale   SubCutaneous three times a day before meals  insulin lispro (ADMELOG) corrective regimen sliding scale   SubCutaneous at bedtime  metoprolol succinate  milliGRAM(s) Oral daily  montelukast 10 milliGRAM(s) Oral daily  polyethylene glycol 3350 17 Gram(s) Oral daily  senna 2 Tablet(s) Oral at bedtime      TELEMETRY: 	    ECG:  	  RADIOLOGY:   DIAGNOSTIC TESTING:  [ ] Echocardiogram:  [ ]  Catheterization:  [ ] Stress Test:    OTHER: 	    LABS:	 	    Troponin T, High Sensitivity Result: 44 ng/L (12-02 @ 14:36)  Troponin T, High Sensitivity Result: 42 ng/L (12-02 @ 12:21)                          10.4   9.25  )-----------( 322      ( 08 Dec 2021 06:42 )             36.6     12-08    144  |  95<L>  |  46<H>  ----------------------------<  102<H>  4.9   |  39<H>  |  1.29    Ca    8.9      08 Dec 2021 06:42  Phos  4.8     12-08  Mg     2.70     12-08    TPro  8.1  /  Alb  3.8  /  TBili  0.4  /  DBili  x   /  AST  30  /  ALT  39<H>  /  AlkPhos  56  12-08

## 2021-12-08 NOTE — PROGRESS NOTE ADULT - ATTENDING COMMENTS
seen and examined with acp, agree with above  doing well with nocturnal avaps  d/c steroids after today's dose  change lasix to 40 po qd  Pain control - avoid opiates if possible

## 2021-12-08 NOTE — PROGRESS NOTE ADULT - ASSESSMENT
Echo 11/19/21: EF 60-65%, nl lv sys fx, mild AS, mild Ms   Echo 6/2/21: Min MR, grossly nl lv sys fx   Echo 12/5/20: grossly nl lv sys fx    a/p  5 y/o female w pmhx of HFpEF, obesity, SHAD on CPAP, HTN, T2DM, COPD, lymphedema, s/p fall and knee fracture s/p  knee surgery, presents to the ED with AMS and facial droop.    #AMS  -in setting of hypercapnic resp failure  -s/p RRT 12/6 for hypoxia, AMS- pt noted to be connected incorrectly to biPAP. Xray - No PTX. BiPAP circuit was fixed and  mental status/ sat improved to 100%.   -now NC   -in RCU- management per RCU team  -repeat chest xray12/6  noted- c/w diuretics -- start po     #Hypercapnic respiratory failure, hypoxic resp failure  -some component of pulmonary edema  -bipap per primary team  -S/P iv diuresis- Improving- would start lasix 40 po BID     #acute/Chronic Diastolic CHF  -pt w chronic Lymphedema  -volume status conitnue to improve   -chest xray 12.6 with Mild vascular congestion   -bun rising,  now on NC -- s/p iv lasix-- start lasix PO   -s/p bipap per pulm/med  -recent Echo w grossly nl lv sys fx   -cont bb     #HTN  -stable  -cont current meds     dvt ppx

## 2021-12-08 NOTE — PROGRESS NOTE ADULT - SUBJECTIVE AND OBJECTIVE BOX
[facility]      Seen earlier today, and examined.  - Today, patient is without complaints.           *****MEDICATIONS: Current medication reviewed and documented.    MEDICATIONS  (STANDING):  albuterol/ipratropium for Nebulization 3 milliLiter(s) Nebulizer every 6 hours  amLODIPine   Tablet 10 milliGRAM(s) Oral daily  atorvastatin 40 milliGRAM(s) Oral at bedtime  budesonide 160 MICROgram(s)/formoterol 4.5 MICROgram(s) Inhaler 2 Puff(s) Inhalation two times a day  chlorhexidine 2% Cloths 1 Application(s) Topical daily  dextrose 40% Gel 15 Gram(s) Oral once  dextrose 5%. 1000 milliLiter(s) (50 mL/Hr) IV Continuous <Continuous>  dextrose 5%. 1000 milliLiter(s) (100 mL/Hr) IV Continuous <Continuous>  dextrose 50% Injectable 25 Gram(s) IV Push once  dextrose 50% Injectable 12.5 Gram(s) IV Push once  dextrose 50% Injectable 25 Gram(s) IV Push once  gabapentin 100 milliGRAM(s) Oral three times a day  glucagon  Injectable 1 milliGRAM(s) IntraMuscular once  heparin   Injectable 7500 Unit(s) SubCutaneous every 8 hours  hydrALAZINE 100 milliGRAM(s) Oral three times a day  insulin lispro (ADMELOG) corrective regimen sliding scale   SubCutaneous three times a day before meals  insulin lispro (ADMELOG) corrective regimen sliding scale   SubCutaneous at bedtime  metoprolol succinate  milliGRAM(s) Oral daily  montelukast 10 milliGRAM(s) Oral daily  polyethylene glycol 3350 17 Gram(s) Oral daily  senna 2 Tablet(s) Oral at bedtime    MEDICATIONS  (PRN):  acetaminophen     Tablet .. 650 milliGRAM(s) Oral every 6 hours PRN Mild Pain (1 - 3), Moderate Pain (4 - 6)          ***** VITAL SIGNS:  T(F): 98.6 (12-08-21 @ 13:47), Max: 98.6 (12-08-21 @ 13:47)  HR: 82 (12-08-21 @ 13:47) (64 - 90)  BP: 106/54 (12-08-21 @ 13:47) (106/54 - 137/71)  RR: 16 (12-08-21 @ 13:47) (16 - 20)  SpO2: 99% (12-08-21 @ 13:47) (96% - 100%)  Wt(kg): --  ,   I&O's Summary    07 Dec 2021 07:01  -  08 Dec 2021 07:00  --------------------------------------------------------  IN: 0 mL / OUT: 1000 mL / NET: -1000 mL             *****PHYSICAL EXAM:   alert oriented x 2 attention comprehension are fair.  Able to name, repeat.   EOmi fundi not visualized   no nystagmus VFF to confrontation  Tongue is midline  Palate elevates symmetrically   Moving all 4 ext spontaneously no drift appreciated  limited rom of the RLE due to brace   Gait not assessed.            *****LAB AND IMAGING:                        10.4   9.25  )-----------( 322      ( 08 Dec 2021 06:42 )             36.6               12-08    144  |  95<L>  |  46<H>  ----------------------------<  102<H>  4.9   |  39<H>  |  1.29    Ca    8.9      08 Dec 2021 06:42  Phos  4.8     12-08  Mg     2.70     12-08    TPro  8.1  /  Alb  3.8  /  TBili  0.4  /  DBili  x   /  AST  30  /  ALT  39<H>  /  AlkPhos  56  12-08                         [All pertinent recent Imaging/Reports reviewed]           *****A S S E S S M E N T   A N D   P L A N :  Pt is an 85 y/o woman w/ a PMH of asthma/COPD, CHF, on aspirin, HTN, lymphedema, DM2, and past surgical history of knee surgery, presents to the [facility] ED as a code stroke after home aide/daughter activated EMS due to patient's lack of responsiveness with LWK 2:30a 12/2/21. Neurological exam does not demonstrate focality. CTH noncon pending. Neurovascular imaging deferred given JOSEPH and low suspicion of LVO at this time. Patient out of window for tpa. Patient not a candidate for mechanical thrombectomy given low suspicion of LVO.    Impression: toxic metabolic encephalopathy likely in setting of acidosis and hypercapnea    Recommendations:  [ ] f/u CTH noncon when stable   [ ] a1c, lipid profile  [ ] c/w aspirin 81mg daily  [ ] c/w rosuvastatin 10mg daily  [ ] infectious and metabolic workup per primary team             pt seen and evaluated at bedside  briefly pt with recent knee surgery immobilization presents with ams, hypercapnia   now improved after bipap   obesity hypoventilation   cannot r/o sequelae of immobility   ct head neg  nonfocal exam            Thank you for allowing me to participate in the care of this patient. Will continue to follow patient periodically. Please do not hesitate to call me if you have any  questions or if there has been a change in patients neurological status     ________________  Constance Mars MD  [facility] ([facility]  [phone]      33 minutes spent on total encounter; more than 50 % of the visit was  spent counseling about plan of care, compliance to diet/exercise and medication regimen and or  coordinating care by the attending physician.      It is advised that stroke patients follow up with SRIDHAR Judd @ [phone] in 1- 2 weeks.   Others please follow up with Dr. Michael Nissenbaum [phone]

## 2021-12-08 NOTE — PROGRESS NOTE ADULT - SUBJECTIVE AND OBJECTIVE BOX
PULMONARY PROGRESS NOTE    Chart and meds reviewed.  Patient seen and examined.    CC:    Interval History:    All other systems reviewed and found to be negative with the exception of what has been described above.    MEDICATIONS  (STANDING):  albuterol/ipratropium for Nebulization 3 milliLiter(s) Nebulizer every 6 hours  amLODIPine   Tablet 10 milliGRAM(s) Oral daily  atorvastatin 40 milliGRAM(s) Oral at bedtime  budesonide 160 MICROgram(s)/formoterol 4.5 MICROgram(s) Inhaler 2 Puff(s) Inhalation two times a day  dextrose 40% Gel 15 Gram(s) Oral once  dextrose 5%. 1000 milliLiter(s) (50 mL/Hr) IV Continuous <Continuous>  dextrose 5%. 1000 milliLiter(s) (100 mL/Hr) IV Continuous <Continuous>  dextrose 50% Injectable 25 Gram(s) IV Push once  dextrose 50% Injectable 12.5 Gram(s) IV Push once  dextrose 50% Injectable 25 Gram(s) IV Push once  furosemide   Injectable 40 milliGRAM(s) IV Push two times a day  gabapentin 100 milliGRAM(s) Oral three times a day  glucagon  Injectable 1 milliGRAM(s) IntraMuscular once  heparin   Injectable 7500 Unit(s) SubCutaneous every 8 hours  hydrALAZINE 100 milliGRAM(s) Oral three times a day  insulin lispro (ADMELOG) corrective regimen sliding scale   SubCutaneous three times a day before meals  insulin lispro (ADMELOG) corrective regimen sliding scale   SubCutaneous at bedtime  methylPREDNISolone sodium succinate Injectable 40 milliGRAM(s) IV Push daily  metoprolol succinate  milliGRAM(s) Oral daily  montelukast 10 milliGRAM(s) Oral daily  polyethylene glycol 3350 17 Gram(s) Oral daily  senna 2 Tablet(s) Oral at bedtime    MEDICATIONS  (PRN):  oxycodone    5 mG/acetaminophen 325 mG 1 Tablet(s) Oral every 6 hours PRN Moderate Pain (4 - 6)      VITALS SIGNS:  T(F): 97.1 (12-08-21 @ 06:33), Max: 98.4 (12-07-21 @ 16:25)  HR: 75 (12-08-21 @ 07:13) (64 - 90)  BP: 137/71 (12-08-21 @ 06:33) (128/62 - 137/71)  RR: 19 (12-08-21 @ 06:33) (18 - 20)  SpO2: 100% (12-08-21 @ 07:13) (96% - 100%)  Wt(kg): --    I&O's Summary    07 Dec 2021 07:01  -  08 Dec 2021 07:00  --------------------------------------------------------  IN: 0 mL / OUT: 1000 mL / NET: -1000 mL        CAPILLARY BLOOD GLUCOSE      POCT Blood Glucose.: 287 mg/dL (07 Dec 2021 21:04)  POCT Blood Glucose.: 296 mg/dL (07 Dec 2021 16:40)  POCT Blood Glucose.: 275 mg/dL (07 Dec 2021 11:31)  POCT Blood Glucose.: 117 mg/dL (07 Dec 2021 07:55)        LABS:                            10.4   9.25  )-----------( 322      ( 08 Dec 2021 06:42 )             36.6     12-08    144  |  95<L>  |  46<H>  ----------------------------<  102<H>  4.9   |  39<H>  |  1.29    Ca    8.9      08 Dec 2021 06:42  Phos  4.8     12-08  Mg     2.70     12-08    TPro  8.1  /  Alb  3.8  /  TBili  0.4  /  DBili  x   /  AST  30  /  ALT  39<H>  /  AlkPhos  56  12-08        LIVER FUNCTIONS - ( 08 Dec 2021 06:42 )  Alb: 3.8 g/dL / Pro: 8.1 g/dL / ALK PHOS: 56 U/L / ALT: 39 U/L / AST: 30 U/L / GGT: x                                             CULTURES:       PULMONARY PROGRESS NOTE    Chart and meds reviewed.  Patient seen and examined.    CC: Patient is a 86y old  Female who presents with a chief complaint of SOB (08 Dec 2021 12:10)    Interval History: none overnight     Review of systems:  General: no fevers, chills, sweats  Respiratory: no worsening of shortness of breath  Cardiac: no chest pain or heart palpitations  Abdomen: no abdominal pain, N/V/C/D  Extremities: + right knee pain   All other systems reviewed and found to be negative with the exception of what has been described above.    MEDICATIONS  (STANDING):  albuterol/ipratropium for Nebulization 3 milliLiter(s) Nebulizer every 6 hours  amLODIPine   Tablet 10 milliGRAM(s) Oral daily  atorvastatin 40 milliGRAM(s) Oral at bedtime  budesonide 160 MICROgram(s)/formoterol 4.5 MICROgram(s) Inhaler 2 Puff(s) Inhalation two times a day  dextrose 40% Gel 15 Gram(s) Oral once  dextrose 5%. 1000 milliLiter(s) (50 mL/Hr) IV Continuous <Continuous>  dextrose 5%. 1000 milliLiter(s) (100 mL/Hr) IV Continuous <Continuous>  dextrose 50% Injectable 25 Gram(s) IV Push once  dextrose 50% Injectable 12.5 Gram(s) IV Push once  dextrose 50% Injectable 25 Gram(s) IV Push once  furosemide   Injectable 40 milliGRAM(s) IV Push two times a day  gabapentin 100 milliGRAM(s) Oral three times a day  glucagon  Injectable 1 milliGRAM(s) IntraMuscular once  heparin   Injectable 7500 Unit(s) SubCutaneous every 8 hours  hydrALAZINE 100 milliGRAM(s) Oral three times a day  insulin lispro (ADMELOG) corrective regimen sliding scale   SubCutaneous three times a day before meals  insulin lispro (ADMELOG) corrective regimen sliding scale   SubCutaneous at bedtime  methylPREDNISolone sodium succinate Injectable 40 milliGRAM(s) IV Push daily  metoprolol succinate  milliGRAM(s) Oral daily  montelukast 10 milliGRAM(s) Oral daily  polyethylene glycol 3350 17 Gram(s) Oral daily  senna 2 Tablet(s) Oral at bedtime    MEDICATIONS  (PRN):  oxycodone    5 mG/acetaminophen 325 mG 1 Tablet(s) Oral every 6 hours PRN Moderate Pain (4 - 6)      VITALS SIGNS:  T(F): 97.1 (12-08-21 @ 06:33), Max: 98.4 (12-07-21 @ 16:25)  HR: 75 (12-08-21 @ 07:13) (64 - 90)  BP: 137/71 (12-08-21 @ 06:33) (128/62 - 137/71)  RR: 19 (12-08-21 @ 06:33) (18 - 20)  SpO2: 100% (12-08-21 @ 07:13) (96% - 100%)  Wt(kg): --    I&O's Summary    07 Dec 2021 07:01  -  08 Dec 2021 07:00  --------------------------------------------------------  IN: 0 mL / OUT: 1000 mL / NET: -1000 mL        CAPILLARY BLOOD GLUCOSE      POCT Blood Glucose.: 287 mg/dL (07 Dec 2021 21:04)  POCT Blood Glucose.: 296 mg/dL (07 Dec 2021 16:40)  POCT Blood Glucose.: 275 mg/dL (07 Dec 2021 11:31)  POCT Blood Glucose.: 117 mg/dL (07 Dec 2021 07:55)        LABS:                            10.4   9.25  )-----------( 322      ( 08 Dec 2021 06:42 )             36.6     12-08    144  |  95<L>  |  46<H>  ----------------------------<  102<H>  4.9   |  39<H>  |  1.29    Ca    8.9      08 Dec 2021 06:42  Phos  4.8     12-08  Mg     2.70     12-08    TPro  8.1  /  Alb  3.8  /  TBili  0.4  /  DBili  x   /  AST  30  /  ALT  39<H>  /  AlkPhos  56  12-08        LIVER FUNCTIONS - ( 08 Dec 2021 06:42 )  Alb: 3.8 g/dL / Pro: 8.1 g/dL / ALK PHOS: 56 U/L / ALT: 39 U/L / AST: 30 U/L / GGT: x                                             CULTURES:

## 2021-12-09 LAB
ALBUMIN SERPL ELPH-MCNC: 3.5 G/DL — SIGNIFICANT CHANGE UP (ref 3.3–5)
ALP SERPL-CCNC: 54 U/L — SIGNIFICANT CHANGE UP (ref 40–120)
ALT FLD-CCNC: 29 U/L — SIGNIFICANT CHANGE UP (ref 4–33)
ANION GAP SERPL CALC-SCNC: 8 MMOL/L — SIGNIFICANT CHANGE UP (ref 7–14)
AST SERPL-CCNC: 18 U/L — SIGNIFICANT CHANGE UP (ref 4–32)
BASE EXCESS BLDA CALC-SCNC: 18.2 MMOL/L — HIGH (ref -2–3)
BASOPHILS # BLD AUTO: 0.01 K/UL — SIGNIFICANT CHANGE UP (ref 0–0.2)
BASOPHILS NFR BLD AUTO: 0.1 % — SIGNIFICANT CHANGE UP (ref 0–2)
BILIRUB SERPL-MCNC: 0.4 MG/DL — SIGNIFICANT CHANGE UP (ref 0.2–1.2)
BUN SERPL-MCNC: 48 MG/DL — HIGH (ref 7–23)
CALCIUM SERPL-MCNC: 9.1 MG/DL — SIGNIFICANT CHANGE UP (ref 8.4–10.5)
CHLORIDE SERPL-SCNC: 94 MMOL/L — LOW (ref 98–107)
CO2 BLDA-SCNC: 49 MMOL/L — HIGH (ref 19–24)
CO2 SERPL-SCNC: 40 MMOL/L — HIGH (ref 22–31)
CREAT SERPL-MCNC: 1.12 MG/DL — SIGNIFICANT CHANGE UP (ref 0.5–1.3)
D DIMER BLD IA.RAPID-MCNC: 828 NG/ML DDU — HIGH
EOSINOPHIL # BLD AUTO: 0.01 K/UL — SIGNIFICANT CHANGE UP (ref 0–0.5)
EOSINOPHIL NFR BLD AUTO: 0.1 % — SIGNIFICANT CHANGE UP (ref 0–6)
GLUCOSE BLDC GLUCOMTR-MCNC: 101 MG/DL — HIGH (ref 70–99)
GLUCOSE BLDC GLUCOMTR-MCNC: 107 MG/DL — HIGH (ref 70–99)
GLUCOSE BLDC GLUCOMTR-MCNC: 114 MG/DL — HIGH (ref 70–99)
GLUCOSE BLDC GLUCOMTR-MCNC: 161 MG/DL — HIGH (ref 70–99)
GLUCOSE SERPL-MCNC: 82 MG/DL — SIGNIFICANT CHANGE UP (ref 70–99)
HCO3 BLDA-SCNC: 46 MMOL/L — CRITICAL HIGH (ref 21–28)
HCT VFR BLD CALC: 33.8 % — LOW (ref 34.5–45)
HGB BLD-MCNC: 9.3 G/DL — LOW (ref 11.5–15.5)
IANC: 7.94 K/UL — SIGNIFICANT CHANGE UP (ref 1.5–8.5)
IMM GRANULOCYTES NFR BLD AUTO: 0.4 % — SIGNIFICANT CHANGE UP (ref 0–1.5)
LYMPHOCYTES # BLD AUTO: 2.34 K/UL — SIGNIFICANT CHANGE UP (ref 1–3.3)
LYMPHOCYTES # BLD AUTO: 21.2 % — SIGNIFICANT CHANGE UP (ref 13–44)
MAGNESIUM SERPL-MCNC: 2.8 MG/DL — HIGH (ref 1.6–2.6)
MCHC RBC-ENTMCNC: 25.8 PG — LOW (ref 27–34)
MCHC RBC-ENTMCNC: 27.5 GM/DL — LOW (ref 32–36)
MCV RBC AUTO: 93.6 FL — SIGNIFICANT CHANGE UP (ref 80–100)
MONOCYTES # BLD AUTO: 0.69 K/UL — SIGNIFICANT CHANGE UP (ref 0–0.9)
MONOCYTES NFR BLD AUTO: 6.3 % — SIGNIFICANT CHANGE UP (ref 2–14)
NEUTROPHILS # BLD AUTO: 7.94 K/UL — HIGH (ref 1.8–7.4)
NEUTROPHILS NFR BLD AUTO: 71.9 % — SIGNIFICANT CHANGE UP (ref 43–77)
NRBC # BLD: 0 /100 WBCS — SIGNIFICANT CHANGE UP
NRBC # FLD: 0 K/UL — SIGNIFICANT CHANGE UP
PCO2 BLDA: 70 MMHG — CRITICAL HIGH (ref 32–35)
PH BLDA: 7.43 — SIGNIFICANT CHANGE UP (ref 7.35–7.45)
PHOSPHATE SERPL-MCNC: 4 MG/DL — SIGNIFICANT CHANGE UP (ref 2.5–4.5)
PLATELET # BLD AUTO: 305 K/UL — SIGNIFICANT CHANGE UP (ref 150–400)
PO2 BLDA: 55 MMHG — LOW (ref 83–108)
POTASSIUM SERPL-MCNC: 4.6 MMOL/L — SIGNIFICANT CHANGE UP (ref 3.5–5.3)
POTASSIUM SERPL-SCNC: 4.6 MMOL/L — SIGNIFICANT CHANGE UP (ref 3.5–5.3)
PROT SERPL-MCNC: 7 G/DL — SIGNIFICANT CHANGE UP (ref 6–8.3)
RBC # BLD: 3.61 M/UL — LOW (ref 3.8–5.2)
RBC # FLD: 15.9 % — HIGH (ref 10.3–14.5)
SAO2 % BLDA: 89.7 % — LOW (ref 94–98)
SARS-COV-2 RNA SPEC QL NAA+PROBE: SIGNIFICANT CHANGE UP
SODIUM SERPL-SCNC: 142 MMOL/L — SIGNIFICANT CHANGE UP (ref 135–145)
WBC # BLD: 11.03 K/UL — HIGH (ref 3.8–10.5)
WBC # FLD AUTO: 11.03 K/UL — HIGH (ref 3.8–10.5)

## 2021-12-09 PROCEDURE — 71045 X-RAY EXAM CHEST 1 VIEW: CPT | Mod: 26

## 2021-12-09 PROCEDURE — 99233 SBSQ HOSP IP/OBS HIGH 50: CPT | Mod: GC

## 2021-12-09 PROCEDURE — 93970 EXTREMITY STUDY: CPT | Mod: 26

## 2021-12-09 RX ORDER — ACETAMINOPHEN 500 MG
1000 TABLET ORAL ONCE
Refills: 0 | Status: COMPLETED | OUTPATIENT
Start: 2021-12-09 | End: 2021-12-09

## 2021-12-09 RX ADMIN — CHLORHEXIDINE GLUCONATE 1 APPLICATION(S): 213 SOLUTION TOPICAL at 12:02

## 2021-12-09 RX ADMIN — Medication 1: at 17:08

## 2021-12-09 RX ADMIN — Medication 100 MILLIGRAM(S): at 05:40

## 2021-12-09 RX ADMIN — Medication 3 MILLILITER(S): at 04:29

## 2021-12-09 RX ADMIN — Medication 3 MILLILITER(S): at 23:40

## 2021-12-09 RX ADMIN — Medication 3 MILLILITER(S): at 15:19

## 2021-12-09 RX ADMIN — HEPARIN SODIUM 7500 UNIT(S): 5000 INJECTION INTRAVENOUS; SUBCUTANEOUS at 23:17

## 2021-12-09 RX ADMIN — Medication 100 MILLIGRAM(S): at 05:39

## 2021-12-09 RX ADMIN — HEPARIN SODIUM 7500 UNIT(S): 5000 INJECTION INTRAVENOUS; SUBCUTANEOUS at 05:43

## 2021-12-09 RX ADMIN — HEPARIN SODIUM 7500 UNIT(S): 5000 INJECTION INTRAVENOUS; SUBCUTANEOUS at 13:15

## 2021-12-09 RX ADMIN — Medication 3 MILLILITER(S): at 08:25

## 2021-12-09 RX ADMIN — GABAPENTIN 100 MILLIGRAM(S): 400 CAPSULE ORAL at 05:40

## 2021-12-09 RX ADMIN — Medication 40 MILLIGRAM(S): at 05:43

## 2021-12-09 RX ADMIN — AMLODIPINE BESYLATE 10 MILLIGRAM(S): 2.5 TABLET ORAL at 05:40

## 2021-12-09 RX ADMIN — Medication 400 MILLIGRAM(S): at 13:33

## 2021-12-09 RX ADMIN — Medication 1000 MILLIGRAM(S): at 14:35

## 2021-12-09 NOTE — PROGRESS NOTE ADULT - SUBJECTIVE AND OBJECTIVE BOX
[facility]      Seen earlier today, and examined.  - Today, patient is without complaints.           *****MEDICATIONS: Current medication reviewed and documented.    MEDICATIONS  (STANDING):  albuterol/ipratropium for Nebulization 3 milliLiter(s) Nebulizer every 6 hours  amLODIPine   Tablet 10 milliGRAM(s) Oral daily  atorvastatin 40 milliGRAM(s) Oral at bedtime  budesonide 160 MICROgram(s)/formoterol 4.5 MICROgram(s) Inhaler 2 Puff(s) Inhalation two times a day  chlorhexidine 2% Cloths 1 Application(s) Topical daily  dextrose 40% Gel 15 Gram(s) Oral once  dextrose 5%. 1000 milliLiter(s) (50 mL/Hr) IV Continuous <Continuous>  dextrose 5%. 1000 milliLiter(s) (100 mL/Hr) IV Continuous <Continuous>  dextrose 50% Injectable 25 Gram(s) IV Push once  dextrose 50% Injectable 12.5 Gram(s) IV Push once  dextrose 50% Injectable 25 Gram(s) IV Push once  gabapentin 100 milliGRAM(s) Oral three times a day  glucagon  Injectable 1 milliGRAM(s) IntraMuscular once  heparin   Injectable 7500 Unit(s) SubCutaneous every 8 hours  hydrALAZINE 100 milliGRAM(s) Oral three times a day  insulin lispro (ADMELOG) corrective regimen sliding scale   SubCutaneous three times a day before meals  insulin lispro (ADMELOG) corrective regimen sliding scale   SubCutaneous at bedtime  metoprolol succinate  milliGRAM(s) Oral daily  montelukast 10 milliGRAM(s) Oral daily  polyethylene glycol 3350 17 Gram(s) Oral daily  senna 2 Tablet(s) Oral at bedtime    MEDICATIONS  (PRN):  acetaminophen     Tablet .. 650 milliGRAM(s) Oral every 6 hours PRN Mild Pain (1 - 3), Moderate Pain (4 - 6)          ***** VITAL SIGNS:  T(F): 98.2 (21 @ 13:52), Max: 98.2 (21 @ 05:34)  HR: 67 (21 @ 15:19) (64 - 98)  BP: 121/52 (21 @ 13:52) (116/56 - 121/60)  RR: 18 (21 @ 13:52) (18 - 18)  SpO2: 97% (21 @ 15:19) (97% - 100%)  Wt(kg): --  ,   I&O's Summary    08 Dec 2021 07:01  -  09 Dec 2021 07:00  --------------------------------------------------------  IN: 800 mL / OUT: 500 mL / NET: 300 mL             *****PHYSICAL EXAM:   alert oriented x 2 attention comprehension are limited   Able to name, repeat.   EOmi fundi not visualized   no nystagmus VFF to confrontation  Tongue is midline  Palate elevates symmetrically   Moving all 4 ext spontaneously no drift appreciated    Gait not assessed.            *****LAB AND IMAGIN.3    11.03 )-----------( 305      ( 09 Dec 2021 06:50 )             33.8                   142  |  94<L>  |  48<H>  ----------------------------<  82  4.6   |  40<H>  |  1.12    Ca    9.1      09 Dec 2021 06:50  Phos  4.0       Mg     2.80         TPro  7.0  /  Alb  3.5  /  TBili  0.4  /  DBili  x   /  AST  18  /  ALT  29  /  AlkPhos  54                       ABG - ( 09 Dec 2021 11:47 )  pH, Arterial: 7.43  pH, Blood: x     /  pCO2: 70    /  pO2: 55    / HCO3: 46    / Base Excess: 18.2  /  SaO2: 89.7                [All pertinent recent Imaging/Reports reviewed]           *****A S S E S S M E N T   A N D   P L A N :      Pt is an 85 y/o woman w/ a PMH of asthma/COPD, CHF, on aspirin, HTN, lymphedema, DM2, and past surgical history of knee surgery, presents to the [facility] ED as a code stroke after home aide/daughter activated EMS due to patient's lack of responsiveness with LWK 2:30a 21. Neurological exam does not demonstrate focality. CTH noncon pending. Neurovascular imaging deferred given JOSEPH and low suspicion of LVO at this time. Patient out of window for tpa. Patient not a candidate for mechanical thrombectomy given low suspicion of LVO.    Impression: toxic metabolic encephalopathy likely in setting of acidosis and hypercapnea    Recommendations:  [ ] f/u CTH noncon when stable   [ ] a1c, lipid profile  [ ] c/w aspirin 81mg daily  [ ] c/w rosuvastatin 10mg daily  [ ] infectious and metabolic workup per primary team             pt seen and evaluated at bedside  briefly pt with recent knee surgery immobilization presents with ams, hypercapnia   now improved after bipap   obesity hypoventilation   cannot r/o sequelae of immobility   ct head neg  nonfocal exam        Thank you for allowing me to participate in the care of this patient. Will continue to follow patient periodically. Please do not hesitate to call me if you have any  questions or if there has been a change in patients neurological status     ________________  Constance Mars MD  [facility]  [phone]      33 minutes spent on total encounter; more than 50 % of the visit was  spent counseling about plan of care, compliance to diet/exercise and medication regimen and or  coordinating care by the attending physician.      It is advised that stroke patients follow up with SRIDHAR Judd @ [phone] in 1- 2 weeks.   Others please follow up with Dr. Michael Nissenbaum [phone]

## 2021-12-09 NOTE — PROGRESS NOTE ADULT - ASSESSMENT
Echo 11/19/21: EF 60-65%, nl lv sys fx, mild AS, mild Ms   Echo 6/2/21: Min MR, grossly nl lv sys fx   Echo 12/5/20: grossly nl lv sys fx    a/p  5 y/o female w pmhx of HFpEF, obesity, SHAD on CPAP, HTN, T2DM, COPD, lymphedema, s/p fall and knee fracture s/p  knee surgery, presents to the ED with AMS and facial droop.    #AMS  -in setting of hypercapnic resp failure  -s/p RRT 12/6 for hypoxia, AMS- pt noted to be connected incorrectly to biPAP. Xray - No PTX. BiPAP circuit was fixed and  mental status/ sat improved to 100%.   -now NC intermittent on AVAPS - management per RCU team  -repeat chest xray12/6  noted- c/w diuretics -- would transition to po     #Hypercapnic respiratory failure, hypoxic resp failure  -some component of pulmonary edema  -AVAPs per primary team  -S/P iv diuresis- c/w diuretics -- would transition to po     #acute/Chronic Diastolic CHF  -pt w chronic Lymphedema  -volume status stable   -chest xray 12.6 with Mild vascular congestion   -c/w diuretics -- would transition to po   -s/p bipap per pulm/med  -recent Echo w grossly nl lv sys fx   -cont bb     #HTN  -stable  -cont current meds     dvt ppx

## 2021-12-09 NOTE — PROGRESS NOTE ADULT - SUBJECTIVE AND OBJECTIVE BOX
PULMONARY PROGRESS NOTE    Chart and meds reviewed.  Patient seen and examined.    CC:    Interval History:    All other systems reviewed and found to be negative with the exception of what has been described above.    MEDICATIONS  (STANDING):  albuterol/ipratropium for Nebulization 3 milliLiter(s) Nebulizer every 6 hours  amLODIPine   Tablet 10 milliGRAM(s) Oral daily  atorvastatin 40 milliGRAM(s) Oral at bedtime  budesonide 160 MICROgram(s)/formoterol 4.5 MICROgram(s) Inhaler 2 Puff(s) Inhalation two times a day  chlorhexidine 2% Cloths 1 Application(s) Topical daily  dextrose 40% Gel 15 Gram(s) Oral once  dextrose 5%. 1000 milliLiter(s) (50 mL/Hr) IV Continuous <Continuous>  dextrose 5%. 1000 milliLiter(s) (100 mL/Hr) IV Continuous <Continuous>  dextrose 50% Injectable 25 Gram(s) IV Push once  dextrose 50% Injectable 12.5 Gram(s) IV Push once  dextrose 50% Injectable 25 Gram(s) IV Push once  furosemide    Tablet 40 milliGRAM(s) Oral daily  gabapentin 100 milliGRAM(s) Oral three times a day  glucagon  Injectable 1 milliGRAM(s) IntraMuscular once  heparin   Injectable 7500 Unit(s) SubCutaneous every 8 hours  hydrALAZINE 100 milliGRAM(s) Oral three times a day  insulin lispro (ADMELOG) corrective regimen sliding scale   SubCutaneous at bedtime  insulin lispro (ADMELOG) corrective regimen sliding scale   SubCutaneous three times a day before meals  metoprolol succinate  milliGRAM(s) Oral daily  montelukast 10 milliGRAM(s) Oral daily  polyethylene glycol 3350 17 Gram(s) Oral daily  senna 2 Tablet(s) Oral at bedtime    MEDICATIONS  (PRN):  acetaminophen     Tablet .. 650 milliGRAM(s) Oral every 6 hours PRN Mild Pain (1 - 3), Moderate Pain (4 - 6)      VITALS SIGNS:  T(F): 98.2 (12-09-21 @ 05:34), Max: 98.6 (12-08-21 @ 13:47)  HR: 84 (12-09-21 @ 05:34) (79 - 98)  BP: 116/56 (12-09-21 @ 05:34) (106/54 - 121/60)  RR: 18 (12-09-21 @ 05:34) (16 - 18)  SpO2: 100% (12-09-21 @ 05:34) (96% - 100%)  Wt(kg): --    I&O's Summary    08 Dec 2021 07:01  -  09 Dec 2021 07:00  --------------------------------------------------------  IN: 800 mL / OUT: 500 mL / NET: 300 mL        CAPILLARY BLOOD GLUCOSE      POCT Blood Glucose.: 177 mg/dL (08 Dec 2021 21:33)  POCT Blood Glucose.: 241 mg/dL (08 Dec 2021 16:52)  POCT Blood Glucose.: 260 mg/dL (08 Dec 2021 11:21)        LABS:                            9.3    11.03 )-----------( 305      ( 09 Dec 2021 06:50 )             33.8     12-09    142  |  94<L>  |  48<H>  ----------------------------<  82  4.6   |  40<H>  |  1.12    Ca    9.1      09 Dec 2021 06:50  Phos  4.0     12-09  Mg     2.80     12-09    TPro  7.0  /  Alb  3.5  /  TBili  0.4  /  DBili  x   /  AST  18  /  ALT  29  /  AlkPhos  54  12-09        LIVER FUNCTIONS - ( 09 Dec 2021 06:50 )  Alb: 3.5 g/dL / Pro: 7.0 g/dL / ALK PHOS: 54 U/L / ALT: 29 U/L / AST: 18 U/L / GGT: x                                             CULTURES:       PULMONARY PROGRESS NOTE    Chart and meds reviewed.  Patient seen and examined.    CC: Patient is a 86y old  Female who presents with a chief complaint of SOB (09 Dec 2021 12:50)    Interval History: none overnight    Review of systems:  General: no fevers, sweats, or chills  Respiratory: no worsening shortness of breath  Cardiac: no chest pain or heart palpitations  Abdomen: no abdominal pain, No N/V/C/D  All other systems reviewed and found to be negative with the exception of what has been described above.    MEDICATIONS  (STANDING):  albuterol/ipratropium for Nebulization 3 milliLiter(s) Nebulizer every 6 hours  amLODIPine   Tablet 10 milliGRAM(s) Oral daily  atorvastatin 40 milliGRAM(s) Oral at bedtime  budesonide 160 MICROgram(s)/formoterol 4.5 MICROgram(s) Inhaler 2 Puff(s) Inhalation two times a day  chlorhexidine 2% Cloths 1 Application(s) Topical daily  dextrose 40% Gel 15 Gram(s) Oral once  dextrose 5%. 1000 milliLiter(s) (50 mL/Hr) IV Continuous <Continuous>  dextrose 5%. 1000 milliLiter(s) (100 mL/Hr) IV Continuous <Continuous>  dextrose 50% Injectable 25 Gram(s) IV Push once  dextrose 50% Injectable 12.5 Gram(s) IV Push once  dextrose 50% Injectable 25 Gram(s) IV Push once  furosemide    Tablet 40 milliGRAM(s) Oral daily  gabapentin 100 milliGRAM(s) Oral three times a day  glucagon  Injectable 1 milliGRAM(s) IntraMuscular once  heparin   Injectable 7500 Unit(s) SubCutaneous every 8 hours  hydrALAZINE 100 milliGRAM(s) Oral three times a day  insulin lispro (ADMELOG) corrective regimen sliding scale   SubCutaneous at bedtime  insulin lispro (ADMELOG) corrective regimen sliding scale   SubCutaneous three times a day before meals  metoprolol succinate  milliGRAM(s) Oral daily  montelukast 10 milliGRAM(s) Oral daily  polyethylene glycol 3350 17 Gram(s) Oral daily  senna 2 Tablet(s) Oral at bedtime    MEDICATIONS  (PRN):  acetaminophen     Tablet .. 650 milliGRAM(s) Oral every 6 hours PRN Mild Pain (1 - 3), Moderate Pain (4 - 6)      VITALS SIGNS:  T(F): 98.2 (12-09-21 @ 05:34), Max: 98.6 (12-08-21 @ 13:47)  HR: 84 (12-09-21 @ 05:34) (79 - 98)  BP: 116/56 (12-09-21 @ 05:34) (106/54 - 121/60)  RR: 18 (12-09-21 @ 05:34) (16 - 18)  SpO2: 100% (12-09-21 @ 05:34) (96% - 100%)  Wt(kg): --    I&O's Summary    08 Dec 2021 07:01  -  09 Dec 2021 07:00  --------------------------------------------------------  IN: 800 mL / OUT: 500 mL / NET: 300 mL        CAPILLARY BLOOD GLUCOSE      POCT Blood Glucose.: 177 mg/dL (08 Dec 2021 21:33)  POCT Blood Glucose.: 241 mg/dL (08 Dec 2021 16:52)  POCT Blood Glucose.: 260 mg/dL (08 Dec 2021 11:21)        LABS:                            9.3    11.03 )-----------( 305      ( 09 Dec 2021 06:50 )             33.8     12-09    142  |  94<L>  |  48<H>  ----------------------------<  82  4.6   |  40<H>  |  1.12    Ca    9.1      09 Dec 2021 06:50  Phos  4.0     12-09  Mg     2.80     12-09    TPro  7.0  /  Alb  3.5  /  TBili  0.4  /  DBili  x   /  AST  18  /  ALT  29  /  AlkPhos  54  12-09        LIVER FUNCTIONS - ( 09 Dec 2021 06:50 )  Alb: 3.5 g/dL / Pro: 7.0 g/dL / ALK PHOS: 54 U/L / ALT: 29 U/L / AST: 18 U/L / GGT: x                                             CULTURES:

## 2021-12-09 NOTE — PROGRESS NOTE ADULT - SUBJECTIVE AND OBJECTIVE BOX
CARDIOLOGY FOLLOW UP - Dr. Bermudez  DATE OF SERVICE:12/9/21    CC no cp or sob      REVIEW OF SYSTEMS:  CONSTITUTIONAL: No fever, weight loss, or fatigue  RESPIRATORY: No cough, wheezing, chills or hemoptysis; No Shortness of Breath  CARDIOVASCULAR: No chest pain, palpitations, passing out, dizziness, or leg swelling  GASTROINTESTINAL: No abdominal or epigastric pain. No nausea, vomiting, or hematemesis; No diarrhea or constipation. No melena or hematochezia.  VASCULAR: No edema     PHYSICAL EXAM:  T(C): 36.8 (12-09-21 @ 05:34), Max: 37 (12-08-21 @ 13:47)  HR: 64 (12-09-21 @ 11:28) (64 - 98)  BP: 116/56 (12-09-21 @ 05:34) (106/54 - 121/60)  RR: 18 (12-09-21 @ 05:34) (16 - 18)  SpO2: 100% (12-09-21 @ 11:28) (96% - 100%)  Wt(kg): --  I&O's Summary    08 Dec 2021 07:01  -  09 Dec 2021 07:00  --------------------------------------------------------  IN: 800 mL / OUT: 500 mL / NET: 300 mL        Appearance: Normal	  Cardiovascular: Normal S1 S2,RRR, No JVD, No murmurs  Respiratory:  diminsihed   Gastrointestinal:  Soft, Non-tender, + BS	  Extremities: ++ edema    Home Medications:  Advair Diskus 250 mcg-50 mcg inhalation powder: 1 puff(s) inhaled 2 times a day (23 Nov 2021 14:10)  alendronate 70 mg oral tablet: 1 tab(s) orally once a week (23 Nov 2021 14:10)  ascorbic acid 500 mg oral capsule: 1 cap(s) orally once a day (30 Oct 2019 16:54)  azelastine 137 mcg/inh (0.1%) nasal spray: 2 spray(s) nasal 2 times a day (30 Oct 2019 16:54)  glimepiride 2 mg oral tablet: 1 tab(s) orally once a day (30 Oct 2019 16:54)  ipratropium-albuterol 0.5 mg-2.5 mg/3 mLinhalation solution: 3 milliliter(s) inhaled every 6 hours, As Needed (04 Jun 2021 10:05)  montelukast 10 mg oral tablet: 1 tab(s) orally once a day (30 Oct 2019 16:54)  Neurontin 100 mg oral capsule: 1 cap(s) orally 3 times a day (29 Nov 2020 18:19)  ocular lubricant ophthalmic solution: 1 drop(s) to each affected eye 3 times a day, As needed, Dry Eyes (06 Nov 2019 13:41)  ProAir HFA 90 mcg/inh inhalation aerosol: 2 puff(s) inhaled 4 times a day, As Needed (23 Nov 2021 14:10)      MEDICATIONS  (STANDING):  albuterol/ipratropium for Nebulization 3 milliLiter(s) Nebulizer every 6 hours  amLODIPine   Tablet 10 milliGRAM(s) Oral daily  atorvastatin 40 milliGRAM(s) Oral at bedtime  budesonide 160 MICROgram(s)/formoterol 4.5 MICROgram(s) Inhaler 2 Puff(s) Inhalation two times a day  chlorhexidine 2% Cloths 1 Application(s) Topical daily  dextrose 40% Gel 15 Gram(s) Oral once  dextrose 5%. 1000 milliLiter(s) (50 mL/Hr) IV Continuous <Continuous>  dextrose 5%. 1000 milliLiter(s) (100 mL/Hr) IV Continuous <Continuous>  dextrose 50% Injectable 25 Gram(s) IV Push once  dextrose 50% Injectable 12.5 Gram(s) IV Push once  dextrose 50% Injectable 25 Gram(s) IV Push once  gabapentin 100 milliGRAM(s) Oral three times a day  glucagon  Injectable 1 milliGRAM(s) IntraMuscular once  heparin   Injectable 7500 Unit(s) SubCutaneous every 8 hours  hydrALAZINE 100 milliGRAM(s) Oral three times a day  insulin lispro (ADMELOG) corrective regimen sliding scale   SubCutaneous at bedtime  insulin lispro (ADMELOG) corrective regimen sliding scale   SubCutaneous three times a day before meals  metoprolol succinate  milliGRAM(s) Oral daily  montelukast 10 milliGRAM(s) Oral daily  polyethylene glycol 3350 17 Gram(s) Oral daily  senna 2 Tablet(s) Oral at bedtime      TELEMETRY: 	    ECG:  	  RADIOLOGY:   DIAGNOSTIC TESTING:  [ ] Echocardiogram:  [ ]  Catheterization:  [ ] Stress Test:    OTHER: 	    LABS:	 	    Troponin T, High Sensitivity Result: 44 ng/L (12-02 @ 14:36)                          9.3    11.03 )-----------( 305      ( 09 Dec 2021 06:50 )             33.8     12-09    142  |  94<L>  |  48<H>  ----------------------------<  82  4.6   |  40<H>  |  1.12    Ca    9.1      09 Dec 2021 06:50  Phos  4.0     12-09  Mg     2.80     12-09    TPro  7.0  /  Alb  3.5  /  TBili  0.4  /  DBili  x   /  AST  18  /  ALT  29  /  AlkPhos  54  12-09

## 2021-12-09 NOTE — PROGRESS NOTE ADULT - ASSESSMENT
87y/o F w/ h/o HFpEF, obesity, SHAD on CPAP, HTN, T2DM, COPD, lymphedema, s/p fall and knee fracture p/w AMS and facial droop, MICU consulted for increased work of breathing on Bipap and elevated pCO2, found with hypercapnic respiratory failure c/b AMS improved on BIPAP.      Problem/Plan - 1:  ·  Problem: Acute on chronic respiratory failure with hypercapnia.   ·  Plan: - acute on chronic hypercapnic respiratory failure likely in setting of COPD/SHAD overlap exacerbation with possible PNA on CXR, HFpEF exacerbation given fluid overload on imaging (MICU POCUS with b-lines)  - HFpEF: c/w lasix, strict Is/OS. cards following recent echo with HFpEF and grade I DD  - COPD exacerbation: c/w AVAPs qHS. will consider steroids as well. c/w duonebs q6. c/w azithro. Pulmonology c/s 12/4: AVAPs settings adjusted but persistently hypoxic  - PNA: c/w zosyn. S/P Azithromycin  ***RRT called AM of 12/6, for ams 2/2 severe hypoxia (40-50s) d/t bipap being disconnected.   Discussed GOC with pts daughter, Marina, at the time, who wanted everything to be done, including intubation if needed. Pt is FULL CODE.  Transferred to RCU from Medicine 12/6     Problem/Plan - 2:  ·  Problem: AMS (altered mental status).   ·  Plan: Resolved w/improved resp status  - likely 2/2 acute on chronic hypercapnic respiratory failure  - neuro following  - CTH negative for acute ICH or infarct  - c.w statin  - recent echo wnl.     Problem/Plan - 3:  ·  Problem: Chronic heart failure with preserved ejection fraction (HFpEF).   ·  Plan: -recent echo with HFpEF and grade I DD  - s/p lasix BID 12/3  - c/w lasix 40 IV BID for now  - strict Is/Os monitor output  - cards following: recent echo with grade 1 diastolic dysfunction  - c/w lasix with plan to transition to PO  - c/w metoprolol, amlodipine, statin. on home bumex but IV lasix inpatient, f/u Is/O  - Cardiology recs appreciated  - 12/8/2021 IV Lasix 40 mg bid discontinued, PO Lasix 40 mg QD started     Problem/Plan - 4:  ·  Problem: SHAD and COPD overlap syndrome.   ·  Plan: - c/w AVAPS qHS and PRN for AMS  - NC during day to maintain O2 sat 88-92 given COPD  - c/w duonebs standing,  zosyn for possible PNA  - 12/8/2021: s/p 5 days of IV Solu-medrol. No further steroids indicated at this time.      Problem/Plan - 5:  ·  Problem: Pneumonia.   ·  Plan: - c/w zosyn,   - s/p Azithromycin  - monitor JOSEPH on zosyn  - f/u pulmonary recs.     Problem/Plan - 6:  ·  Problem: Acute-on-chronic kidney injury.   ·  Plan: - Cr initially elevated to 2s, baseline in 1s  - workup: renal lytes FeUrea c/w intrinsic renal disease, making good outputs -1.9 L O/N 12/4-12/5. Check UA and renal/bladder US  - suspect JOSEPH due to underlying medical condition: HFpEF exacerbation and fluid overload worsening renal perfusion. Now improved s/p diuresis and oxygenation on AVAPs  - hyperkalemia was treated with insulin and D50, with improvement of K  - Cr downtrended to 1s with diuresis and medical treatment.  - improved     Problem/Plan - 7:  ·  Problem: Hyperkalemia.   ·  Plan: resolved.     Problem/Plan - 8:  ·  Problem: Elevated troponin level.   ·  Plan: - initital trops 42 - 44  - cardiology following  - recent echo with grade I DD  - c/w diuresis, likely type II NSTEMI.     Problem/Plan - 9:  ·  Problem: T2DM (type 2 diabetes mellitus).   ·  Plan: - a1c 6.9 11/2021  - c/w AISS premeal and qHS  - CC diet.     Problem/Plan - 10:  ·  Problem: Prophylactic measure.   ·  Plan; - DVT PPx: HSQ  - Dispo: To be determined 87y/o F w/ h/o HFpEF, obesity, SHAD on CPAP, HTN, T2DM, COPD, lymphedema, s/p fall and knee fracture p/w AMS and facial droop, MICU consulted for increased work of breathing on Bipap and elevated pCO2, found with hypercapnic respiratory failure c/b AMS improved on BIPAP.      Problem/Plan - 1:  ·  Problem: Acute on chronic respiratory failure with hypercapnia.   ·  Plan: - acute on chronic hypercapnic respiratory failure likely in setting of COPD/SHAD overlap exacerbation with possible PNA on CXR, HFpEF exacerbation given fluid overload on imaging (MICU POCUS with b-lines)  - HFpEF: c/w lasix, strict Is/OS. cards following recent echo with HFpEF and grade I DD  - COPD exacerbation: c/w AVAPs qHS. will consider steroids as well. c/w duonebs q6. c/w azithro. Pulmonology c/s 12/4: AVAPs settings adjusted but persistently hypoxic  - PNA: c/w zosyn. S/P Azithromycin  ***RRT called AM of 12/6, for ams 2/2 severe hypoxia (40-50s) d/t bipap being disconnected.   Discussed GOC with pts daughter, Marina, at the time, who wanted everything to be done, including intubation if needed. Pt is FULL CODE.  Transferred to RCU from Medicine 12/6     Problem/Plan - 2:  ·  Problem: AMS (altered mental status).   ·  Plan: Resolved w/improved resp status  - likely 2/2 acute on chronic hypercapnic respiratory failure  - neuro following  - CTH negative for acute ICH or infarct  - c.w statin  - recent echo wnl.     Problem/Plan - 3:  ·  Problem: Chronic heart failure with preserved ejection fraction (HFpEF).   ·  Plan: -recent echo with HFpEF and grade I DD  - s/p lasix BID 12/3  - c/w lasix 40 IV BID for now  - strict Is/Os monitor output  - cards following: recent echo with grade 1 diastolic dysfunction  - c/w lasix with plan to transition to PO  - c/w metoprolol, amlodipine, statin. on home bumex but IV lasix inpatient, f/u Is/O  - Cardiology recs appreciated  - 12/8/2021 IV Lasix 40 mg bid discontinued, PO Lasix 40 mg QD started  - Previous abg's with met alkalosis, patient over diuresed - hold lasix for now     Problem/Plan - 4:  ·  Problem: SHAD and COPD overlap syndrome.   ·  Plan: - c/w AVAPS qHS and PRN for AMS  - NC during day to maintain O2 sat 88-92 given COPD  - c/w duonebs standing,  zosyn for possible PNA  - 12/8/2021: s/p 5 days of IV Solu-medrol. No further steroids indicated at this time.   - 12/9 patient with increased shortness of breath and hypoxia. Placed on AVAPS. Will attempt to bring patient back down to nasal cannula later today. ABG obtained and reviewed. US of b/l LE dopplers ordered to r/o DVT. D-dimer ordered. CXR ordered.      Problem/Plan - 5:  ·  Problem: Pneumonia.   ·  Plan: - c/w zosyn,   - s/p Azithromycin  - s/p zosyn     Problem/Plan - 6:  ·  Problem: Acute-on-chronic kidney injury.   ·  Plan: - Cr initially elevated to 2s, baseline in 1s  - workup: renal lytes FeUrea c/w intrinsic renal disease, making good outputs -1.9 L O/N 12/4-12/5. Check UA and renal/bladder US  - suspect JOSEPH due to underlying medical condition: HFpEF exacerbation and fluid overload worsening renal perfusion. Now improved s/p diuresis and oxygenation on AVAPs  - hyperkalemia was treated with insulin and D50, with improvement of K  - Cr downtrended to 1s with diuresis and medical treatment.  - improved     Problem/Plan - 7:  ·  Problem: Hyperkalemia.   ·  Plan: resolved.     Problem/Plan - 8:  ·  Problem: Elevated troponin level.   ·  Plan: - initital trops 42 - 44  - cardiology following  - recent echo with grade I DD  - c/w diuresis, likely type II NSTEMI.     Problem/Plan - 9:  ·  Problem: T2DM (type 2 diabetes mellitus).   ·  Plan: - a1c 6.9 11/2021  - c/w AISS premeal and qHS  - CC diet.     Problem/Plan - 10:  ·  Problem: Prophylactic measure.   ·  Plan; - DVT PPx: HSQ  - Dispo: To be determined

## 2021-12-09 NOTE — PROGRESS NOTE ADULT - SUBJECTIVE AND OBJECTIVE BOX
[facility]      Seen earlier today, and examined.  - Today, patient is without complaints.           *****MEDICATIONS: Current medication reviewed and documented.    MEDICATIONS  (STANDING):  albuterol/ipratropium for Nebulization 3 milliLiter(s) Nebulizer every 6 hours  amLODIPine   Tablet 10 milliGRAM(s) Oral daily  atorvastatin 40 milliGRAM(s) Oral at bedtime  budesonide 160 MICROgram(s)/formoterol 4.5 MICROgram(s) Inhaler 2 Puff(s) Inhalation two times a day  chlorhexidine 2% Cloths 1 Application(s) Topical daily  dextrose 40% Gel 15 Gram(s) Oral once  dextrose 5%. 1000 milliLiter(s) (50 mL/Hr) IV Continuous <Continuous>  dextrose 5%. 1000 milliLiter(s) (100 mL/Hr) IV Continuous <Continuous>  dextrose 50% Injectable 25 Gram(s) IV Push once  dextrose 50% Injectable 12.5 Gram(s) IV Push once  dextrose 50% Injectable 25 Gram(s) IV Push once  gabapentin 100 milliGRAM(s) Oral three times a day  glucagon  Injectable 1 milliGRAM(s) IntraMuscular once  heparin   Injectable 7500 Unit(s) SubCutaneous every 8 hours  hydrALAZINE 100 milliGRAM(s) Oral three times a day  insulin lispro (ADMELOG) corrective regimen sliding scale   SubCutaneous three times a day before meals  insulin lispro (ADMELOG) corrective regimen sliding scale   SubCutaneous at bedtime  metoprolol succinate  milliGRAM(s) Oral daily  montelukast 10 milliGRAM(s) Oral daily  polyethylene glycol 3350 17 Gram(s) Oral daily  senna 2 Tablet(s) Oral at bedtime    MEDICATIONS  (PRN):  acetaminophen     Tablet .. 650 milliGRAM(s) Oral every 6 hours PRN Mild Pain (1 - 3), Moderate Pain (4 - 6)          ***** VITAL SIGNS:  T(F): 98.2 (21 @ 13:52), Max: 98.2 (21 @ 05:34)  HR: 67 (21 @ 15:19) (64 - 98)  BP: 121/52 (21 @ 13:52) (116/56 - 121/60)  RR: 18 (21 @ 13:52) (18 - 18)  SpO2: 97% (21 @ 15:19) (97% - 100%)  Wt(kg): --  ,   I&O's Summary    08 Dec 2021 07:01  -  09 Dec 2021 07:00  --------------------------------------------------------  IN: 800 mL / OUT: 500 mL / NET: 300 mL             *****PHYSICAL EXAM:   alert oriented x 2 attention comprehension are limited   Able to name, repeat.   EOmi fundi not visualized   no nystagmus VFF to confrontation  Tongue is midline  Palate elevates symmetrically   Moving all 4 ext spontaneously no drift appreciated    Gait not assessed.            *****LAB AND IMAGIN.3    11.03 )-----------( 305      ( 09 Dec 2021 06:50 )             33.8                   142  |  94<L>  |  48<H>  ----------------------------<  82  4.6   |  40<H>  |  1.12    Ca    9.1      09 Dec 2021 06:50  Phos  4.0       Mg     2.80         TPro  7.0  /  Alb  3.5  /  TBili  0.4  /  DBili  x   /  AST  18  /  ALT  29  /  AlkPhos  54                       ABG - ( 09 Dec 2021 11:47 )  pH, Arterial: 7.43  pH, Blood: x     /  pCO2: 70    /  pO2: 55    / HCO3: 46    / Base Excess: 18.2  /  SaO2: 89.7                [All pertinent recent Imaging/Reports reviewed]           *****A S S E S S M E N T   A N D   P L A N :      Pt is an 87 y/o woman w/ a PMH of asthma/COPD, CHF, on aspirin, HTN, lymphedema, DM2, and past surgical history of knee surgery, presents to the [facility] ED as a code stroke after home aide/daughter activated EMS due to patient's lack of responsiveness with LWK 2:30a 21. Neurological exam does not demonstrate focality. CTH noncon pending. Neurovascular imaging deferred given JOSEPH and low suspicion of LVO at this time. Patient out of window for tpa. Patient not a candidate for mechanical thrombectomy given low suspicion of LVO.    Impression: toxic metabolic encephalopathy likely in setting of acidosis and hypercapnea    Recommendations:  [ ] f/u CTH noncon when stable   [ ] a1c, lipid profile  [ ] c/w aspirin 81mg daily  [ ] c/w rosuvastatin 10mg daily  [ ] infectious and metabolic workup per primary team             pt seen and evaluated at bedside  briefly pt with recent knee surgery immobilization presents with ams, hypercapnia   now improved after bipap   obesity hypoventilation   cannot r/o sequelae of immobility   ct head neg  nonfocal exam        Thank you for allowing me to participate in the care of this patient. Will continue to follow patient periodically. Please do not hesitate to call me if you have any  questions or if there has been a change in patients neurological status     ________________  Constance Mars MD  [facility] ([facility]  [phone]      33 minutes spent on total encounter; more than 50 % of the visit was  spent counseling about plan of care, compliance to diet/exercise and medication regimen and or  coordinating care by the attending physician.      It is advised that stroke patients follow up with SRIDHAR Judd @ [phone] in 1- 2 weeks.   Others please follow up with Dr. Michael Nissenbaum [phone]

## 2021-12-09 NOTE — PROGRESS NOTE ADULT - ATTENDING COMMENTS
seen and examined with acp, agree with above  AVAPS at night and prn. Chronic hypercapnea, compensated  Also component of met alkalosis - will hold lasix for now, she has been aggressively diuresed  Hypoxia as well, can be atelectasis and underlying copd, but will also check lower extremity dopplers, d dimer, high risk for DVT    Chronic respiratory failure with hyperapnea, underlying COPD, failed bilevel 16/8cmH20  Doing well with AVAPS NIV - pt will benefit from NIV at home to treat chronic respiratory failure, improve respiratory mechanics and prevent readmissions

## 2021-12-10 LAB
ANION GAP SERPL CALC-SCNC: 5 MMOL/L — LOW (ref 7–14)
BASOPHILS # BLD AUTO: 0.02 K/UL — SIGNIFICANT CHANGE UP (ref 0–0.2)
BASOPHILS NFR BLD AUTO: 0.2 % — SIGNIFICANT CHANGE UP (ref 0–2)
BUN SERPL-MCNC: 50 MG/DL — HIGH (ref 7–23)
CALCIUM SERPL-MCNC: 9.6 MG/DL — SIGNIFICANT CHANGE UP (ref 8.4–10.5)
CHLORIDE SERPL-SCNC: 95 MMOL/L — LOW (ref 98–107)
CO2 SERPL-SCNC: 45 MMOL/L — CRITICAL HIGH (ref 22–31)
CREAT SERPL-MCNC: 1.03 MG/DL — SIGNIFICANT CHANGE UP (ref 0.5–1.3)
EOSINOPHIL # BLD AUTO: 0.12 K/UL — SIGNIFICANT CHANGE UP (ref 0–0.5)
EOSINOPHIL NFR BLD AUTO: 1.2 % — SIGNIFICANT CHANGE UP (ref 0–6)
GLUCOSE BLDC GLUCOMTR-MCNC: 126 MG/DL — HIGH (ref 70–99)
GLUCOSE BLDC GLUCOMTR-MCNC: 133 MG/DL — HIGH (ref 70–99)
GLUCOSE BLDC GLUCOMTR-MCNC: 152 MG/DL — HIGH (ref 70–99)
GLUCOSE BLDC GLUCOMTR-MCNC: 180 MG/DL — HIGH (ref 70–99)
GLUCOSE SERPL-MCNC: 94 MG/DL — SIGNIFICANT CHANGE UP (ref 70–99)
HCT VFR BLD CALC: 34.4 % — LOW (ref 34.5–45)
HGB BLD-MCNC: 9.2 G/DL — LOW (ref 11.5–15.5)
IANC: 7.53 K/UL — SIGNIFICANT CHANGE UP (ref 1.5–8.5)
IMM GRANULOCYTES NFR BLD AUTO: 0.3 % — SIGNIFICANT CHANGE UP (ref 0–1.5)
LYMPHOCYTES # BLD AUTO: 1.62 K/UL — SIGNIFICANT CHANGE UP (ref 1–3.3)
LYMPHOCYTES # BLD AUTO: 16.6 % — SIGNIFICANT CHANGE UP (ref 13–44)
MAGNESIUM SERPL-MCNC: 2.8 MG/DL — HIGH (ref 1.6–2.6)
MCHC RBC-ENTMCNC: 25.5 PG — LOW (ref 27–34)
MCHC RBC-ENTMCNC: 26.7 GM/DL — LOW (ref 32–36)
MCV RBC AUTO: 95.3 FL — SIGNIFICANT CHANGE UP (ref 80–100)
MONOCYTES # BLD AUTO: 0.46 K/UL — SIGNIFICANT CHANGE UP (ref 0–0.9)
MONOCYTES NFR BLD AUTO: 4.7 % — SIGNIFICANT CHANGE UP (ref 2–14)
NEUTROPHILS # BLD AUTO: 7.53 K/UL — HIGH (ref 1.8–7.4)
NEUTROPHILS NFR BLD AUTO: 77 % — SIGNIFICANT CHANGE UP (ref 43–77)
NRBC # BLD: 0 /100 WBCS — SIGNIFICANT CHANGE UP
NRBC # FLD: 0 K/UL — SIGNIFICANT CHANGE UP
PHOSPHATE SERPL-MCNC: 3.7 MG/DL — SIGNIFICANT CHANGE UP (ref 2.5–4.5)
PLATELET # BLD AUTO: 278 K/UL — SIGNIFICANT CHANGE UP (ref 150–400)
POTASSIUM SERPL-MCNC: 4.6 MMOL/L — SIGNIFICANT CHANGE UP (ref 3.5–5.3)
POTASSIUM SERPL-SCNC: 4.6 MMOL/L — SIGNIFICANT CHANGE UP (ref 3.5–5.3)
RBC # BLD: 3.61 M/UL — LOW (ref 3.8–5.2)
RBC # FLD: 16.1 % — HIGH (ref 10.3–14.5)
SODIUM SERPL-SCNC: 145 MMOL/L — SIGNIFICANT CHANGE UP (ref 135–145)
WBC # BLD: 9.78 K/UL — SIGNIFICANT CHANGE UP (ref 3.8–10.5)
WBC # FLD AUTO: 9.78 K/UL — SIGNIFICANT CHANGE UP (ref 3.8–10.5)

## 2021-12-10 PROCEDURE — 99233 SBSQ HOSP IP/OBS HIGH 50: CPT | Mod: GC

## 2021-12-10 RX ORDER — SODIUM CHLORIDE 9 MG/ML
500 INJECTION, SOLUTION INTRAVENOUS
Refills: 0 | Status: DISCONTINUED | OUTPATIENT
Start: 2021-12-10 | End: 2021-12-17

## 2021-12-10 RX ADMIN — Medication 100 MILLIGRAM(S): at 06:14

## 2021-12-10 RX ADMIN — Medication 100 MILLIGRAM(S): at 13:35

## 2021-12-10 RX ADMIN — SENNA PLUS 2 TABLET(S): 8.6 TABLET ORAL at 21:23

## 2021-12-10 RX ADMIN — Medication 3 MILLILITER(S): at 16:42

## 2021-12-10 RX ADMIN — GABAPENTIN 100 MILLIGRAM(S): 400 CAPSULE ORAL at 13:35

## 2021-12-10 RX ADMIN — Medication 100 MILLIGRAM(S): at 06:15

## 2021-12-10 RX ADMIN — BUDESONIDE AND FORMOTEROL FUMARATE DIHYDRATE 2 PUFF(S): 160; 4.5 AEROSOL RESPIRATORY (INHALATION) at 22:15

## 2021-12-10 RX ADMIN — HEPARIN SODIUM 7500 UNIT(S): 5000 INJECTION INTRAVENOUS; SUBCUTANEOUS at 21:23

## 2021-12-10 RX ADMIN — GABAPENTIN 100 MILLIGRAM(S): 400 CAPSULE ORAL at 06:14

## 2021-12-10 RX ADMIN — Medication 100 MILLIGRAM(S): at 21:22

## 2021-12-10 RX ADMIN — HEPARIN SODIUM 7500 UNIT(S): 5000 INJECTION INTRAVENOUS; SUBCUTANEOUS at 06:15

## 2021-12-10 RX ADMIN — MONTELUKAST 10 MILLIGRAM(S): 4 TABLET, CHEWABLE ORAL at 11:47

## 2021-12-10 RX ADMIN — Medication 1: at 11:51

## 2021-12-10 RX ADMIN — ATORVASTATIN CALCIUM 40 MILLIGRAM(S): 80 TABLET, FILM COATED ORAL at 21:22

## 2021-12-10 RX ADMIN — CHLORHEXIDINE GLUCONATE 1 APPLICATION(S): 213 SOLUTION TOPICAL at 12:08

## 2021-12-10 RX ADMIN — GABAPENTIN 100 MILLIGRAM(S): 400 CAPSULE ORAL at 21:22

## 2021-12-10 RX ADMIN — AMLODIPINE BESYLATE 10 MILLIGRAM(S): 2.5 TABLET ORAL at 06:13

## 2021-12-10 RX ADMIN — SODIUM CHLORIDE 40 MILLILITER(S): 9 INJECTION, SOLUTION INTRAVENOUS at 12:08

## 2021-12-10 RX ADMIN — Medication 1: at 08:04

## 2021-12-10 RX ADMIN — Medication 3 MILLILITER(S): at 22:15

## 2021-12-10 RX ADMIN — Medication 3 MILLILITER(S): at 04:17

## 2021-12-10 RX ADMIN — BUDESONIDE AND FORMOTEROL FUMARATE DIHYDRATE 2 PUFF(S): 160; 4.5 AEROSOL RESPIRATORY (INHALATION) at 09:24

## 2021-12-10 RX ADMIN — HEPARIN SODIUM 7500 UNIT(S): 5000 INJECTION INTRAVENOUS; SUBCUTANEOUS at 13:34

## 2021-12-10 RX ADMIN — Medication 3 MILLILITER(S): at 09:24

## 2021-12-10 NOTE — PROVIDER CONTACT NOTE (CRITICAL VALUE NOTIFICATION) - ASSESSMENT
Pt alert and oriented, in no apparent distress
Pt is alert, remains on Bipap as per order
Pt is alert, on Bipap continuos

## 2021-12-10 NOTE — PROGRESS NOTE ADULT - SUBJECTIVE AND OBJECTIVE BOX
[facility]      Seen earlier today, and examined.  - Today, patient is without complaints.  on nasal cannula          *****MEDICATIONS: Current medication reviewed and documented.    MEDICATIONS  (STANDING):  albuterol/ipratropium for Nebulization 3 milliLiter(s) Nebulizer every 6 hours  amLODIPine   Tablet 10 milliGRAM(s) Oral daily  atorvastatin 40 milliGRAM(s) Oral at bedtime  budesonide 160 MICROgram(s)/formoterol 4.5 MICROgram(s) Inhaler 2 Puff(s) Inhalation two times a day  chlorhexidine 2% Cloths 1 Application(s) Topical daily  dextrose 40% Gel 15 Gram(s) Oral once  dextrose 5%. 1000 milliLiter(s) (100 mL/Hr) IV Continuous <Continuous>  dextrose 5%. 1000 milliLiter(s) (50 mL/Hr) IV Continuous <Continuous>  dextrose 50% Injectable 25 Gram(s) IV Push once  dextrose 50% Injectable 12.5 Gram(s) IV Push once  dextrose 50% Injectable 25 Gram(s) IV Push once  gabapentin 100 milliGRAM(s) Oral three times a day  glucagon  Injectable 1 milliGRAM(s) IntraMuscular once  heparin   Injectable 7500 Unit(s) SubCutaneous every 8 hours  hydrALAZINE 100 milliGRAM(s) Oral three times a day  insulin lispro (ADMELOG) corrective regimen sliding scale   SubCutaneous three times a day before meals  insulin lispro (ADMELOG) corrective regimen sliding scale   SubCutaneous at bedtime  metoprolol succinate  milliGRAM(s) Oral daily  montelukast 10 milliGRAM(s) Oral daily  polyethylene glycol 3350 17 Gram(s) Oral daily  senna 2 Tablet(s) Oral at bedtime  sodium chloride 0.45%. 500 milliLiter(s) (40 mL/Hr) IV Continuous <Continuous>    MEDICATIONS  (PRN):  acetaminophen     Tablet .. 650 milliGRAM(s) Oral every 6 hours PRN Mild Pain (1 - 3), Moderate Pain (4 - 6)          ***** VITAL SIGNS:  T(F): 98.6 (12-10-21 @ 21:17), Max: 98.7 (12-10-21 @ 13:18)  HR: 79 (12-10-21 @ 21:17) (63 - 86)  BP: 137/55 (12-10-21 @ 21:17) (119/59 - 137/55)  RR: 17 (12-10-21 @ 21:17) (17 - 18)  SpO2: 95% (12-10-21 @ 21:17) (95% - 99%)  Wt(kg): --  ,   I&O's Summary    10 Dec 2021 07:01  -  10 Dec 2021 22:12  --------------------------------------------------------  IN: 315 mL / OUT: 400 mL / NET: -85 mL             *****PHYSICAL EXAM:   alert oriented x 2  attention comprehension are fair.  Able to name, repeat.   EOmi fundi not visualized           Tongue is midline  Palate elevates symmetrically   Moving all 4 ext spontaneously no drift appreciated    Gait not assessed.            *****LAB AND IMAGIN.2    9.78  )-----------( 278      ( 10 Dec 2021 06:26 )             34.4               12-10    145  |  95<L>  |  50<H>  ----------------------------<  94  4.6   |  45<HH>  |  1.03    Ca    9.6      10 Dec 2021 06:26  Phos  3.7     12-10  Mg     2.80     12-10    TPro  7.0  /  Alb  3.5  /  TBili  0.4  /  DBili  x   /  AST  18  /  ALT  29  /  AlkPhos  54  12-                     ABG - ( 09 Dec 2021 11:47 )  pH, Arterial: 7.43  pH, Blood: x     /  pCO2: 70    /  pO2: 55    / HCO3: 46    / Base Excess: 18.2  /  SaO2: 89.7                [All pertinent recent Imaging/Reports reviewed]           *****A S S E S S M E N T   A N D   P L A N :      Pt is an 85 y/o woman w/ a PMH of asthma/COPD, CHF, on aspirin, HTN, lymphedema, DM2, and past surgical history of knee surgery, presents to the [facility] ED as a code stroke after home aide/daughter activated EMS due to patient's lack of responsiveness with LWK 2:30a 21. Neurological exam does not demonstrate focality. CTH noncon pending. Neurovascular imaging deferred given JOSEPH and low suspicion of LVO at this time. Patient out of window for tpa. Patient not a candidate for mechanical thrombectomy given low suspicion of LVO.    Impression: toxic metabolic encephalopathy likely in setting of acidosis and hypercapnea    Recommendations:  [ ] f/u CTH noncon when stable   [ ] a1c, lipid profile  [ ] c/w aspirin 81mg daily  [ ] c/w rosuvastatin 10mg daily  [ ] infectious and metabolic workup per primary team             pt seen and evaluated at bedside  briefly pt with recent knee surgery immobilization presents with ams, hypercapnia   now improved after bipap   obesity hypoventilation   cannot r/o sequelae of immobility   ct head neg  nonfocal exam   keep pco2 <50          Thank you for allowing me to participate in the care of this patient. Will continue to follow patient periodically. Please do not hesitate to call me if you have any  questions or if there has been a change in patients neurological status     ________________  Constance Mars MD  [facility] ([facility]  [phone]      33 minutes spent on total encounter; more than 50 % of the visit was  spent counseling about plan of care, compliance to diet/exercise and medication regimen and or  coordinating care by the attending physician.      It is advised that stroke patients follow up with SRIDHAR Judd @ [phone] in 1- 2 weeks.   Others please follow up with Dr. Michael Nissenbaum [phone]

## 2021-12-10 NOTE — DIETITIAN INITIAL EVALUATION ADULT. - OTHER INFO
87yo F w HF, obesity, SHAD on CPAP, HTN, DM, COPD, lymphedema, s/p fall and knee Fx presenting with AMS s/p MICU for increased WOB on BiPAP.  Acute on chronic respiratory failure, COPD exacerbation.. resolved AMS.     Met with Pt. who is [place].  Observed as edentulous.  Reports good appetite/PO intake on current diet & denies swallowing difficulty @ this time.  Also denies N/V/D, however endorses constipation... on bowel regimen.    C/o pain at the moment... informed RN.     discussed prescribed diet.

## 2021-12-10 NOTE — PROGRESS NOTE ADULT - ATTENDING COMMENTS
seen and examined with acp, agree with above  AVAPS at night and prn. Chronic hypercapnea, compensated  Also component of met alkalosis - will hold lasix for now, she has been aggressively diuresed  Hypoxia as well, can be atelectasis and underlying copd, but will also check lower extremity dopplers, d dimer, high risk for DVT    Chronic respiratory failure with hyperapnea, underlying COPD, failed bilevel 16/8cmH20  Doing well with AVAPS NIV - pt will benefit from NIV at home to treat chronic respiratory failure, improve respiratory mechanics and prevent readmissions agree with above  stable on nocturnal avaps and daytime 02  metabolic alk -- will give back very gentle fluids    LE dopplers negative. 02 requirements decreased    Chronic respiratory failure with hyperapnea, underlying COPD, failed bilevel 16/8cmH20  Doing well with AVAPS NIV - pt will benefit from NIV at home to treat chronic respiratory failure, improve respiratory mechanics and prevent readmissions    02 sat RA at rest 88%. 02 sat 2lpm at rest 95%. Pt is non-ambulatory  Will also need home 02

## 2021-12-10 NOTE — PROGRESS NOTE ADULT - ASSESSMENT
Echo 11/19/21: EF 60-65%, nl lv sys fx, mild AS, mild Ms   Echo 6/2/21: Min MR, grossly nl lv sys fx   Echo 12/5/20: grossly nl lv sys fx    a/p  7 y/o female w pmhx of HFpEF, obesity, SHAD on CPAP, HTN, T2DM, COPD, lymphedema, s/p fall and knee fracture s/p  knee surgery, presents to the ED with AMS and facial droop.    #AMS  -in setting of hypercapnic resp failure  -s/p RRT 12/6 for hypoxia, AMS- pt noted to be connected incorrectly to biPAP. Xray - No PTX. BiPAP circuit was fixed and  mental status/ sat improved to 100%.   -now NC intermittent on AVAPS - management per RCU team  -repeat chest xray12/6  noted- c/w diuretics -- would transition to po     #Hypercapnic respiratory failure, hypoxic resp failure  -some component of pulmonary edema  -AVAPs per primary team  -S/P iv diuresis-  -diuretics held fort metab alkalosis  r-esume per RCU    #acute/Chronic Diastolic CHF  -pt w chronic Lymphedema  -volume status stable   -c-diuretics held as above  -s/p bipap per pulm/med  -recent Echo w grossly nl lv sys fx   -cont bb     #HTN  -stable  -cont current meds     dvt ppx

## 2021-12-10 NOTE — PROGRESS NOTE ADULT - SUBJECTIVE AND OBJECTIVE BOX
PULMONARY PROGRESS NOTE    Chart and meds reviewed.  Patient seen and examined.    CC: Patient is a 86y old  Female who presents with a chief complaint of SOB (09 Dec 2021 12:50)    Interval History: none overnight    Review of systems:  General: no fevers, sweats, or chills  Respiratory: no worsening shortness of breath  Cardiac: no chest pain or heart palpitations  Abdomen: no abdominal pain, No N/V/C/D  All other systems reviewed and found to be negative with the exception of what has been described above.    MEDICATIONS  (STANDING):  albuterol/ipratropium for Nebulization 3 milliLiter(s) Nebulizer every 6 hours  amLODIPine   Tablet 10 milliGRAM(s) Oral daily  atorvastatin 40 milliGRAM(s) Oral at bedtime  budesonide 160 MICROgram(s)/formoterol 4.5 MICROgram(s) Inhaler 2 Puff(s) Inhalation two times a day  chlorhexidine 2% Cloths 1 Application(s) Topical daily  dextrose 40% Gel 15 Gram(s) Oral once  dextrose 5%. 1000 milliLiter(s) (50 mL/Hr) IV Continuous <Continuous>  dextrose 5%. 1000 milliLiter(s) (100 mL/Hr) IV Continuous <Continuous>  dextrose 50% Injectable 25 Gram(s) IV Push once  dextrose 50% Injectable 12.5 Gram(s) IV Push once  dextrose 50% Injectable 25 Gram(s) IV Push once  furosemide    Tablet 40 milliGRAM(s) Oral daily  gabapentin 100 milliGRAM(s) Oral three times a day  glucagon  Injectable 1 milliGRAM(s) IntraMuscular once  heparin   Injectable 7500 Unit(s) SubCutaneous every 8 hours  hydrALAZINE 100 milliGRAM(s) Oral three times a day  insulin lispro (ADMELOG) corrective regimen sliding scale   SubCutaneous at bedtime  insulin lispro (ADMELOG) corrective regimen sliding scale   SubCutaneous three times a day before meals  metoprolol succinate  milliGRAM(s) Oral daily  montelukast 10 milliGRAM(s) Oral daily  polyethylene glycol 3350 17 Gram(s) Oral daily  senna 2 Tablet(s) Oral at bedtime    MEDICATIONS  (PRN):  acetaminophen     Tablet .. 650 milliGRAM(s) Oral every 6 hours PRN Mild Pain (1 - 3), Moderate Pain (4 - 6)      VITALS SIGNS:  T(F): 98.2 (12-09-21 @ 05:34), Max: 98.6 (12-08-21 @ 13:47)  HR: 84 (12-09-21 @ 05:34) (79 - 98)  BP: 116/56 (12-09-21 @ 05:34) (106/54 - 121/60)  RR: 18 (12-09-21 @ 05:34) (16 - 18)  SpO2: 100% (12-09-21 @ 05:34) (96% - 100%)  Wt(kg): --    I&O's Summary    08 Dec 2021 07:01  -  09 Dec 2021 07:00  --------------------------------------------------------  IN: 800 mL / OUT: 500 mL / NET: 300 mL        CAPILLARY BLOOD GLUCOSE      POCT Blood Glucose.: 180 mg/dL (10 Dec 2021 08:03)  POCT Blood Glucose.: 101 mg/dL (09 Dec 2021 23:05)  POCT Blood Glucose.: 161 mg/dL (09 Dec 2021 17:05)  POCT Blood Glucose.: 114 mg/dL (09 Dec 2021 11:40)      LABS:                        9.2    9.78  )-----------( 278      ( 10 Dec 2021 06:26 )             34.4     12-10    145  |  95<L>  |  50<H>  ----------------------------<  94  4.6   |  45<HH>  |  1.03    Ca    9.6      10 Dec 2021 06:26  Phos  3.7     12-10  Mg     2.80     12-10    LIVER FUNCTIONS - ( 09 Dec 2021 06:50 )  Alb: 3.5 g/dL / Pro: 7.0 g/dL / ALK PHOS: 54 U/L / ALT: 29 U/L / AST: 18 U/L / GGT: x

## 2021-12-10 NOTE — DIETITIAN INITIAL EVALUATION ADULT. - PERTINENT LABORATORY DATA
12-10    145  |  95<L>  |  50<H>  ----------------------------<  94  4.6   |  45<HH>  |  1.03    Ca    9.6      10 Dec 2021 06:26  Phos  3.7     12-10  Mg     2.80     12-10    TPro  7.0  /  Alb  3.5  /  TBili  0.4  /  DBili  x   /  AST  18  /  ALT  29  /  AlkPhos  54  12-09      CAPILLARY BLOOD GLUCOSE      POCT Blood Glucose.: 180 mg/dL (10 Dec 2021 08:03)  POCT Blood Glucose.: 101 mg/dL (09 Dec 2021 23:05)  POCT Blood Glucose.: 161 mg/dL (09 Dec 2021 17:05)  POCT Blood Glucose.: 114 mg/dL (09 Dec 2021 11:40)

## 2021-12-10 NOTE — PROGRESS NOTE ADULT - SUBJECTIVE AND OBJECTIVE BOX
CC    TELEMETRY:     PHYSICAL EXAM:    T(C): 36.6 (12-10-21 @ 06:03), Max: 37 (12-09-21 @ 20:37)  HR: 82 (12-10-21 @ 09:24) (63 - 85)  BP: 119/59 (12-10-21 @ 06:03) (114/53 - 121/52)  RR: 17 (12-10-21 @ 06:03) (16 - 18)  SpO2: 96% (12-10-21 @ 06:44) (96% - 100%)  Wt(kg): --  I&O's Summary      Appearance: Normal	  Cardiovascular: Normal S1 S2,RRR, No JVD, No murmurs  Respiratory: Lungs clear to auscultation	  Gastrointestinal:  Soft, Non-tender, + BS	  Extremities: Normal range of motion, No clubbing, cyanosis or edema  Vascular: Peripheral pulses palpable 2+ bilaterally     LABS:	 	                          9.2    9.78  )-----------( 278      ( 10 Dec 2021 06:26 )             34.4     12-10    145  |  95<L>  |  50<H>  ----------------------------<  94  4.6   |  45<HH>  |  1.03    Ca    9.6      10 Dec 2021 06:26  Phos  3.7     12-10  Mg     2.80     12-10    TPro  7.0  /  Alb  3.5  /  TBili  0.4  /  DBili  x   /  AST  18  /  ALT  29  /  AlkPhos  54  12-09          CARDIAC MARKERS:        MEDICATIONS  (STANDING):  albuterol/ipratropium for Nebulization 3 milliLiter(s) Nebulizer every 6 hours  amLODIPine   Tablet 10 milliGRAM(s) Oral daily  atorvastatin 40 milliGRAM(s) Oral at bedtime  budesonide 160 MICROgram(s)/formoterol 4.5 MICROgram(s) Inhaler 2 Puff(s) Inhalation two times a day  chlorhexidine 2% Cloths 1 Application(s) Topical daily  dextrose 40% Gel 15 Gram(s) Oral once  dextrose 5%. 1000 milliLiter(s) (50 mL/Hr) IV Continuous <Continuous>  dextrose 5%. 1000 milliLiter(s) (100 mL/Hr) IV Continuous <Continuous>  dextrose 50% Injectable 25 Gram(s) IV Push once  dextrose 50% Injectable 12.5 Gram(s) IV Push once  dextrose 50% Injectable 25 Gram(s) IV Push once  gabapentin 100 milliGRAM(s) Oral three times a day  glucagon  Injectable 1 milliGRAM(s) IntraMuscular once  heparin   Injectable 7500 Unit(s) SubCutaneous every 8 hours  hydrALAZINE 100 milliGRAM(s) Oral three times a day  insulin lispro (ADMELOG) corrective regimen sliding scale   SubCutaneous three times a day before meals  insulin lispro (ADMELOG) corrective regimen sliding scale   SubCutaneous at bedtime  metoprolol succinate  milliGRAM(s) Oral daily  montelukast 10 milliGRAM(s) Oral daily  polyethylene glycol 3350 17 Gram(s) Oral daily  senna 2 Tablet(s) Oral at bedtime  sodium chloride 0.45%. 500 milliLiter(s) (40 mL/Hr) IV Continuous <Continuous>

## 2021-12-10 NOTE — PROGRESS NOTE ADULT - ASSESSMENT
87y/o F w/ h/o HFpEF, obesity, SHAD on CPAP, HTN, T2DM, COPD, lymphedema, s/p fall and knee fracture p/w AMS and facial droop, MICU consulted for increased work of breathing on Bipap and elevated pCO2, found with hypercapnic respiratory failure c/b AMS improved on BIPAP.      Problem/Plan - 1:  ·  Problem: Acute on chronic respiratory failure with hypercapnia.   ·  Plan: - acute on chronic hypercapnic respiratory failure likely in setting of COPD/SHAD overlap exacerbation with possible PNA on CXR, HFpEF exacerbation given fluid overload on imaging (MICU POCUS with b-lines)  - HFpEF: c/w lasix, strict Is/OS. cards following recent echo with HFpEF and grade I DD  - COPD exacerbation: c/w AVAPs qHS. will consider steroids as well. c/w duonebs q6. c/w azithro. Pulmonology c/s 12/4: AVAPs settings adjusted but persistently hypoxic  - PNA: c/w zosyn. S/P Azithromycin  ***RRT called AM of 12/6, for ams 2/2 severe hypoxia (40-50s) d/t bipap being disconnected.   Discussed GOC with pts daughter, Marina, at the time, who wanted everything to be done, including intubation if needed. Pt is FULL CODE.  Transferred to RCU from Medicine 12/6     Problem/Plan - 2:  ·  Problem: AMS (altered mental status).   ·  Plan: Resolved w/improved resp status  - likely 2/2 acute on chronic hypercapnic respiratory failure  - neuro following  - CTH negative for acute ICH or infarct  - c.w statin  - recent echo wnl.     Problem/Plan - 3:  ·  Problem: Chronic heart failure with preserved ejection fraction (HFpEF).   ·  Plan: -recent echo with HFpEF and grade I DD  - s/p lasix BID 12/3  - c/w lasix 40 IV BID for now  - strict Is/Os monitor output  - cards following: recent echo with grade 1 diastolic dysfunction  - c/w lasix with plan to transition to PO  - c/w metoprolol, amlodipine, statin. on home bumex but IV lasix inpatient, f/u Is/O  - Cardiology recs appreciated  - 12/8/2021 IV Lasix 40 mg bid discontinued, PO Lasix 40 mg QD started  - Previous abg's with met alkalosis, patient over diuresed - hold lasix for now  - 500 ml  0.45% NS over 12 hours.     Problem/Plan - 4:  ·  Problem: SHAD and COPD overlap syndrome.   ·  Plan: - c/w AVAPS qHS and PRN for AMS  - NC during day to maintain O2 sat 88-92 given COPD  - c/w duonebs standing,  zosyn for possible PNA  - 12/8/2021: s/p 5 days of IV Solu-medrol. No further steroids indicated at this time.   - 12/9 patient with increased shortness of breath and hypoxia. Placed on AVAPS. Will attempt to bring patient back down to nasal cannula later today. ABG obtained and reviewed. US of b/l LE dopplers ordered to r/o DVT. D-dimer ordered. CXR ordered.      Problem/Plan - 5:  ·  Problem: Pneumonia.   ·  Plan: - c/w zosyn,   - s/p Azithromycin  - s/p zosyn     Problem/Plan - 6:  ·  Problem: Acute-on-chronic kidney injury.   ·  Plan: - Cr initially elevated to 2s, baseline in 1s  - workup: renal lytes FeUrea c/w intrinsic renal disease, making good outputs -1.9 L O/N 12/4-12/5. Check UA and renal/bladder US  - suspect JOSEPH due to underlying medical condition: HFpEF exacerbation and fluid overload worsening renal perfusion. Now improved s/p diuresis and oxygenation on AVAPs  - hyperkalemia was treated with insulin and D50, with improvement of K  - Cr downtrended to 1s with diuresis and medical treatment.  - improved     Problem/Plan - 7:  ·  Problem: Hyperkalemia.   ·  Plan: resolved.     Problem/Plan - 8:  ·  Problem: Elevated troponin level.   ·  Plan: - initital trops 42 - 44  - cardiology following  - recent echo with grade I DD  - c/w diuresis, likely type II NSTEMI.     Problem/Plan - 9:  ·  Problem: T2DM (type 2 diabetes mellitus).   ·  Plan: - a1c 6.9 11/2021  - c/w AISS premeal and qHS  - CC diet.     Problem/Plan - 10:  ·  Problem: Prophylactic measure.   ·  Plan; - DVT PPx: HSQ  - Dispo: To be determined

## 2021-12-11 LAB
ANION GAP SERPL CALC-SCNC: 8 MMOL/L — SIGNIFICANT CHANGE UP (ref 7–14)
BUN SERPL-MCNC: 42 MG/DL — HIGH (ref 7–23)
CALCIUM SERPL-MCNC: 9.5 MG/DL — SIGNIFICANT CHANGE UP (ref 8.4–10.5)
CHLORIDE SERPL-SCNC: 92 MMOL/L — LOW (ref 98–107)
CO2 SERPL-SCNC: 38 MMOL/L — HIGH (ref 22–31)
CREAT SERPL-MCNC: 0.99 MG/DL — SIGNIFICANT CHANGE UP (ref 0.5–1.3)
GLUCOSE BLDC GLUCOMTR-MCNC: 144 MG/DL — HIGH (ref 70–99)
GLUCOSE BLDC GLUCOMTR-MCNC: 166 MG/DL — HIGH (ref 70–99)
GLUCOSE BLDC GLUCOMTR-MCNC: 166 MG/DL — HIGH (ref 70–99)
GLUCOSE BLDC GLUCOMTR-MCNC: 183 MG/DL — HIGH (ref 70–99)
GLUCOSE SERPL-MCNC: 114 MG/DL — HIGH (ref 70–99)
HCT VFR BLD CALC: 32.1 % — LOW (ref 34.5–45)
HGB BLD-MCNC: 8.9 G/DL — LOW (ref 11.5–15.5)
MAGNESIUM SERPL-MCNC: 2.8 MG/DL — HIGH (ref 1.6–2.6)
MCHC RBC-ENTMCNC: 26.1 PG — LOW (ref 27–34)
MCHC RBC-ENTMCNC: 27.7 GM/DL — LOW (ref 32–36)
MCV RBC AUTO: 94.1 FL — SIGNIFICANT CHANGE UP (ref 80–100)
NRBC # BLD: 0 /100 WBCS — SIGNIFICANT CHANGE UP
NRBC # FLD: 0 K/UL — SIGNIFICANT CHANGE UP
PHOSPHATE SERPL-MCNC: 3.5 MG/DL — SIGNIFICANT CHANGE UP (ref 2.5–4.5)
PLATELET # BLD AUTO: 222 K/UL — SIGNIFICANT CHANGE UP (ref 150–400)
POTASSIUM SERPL-MCNC: 4.2 MMOL/L — SIGNIFICANT CHANGE UP (ref 3.5–5.3)
POTASSIUM SERPL-SCNC: 4.2 MMOL/L — SIGNIFICANT CHANGE UP (ref 3.5–5.3)
RBC # BLD: 3.41 M/UL — LOW (ref 3.8–5.2)
RBC # FLD: 16.3 % — HIGH (ref 10.3–14.5)
SODIUM SERPL-SCNC: 138 MMOL/L — SIGNIFICANT CHANGE UP (ref 135–145)
WBC # BLD: 8.02 K/UL — SIGNIFICANT CHANGE UP (ref 3.8–10.5)
WBC # FLD AUTO: 8.02 K/UL — SIGNIFICANT CHANGE UP (ref 3.8–10.5)

## 2021-12-11 PROCEDURE — 99233 SBSQ HOSP IP/OBS HIGH 50: CPT | Mod: GC

## 2021-12-11 RX ORDER — IPRATROPIUM/ALBUTEROL SULFATE 18-103MCG
3 AEROSOL WITH ADAPTER (GRAM) INHALATION EVERY 12 HOURS
Refills: 0 | Status: DISCONTINUED | OUTPATIENT
Start: 2021-12-11 | End: 2021-12-17

## 2021-12-11 RX ADMIN — GABAPENTIN 100 MILLIGRAM(S): 400 CAPSULE ORAL at 06:11

## 2021-12-11 RX ADMIN — Medication 1: at 16:21

## 2021-12-11 RX ADMIN — Medication 100 MILLIGRAM(S): at 14:32

## 2021-12-11 RX ADMIN — HEPARIN SODIUM 7500 UNIT(S): 5000 INJECTION INTRAVENOUS; SUBCUTANEOUS at 14:32

## 2021-12-11 RX ADMIN — ATORVASTATIN CALCIUM 40 MILLIGRAM(S): 80 TABLET, FILM COATED ORAL at 21:19

## 2021-12-11 RX ADMIN — Medication 100 MILLIGRAM(S): at 21:18

## 2021-12-11 RX ADMIN — BUDESONIDE AND FORMOTEROL FUMARATE DIHYDRATE 2 PUFF(S): 160; 4.5 AEROSOL RESPIRATORY (INHALATION) at 21:44

## 2021-12-11 RX ADMIN — GABAPENTIN 100 MILLIGRAM(S): 400 CAPSULE ORAL at 14:32

## 2021-12-11 RX ADMIN — Medication 3 MILLILITER(S): at 21:44

## 2021-12-11 RX ADMIN — Medication 100 MILLIGRAM(S): at 06:12

## 2021-12-11 RX ADMIN — Medication 1: at 08:14

## 2021-12-11 RX ADMIN — MONTELUKAST 10 MILLIGRAM(S): 4 TABLET, CHEWABLE ORAL at 12:02

## 2021-12-11 RX ADMIN — AMLODIPINE BESYLATE 10 MILLIGRAM(S): 2.5 TABLET ORAL at 06:12

## 2021-12-11 RX ADMIN — Medication 1: at 12:09

## 2021-12-11 RX ADMIN — Medication 100 MILLIGRAM(S): at 06:41

## 2021-12-11 RX ADMIN — HEPARIN SODIUM 7500 UNIT(S): 5000 INJECTION INTRAVENOUS; SUBCUTANEOUS at 21:17

## 2021-12-11 RX ADMIN — BUDESONIDE AND FORMOTEROL FUMARATE DIHYDRATE 2 PUFF(S): 160; 4.5 AEROSOL RESPIRATORY (INHALATION) at 07:48

## 2021-12-11 RX ADMIN — Medication 3 MILLILITER(S): at 07:48

## 2021-12-11 RX ADMIN — Medication 3 MILLILITER(S): at 03:38

## 2021-12-11 RX ADMIN — POLYETHYLENE GLYCOL 3350 17 GRAM(S): 17 POWDER, FOR SOLUTION ORAL at 12:03

## 2021-12-11 RX ADMIN — HEPARIN SODIUM 7500 UNIT(S): 5000 INJECTION INTRAVENOUS; SUBCUTANEOUS at 06:12

## 2021-12-11 RX ADMIN — CHLORHEXIDINE GLUCONATE 1 APPLICATION(S): 213 SOLUTION TOPICAL at 12:05

## 2021-12-11 RX ADMIN — GABAPENTIN 100 MILLIGRAM(S): 400 CAPSULE ORAL at 21:17

## 2021-12-11 NOTE — PROGRESS NOTE ADULT - SUBJECTIVE AND OBJECTIVE BOX
CHIEF COMPLAINT:Patient is a 86y old  Female who presents with a chief complaint of SOB (11 Dec 2021 09:37)      INTERVAL EVENTS:     ROS: Seen by bedside during AM rounds     OBJECTIVE:  ICU Vital Signs Last 24 Hrs  T(C): 36.7 (11 Dec 2021 06:00), Max: 37.1 (10 Dec 2021 13:18)  T(F): 98 (11 Dec 2021 06:00), Max: 98.7 (10 Dec 2021 13:18)  HR: 75 (11 Dec 2021 07:48) (72 - 86)  BP: 124/54 (11 Dec 2021 06:00) (124/54 - 137/55)  BP(mean): --  ABP: --  ABP(mean): --  RR: 17 (11 Dec 2021 06:00) (17 - 18)  SpO2: 99% (11 Dec 2021 07:48) (95% - 99%)    Mode: [facility]    12-10 @ 07:01  -  12-11 @ 07:00  --------------------------------------------------------  IN: 315 mL / OUT: 850 mL / NET: -535 mL      CAPILLARY BLOOD GLUCOSE      POCT Blood Glucose.: 166 mg/dL (11 Dec 2021 08:04)      PHYSICAL EXAM:  General:   HEENT:   Lymph Nodes:  Neck:   Respiratory:   Cardiovascular:   Abdomen:   Extremities:   Skin:   Neurological:  Psychiatry:    Mode: [facility] MEDICATIONS:  MEDICATIONS  (STANDING):  albuterol/ipratropium for Nebulization 3 milliLiter(s) Nebulizer every 6 hours  amLODIPine   Tablet 10 milliGRAM(s) Oral daily  atorvastatin 40 milliGRAM(s) Oral at bedtime  budesonide 160 MICROgram(s)/formoterol 4.5 MICROgram(s) Inhaler 2 Puff(s) Inhalation two times a day  chlorhexidine 2% Cloths 1 Application(s) Topical daily  dextrose 40% Gel 15 Gram(s) Oral once  dextrose 5%. 1000 milliLiter(s) (50 mL/Hr) IV Continuous <Continuous>  dextrose 5%. 1000 milliLiter(s) (100 mL/Hr) IV Continuous <Continuous>  dextrose 50% Injectable 25 Gram(s) IV Push once  dextrose 50% Injectable 12.5 Gram(s) IV Push once  dextrose 50% Injectable 25 Gram(s) IV Push once  gabapentin 100 milliGRAM(s) Oral three times a day  glucagon  Injectable 1 milliGRAM(s) IntraMuscular once  heparin   Injectable 7500 Unit(s) SubCutaneous every 8 hours  hydrALAZINE 100 milliGRAM(s) Oral three times a day  insulin lispro (ADMELOG) corrective regimen sliding scale   SubCutaneous three times a day before meals  insulin lispro (ADMELOG) corrective regimen sliding scale   SubCutaneous at bedtime  metoprolol succinate  milliGRAM(s) Oral daily  montelukast 10 milliGRAM(s) Oral daily  polyethylene glycol 3350 17 Gram(s) Oral daily  senna 2 Tablet(s) Oral at bedtime  sodium chloride 0.45%. 500 milliLiter(s) (40 mL/Hr) IV Continuous <Continuous>    MEDICATIONS  (PRN):  acetaminophen     Tablet .. 650 milliGRAM(s) Oral every 6 hours PRN Mild Pain (1 - 3), Moderate Pain (4 - 6)      LABS:                        8.9    8.02  )-----------( 222      ( 11 Dec 2021 06:42 )             32.1     12-11    138  |  92<L>  |  42<H>  ----------------------------<  114<H>  4.2   |  38<H>  |  0.99    Ca    9.5      11 Dec 2021 06:42  Phos  3.5     12-11  Mg     2.80     12-11          Arterial Blood Gas:  12-09 @ 11:47  7.43/70/55/46/89.7/18.2  ABG lactate: --     CHIEF COMPLAINT:Patient is a 86y old  Female who presents with a chief complaint of SOB (11 Dec 2021 09:37)      INTERVAL EVENTS: no events overnight    ROS: Seen by bedside during AM rounds. Denies chest pain, shortness of breath or abd pain. All other systems negative    OBJECTIVE:  ICU Vital Signs Last 24 Hrs  T(C): 36.7 (11 Dec 2021 06:00), Max: 37.1 (10 Dec 2021 13:18)  T(F): 98 (11 Dec 2021 06:00), Max: 98.7 (10 Dec 2021 13:18)  HR: 75 (11 Dec 2021 07:48) (72 - 86)  BP: 124/54 (11 Dec 2021 06:00) (124/54 - 137/55)  BP(mean): --  ABP: --  ABP(mean): --  RR: 17 (11 Dec 2021 06:00) (17 - 18)  SpO2: 99% (11 Dec 2021 07:48) (95% - 99%)    Mode: [facility]    12-10 @ 07:01  -  12-11 @ 07:00  --------------------------------------------------------  IN: 315 mL / OUT: 850 mL / NET: -535 mL      CAPILLARY BLOOD GLUCOSE      POCT Blood Glucose.: 166 mg/dL (11 Dec 2021 08:04)    Mode: [facility] MEDICATIONS:  MEDICATIONS  (STANDING):  albuterol/ipratropium for Nebulization 3 milliLiter(s) Nebulizer every 6 hours  amLODIPine   Tablet 10 milliGRAM(s) Oral daily  atorvastatin 40 milliGRAM(s) Oral at bedtime  budesonide 160 MICROgram(s)/formoterol 4.5 MICROgram(s) Inhaler 2 Puff(s) Inhalation two times a day  chlorhexidine 2% Cloths 1 Application(s) Topical daily  dextrose 40% Gel 15 Gram(s) Oral once  dextrose 5%. 1000 milliLiter(s) (50 mL/Hr) IV Continuous <Continuous>  dextrose 5%. 1000 milliLiter(s) (100 mL/Hr) IV Continuous <Continuous>  dextrose 50% Injectable 25 Gram(s) IV Push once  dextrose 50% Injectable 12.5 Gram(s) IV Push once  dextrose 50% Injectable 25 Gram(s) IV Push once  gabapentin 100 milliGRAM(s) Oral three times a day  glucagon  Injectable 1 milliGRAM(s) IntraMuscular once  heparin   Injectable 7500 Unit(s) SubCutaneous every 8 hours  hydrALAZINE 100 milliGRAM(s) Oral three times a day  insulin lispro (ADMELOG) corrective regimen sliding scale   SubCutaneous three times a day before meals  insulin lispro (ADMELOG) corrective regimen sliding scale   SubCutaneous at bedtime  metoprolol succinate  milliGRAM(s) Oral daily  montelukast 10 milliGRAM(s) Oral daily  polyethylene glycol 3350 17 Gram(s) Oral daily  senna 2 Tablet(s) Oral at bedtime  sodium chloride 0.45%. 500 milliLiter(s) (40 mL/Hr) IV Continuous <Continuous>    MEDICATIONS  (PRN):  acetaminophen     Tablet .. 650 milliGRAM(s) Oral every 6 hours PRN Mild Pain (1 - 3), Moderate Pain (4 - 6)      LABS:                        8.9    8.02  )-----------( 222      ( 11 Dec 2021 06:42 )             32.1     12-11    138  |  92<L>  |  42<H>  ----------------------------<  114<H>  4.2   |  38<H>  |  0.99    Ca    9.5      11 Dec 2021 06:42  Phos  3.5     12-11  Mg     2.80     12-11          Arterial Blood Gas:  12-09 @ 11:47  7.43/70/55/46/89.7/18.2  ABG lactate: --

## 2021-12-11 NOTE — PROGRESS NOTE ADULT - ASSESSMENT
Echo 11/19/21: EF 60-65%, nl lv sys fx, mild AS, mild Ms   Echo 6/2/21: Min MR, grossly nl lv sys fx   Echo 12/5/20: grossly nl lv sys fx    a/p  87 y/o female w pmhx of HFpEF, obesity, SHAD on CPAP, HTN, T2DM, COPD, lymphedema, s/p fall and knee fracture s/p  knee surgery, presents to the ED with AMS and facial droop.    #AMS  -in setting of hypercapnic resp failure  -s/p RRT 12/6 for hypoxia, AMS- pt noted to be connected incorrectly to biPAP. Xray - No PTX. BiPAP circuit was fixed and  mental status/ sat improved to 100%.   -now NC intermittent on AVAPS - management per RCU team  -repeat chest xray12/6  noted-   -diuretics per RCU    #Hypercapnic respiratory failure, hypoxic resp failure  -some component of pulmonary edema  -AVAPs per primary team  -S/P iv diuresis-  -diuretics held fort metab alkalosis  -resume per RCU    #acute/Chronic Diastolic CHF  -pt w chronic Lymphedema  -volume status stable   -c-diuretics held as above  -s/p bipap per pulm/med  -recent Echo w grossly nl lv sys fx   -cont bb     #HTN  -stable  -cont current meds     dvt ppx

## 2021-12-11 NOTE — PROGRESS NOTE ADULT - ASSESSMENT
87y/o F w/ h/o HFpEF, obesity, SHAD on CPAP, HTN, T2DM, COPD, lymphedema, s/p fall and knee fracture p/w AMS and facial droop, MICU consulted for increased work of breathing on Bipap and elevated pCO2, found with hypercapnic respiratory failure c/b AMS improved on BIPAP.      Problem/Plan - 1:  ·  Problem: Acute on chronic respiratory failure with hypercapnia.   ·  Plan: - acute on chronic hypercapnic respiratory failure likely in setting of COPD/SHAD overlap exacerbation with possible PNA on CXR, HFpEF exacerbation given fluid overload on imaging (MICU POCUS with b-lines)  - HFpEF: c/w lasix, strict Is/OS. cards following recent echo with HFpEF and grade I DD  - COPD exacerbation: c/w AVAPs qHS. will consider steroids as well. c/w duonebs q6. c/w azithro. Pulmonology c/s 12/4: AVAPs settings adjusted but persistently hypoxic  - PNA: c/w zosyn. S/P Azithromycin  ***RRT called AM of 12/6, for ams 2/2 severe hypoxia (40-50s) d/t bipap being disconnected.   Discussed GOC with pts daughter, Marina, at the time, who wanted everything to be done, including intubation if needed. Pt is FULL CODE.  Transferred to RCU from Medicine 12/6     Problem/Plan - 2:  ·  Problem: AMS (altered mental status).   ·  Plan: Resolved w/improved resp status  - likely 2/2 acute on chronic hypercapnic respiratory failure  - neuro following  - CTH negative for acute ICH or infarct  - c.w statin  - recent echo wnl.     Problem/Plan - 3:  ·  Problem: Chronic heart failure with preserved ejection fraction (HFpEF).   ·  Plan: -recent echo with HFpEF and grade I DD  - s/p lasix BID 12/3  - c/w lasix 40 IV BID for now  - strict Is/Os monitor output  - cards following: recent echo with grade 1 diastolic dysfunction  - c/w lasix with plan to transition to PO  - c/w metoprolol, amlodipine, statin. on home bumex but IV lasix inpatient, f/u Is/O  - Cardiology recs appreciated  - 12/8/2021 IV Lasix 40 mg bid discontinued, PO Lasix 40 mg QD started  - Previous abg's with met alkalosis, patient over diuresed - hold lasix for now  - 500 ml  0.45% NS over 12 hours.     Problem/Plan - 4:  ·  Problem: SHAD and COPD overlap syndrome.   ·  Plan: - c/w AVAPS qHS and PRN for AMS  - NC during day to maintain O2 sat 88-92 given COPD  - c/w duonebs standing,  zosyn for possible PNA  - 12/8/2021: s/p 5 days of IV Solu-medrol. No further steroids indicated at this time.   - 12/9 patient with increased shortness of breath and hypoxia. Placed on AVAPS. Will attempt to bring patient back down to nasal cannula later today. ABG obtained and reviewed. US of b/l LE dopplers ordered to r/o DVT. D-dimer ordered. CXR ordered.      Problem/Plan - 5:  ·  Problem: Pneumonia.   ·  Plan: - c/w zosyn,   - s/p Azithromycin  - s/p zosyn     Problem/Plan - 6:  ·  Problem: Acute-on-chronic kidney injury.   ·  Plan: - Cr initially elevated to 2s, baseline in 1s  - workup: renal lytes FeUrea c/w intrinsic renal disease, making good outputs -1.9 L O/N 12/4-12/5. Check UA and renal/bladder US  - suspect JOSEPH due to underlying medical condition: HFpEF exacerbation and fluid overload worsening renal perfusion. Now improved s/p diuresis and oxygenation on AVAPs  - hyperkalemia was treated with insulin and D50, with improvement of K  - Cr downtrended to 1s with diuresis and medical treatment.  - improved     Problem/Plan - 7:  ·  Problem: Hyperkalemia.   ·  Plan: resolved.     Problem/Plan - 8:  ·  Problem: Elevated troponin level.   ·  Plan: - initital trops 42 - 44  - cardiology following  - recent echo with grade I DD  - c/w diuresis, likely type II NSTEMI.     Problem/Plan - 9:  ·  Problem: T2DM (type 2 diabetes mellitus).   ·  Plan: - a1c 6.9 11/2021  - c/w AISS premeal and qHS  - CC diet.     Problem/Plan - 10:  ·  Problem: Prophylactic measure.   ·  Plan; - DVT PPx: HSQ  - Dispo: To be determined 85y/o F w/ h/o HFpEF, obesity, SHAD on CPAP, HTN, T2DM, COPD, lymphedema, s/p fall and knee fracture p/w AMS and facial droop, MICU consulted for increased work of breathing on Bipap and elevated pCO2, found with hypercapnic respiratory failure c/b AMS improved on BIPAP.      Problem/Plan - 1:  ·  Problem: Acute on chronic respiratory failure with hypercapnia.   ·  Plan: - acute on chronic hypercapnic respiratory failure likely in setting of COPD/SHAD overlap exacerbation with possible PNA on CXR, HFpEF exacerbation given fluid overload on imaging (MICU POCUS with b-lines)  - HFpEF: c/w lasix, strict Is/OS. cards following recent echo with HFpEF and grade I DD  - COPD exacerbation: c/w AVAPs qHS. will consider steroids as well. c/w duonebs q6. c/w azithro. Pulmonology c/s 12/4: AVAPs settings adjusted but persistently hypoxic  - PNA: c/w zosyn. S/P Azithromycin  ***RRT called AM of 12/6, for ams 2/2 severe hypoxia (40-50s) d/t bipap being disconnected.   Discussed GOC with pts daughter, Marina, at the time, who wanted everything to be done, including intubation if needed. Pt is FULL CODE.  Transferred to RCU from Medicine 12/6  - Breathing comfortably on 2L NC O2, AVAPS QHS and PRN     Problem/Plan - 2:  ·  Problem: AMS (altered mental status).   ·  Plan: Resolved w/improved resp status  - likely 2/2 acute on chronic hypercapnic respiratory failure  - neuro following  - CTH negative for acute ICH or infarct  - c.w statin  - recent echo wnl.     Problem/Plan - 3:  ·  Problem: Chronic heart failure with preserved ejection fraction (HFpEF).   ·  Plan: -recent echo with HFpEF and grade I DD  - s/p lasix BID 12/3  - strict Is/Os monitor output  - cards following: recent echo with grade 1 diastolic dysfunction  - c/w lasix with plan to transition to PO  - c/w metoprolol, amlodipine, statin. on home bumex but IV lasix inpatient, f/u Is/O  - Cardiology recs appreciated  - 12/8/2021 transitioned to PO Lasix 40 mg   - Previous abg's with met alkalosis, patient over diuresed - hold lasix for now  - 500 ml  0.45% NS over 12 hours.  - likely restart Lasix in AM if HCO3 improved     Problem/Plan - 4:  ·  Problem: SHAD and COPD overlap syndrome.   ·  Plan: - c/w AVAPS qHS and PRN for AMS  - NC during day to maintain O2 sat 88-92 given COPD  - c/w duonebs standing,  zosyn for possible PNA  - 12/8/2021: s/p 5 days of IV Solu-medrol. No further steroids indicated at this time.   - 12/9 patient with increased shortness of breath and hypoxia. Placed on AVAPS. Will attempt to bring patient back down to nasal cannula later today. ABG obtained and reviewed. US of b/l LE dopplers ordered to r/o DVT. D-dimer ordered. CXR ordered.   -12/11: breathing well on nasal canula, will attempt to wean O2 to 2L, maintain O2 sat 92%     Problem/Plan - 5:  ·  Problem: Pneumonia.   ·  Plan: - c/w zosyn,   - s/p Azithromycin  - s/p zosyn     Problem/Plan - 6:  ·  Problem: Acute-on-chronic kidney injury.   ·  Plan: - Cr initially elevated to 2s, baseline in 1s  - workup: renal lytes FeUrea c/w intrinsic renal disease, making good outputs -1.9 L O/N 12/4-12/5. Check UA and renal/bladder US  - suspect JOSEPH due to underlying medical condition: HFpEF exacerbation and fluid overload worsening renal perfusion. Now improved s/p diuresis and oxygenation on AVAPs  - hyperkalemia was treated with insulin and D50, with improvement of K  - Cr downtrended to 1s with diuresis and medical treatment.  - improved     Problem/Plan - 7:  ·  Problem: Hyperkalemia.   ·  Plan: resolved.     Problem/Plan - 8:  ·  Problem: Elevated troponin level.   ·  Plan: - initital trops 42 - 44  - cardiology following  - recent echo with grade I DD  - c/w diuresis, likely type II NSTEMI.     Problem/Plan - 9:  ·  Problem: T2DM (type 2 diabetes mellitus).   ·  Plan: - a1c 6.9 11/2021  - c/w AISS premeal and qHS  - CC diet.     Problem/Plan - 10:  ·  Problem: Prophylactic measure.   ·  Plan; - DVT PPx: HSQ  - Dispo: To be determined

## 2021-12-11 NOTE — PROGRESS NOTE ADULT - SUBJECTIVE AND OBJECTIVE BOX
CC: no events    TELEMETRY:     PHYSICAL EXAM:    T(C): 36.7 (12-11-21 @ 06:00), Max: 37.1 (12-10-21 @ 13:18)  HR: 75 (12-11-21 @ 07:48) (72 - 86)  BP: 124/54 (12-11-21 @ 06:00) (124/54 - 137/55)  RR: 17 (12-11-21 @ 06:00) (17 - 18)  SpO2: 99% (12-11-21 @ 07:48) (95% - 99%)  Wt(kg): --  I&O's Summary    10 Dec 2021 07:01  -  11 Dec 2021 07:00  --------------------------------------------------------  IN: 315 mL / OUT: 850 mL / NET: -535 mL        Appearance: Normal	  Cardiovascular: Normal S1 S2,RRR, No JVD, No murmurs  Respiratory: Lungs clear to auscultation	  Gastrointestinal:  Soft, Non-tender, + BS	  Extremities: Normal range of motion, No clubbing, cyanosis or edema  Vascular: Peripheral pulses palpable 2+ bilaterally     LABS:	 	                          8.9    8.02  )-----------( 222      ( 11 Dec 2021 06:42 )             32.1     12-11    138  |  92<L>  |  42<H>  ----------------------------<  114<H>  4.2   |  38<H>  |  0.99    Ca    9.5      11 Dec 2021 06:42  Phos  3.5     12-11  Mg     2.80     12-11            CARDIAC MARKERS:        MEDICATIONS  (STANDING):  albuterol/ipratropium for Nebulization 3 milliLiter(s) Nebulizer every 6 hours  amLODIPine   Tablet 10 milliGRAM(s) Oral daily  atorvastatin 40 milliGRAM(s) Oral at bedtime  budesonide 160 MICROgram(s)/formoterol 4.5 MICROgram(s) Inhaler 2 Puff(s) Inhalation two times a day  chlorhexidine 2% Cloths 1 Application(s) Topical daily  dextrose 40% Gel 15 Gram(s) Oral once  dextrose 5%. 1000 milliLiter(s) (50 mL/Hr) IV Continuous <Continuous>  dextrose 5%. 1000 milliLiter(s) (100 mL/Hr) IV Continuous <Continuous>  dextrose 50% Injectable 25 Gram(s) IV Push once  dextrose 50% Injectable 12.5 Gram(s) IV Push once  dextrose 50% Injectable 25 Gram(s) IV Push once  gabapentin 100 milliGRAM(s) Oral three times a day  glucagon  Injectable 1 milliGRAM(s) IntraMuscular once  heparin   Injectable 7500 Unit(s) SubCutaneous every 8 hours  hydrALAZINE 100 milliGRAM(s) Oral three times a day  insulin lispro (ADMELOG) corrective regimen sliding scale   SubCutaneous three times a day before meals  insulin lispro (ADMELOG) corrective regimen sliding scale   SubCutaneous at bedtime  metoprolol succinate  milliGRAM(s) Oral daily  montelukast 10 milliGRAM(s) Oral daily  polyethylene glycol 3350 17 Gram(s) Oral daily  senna 2 Tablet(s) Oral at bedtime  sodium chloride 0.45%. 500 milliLiter(s) (40 mL/Hr) IV Continuous <Continuous>

## 2021-12-11 NOTE — PROGRESS NOTE ADULT - ATTENDING COMMENTS
agree with above  stable on nocturnal avaps, daytime NC  HCO3 better today, consider restart po lasix tomorrow

## 2021-12-12 LAB
ANION GAP SERPL CALC-SCNC: 6 MMOL/L — LOW (ref 7–14)
BUN SERPL-MCNC: 33 MG/DL — HIGH (ref 7–23)
CALCIUM SERPL-MCNC: 9.4 MG/DL — SIGNIFICANT CHANGE UP (ref 8.4–10.5)
CHLORIDE SERPL-SCNC: 95 MMOL/L — LOW (ref 98–107)
CO2 SERPL-SCNC: 39 MMOL/L — HIGH (ref 22–31)
CREAT SERPL-MCNC: 0.93 MG/DL — SIGNIFICANT CHANGE UP (ref 0.5–1.3)
GLUCOSE BLDC GLUCOMTR-MCNC: 141 MG/DL — HIGH (ref 70–99)
GLUCOSE BLDC GLUCOMTR-MCNC: 148 MG/DL — HIGH (ref 70–99)
GLUCOSE BLDC GLUCOMTR-MCNC: 164 MG/DL — HIGH (ref 70–99)
GLUCOSE BLDC GLUCOMTR-MCNC: 179 MG/DL — HIGH (ref 70–99)
GLUCOSE SERPL-MCNC: 120 MG/DL — HIGH (ref 70–99)
HCT VFR BLD CALC: 33 % — LOW (ref 34.5–45)
HGB BLD-MCNC: 8.9 G/DL — LOW (ref 11.5–15.5)
MAGNESIUM SERPL-MCNC: 2.6 MG/DL — SIGNIFICANT CHANGE UP (ref 1.6–2.6)
MCHC RBC-ENTMCNC: 25.4 PG — LOW (ref 27–34)
MCHC RBC-ENTMCNC: 27 GM/DL — LOW (ref 32–36)
MCV RBC AUTO: 94.3 FL — SIGNIFICANT CHANGE UP (ref 80–100)
NRBC # BLD: 0 /100 WBCS — SIGNIFICANT CHANGE UP
NRBC # FLD: 0 K/UL — SIGNIFICANT CHANGE UP
PHOSPHATE SERPL-MCNC: 3.1 MG/DL — SIGNIFICANT CHANGE UP (ref 2.5–4.5)
PLATELET # BLD AUTO: 216 K/UL — SIGNIFICANT CHANGE UP (ref 150–400)
POTASSIUM SERPL-MCNC: 3.9 MMOL/L — SIGNIFICANT CHANGE UP (ref 3.5–5.3)
POTASSIUM SERPL-SCNC: 3.9 MMOL/L — SIGNIFICANT CHANGE UP (ref 3.5–5.3)
RBC # BLD: 3.5 M/UL — LOW (ref 3.8–5.2)
RBC # FLD: 16.6 % — HIGH (ref 10.3–14.5)
SODIUM SERPL-SCNC: 140 MMOL/L — SIGNIFICANT CHANGE UP (ref 135–145)
WBC # BLD: 9.25 K/UL — SIGNIFICANT CHANGE UP (ref 3.8–10.5)
WBC # FLD AUTO: 9.25 K/UL — SIGNIFICANT CHANGE UP (ref 3.8–10.5)

## 2021-12-12 PROCEDURE — 99233 SBSQ HOSP IP/OBS HIGH 50: CPT | Mod: GC

## 2021-12-12 RX ORDER — FUROSEMIDE 40 MG
20 TABLET ORAL DAILY
Refills: 0 | Status: DISCONTINUED | OUTPATIENT
Start: 2021-12-12 | End: 2021-12-13

## 2021-12-12 RX ORDER — FUROSEMIDE 40 MG
40 TABLET ORAL DAILY
Refills: 0 | Status: DISCONTINUED | OUTPATIENT
Start: 2021-12-12 | End: 2021-12-12

## 2021-12-12 RX ADMIN — Medication 100 MILLIGRAM(S): at 06:04

## 2021-12-12 RX ADMIN — ATORVASTATIN CALCIUM 40 MILLIGRAM(S): 80 TABLET, FILM COATED ORAL at 21:13

## 2021-12-12 RX ADMIN — HEPARIN SODIUM 7500 UNIT(S): 5000 INJECTION INTRAVENOUS; SUBCUTANEOUS at 06:04

## 2021-12-12 RX ADMIN — Medication 100 MILLIGRAM(S): at 21:14

## 2021-12-12 RX ADMIN — Medication 3 MILLILITER(S): at 07:06

## 2021-12-12 RX ADMIN — Medication 650 MILLIGRAM(S): at 21:13

## 2021-12-12 RX ADMIN — Medication 1: at 17:19

## 2021-12-12 RX ADMIN — Medication 1: at 13:05

## 2021-12-12 RX ADMIN — MONTELUKAST 10 MILLIGRAM(S): 4 TABLET, CHEWABLE ORAL at 11:45

## 2021-12-12 RX ADMIN — GABAPENTIN 100 MILLIGRAM(S): 400 CAPSULE ORAL at 13:15

## 2021-12-12 RX ADMIN — Medication 3 MILLILITER(S): at 23:12

## 2021-12-12 RX ADMIN — POLYETHYLENE GLYCOL 3350 17 GRAM(S): 17 POWDER, FOR SOLUTION ORAL at 11:45

## 2021-12-12 RX ADMIN — BUDESONIDE AND FORMOTEROL FUMARATE DIHYDRATE 2 PUFF(S): 160; 4.5 AEROSOL RESPIRATORY (INHALATION) at 07:07

## 2021-12-12 RX ADMIN — Medication 650 MILLIGRAM(S): at 22:00

## 2021-12-12 RX ADMIN — CHLORHEXIDINE GLUCONATE 1 APPLICATION(S): 213 SOLUTION TOPICAL at 13:13

## 2021-12-12 RX ADMIN — GABAPENTIN 100 MILLIGRAM(S): 400 CAPSULE ORAL at 21:13

## 2021-12-12 RX ADMIN — HEPARIN SODIUM 7500 UNIT(S): 5000 INJECTION INTRAVENOUS; SUBCUTANEOUS at 13:13

## 2021-12-12 RX ADMIN — SENNA PLUS 2 TABLET(S): 8.6 TABLET ORAL at 21:15

## 2021-12-12 RX ADMIN — Medication 20 MILLIGRAM(S): at 11:50

## 2021-12-12 RX ADMIN — BUDESONIDE AND FORMOTEROL FUMARATE DIHYDRATE 2 PUFF(S): 160; 4.5 AEROSOL RESPIRATORY (INHALATION) at 23:12

## 2021-12-12 RX ADMIN — AMLODIPINE BESYLATE 10 MILLIGRAM(S): 2.5 TABLET ORAL at 06:03

## 2021-12-12 RX ADMIN — GABAPENTIN 100 MILLIGRAM(S): 400 CAPSULE ORAL at 06:03

## 2021-12-12 RX ADMIN — Medication 100 MILLIGRAM(S): at 13:15

## 2021-12-12 RX ADMIN — HEPARIN SODIUM 7500 UNIT(S): 5000 INJECTION INTRAVENOUS; SUBCUTANEOUS at 21:15

## 2021-12-12 NOTE — PROGRESS NOTE ADULT - SUBJECTIVE AND OBJECTIVE BOX
CHIEF COMPLAINT:Patient is an 86F who presented with a chief complaint of SOB    INTERVAL EVENTS: Seen by bedside this AM. No events reported overnight.    ROS: No fever; No chills; No night sweats; No weight loss; No fatigue; No cough; No wheezing; No hemoptysis; No shortness of breath; No sputum production; No chest pain; No palpitations; No leg swelling; No abdominal pain; No nausea; No vomiting; No hematemesis; No diarrhea; No constipation. No melena or hematochezia. All other systems negative.    OBJECTIVE:  ICU Vital Signs Last 24 Hrs  T(C): 37 (12 Dec 2021 06:30), Max: 37.2 (11 Dec 2021 14:24)  T(F): 98.6 (12 Dec 2021 06:30), Max: 99 (11 Dec 2021 14:24)  HR: 84 (12 Dec 2021 07:06) (73 - 87)  BP: 140/60 (12 Dec 2021 06:30) (120/54 - 140/60)  BP(mean): --  ABP: --  ABP(mean): --  RR: 17 (12 Dec 2021 06:30) (17 - 18)  SpO2: 98% (12 Dec 2021 07:06) (93% - 100%)      Mode: standby    I&O's Summary    11 Dec 2021 07:01  -  12 Dec 2021 07:00  --------------------------------------------------------  IN: 0 mL / OUT: 800 mL / NET: -800 mL      CAPILLARY BLOOD GLUCOSE    CAPILLARY BLOOD GLUCOSE      POCT Blood Glucose.: 144 mg/dL (11 Dec 2021 21:09)  POCT Blood Glucose.: 183 mg/dL (11 Dec 2021 16:13)  POCT Blood Glucose.: 166 mg/dL (11 Dec 2021 12:08)  POCT Blood Glucose.: 166 mg/dL (11 Dec 2021 08:04)    HOSPITAL MEDICATIONS:  MEDICATIONS  (STANDING):  albuterol/ipratropium for Nebulization 3 milliLiter(s) Nebulizer every 6 hours  amLODIPine   Tablet 10 milliGRAM(s) Oral daily  atorvastatin 40 milliGRAM(s) Oral at bedtime  budesonide 160 MICROgram(s)/formoterol 4.5 MICROgram(s) Inhaler 2 Puff(s) Inhalation two times a day  chlorhexidine 2% Cloths 1 Application(s) Topical daily  dextrose 40% Gel 15 Gram(s) Oral once  dextrose 5%. 1000 milliLiter(s) (50 mL/Hr) IV Continuous <Continuous>  dextrose 5%. 1000 milliLiter(s) (100 mL/Hr) IV Continuous <Continuous>  dextrose 50% Injectable 25 Gram(s) IV Push once  dextrose 50% Injectable 12.5 Gram(s) IV Push once  dextrose 50% Injectable 25 Gram(s) IV Push once  gabapentin 100 milliGRAM(s) Oral three times a day  glucagon  Injectable 1 milliGRAM(s) IntraMuscular once  heparin   Injectable 7500 Unit(s) SubCutaneous every 8 hours  hydrALAZINE 100 milliGRAM(s) Oral three times a day  insulin lispro (ADMELOG) corrective regimen sliding scale   SubCutaneous three times a day before meals  insulin lispro (ADMELOG) corrective regimen sliding scale   SubCutaneous at bedtime  metoprolol succinate  milliGRAM(s) Oral daily  montelukast 10 milliGRAM(s) Oral daily  polyethylene glycol 3350 17 Gram(s) Oral daily  senna 2 Tablet(s) Oral at bedtime  sodium chloride 0.45%. 500 milliLiter(s) (40 mL/Hr) IV Continuous <Continuous>    MEDICATIONS  (PRN):  acetaminophen     Tablet .. 650 milliGRAM(s) Oral every 6 hours PRN Mild Pain (1 - 3), Moderate Pain (4 - 6)      LABS:                            8.9    9.25  )-----------( 216      ( 12 Dec 2021 05:16 )             33.0     12-12    140  |  95<L>  |  33<H>  ----------------------------<  120<H>  3.9   |  39<H>  |  0.93    Ca    9.4      12 Dec 2021 05:16  Phos  3.1     12-12  Mg     2.60     12-12    Arterial Blood Gas:  12-09 @ 11:47  7.43/70/55/46/89.7/18.2  ABG lactate: --

## 2021-12-12 NOTE — PROGRESS NOTE ADULT - SUBJECTIVE AND OBJECTIVE BOX
CC: no events overnight    TELEMETRY:     PHYSICAL EXAM:    T(C): 37 (12-12-21 @ 06:30), Max: 37.2 (12-11-21 @ 14:24)  HR: 84 (12-12-21 @ 07:06) (77 - 87)  BP: 140/60 (12-12-21 @ 06:30) (120/54 - 140/60)  RR: 17 (12-12-21 @ 06:30) (17 - 18)  SpO2: 98% (12-12-21 @ 07:06) (93% - 100%)  Wt(kg): --  I&O's Summary    11 Dec 2021 07:01  -  12 Dec 2021 07:00  --------------------------------------------------------  IN: 0 mL / OUT: 800 mL / NET: -800 mL        Appearance: Normal	  Cardiovascular: Normal S1 S2,RRR, No JVD, No murmurs  Respiratory: Lungs clear to auscultation	  Gastrointestinal:  Soft, Non-tender, + BS	  Extremities: Normal range of motion, No clubbing, cyanosis or edema  Vascular: Peripheral pulses palpable 2+ bilaterally     LABS:	 	                          8.9    9.25  )-----------( 216      ( 12 Dec 2021 05:16 )             33.0     12-12    140  |  95<L>  |  33<H>  ----------------------------<  120<H>  3.9   |  39<H>  |  0.93    Ca    9.4      12 Dec 2021 05:16  Phos  3.1     12-12  Mg     2.60     12-12            CARDIAC MARKERS:

## 2021-12-12 NOTE — PROGRESS NOTE ADULT - ATTENDING COMMENTS
agree with above  stable on nocturnal avaps and daytime 2lpm  restart lasix at 20 daily  d/c planning

## 2021-12-12 NOTE — PROGRESS NOTE ADULT - ASSESSMENT
85y/o F w/ h/o HFpEF, obesity, SHDA on CPAP, HTN, T2DM, COPD, lymphedema, s/p fall and knee fracture p/w AMS and facial droop, MICU consulted for increased work of breathing on Bipap and elevated pCO2, found with hypercapnic respiratory failure c/b AMS improved on BIPAP.      Problem/Plan - 1:  ·  Problem: Acute on chronic respiratory failure with hypercapnia.   ·  Plan: - acute on chronic hypercapnic respiratory failure likely in setting of COPD/SHAD overlap exacerbation with possible PNA on CXR, HFpEF exacerbation given fluid overload on imaging (MICU POCUS with b-lines)  - HFpEF: c/w lasix, strict Is/OS. cards following recent echo with HFpEF and grade I DD  - COPD exacerbation: c/w AVAPs qHS. will consider steroids as well. c/w duonebs q6. c/w azithro. Pulmonology c/s 12/4: AVAPs settings adjusted but persistently hypoxic  - PNA: c/w zosyn. S/P Azithromycin  ***RRT called AM of 12/6, for ams 2/2 severe hypoxia (40-50s) d/t bipap being disconnected.   Discussed GOC with pts daughter, Marina, at the time, who wanted everything to be done, including intubation if needed. Pt is FULL CODE.  Transferred to RCU from Medicine 12/6  - Breathing comfortably on 2L NC O2, AVAPS QHS and PRN     Problem/Plan - 2:  ·  Problem: AMS (altered mental status).   ·  Plan: Resolved w/improved resp status  - likely 2/2 acute on chronic hypercapnic respiratory failure  - neuro following  - CTH negative for acute ICH or infarct  - c.w statin  - recent echo wnl.     Problem/Plan - 3:  ·  Problem: Chronic heart failure with preserved ejection fraction (HFpEF).   ·  Plan: -recent echo with HFpEF and grade I DD  - s/p lasix BID 12/3  - strict Is/Os monitor output  - cards following: recent echo with grade 1 diastolic dysfunction  - c/w lasix with plan to transition to PO  - c/w metoprolol, amlodipine, statin. on home bumex but IV lasix inpatient, f/u Is/O  - Cardiology recs appreciated  - 12/8/2021 transitioned to PO Lasix 40 mg   - Previous abg's with met alkalosis, patient over diuresed - hold lasix for now  - s/p 500 ml  0.45% NS over 12 hours  - Restart Lasix if HCO3 improved     Problem/Plan - 4:  ·  Problem: SHAD and COPD overlap syndrome.   ·  Plan: - c/w AVAPS qHS and PRN for AMS  - NC during day to maintain O2 sat 88-92 given COPD  - c/w duonebs standing,  zosyn for possible PNA  - 12/8/2021: s/p 5 days of IV Solu-medrol. No further steroids indicated at this time.   - 12/9 patient with increased shortness of breath and hypoxia. Placed on AVAPS. Will attempt to bring patient back down to nasal cannula later today. ABG obtained and reviewed. US of b/l LE dopplers ordered to r/o DVT. D-dimer ordered. CXR ordered.   -12/11: breathing well on nasal canula, will attempt to wean O2 to 2L, maintain O2 sat 92%  -12/12: Continues to do well on 2L NC --> continue to wean as tolerated for goal SpO2 > 92%     Problem/Plan - 5:  ·  Problem: Pneumonia.   ·  Plan: - c/w zosyn,   - s/p Azithromycin  - s/p zosyn     Problem/Plan - 6:  ·  Problem: Acute-on-chronic kidney injury.   ·  Plan: - Cr initially elevated to 2s, baseline in 1s  - workup: renal lytes FeUrea c/w intrinsic renal disease, making good outputs -1.9 L O/N 12/4-12/5. Check UA and renal/bladder US  - suspect JOSEPH due to underlying medical condition: HFpEF exacerbation and fluid overload worsening renal perfusion. Now improved s/p diuresis and oxygenation on AVAPs  - hyperkalemia was treated with insulin and D50, with improvement of K  - Cr downtrended to 1s with diuresis and medical treatment.  - Improved     Problem/Plan - 7:  ·  Problem: Hyperkalemia.   ·  Plan: Resolved.     Problem/Plan - 8:  ·  Problem: Elevated troponin level.   ·  Plan: - initital trops 42 - 44  - cardiology following  - recent echo with grade I DD  - c/w diuresis as above, likely type II NSTEMI.     Problem/Plan - 9:  ·  Problem: T2DM (type 2 diabetes mellitus).   ·  Plan: - a1c 6.9 11/2021  - c/w AISS premeal and qHS  - CC diet.     Problem/Plan - 10:  ·  Problem: Prophylactic measure.   ·  Plan; - DVT PPx: HSQ  - Dispo: PT recommending rehab as of 12/10 87y/o F w/ h/o HFpEF, obesity, SHAD on CPAP, HTN, T2DM, COPD, lymphedema, s/p fall and knee fracture p/w AMS and facial droop, MICU consulted for increased work of breathing on Bipap and elevated pCO2, found with hypercapnic respiratory failure c/b AMS improved on BIPAP.      Problem/Plan - 1:  ·  Problem: Acute on chronic respiratory failure with hypercapnia.   ·  Plan: - acute on chronic hypercapnic respiratory failure likely in setting of COPD/SHAD overlap exacerbation with possible PNA on CXR, HFpEF exacerbation given fluid overload on imaging (MICU POCUS with b-lines)  - HFpEF: c/w lasix, strict Is/OS. cards following recent echo with HFpEF and grade I DD  - COPD exacerbation: c/w AVAPs qHS. will consider steroids as well. c/w duonebs q6. c/w azithro. Pulmonology c/s 12/4: AVAPs settings adjusted but persistently hypoxic  - PNA: c/w zosyn. S/P Azithromycin  ***RRT called AM of 12/6, for ams 2/2 severe hypoxia (40-50s) d/t bipap being disconnected.   Discussed GOC with pts daughter, Marina, at the time, who wanted everything to be done, including intubation if needed. Pt is FULL CODE.  Transferred to RCU from Medicine 12/6  - Breathing comfortably on 2L NC O2, AVAPS QHS and PRN     Problem/Plan - 2:  ·  Problem: AMS (altered mental status).   ·  Plan: Resolved w/improved resp status  - likely 2/2 acute on chronic hypercapnic respiratory failure  - neuro following  - CTH negative for acute ICH or infarct  - c.w statin  - recent echo wnl.     Problem/Plan - 3:  ·  Problem: Chronic heart failure with preserved ejection fraction (HFpEF).   ·  Plan: -recent echo with HFpEF and grade I DD  - s/p lasix BID 12/3  - strict Is/Os monitor output  - cards following: recent echo with grade 1 diastolic dysfunction  - c/w lasix with plan to transition to PO  - c/w metoprolol, amlodipine, statin. on home bumex but IV lasix inpatient, f/u Is/O  - Cardiology recs appreciated  - 12/8/2021 transitioned to PO Lasix 40 mg   - Previous abg's with met alkalosis, patient over diuresed - hold lasix for now  - s/p 500 ml  0.45% NS over 12 hours  - Restart Lasix 20mg qD for fluid management 12/12     Problem/Plan - 4:  ·  Problem: SHAD and COPD overlap syndrome.   ·  Plan: - c/w AVAPS qHS and PRN for AMS  - NC during day to maintain O2 sat 88-92 given COPD  - c/w duonebs standing,  zosyn for possible PNA  - 12/8/2021: s/p 5 days of IV Solu-medrol. No further steroids indicated at this time.   - 12/9 patient with increased shortness of breath and hypoxia. Placed on AVAPS. Will attempt to bring patient back down to nasal cannula later today. ABG obtained and reviewed. US of b/l LE dopplers ordered to r/o DVT. D-dimer ordered. CXR ordered.   -12/11: breathing well on nasal canula, will attempt to wean O2 to 2L, maintain O2 sat 92%  -12/12: Continues to do well on 2L NC with AVAPs qHS --> continue to wean as tolerated for goal SpO2 > 92%     Problem/Plan - 5:  ·  Problem: Pneumonia.   ·  Plan: - c/w zosyn,   - s/p Azithromycin  - s/p zosyn     Problem/Plan - 6:  ·  Problem: Acute-on-chronic kidney injury.   ·  Plan: - Cr initially elevated to 2s, baseline in 1s  - workup: renal lytes FeUrea c/w intrinsic renal disease, making good outputs -1.9 L O/N 12/4-12/5. Check UA and renal/bladder US  - suspect JOSEPH due to underlying medical condition: HFpEF exacerbation and fluid overload worsening renal perfusion. Now improved s/p diuresis and oxygenation on AVAPs  - hyperkalemia was treated with insulin and D50, with improvement of K  - Cr downtrended to 1s with diuresis and medical treatment.  - Improved     Problem/Plan - 7:  ·  Problem: Hyperkalemia.   ·  Plan: Resolved.     Problem/Plan - 8:  ·  Problem: Elevated troponin level.   ·  Plan: - initital trops 42 - 44  - cardiology following  - recent echo with grade I DD  - c/w diuresis as above, likely type II NSTEMI.     Problem/Plan - 9:  ·  Problem: T2DM (type 2 diabetes mellitus).   ·  Plan: - a1c 6.9 11/2021  - c/w AISS premeal and qHS  - CC diet.     Problem/Plan - 10:  ·  Problem: Prophylactic measure.   ·  Plan; - DVT PPx: HSQ  - Dispo: PT recommending rehab as of 12/10

## 2021-12-12 NOTE — PROGRESS NOTE ADULT - ASSESSMENT
Echo 11/19/21: EF 60-65%, nl lv sys fx, mild AS, mild Ms   Echo 6/2/21: Min MR, grossly nl lv sys fx   Echo 12/5/20: grossly nl lv sys fx    a/p  85 y/o female w pmhx of HFpEF, obesity, SHAD on CPAP, HTN, T2DM, COPD, lymphedema, s/p fall and knee fracture s/p  knee surgery, presents to the ED with AMS and facial droop.    #AMS  -in setting of hypercapnic resp failure  -s/p RRT 12/6 for hypoxia, AMS- pt noted to be connected incorrectly to biPAP. Xray - No PTX. BiPAP circuit was fixed and  mental status/ sat improved to 100%.   -now NC intermittent on AVAPS - management per RCU team  -repeat chest xray12/6  noted-   -diuretics per RCU    #Hypercapnic respiratory failure, hypoxic resp failure  -some component of pulmonary edema  -AVAPs per primary team  -S/P iv diuresis-  -diuretics held fort metab alkalosis  -resume per RCU    #acute/Chronic Diastolic CHF  -pt w chronic Lymphedema  -volume status stable   -c-diuretics held as above  -s/p bipap per pulm/med  -recent Echo w grossly nl lv sys fx   -cont bb     #HTN  -stable  -cont current meds     dvt ppx

## 2021-12-13 LAB
GLUCOSE BLDC GLUCOMTR-MCNC: 144 MG/DL — HIGH (ref 70–99)
GLUCOSE BLDC GLUCOMTR-MCNC: 148 MG/DL — HIGH (ref 70–99)
GLUCOSE BLDC GLUCOMTR-MCNC: 163 MG/DL — HIGH (ref 70–99)
GLUCOSE BLDC GLUCOMTR-MCNC: 175 MG/DL — HIGH (ref 70–99)

## 2021-12-13 PROCEDURE — 73562 X-RAY EXAM OF KNEE 3: CPT | Mod: 26,RT

## 2021-12-13 PROCEDURE — 99233 SBSQ HOSP IP/OBS HIGH 50: CPT | Mod: GC

## 2021-12-13 PROCEDURE — 73590 X-RAY EXAM OF LOWER LEG: CPT | Mod: 26,RT

## 2021-12-13 RX ORDER — FUROSEMIDE 40 MG
40 TABLET ORAL DAILY
Refills: 0 | Status: DISCONTINUED | OUTPATIENT
Start: 2021-12-13 | End: 2021-12-17

## 2021-12-13 RX ORDER — TRAMADOL HYDROCHLORIDE 50 MG/1
25 TABLET ORAL EVERY 6 HOURS
Refills: 0 | Status: DISCONTINUED | OUTPATIENT
Start: 2021-12-13 | End: 2021-12-17

## 2021-12-13 RX ADMIN — HEPARIN SODIUM 7500 UNIT(S): 5000 INJECTION INTRAVENOUS; SUBCUTANEOUS at 21:51

## 2021-12-13 RX ADMIN — Medication 1: at 12:04

## 2021-12-13 RX ADMIN — GABAPENTIN 100 MILLIGRAM(S): 400 CAPSULE ORAL at 13:51

## 2021-12-13 RX ADMIN — ATORVASTATIN CALCIUM 40 MILLIGRAM(S): 80 TABLET, FILM COATED ORAL at 21:51

## 2021-12-13 RX ADMIN — Medication 40 MILLIGRAM(S): at 11:56

## 2021-12-13 RX ADMIN — Medication 3 MILLILITER(S): at 10:45

## 2021-12-13 RX ADMIN — Medication 100 MILLIGRAM(S): at 05:27

## 2021-12-13 RX ADMIN — GABAPENTIN 100 MILLIGRAM(S): 400 CAPSULE ORAL at 05:27

## 2021-12-13 RX ADMIN — GABAPENTIN 100 MILLIGRAM(S): 400 CAPSULE ORAL at 21:46

## 2021-12-13 RX ADMIN — Medication 20 MILLIGRAM(S): at 05:37

## 2021-12-13 RX ADMIN — HEPARIN SODIUM 7500 UNIT(S): 5000 INJECTION INTRAVENOUS; SUBCUTANEOUS at 05:28

## 2021-12-13 RX ADMIN — Medication 100 MILLIGRAM(S): at 21:50

## 2021-12-13 RX ADMIN — HEPARIN SODIUM 7500 UNIT(S): 5000 INJECTION INTRAVENOUS; SUBCUTANEOUS at 13:53

## 2021-12-13 RX ADMIN — Medication 650 MILLIGRAM(S): at 11:59

## 2021-12-13 RX ADMIN — SENNA PLUS 2 TABLET(S): 8.6 TABLET ORAL at 21:52

## 2021-12-13 RX ADMIN — CHLORHEXIDINE GLUCONATE 1 APPLICATION(S): 213 SOLUTION TOPICAL at 11:55

## 2021-12-13 RX ADMIN — AMLODIPINE BESYLATE 10 MILLIGRAM(S): 2.5 TABLET ORAL at 05:36

## 2021-12-13 RX ADMIN — BUDESONIDE AND FORMOTEROL FUMARATE DIHYDRATE 2 PUFF(S): 160; 4.5 AEROSOL RESPIRATORY (INHALATION) at 08:50

## 2021-12-13 RX ADMIN — POLYETHYLENE GLYCOL 3350 17 GRAM(S): 17 POWDER, FOR SOLUTION ORAL at 11:56

## 2021-12-13 RX ADMIN — Medication 100 MILLIGRAM(S): at 13:51

## 2021-12-13 RX ADMIN — BUDESONIDE AND FORMOTEROL FUMARATE DIHYDRATE 2 PUFF(S): 160; 4.5 AEROSOL RESPIRATORY (INHALATION) at 22:10

## 2021-12-13 RX ADMIN — Medication 3 MILLILITER(S): at 22:11

## 2021-12-13 RX ADMIN — MONTELUKAST 10 MILLIGRAM(S): 4 TABLET, CHEWABLE ORAL at 11:56

## 2021-12-13 NOTE — PROGRESS NOTE ADULT - SUBJECTIVE AND OBJECTIVE BOX
CARDIOLOGY FOLLOW UP - Dr. Bermudez  Date of Service: 12/13/21  CC: no cp/sob     Review of Systems:  Constitutional: No fever, weight loss, or fatigue  Respiratory: No cough, wheezing, or hemoptysis, no shortness of breath  Cardiovascular: No chest pain, palpitations, passing out, dizziness, or leg swelling  Gastrointestinal: No abd or epigastric pain.  No nausea, vomiting, or hematemesis; no diarrhea or constipation, no melena or hematochezia  Vascular: no edema       PHYSICAL EXAM:  T(C): 37.1 (12-13-21 @ 06:00), Max: 37.4 (12-12-21 @ 13:26)  HR: 76 (12-13-21 @ 06:52) (74 - 89)  BP: 127/67 (12-13-21 @ 06:00) (121/54 - 141/62)  RR: 16 (12-13-21 @ 06:00) (16 - 19)  SpO2: 95% (12-13-21 @ 06:52) (95% - 100%)  Wt(kg): --  I&O's Summary    12 Dec 2021 07:01  -  13 Dec 2021 07:00  --------------------------------------------------------  IN: 250 mL / OUT: 800 mL / NET: -550 mL        Appearance: Normal	  Cardiovascular: Normal S1 S2,RRR, No JVD, No murmurs  Respiratory: Lungs clear to auscultation	  Gastrointestinal:  Soft, Non-tender, + BS	  Extremities: Normal range of motion, No clubbing, cyanosis or edema      Home Medications:  Advair Diskus 250 mcg-50 mcg inhalation powder: 1 puff(s) inhaled 2 times a day (23 Nov 2021 14:10)  alendronate 70 mg oral tablet: 1 tab(s) orally once a week (23 Nov 2021 14:10)  ascorbic acid 500 mg oral capsule: 1 cap(s) orally once a day (30 Oct 2019 16:54)  azelastine 137 mcg/inh (0.1%) nasal spray: 2 spray(s) nasal 2 times a day (30 Oct 2019 16:54)  glimepiride 2 mg oral tablet: 1 tab(s) orally once a day (30 Oct 2019 16:54)  ipratropium-albuterol 0.5 mg-2.5 mg/3 mLinhalation solution: 3 milliliter(s) inhaled every 6 hours, As Needed (04 Jun 2021 10:05)  montelukast 10 mg oral tablet: 1 tab(s) orally once a day (30 Oct 2019 16:54)  Neurontin 100 mg oral capsule: 1 cap(s) orally 3 times a day (29 Nov 2020 18:19)  ocular lubricant ophthalmic solution: 1 drop(s) to each affected eye 3 times a day, As needed, Dry Eyes (06 Nov 2019 13:41)  ProAir HFA 90 mcg/inh inhalation aerosol: 2 puff(s) inhaled 4 times a day, As Needed (23 Nov 2021 14:10)      MEDICATIONS  (STANDING):  albuterol/ipratropium for Nebulization 3 milliLiter(s) Nebulizer every 12 hours  amLODIPine   Tablet 10 milliGRAM(s) Oral daily  atorvastatin 40 milliGRAM(s) Oral at bedtime  budesonide 160 MICROgram(s)/formoterol 4.5 MICROgram(s) Inhaler 2 Puff(s) Inhalation two times a day  chlorhexidine 2% Cloths 1 Application(s) Topical daily  dextrose 40% Gel 15 Gram(s) Oral once  dextrose 5%. 1000 milliLiter(s) (50 mL/Hr) IV Continuous <Continuous>  dextrose 5%. 1000 milliLiter(s) (100 mL/Hr) IV Continuous <Continuous>  dextrose 50% Injectable 25 Gram(s) IV Push once  dextrose 50% Injectable 25 Gram(s) IV Push once  dextrose 50% Injectable 12.5 Gram(s) IV Push once  furosemide    Tablet 20 milliGRAM(s) Oral daily  gabapentin 100 milliGRAM(s) Oral three times a day  glucagon  Injectable 1 milliGRAM(s) IntraMuscular once  heparin   Injectable 7500 Unit(s) SubCutaneous every 8 hours  hydrALAZINE 100 milliGRAM(s) Oral three times a day  insulin lispro (ADMELOG) corrective regimen sliding scale   SubCutaneous three times a day before meals  insulin lispro (ADMELOG) corrective regimen sliding scale   SubCutaneous at bedtime  metoprolol succinate  milliGRAM(s) Oral daily  montelukast 10 milliGRAM(s) Oral daily  polyethylene glycol 3350 17 Gram(s) Oral daily  senna 2 Tablet(s) Oral at bedtime  sodium chloride 0.45%. 500 milliLiter(s) (40 mL/Hr) IV Continuous <Continuous>      TELEMETRY: 	    ECG:  	  RADIOLOGY:   DIAGNOSTIC TESTING:  [ ] Echocardiogram:  [ ]  Catheterization:  [ ] Stress Test:    OTHER: 	    LABS:	 	                            8.9    9.25  )-----------( 216      ( 12 Dec 2021 05:16 )             33.0     12-12    140  |  95<L>  |  33<H>  ----------------------------<  120<H>  3.9   |  39<H>  |  0.93    Ca    9.4      12 Dec 2021 05:16  Phos  3.1     12-12  Mg     2.60     12-12

## 2021-12-13 NOTE — PROGRESS NOTE ADULT - SUBJECTIVE AND OBJECTIVE BOX
[facility]      Seen earlier today, and examined.  - Today, patient is without complaints.           *****MEDICATIONS: Current medication reviewed and documented.    MEDICATIONS  (STANDING):  albuterol/ipratropium for Nebulization 3 milliLiter(s) Nebulizer every 12 hours  amLODIPine   Tablet 10 milliGRAM(s) Oral daily  atorvastatin 40 milliGRAM(s) Oral at bedtime  budesonide 160 MICROgram(s)/formoterol 4.5 MICROgram(s) Inhaler 2 Puff(s) Inhalation two times a day  chlorhexidine 2% Cloths 1 Application(s) Topical daily  dextrose 40% Gel 15 Gram(s) Oral once  dextrose 5%. 1000 milliLiter(s) (50 mL/Hr) IV Continuous <Continuous>  dextrose 5%. 1000 milliLiter(s) (100 mL/Hr) IV Continuous <Continuous>  dextrose 50% Injectable 25 Gram(s) IV Push once  dextrose 50% Injectable 12.5 Gram(s) IV Push once  dextrose 50% Injectable 25 Gram(s) IV Push once  furosemide    Tablet 40 milliGRAM(s) Oral daily  gabapentin 100 milliGRAM(s) Oral three times a day  glucagon  Injectable 1 milliGRAM(s) IntraMuscular once  heparin   Injectable 7500 Unit(s) SubCutaneous every 8 hours  hydrALAZINE 100 milliGRAM(s) Oral three times a day  insulin lispro (ADMELOG) corrective regimen sliding scale   SubCutaneous three times a day before meals  insulin lispro (ADMELOG) corrective regimen sliding scale   SubCutaneous at bedtime  metoprolol succinate  milliGRAM(s) Oral daily  montelukast 10 milliGRAM(s) Oral daily  polyethylene glycol 3350 17 Gram(s) Oral daily  senna 2 Tablet(s) Oral at bedtime  sodium chloride 0.45%. 500 milliLiter(s) (40 mL/Hr) IV Continuous <Continuous>    MEDICATIONS  (PRN):  acetaminophen     Tablet .. 650 milliGRAM(s) Oral every 6 hours PRN Mild Pain (1 - 3), Moderate Pain (4 - 6)  traMADol 25 milliGRAM(s) Oral every 6 hours PRN Severe Pain (7 - 10)          ***** VITAL SIGNS:  T(F): 99 (21 @ 17:00), Max: 99 (21 @ 17:00)  HR: 70 (12-13-21 @ 19:32) (70 - 89)  BP: 124/55 (21 @ 17:00) (121/54 - 127/67)  RR: 16 (21 @ 17:00) (16 - 16)  SpO2: 98% (21 @ 19:32) (95% - 100%)  Wt(kg): --  ,   I&O's Summary    12 Dec 2021 07:01  -  13 Dec 2021 07:00  --------------------------------------------------------  IN: 250 mL / OUT: 800 mL / NET: -550 mL             *****PHYSICAL EXAM:   alert oriented x 3 attention comprehension are fair.  Able to name, repeat.   EOmi fundi not visualized   no nystagmus VFF to confrontation  Tongue is midline  Palate elevates symmetrically   Moving all 4 ext spontaneously no drift appreciated    Gait not assessed.            *****LAB AND IMAGIN.9    9.25  )-----------( 216      ( 12 Dec 2021 05:16 )             33.0               12-12    140  |  95<L>  |  33<H>  ----------------------------<  120<H>  3.9   |  39<H>  |  0.93    Ca    9.4      12 Dec 2021 05:16  Phos  3.1     12-12  Mg     2.60     12-12                           [All pertinent recent Imaging/Reports reviewed]           *****A S S E S S M E N T   A N D   P L A N :      Pt is an 87 y/o woman w/ a PMH of asthma/COPD, CHF, on aspirin, HTN, lymphedema, DM2, and past surgical history of knee surgery, presents to the [facility] ED as a code stroke after home aide/daughter activated EMS due to patient's lack of responsiveness with LWK 2:30a 21. Neurological exam does not demonstrate focality. CTH noncon pending. Neurovascular imaging deferred given JOSEPH and low suspicion of LVO at this time. Patient out of window for tpa. Patient not a candidate for mechanical thrombectomy given low suspicion of LVO.    Impression: toxic metabolic encephalopathy likely in setting of acidosis and hypercapnea    Recommendations:  [ ] f/u CTH noncon when stable   [ ] a1c, lipid profile  [ ] c/w aspirin 81mg daily  [ ] c/w rosuvastatin 10mg daily  [ ] infectious and metabolic workup per primary team             pt seen and evaluated at bedside  briefly pt with recent knee surgery immobilization presents with ams, hypercapnia   now improved after bipap   obesity hypoventilation   cannot r/o sequelae of immobility   ct head neg  nonfocal exam   keep pco2 <50    pt titi curran.w brother at bedside         Thank you for allowing me to participate in the care of this patient. Will continue to follow patient periodically. Please do not hesitate to call me if you have any  questions or if there has been a change in patients neurological status     ________________  Constance Mars MD  [facility]rological [facility] ([facility]  [phone]      33 minutes spent on total encounter; more than 50 % of the visit was  spent counseling about plan of care, compliance to diet/exercise and medication regimen and or  coordinating care by the attending physician.      It is advised that stroke patients follow up with SRIDHAR Judd @ [phone] in 1- 2 weeks.   Others please follow up with Dr. Michael Nissenbaum [phone]

## 2021-12-13 NOTE — PROGRESS NOTE ADULT - ASSESSMENT
Echo 11/19/21: EF 60-65%, nl lv sys fx, mild AS, mild Ms   Echo 6/2/21: Min MR, grossly nl lv sys fx   Echo 12/5/20: grossly nl lv sys fx    a/p  87 y/o female w pmhx of HFpEF, obesity, SHAD on CPAP, HTN, T2DM, COPD, lymphedema, s/p fall and knee fracture s/p  knee surgery, presents to the ED with AMS and facial droop.    #AMS  -in setting of hypercapnic resp failure  -s/p RRT 12/6 for hypoxia, AMS- pt noted to be connected incorrectly to biPAP. Xray - No PTX. BiPAP circuit was fixed and  mental status/ sat improved to 100%.   -now NC intermittent on AVAPS - management per RCU team  -repeat chest xray12/6  noted-    #Hypercapnic respiratory failure, hypoxic resp failure  -some component of pulmonary edema  -AVAPs per primary team  -cont PO lasix     #acute/Chronic Diastolic CHF  -pt w chronic Lymphedema  -volume status stable   -s/p bipap per pulm/med  -recent Echo w grossly nl lv sys fx   -cont bb , lasix     #HTN  -stable  -cont current meds     dvt ppx

## 2021-12-13 NOTE — PROGRESS NOTE ADULT - RS GEN PE MLT RESP DETAILS PC
airway patent/breath sounds equal/no wheezes
airway patent/breath sounds equal
airway patent/good air movement/respirations non-labored/clear to auscultation bilaterally
airway patent/breath sounds equal/good air movement/respirations non-labored
airway patent/breath sounds equal/good air movement
airway patent/breath sounds equal

## 2021-12-13 NOTE — PROGRESS NOTE ADULT - CVS HE PE MLT D E PC
no rub/no murmur
regular rate and rhythm
regular rate and rhythm
regular rate and rhythm/no rub
regular rate and rhythm/no murmur
regular rate and rhythm

## 2021-12-13 NOTE — PROGRESS NOTE ADULT - GASTROINTESTINAL DETAILS
soft/nontender/no distention/bowel sounds normal
soft/nontender/no distention
soft/nontender
soft/nontender/no distention

## 2021-12-13 NOTE — PROGRESS NOTE ADULT - ATTENDING COMMENTS
seen and examined with acp, agree with above    chronic respiratory failure with hypoxia and hypercapnea secondary to COPD. Failed bilevel 16/8cmH20  doing very well with NIV   pt will benefit from home NIV to improve respiratory mechanics and decrease hospital admissions  pt will also continue to benefit from the home oxygen that she already has in place

## 2021-12-13 NOTE — CHART NOTE - NSCHARTNOTEFT_GEN_A_CORE
Spoke with ortho surgery for R knee pain and evaluation of NWB R leg. Currently in knee immobilizer and has been in knee immobilizer since Nov 18th. Ortho recommending xray knee and tib fib (ordered) then to reconsult. Will follow up on xrays and reconsult.     -Yuan HEATH [id number]

## 2021-12-13 NOTE — PROGRESS NOTE ADULT - SUBJECTIVE AND OBJECTIVE BOX
CHIEF COMPLAINT: Patient is a 86y old  Female who presents with a chief complaint of SOB.      Interval Events:      REVIEW OF SYSTEMS:  [ ] All other systems negative  [ ] Unable to assess ROS because ________    Mode: NIV (Noninvasive Ventilation), RR (machine): 16, TV (machine): 450, FiO2: 40, PEEP: 8, ITime: 0.95, P-High: 30, P-Low: 14, PIP: 16      OBJECTIVE:  ICU Vital Signs Last 24 Hrs  T(C): 37.1 (13 Dec 2021 06:00), Max: 37.4 (12 Dec 2021 13:26)  T(F): 98.7 (13 Dec 2021 06:00), Max: 99.3 (12 Dec 2021 13:26)  HR: 76 (13 Dec 2021 06:52) (74 - 89)  BP: 127/67 (13 Dec 2021 06:00) (121/54 - 141/62)  RR: 16 (13 Dec 2021 06:00) (16 - 19)  SpO2: 95% (13 Dec 2021 06:52) (95% - 100%)    Mode: NIV (Noninvasive Ventilation), RR (machine): 16, TV (machine): 450, FiO2: 40, PEEP: 8, ITime: 0.95, P-High: 30, P-Low: 14, PIP: 16    12-12 @ 07:01  -  12-13 @ 07:00  --------------------------------------------------------  IN: 250 mL / OUT: 800 mL / NET: -550 mL      CAPILLARY BLOOD GLUCOSE      POCT Blood Glucose.: 141 mg/dL (12 Dec 2021 21:26)      HOSPITAL MEDICATIONS:  MEDICATIONS  (STANDING):  albuterol/ipratropium for Nebulization 3 milliLiter(s) Nebulizer every 12 hours  amLODIPine   Tablet 10 milliGRAM(s) Oral daily  atorvastatin 40 milliGRAM(s) Oral at bedtime  budesonide 160 MICROgram(s)/formoterol 4.5 MICROgram(s) Inhaler 2 Puff(s) Inhalation two times a day  chlorhexidine 2% Cloths 1 Application(s) Topical daily  dextrose 40% Gel 15 Gram(s) Oral once  dextrose 5%. 1000 milliLiter(s) (50 mL/Hr) IV Continuous <Continuous>  dextrose 5%. 1000 milliLiter(s) (100 mL/Hr) IV Continuous <Continuous>  dextrose 50% Injectable 25 Gram(s) IV Push once  dextrose 50% Injectable 25 Gram(s) IV Push once  dextrose 50% Injectable 12.5 Gram(s) IV Push once  furosemide    Tablet 20 milliGRAM(s) Oral daily  gabapentin 100 milliGRAM(s) Oral three times a day  glucagon  Injectable 1 milliGRAM(s) IntraMuscular once  heparin   Injectable 7500 Unit(s) SubCutaneous every 8 hours  hydrALAZINE 100 milliGRAM(s) Oral three times a day  insulin lispro (ADMELOG) corrective regimen sliding scale   SubCutaneous three times a day before meals  insulin lispro (ADMELOG) corrective regimen sliding scale   SubCutaneous at bedtime  metoprolol succinate  milliGRAM(s) Oral daily  montelukast 10 milliGRAM(s) Oral daily  polyethylene glycol 3350 17 Gram(s) Oral daily  senna 2 Tablet(s) Oral at bedtime  sodium chloride 0.45%. 500 milliLiter(s) (40 mL/Hr) IV Continuous <Continuous>    MEDICATIONS  (PRN):  acetaminophen     Tablet .. 650 milliGRAM(s) Oral every 6 hours PRN Mild Pain (1 - 3), Moderate Pain (4 - 6)      LABS:                        PAST MEDICAL & SURGICAL HISTORY:  HTN (hypertension)    HLD (hyperlipidemia)    Asthma    DM (diabetes mellitus)    GERD (gastroesophageal reflux disease)    Lymphedema    S/P knee replacement        FAMILY HISTORY:      Social History:  Denies smoking/alcohol/illicit drugs (02 Dec 2021 19:54)      RADIOLOGY:  [ ] Reviewed and interpreted by me    PULMONARY FUNCTION TESTS:    EKG: CHIEF COMPLAINT: Patient is a 86y old  Female who presents with a chief complaint of SOB.      Interval Events: No interval events noted overnight.      REVIEW OF SYSTEMS:  Complaining of R leg pain. Denies SOB, CP, abd pain, N/V.  [x] All other systems negative    Mode: NIV (Noninvasive Ventilation), RR (machine): 16, TV (machine): 450, FiO2: 40, PEEP: 8, ITime: 0.95, P-High: 30, P-Low: 14, PIP: 16      OBJECTIVE:  ICU Vital Signs Last 24 Hrs  T(C): 37.1 (13 Dec 2021 06:00), Max: 37.4 (12 Dec 2021 13:26)  T(F): 98.7 (13 Dec 2021 06:00), Max: 99.3 (12 Dec 2021 13:26)  HR: 76 (13 Dec 2021 06:52) (74 - 89)  BP: 127/67 (13 Dec 2021 06:00) (121/54 - 141/62)  RR: 16 (13 Dec 2021 06:00) (16 - 19)  SpO2: 95% (13 Dec 2021 06:52) (95% - 100%)    Mode: NIV (Noninvasive Ventilation), RR (machine): 16, TV (machine): 450, FiO2: 40, PEEP: 8, ITime: 0.95, P-High: 30, P-Low: 14, PIP: 16    12-12 @ 07:01  -  12-13 @ 07:00  --------------------------------------------------------  IN: 250 mL / OUT: 800 mL / NET: -550 mL      CAPILLARY BLOOD GLUCOSE      POCT Blood Glucose.: 141 mg/dL (12 Dec 2021 21:26)      HOSPITAL MEDICATIONS:  MEDICATIONS  (STANDING):  albuterol/ipratropium for Nebulization 3 milliLiter(s) Nebulizer every 12 hours  amLODIPine   Tablet 10 milliGRAM(s) Oral daily  atorvastatin 40 milliGRAM(s) Oral at bedtime  budesonide 160 MICROgram(s)/formoterol 4.5 MICROgram(s) Inhaler 2 Puff(s) Inhalation two times a day  chlorhexidine 2% Cloths 1 Application(s) Topical daily  dextrose 40% Gel 15 Gram(s) Oral once  dextrose 5%. 1000 milliLiter(s) (50 mL/Hr) IV Continuous <Continuous>  dextrose 5%. 1000 milliLiter(s) (100 mL/Hr) IV Continuous <Continuous>  dextrose 50% Injectable 25 Gram(s) IV Push once  dextrose 50% Injectable 25 Gram(s) IV Push once  dextrose 50% Injectable 12.5 Gram(s) IV Push once  furosemide    Tablet 20 milliGRAM(s) Oral daily  gabapentin 100 milliGRAM(s) Oral three times a day  glucagon  Injectable 1 milliGRAM(s) IntraMuscular once  heparin   Injectable 7500 Unit(s) SubCutaneous every 8 hours  hydrALAZINE 100 milliGRAM(s) Oral three times a day  insulin lispro (ADMELOG) corrective regimen sliding scale   SubCutaneous three times a day before meals  insulin lispro (ADMELOG) corrective regimen sliding scale   SubCutaneous at bedtime  metoprolol succinate  milliGRAM(s) Oral daily  montelukast 10 milliGRAM(s) Oral daily  polyethylene glycol 3350 17 Gram(s) Oral daily  senna 2 Tablet(s) Oral at bedtime  sodium chloride 0.45%. 500 milliLiter(s) (40 mL/Hr) IV Continuous <Continuous>    MEDICATIONS  (PRN):  acetaminophen     Tablet .. 650 milliGRAM(s) Oral every 6 hours PRN Mild Pain (1 - 3), Moderate Pain (4 - 6)      LABS:                        PAST MEDICAL & SURGICAL HISTORY:  HTN (hypertension)    HLD (hyperlipidemia)    Asthma    DM (diabetes mellitus)    GERD (gastroesophageal reflux disease)    Lymphedema    S/P knee replacement      FAMILY HISTORY:      Social History:  Denies smoking/alcohol/illicit drugs (02 Dec 2021 19:54)      RADIOLOGY:  [ ] Reviewed and interpreted by me    PULMONARY FUNCTION TESTS:    EKG:

## 2021-12-13 NOTE — PROGRESS NOTE ADULT - ASSESSMENT
85y/o F w/ h/o HFpEF, obesity, SHAD on CPAP, HTN, T2DM, COPD, lymphedema, s/p fall and knee fracture p/w AMS and facial droop, MICU consulted for increased work of breathing on Bipap and elevated pCO2, found with hypercapnic respiratory failure c/b AMS improved on BIPAP.      Problem/Plan - 1:  ·  Problem: Acute on chronic respiratory failure with hypercapnia.   ·  Plan: - acute on chronic hypercapnic respiratory failure likely in setting of COPD/SHAD overlap exacerbation with possible PNA on CXR, HFpEF exacerbation given fluid overload on imaging (MICU POCUS with b-lines)  - HFpEF: c/w lasix, strict Is/OS. cards following recent echo with HFpEF and grade I DD  - COPD exacerbation: c/w AVAPs qHS. will consider steroids as well. c/w duonebs q6. c/w azithro. Pulmonology c/s 12/4: AVAPs settings adjusted but persistently hypoxic  - PNA: c/w zosyn. S/P Azithromycin  ***RRT called AM of 12/6, for ams 2/2 severe hypoxia (40-50s) d/t bipap being disconnected.   Discussed GOC with pts daughter, Marina, at the time, who wanted everything to be done, including intubation if needed. Pt is FULL CODE.  Transferred to RCU from Medicine 12/6  - Breathing comfortably on 2L NC O2, AVAPS QHS and PRN     Problem/Plan - 2:  ·  Problem: AMS (altered mental status).   ·  Plan: Resolved w/improved resp status  - likely 2/2 acute on chronic hypercapnic respiratory failure  - neuro following  - CTH negative for acute ICH or infarct  - c.w statin  - recent echo wnl.     Problem/Plan - 3:  ·  Problem: Chronic heart failure with preserved ejection fraction (HFpEF).   ·  Plan: -recent echo with HFpEF and grade I DD  - s/p lasix BID 12/3  - strict Is/Os monitor output  - cards following: recent echo with grade 1 diastolic dysfunction  - c/w lasix with plan to transition to PO  - c/w metoprolol, amlodipine, statin. on home bumex but IV lasix inpatient, f/u Is/O  - Cardiology recs appreciated  - 12/8/2021 transitioned to PO Lasix 40 mg   - Previous abg's with met alkalosis, patient over diuresed - hold lasix for now  - s/p 500 ml  0.45% NS over 12 hours  - Restart Lasix 20mg qD for fluid management 12/12     Problem/Plan - 4:  ·  Problem: SHAD and COPD overlap syndrome.   ·  Plan: - c/w AVAPS qHS and PRN for AMS  - NC during day to maintain O2 sat 88-92 given COPD  - c/w duonebs standing,  zosyn for possible PNA  - 12/8/2021: s/p 5 days of IV Solu-medrol. No further steroids indicated at this time.   - 12/9 patient with increased shortness of breath and hypoxia. Placed on AVAPS. Will attempt to bring patient back down to nasal cannula later today. ABG obtained and reviewed. US of b/l LE dopplers ordered to r/o DVT. D-dimer ordered. CXR ordered.   -12/11: breathing well on nasal canula, will attempt to wean O2 to 2L, maintain O2 sat 92%  -12/12: Continues to do well on 2L NC with AVAPs qHS --> continue to wean as tolerated for goal SpO2 > 92%     Problem/Plan - 5:  ·  Problem: Pneumonia.   ·  Plan: - c/w zosyn,   - s/p Azithromycin  - s/p zosyn     Problem/Plan - 6:  ·  Problem: Acute-on-chronic kidney injury.   ·  Plan: - Cr initially elevated to 2s, baseline in 1s  - workup: renal lytes FeUrea c/w intrinsic renal disease, making good outputs -1.9 L O/N 12/4-12/5. Check UA and renal/bladder US  - suspect JOSEPH due to underlying medical condition: HFpEF exacerbation and fluid overload worsening renal perfusion. Now improved s/p diuresis and oxygenation on AVAPs  - hyperkalemia was treated with insulin and D50, with improvement of K  - Cr downtrended to 1s with diuresis and medical treatment.  - Improved     Problem/Plan - 7:  ·  Problem: Hyperkalemia.   ·  Plan: Resolved.     Problem/Plan - 8:  ·  Problem: Elevated troponin level.   ·  Plan: - initital trops 42 - 44  - cardiology following  - recent echo with grade I DD  - c/w diuresis as above, likely type II NSTEMI.     Problem/Plan - 9:  ·  Problem: T2DM (type 2 diabetes mellitus).   ·  Plan: - a1c 6.9 11/2021  - c/w AISS premeal and qHS  - CC diet.     Problem/Plan - 10:  ·  Problem: Prophylactic measure.   ·  Plan; - DVT PPx: HSQ  - Dispo: PT recommending rehab as of 12/10 85y/o F w/ h/o HFpEF, obesity, SHAD on CPAP, HTN, T2DM, COPD, lymphedema, s/p fall and knee fracture p/w AMS and facial droop, MICU consulted for increased work of breathing on Bipap and elevated pCO2, found with hypercapnic respiratory failure c/b AMS improved on BIPAP.      Problem/Plan - 1:  ·  Problem: Acute on chronic respiratory failure with hypercapnia.   ·  Plan: - acute on chronic hypercapnic respiratory failure likely in setting of COPD/SHAD overlap exacerbation with possible PNA on CXR, HFpEF exacerbation given fluid overload on imaging (MICU POCUS with b-lines)  - HFpEF: c/w lasix, strict Is/OS. cards following recent echo with HFpEF and grade I DD  - COPD exacerbation: c/w AVAPs qHS. will consider steroids as well. c/w duonebs q6. c/w azithro. Pulmonology c/s 12/4: AVAPs settings adjusted but persistently hypoxic  - PNA: c/w zosyn. S/P Azithromycin  ***RRT called AM of 12/6, for ams 2/2 severe hypoxia (40-50s) d/t bipap being disconnected.   Discussed GOC with pts daughter, Marina, at the time, who wanted everything to be done, including intubation if needed. Pt is FULL CODE.  Transferred to RCU from Medicine 12/6  - Breathing comfortably on 2L NC O2, AVAPS QHS and PRN     Problem/Plan - 2:  ·  Problem: AMS (altered mental status).   ·  Plan: Resolved w/improved resp status  - likely 2/2 acute on chronic hypercapnic respiratory failure  - neuro following  - CTH negative for acute ICH or infarct  - c.w statin  - recent echo wnl.     Problem/Plan - 3:  ·  Problem: Chronic heart failure with preserved ejection fraction (HFpEF).   ·  Plan: -recent echo with HFpEF and grade I DD  - s/p lasix BID 12/3  - strict Is/Os monitor output  - cards following: recent echo with grade 1 diastolic dysfunction  - c/w lasix with plan to transition to PO  - c/w metoprolol, amlodipine, statin. on home bumex but IV lasix inpatient, f/u Is/O  - Cardiology recs appreciated  - 12/8/2021 transitioned to PO Lasix 40 mg   - Previous abg's with met alkalosis, patient over diuresed - hold lasix for now  - s/p 500 ml  0.45% NS over 12 hours  - Restart Lasix 20mg qD for fluid management 12/12     Problem/Plan - 4:  ·  Problem: SHAD and COPD overlap syndrome.   ·  Plan: - c/w AVAPS qHS and PRN for AMS  - NC during day to maintain O2 sat 88-92 given COPD  - c/w duonebs standing,  zosyn for possible PNA  - 12/8/2021: s/p 5 days of IV Solu-medrol. No further steroids indicated at this time.   - 12/9 patient with increased shortness of breath and hypoxia. Placed on AVAPS. Will attempt to bring patient back down to nasal cannula later today. ABG obtained and reviewed. US of b/l LE dopplers ordered to r/o DVT. D-dimer ordered. CXR ordered.   -12/11: breathing well on nasal canula, will attempt to wean O2 to 2L, maintain O2 sat 92%  -12/12: Continues to do well on 2L NC with AVAPs qHS --> continue to wean as tolerated for goal SpO2 > 92%     Problem/Plan - 5:  ·  Problem: Pneumonia.   ·  Plan: - c/w zosyn,   - s/p Azithromycin  - s/p zosyn     Problem/Plan - 6:  ·  Problem: Acute-on-chronic kidney injury.   ·  Plan: - Cr initially elevated to 2s, baseline in 1s  - workup: renal lytes FeUrea c/w intrinsic renal disease, making good outputs -1.9 L O/N 12/4-12/5. Check UA and renal/bladder US  - suspect JOSEPH due to underlying medical condition: HFpEF exacerbation and fluid overload worsening renal perfusion. Now improved s/p diuresis and oxygenation on AVAPs  - hyperkalemia was treated with insulin and D50, with improvement of K  - Cr downtrended to 1s with diuresis and medical treatment.  - Improved     Problem/Plan - 7:  ·  Problem: Hyperkalemia.   ·  Plan: Resolved.     Problem/Plan - 8:  ·  Problem: Elevated troponin level.   ·  Plan: - initital trops 42 - 44  - cardiology following  - recent echo with grade I DD  - c/w diuresis as above, likely type II NSTEMI.     Problem/Plan - 9:  ·  Problem: T2DM (type 2 diabetes mellitus).   ·  Plan: - a1c 6.9 11/2021  - c/w AISS premeal and qHS  - CC diet.    MSK  -Right knee proximal tibia periprosthetic fracture  - Pt w/ R knee immobilizer.   - Ortho consulted and recommending xrays (ordered). Will need to reconsult once xrays complete  - C/w PT     Problem/Plan - 10:  ·  Problem: Prophylactic measure.   ·  Plan; - DVT PPx: HSQ  - Dispo: PT recommending rehab as of 12/10

## 2021-12-14 LAB
ANION GAP SERPL CALC-SCNC: 9 MMOL/L — SIGNIFICANT CHANGE UP (ref 7–14)
BUN SERPL-MCNC: 21 MG/DL — SIGNIFICANT CHANGE UP (ref 7–23)
CALCIUM SERPL-MCNC: 9.1 MG/DL — SIGNIFICANT CHANGE UP (ref 8.4–10.5)
CHLORIDE SERPL-SCNC: 94 MMOL/L — LOW (ref 98–107)
CO2 SERPL-SCNC: 36 MMOL/L — HIGH (ref 22–31)
CREAT SERPL-MCNC: 0.91 MG/DL — SIGNIFICANT CHANGE UP (ref 0.5–1.3)
GLUCOSE BLDC GLUCOMTR-MCNC: 151 MG/DL — HIGH (ref 70–99)
GLUCOSE BLDC GLUCOMTR-MCNC: 156 MG/DL — HIGH (ref 70–99)
GLUCOSE BLDC GLUCOMTR-MCNC: 173 MG/DL — HIGH (ref 70–99)
GLUCOSE BLDC GLUCOMTR-MCNC: 178 MG/DL — HIGH (ref 70–99)
GLUCOSE BLDC GLUCOMTR-MCNC: 209 MG/DL — HIGH (ref 70–99)
GLUCOSE SERPL-MCNC: 135 MG/DL — HIGH (ref 70–99)
HCT VFR BLD CALC: 31 % — LOW (ref 34.5–45)
HGB BLD-MCNC: 9 G/DL — LOW (ref 11.5–15.5)
MAGNESIUM SERPL-MCNC: 2.3 MG/DL — SIGNIFICANT CHANGE UP (ref 1.6–2.6)
MCHC RBC-ENTMCNC: 26.2 PG — LOW (ref 27–34)
MCHC RBC-ENTMCNC: 29 GM/DL — LOW (ref 32–36)
MCV RBC AUTO: 90.4 FL — SIGNIFICANT CHANGE UP (ref 80–100)
NRBC # BLD: 0 /100 WBCS — SIGNIFICANT CHANGE UP
NRBC # FLD: 0 K/UL — SIGNIFICANT CHANGE UP
PHOSPHATE SERPL-MCNC: 2.9 MG/DL — SIGNIFICANT CHANGE UP (ref 2.5–4.5)
PLATELET # BLD AUTO: 205 K/UL — SIGNIFICANT CHANGE UP (ref 150–400)
POTASSIUM SERPL-MCNC: 3.9 MMOL/L — SIGNIFICANT CHANGE UP (ref 3.5–5.3)
POTASSIUM SERPL-SCNC: 3.9 MMOL/L — SIGNIFICANT CHANGE UP (ref 3.5–5.3)
RBC # BLD: 3.43 M/UL — LOW (ref 3.8–5.2)
RBC # FLD: 16.9 % — HIGH (ref 10.3–14.5)
SODIUM SERPL-SCNC: 139 MMOL/L — SIGNIFICANT CHANGE UP (ref 135–145)
WBC # BLD: 8.91 K/UL — SIGNIFICANT CHANGE UP (ref 3.8–10.5)
WBC # FLD AUTO: 8.91 K/UL — SIGNIFICANT CHANGE UP (ref 3.8–10.5)

## 2021-12-14 PROCEDURE — 99233 SBSQ HOSP IP/OBS HIGH 50: CPT | Mod: GC

## 2021-12-14 RX ADMIN — HEPARIN SODIUM 7500 UNIT(S): 5000 INJECTION INTRAVENOUS; SUBCUTANEOUS at 12:31

## 2021-12-14 RX ADMIN — GABAPENTIN 100 MILLIGRAM(S): 400 CAPSULE ORAL at 06:50

## 2021-12-14 RX ADMIN — Medication 2: at 12:30

## 2021-12-14 RX ADMIN — CHLORHEXIDINE GLUCONATE 1 APPLICATION(S): 213 SOLUTION TOPICAL at 11:22

## 2021-12-14 RX ADMIN — SENNA PLUS 2 TABLET(S): 8.6 TABLET ORAL at 21:12

## 2021-12-14 RX ADMIN — Medication 100 MILLIGRAM(S): at 21:10

## 2021-12-14 RX ADMIN — TRAMADOL HYDROCHLORIDE 25 MILLIGRAM(S): 50 TABLET ORAL at 16:02

## 2021-12-14 RX ADMIN — Medication 1: at 17:10

## 2021-12-14 RX ADMIN — Medication 100 MILLIGRAM(S): at 06:47

## 2021-12-14 RX ADMIN — HEPARIN SODIUM 7500 UNIT(S): 5000 INJECTION INTRAVENOUS; SUBCUTANEOUS at 06:50

## 2021-12-14 RX ADMIN — Medication 3 MILLILITER(S): at 22:17

## 2021-12-14 RX ADMIN — AMLODIPINE BESYLATE 10 MILLIGRAM(S): 2.5 TABLET ORAL at 06:48

## 2021-12-14 RX ADMIN — GABAPENTIN 100 MILLIGRAM(S): 400 CAPSULE ORAL at 21:10

## 2021-12-14 RX ADMIN — BUDESONIDE AND FORMOTEROL FUMARATE DIHYDRATE 2 PUFF(S): 160; 4.5 AEROSOL RESPIRATORY (INHALATION) at 09:43

## 2021-12-14 RX ADMIN — Medication 40 MILLIGRAM(S): at 06:48

## 2021-12-14 RX ADMIN — TRAMADOL HYDROCHLORIDE 25 MILLIGRAM(S): 50 TABLET ORAL at 16:47

## 2021-12-14 RX ADMIN — GABAPENTIN 100 MILLIGRAM(S): 400 CAPSULE ORAL at 12:30

## 2021-12-14 RX ADMIN — MONTELUKAST 10 MILLIGRAM(S): 4 TABLET, CHEWABLE ORAL at 11:20

## 2021-12-14 RX ADMIN — Medication 3 MILLILITER(S): at 10:58

## 2021-12-14 RX ADMIN — ATORVASTATIN CALCIUM 40 MILLIGRAM(S): 80 TABLET, FILM COATED ORAL at 21:11

## 2021-12-14 RX ADMIN — Medication 100 MILLIGRAM(S): at 12:30

## 2021-12-14 RX ADMIN — Medication 100 MILLIGRAM(S): at 06:53

## 2021-12-14 RX ADMIN — BUDESONIDE AND FORMOTEROL FUMARATE DIHYDRATE 2 PUFF(S): 160; 4.5 AEROSOL RESPIRATORY (INHALATION) at 22:17

## 2021-12-14 RX ADMIN — HEPARIN SODIUM 7500 UNIT(S): 5000 INJECTION INTRAVENOUS; SUBCUTANEOUS at 21:11

## 2021-12-14 RX ADMIN — Medication 1: at 06:48

## 2021-12-14 NOTE — PROGRESS NOTE ADULT - SUBJECTIVE AND OBJECTIVE BOX
[facility]      Seen earlier today, and examined.  - Today, patient is without complaints.           *****MEDICATIONS: Current medication reviewed and documented.    MEDICATIONS  (STANDING):  albuterol/ipratropium for Nebulization 3 milliLiter(s) Nebulizer every 12 hours  amLODIPine   Tablet 10 milliGRAM(s) Oral daily  atorvastatin 40 milliGRAM(s) Oral at bedtime  budesonide 160 MICROgram(s)/formoterol 4.5 MICROgram(s) Inhaler 2 Puff(s) Inhalation two times a day  chlorhexidine 2% Cloths 1 Application(s) Topical daily  dextrose 40% Gel 15 Gram(s) Oral once  dextrose 5%. 1000 milliLiter(s) (50 mL/Hr) IV Continuous <Continuous>  dextrose 5%. 1000 milliLiter(s) (100 mL/Hr) IV Continuous <Continuous>  dextrose 50% Injectable 25 Gram(s) IV Push once  dextrose 50% Injectable 12.5 Gram(s) IV Push once  dextrose 50% Injectable 25 Gram(s) IV Push once  furosemide    Tablet 40 milliGRAM(s) Oral daily  gabapentin 100 milliGRAM(s) Oral three times a day  glucagon  Injectable 1 milliGRAM(s) IntraMuscular once  heparin   Injectable 7500 Unit(s) SubCutaneous every 8 hours  hydrALAZINE 100 milliGRAM(s) Oral three times a day  insulin lispro (ADMELOG) corrective regimen sliding scale   SubCutaneous three times a day before meals  insulin lispro (ADMELOG) corrective regimen sliding scale   SubCutaneous at bedtime  metoprolol succinate  milliGRAM(s) Oral daily  montelukast 10 milliGRAM(s) Oral daily  polyethylene glycol 3350 17 Gram(s) Oral daily  senna 2 Tablet(s) Oral at bedtime  sodium chloride 0.45%. 500 milliLiter(s) (40 mL/Hr) IV Continuous <Continuous>    MEDICATIONS  (PRN):  acetaminophen     Tablet .. 650 milliGRAM(s) Oral every 6 hours PRN Mild Pain (1 - 3), Moderate Pain (4 - 6)  traMADol 25 milliGRAM(s) Oral every 6 hours PRN Severe Pain (7 - 10)          ***** VITAL SIGNS:  T(F): 98.6 (21 @ 12:35), Max: 99.9 (21 @ 21:42)  HR: 88 (21 @ 19:50) (69 - 97)  BP: 122/64 (21 @ 12:35) (118/60 - 127/64)  RR: 18 (21 @ 12:35) (16 - 18)  SpO2: 97% (21 @ 19:50) (93% - 100%)  Wt(kg): --  ,   I&O's Summary    13 Dec 2021 07:01  -  14 Dec 2021 07:00  --------------------------------------------------------  IN: 0 mL / OUT: 850 mL / NET: -850 mL    14 Dec 2021 07:01  -  14 Dec 2021 20:22  --------------------------------------------------------  IN: 0 mL / OUT: 450 mL / NET: -450 mL             *****PHYSICAL EXAM:   alert oriented x 3 attention comprehension are fair.  Able to name, repeat.   EOmi fundi not visualized   no nystagmus VFF to confrontation  Tongue is midline  Palate elevates symmetrically   Moving all 4 ext spontaneously no drift appreciated    Gait not assessed.            *****LAB AND IMAGIN.0    8.91  )-----------( 205      ( 14 Dec 2021 07:30 )             31.0               12-14    139  |  94<L>  |  21  ----------------------------<  135<H>  3.9   |  36<H>  |  0.91    Ca    9.1      14 Dec 2021 07:30  Phos  2.9     12-14  Mg     2.30     12-14                           [All pertinent recent Imaging/Reports reviewed]           *****A S S E S S M E N T   A N D   P L A N :    Pt is an 85 y/o woman w/ a PMH of asthma/COPD, CHF, on aspirin, HTN, lymphedema, DM2, and past surgical history of knee surgery, presents to the [facility] ED as a code stroke after home aide/daughter activated EMS due to patient's lack of responsiveness with LWK 2:30a 21. Neurological exam does not demonstrate focality. CTH noncon pending. Neurovascular imaging deferred given JOSEPH and low suspicion of LVO at this time. Patient out of window for tpa. Patient not a candidate for mechanical thrombectomy given low suspicion of LVO.    Impression: toxic metabolic encephalopathy likely in setting of acidosis and hypercapnea    Recommendations:  [ ] f/u CTH noncon when stable   [ ] a1c, lipid profile  [ ] c/w aspirin 81mg daily  [ ] c/w rosuvastatin 10mg daily  [ ] infectious and metabolic workup per primary team             pt seen and evaluated at bedside  briefly pt with recent knee surgery immobilization presents with ams, hypercapnia   now improved after bipap   obesity hypoventilation   cannot r/o sequelae of immobility   ct head neg  nonfocal exam   keep pco2 <50    pt eval  worsening foot pain after xray   ortho f/u please     d.w  daughter at bedside         Thank you for allowing me to participate in the care of this patient. Will continue to follow patient periodically. Please do not hesitate to call me if you have any  questions or if there has been a change in patients neurological status     ________________  Constance Mars MD  [facility] ([facility]  [phone]      33 minutes spent on total encounter; more than 50 % of the visit was  spent counseling about plan of care, compliance to diet/exercise and medication regimen and or  coordinating care by the attending physician.      It is advised that stroke patients follow up with SRIDHAR Judd @ [phone] in 1- 2 weeks.   Others please follow up with Dr. Michael Nissenbaum [phone]

## 2021-12-14 NOTE — PROGRESS NOTE ADULT - SUBJECTIVE AND OBJECTIVE BOX
CHIEF COMPLAINT: Patient is a 86y old  Female who presents with a chief complaint of SOB (13 Dec 2021 21:29)    Interval Events:    REVIEW OF SYSTEMS:  Constitutional:   Eyes:  ENT:  CV:  Resp:  GI:  :  MSK:  Integumentary:  Neurological:  Psychiatric:  Endocrine:  Hematologic/Lymphatic:  Allergic/Immunologic:  [ ] All other systems negative  [ ] Unable to assess ROS because ________    OBJECTIVE:  ICU Vital Signs Last 24 Hrs  T(C): 37.7 (13 Dec 2021 21:42), Max: 37.7 (13 Dec 2021 21:42)  T(F): 99.9 (13 Dec 2021 21:42), Max: 99.9 (13 Dec 2021 21:42)  HR: 91 (14 Dec 2021 07:16) (69 - 97)  BP: 118/60 (14 Dec 2021 05:39) (118/60 - 127/64)  BP(mean): --  ABP: --  ABP(mean): --  RR: 18 (14 Dec 2021 05:39) (16 - 18)  SpO2: 97% (14 Dec 2021 07:16) (93% - 100%)    Mode: standby    12-13 @ 07:01  -  12-14 @ 07:00  --------------------------------------------------------  IN: 0 mL / OUT: 850 mL / NET: -850 mL      CAPILLARY BLOOD GLUCOSE      POCT Blood Glucose.: 173 mg/dL (14 Dec 2021 07:58)      PHYSICAL EXAM:  General:   HEENT:   Lymph Nodes:  Neck:   Respiratory:   Cardiovascular:   Abdomen:   Extremities:   Skin:   Neurological:  Psychiatry:    HOSPITAL MEDICATIONS:  MEDICATIONS  (STANDING):  albuterol/ipratropium for Nebulization 3 milliLiter(s) Nebulizer every 12 hours  amLODIPine   Tablet 10 milliGRAM(s) Oral daily  atorvastatin 40 milliGRAM(s) Oral at bedtime  budesonide 160 MICROgram(s)/formoterol 4.5 MICROgram(s) Inhaler 2 Puff(s) Inhalation two times a day  chlorhexidine 2% Cloths 1 Application(s) Topical daily  dextrose 40% Gel 15 Gram(s) Oral once  dextrose 5%. 1000 milliLiter(s) (50 mL/Hr) IV Continuous <Continuous>  dextrose 5%. 1000 milliLiter(s) (100 mL/Hr) IV Continuous <Continuous>  dextrose 50% Injectable 25 Gram(s) IV Push once  dextrose 50% Injectable 12.5 Gram(s) IV Push once  dextrose 50% Injectable 25 Gram(s) IV Push once  furosemide    Tablet 40 milliGRAM(s) Oral daily  gabapentin 100 milliGRAM(s) Oral three times a day  glucagon  Injectable 1 milliGRAM(s) IntraMuscular once  heparin   Injectable 7500 Unit(s) SubCutaneous every 8 hours  hydrALAZINE 100 milliGRAM(s) Oral three times a day  insulin lispro (ADMELOG) corrective regimen sliding scale   SubCutaneous three times a day before meals  insulin lispro (ADMELOG) corrective regimen sliding scale   SubCutaneous at bedtime  metoprolol succinate  milliGRAM(s) Oral daily  montelukast 10 milliGRAM(s) Oral daily  polyethylene glycol 3350 17 Gram(s) Oral daily  senna 2 Tablet(s) Oral at bedtime  sodium chloride 0.45%. 500 milliLiter(s) (40 mL/Hr) IV Continuous <Continuous>    MEDICATIONS  (PRN):  acetaminophen     Tablet .. 650 milliGRAM(s) Oral every 6 hours PRN Mild Pain (1 - 3), Moderate Pain (4 - 6)  traMADol 25 milliGRAM(s) Oral every 6 hours PRN Severe Pain (7 - 10)      LABS:                        9.0    8.91  )-----------( 205      ( 14 Dec 2021 07:30 )             31.0     12-14    139  |  94<L>  |  21  ----------------------------<  135<H>  3.9   |  36<H>  |  0.91    Ca    9.1      14 Dec 2021 07:30  Phos  2.9     12-14  Mg     2.30     12-14                MICROBIOLOGY:     RADIOLOGY:  [ ] Reviewed and interpreted by me    PULMONARY FUNCTION TESTS:    EKG: CHIEF COMPLAINT: Patient is a 86y old  Female who presents with a chief complaint of SOB (13 Dec 2021 21:29)    Interval Events: None reported overnight Clinically stable. VSS, and medications reviewed.     REVIEW OF SYSTEMS:  See above  [x] All other systems negative    PAST MEDICAL & SURGICAL HISTORY:  HTN (hypertension)    HLD (hyperlipidemia)    Asthma    DM (diabetes mellitus)    GERD (gastroesophageal reflux disease)    Lymphedema    S/P knee replacement      FAMILY HISTORY:      Social History:  Denies smoking/alcohol/illicit drugs (02 Dec 2021 19:54)    OBJECTIVE:  ICU Vital Signs Last 24 Hrs  T(C): 37.7 (13 Dec 2021 21:42), Max: 37.7 (13 Dec 2021 21:42)  T(F): 99.9 (13 Dec 2021 21:42), Max: 99.9 (13 Dec 2021 21:42)  HR: 91 (14 Dec 2021 07:16) (69 - 97)  BP: 118/60 (14 Dec 2021 05:39) (118/60 - 127/64)  BP(mean): --  ABP: --  ABP(mean): --  RR: 18 (14 Dec 2021 05:39) (16 - 18)  SpO2: 97% (14 Dec 2021 07:16) (93% - 100%)    Mode: standby    12-13 @ 07:01  -  12-14 @ 07:00  --------------------------------------------------------  IN: 0 mL / OUT: 850 mL / NET: -850 mL      CAPILLARY BLOOD GLUCOSE      POCT Blood Glucose.: 173 mg/dL (14 Dec 2021 07:58)      PHYSICAL EXAM  General: Laying in bed, NAD  Neck: Supple, no JVD  Respiratory: Normal resp effort, CTA b/l   Cardiovascular: +s1, s2  Abdomen: +BS, protuberant, nt/nd, no peritoneal signs noted   Extremities: no swelling, +RLE knee immobilizer  Skin: as per RN assessment  sheet   Neurological: non focal     HOSPITAL MEDICATIONS:  MEDICATIONS  (STANDING):  albuterol/ipratropium for Nebulization 3 milliLiter(s) Nebulizer every 12 hours  amLODIPine   Tablet 10 milliGRAM(s) Oral daily  atorvastatin 40 milliGRAM(s) Oral at bedtime  budesonide 160 MICROgram(s)/formoterol 4.5 MICROgram(s) Inhaler 2 Puff(s) Inhalation two times a day  chlorhexidine 2% Cloths 1 Application(s) Topical daily  dextrose 40% Gel 15 Gram(s) Oral once  dextrose 5%. 1000 milliLiter(s) (50 mL/Hr) IV Continuous <Continuous>  dextrose 5%. 1000 milliLiter(s) (100 mL/Hr) IV Continuous <Continuous>  dextrose 50% Injectable 25 Gram(s) IV Push once  dextrose 50% Injectable 12.5 Gram(s) IV Push once  dextrose 50% Injectable 25 Gram(s) IV Push once  furosemide    Tablet 40 milliGRAM(s) Oral daily  gabapentin 100 milliGRAM(s) Oral three times a day  glucagon  Injectable 1 milliGRAM(s) IntraMuscular once  heparin   Injectable 7500 Unit(s) SubCutaneous every 8 hours  hydrALAZINE 100 milliGRAM(s) Oral three times a day  insulin lispro (ADMELOG) corrective regimen sliding scale   SubCutaneous three times a day before meals  insulin lispro (ADMELOG) corrective regimen sliding scale   SubCutaneous at bedtime  metoprolol succinate  milliGRAM(s) Oral daily  montelukast 10 milliGRAM(s) Oral daily  polyethylene glycol 3350 17 Gram(s) Oral daily  senna 2 Tablet(s) Oral at bedtime  sodium chloride 0.45%. 500 milliLiter(s) (40 mL/Hr) IV Continuous <Continuous>    MEDICATIONS  (PRN):  acetaminophen     Tablet .. 650 milliGRAM(s) Oral every 6 hours PRN Mild Pain (1 - 3), Moderate Pain (4 - 6)  traMADol 25 milliGRAM(s) Oral every 6 hours PRN Severe Pain (7 - 10)      LABS:                        9.0    8.91  )-----------( 205      ( 14 Dec 2021 07:30 )             31.0     12-14    139  |  94<L>  |  21  ----------------------------<  135<H>  3.9   |  36<H>  |  0.91    Ca    9.1      14 Dec 2021 07:30  Phos  2.9     12-14  Mg     2.30     12-14                MICROBIOLOGY:     RADIOLOGY:  [ ] Reviewed and interpreted by me    PULMONARY FUNCTION TESTS:    EKG:

## 2021-12-14 NOTE — PROGRESS NOTE ADULT - ASSESSMENT
85y/o F w/ h/o HFpEF, obesity, SHAD on CPAP, HTN, T2DM, COPD, lymphedema, s/p fall and knee fracture p/w AMS and facial droop, MICU consulted for increased work of breathing on Bipap and elevated pCO2, found with hypercapnic respiratory failure c/b AMS improved on BIPAP.      Problem/Plan - 1:  ·  Problem: Acute on chronic respiratory failure with hypercapnia.   ·  Plan: - acute on chronic hypercapnic respiratory failure likely in setting of COPD/SHAD overlap exacerbation with possible PNA on CXR, HFpEF exacerbation given fluid overload on imaging (MICU POCUS with b-lines)  - HFpEF: c/w lasix, strict Is/OS. cards following recent echo with HFpEF and grade I DD  - COPD exacerbation: c/w AVAPs qHS. will consider steroids as well. c/w duonebs q6. c/w azithro. Pulmonology c/s 12/4: AVAPs settings adjusted but persistently hypoxic  - PNA: c/w zosyn. S/P Azithromycin  ***RRT called AM of 12/6, for ams 2/2 severe hypoxia (40-50s) d/t bipap being disconnected.   Discussed GOC with pts daughter, Marina, at the time, who wanted everything to be done, including intubation if needed. Pt is FULL CODE.  Transferred to RCU from Medicine 12/6  - Breathing comfortably on 2L NC O2, AVAPS QHS and PRN     Problem/Plan - 2:  ·  Problem: AMS (altered mental status).   ·  Plan: Resolved w/improved resp status  - likely 2/2 acute on chronic hypercapnic respiratory failure  - neuro following  - CTH negative for acute ICH or infarct  - c.w statin  - recent echo wnl.     Problem/Plan - 3:  ·  Problem: Chronic heart failure with preserved ejection fraction (HFpEF).   ·  Plan: -recent echo with HFpEF and grade I DD  - s/p lasix BID 12/3  - strict Is/Os monitor output  - cards following: recent echo with grade 1 diastolic dysfunction  - c/w lasix with plan to transition to PO  - c/w metoprolol, amlodipine, statin. on home bumex but IV lasix inpatient, f/u Is/O  - Cardiology recs appreciated  - 12/8/2021 transitioned to PO Lasix 40 mg   - Previous abg's with met alkalosis, patient over diuresed - hold lasix for now  - s/p 500 ml  0.45% NS over 12 hours  - Restart Lasix 20mg qD for fluid management 12/12     Problem/Plan - 4:  ·  Problem: SHAD and COPD overlap syndrome.   ·  Plan: - c/w AVAPS qHS and PRN for AMS  - NC during day to maintain O2 sat 88-92 given COPD  - c/w duonebs standing,  zosyn for possible PNA  - 12/8/2021: s/p 5 days of IV Solu-medrol. No further steroids indicated at this time.   - 12/9 patient with increased shortness of breath and hypoxia. Placed on AVAPS. Will attempt to bring patient back down to nasal cannula later today. ABG obtained and reviewed. US of b/l LE dopplers ordered to r/o DVT. D-dimer ordered. CXR ordered.   -12/11: breathing well on nasal canula, will attempt to wean O2 to 2L, maintain O2 sat 92%  -12/12: Continues to do well on 2L NC with AVAPs qHS --> continue to wean as tolerated for goal SpO2 > 92%     Problem/Plan - 5:  ·  Problem: Pneumonia.   ·  Plan: - c/w zosyn,   - s/p Azithromycin  - s/p zosyn     Problem/Plan - 6:  ·  Problem: Acute-on-chronic kidney injury.   ·  Plan: - Cr initially elevated to 2s, baseline in 1s  - workup: renal lytes FeUrea c/w intrinsic renal disease, making good outputs -1.9 L O/N 12/4-12/5. Check UA and renal/bladder US  - suspect JOSEPH due to underlying medical condition: HFpEF exacerbation and fluid overload worsening renal perfusion. Now improved s/p diuresis and oxygenation on AVAPs  - hyperkalemia was treated with insulin and D50, with improvement of K  - Cr downtrended to 1s with diuresis and medical treatment.  - Improved     Problem/Plan - 7:  ·  Problem: Hyperkalemia.   ·  Plan: Resolved.     Problem/Plan - 8:  ·  Problem: Elevated troponin level.   ·  Plan: - initital trops 42 - 44  - cardiology following  - recent echo with grade I DD  - c/w diuresis as above, likely type II NSTEMI.     Problem/Plan - 9:  ·  Problem: T2DM (type 2 diabetes mellitus).   ·  Plan: - a1c 6.9 11/2021  - c/w AISS premeal and qHS  - CC diet.    MSK  -Right knee proximal tibia periprosthetic fracture  - Pt w/ R knee immobilizer.   - Ortho consulted and recommending xrays (ordered). Will need to reconsult once xrays complete  - C/w PT     Problem/Plan - 10:  ·  Problem: Prophylactic measure.   ·  Plan; - DVT PPx: HSQ  - Dispo: PT recommending rehab as of 12/10 87y/o F w/ h/o HFpEF, obesity, SHAD on CPAP, HTN, T2DM, COPD, lymphedema, s/p fall and knee fracture p/w AMS and facial droop, MICU consulted for increased work of breathing on Bipap and elevated pCO2, found with hypercapnic respiratory failure c/b AMS improved on BIPAP.     # Acute on chronic respiratory failure with hypercapnia.   ·  Plan: - acute on chronic hypercapnic respiratory failure likely in setting of COPD/SHAD overlap exacerbation with possible PNA on CXR, HFpEF exacerbation given fluid overload on imaging (MICU POCUS with b-lines)  - HFpEF: c/w lasix, strict Is/OS. cards following recent echo with HFpEF and grade I DD  - COPD exacerbation: c/w AVAPs qHS. will consider steroids as well. c/w duonebs q6. c/w azithro. Pulmonology c/s 12/4: AVAPs settings adjusted but persistently hypoxic  - PNA: c/w zosyn. S/P Azithromycin  ***RRT called AM of 12/6, for AMS 2/2 severe hypoxia (40-50s) d/t bipap being disconnected.   Discussed GOC with pts daughter, Marina, at the time, who wanted everything to be done, including intubation if needed. Pt is FULL CODE.  Transferred to RCU from Medicine 12/6  - Breathing comfortably on 2L NC O2, AVAPS QHS and PRN    # AMS (altered mental status).   · Resolved w/improved resp status  - likely 2/2 acute on chronic hypercapnic respiratory failure  - neuro following  - CTH negative for acute ICH or infarct  - c.w statin  - recent echo wnl.    # Chronic heart failure with preserved ejection fraction (HFpEF).   - Recent echo with HFpEF and grade I DD  - strict Is/Os monitor output  - cards following: recent echo with grade 1 diastolic dysfunction  - c/w metoprolol, amlodipine, statin. (on home bumex) - now on PO Lasix  - Cardiology recs appreciated  - c/w Lasix 40mg qd     # SHAD and COPD overlap syndrome.   · c/w AVAPS qHS and PRN for AMS  - NC during day to maintain O2 sat 88-92 given COPD  - c/w Duonebs standing,  s/p zosyn for possible PNA  - 12/8/2021: s/p 5 days of IV Solu-medrol. No further steroids indicated at this time.   - 12/9 patient with increased shortness of breath and hypoxia. Placed on AVAPS. Will attempt to bring patient back down to nasal cannula later today. ABG obtained and reviewed. US of b/l LE dopplers ordered to r/o DVT. D-dimer ordered. CXR ordered.   -12/11: breathing well on nasal canula, will attempt to wean O2 to 2L, maintain O2 sat 92%  -12/12: Continues to do well on 2L NC with AVAPs qHS --> continue to wean as tolerated for goal SpO2 > 92%    # Pneumonia.   - s/p Azithromycin  - s/p zosyn    # Acute-on-chronic kidney injury.   - Cr initially elevated to 2s, baseline in 1s  - workup: renal lytes FeUrea c/w intrinsic renal disease, making good outputs -1.9 L O/N 12/4-12/5. Check UA and renal/bladder US  - suspect JOSEPH due to underlying medical condition: HFpEF exacerbation and fluid overload worsening renal perfusion. Now improved s/p diuresis and oxygenation on AVAPs  - hyperkalemia was treated with insulin and D50, with improvement of K  - Cr downtrended to 1s with diuresis and medical treatment.  - Improved    # Elevated troponin level.   · Initital trops 42 - 44  - cardiology following  - recent echo with grade I DD  - c/w diuresis as above, likely type II NSTEMI.    # T2DM (type 2 diabetes mellitus).   · a1c 6.9 11/2021  - c/w AISS premeal and qHS  - CC diet.    # MSK  -Right knee proximal tibia periprosthetic fracture  - Pt w/ R knee immobilizer.   - Ortho consulted and recommending xrays (ordered). Will need to reconsult once xrays complete  - C/w PT    # Prophylactic measure.   · DVT PPx: HSQ  - Dispo: PT recommending rehab as of 12/10

## 2021-12-14 NOTE — PROGRESS NOTE ADULT - SUBJECTIVE AND OBJECTIVE BOX
CARDIOLOGY FOLLOW UP - Dr. Bermudez  Date of Service: 12/14/21  CC: resting comfortably , NAD  no acute events    Review of Systems:  Constitutional: No fever, weight loss, or fatigue  Respiratory: No cough, wheezing, or hemoptysis, no shortness of breath  Cardiovascular: No chest pain, palpitations, passing out, dizziness, or leg swelling  Gastrointestinal: No abd or epigastric pain.  No nausea, vomiting, or hematemesis; no diarrhea or constipation, no melena or hematochezia  Vascular: no edema       PHYSICAL EXAM:  T(C): 37.7 (12-13-21 @ 21:42), Max: 37.7 (12-13-21 @ 21:42)  HR: 91 (12-14-21 @ 07:16) (69 - 97)  BP: 118/60 (12-14-21 @ 05:39) (118/60 - 127/64)  RR: 18 (12-14-21 @ 05:39) (16 - 18)  SpO2: 97% (12-14-21 @ 07:16) (93% - 100%)  Wt(kg): --  I&O's Summary    13 Dec 2021 07:01  -  14 Dec 2021 07:00  --------------------------------------------------------  IN: 0 mL / OUT: 850 mL / NET: -850 mL        Appearance: Normal	  Cardiovascular: Normal S1 S2,RRR, No JVD, No murmurs  Respiratory: Lungs clear to auscultation	  Gastrointestinal:  Soft, Non-tender, + BS	  Extremities: Normal range of motion, No clubbing, cyanosis or edema      Home Medications:  Advair Diskus 250 mcg-50 mcg inhalation powder: 1 puff(s) inhaled 2 times a day (23 Nov 2021 14:10)  alendronate 70 mg oral tablet: 1 tab(s) orally once a week (23 Nov 2021 14:10)  ascorbic acid 500 mg oral capsule: 1 cap(s) orally once a day (30 Oct 2019 16:54)  azelastine 137 mcg/inh (0.1%) nasal spray: 2 spray(s) nasal 2 times a day (30 Oct 2019 16:54)  glimepiride 2 mg oral tablet: 1 tab(s) orally once a day (30 Oct 2019 16:54)  ipratropium-albuterol 0.5 mg-2.5 mg/3 mLinhalation solution: 3 milliliter(s) inhaled every 6 hours, As Needed (04 Jun 2021 10:05)  montelukast 10 mg oral tablet: 1 tab(s) orally once a day (30 Oct 2019 16:54)  Neurontin 100 mg oral capsule: 1 cap(s) orally 3 times a day (29 Nov 2020 18:19)  ocular lubricant ophthalmic solution: 1 drop(s) to each affected eye 3 times a day, As needed, Dry Eyes (06 Nov 2019 13:41)  ProAir HFA 90 mcg/inh inhalation aerosol: 2 puff(s) inhaled 4 times a day, As Needed (23 Nov 2021 14:10)      MEDICATIONS  (STANDING):  albuterol/ipratropium for Nebulization 3 milliLiter(s) Nebulizer every 12 hours  amLODIPine   Tablet 10 milliGRAM(s) Oral daily  atorvastatin 40 milliGRAM(s) Oral at bedtime  budesonide 160 MICROgram(s)/formoterol 4.5 MICROgram(s) Inhaler 2 Puff(s) Inhalation two times a day  chlorhexidine 2% Cloths 1 Application(s) Topical daily  dextrose 40% Gel 15 Gram(s) Oral once  dextrose 5%. 1000 milliLiter(s) (50 mL/Hr) IV Continuous <Continuous>  dextrose 5%. 1000 milliLiter(s) (100 mL/Hr) IV Continuous <Continuous>  dextrose 50% Injectable 25 Gram(s) IV Push once  dextrose 50% Injectable 12.5 Gram(s) IV Push once  dextrose 50% Injectable 25 Gram(s) IV Push once  furosemide    Tablet 40 milliGRAM(s) Oral daily  gabapentin 100 milliGRAM(s) Oral three times a day  glucagon  Injectable 1 milliGRAM(s) IntraMuscular once  heparin   Injectable 7500 Unit(s) SubCutaneous every 8 hours  hydrALAZINE 100 milliGRAM(s) Oral three times a day  insulin lispro (ADMELOG) corrective regimen sliding scale   SubCutaneous three times a day before meals  insulin lispro (ADMELOG) corrective regimen sliding scale   SubCutaneous at bedtime  metoprolol succinate  milliGRAM(s) Oral daily  montelukast 10 milliGRAM(s) Oral daily  polyethylene glycol 3350 17 Gram(s) Oral daily  senna 2 Tablet(s) Oral at bedtime  sodium chloride 0.45%. 500 milliLiter(s) (40 mL/Hr) IV Continuous <Continuous>      TELEMETRY: 	    ECG:  	  RADIOLOGY:   DIAGNOSTIC TESTING:  [ ] Echocardiogram:  [ ]  Catheterization:  [ ] Stress Test:    OTHER: 	    LABS:	 	                            9.0    8.91  )-----------( 205      ( 14 Dec 2021 07:30 )             31.0     12-14    139  |  94<L>  |  21  ----------------------------<  135<H>  3.9   |  36<H>  |  0.91    Ca    9.1      14 Dec 2021 07:30  Phos  2.9     12-14  Mg     2.30     12-14

## 2021-12-14 NOTE — PROGRESS NOTE ADULT - ASSESSMENT
Echo 11/19/21: EF 60-65%, nl lv sys fx, mild AS, mild Ms   Echo 6/2/21: Min MR, grossly nl lv sys fx   Echo 12/5/20: grossly nl lv sys fx    a/p  85 y/o female w pmhx of HFpEF, obesity, SHAD on CPAP, HTN, T2DM, COPD, lymphedema, s/p fall and knee fracture s/p  knee surgery, presents to the ED with AMS and facial droop.    #AMS  -in setting of hypercapnic resp failure  -s/p RRT 12/6 for hypoxia, AMS- pt noted to be connected incorrectly to biPAP. Xray - No PTX. BiPAP circuit was fixed and  mental status/ sat improved to 100%.   -now NC intermittent on AVAPS - management per RCU team  -repeat chest xray12/6  noted    #Hypercapnic respiratory failure, hypoxic resp failure  -some component of pulmonary edema  -AVAPs per primary team  -cont PO lasix     #acute/Chronic Diastolic CHF  -pt w chronic Lymphedema  -volume status stable   -s/p bipap per pulm/med  -recent Echo w grossly nl lv sys fx   -cont bb , lasix     #HTN  -stable  -cont current meds     dvt ppx

## 2021-12-15 LAB
GLUCOSE BLDC GLUCOMTR-MCNC: 152 MG/DL — HIGH (ref 70–99)
GLUCOSE BLDC GLUCOMTR-MCNC: 173 MG/DL — HIGH (ref 70–99)
GLUCOSE BLDC GLUCOMTR-MCNC: 181 MG/DL — HIGH (ref 70–99)
GLUCOSE BLDC GLUCOMTR-MCNC: 248 MG/DL — HIGH (ref 70–99)
SARS-COV-2 RNA SPEC QL NAA+PROBE: SIGNIFICANT CHANGE UP

## 2021-12-15 PROCEDURE — 99223 1ST HOSP IP/OBS HIGH 75: CPT

## 2021-12-15 PROCEDURE — 99221 1ST HOSP IP/OBS SF/LOW 40: CPT

## 2021-12-15 PROCEDURE — 99233 SBSQ HOSP IP/OBS HIGH 50: CPT | Mod: GC

## 2021-12-15 RX ADMIN — GABAPENTIN 100 MILLIGRAM(S): 400 CAPSULE ORAL at 21:17

## 2021-12-15 RX ADMIN — Medication 1: at 17:03

## 2021-12-15 RX ADMIN — MONTELUKAST 10 MILLIGRAM(S): 4 TABLET, CHEWABLE ORAL at 11:21

## 2021-12-15 RX ADMIN — Medication 40 MILLIGRAM(S): at 05:47

## 2021-12-15 RX ADMIN — GABAPENTIN 100 MILLIGRAM(S): 400 CAPSULE ORAL at 14:20

## 2021-12-15 RX ADMIN — Medication 3 MILLILITER(S): at 10:54

## 2021-12-15 RX ADMIN — Medication 650 MILLIGRAM(S): at 12:30

## 2021-12-15 RX ADMIN — HEPARIN SODIUM 7500 UNIT(S): 5000 INJECTION INTRAVENOUS; SUBCUTANEOUS at 14:23

## 2021-12-15 RX ADMIN — Medication 100 MILLIGRAM(S): at 05:48

## 2021-12-15 RX ADMIN — GABAPENTIN 100 MILLIGRAM(S): 400 CAPSULE ORAL at 05:47

## 2021-12-15 RX ADMIN — HEPARIN SODIUM 7500 UNIT(S): 5000 INJECTION INTRAVENOUS; SUBCUTANEOUS at 21:21

## 2021-12-15 RX ADMIN — Medication 100 MILLIGRAM(S): at 21:16

## 2021-12-15 RX ADMIN — Medication 1: at 08:08

## 2021-12-15 RX ADMIN — Medication 650 MILLIGRAM(S): at 11:27

## 2021-12-15 RX ADMIN — Medication 2: at 11:49

## 2021-12-15 RX ADMIN — Medication 100 MILLIGRAM(S): at 14:20

## 2021-12-15 RX ADMIN — CHLORHEXIDINE GLUCONATE 1 APPLICATION(S): 213 SOLUTION TOPICAL at 11:22

## 2021-12-15 RX ADMIN — HEPARIN SODIUM 7500 UNIT(S): 5000 INJECTION INTRAVENOUS; SUBCUTANEOUS at 05:48

## 2021-12-15 RX ADMIN — ATORVASTATIN CALCIUM 40 MILLIGRAM(S): 80 TABLET, FILM COATED ORAL at 21:16

## 2021-12-15 RX ADMIN — BUDESONIDE AND FORMOTEROL FUMARATE DIHYDRATE 2 PUFF(S): 160; 4.5 AEROSOL RESPIRATORY (INHALATION) at 23:36

## 2021-12-15 RX ADMIN — AMLODIPINE BESYLATE 10 MILLIGRAM(S): 2.5 TABLET ORAL at 05:47

## 2021-12-15 RX ADMIN — BUDESONIDE AND FORMOTEROL FUMARATE DIHYDRATE 2 PUFF(S): 160; 4.5 AEROSOL RESPIRATORY (INHALATION) at 10:54

## 2021-12-15 RX ADMIN — Medication 3 MILLILITER(S): at 23:36

## 2021-12-15 RX ADMIN — Medication 100 MILLIGRAM(S): at 05:47

## 2021-12-15 NOTE — PROGRESS NOTE ADULT - SUBJECTIVE AND OBJECTIVE BOX
CARDIOLOGY FOLLOW UP - Dr. Bermudez  Date of Service: 12/15/21  CC: no acute events     Review of Systems:  Constitutional: No fever, weight loss, or fatigue  Respiratory: No cough, wheezing, or hemoptysis, no shortness of breath  Cardiovascular: No chest pain, palpitations, passing out, dizziness, or leg swelling  Gastrointestinal: No abd or epigastric pain.  No nausea, vomiting, or hematemesis; no diarrhea or constipation, no melena or hematochezia  Vascular: no edema       PHYSICAL EXAM:  T(C): 37.1 (12-15-21 @ 05:46), Max: 37.3 (12-14-21 @ 21:05)  HR: 85 (12-15-21 @ 10:55) (71 - 89)  BP: 133/66 (12-15-21 @ 05:46) (122/64 - 133/66)  RR: 16 (12-15-21 @ 05:46) (14 - 18)  SpO2: 100% (12-15-21 @ 10:55) (97% - 100%)  Wt(kg): --  I&O's Summary    14 Dec 2021 07:01  -  15 Dec 2021 07:00  --------------------------------------------------------  IN: 0 mL / OUT: 800 mL / NET: -800 mL        Appearance: Normal	  Cardiovascular: Normal S1 S2,RRR, No JVD, No murmurs  Respiratory: Lungs clear to auscultation	  Gastrointestinal:  Soft, Non-tender, + BS	  Extremities: Normal range of motion, No clubbing, cyanosis or edema      Home Medications:  Advair Diskus 250 mcg-50 mcg inhalation powder: 1 puff(s) inhaled 2 times a day (23 Nov 2021 14:10)  alendronate 70 mg oral tablet: 1 tab(s) orally once a week (23 Nov 2021 14:10)  ascorbic acid 500 mg oral capsule: 1 cap(s) orally once a day (30 Oct 2019 16:54)  azelastine 137 mcg/inh (0.1%) nasal spray: 2 spray(s) nasal 2 times a day (30 Oct 2019 16:54)  glimepiride 2 mg oral tablet: 1 tab(s) orally once a day (30 Oct 2019 16:54)  ipratropium-albuterol 0.5 mg-2.5 mg/3 mLinhalation solution: 3 milliliter(s) inhaled every 6 hours, As Needed (04 Jun 2021 10:05)  montelukast 10 mg oral tablet: 1 tab(s) orally once a day (30 Oct 2019 16:54)  Neurontin 100 mg oral capsule: 1 cap(s) orally 3 times a day (29 Nov 2020 18:19)  ocular lubricant ophthalmic solution: 1 drop(s) to each affected eye 3 times a day, As needed, Dry Eyes (06 Nov 2019 13:41)  ProAir HFA 90 mcg/inh inhalation aerosol: 2 puff(s) inhaled 4 times a day, As Needed (23 Nov 2021 14:10)      MEDICATIONS  (STANDING):  albuterol/ipratropium for Nebulization 3 milliLiter(s) Nebulizer every 12 hours  amLODIPine   Tablet 10 milliGRAM(s) Oral daily  atorvastatin 40 milliGRAM(s) Oral at bedtime  budesonide 160 MICROgram(s)/formoterol 4.5 MICROgram(s) Inhaler 2 Puff(s) Inhalation two times a day  chlorhexidine 2% Cloths 1 Application(s) Topical daily  dextrose 40% Gel 15 Gram(s) Oral once  dextrose 5%. 1000 milliLiter(s) (50 mL/Hr) IV Continuous <Continuous>  dextrose 5%. 1000 milliLiter(s) (100 mL/Hr) IV Continuous <Continuous>  dextrose 50% Injectable 25 Gram(s) IV Push once  dextrose 50% Injectable 12.5 Gram(s) IV Push once  dextrose 50% Injectable 25 Gram(s) IV Push once  furosemide    Tablet 40 milliGRAM(s) Oral daily  gabapentin 100 milliGRAM(s) Oral three times a day  glucagon  Injectable 1 milliGRAM(s) IntraMuscular once  heparin   Injectable 7500 Unit(s) SubCutaneous every 8 hours  hydrALAZINE 100 milliGRAM(s) Oral three times a day  insulin lispro (ADMELOG) corrective regimen sliding scale   SubCutaneous three times a day before meals  insulin lispro (ADMELOG) corrective regimen sliding scale   SubCutaneous at bedtime  metoprolol succinate  milliGRAM(s) Oral daily  montelukast 10 milliGRAM(s) Oral daily  polyethylene glycol 3350 17 Gram(s) Oral daily  senna 2 Tablet(s) Oral at bedtime  sodium chloride 0.45%. 500 milliLiter(s) (40 mL/Hr) IV Continuous <Continuous>      TELEMETRY: 	    ECG:  	  RADIOLOGY:   DIAGNOSTIC TESTING:  [ ] Echocardiogram:  [ ]  Catheterization:  [ ] Stress Test:    OTHER: 	    LABS:	 	                            9.0    8.91  )-----------( 205      ( 14 Dec 2021 07:30 )             31.0     12-14    139  |  94<L>  |  21  ----------------------------<  135<H>  3.9   |  36<H>  |  0.91    Ca    9.1      14 Dec 2021 07:30  Phos  2.9     12-14  Mg     2.30     12-14

## 2021-12-15 NOTE — PROGRESS NOTE ADULT - ASSESSMENT
87y/o F w/ h/o HFpEF, obesity, SHAD on CPAP, HTN, T2DM, COPD, lymphedema, s/p fall and knee fracture p/w AMS and facial droop, MICU consulted for increased work of breathing on Bipap and elevated pCO2, found with hypercapnic respiratory failure c/b AMS improved on BIPAP.     # Acute on chronic respiratory failure with hypercapnia.   ·  Plan: - acute on chronic hypercapnic respiratory failure likely in setting of COPD/SHAD overlap exacerbation with possible PNA on CXR, HFpEF exacerbation given fluid overload on imaging (MICU POCUS with b-lines)  - HFpEF: c/w lasix, strict Is/OS. cards following recent echo with HFpEF and grade I DD  - COPD exacerbation: c/w AVAPs qHS. will consider steroids as well. c/w duonebs q6. c/w azithro. Pulmonology c/s 12/4: AVAPs settings adjusted but persistently hypoxic  - PNA: c/w zosyn. S/P Azithromycin  ***RRT called AM of 12/6, for AMS 2/2 severe hypoxia (40-50s) d/t bipap being disconnected.   Discussed GOC with pts daughter, Marina, at the time, who wanted everything to be done, including intubation if needed. Pt is FULL CODE.  Transferred to RCU from Medicine 12/6  - Breathing comfortably on 2L NC O2, AVAPS QHS and PRN    # AMS (altered mental status).   · Resolved w/improved resp status  - likely 2/2 acute on chronic hypercapnic respiratory failure  - neuro following  - CTH negative for acute ICH or infarct  - c.w statin  - recent echo wnl.    # Chronic heart failure with preserved ejection fraction (HFpEF).   - Recent echo with HFpEF and grade I DD  - strict Is/Os monitor output  - cards following: recent echo with grade 1 diastolic dysfunction  - c/w metoprolol, amlodipine, statin. (on home bumex) - now on PO Lasix  - Cardiology recs appreciated  - c/w Lasix 40mg qd     # SHAD and COPD overlap syndrome.   · c/w AVAPS qHS and PRN for AMS  - NC during day to maintain O2 sat 88-92 given COPD  - c/w Duonebs standing,  s/p zosyn for possible PNA  - 12/8/2021: s/p 5 days of IV Solu-medrol. No further steroids indicated at this time.   - 12/9 patient with increased shortness of breath and hypoxia. Placed on AVAPS. Will attempt to bring patient back down to nasal cannula later today. ABG obtained and reviewed. US of b/l LE dopplers ordered to r/o DVT. D-dimer ordered. CXR ordered.   -12/11: breathing well on nasal canula, will attempt to wean O2 to 2L, maintain O2 sat 92%  -12/12: Continues to do well on 2L NC with AVAPs qHS --> continue to wean as tolerated for goal SpO2 > 92%    # Pneumonia.   - s/p Azithromycin  - s/p zosyn    # Acute-on-chronic kidney injury.   - Cr initially elevated to 2s, baseline in 1s  - workup: renal lytes FeUrea c/w intrinsic renal disease, making good outputs -1.9 L O/N 12/4-12/5. Check UA and renal/bladder US  - suspect JOSEPH due to underlying medical condition: HFpEF exacerbation and fluid overload worsening renal perfusion. Now improved s/p diuresis and oxygenation on AVAPs  - hyperkalemia was treated with insulin and D50, with improvement of K  - Cr downtrended to 1s with diuresis and medical treatment.  - Improved    # Elevated troponin level.   · Initital trops 42 - 44  - cardiology following  - recent echo with grade I DD  - c/w diuresis as above, likely type II NSTEMI.    # T2DM (type 2 diabetes mellitus).   · a1c 6.9 11/2021  - c/w AISS premeal and qHS  - CC diet.    # MSK  -Right knee proximal tibia periprosthetic fracture  - Pt w/ R knee immobilizer.   - Ortho consulted and recommending xrays (ordered). Will need to reconsult once xrays complete  - C/w PT    # Prophylactic measure.   · DVT PPx: HSQ  - Dispo: PT recommending rehab as of 12/10 85y/o F w/ h/o HFpEF, obesity, SHAD on CPAP, HTN, T2DM, COPD, lymphedema, s/p fall and knee fracture p/w AMS and facial droop, MICU consulted for increased work of breathing on Bipap and elevated pCO2, found with hypercapnic respiratory failure c/b AMS improved on BIPAP.     # Acute on chronic respiratory failure with hypercapnia.    - Acute on chronic hypercapnic respiratory failure likely in setting of COPD/SHAD overlap exacerbation with possible PNA on CXR, HFpEF exacerbation given fluid overload on imaging (MICU POCUS with b-lines)  - HFpEF: c/w lasix, strict Is/OS. cards following recent echo with HFpEF and grade I DD  - COPD exacerbation: c/w AVAPs qHS. will consider steroids as well. c/w duonebs q6. c/w azithro. Pulmonology c/s 12/4: AVAPs settings adjusted but persistently hypoxic  - PNA: c/w zosyn. S/P Azithromycin  ***RRT called AM of 12/6, for AMS 2/2 severe hypoxia (40-50s) d/t bipap being disconnected.   Discussed GOC with pts daughter, Marina, at the time, who wanted everything to be done, including intubation if needed. Pt is FULL CODE.  Transferred to RCU from Medicine 12/6  - Breathing comfortably on 2L NC O2, AVAPS QHS and PRN    # AMS (altered mental status)   · Resolved w/improved resp status  - likely 2/2 acute on chronic hypercapnic respiratory failure  - Neuro recs appreciated   - CTH negative for acute ICH or infarct  - c/w Statin  - recent echo wnl.    # Chronic heart failure with preserved ejection fraction (HFpEF).   - Recent echo with HFpEF and grade I DD  - strict Is/Os monitor output  - cards following: recent echo with grade 1 diastolic dysfunction  - c/w metoprolol, Amlodipine, statin. (on home bumex) - now doing well on PO Lasix  - Cardiology recs appreciated  - c/w Lasix 40mg qd     # SHAD and COPD overlap syndrome.   · c/w AVAPS qHS and PRN for AMS  - NC during day to maintain O2 sat 88-92 given COPD  - c/w Duonebs standing,  s/p zosyn for possible PNA  - 12/8/2021: s/p 5 days of IV Solu-medrol. No further steroids indicated at this time.   - 12/9 patient with increased shortness of breath and hypoxia. Placed on AVAPS. Will attempt to bring patient back down to nasal cannula later today. ABG obtained and reviewed. US of b/l LE dopplers ordered to r/o DVT. D-dimer ordered. CXR ordered.   -12/11: breathing well on nasal canula, will attempt to wean O2 to 2L, maintain O2 sat 92%  -12/12: Continues to do well on 2L NC with AVAPs qHS --> continue to wean as tolerated for goal SpO2 > 92%  - Stable - no issues     # Pneumonia (resolved)  - s/p Azithromycin  - s/p zosyn    # Acute-on-chronic kidney injury.   - Cr initially elevated to 2s, baseline in 1s  - workup: renal lytes FeUrea c/w intrinsic renal disease, making good outputs -1.9 L O/N 12/4-12/5. Check UA and renal/bladder US  - suspect JOSEPH due to underlying medical condition: HFpEF exacerbation and fluid overload worsening renal perfusion. Now improved s/p diuresis and oxygenation on AVAPs  - hyperkalemia was treated with insulin and D50, with improvement of K  - Cr downtrended to 1s with diuresis and medical treatment.  - Improved    # Elevated troponin level.   · Initital trops 42 - 44  - cardiology following  - recent echo with grade I DD  - c/w diuresis as above, likely type II NSTEMI.    # T2DM (type 2 diabetes mellitus).   · a1c 6.9 11/2021  - c/w AISS premeal and qHS  - CC diet.    # MSK  -Right knee proximal tibia periprosthetic fracture  - Pt w/ R knee immobilizer - Ortho recs appreciated  - c/w NWB, plan to follow up with outpatient Orthopaedic   - PT/OT consulted, recs pending     # Prophylactic measure.   · DVT PPx: HSQ  - Dispo: PT/OT consulted

## 2021-12-15 NOTE — CONSULT NOTE ADULT - SUBJECTIVE AND OBJECTIVE BOX
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 Castro '12), R TKA (Dr. Castro, '09) admitted to medicine for SOB. Pt unable to provide further history as she keeps drifting in and out of sleep despite repeat attempts at arousing patient. Orthopedics consulted by Medicine for updated weight bearing status. Per chart review, patient was s/p fall on 11/18/2021 and was seen at [facility] by Orthopedics Team. Per Orthopedics Progress Note on 11/25, Medicine Team had discussion with patient's primary orthopaedic surgeon Dr. Ariel Farah where she was made non-weight bearing RLE in [place] Brace locked at 30 degrees flexion. Also, it was documented that patient is a poor surgical candidate given her multiple comorbidities and benefits might not outweigh the risks. [facility] requested Medicine get repeat xrays and get in contact with Dr. Matthew Farah since he is her primary orthopaedic surgeon and it was recommended that patient follow up with him as outpatient.  I attempted to get in contact with Dr. Castro's office today 12/15, message was left with , have not received a call back.       PAST MEDICAL & SURGICAL HISTORY:  HTN (hypertension)  HLD (hyperlipidemia)  Asthma  DM (diabetes mellitus)  GERD (gastroesophageal reflux disease)  Lymphedema  S/P knee replacement      MEDICATIONS  (STANDING):  albuterol/ipratropium for Nebulization 3 milliLiter(s) Nebulizer every 12 hours  amLODIPine   Tablet 10 milliGRAM(s) Oral daily  atorvastatin 40 milliGRAM(s) Oral at bedtime  budesonide 160 MICROgram(s)/formoterol 4.5 MICROgram(s) Inhaler 2 Puff(s) Inhalation two times a day  chlorhexidine 2% Cloths 1 Application(s) Topical daily  dextrose 40% Gel 15 Gram(s) Oral once  dextrose 5%. 1000 milliLiter(s) IV Continuous <Continuous>  dextrose 5%. 1000 milliLiter(s) IV Continuous <Continuous>  dextrose 50% Injectable 25 Gram(s) IV Push once  dextrose 50% Injectable 12.5 Gram(s) IV Push once  dextrose 50% Injectable 25 Gram(s) IV Push once  furosemide    Tablet 40 milliGRAM(s) Oral daily  gabapentin 100 milliGRAM(s) Oral three times a day  glucagon  Injectable 1 milliGRAM(s) IntraMuscular once  heparin   Injectable 7500 Unit(s) SubCutaneous every 8 hours  hydrALAZINE 100 milliGRAM(s) Oral three times a day  insulin lispro (ADMELOG) corrective regimen sliding scale   SubCutaneous three times a day before meals  insulin lispro (ADMELOG) corrective regimen sliding scale   SubCutaneous at bedtime  metoprolol succinate  milliGRAM(s) Oral daily  montelukast 10 milliGRAM(s) Oral daily  polyethylene glycol 3350 17 Gram(s) Oral daily  senna 2 Tablet(s) Oral at bedtime  sodium chloride 0.45%. 500 milliLiter(s) IV Continuous <Continuous>    Allergies  No Known Allergies      Vital Signs Last 24 Hrs  T(C): 36.8 (12-15-21 @ 11:51), Max: 37.3 (12-14-21 @ 21:05)  T(F): 98.3 (12-15-21 @ 11:51), Max: 99.1 (12-14-21 @ 21:05)  HR: 75 (12-15-21 @ 14:35) (71 - 89)  BP: 134/59 (12-15-21 @ 14:35) (125/60 - 139/64)  BP(mean): --  RR: 15 (12-15-21 @ 11:51) (14 - 16)  SpO2: 100% (12-15-21 @ 11:52) (95% - 100%)      Labs:                        9.0    8.91  )-----------( 205      ( 14 Dec 2021 07:30 )             31.0     14 Dec 2021 07:30    139    |  94     |  21     ----------------------------<  135    3.9     |  36     |  0.91     Ca    9.1        14 Dec 2021 07:30  Phos  2.9       14 Dec 2021 07:30  Mg     2.30      14 Dec 2021 07:30        Physical Exam: exam limited secondary to poor patient compliance  Gen: NAD, on nasal cannula, large body habitus   Right LE:   Skin intact, patient w/ severe lymphedema bilaterally   Pt in Bulky Castro Dressing in [place] Brace  Pt able to wiggle toes, slightly able to plantar/dorsiflex foot  Pt unable to perform any other range of motion   Sensation intact to light touch distally  Compartments soft, extremity warm to touch  Hard to appreciate pulses due to lymphedema       Imaging:   < from: Xray Tibia + Fibula 2 Views, Right (12.13.21 @ 18:11) >  ACC: [id number] EXAM:  XR KNEE 3 VIEWS RT                        ACC: [id number] EXAM:  XR TIB FIB AP LAT 2 VIEWS RT                        PROCEDURE DATE:  12/13/2021    INTERPRETATION:  CLINICAL INDICATION: follow-up right tibial fracture    EXAM:  Frontal oblique and crosstable lateral right knee and AP and crosstable   lateral right leg from 12/13/2021 at 1812. Compared to prior study from   11/19/2021.    IMPRESSION:  Articulated brace currently overlies the extremity.  Right total knee prosthesis again noted.  Redemonstrated proximal tibial periprosthetic fracture. Appearance of   progressive slight impaction and anterior fracture margin displacement   compared to prior. Proximal fibular metaphyseal fracture again noted as   well. No dislocations or additional fractures.    Residual surrounding soft tissue swelling.    No discrete lytic or blastic lesions.  --- End of Report ---      MICHELLE MAHONEY MD; Attending Radiologist  This document has been electronically signed. Dec 14 2021  9:33AM  < end of copied text >         A/P: 85 y/o Female with Right proximal tibia periprosthetic fracture   -Pain Control  -NWB in Bulky-Castro Dressing w/ [place] locked at 30 degrees flexion  -PT/OT  -DVT/PE ppx  -Plan to be discussed with Dr. Betito Do, follow for updates Orthopedics Consult Note:  85 y/o Female with PMH of Heart failure, COPD/asthma (on 3L NC), DM2, HTN, SHAD on CPAP, lymphedema on bumex, morbid obesity (BMI 52.7), L TKA (Dr. Castro '12), R TKA (Dr. Castro, '09) admitted to medicine for SOB. Pt unable to provide further history as she keeps drifting in and out of sleep despite repeat attempts at arousing patient. Orthopedics consulted by Medicine for updated weight bearing status. Per chart review, patient was s/p fall on 11/18/2021 and was seen at [facility] by Orthopedics Team. Per Orthopedics Progress Note on 11/25, Medicine Team had discussion with patient's primary orthopaedic surgeon Dr. Ariel Farah where she was made non-weight bearing RLE in [place] Brace locked at 30 degrees flexion. Also, it was documented that patient is a poor surgical candidate given her multiple comorbidities and benefits might not outweigh the risks. [facility] requested Medicine get repeat xrays and get in contact with Dr. Matthew Farah since he is her primary orthopaedic surgeon and it was recommended that patient follow up with him as outpatient.  I attempted to get in contact with Dr. Castro's office today 12/15, message was left with , have not received a call back. Unable to perform ROS secondary to patient being somnolent.      PAST MEDICAL & SURGICAL HISTORY:  HTN (hypertension)  HLD (hyperlipidemia)  Asthma  DM (diabetes mellitus)  GERD (gastroesophageal reflux disease)  Lymphedema  S/P knee replacement      MEDICATIONS  (STANDING):  albuterol/ipratropium for Nebulization 3 milliLiter(s) Nebulizer every 12 hours  amLODIPine   Tablet 10 milliGRAM(s) Oral daily  atorvastatin 40 milliGRAM(s) Oral at bedtime  budesonide 160 MICROgram(s)/formoterol 4.5 MICROgram(s) Inhaler 2 Puff(s) Inhalation two times a day  chlorhexidine 2% Cloths 1 Application(s) Topical daily  dextrose 40% Gel 15 Gram(s) Oral once  dextrose 5%. 1000 milliLiter(s) IV Continuous <Continuous>  dextrose 5%. 1000 milliLiter(s) IV Continuous <Continuous>  dextrose 50% Injectable 25 Gram(s) IV Push once  dextrose 50% Injectable 12.5 Gram(s) IV Push once  dextrose 50% Injectable 25 Gram(s) IV Push once  furosemide    Tablet 40 milliGRAM(s) Oral daily  gabapentin 100 milliGRAM(s) Oral three times a day  glucagon  Injectable 1 milliGRAM(s) IntraMuscular once  heparin   Injectable 7500 Unit(s) SubCutaneous every 8 hours  hydrALAZINE 100 milliGRAM(s) Oral three times a day  insulin lispro (ADMELOG) corrective regimen sliding scale   SubCutaneous three times a day before meals  insulin lispro (ADMELOG) corrective regimen sliding scale   SubCutaneous at bedtime  metoprolol succinate  milliGRAM(s) Oral daily  montelukast 10 milliGRAM(s) Oral daily  polyethylene glycol 3350 17 Gram(s) Oral daily  senna 2 Tablet(s) Oral at bedtime  sodium chloride 0.45%. 500 milliLiter(s) IV Continuous <Continuous>    Allergies  No Known Allergies      Vital Signs Last 24 Hrs  T(C): 36.8 (12-15-21 @ 11:51), Max: 37.3 (12-14-21 @ 21:05)  T(F): 98.3 (12-15-21 @ 11:51), Max: 99.1 (12-14-21 @ 21:05)  HR: 75 (12-15-21 @ 14:35) (71 - 89)  BP: 134/59 (12-15-21 @ 14:35) (125/60 - 139/64)  BP(mean): --  RR: 15 (12-15-21 @ 11:51) (14 - 16)  SpO2: 100% (12-15-21 @ 11:52) (95% - 100%)      Labs:                        9.0    8.91  )-----------( 205      ( 14 Dec 2021 07:30 )             31.0     14 Dec 2021 07:30    139    |  94     |  21     ----------------------------<  135    3.9     |  36     |  0.91     Ca    9.1        14 Dec 2021 07:30  Phos  2.9       14 Dec 2021 07:30  Mg     2.30      14 Dec 2021 07:30        Physical Exam: exam limited secondary to poor patient compliance  Gen: NAD, on nasal cannula, large body habitus   Right LE:   Skin intact, patient w/ severe lymphedema bilaterally   Pt in Bulky [place] Dressing in [place] Brace  Pt able to wiggle toes, slightly able to plantar/dorsiflex foot  Pt unable to perform any other range of motion   Sensation intact to light touch distally  Compartments soft, extremity warm to touch  Hard to appreciate pulses due to lymphedema       Imaging:   < from: Xray Tibia + Fibula 2 Views, Right (12.13.21 @ 18:11) >  ACC: [id number] EXAM:  XR KNEE 3 VIEWS RT                        ACC: [id number] EXAM:  XR TIB FIB AP LAT 2 VIEWS RT                        PROCEDURE DATE:  12/13/2021    INTERPRETATION:  CLINICAL INDICATION: follow-up right tibial fracture    EXAM:  Frontal oblique and crosstable lateral right knee and AP and crosstable   lateral right leg from 12/13/2021 at 1812. Compared to prior study from   11/19/2021.    IMPRESSION:  Articulated brace currently overlies the extremity.  Right total knee prosthesis again noted.  Redemonstrated proximal tibial periprosthetic fracture. Appearance of   progressive slight impaction and anterior fracture margin displacement   compared to prior. Proximal fibular metaphyseal fracture again noted as   well. No dislocations or additional fractures.    Residual surrounding soft tissue swelling.    No discrete lytic or blastic lesions.  --- End of Report ---      MICHELLE MAHONEY MD; Attending Radiologist  This document has been electronically signed. Dec 14 2021  9:33AM  < end of copied text >         A/P: 85 y/o Female with Right proximal tibia periprosthetic fracture   -Pain Control  -No acute orthopedic surgical intervention at this time  -Continue with NWB in Bulky-Castro Dressing w/ [place] locked at 30 degrees flexion  -PT/OT  -Pt may follow up with Dr. Jean Carlos Dee who originally decided on nonoperative treatment at [facility] or her primary orthopaedic surgeon Dr. Ariel Castro upon discharge for further management of periprosthetic fracture  -Appreciate care per primary team   -Plan discussed with Dr. Do   Orthopedics Consult Note:  87 y/o Female with PMH of Heart failure, COPD/asthma (on 3L NC), DM2, HTN, SHAD on CPAP, lymphedema on bumex, morbid obesity (BMI 52.7), L TKA (Dr. Castro '12), R TKA (Dr. Castro, '09) admitted to medicine for SOB. Pt unable to provide further history as she keeps drifting in and out of sleep despite repeat attempts at arousing patient. Orthopedics consulted by Medicine for updated weight bearing status. Per chart review, patient was s/p fall on 11/18/2021 and was seen at [facility] by Orthopedics Team. Per Orthopedics Progress Note on 11/25, Medicine Team had discussion with patient's primary orthopaedic surgeon Dr. Ariel Farah where she was made non-weight bearing RLE in [place] Brace locked at 30 degrees flexion. Also, it was documented that patient is a poor surgical candidate given her multiple comorbidities and benefits might not outweigh the risks. [facility] requested Medicine get repeat xrays and get in contact with Dr. Matthew Farah since he is her primary orthopaedic surgeon and it was recommended that patient follow up with him as outpatient.  I attempted to get in contact with Dr. Castro's office today 12/15, message was left with , have not received a call back. Unable to perform ROS secondary to patient being somnolent.      PAST MEDICAL & SURGICAL HISTORY:  HTN (hypertension)  HLD (hyperlipidemia)  Asthma  DM (diabetes mellitus)  GERD (gastroesophageal reflux disease)  Lymphedema  S/P knee replacement      MEDICATIONS  (STANDING):  albuterol/ipratropium for Nebulization 3 milliLiter(s) Nebulizer every 12 hours  amLODIPine   Tablet 10 milliGRAM(s) Oral daily  atorvastatin 40 milliGRAM(s) Oral at bedtime  budesonide 160 MICROgram(s)/formoterol 4.5 MICROgram(s) Inhaler 2 Puff(s) Inhalation two times a day  chlorhexidine 2% Cloths 1 Application(s) Topical daily  dextrose 40% Gel 15 Gram(s) Oral once  dextrose 5%. 1000 milliLiter(s) IV Continuous <Continuous>  dextrose 5%. 1000 milliLiter(s) IV Continuous <Continuous>  dextrose 50% Injectable 25 Gram(s) IV Push once  dextrose 50% Injectable 12.5 Gram(s) IV Push once  dextrose 50% Injectable 25 Gram(s) IV Push once  furosemide    Tablet 40 milliGRAM(s) Oral daily  gabapentin 100 milliGRAM(s) Oral three times a day  glucagon  Injectable 1 milliGRAM(s) IntraMuscular once  heparin   Injectable 7500 Unit(s) SubCutaneous every 8 hours  hydrALAZINE 100 milliGRAM(s) Oral three times a day  insulin lispro (ADMELOG) corrective regimen sliding scale   SubCutaneous three times a day before meals  insulin lispro (ADMELOG) corrective regimen sliding scale   SubCutaneous at bedtime  metoprolol succinate  milliGRAM(s) Oral daily  montelukast 10 milliGRAM(s) Oral daily  polyethylene glycol 3350 17 Gram(s) Oral daily  senna 2 Tablet(s) Oral at bedtime  sodium chloride 0.45%. 500 milliLiter(s) IV Continuous <Continuous>    Allergies  No Known Allergies      Vital Signs Last 24 Hrs  T(C): 36.8 (12-15-21 @ 11:51), Max: 37.3 (12-14-21 @ 21:05)  T(F): 98.3 (12-15-21 @ 11:51), Max: 99.1 (12-14-21 @ 21:05)  HR: 75 (12-15-21 @ 14:35) (71 - 89)  BP: 134/59 (12-15-21 @ 14:35) (125/60 - 139/64)  BP(mean): --  RR: 15 (12-15-21 @ 11:51) (14 - 16)  SpO2: 100% (12-15-21 @ 11:52) (95% - 100%)      Labs:                        9.0    8.91  )-----------( 205      ( 14 Dec 2021 07:30 )             31.0     14 Dec 2021 07:30    139    |  94     |  21     ----------------------------<  135    3.9     |  36     |  0.91     Ca    9.1        14 Dec 2021 07:30  Phos  2.9       14 Dec 2021 07:30  Mg     2.30      14 Dec 2021 07:30        Physical Exam: exam limited secondary to poor patient compliance  Gen: NAD, on nasal cannula, large body habitus   Right LE:   Skin intact, patient w/ severe lymphedema bilaterally   Pt in Bulky [place] Dressing in [place] Brace  Pt able to wiggle toes, slightly able to plantar/dorsiflex foot  Pt unable to perform any other range of motion   Sensation intact to light touch distally  Compartments soft, extremity warm to touch  Hard to appreciate pulses due to lymphedema       Imaging:   < from: Xray Tibia + Fibula 2 Views, Right (12.13.21 @ 18:11) >  ACC: [id number] EXAM:  XR KNEE 3 VIEWS RT                        ACC: [id number] EXAM:  XR TIB FIB AP LAT 2 VIEWS RT                        PROCEDURE DATE:  12/13/2021    INTERPRETATION:  CLINICAL INDICATION: follow-up right tibial fracture    EXAM:  Frontal oblique and crosstable lateral right knee and AP and crosstable   lateral right leg from 12/13/2021 at 1812. Compared to prior study from   11/19/2021.    IMPRESSION:  Articulated brace currently overlies the extremity.  Right total knee prosthesis again noted.  Redemonstrated proximal tibial periprosthetic fracture. Appearance of   progressive slight impaction and anterior fracture margin displacement   compared to prior. Proximal fibular metaphyseal fracture again noted as   well. No dislocations or additional fractures.    Residual surrounding soft tissue swelling.    No discrete lytic or blastic lesions.  --- End of Report ---      MICHELLE MAHONEY MD; Attending Radiologist  This document has been electronically signed. Dec 14 2021  9:33AM  < end of copied text >         A/P: 87 y/o Female with Right proximal tibia periprosthetic fracture   -Pain Control  -No acute orthopedic surgical intervention at this time  -Continue patient as NWB RLE in Bulky-Castro Dressing w/ [place] locked at 30 degrees flexion  -PT/OT  -Pt may follow up with Dr. Jean Carlos Dee who originally decided on nonoperative treatment at [facility] or her primary orthopaedic surgeon Dr. Ariel Castro upon discharge for further management of periprosthetic fracture  -Appreciate care per primary team   -Plan discussed with Dr. Do   Orthopedics Consult Note:  85 y/o Female with PMH of Heart failure, COPD/asthma (on 3L NC), DM2, HTN, SHAD on CPAP, lymphedema on bumex, morbid obesity (BMI 52.7), L TKA (Dr. Castro '12), R TKA (Dr. Castro, '09) admitted to medicine for SOB. Pt unable to provide further history as she keeps drifting in and out of sleep despite repeat attempts at arousing patient. Orthopedics consulted by Medicine for updated weight bearing status. Per chart review, patient was s/p fall on 11/18/2021 and was seen at [facility] by Orthopedics Team. Per Orthopedics Progress Note on 11/25, Medicine Team had discussion with patient's primary orthopaedic surgeon Dr. Ariel Farah where she was made non-weight bearing RLE in [place] Brace locked at 30 degrees flexion. Also, it was documented that patient is a poor surgical candidate given her multiple comorbidities and benefits might not outweigh the risks. [facility] requested Medicine get repeat xrays and get in contact with Dr. Matthew Farah since he is her primary orthopaedic surgeon and it was recommended that patient follow up with him as outpatient.  I attempted to get in contact with Dr. Castro's office today 12/15, message was left with , have not received a call back. Unable to perform ROS secondary to patient being somnolent.      PAST MEDICAL & SURGICAL HISTORY:  HTN (hypertension)  HLD (hyperlipidemia)  Asthma  DM (diabetes mellitus)  GERD (gastroesophageal reflux disease)  Lymphedema  S/P knee replacement      MEDICATIONS  (STANDING):  albuterol/ipratropium for Nebulization 3 milliLiter(s) Nebulizer every 12 hours  amLODIPine   Tablet 10 milliGRAM(s) Oral daily  atorvastatin 40 milliGRAM(s) Oral at bedtime  budesonide 160 MICROgram(s)/formoterol 4.5 MICROgram(s) Inhaler 2 Puff(s) Inhalation two times a day  chlorhexidine 2% Cloths 1 Application(s) Topical daily  dextrose 40% Gel 15 Gram(s) Oral once  dextrose 5%. 1000 milliLiter(s) IV Continuous <Continuous>  dextrose 5%. 1000 milliLiter(s) IV Continuous <Continuous>  dextrose 50% Injectable 25 Gram(s) IV Push once  dextrose 50% Injectable 12.5 Gram(s) IV Push once  dextrose 50% Injectable 25 Gram(s) IV Push once  furosemide    Tablet 40 milliGRAM(s) Oral daily  gabapentin 100 milliGRAM(s) Oral three times a day  glucagon  Injectable 1 milliGRAM(s) IntraMuscular once  heparin   Injectable 7500 Unit(s) SubCutaneous every 8 hours  hydrALAZINE 100 milliGRAM(s) Oral three times a day  insulin lispro (ADMELOG) corrective regimen sliding scale   SubCutaneous three times a day before meals  insulin lispro (ADMELOG) corrective regimen sliding scale   SubCutaneous at bedtime  metoprolol succinate  milliGRAM(s) Oral daily  montelukast 10 milliGRAM(s) Oral daily  polyethylene glycol 3350 17 Gram(s) Oral daily  senna 2 Tablet(s) Oral at bedtime  sodium chloride 0.45%. 500 milliLiter(s) IV Continuous <Continuous>    Allergies  No Known Allergies      Vital Signs Last 24 Hrs  T(C): 36.8 (12-15-21 @ 11:51), Max: 37.3 (12-14-21 @ 21:05)  T(F): 98.3 (12-15-21 @ 11:51), Max: 99.1 (12-14-21 @ 21:05)  HR: 75 (12-15-21 @ 14:35) (71 - 89)  BP: 134/59 (12-15-21 @ 14:35) (125/60 - 139/64)  BP(mean): --  RR: 15 (12-15-21 @ 11:51) (14 - 16)  SpO2: 100% (12-15-21 @ 11:52) (95% - 100%)      Labs:                        9.0    8.91  )-----------( 205      ( 14 Dec 2021 07:30 )             31.0     14 Dec 2021 07:30    139    |  94     |  21     ----------------------------<  135    3.9     |  36     |  0.91     Ca    9.1        14 Dec 2021 07:30  Phos  2.9       14 Dec 2021 07:30  Mg     2.30      14 Dec 2021 07:30        Physical Exam: exam limited secondary to poor patient compliance  Gen: NAD, on nasal cannula, large body habitus   Right LE:   Skin intact, patient w/ severe lymphedema bilaterally   Pt in Bulky Castro Dressing in [place] Brace  Pt able to wiggle toes, slightly able to plantar/dorsiflex foot  Pt unable to perform any other range of motion   Sensation intact to light touch distally  Compartments soft, extremity warm to touch  No concern for compartment syndrome at this time   Hard to appreciate pulses due to lymphedema       Imaging:   < from: Xray Tibia + Fibula 2 Views, Right (12.13.21 @ 18:11) >  ACC: [id number] EXAM:  XR KNEE 3 VIEWS RT                        ACC: [id number] EXAM:  XR TIB FIB AP LAT 2 VIEWS RT                        PROCEDURE DATE:  12/13/2021    INTERPRETATION:  CLINICAL INDICATION: follow-up right tibial fracture    EXAM:  Frontal oblique and crosstable lateral right knee and AP and crosstable   lateral right leg from 12/13/2021 at 1812. Compared to prior study from   11/19/2021.    IMPRESSION:  Articulated brace currently overlies the extremity.  Right total knee prosthesis again noted.  Redemonstrated proximal tibial periprosthetic fracture. Appearance of   progressive slight impaction and anterior fracture margin displacement   compared to prior. Proximal fibular metaphyseal fracture again noted as   well. No dislocations or additional fractures.    Residual surrounding soft tissue swelling.    No discrete lytic or blastic lesions.  --- End of Report ---      MICHELLE MAHONEY MD; Attending Radiologist  This document has been electronically signed. Dec 14 2021  9:33AM  < end of copied text >         A/P: 85 y/o Female with Right proximal tibia periprosthetic fracture   -Pain Control  -No acute orthopedic surgical intervention at this time  -Continue patient as NWB RLE in Bulky-Castro Dressing w/ [place] locked at 30 degrees flexion  -PT/OT  -Pt may follow up with Dr. Jean Carlos Dee who originally decided on nonoperative treatment at [facility] or her primary orthopaedic surgeon Dr. Ariel Castro upon discharge for further management of periprosthetic fracture  -Appreciate care per primary team   -Plan discussed with Dr. Do

## 2021-12-15 NOTE — PROGRESS NOTE ADULT - ATTENDING COMMENTS
agree with above  stable on nocturnal avaps and daytime 02  pt and family now wishing rehab as dispo -- planning in progress  cont lasix 40 po daily. euvolemic and HCO3 stable

## 2021-12-15 NOTE — PROGRESS NOTE ADULT - ASSESSMENT
Echo 11/19/21: EF 60-65%, nl lv sys fx, mild AS, mild Ms   Echo 6/2/21: Min MR, grossly nl lv sys fx   Echo 12/5/20: grossly nl lv sys fx    a/p  87 y/o female w pmhx of HFpEF, obesity, SHAD on CPAP, HTN, T2DM, COPD, lymphedema, s/p fall and knee fracture s/p  knee surgery, presents to the ED with AMS and facial droop.    #AMS  -in setting of hypercapnic resp failure  -s/p RRT 12/6 for hypoxia, AMS- pt noted to be connected incorrectly to biPAP. Xray - No PTX. BiPAP circuit was fixed and  mental status/ sat improved to 100%.   -now NC intermittent on AVAPS - management per RCU team  -repeat chest xray12/6  noted    #Hypercapnic respiratory failure, hypoxic resp failure  -some component of pulmonary edema  -AVAPs per primary team  -cont PO lasix     #acute/Chronic Diastolic CHF  -pt w chronic Lymphedema  -volume status stable   -s/p bipap per pulm/med  -recent Echo w grossly nl lv sys fx   -cont bb , lasix     #HTN  -stable  -cont current meds     dvt ppx

## 2021-12-15 NOTE — CONSULT NOTE ADULT - ATTENDING COMMENTS
Agree with above.    85 y/o Female with PMH of Heart failure, COPD/asthma (on 3L NC), DM2, HTN, SHAD on CPAP, lymphedema on bumex, morbid obesity (BMI 52.7), L TKA (Dr. Castor '12), R TKA (Dr. Castro, '09) admitted to medicine for SOB. Had sustained a right periprosthetic tibia fx 1 month ago, being treated NWB in a brace by her orthopaedist at [facility], as she is not a surgical candidate.     Recommend continue status quo- NWB RLE in [place].  FU with her orthopaedists- either Dr. Castro or her  from [facility].   No acute orthopaedic surgical intervention.  FU ortho PRN.

## 2021-12-15 NOTE — PROGRESS NOTE ADULT - SUBJECTIVE AND OBJECTIVE BOX
CHIEF COMPLAINT: Patient is a 86y old  Female who presents with a chief complaint of SOB (14 Dec 2021 20:22)    Interval Events:    REVIEW OF SYSTEMS:  Constitutional:   Eyes:  ENT:  CV:  Resp:  GI:  :  MSK:  Integumentary:  Neurological:  Psychiatric:  Endocrine:  Hematologic/Lymphatic:  Allergic/Immunologic:  [ ] All other systems negative  [ ] Unable to assess ROS because ________    OBJECTIVE:  ICU Vital Signs Last 24 Hrs  T(C): 37.1 (15 Dec 2021 05:46), Max: 37.3 (14 Dec 2021 21:05)  T(F): 98.7 (15 Dec 2021 05:46), Max: 99.1 (14 Dec 2021 21:05)  HR: 71 (15 Dec 2021 07:15) (71 - 91)  BP: 133/66 (15 Dec 2021 05:46) (122/64 - 133/66)  BP(mean): --  ABP: --  ABP(mean): --  RR: 16 (15 Dec 2021 05:46) (14 - 18)  SpO2: 98% (15 Dec 2021 08:08) (94% - 99%)    Mode: standby    12-14 @ 07:01  -  12-15 @ 07:00  --------------------------------------------------------  IN: 0 mL / OUT: 800 mL / NET: -800 mL      CAPILLARY BLOOD GLUCOSE      POCT Blood Glucose.: 152 mg/dL (15 Dec 2021 07:36)      PHYSICAL EXAM:  General:   HEENT:   Lymph Nodes:  Neck:   Respiratory:   Cardiovascular:   Abdomen:   Extremities:   Skin:   Neurological:  Psychiatry:    HOSPITAL MEDICATIONS:  MEDICATIONS  (STANDING):  albuterol/ipratropium for Nebulization 3 milliLiter(s) Nebulizer every 12 hours  amLODIPine   Tablet 10 milliGRAM(s) Oral daily  atorvastatin 40 milliGRAM(s) Oral at bedtime  budesonide 160 MICROgram(s)/formoterol 4.5 MICROgram(s) Inhaler 2 Puff(s) Inhalation two times a day  chlorhexidine 2% Cloths 1 Application(s) Topical daily  dextrose 40% Gel 15 Gram(s) Oral once  dextrose 5%. 1000 milliLiter(s) (50 mL/Hr) IV Continuous <Continuous>  dextrose 5%. 1000 milliLiter(s) (100 mL/Hr) IV Continuous <Continuous>  dextrose 50% Injectable 25 Gram(s) IV Push once  dextrose 50% Injectable 12.5 Gram(s) IV Push once  dextrose 50% Injectable 25 Gram(s) IV Push once  furosemide    Tablet 40 milliGRAM(s) Oral daily  gabapentin 100 milliGRAM(s) Oral three times a day  glucagon  Injectable 1 milliGRAM(s) IntraMuscular once  heparin   Injectable 7500 Unit(s) SubCutaneous every 8 hours  hydrALAZINE 100 milliGRAM(s) Oral three times a day  insulin lispro (ADMELOG) corrective regimen sliding scale   SubCutaneous three times a day before meals  insulin lispro (ADMELOG) corrective regimen sliding scale   SubCutaneous at bedtime  metoprolol succinate  milliGRAM(s) Oral daily  montelukast 10 milliGRAM(s) Oral daily  polyethylene glycol 3350 17 Gram(s) Oral daily  senna 2 Tablet(s) Oral at bedtime  sodium chloride 0.45%. 500 milliLiter(s) (40 mL/Hr) IV Continuous <Continuous>    MEDICATIONS  (PRN):  acetaminophen     Tablet .. 650 milliGRAM(s) Oral every 6 hours PRN Mild Pain (1 - 3), Moderate Pain (4 - 6)  traMADol 25 milliGRAM(s) Oral every 6 hours PRN Severe Pain (7 - 10)      LABS:                        9.0    8.91  )-----------( 205      ( 14 Dec 2021 07:30 )             31.0     12-14    139  |  94<L>  |  21  ----------------------------<  135<H>  3.9   |  36<H>  |  0.91    Ca    9.1      14 Dec 2021 07:30  Phos  2.9     12-14  Mg     2.30     12-14                MICROBIOLOGY:     RADIOLOGY:  [ ] Reviewed and interpreted by me    PULMONARY FUNCTION TESTS:    EKG: CHIEF COMPLAINT: Patient is a 86y old  Female who presents with a chief complaint of SOB (14 Dec 2021 20:22)    Interval Events: None reported overnight. Clinically stable.     REVIEW OF SYSTEMS:  see above  [x] All other systems negative    PAST MEDICAL & SURGICAL HISTORY:  HTN (hypertension)    HLD (hyperlipidemia)    Asthma    DM (diabetes mellitus)    GERD (gastroesophageal reflux disease)    Lymphedema    S/P knee replacement      FAMILY HISTORY:      Social History:  Denies smoking/alcohol/illicit drugs (02 Dec 2021 19:54)    OBJECTIVE:  ICU Vital Signs Last 24 Hrs  T(C): 37.1 (15 Dec 2021 05:46), Max: 37.3 (14 Dec 2021 21:05)  T(F): 98.7 (15 Dec 2021 05:46), Max: 99.1 (14 Dec 2021 21:05)  HR: 71 (15 Dec 2021 07:15) (71 - 91)  BP: 133/66 (15 Dec 2021 05:46) (122/64 - 133/66)  BP(mean): --  ABP: --  ABP(mean): --  RR: 16 (15 Dec 2021 05:46) (14 - 18)  SpO2: 98% (15 Dec 2021 08:08) (94% - 99%)    Mode: standby    12-14 @ 07:01  -  12-15 @ 07:00  --------------------------------------------------------  IN: 0 mL / OUT: 800 mL / NET: -800 mL      CAPILLARY BLOOD GLUCOSE      POCT Blood Glucose.: 152 mg/dL (15 Dec 2021 07:36)      PHYSICAL EXAM  General: Laying in bed, NAD  Neck: Supple, no JVD  Respiratory: Normal resp effort, CTA b/l   Cardiovascular: +s1, s2  Abdomen: +BS, protuberant, nt/nd, no peritoneal signs noted   Extremities: no swelling, +RLE knee immobilizer  Skin: as per RN assessment  sheet   Neurological: non focal       HOSPITAL MEDICATIONS:  MEDICATIONS  (STANDING):  albuterol/ipratropium for Nebulization 3 milliLiter(s) Nebulizer every 12 hours  amLODIPine   Tablet 10 milliGRAM(s) Oral daily  atorvastatin 40 milliGRAM(s) Oral at bedtime  budesonide 160 MICROgram(s)/formoterol 4.5 MICROgram(s) Inhaler 2 Puff(s) Inhalation two times a day  chlorhexidine 2% Cloths 1 Application(s) Topical daily  dextrose 40% Gel 15 Gram(s) Oral once  dextrose 5%. 1000 milliLiter(s) (50 mL/Hr) IV Continuous <Continuous>  dextrose 5%. 1000 milliLiter(s) (100 mL/Hr) IV Continuous <Continuous>  dextrose 50% Injectable 25 Gram(s) IV Push once  dextrose 50% Injectable 12.5 Gram(s) IV Push once  dextrose 50% Injectable 25 Gram(s) IV Push once  furosemide    Tablet 40 milliGRAM(s) Oral daily  gabapentin 100 milliGRAM(s) Oral three times a day  glucagon  Injectable 1 milliGRAM(s) IntraMuscular once  heparin   Injectable 7500 Unit(s) SubCutaneous every 8 hours  hydrALAZINE 100 milliGRAM(s) Oral three times a day  insulin lispro (ADMELOG) corrective regimen sliding scale   SubCutaneous three times a day before meals  insulin lispro (ADMELOG) corrective regimen sliding scale   SubCutaneous at bedtime  metoprolol succinate  milliGRAM(s) Oral daily  montelukast 10 milliGRAM(s) Oral daily  polyethylene glycol 3350 17 Gram(s) Oral daily  senna 2 Tablet(s) Oral at bedtime  sodium chloride 0.45%. 500 milliLiter(s) (40 mL/Hr) IV Continuous <Continuous>    MEDICATIONS  (PRN):  acetaminophen     Tablet .. 650 milliGRAM(s) Oral every 6 hours PRN Mild Pain (1 - 3), Moderate Pain (4 - 6)  traMADol 25 milliGRAM(s) Oral every 6 hours PRN Severe Pain (7 - 10)      LABS:                        9.0    8.91  )-----------( 205      ( 14 Dec 2021 07:30 )             31.0     12-14    139  |  94<L>  |  21  ----------------------------<  135<H>  3.9   |  36<H>  |  0.91    Ca    9.1      14 Dec 2021 07:30  Phos  2.9     12-14  Mg     2.30     12-14      MICROBIOLOGY:     RADIOLOGY:  [ ] Reviewed and interpreted by me    PULMONARY FUNCTION TESTS:    EKG:

## 2021-12-16 LAB
ANION GAP SERPL CALC-SCNC: 9 MMOL/L — SIGNIFICANT CHANGE UP (ref 7–14)
BUN SERPL-MCNC: 23 MG/DL — SIGNIFICANT CHANGE UP (ref 7–23)
CALCIUM SERPL-MCNC: 8.7 MG/DL — SIGNIFICANT CHANGE UP (ref 8.4–10.5)
CHLORIDE SERPL-SCNC: 94 MMOL/L — LOW (ref 98–107)
CO2 SERPL-SCNC: 34 MMOL/L — HIGH (ref 22–31)
CREAT SERPL-MCNC: 0.9 MG/DL — SIGNIFICANT CHANGE UP (ref 0.5–1.3)
GLUCOSE BLDC GLUCOMTR-MCNC: 155 MG/DL — HIGH (ref 70–99)
GLUCOSE BLDC GLUCOMTR-MCNC: 204 MG/DL — HIGH (ref 70–99)
GLUCOSE BLDC GLUCOMTR-MCNC: 231 MG/DL — HIGH (ref 70–99)
GLUCOSE BLDC GLUCOMTR-MCNC: 244 MG/DL — HIGH (ref 70–99)
GLUCOSE SERPL-MCNC: 149 MG/DL — HIGH (ref 70–99)
HCT VFR BLD CALC: 32.6 % — LOW (ref 34.5–45)
HGB BLD-MCNC: 9.4 G/DL — LOW (ref 11.5–15.5)
MAGNESIUM SERPL-MCNC: 2.3 MG/DL — SIGNIFICANT CHANGE UP (ref 1.6–2.6)
MCHC RBC-ENTMCNC: 26.4 PG — LOW (ref 27–34)
MCHC RBC-ENTMCNC: 28.8 GM/DL — LOW (ref 32–36)
MCV RBC AUTO: 91.6 FL — SIGNIFICANT CHANGE UP (ref 80–100)
NRBC # BLD: 0 /100 WBCS — SIGNIFICANT CHANGE UP
NRBC # FLD: 0 K/UL — SIGNIFICANT CHANGE UP
PHOSPHATE SERPL-MCNC: 3.1 MG/DL — SIGNIFICANT CHANGE UP (ref 2.5–4.5)
PLATELET # BLD AUTO: 193 K/UL — SIGNIFICANT CHANGE UP (ref 150–400)
POTASSIUM SERPL-MCNC: 3.8 MMOL/L — SIGNIFICANT CHANGE UP (ref 3.5–5.3)
POTASSIUM SERPL-SCNC: 3.8 MMOL/L — SIGNIFICANT CHANGE UP (ref 3.5–5.3)
RBC # BLD: 3.56 M/UL — LOW (ref 3.8–5.2)
RBC # FLD: 16.5 % — HIGH (ref 10.3–14.5)
SODIUM SERPL-SCNC: 137 MMOL/L — SIGNIFICANT CHANGE UP (ref 135–145)
WBC # BLD: 11.29 K/UL — HIGH (ref 3.8–10.5)
WBC # FLD AUTO: 11.29 K/UL — HIGH (ref 3.8–10.5)

## 2021-12-16 PROCEDURE — 99233 SBSQ HOSP IP/OBS HIGH 50: CPT | Mod: GC

## 2021-12-16 PROCEDURE — 74018 RADEX ABDOMEN 1 VIEW: CPT | Mod: 26

## 2021-12-16 RX ORDER — SIMETHICONE 80 MG/1
80 TABLET, CHEWABLE ORAL ONCE
Refills: 0 | Status: COMPLETED | OUTPATIENT
Start: 2021-12-16 | End: 2021-12-16

## 2021-12-16 RX ORDER — POLYETHYLENE GLYCOL 3350 17 G/17G
17 POWDER, FOR SOLUTION ORAL
Refills: 0 | Status: DISCONTINUED | OUTPATIENT
Start: 2021-12-16 | End: 2021-12-17

## 2021-12-16 RX ADMIN — HEPARIN SODIUM 7500 UNIT(S): 5000 INJECTION INTRAVENOUS; SUBCUTANEOUS at 21:12

## 2021-12-16 RX ADMIN — HEPARIN SODIUM 7500 UNIT(S): 5000 INJECTION INTRAVENOUS; SUBCUTANEOUS at 15:01

## 2021-12-16 RX ADMIN — Medication 1: at 08:15

## 2021-12-16 RX ADMIN — Medication 100 MILLIGRAM(S): at 15:01

## 2021-12-16 RX ADMIN — SENNA PLUS 2 TABLET(S): 8.6 TABLET ORAL at 21:13

## 2021-12-16 RX ADMIN — Medication 100 MILLIGRAM(S): at 05:27

## 2021-12-16 RX ADMIN — TRAMADOL HYDROCHLORIDE 25 MILLIGRAM(S): 50 TABLET ORAL at 09:34

## 2021-12-16 RX ADMIN — CHLORHEXIDINE GLUCONATE 1 APPLICATION(S): 213 SOLUTION TOPICAL at 11:31

## 2021-12-16 RX ADMIN — TRAMADOL HYDROCHLORIDE 25 MILLIGRAM(S): 50 TABLET ORAL at 10:37

## 2021-12-16 RX ADMIN — Medication 3 MILLILITER(S): at 21:54

## 2021-12-16 RX ADMIN — Medication 2: at 12:02

## 2021-12-16 RX ADMIN — MONTELUKAST 10 MILLIGRAM(S): 4 TABLET, CHEWABLE ORAL at 11:18

## 2021-12-16 RX ADMIN — POLYETHYLENE GLYCOL 3350 17 GRAM(S): 17 POWDER, FOR SOLUTION ORAL at 16:01

## 2021-12-16 RX ADMIN — GABAPENTIN 100 MILLIGRAM(S): 400 CAPSULE ORAL at 15:02

## 2021-12-16 RX ADMIN — Medication 100 MILLIGRAM(S): at 05:28

## 2021-12-16 RX ADMIN — Medication 3 MILLILITER(S): at 09:55

## 2021-12-16 RX ADMIN — ATORVASTATIN CALCIUM 40 MILLIGRAM(S): 80 TABLET, FILM COATED ORAL at 21:12

## 2021-12-16 RX ADMIN — BUDESONIDE AND FORMOTEROL FUMARATE DIHYDRATE 2 PUFF(S): 160; 4.5 AEROSOL RESPIRATORY (INHALATION) at 09:55

## 2021-12-16 RX ADMIN — Medication 40 MILLIGRAM(S): at 05:27

## 2021-12-16 RX ADMIN — Medication 2: at 17:12

## 2021-12-16 RX ADMIN — AMLODIPINE BESYLATE 10 MILLIGRAM(S): 2.5 TABLET ORAL at 05:27

## 2021-12-16 RX ADMIN — Medication 100 MILLIGRAM(S): at 21:12

## 2021-12-16 RX ADMIN — GABAPENTIN 100 MILLIGRAM(S): 400 CAPSULE ORAL at 21:12

## 2021-12-16 RX ADMIN — SIMETHICONE 80 MILLIGRAM(S): 80 TABLET, CHEWABLE ORAL at 16:19

## 2021-12-16 RX ADMIN — BUDESONIDE AND FORMOTEROL FUMARATE DIHYDRATE 2 PUFF(S): 160; 4.5 AEROSOL RESPIRATORY (INHALATION) at 21:54

## 2021-12-16 RX ADMIN — GABAPENTIN 100 MILLIGRAM(S): 400 CAPSULE ORAL at 05:27

## 2021-12-16 RX ADMIN — HEPARIN SODIUM 7500 UNIT(S): 5000 INJECTION INTRAVENOUS; SUBCUTANEOUS at 05:28

## 2021-12-16 RX ADMIN — Medication 650 MILLIGRAM(S): at 04:02

## 2021-12-16 NOTE — PROGRESS NOTE ADULT - SUBJECTIVE AND OBJECTIVE BOX
CARDIOLOGY FOLLOW UP - Dr. Bermudez  Date of Service: 12/16/21  CC: resting comfortably  no cp/sob      Review of Systems:  Constitutional: No fever, weight loss, or fatigue  Respiratory: No cough, wheezing, or hemoptysis, no shortness of breath  Cardiovascular: No chest pain, palpitations, passing out, dizziness, or leg swelling  Gastrointestinal: No abd or epigastric pain.  No nausea, vomiting, or hematemesis; no diarrhea or constipation, no melena or hematochezia  Vascular: no edema       PHYSICAL EXAM:  T(C): 35 (12-16-21 @ 06:00), Max: 36.8 (12-15-21 @ 11:51)  HR: 77 (12-16-21 @ 07:20) (71 - 86)  BP: 129/62 (12-16-21 @ 06:00) (129/62 - 139/64)  RR: 18 (12-16-21 @ 06:00) (14 - 18)  SpO2: 100% (12-16-21 @ 07:20) (95% - 100%)  Wt(kg): --  I&O's Summary    15 Dec 2021 07:01  -  16 Dec 2021 07:00  --------------------------------------------------------  IN: 0 mL / OUT: 400 mL / NET: -400 mL        Appearance: Normal	  Cardiovascular: Normal S1 S2,RRR, No JVD, No murmurs  Respiratory: Lungs clear to auscultation	  Gastrointestinal:  Soft, Non-tender, + BS	  Extremities: Normal range of motion, No clubbing, cyanosis or edema      Home Medications:  Advair Diskus 250 mcg-50 mcg inhalation powder: 1 puff(s) inhaled 2 times a day (23 Nov 2021 14:10)  alendronate 70 mg oral tablet: 1 tab(s) orally once a week (23 Nov 2021 14:10)  ascorbic acid 500 mg oral capsule: 1 cap(s) orally once a day (30 Oct 2019 16:54)  azelastine 137 mcg/inh (0.1%) nasal spray: 2 spray(s) nasal 2 times a day (30 Oct 2019 16:54)  glimepiride 2 mg oral tablet: 1 tab(s) orally once a day (30 Oct 2019 16:54)  ipratropium-albuterol 0.5 mg-2.5 mg/3 mLinhalation solution: 3 milliliter(s) inhaled every 6 hours, As Needed (04 Jun 2021 10:05)  montelukast 10 mg oral tablet: 1 tab(s) orally once a day (30 Oct 2019 16:54)  Neurontin 100 mg oral capsule: 1 cap(s) orally 3 times a day (29 Nov 2020 18:19)  ocular lubricant ophthalmic solution: 1 drop(s) to each affected eye 3 times a day, As needed, Dry Eyes (06 Nov 2019 13:41)  ProAir HFA 90 mcg/inh inhalation aerosol: 2 puff(s) inhaled 4 times a day, As Needed (23 Nov 2021 14:10)      MEDICATIONS  (STANDING):  albuterol/ipratropium for Nebulization 3 milliLiter(s) Nebulizer every 12 hours  amLODIPine   Tablet 10 milliGRAM(s) Oral daily  atorvastatin 40 milliGRAM(s) Oral at bedtime  budesonide 160 MICROgram(s)/formoterol 4.5 MICROgram(s) Inhaler 2 Puff(s) Inhalation two times a day  chlorhexidine 2% Cloths 1 Application(s) Topical daily  dextrose 40% Gel 15 Gram(s) Oral once  dextrose 5%. 1000 milliLiter(s) (50 mL/Hr) IV Continuous <Continuous>  dextrose 5%. 1000 milliLiter(s) (100 mL/Hr) IV Continuous <Continuous>  dextrose 50% Injectable 25 Gram(s) IV Push once  dextrose 50% Injectable 12.5 Gram(s) IV Push once  dextrose 50% Injectable 25 Gram(s) IV Push once  furosemide    Tablet 40 milliGRAM(s) Oral daily  gabapentin 100 milliGRAM(s) Oral three times a day  glucagon  Injectable 1 milliGRAM(s) IntraMuscular once  heparin   Injectable 7500 Unit(s) SubCutaneous every 8 hours  hydrALAZINE 100 milliGRAM(s) Oral three times a day  insulin lispro (ADMELOG) corrective regimen sliding scale   SubCutaneous three times a day before meals  insulin lispro (ADMELOG) corrective regimen sliding scale   SubCutaneous at bedtime  metoprolol succinate  milliGRAM(s) Oral daily  montelukast 10 milliGRAM(s) Oral daily  polyethylene glycol 3350 17 Gram(s) Oral daily  senna 2 Tablet(s) Oral at bedtime  sodium chloride 0.45%. 500 milliLiter(s) (40 mL/Hr) IV Continuous <Continuous>      TELEMETRY: 	    ECG:  	  RADIOLOGY:   DIAGNOSTIC TESTING:  [ ] Echocardiogram:  [ ]  Catheterization:  [ ] Stress Test:    OTHER: 	    LABS:	 	                            9.4    11.29 )-----------( 193      ( 16 Dec 2021 06:44 )             32.6     12-16    137  |  94<L>  |  23  ----------------------------<  149<H>  3.8   |  34<H>  |  0.90    Ca    8.7      16 Dec 2021 06:44  Phos  3.1     12-16  Mg     2.30     12-16

## 2021-12-16 NOTE — PROGRESS NOTE ADULT - ASSESSMENT
Echo 11/19/21: EF 60-65%, nl lv sys fx, mild AS, mild Ms   Echo 6/2/21: Min MR, grossly nl lv sys fx   Echo 12/5/20: grossly nl lv sys fx    a/p  85 y/o female w pmhx of HFpEF, obesity, SHAD on CPAP, HTN, T2DM, COPD, lymphedema, s/p fall and knee fracture s/p  knee surgery, presents to the ED with AMS and facial droop.    #AMS  -improved   -in setting of hypercapnic resp failure  -s/p RRT 12/6 for hypoxia, AMS- pt noted to be connected incorrectly to biPAP. Xray - No PTX. BiPAP circuit was fixed and  mental status/ sat improved to 100%.   -now NC intermittent on AVAPS - management per [facility] team  -repeat chest xray12/6  noted    #Hypercapnic respiratory failure, hypoxic resp failure  -some component of pulmonary edema  -AVAPs per primary team  -cont PO lasix     #acute/Chronic Diastolic CHF  -pt w chronic Lymphedema  -volume status stable   -s/p bipap, intermittent AVAPS per RCU   -recent Echo w grossly nl lv sys fx   -cont bb , lasix     #HTN  -stable  -cont current meds     dvt ppx

## 2021-12-16 NOTE — PROGRESS NOTE ADULT - SUBJECTIVE AND OBJECTIVE BOX
CHIEF COMPLAINT: Patient is a 86y old  Female who presents with a chief complaint of shortness of breath (15 Dec 2021 14:56)    Interval Events: None reported overnight.     REVIEW OF SYSTEMS:  Constitutional:   Eyes:  ENT:  CV:  Resp:  GI:  :  MSK:  Integumentary:  Neurological:  Psychiatric:  Endocrine:  Hematologic/Lymphatic:  Allergic/Immunologic:  [ ] All other systems negative  [ ] Unable to assess ROS because ________    OBJECTIVE:  ICU Vital Signs Last 24 Hrs  T(C): 35 (16 Dec 2021 06:00), Max: 36.8 (15 Dec 2021 11:51)  T(F): 95 (16 Dec 2021 06:00), Max: 98.3 (15 Dec 2021 11:51)  HR: 77 (16 Dec 2021 07:20) (71 - 86)  BP: 129/62 (16 Dec 2021 06:00) (129/62 - 139/64)  BP(mean): --  ABP: --  ABP(mean): --  RR: 18 (16 Dec 2021 06:00) (14 - 18)  SpO2: 100% (16 Dec 2021 07:20) (95% - 100%)    Mode: standby    12-15 @ 07:01  -  12-16 @ 07:00  --------------------------------------------------------  IN: 0 mL / OUT: 400 mL / NET: -400 mL      CAPILLARY BLOOD GLUCOSE      POCT Blood Glucose.: 155 mg/dL (16 Dec 2021 07:40)      PHYSICAL EXAM:  General:   HEENT:   Lymph Nodes:  Neck:   Respiratory:   Cardiovascular:   Abdomen:   Extremities:   Skin:   Neurological:  Psychiatry:    HOSPITAL MEDICATIONS:  MEDICATIONS  (STANDING):  albuterol/ipratropium for Nebulization 3 milliLiter(s) Nebulizer every 12 hours  amLODIPine   Tablet 10 milliGRAM(s) Oral daily  atorvastatin 40 milliGRAM(s) Oral at bedtime  budesonide 160 MICROgram(s)/formoterol 4.5 MICROgram(s) Inhaler 2 Puff(s) Inhalation two times a day  chlorhexidine 2% Cloths 1 Application(s) Topical daily  dextrose 40% Gel 15 Gram(s) Oral once  dextrose 5%. 1000 milliLiter(s) (50 mL/Hr) IV Continuous <Continuous>  dextrose 5%. 1000 milliLiter(s) (100 mL/Hr) IV Continuous <Continuous>  dextrose 50% Injectable 25 Gram(s) IV Push once  dextrose 50% Injectable 12.5 Gram(s) IV Push once  dextrose 50% Injectable 25 Gram(s) IV Push once  furosemide    Tablet 40 milliGRAM(s) Oral daily  gabapentin 100 milliGRAM(s) Oral three times a day  glucagon  Injectable 1 milliGRAM(s) IntraMuscular once  heparin   Injectable 7500 Unit(s) SubCutaneous every 8 hours  hydrALAZINE 100 milliGRAM(s) Oral three times a day  insulin lispro (ADMELOG) corrective regimen sliding scale   SubCutaneous three times a day before meals  insulin lispro (ADMELOG) corrective regimen sliding scale   SubCutaneous at bedtime  metoprolol succinate  milliGRAM(s) Oral daily  montelukast 10 milliGRAM(s) Oral daily  polyethylene glycol 3350 17 Gram(s) Oral daily  senna 2 Tablet(s) Oral at bedtime  sodium chloride 0.45%. 500 milliLiter(s) (40 mL/Hr) IV Continuous <Continuous>    MEDICATIONS  (PRN):  acetaminophen     Tablet .. 650 milliGRAM(s) Oral every 6 hours PRN Mild Pain (1 - 3), Moderate Pain (4 - 6)  aluminum hydroxide/magnesium hydroxide/simethicone Suspension 30 milliLiter(s) Oral every 6 hours PRN Dyspepsia  traMADol 25 milliGRAM(s) Oral every 6 hours PRN Severe Pain (7 - 10)      LABS:                        9.4    11.29 )-----------( 193      ( 16 Dec 2021 06:44 )             32.6     12-16    137  |  94<L>  |  23  ----------------------------<  149<H>  3.8   |  34<H>  |  0.90    Ca    8.7      16 Dec 2021 06:44  Phos  3.1     12-16  Mg     2.30     12-16                MICROBIOLOGY:     RADIOLOGY:  [ ] Reviewed and interpreted by me    PULMONARY FUNCTION TESTS:    EKG: CHIEF COMPLAINT: Patient is a 86y old  Female who presents with a chief complaint of shortness of breath (15 Dec 2021 14:56)    Interval Events: None reported overnight. Pt c/o of generalized abdominal discomfort/bloated. Able to pass gas, and had two bowel movements today. Otherwise, no new complaints. VSS.     REVIEW OF SYSTEMS:  See above  [x] All other systems negative    OBJECTIVE:  ICU Vital Signs Last 24 Hrs  T(C): 35 (16 Dec 2021 06:00), Max: 36.8 (15 Dec 2021 11:51)  T(F): 95 (16 Dec 2021 06:00), Max: 98.3 (15 Dec 2021 11:51)  HR: 77 (16 Dec 2021 07:20) (71 - 86)  BP: 129/62 (16 Dec 2021 06:00) (129/62 - 139/64)  BP(mean): --  ABP: --  ABP(mean): --  RR: 18 (16 Dec 2021 06:00) (14 - 18)  SpO2: 100% (16 Dec 2021 07:20) (95% - 100%)    Mode: standby    12-15 @ 07:01  -  12-16 @ 07:00  --------------------------------------------------------  IN: 0 mL / OUT: 400 mL / NET: -400 mL      CAPILLARY BLOOD GLUCOSE      POCT Blood Glucose.: 155 mg/dL (16 Dec 2021 07:40)    PAST MEDICAL & SURGICAL HISTORY:  HTN (hypertension)    HLD (hyperlipidemia)    Asthma    DM (diabetes mellitus)    GERD (gastroesophageal reflux disease)    Lymphedema    S/P knee replacement      FAMILY HISTORY:      Social History:  Denies smoking/alcohol/illicit drugs (02 Dec 2021 19:54)    PHYSICAL EXAM  General: Laying in bed, NAD  Neck: Supple, no JVD  Respiratory: Normal resp effort, CTA b/l   Cardiovascular: +s1, s2  Abdomen: +BS, protuberant, mildly distended, nt/nd, no peritoneal signs noted   Extremities: no swelling, +RLE knee immobilizer  Skin: as per RN assessment  sheet   Neurological: non focal       HOSPITAL MEDICATIONS:  MEDICATIONS  (STANDING):  albuterol/ipratropium for Nebulization 3 milliLiter(s) Nebulizer every 12 hours  amLODIPine   Tablet 10 milliGRAM(s) Oral daily  atorvastatin 40 milliGRAM(s) Oral at bedtime  budesonide 160 MICROgram(s)/formoterol 4.5 MICROgram(s) Inhaler 2 Puff(s) Inhalation two times a day  chlorhexidine 2% Cloths 1 Application(s) Topical daily  dextrose 40% Gel 15 Gram(s) Oral once  dextrose 5%. 1000 milliLiter(s) (50 mL/Hr) IV Continuous <Continuous>  dextrose 5%. 1000 milliLiter(s) (100 mL/Hr) IV Continuous <Continuous>  dextrose 50% Injectable 25 Gram(s) IV Push once  dextrose 50% Injectable 12.5 Gram(s) IV Push once  dextrose 50% Injectable 25 Gram(s) IV Push once  furosemide    Tablet 40 milliGRAM(s) Oral daily  gabapentin 100 milliGRAM(s) Oral three times a day  glucagon  Injectable 1 milliGRAM(s) IntraMuscular once  heparin   Injectable 7500 Unit(s) SubCutaneous every 8 hours  hydrALAZINE 100 milliGRAM(s) Oral three times a day  insulin lispro (ADMELOG) corrective regimen sliding scale   SubCutaneous three times a day before meals  insulin lispro (ADMELOG) corrective regimen sliding scale   SubCutaneous at bedtime  metoprolol succinate  milliGRAM(s) Oral daily  montelukast 10 milliGRAM(s) Oral daily  polyethylene glycol 3350 17 Gram(s) Oral daily  senna 2 Tablet(s) Oral at bedtime  sodium chloride 0.45%. 500 milliLiter(s) (40 mL/Hr) IV Continuous <Continuous>    MEDICATIONS  (PRN):  acetaminophen     Tablet .. 650 milliGRAM(s) Oral every 6 hours PRN Mild Pain (1 - 3), Moderate Pain (4 - 6)  aluminum hydroxide/magnesium hydroxide/simethicone Suspension 30 milliLiter(s) Oral every 6 hours PRN Dyspepsia  traMADol 25 milliGRAM(s) Oral every 6 hours PRN Severe Pain (7 - 10)      LABS:                        9.4    11.29 )-----------( 193      ( 16 Dec 2021 06:44 )             32.6     12-16    137  |  94<L>  |  23  ----------------------------<  149<H>  3.8   |  34<H>  |  0.90    Ca    8.7      16 Dec 2021 06:44  Phos  3.1     12-16  Mg     2.30     12-16      MICROBIOLOGY:     RADIOLOGY:  [ ] Reviewed and interpreted by me    PULMONARY FUNCTION TESTS:    EKG:     CHIEF COMPLAINT: Patient is a 86y old  Female who presents with a chief complaint of shortness of breath (15 Dec 2021 14:56)    Interval Events: None reported overnight. Pt c/o of generalized abdominal discomfort/bloated. Able to pass gas, and had two bowel movements today. Otherwise, no new complaints. VSS.     REVIEW OF SYSTEMS:  See above  [x] All other systems negative    OBJECTIVE:  ICU Vital Signs Last 24 Hrs  T(C): 35 (16 Dec 2021 06:00), Max: 36.8 (15 Dec 2021 11:51)  T(F): 95 (16 Dec 2021 06:00), Max: 98.3 (15 Dec 2021 11:51)  HR: 77 (16 Dec 2021 07:20) (71 - 86)  BP: 129/62 (16 Dec 2021 06:00) (129/62 - 139/64)  BP(mean): --  ABP: --  ABP(mean): --  RR: 18 (16 Dec 2021 06:00) (14 - 18)  SpO2: 100% (16 Dec 2021 07:20) (95% - 100%)    Mode: standby    12-15 @ 07:01  -  12-16 @ 07:00  --------------------------------------------------------  IN: 0 mL / OUT: 400 mL / NET: -400 mL      CAPILLARY BLOOD GLUCOSE      POCT Blood Glucose.: 155 mg/dL (16 Dec 2021 07:40)    PAST MEDICAL & SURGICAL HISTORY:  HTN (hypertension)    HLD (hyperlipidemia)    Asthma    DM (diabetes mellitus)    GERD (gastroesophageal reflux disease)    Lymphedema    S/P knee replacement      FAMILY HISTORY:      Social History:  Denies smoking/alcohol/illicit drugs (02 Dec 2021 19:54)    PHYSICAL EXAM  General: Laying in bed, NAD  Neck: Supple, no JVD  Respiratory: Normal resp effort, CTA b/l   Cardiovascular: +s1, s2  Abdomen: +BS, protuberant, mildly distended, nt/nd, no peritoneal signs noted   Extremities: no swelling, +RLE knee immobilizer  Skin: as per RN assessment  sheet   Neurological: non focal     HOSPITAL MEDICATIONS:  MEDICATIONS  (STANDING):  albuterol/ipratropium for Nebulization 3 milliLiter(s) Nebulizer every 12 hours  amLODIPine   Tablet 10 milliGRAM(s) Oral daily  atorvastatin 40 milliGRAM(s) Oral at bedtime  budesonide 160 MICROgram(s)/formoterol 4.5 MICROgram(s) Inhaler 2 Puff(s) Inhalation two times a day  chlorhexidine 2% Cloths 1 Application(s) Topical daily  dextrose 40% Gel 15 Gram(s) Oral once  dextrose 5%. 1000 milliLiter(s) (50 mL/Hr) IV Continuous <Continuous>  dextrose 5%. 1000 milliLiter(s) (100 mL/Hr) IV Continuous <Continuous>  dextrose 50% Injectable 25 Gram(s) IV Push once  dextrose 50% Injectable 12.5 Gram(s) IV Push once  dextrose 50% Injectable 25 Gram(s) IV Push once  furosemide    Tablet 40 milliGRAM(s) Oral daily  gabapentin 100 milliGRAM(s) Oral three times a day  glucagon  Injectable 1 milliGRAM(s) IntraMuscular once  heparin   Injectable 7500 Unit(s) SubCutaneous every 8 hours  hydrALAZINE 100 milliGRAM(s) Oral three times a day  insulin lispro (ADMELOG) corrective regimen sliding scale   SubCutaneous three times a day before meals  insulin lispro (ADMELOG) corrective regimen sliding scale   SubCutaneous at bedtime  metoprolol succinate  milliGRAM(s) Oral daily  montelukast 10 milliGRAM(s) Oral daily  polyethylene glycol 3350 17 Gram(s) Oral daily  senna 2 Tablet(s) Oral at bedtime  sodium chloride 0.45%. 500 milliLiter(s) (40 mL/Hr) IV Continuous <Continuous>    MEDICATIONS  (PRN):  acetaminophen     Tablet .. 650 milliGRAM(s) Oral every 6 hours PRN Mild Pain (1 - 3), Moderate Pain (4 - 6)  aluminum hydroxide/magnesium hydroxide/simethicone Suspension 30 milliLiter(s) Oral every 6 hours PRN Dyspepsia  traMADol 25 milliGRAM(s) Oral every 6 hours PRN Severe Pain (7 - 10)      LABS:                        9.4    11.29 )-----------( 193      ( 16 Dec 2021 06:44 )             32.6     12-16    137  |  94<L>  |  23  ----------------------------<  149<H>  3.8   |  34<H>  |  0.90    Ca    8.7      16 Dec 2021 06:44  Phos  3.1     12-16  Mg     2.30     12-16      MICROBIOLOGY:     RADIOLOGY:  [ ] Reviewed and interpreted by me    PULMONARY FUNCTION TESTS:    EKG:

## 2021-12-16 NOTE — PROGRESS NOTE ADULT - ASSESSMENT
85y/o F w/ h/o HFpEF, obesity, SHAD on CPAP, HTN, T2DM, COPD, lymphedema, s/p fall and knee fracture p/w AMS and facial droop, MICU consulted for increased work of breathing on Bipap and elevated pCO2, found with hypercapnic respiratory failure c/b AMS improved on BIPAP.     # Acute on chronic respiratory failure with hypercapnia.    - Acute on chronic hypercapnic respiratory failure likely in setting of COPD/SHAD overlap exacerbation with possible PNA on CXR, HFpEF exacerbation given fluid overload on imaging (MICU POCUS with b-lines)  - HFpEF: c/w lasix, strict Is/OS. cards following recent echo with HFpEF and grade I DD  - COPD exacerbation: c/w AVAPs qHS. will consider steroids as well. c/w duonebs q6. c/w azithro. Pulmonology c/s 12/4: AVAPs settings adjusted but persistently hypoxic  - PNA: c/w zosyn. S/P Azithromycin  ***RRT called AM of 12/6, for AMS 2/2 severe hypoxia (40-50s) d/t bipap being disconnected.   Discussed GOC with pts daughter, Marnia, at the time, who wanted everything to be done, including intubation if needed. Pt is FULL CODE.  Transferred to RCU from Medicine 12/6  - Breathing comfortably on 2L NC O2, AVAPS QHS and PRN    # AMS (altered mental status)   · Resolved w/improved resp status  - likely 2/2 acute on chronic hypercapnic respiratory failure  - Neuro recs appreciated   - CTH negative for acute ICH or infarct  - c/w Statin  - recent echo wnl.    # Chronic heart failure with preserved ejection fraction (HFpEF).   - Recent echo with HFpEF and grade I DD  - strict Is/Os monitor output  - cards following: recent echo with grade 1 diastolic dysfunction  - c/w metoprolol, Amlodipine, statin. (on home bumex) - now doing well on PO Lasix  - Cardiology recs appreciated  - c/w Lasix 40mg qd     # SHAD and COPD overlap syndrome.   · c/w AVAPS qHS and PRN for AMS  - NC during day to maintain O2 sat 88-92 given COPD  - c/w Duonebs standing,  s/p zosyn for possible PNA  - 12/8/2021: s/p 5 days of IV Solu-medrol. No further steroids indicated at this time.   - 12/9 patient with increased shortness of breath and hypoxia. Placed on AVAPS. Will attempt to bring patient back down to nasal cannula later today. ABG obtained and reviewed. US of b/l LE dopplers ordered to r/o DVT. D-dimer ordered. CXR ordered.   -12/11: breathing well on nasal canula, will attempt to wean O2 to 2L, maintain O2 sat 92%  -12/12: Continues to do well on 2L NC with AVAPs qHS --> continue to wean as tolerated for goal SpO2 > 92%  - Stable - no issues     # Pneumonia (resolved)  - s/p Azithromycin  - s/p zosyn    # Acute-on-chronic kidney injury.   - Cr initially elevated to 2s, baseline in 1s  - workup: renal lytes FeUrea c/w intrinsic renal disease, making good outputs -1.9 L O/N 12/4-12/5. Check UA and renal/bladder US  - suspect JOSEPH due to underlying medical condition: HFpEF exacerbation and fluid overload worsening renal perfusion. Now improved s/p diuresis and oxygenation on AVAPs  - hyperkalemia was treated with insulin and D50, with improvement of K  - Cr downtrended to 1s with diuresis and medical treatment.  - Improved    # Elevated troponin level.   · Initital trops 42 - 44  - cardiology following  - recent echo with grade I DD  - c/w diuresis as above, likely type II NSTEMI.    # T2DM (type 2 diabetes mellitus).   · a1c 6.9 11/2021  - c/w AISS premeal and qHS  - CC diet.    # MSK  -Right knee proximal tibia periprosthetic fracture  - Pt w/ R knee immobilizer - Ortho recs appreciated  - c/w NWB, plan to follow up with outpatient Orthopaedic   - PT/OT consulted, recs pending     # Prophylactic measure.   · DVT PPx: HSQ  - Dispo: PT/OT consulted 85y/o F w/ h/o HFpEF, obesity, SHAD on CPAP, HTN, T2DM, COPD, lymphedema, s/p fall and knee fracture p/w AMS and facial droop, MICU consulted for increased work of breathing on Bipap and elevated pCO2, found with hypercapnic respiratory failure c/b AMS improved on BIPAP.     # Acute on chronic respiratory failure with hypercapnia.    - Acute on chronic hypercapnic respiratory failure likely in setting of COPD/SHAD overlap exacerbation with possible PNA on CXR, HFpEF exacerbation given fluid overload on imaging (MICU POCUS with b-lines)  - HFpEF: c/w lasix, strict Is/OS. cards following recent echo with HFpEF and grade I DD  - COPD exacerbation: c/w AVAPs qHS. will consider steroids as well. c/w duonebs q6. c/w azithro. Pulmonology c/s 12/4: AVAPs settings adjusted but persistently hypoxic  - PNA: c/w zosyn. S/P Azithromycin  ***RRT called AM of 12/6, for AMS 2/2 severe hypoxia (40-50s) d/t bipap being disconnected.   Discussed GOC with pts daughter, Marina, at the time, who wanted everything to be done, including intubation if needed. Pt is FULL CODE.  Transferred to RCU from Medicine 12/6  - Breathing comfortably on 2L NC O2, AVAPS QHS and PRN    # AMS (altered mental status)   · Resolved w/improved resp status  - likely 2/2 acute on chronic hypercapnic respiratory failure  - Neuro recs appreciated   - CTH negative for acute ICH or infarct  - c/w Statin  - recent echo wnl.    # Chronic heart failure with preserved ejection fraction (HFpEF).   - Recent echo with HFpEF and grade I DD  - strict Is/Os monitor output  - cards following: recent echo with grade 1 diastolic dysfunction  - c/w metoprolol, Amlodipine, statin. (on home bumex) - now doing well on PO Lasix  - Cardiology recs appreciated  - c/w Lasix 40mg qd     # SHAD and COPD overlap syndrome.   · c/w AVAPS qHS and PRN for AMS  - NC during day to maintain O2 sat 88-92 given COPD  - c/w Duonebs standing,  s/p zosyn for possible PNA  - 12/8/2021: s/p 5 days of IV Solu-medrol. No further steroids indicated at this time.   - 12/9 patient with increased shortness of breath and hypoxia. Placed on AVAPS. Will attempt to bring patient back down to nasal cannula later today. ABG obtained and reviewed. US of b/l LE dopplers ordered to r/o DVT. D-dimer ordered. CXR ordered.   -12/11: breathing well on nasal canula, will attempt to wean O2 to 2L, maintain O2 sat 92%  -12/12: Continues to do well on 2L NC with AVAPs qHS --> continue to wean as tolerated for goal SpO2 > 92%  - Stable - no issues     # Pneumonia (resolved)  - s/p Azithromycin  - s/p zosyn    # Acute-on-chronic kidney injury.   - Cr initially elevated to 2s, baseline in 1s  - workup: renal lytes FeUrea c/w intrinsic renal disease, making good outputs -1.9 L O/N 12/4-12/5. Check UA and renal/bladder US  - suspect JOSEPH due to underlying medical condition: HFpEF exacerbation and fluid overload worsening renal perfusion. Now improved s/p diuresis and oxygenation on AVAPs  - hyperkalemia was treated with insulin and D50, with improvement of K  - Cr downtrended to 1s with diuresis and medical treatment.  - Improved    # Elevated troponin level.   · Initital trops 42 - 44  - cardiology following  - recent echo with grade I DD  - c/w diuresis as above, likely type II NSTEMI.    # T2DM (type 2 diabetes mellitus).   · a1c 6.9 11/2021  - c/w AISS premeal and qHS  - CC diet.    # MSK  -Right knee proximal tibia periprosthetic fracture  - Pt w/ R knee immobilizer - Ortho recs appreciated  - c/w NWB, plan to follow up with outpatient Orthopaedic   - PT/OT consulted, recs pending     # Abdominal bloating    # Prophylactic measure.   · DVT PPx: HSQ  - Dispo: PT/OT consulted 85y/o F w/ h/o HFpEF, obesity, SHAD on CPAP, HTN, T2DM, COPD, lymphedema, s/p fall and knee fracture p/w AMS and facial droop, MICU consulted for increased work of breathing on Bipap and elevated pCO2, found with hypercapnic respiratory failure c/b AMS improved on BIPAP.     # Acute on chronic respiratory failure with hypercapnia.    - Acute on chronic hypercapnic respiratory failure likely in setting of COPD/SHAD overlap exacerbation with possible PNA on CXR, HFpEF exacerbation given fluid overload on imaging (MICU POCUS with b-lines)  - HFpEF: c/w lasix, strict Is/OS. cards following recent echo with HFpEF and grade I DD  - COPD exacerbation: c/w AVAPs qHS. will consider steroids as well. c/w duonebs q6. c/w azithro. Pulmonology c/s 12/4: AVAPs settings adjusted but persistently hypoxic  - PNA: c/w zosyn. S/P Azithromycin  ***RRT called AM of 12/6, for AMS 2/2 severe hypoxia (40-50s) d/t bipap being disconnected.   Discussed GOC with pts daughter, Marina, at the time, who wanted everything to be done, including intubation if needed. Pt is FULL CODE.  Transferred to RCU from Medicine 12/6  - Breathing comfortably on 2L NC O2, AVAPS QHS and PRN    # AMS (altered mental status)   · Resolved w/improved resp status  - likely 2/2 acute on chronic hypercapnic respiratory failure  - Neuro recs appreciated   - CTH negative for acute ICH or infarct  - c/w Statin  - recent echo wnl.    # Chronic heart failure with preserved ejection fraction (HFpEF).   - Recent echo with HFpEF and grade I DD  - strict Is/Os monitor output  - cards following: recent echo with grade 1 diastolic dysfunction  - c/w metoprolol, Amlodipine, statin. (on home bumex) - now doing well on PO Lasix  - Cardiology recs appreciated  - c/w Lasix 40mg qd     # SHAD and COPD overlap syndrome.   · c/w AVAPS qHS and PRN for AMS  - NC during day to maintain O2 sat 88-92 given COPD  - c/w Duonebs standing,  s/p zosyn for possible PNA  - 12/8/2021: s/p 5 days of IV Solu-medrol. No further steroids indicated at this time.   - 12/9 patient with increased shortness of breath and hypoxia. Placed on AVAPS. Will attempt to bring patient back down to nasal cannula later today. ABG obtained and reviewed. US of b/l LE dopplers ordered to r/o DVT. D-dimer ordered. CXR ordered.   -12/11: breathing well on nasal canula, will attempt to wean O2 to 2L, maintain O2 sat 92%  -12/12: Continues to do well on 2L NC with AVAPs qHS --> continue to wean as tolerated for goal SpO2 > 92%  - Stable - no issues     # Pneumonia (resolved)  - s/p Azithromycin  - s/p zosyn    # Acute-on-chronic kidney injury.   - Cr initially elevated to 2s, baseline in 1s  - workup: renal lytes FeUrea c/w intrinsic renal disease, making good outputs -1.9 L O/N 12/4-12/5. Check UA and renal/bladder US  - suspect JOSEPH due to underlying medical condition: HFpEF exacerbation and fluid overload worsening renal perfusion. Now improved s/p diuresis and oxygenation on AVAPs  - hyperkalemia was treated with insulin and D50, with improvement of K  - Cr downtrended to 1s with diuresis and medical treatment.  - Improved    # Elevated troponin level.   · Initital trops 42 - 44  - cardiology following  - recent echo with grade I DD  - c/w diuresis as above, likely type II NSTEMI.    # T2DM (type 2 diabetes mellitus).   · a1c 6.9 11/2021  - c/w AISS premeal and qHS  - CC diet.    # MSK  -Right knee proximal tibia periprosthetic fracture  - Pt w/ R knee immobilizer - Ortho recs appreciated  - c/w NWB, plan to follow up with outpatient Orthopaedic   - PT/OT consulted, recs pending     # Abdominal bloating/discomfort  - s/p Simethicone - feeling much better  - c/w Miralax BID and Senna 2 tabs qhs   - Pending Abd xray    # Prophylactic measure.   · DVT PPx: HSQ  - Dispo: PT/OT consulted

## 2021-12-16 NOTE — PROGRESS NOTE ADULT - ATTENDING COMMENTS
agree with above  nocturnal avaps, daytime 02  consitpation - laxatives  ortho f/u appreciated  d/c planning to [facility]

## 2021-12-17 ENCOUNTER — TRANSCRIPTION ENCOUNTER (OUTPATIENT)
Age: 86
End: 2021-12-17

## 2021-12-17 VITALS — OXYGEN SATURATION: 97 % | HEART RATE: 70 BPM

## 2021-12-17 LAB
GLUCOSE BLDC GLUCOMTR-MCNC: 167 MG/DL — HIGH (ref 70–99)
GLUCOSE BLDC GLUCOMTR-MCNC: 186 MG/DL — HIGH (ref 70–99)
GLUCOSE BLDC GLUCOMTR-MCNC: 196 MG/DL — HIGH (ref 70–99)

## 2021-12-17 PROCEDURE — 99233 SBSQ HOSP IP/OBS HIGH 50: CPT | Mod: GC

## 2021-12-17 RX ORDER — FUROSEMIDE 40 MG
1 TABLET ORAL
Qty: 0 | Refills: 0 | DISCHARGE
Start: 2021-12-17

## 2021-12-17 RX ADMIN — MONTELUKAST 10 MILLIGRAM(S): 4 TABLET, CHEWABLE ORAL at 11:25

## 2021-12-17 RX ADMIN — Medication 100 MILLIGRAM(S): at 06:07

## 2021-12-17 RX ADMIN — POLYETHYLENE GLYCOL 3350 17 GRAM(S): 17 POWDER, FOR SOLUTION ORAL at 18:37

## 2021-12-17 RX ADMIN — Medication 650 MILLIGRAM(S): at 07:33

## 2021-12-17 RX ADMIN — Medication 40 MILLIGRAM(S): at 06:06

## 2021-12-17 RX ADMIN — Medication 1: at 11:24

## 2021-12-17 RX ADMIN — Medication 100 MILLIGRAM(S): at 13:14

## 2021-12-17 RX ADMIN — BUDESONIDE AND FORMOTEROL FUMARATE DIHYDRATE 2 PUFF(S): 160; 4.5 AEROSOL RESPIRATORY (INHALATION) at 10:15

## 2021-12-17 RX ADMIN — GABAPENTIN 100 MILLIGRAM(S): 400 CAPSULE ORAL at 06:06

## 2021-12-17 RX ADMIN — HEPARIN SODIUM 7500 UNIT(S): 5000 INJECTION INTRAVENOUS; SUBCUTANEOUS at 06:07

## 2021-12-17 RX ADMIN — CHLORHEXIDINE GLUCONATE 1 APPLICATION(S): 213 SOLUTION TOPICAL at 11:32

## 2021-12-17 RX ADMIN — HEPARIN SODIUM 7500 UNIT(S): 5000 INJECTION INTRAVENOUS; SUBCUTANEOUS at 13:05

## 2021-12-17 RX ADMIN — Medication 650 MILLIGRAM(S): at 08:13

## 2021-12-17 RX ADMIN — AMLODIPINE BESYLATE 10 MILLIGRAM(S): 2.5 TABLET ORAL at 06:06

## 2021-12-17 RX ADMIN — GABAPENTIN 100 MILLIGRAM(S): 400 CAPSULE ORAL at 13:06

## 2021-12-17 RX ADMIN — Medication 1: at 07:55

## 2021-12-17 RX ADMIN — Medication 1: at 16:33

## 2021-12-17 RX ADMIN — POLYETHYLENE GLYCOL 3350 17 GRAM(S): 17 POWDER, FOR SOLUTION ORAL at 06:08

## 2021-12-17 RX ADMIN — Medication 3 MILLILITER(S): at 10:14

## 2021-12-17 RX ADMIN — BUDESONIDE AND FORMOTEROL FUMARATE DIHYDRATE 2 PUFF(S): 160; 4.5 AEROSOL RESPIRATORY (INHALATION) at 20:40

## 2021-12-17 NOTE — PROGRESS NOTE ADULT - SUBJECTIVE AND OBJECTIVE BOX
CHIEF COMPLAINT: Patient is a 86y old  Female who presents with a chief complaint of SOB (16 Dec 2021 09:29)    Interval Events:    REVIEW OF SYSTEMS:  Constitutional:   Eyes:  ENT:  CV:  Resp:  GI:  :  MSK:  Integumentary:  Neurological:  Psychiatric:  Endocrine:  Hematologic/Lymphatic:  Allergic/Immunologic:  [ ] All other systems negative  [ ] Unable to assess ROS because ________    OBJECTIVE:  ICU Vital Signs Last 24 Hrs  T(C): 36.1 (17 Dec 2021 06:03), Max: 36.7 (16 Dec 2021 15:06)  T(F): 97 (17 Dec 2021 06:03), Max: 98 (16 Dec 2021 15:06)  HR: 87 (17 Dec 2021 07:37) (70 - 87)  BP: 133/53 (17 Dec 2021 06:03) (129/60 - 146/71)  BP(mean): 78 (17 Dec 2021 06:03) (78 - 80)  ABP: --  ABP(mean): --  RR: 17 (17 Dec 2021 06:03) (17 - 20)  SpO2: 100% (17 Dec 2021 07:37) (98% - 100%)    Mode: standby    12-16 @ 07:01  -  12-17 @ 07:00  --------------------------------------------------------  IN: 450 mL / OUT: 275 mL / NET: 175 mL      CAPILLARY BLOOD GLUCOSE      POCT Blood Glucose.: 167 mg/dL (17 Dec 2021 07:14)      PHYSICAL EXAM:  General:   HEENT:   Lymph Nodes:  Neck:   Respiratory:   Cardiovascular:   Abdomen:   Extremities:   Skin:   Neurological:  Psychiatry:    HOSPITAL MEDICATIONS:  MEDICATIONS  (STANDING):  albuterol/ipratropium for Nebulization 3 milliLiter(s) Nebulizer every 12 hours  amLODIPine   Tablet 10 milliGRAM(s) Oral daily  atorvastatin 40 milliGRAM(s) Oral at bedtime  budesonide 160 MICROgram(s)/formoterol 4.5 MICROgram(s) Inhaler 2 Puff(s) Inhalation two times a day  chlorhexidine 2% Cloths 1 Application(s) Topical daily  dextrose 40% Gel 15 Gram(s) Oral once  dextrose 5%. 1000 milliLiter(s) (50 mL/Hr) IV Continuous <Continuous>  dextrose 5%. 1000 milliLiter(s) (100 mL/Hr) IV Continuous <Continuous>  dextrose 50% Injectable 25 Gram(s) IV Push once  dextrose 50% Injectable 12.5 Gram(s) IV Push once  dextrose 50% Injectable 25 Gram(s) IV Push once  furosemide    Tablet 40 milliGRAM(s) Oral daily  gabapentin 100 milliGRAM(s) Oral three times a day  glucagon  Injectable 1 milliGRAM(s) IntraMuscular once  heparin   Injectable 7500 Unit(s) SubCutaneous every 8 hours  hydrALAZINE 100 milliGRAM(s) Oral three times a day  insulin lispro (ADMELOG) corrective regimen sliding scale   SubCutaneous three times a day before meals  insulin lispro (ADMELOG) corrective regimen sliding scale   SubCutaneous at bedtime  metoprolol succinate  milliGRAM(s) Oral daily  montelukast 10 milliGRAM(s) Oral daily  polyethylene glycol 3350 17 Gram(s) Oral two times a day  senna 2 Tablet(s) Oral at bedtime  sodium chloride 0.45%. 500 milliLiter(s) (40 mL/Hr) IV Continuous <Continuous>    MEDICATIONS  (PRN):  acetaminophen     Tablet .. 650 milliGRAM(s) Oral every 6 hours PRN Mild Pain (1 - 3), Moderate Pain (4 - 6)  aluminum hydroxide/magnesium hydroxide/simethicone Suspension 30 milliLiter(s) Oral every 6 hours PRN Dyspepsia  traMADol 25 milliGRAM(s) Oral every 6 hours PRN Severe Pain (7 - 10)      LABS:                        9.4    11.29 )-----------( 193      ( 16 Dec 2021 06:44 )             32.6     12-16    137  |  94<L>  |  23  ----------------------------<  149<H>  3.8   |  34<H>  |  0.90    Ca    8.7      16 Dec 2021 06:44  Phos  3.1     12-16  Mg     2.30     12-16                MICROBIOLOGY:     RADIOLOGY:  [ ] Reviewed and interpreted by me    PULMONARY FUNCTION TESTS:    EKG: CHIEF COMPLAINT: Patient is a 86y old  Female who presents with a chief complaint of SOB (16 Dec 2021 09:29)    Interval Events: None reported overnight. Clinically stable to be discharged today.     REVIEW OF SYSTEMS:  See above  [x] All other systems negative    OBJECTIVE:  ICU Vital Signs Last 24 Hrs  T(C): 36.1 (17 Dec 2021 06:03), Max: 36.7 (16 Dec 2021 15:06)  T(F): 97 (17 Dec 2021 06:03), Max: 98 (16 Dec 2021 15:06)  HR: 87 (17 Dec 2021 07:37) (70 - 87)  BP: 133/53 (17 Dec 2021 06:03) (129/60 - 146/71)  BP(mean): 78 (17 Dec 2021 06:03) (78 - 80)  ABP: --  ABP(mean): --  RR: 17 (17 Dec 2021 06:03) (17 - 20)  SpO2: 100% (17 Dec 2021 07:37) (98% - 100%)    Mode: standby    12-16 @ 07:01  -  12-17 @ 07:00  --------------------------------------------------------  IN: 450 mL / OUT: 275 mL / NET: 175 mL    CAPILLARY BLOOD GLUCOSE  POCT Blood Glucose.: 167 mg/dL (17 Dec 2021 07:14)    PAST MEDICAL & SURGICAL HISTORY:  HTN (hypertension)    HLD (hyperlipidemia)    Asthma    DM (diabetes mellitus)    GERD (gastroesophageal reflux disease)    Lymphedema    S/P knee replacement      FAMILY HISTORY:      Social History:  Denies smoking/alcohol/illicit drugs (02 Dec 2021 19:54)    PHYSICAL EXAM  General: Laying in bed, NAD  Neck: Supple, no JVD  Respiratory: Normal resp effort, CTA b/l   Cardiovascular: +s1, s2  Abdomen: +BS, protuberant, mildly distended, nt/nd, no peritoneal signs noted   Extremities: no swelling, +RLE knee immobilizer  Skin: as per RN assessment  sheet   Neurological: non focal       HOSPITAL MEDICATIONS:  MEDICATIONS  (STANDING):  albuterol/ipratropium for Nebulization 3 milliLiter(s) Nebulizer every 12 hours  amLODIPine   Tablet 10 milliGRAM(s) Oral daily  atorvastatin 40 milliGRAM(s) Oral at bedtime  budesonide 160 MICROgram(s)/formoterol 4.5 MICROgram(s) Inhaler 2 Puff(s) Inhalation two times a day  chlorhexidine 2% Cloths 1 Application(s) Topical daily  dextrose 40% Gel 15 Gram(s) Oral once  dextrose 5%. 1000 milliLiter(s) (50 mL/Hr) IV Continuous <Continuous>  dextrose 5%. 1000 milliLiter(s) (100 mL/Hr) IV Continuous <Continuous>  dextrose 50% Injectable 25 Gram(s) IV Push once  dextrose 50% Injectable 12.5 Gram(s) IV Push once  dextrose 50% Injectable 25 Gram(s) IV Push once  furosemide    Tablet 40 milliGRAM(s) Oral daily  gabapentin 100 milliGRAM(s) Oral three times a day  glucagon  Injectable 1 milliGRAM(s) IntraMuscular once  heparin   Injectable 7500 Unit(s) SubCutaneous every 8 hours  hydrALAZINE 100 milliGRAM(s) Oral three times a day  insulin lispro (ADMELOG) corrective regimen sliding scale   SubCutaneous three times a day before meals  insulin lispro (ADMELOG) corrective regimen sliding scale   SubCutaneous at bedtime  metoprolol succinate  milliGRAM(s) Oral daily  montelukast 10 milliGRAM(s) Oral daily  polyethylene glycol 3350 17 Gram(s) Oral two times a day  senna 2 Tablet(s) Oral at bedtime  sodium chloride 0.45%. 500 milliLiter(s) (40 mL/Hr) IV Continuous <Continuous>    MEDICATIONS  (PRN):  acetaminophen     Tablet .. 650 milliGRAM(s) Oral every 6 hours PRN Mild Pain (1 - 3), Moderate Pain (4 - 6)  aluminum hydroxide/magnesium hydroxide/simethicone Suspension 30 milliLiter(s) Oral every 6 hours PRN Dyspepsia  traMADol 25 milliGRAM(s) Oral every 6 hours PRN Severe Pain (7 - 10)      LABS:                        9.4    11.29 )-----------( 193      ( 16 Dec 2021 06:44 )             32.6     12-16    137  |  94<L>  |  23  ----------------------------<  149<H>  3.8   |  34<H>  |  0.90    Ca    8.7      16 Dec 2021 06:44  Phos  3.1     12-16  Mg     2.30     12-16      MICROBIOLOGY:     RADIOLOGY:  [ ] Reviewed and interpreted by me    PULMONARY FUNCTION TESTS:    EKG:

## 2021-12-17 NOTE — DISCHARGE NOTE PROVIDER - NSDCCPCAREPLAN_GEN_ALL_CORE_FT
PRINCIPAL DISCHARGE DIAGNOSIS  Diagnosis: Hypercapnic respiratory failure  Assessment and Plan of Treatment: You were admitted at [facility] for respiratory distress/difficulty breathing in settings of acute COPD/Obstructive sleep Apnea exacerbation with possible pneumonia on Chest Xray. You were admitted to Medical Intensive Care Unit for increased work of breathing and was later on downgrade to the Respiratory Care Unit. You were medically managed with steroids, antibiotics, and duonebs. You were seen and evaluated by the Pulmonary team, and they had adjusted your AVAP settings. You have been stable on 2-3 L Nasal canula during the day and AVAP at night.  - Please follow up with your Pulmonary and your Primary Care Doctor within 1-2 weeks upon discharge.  - Please continue your medications as prescribed for your known history of COPD and SHAD.  AVAPs settings are as follow      SECONDARY DISCHARGE DIAGNOSES  Diagnosis: Acute-on-chronic kidney injury  Assessment and Plan of Treatment:     Diagnosis: T2DM (type 2 diabetes mellitus)  Assessment and Plan of Treatment: You have history of Type II Diabetes Mellitus. Your hemoglobin A1c on 11/2021 was 6.9.   - Please continue to monitor your Finger stick blood sugar level routinely, at least 3 times daily  - Low carbohydrate diet recommended  - Please take your medications as prescribed.       PRINCIPAL DISCHARGE DIAGNOSIS  Diagnosis: Hypercapnic respiratory failure  Assessment and Plan of Treatment: You were admitted at [facility] for respiratory distress/difficulty breathing in settings of acute COPD/Obstructive sleep Apnea exacerbation with possible pneumonia on Chest Xray. You were admitted to Medical Intensive Care Unit for increased work of breathing and was later on downgrade to the Respiratory Care Unit. You were medically managed with steroids, antibiotics, and duonebs. You were seen and evaluated by the Pulmonary team, and they had adjusted your AVAP settings. You have been stable on 2-3 L Nasal canula during the day and AVAP at night.  - Please follow up with your Pulmonary and your Primary Care Doctor within 1-2 weeks upon discharge.  - Please continue your medications as prescribed for your known history of COPD and SHAD.  AVAPs settings are as follow  FIO2 50%    BACK UP RATE 16  EXPIRATORY PRESSURE 8  MAXIMUM PRESSURE 30  MINIMUM PRESSURE 14      SECONDARY DISCHARGE DIAGNOSES  Diagnosis: Periprosthetic fracture of knee  Assessment and Plan of Treatment: You have a history of Right knee proximal tibial periprosthetic fracture.   - Please continue with knee immobilizer in Bulky[employer]Castro Dressing w/ Caden locked at 30 degrees flexion  - Please continue with non weight bearing  - Plan to follow up with your outpatient Orthopeadic Doctor Dr. Jean Carlos Dee who originally decided on nonoperative treatment at [facility] or her primary orthopaedic surgeon Dr. Ariel Castro upon discharge for further management of periprosthetic fracture    Diagnosis: Chronic heart failure with preserved ejection fraction (HFpEF)  Assessment and Plan of Treatment: You have known history of chronic heart failure. You were seen and evaluated by the Cardiology team. You recent echocardiogram showed grade 1 diastolic dysfunction  - Please continue with your medications as indicated on your discharge paper.    Diagnosis: T2DM (type 2 diabetes mellitus)  Assessment and Plan of Treatment: You have history of Type II Diabetes Mellitus. Your hemoglobin A1c on 11/2021 was 6.9.   - Please continue to monitor your Finger stick blood sugar level routinely, at least 3 times daily  - Low carbohydrate diet recommended  - Please take your medications as prescribed.      Diagnosis: Acute-on-chronic kidney injury  Assessment and Plan of Treatment: Stable at this time.  Please follow up with your Primary Care Doctor, and/or your Nephrologist as outpatient within one to two weeks.

## 2021-12-17 NOTE — PROGRESS NOTE ADULT - ASSESSMENT
85y/o F w/ h/o HFpEF, obesity, SHAD on CPAP, HTN, T2DM, COPD, lymphedema, s/p fall and knee fracture p/w AMS and facial droop, MICU consulted for increased work of breathing on Bipap and elevated pCO2, found with hypercapnic respiratory failure c/b AMS improved on BIPAP.     # Acute on chronic respiratory failure with hypercapnia.    - Acute on chronic hypercapnic respiratory failure likely in setting of COPD/SHAD overlap exacerbation with possible PNA on CXR, HFpEF exacerbation given fluid overload on imaging (MICU POCUS with b-lines)  - HFpEF: c/w lasix, strict Is/OS. cards following recent echo with HFpEF and grade I DD  - COPD exacerbation: c/w AVAPs qHS. will consider steroids as well. c/w duonebs q6. c/w azithro. Pulmonology c/s 12/4: AVAPs settings adjusted but persistently hypoxic  - PNA: c/w zosyn. S/P Azithromycin  ***RRT called AM of 12/6, for AMS 2/2 severe hypoxia (40-50s) d/t bipap being disconnected.   Discussed GOC with pts daughter, Marina, at the time, who wanted everything to be done, including intubation if needed. Pt is FULL CODE.  Transferred to RCU from Medicine 12/6  - Breathing comfortably on 2L NC O2, AVAPS QHS and PRN    # AMS (altered mental status)   · Resolved w/improved resp status  - likely 2/2 acute on chronic hypercapnic respiratory failure  - Neuro recs appreciated   - CTH negative for acute ICH or infarct  - c/w Statin  - recent echo wnl.    # Chronic heart failure with preserved ejection fraction (HFpEF).   - Recent echo with HFpEF and grade I DD  - strict Is/Os monitor output  - cards following: recent echo with grade 1 diastolic dysfunction  - c/w metoprolol, Amlodipine, statin. (on home bumex) - now doing well on PO Lasix  - Cardiology recs appreciated  - c/w Lasix 40mg qd     # SHAD and COPD overlap syndrome.   · c/w AVAPS qHS and PRN for AMS  - NC during day to maintain O2 sat 88-92 given COPD  - c/w Duonebs standing,  s/p zosyn for possible PNA  - 12/8/2021: s/p 5 days of IV Solu-medrol. No further steroids indicated at this time.   - 12/9 patient with increased shortness of breath and hypoxia. Placed on AVAPS. Will attempt to bring patient back down to nasal cannula later today. ABG obtained and reviewed. US of b/l LE dopplers ordered to r/o DVT. D-dimer ordered. CXR ordered.   -12/11: breathing well on nasal canula, will attempt to wean O2 to 2L, maintain O2 sat 92%  -12/12: Continues to do well on 2L NC with AVAPs qHS --> continue to wean as tolerated for goal SpO2 > 92%  - Stable - no issues     # Pneumonia (resolved)  - s/p Azithromycin  - s/p zosyn    # Acute-on-chronic kidney injury.   - Cr initially elevated to 2s, baseline in 1s  - workup: renal lytes FeUrea c/w intrinsic renal disease, making good outputs -1.9 L O/N 12/4-12/5. Check UA and renal/bladder US  - suspect JOSEPH due to underlying medical condition: HFpEF exacerbation and fluid overload worsening renal perfusion. Now improved s/p diuresis and oxygenation on AVAPs  - hyperkalemia was treated with insulin and D50, with improvement of K  - Cr downtrended to 1s with diuresis and medical treatment.  - Improved    # Elevated troponin level.   · Initital trops 42 - 44  - cardiology following  - recent echo with grade I DD  - c/w diuresis as above, likely type II NSTEMI.    # T2DM (type 2 diabetes mellitus).   · a1c 6.9 11/2021  - c/w AISS premeal and qHS  - CC diet.    # MSK  -Right knee proximal tibia periprosthetic fracture  - Pt w/ R knee immobilizer - Ortho recs appreciated  - c/w NWB, plan to follow up with outpatient Orthopaedic   - PT/OT consulted, recs pending     # Abdominal bloating/discomfort  - s/p Simethicone - feeling much better  - c/w Miralax BID and Senna 2 tabs qhs   - Pending Abd xray    # Prophylactic measure.   · DVT PPx: HSQ  - Dispo: PT/OT consulted 87y/o F w/ h/o HFpEF, obesity, SHAD on CPAP, HTN, T2DM, COPD, lymphedema, s/p fall and knee fracture p/w AMS and facial droop, MICU consulted for increased work of breathing on Bipap and elevated pCO2, found with hypercapnic respiratory failure c/b AMS improved on BIPAP.     # Acute on chronic respiratory failure with hypercapnia.    - Acute on chronic hypercapnic respiratory failure likely in setting of COPD/SHAD overlap exacerbation with possible PNA on CXR, HFpEF exacerbation given fluid overload on imaging (MICU POCUS with b-lines)  - HFpEF: c/w lasix, strict Is/OS. cards following recent echo with HFpEF and grade I DD  - COPD exacerbation: c/w AVAPs qHS. will consider steroids as well. c/w duonebs q6. c/w azithro. Pulmonology c/s 12/4: AVAPs settings adjusted but persistently hypoxic  - PNA: c/w zosyn. S/P Azithromycin  ***RRT called AM of 12/6, for AMS 2/2 severe hypoxia (40-50s) d/t bipap being disconnected.   - Discussed GOC with pts daughter, Marina, at the time, who wanted everything to be done, including intubation if needed. Pt is FULL CODE.  - Transferred to RCU from Medicine 12/6  - Breathing comfortably on 2L NC O2, AVAPS QHS and PRN    # AMS (altered mental status)   · Resolved w/improved resp status  - likely 2/2 acute on chronic hypercapnic respiratory failure  - Neuro recs appreciated   - CTH negative for acute ICH or infarct  - c/w Statin  - Recent echo wnl.    # Chronic heart failure with preserved ejection fraction (HFpEF).   - Recent echo with HFpEF and grade I DD  - strict Is/Os monitor output  - cards following: recent echo with grade 1 diastolic dysfunction  - c/w metoprolol, Amlodipine, statin. (on home bumex) - now doing well on PO Lasix  - c/w Lasix 40mg qd   - Cardiology recs appreciated    # SHAD and COPD overlap syndrome.   · c/w AVAPS qHS and PRN for AMS  - NC during day to maintain O2 sat 88-92 given COPD  - c/w Duonebs standing,  s/p zosyn for possible PNA  - 12/8/2021: s/p 5 days of IV Solu-medrol. No further steroids indicated at this time.   - 12/9 patient with increased shortness of breath and hypoxia. Placed on AVAPS. Will attempt to bring patient back down to nasal cannula later today. ABG obtained and reviewed. US of b/l LE dopplers ordered to r/o DVT. D-dimer ordered. CXR ordered.   -12/11: breathing well on nasal canula, will attempt to wean O2 to 2L, maintain O2 sat 92%  -12/12: Continues to do well on 2L NC with AVAPs qHS --> continue to wean as tolerated for goal SpO2 > 92%  - Stable - no issues     # Pneumonia (resolved)  - s/p Azithromycin  - s/p zosyn    # Acute-on-chronic kidney injury.   - Cr initially elevated to 2s, baseline in 1s  - Workup: renal lytes FeUrea c/w intrinsic renal disease, making good outputs -1.9 L O/N 12/4-12/5. Check UA and renal/bladder US  - Cr downtrended to 1s with diuresis and medical treatment.  - Improved    # Elevated troponin level.   · Initial trops 42 - 44  - cardiology following  - recent echo with grade I DD  - c/w diuresis as above, likely type II NSTEMI.    # T2DM (type 2 diabetes mellitus).   · a1c 6.9 11/2021  - c/w AISS premeal and qHS  - CC diet.    # MSK  -Right knee proximal tibia periprosthetic fracture  - Pt w/ R knee immobilizer - Ortho recs appreciated  - c/w NWB, plan to follow up with outpatient Orthopaedic   - PT - anticipate inpatient restorative Rehab     # Abdominal bloating/discomfort (improved)  - s/p Simethicone - feeling much better  - c/w Miralax BID and Senna 2 tabs qhs   - Abd xray - non specific bowel gas pattern     # Prophylactic measure.   · DVT PPx: HSQ  - Dispo: PT - Inpatient Restorative Rehab

## 2021-12-17 NOTE — DISCHARGE NOTE NURSING/CASE MANAGEMENT/SOCIAL WORK - NSDCPEFALRISK_GEN_ALL_CORE
For information on Fall & Injury Prevention, visit: [email or web address] OR  [email or web address] OR  https://www.cdc.gov/steadi/patient.html

## 2021-12-17 NOTE — DISCHARGE NOTE PROVIDER - CARE PROVIDER_API CALL
Erasmo Bermudez (MD)  Cardiovascular Disease; Interventional Cardiology; Nuclear Cardiology  [address], [address]  [place] [address]  Phone: [phone]  Fax: [phone]  Follow Up Time:     Lisker, Gita N (MD)  Critical Care Medicine; Pulmonary Disease  [address], [address]  [place] [address]  Phone: [phone]  Fax: [phone]  Follow Up Time:     PRIMARY CARE DOCTOR,   PRIMARY CARE CLINIC  Phone: (   )    -  Fax: (   )    -  Follow Up Time: 1 month

## 2021-12-17 NOTE — DISCHARGE NOTE PROVIDER - PROVIDER TOKENS
PROVIDER:[TOKEN:[[id number]]],PROVIDER:[TOKEN:[[id number]]],FREE:[LAST:[PRIMARY CARE DOCTOR],PHONE:[(   )    -],FAX:[(   )    -],ADDRESS:[PRIMARY CARE CLINIC],FOLLOWUP:[1 month]]

## 2021-12-17 NOTE — PROGRESS NOTE ADULT - REASON FOR ADMISSION
SOB

## 2021-12-17 NOTE — DISCHARGE NOTE NURSING/CASE MANAGEMENT/SOCIAL WORK - PATIENT PORTAL LINK FT
You can access the FollowMyHealth Patient Portal offered by [facility] by registering at the following website: [email or web address]. By joining [employer]’s FollowMyHealth portal, you will also be able to view your health information using other applications (apps) compatible with our system.

## 2021-12-17 NOTE — DISCHARGE NOTE PROVIDER - NSDCMRMEDTOKEN_GEN_ALL_CORE_FT
Actamin 325 mg oral tablet: 2 tab(s) orally every 6 hours, As needed, Mild Pain (1 - 3), Moderate Pain (4 - 6)  Advair Diskus 250 mcg-50 mcg inhalation powder: 1 puff(s) inhaled 2 times a day  alendronate 70 mg oral tablet: 1 tab(s) orally once a week  amLODIPine 10 mg oral tablet: 1 tab(s) orally once a day  ascorbic acid 500 mg oral capsule: 1 cap(s) orally once a day  azelastine 137 mcg/inh (0.1%) nasal spray: 2 spray(s) nasal 2 times a day  BIPAP : BIPAP IP 15 EP 5 backup rate 12 FIO2 34% target saturation 88%-97%   Caden brace: Caden brace to be locked in 30 degrees of flexion on right knee for periprosthetic proximal tibia fracture    ICD 10   M97. 11XA  bumetanide 1 mg oral tablet: 1 tab(s) orally once a day  cefpodoxime 200 mg oral tablet: 1 tab(s) orally 2 times a day   Disposable bed chucks: Disposable Bed Chucks   1 application subcutaneously 4 times a day   ferrous sulfate 325 mg (65 mg elemental iron) oral tablet: 1 tab(s) orally once a day  glimepiride 2 mg oral tablet: 1 tab(s) orally once a day  hydrALAZINE 100 mg oral tablet: 1 tab(s) orally 3 times a day  ipratropium-albuterol 0.5 mg-2.5 mg/3 mLinhalation solution: 3 milliliter(s) inhaled every 6 hours, As Needed  metoprolol succinate 100 mg oral tablet, extended release: 1 tab(s) orally once a day  metroNIDAZOLE 500 mg oral tablet: 1 tab(s) orally every 8 hours   montelukast 10 mg oral tablet: 1 tab(s) orally once a day  Neurontin 100 mg oral capsule: 1 cap(s) orally 3 times a day  ocular lubricant ophthalmic solution: 1 drop(s) to each affected eye 3 times a day, As needed, Dry Eyes  oxyCODONE 5 mg oral tablet: 1 tab(s) orally every 6 hours, As Needed -severe pain (8-10) as needed MDD:4  polyethylene glycol 3350 oral powder for reconstitution: 17 gram(s) orally once a day  ProAir HFA 90 mcg/inh inhalation aerosol: 2 puff(s) inhaled 4 times a day, As Needed  rosuvastatin 10 mg oral tablet: 1 tab(s) orally once a day  senna oral tablet: 2 tab(s) orally once a day (at bedtime) as needed for constipation   Actamin 325 mg oral tablet: 2 tab(s) orally every 6 hours, As needed, Mild Pain (1 - 3), Moderate Pain (4 - 6)  Advair Diskus 250 mcg-50 mcg inhalation powder: 1 puff(s) inhaled 2 times a day  alendronate 70 mg oral tablet: 1 tab(s) orally once a week  aluminum hydroxide-magnesium hydroxide 200 mg-200 mg/5 mL oral suspension: 30 milliliter(s) orally every 6 hours, As needed, Dyspepsia  amLODIPine 10 mg oral tablet: 1 tab(s) orally once a day  ascorbic acid 500 mg oral capsule: 1 cap(s) orally once a day  azelastine 137 mcg/inh (0.1%) nasal spray: 2 spray(s) nasal 2 times a day  Caden brace: [place] brace to be locked in 30 degrees of flexion on right knee for periprosthetic proximal tibia fracture    ICD 10   M97. 11XA  ferrous sulfate 325 mg (65 mg elemental iron) oral tablet: 1 tab(s) orally once a day  furosemide 40 mg oral tablet: 1 tab(s) orally once a day  glimepiride 2 mg oral tablet: 1 tab(s) orally once a day  hydrALAZINE 100 mg oral tablet: 1 tab(s) orally 3 times a day  ipratropium-albuterol 0.5 mg-2.5 mg/3 mLinhalation solution: 3 milliliter(s) inhaled every 6 hours, As Needed  metoprolol succinate 100 mg oral tablet, extended release: 1 tab(s) orally once a day  montelukast 10 mg oral tablet: 1 tab(s) orally once a day  Neurontin 100 mg oral capsule: 1 cap(s) orally 3 times a day  ocular lubricant ophthalmic solution: 1 drop(s) to each affected eye 3 times a day, As needed, Dry Eyes  oxyCODONE 5 mg oral tablet: 1 tab(s) orally every 6 hours, As Needed -severe pain (8-10) as needed MDD:4  polyethylene glycol 3350 oral powder for reconstitution: 17 gram(s) orally once a day  ProAir HFA 90 mcg/inh inhalation aerosol: 2 puff(s) inhaled 4 times a day, As Needed  rosuvastatin 10 mg oral tablet: 1 tab(s) orally once a day  senna oral tablet: 2 tab(s) orally once a day (at bedtime) as needed for constipation

## 2021-12-17 NOTE — PROGRESS NOTE ADULT - SUBJECTIVE AND OBJECTIVE BOX
CARDIOLOGY FOLLOW UP - Dr. Bermudez  Date of Service: 12/17/21  CC: no cp / sob  reports intermittent R leg pain     Review of Systems:  Constitutional: No fever, weight loss, or fatigue  Respiratory: No cough, wheezing, or hemoptysis, no shortness of breath  Cardiovascular: No chest pain, palpitations, passing out, dizziness, or leg swelling  Gastrointestinal: No abd or epigastric pain.  No nausea, vomiting, or hematemesis; no diarrhea or constipation, no melena or hematochezia  Vascular: no edema       PHYSICAL EXAM:  T(C): 36.1 (12-17-21 @ 06:03), Max: 36.7 (12-16-21 @ 15:06)  HR: 87 (12-17-21 @ 07:37) (70 - 87)  BP: 133/53 (12-17-21 @ 06:03) (129/60 - 146/71)  RR: 17 (12-17-21 @ 06:03) (17 - 20)  SpO2: 100% (12-17-21 @ 07:37) (98% - 100%)  Wt(kg): --  I&O's Summary    16 Dec 2021 07:01  -  17 Dec 2021 07:00  --------------------------------------------------------  IN: 450 mL / OUT: 275 mL / NET: 175 mL        Appearance: Normal	  Cardiovascular: Normal S1 S2,RRR, No JVD, No murmurs  Respiratory: Lungs clear to auscultation	  Gastrointestinal:  Soft, Non-tender, + BS	  Extremities: Normal range of motion, No clubbing, cyanosis or edema      Home Medications:  Advair Diskus 250 mcg-50 mcg inhalation powder: 1 puff(s) inhaled 2 times a day (23 Nov 2021 14:10)  alendronate 70 mg oral tablet: 1 tab(s) orally once a week (23 Nov 2021 14:10)  ascorbic acid 500 mg oral capsule: 1 cap(s) orally once a day (30 Oct 2019 16:54)  azelastine 137 mcg/inh (0.1%) nasal spray: 2 spray(s) nasal 2 times a day (30 Oct 2019 16:54)  glimepiride 2 mg oral tablet: 1 tab(s) orally once a day (30 Oct 2019 16:54)  ipratropium-albuterol 0.5 mg-2.5 mg/3 mLinhalation solution: 3 milliliter(s) inhaled every 6 hours, As Needed (04 Jun 2021 10:05)  montelukast 10 mg oral tablet: 1 tab(s) orally once a day (30 Oct 2019 16:54)  Neurontin 100 mg oral capsule: 1 cap(s) orally 3 times a day (29 Nov 2020 18:19)  ocular lubricant ophthalmic solution: 1 drop(s) to each affected eye 3 times a day, As needed, Dry Eyes (06 Nov 2019 13:41)  ProAir HFA 90 mcg/inh inhalation aerosol: 2 puff(s) inhaled 4 times a day, As Needed (23 Nov 2021 14:10)      MEDICATIONS  (STANDING):  albuterol/ipratropium for Nebulization 3 milliLiter(s) Nebulizer every 12 hours  amLODIPine   Tablet 10 milliGRAM(s) Oral daily  atorvastatin 40 milliGRAM(s) Oral at bedtime  budesonide 160 MICROgram(s)/formoterol 4.5 MICROgram(s) Inhaler 2 Puff(s) Inhalation two times a day  chlorhexidine 2% Cloths 1 Application(s) Topical daily  dextrose 40% Gel 15 Gram(s) Oral once  dextrose 5%. 1000 milliLiter(s) (50 mL/Hr) IV Continuous <Continuous>  dextrose 5%. 1000 milliLiter(s) (100 mL/Hr) IV Continuous <Continuous>  dextrose 50% Injectable 25 Gram(s) IV Push once  dextrose 50% Injectable 12.5 Gram(s) IV Push once  dextrose 50% Injectable 25 Gram(s) IV Push once  furosemide    Tablet 40 milliGRAM(s) Oral daily  gabapentin 100 milliGRAM(s) Oral three times a day  glucagon  Injectable 1 milliGRAM(s) IntraMuscular once  heparin   Injectable 7500 Unit(s) SubCutaneous every 8 hours  hydrALAZINE 100 milliGRAM(s) Oral three times a day  insulin lispro (ADMELOG) corrective regimen sliding scale   SubCutaneous three times a day before meals  insulin lispro (ADMELOG) corrective regimen sliding scale   SubCutaneous at bedtime  metoprolol succinate  milliGRAM(s) Oral daily  montelukast 10 milliGRAM(s) Oral daily  polyethylene glycol 3350 17 Gram(s) Oral two times a day  senna 2 Tablet(s) Oral at bedtime  sodium chloride 0.45%. 500 milliLiter(s) (40 mL/Hr) IV Continuous <Continuous>      TELEMETRY: 	    ECG:  	  RADIOLOGY:   DIAGNOSTIC TESTING:  [ ] Echocardiogram:  [ ]  Catheterization:  [ ] Stress Test:    OTHER: 	    LABS:	 	                            9.4    11.29 )-----------( 193      ( 16 Dec 2021 06:44 )             32.6     12-16    137  |  94<L>  |  23  ----------------------------<  149<H>  3.8   |  34<H>  |  0.90    Ca    8.7      16 Dec 2021 06:44  Phos  3.1     12-16  Mg     2.30     12-16
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
## 2021-12-17 NOTE — PROGRESS NOTE ADULT - PROVIDER SPECIALTY LIST ADULT
Cardiology
Neurology
Neurology
Pulmonology
Pulmonology
Cardiology
Neurology
Pulmonology
Cardiology
Neurology
Pulmonology
Cardiology
MICU
Pulmonology
Hospitalist

## 2021-12-17 NOTE — PROGRESS NOTE ADULT - ASSESSMENT
Echo 11/19/21: EF 60-65%, nl lv sys fx, mild AS, mild Ms   Echo 6/2/21: Min MR, grossly nl lv sys fx   Echo 12/5/20: grossly nl lv sys fx    a/p  87 y/o female w pmhx of HFpEF, obesity, SHAD on CPAP, HTN, T2DM, COPD, lymphedema, s/p fall and knee fracture s/p  knee surgery, presents to the ED with AMS and facial droop.    #AMS  -improved   -in setting of hypercapnic resp failure  -s/p RRT 12/6 for hypoxia, AMS- pt noted to be connected incorrectly to biPAP. Xray - No PTX. BiPAP circuit was fixed and  mental status/ sat improved to 100%.   -now NC intermittent on AVAPS - management per RCU team  -repeat chest xray12/6  noted    #Hypercapnic respiratory failure, hypoxic resp failure  -some component of pulmonary edema  -AVAPs per primary team  -cont PO lasix     #acute/Chronic Diastolic CHF  -pt w chronic Lymphedema  -volume status stable   -s/p bipap, intermittent AVAPS per RCU   -recent Echo w grossly nl lv sys fx   -hsT indeterminate, likely demand ischemia without NSTEMI in setting of JOSEPH  -cont bb , lasix     #HTN  -stable  -cont current meds     dvt ppx

## 2021-12-17 NOTE — DISCHARGE NOTE PROVIDER - NSDCFUADDINST_GEN_ALL_CORE_FT
AVAPs settings are as follow FIO2 50%  BACK UP RATE 16 EXPIRATORY PRESSURE 8 MAXIMUM PRESSURE 30 MINIMUM PRESSURE 14

## 2021-12-17 NOTE — CHART NOTE - NSCHARTNOTEFT_GEN_A_CORE
NUTRITION FOLLOW-UP  85yo F w HF, obesity, SHAD on CPAP, HTN, DM, COPD, lymphedema, s/p fall and knee Fx presenting with AMS s/p MICU for increased WOB on BiPAP.  Acute on chronic respiratory failure, COPD exacerbation.. resolved AMS.  Also with CHF, JOSEPH and hyperkalemia which has resolved.  K+ and Phosphorus levels WALs, thus dietary potassium/phos. restrictions likely no longer warranted & would consider diet liberalization.   Noted persistently elevated POCT glucose values... Pt. may benefit from insulin regimen modification.    Pt. somnolent and sleepy at time of RDN visit & also observed receiving nebulizer breathing treatment.  Unable to review diet education at this time.  Breakfast tray @ bedside w 100% of meal. No reported issues swallowing with current diet consistency & no nausea/vomiting/diarrhea/constipation.  BM 12/16.         _________________Diet___________________  Diet, Pureed:   Consistent Carbohydrate {No Snacks} (CSTCHO)  DASH/TLC {Sodium & Cholesterol Restricted} (DASH)  1000mL Fluid Restriction ([id number])  Mildly Thick Liquids (MILDTHICKLIQS)  No Concentrated Potassium  No Concentrated Phosphorus (12-07-21 @ 15:22)          Weight:                                                            Height:   65"                 Ideal Body Weight: 56.6kg  128.8kg (12/17 obtained by RDN via bed scale)  125.4kg (12/3)      Edema:  None noted    Skin:  No noted pressure injuries       ________________________Pertinent Medications____________   MEDICATIONS  (STANDING):  albuterol/ipratropium for Nebulization 3 milliLiter(s) Nebulizer every 12 hours  amLODIPine   Tablet 10 milliGRAM(s) Oral daily  atorvastatin 40 milliGRAM(s) Oral at bedtime  budesonide 160 MICROgram(s)/formoterol 4.5 MICROgram(s) Inhaler 2 Puff(s) Inhalation two times a day  chlorhexidine 2% Cloths 1 Application(s) Topical daily  dextrose 40% Gel 15 Gram(s) Oral once  dextrose 5%. 1000 milliLiter(s) (50 mL/Hr) IV Continuous <Continuous>  dextrose 5%. 1000 milliLiter(s) (100 mL/Hr) IV Continuous <Continuous>  dextrose 50% Injectable 25 Gram(s) IV Push once  dextrose 50% Injectable 12.5 Gram(s) IV Push once  dextrose 50% Injectable 25 Gram(s) IV Push once  furosemide    Tablet 40 milliGRAM(s) Oral daily  gabapentin 100 milliGRAM(s) Oral three times a day  glucagon  Injectable 1 milliGRAM(s) IntraMuscular once  heparin   Injectable 7500 Unit(s) SubCutaneous every 8 hours  hydrALAZINE 100 milliGRAM(s) Oral three times a day  insulin lispro (ADMELOG) corrective regimen sliding scale   SubCutaneous three times a day before meals  insulin lispro (ADMELOG) corrective regimen sliding scale   SubCutaneous at bedtime  metoprolol succinate  milliGRAM(s) Oral daily  montelukast 10 milliGRAM(s) Oral daily  polyethylene glycol 3350 17 Gram(s) Oral two times a day  senna 2 Tablet(s) Oral at bedtime  sodium chloride 0.45%. 500 milliLiter(s) (40 mL/Hr) IV Continuous <Continuous>    MEDICATIONS  (PRN):  acetaminophen     Tablet .. 650 milliGRAM(s) Oral every 6 hours PRN Mild Pain (1 - 3), Moderate Pain (4 - 6)  aluminum hydroxide/magnesium hydroxide/simethicone Suspension 30 milliLiter(s) Oral every 6 hours PRN Dyspepsia  traMADol 25 milliGRAM(s) Oral every 6 hours PRN Severe Pain (7 - 10)          _________________________Pertinent Labs____________________     12-16    137  |  94<L>  |  23  ----------------------------<  149<H>  3.8   |  34<H>  |  0.90    Ca    8.7      16 Dec 2021 06:44  Phos  3.1     12-16  Mg     2.30     12-16                                                                     9.4    11.29 )-----------( 193      ( 16 Dec 2021 06:44 )             32.6         CAPILLARY BLOOD GLUCOSE      POCT Blood Glucose.: 196 mg/dL (17 Dec 2021 11:14)    POCT Blood Glucose.: 196 mg/dL (12-17-21 @ 11:14)  POCT Blood Glucose.: 167 mg/dL (12-17-21 @ 07:14)  POCT Blood Glucose.: 244 mg/dL (12-16-21 @ 21:07)  POCT Blood Glucose.: 204 mg/dL (12-16-21 @ 17:09)              PLAN/RECOMMENDATIONS:    1) D/C potassium and phosphorus diet restrictions.  Continue all other therapeutic diet modifications at this time.    2) Obtain daily weights  3) Consider insulin regimen modification    RDN remains available and will f/u PRN.          Kathy Mesa RDN, CDN pager [phone]

## 2021-12-17 NOTE — PROGRESS NOTE ADULT - TIME BILLING
Agree with above NP note.  CV stable  cont now NC   c/w diuretics   care per RCU  cont bb   dvt ppx
Agree with above NP note.  cv stable  cont oral lasix  bp control  care per rcu  dvt ppx
Agree with above NP note.  Echo 11/19/21: EF 60-65%, nl lv sys fx, mild AS, mild Ms   Echo 6/2/21: Min MR, grossly nl lv sys fx   Echo 12/5/20: grossly nl lv sys fx    a/p  87 y/o female w pmhx of HFpEF, obesity, SHAD on CPAP, HTN, T2DM, COPD, lymphedema, s/p fall and knee fracture s/p  knee surgery, presents to the ED with AMS and facial droop.    #AMS  -in setting of hypercapnic resp failure  -CVA r/o  -MICU w/u    #Hypercapnic respiratory failure  -bipap per icu/med  -trend abg    #Chronic Diastolic CHF  -vol status stable  -pt w chronic Lymphedema  -iv lasix as needed, given today   -reassess daily need  -recent Echo w grossly nl lv sys fx   -hsT indeterminate, no acs on EKG     dvt ppx
Agree with above NP note.  cv stable  cont oral lasix  bp control  care per rcu  dvt ppx
Agree with above NP note.  CV stable  clinically improved  s/p iv lasix   contraction alkalosis   iv lasix on hold   resume as needed  care per RCU  cont bb   dvt ppx
Agree with above NP note.  CV stable  cont now NC   c/w diuretics   care per RCU  cont bb   dvt ppx
Patient seen and examined.  Agree with above NP note.  events notet  rrt   await RCU  iv lasix   BIPAP per pulmonary  asp precautions  abx

## 2021-12-17 NOTE — DISCHARGE NOTE PROVIDER - HOSPITAL COURSE
· Assessment    85y/o F w/ h/o HFpEF, obesity, SHAD on CPAP, HTN, T2DM, COPD, lymphedema, s/p fall and knee fracture p/w AMS and facial droop, MICU consulted for increased work of breathing on Bipap and elevated pCO2, found with hypercapnic respiratory failure c/b AMS improved on BIPAP.     # Acute on chronic respiratory failure with hypercapnia.    - Acute on chronic hypercapnic respiratory failure likely in setting of COPD/SHAD overlap exacerbation with possible PNA on CXR, HFpEF exacerbation given fluid overload on imaging (MICU POCUS with b-lines)  - HFpEF: c/w lasix, strict Is/OS. cards following recent echo with HFpEF and grade I DD  - COPD exacerbation: c/w AVAPs qHS. will consider steroids as well. c/w duonebs q6. c/w azithro. Pulmonology c/s 12/4: AVAPs settings adjusted but persistently hypoxic  - PNA: c/w zosyn. S/P Azithromycin  ***RRT called AM of 12/6, for AMS 2/2 severe hypoxia (40-50s) d/t bipap being disconnected.   - Discussed GOC with pts daughter, Marina, at the time, who wanted everything to be done, including intubation if needed. Pt is FULL CODE.  - Transferred to RCU from Medicine 12/6  - Breathing comfortably on 2L NC O2, AVAPS QHS and PRN    # AMS (altered mental status)   · Resolved w/improved resp status  - likely 2/2 acute on chronic hypercapnic respiratory failure  - Neuro recs appreciated   - CTH negative for acute ICH or infarct  - c/w Statin  - Recent echo wnl.    # Chronic heart failure with preserved ejection fraction (HFpEF).   - Recent echo with HFpEF and grade I DD  - strict Is/Os monitor output  - cards following: recent echo with grade 1 diastolic dysfunction  - c/w metoprolol, Amlodipine, statin. (on home bumex) - now doing well on PO Lasix  - c/w Lasix 40mg qd   - Cardiology recs appreciated    # SHAD and COPD overlap syndrome.   · c/w AVAPS qHS and PRN for AMS  - NC during day to maintain O2 sat 88-92 given COPD  - c/w Duonebs standing,  s/p zosyn for possible PNA  - 12/8/2021: s/p 5 days of IV Solu-medrol. No further steroids indicated at this time.   - 12/9 patient with increased shortness of breath and hypoxia. Placed on AVAPS. Will attempt to bring patient back down to nasal cannula later today. ABG obtained and reviewed. US of b/l LE dopplers ordered to r/o DVT. D-dimer ordered. CXR ordered.   -12/11: breathing well on nasal canula, will attempt to wean O2 to 2L, maintain O2 sat 92%  -12/12: Continues to do well on 2L NC with AVAPs qHS --> continue to wean as tolerated for goal SpO2 > 92%  - Stable - no issues     # Pneumonia (resolved)  - s/p Azithromycin  - s/p zosyn    # Acute-on-chronic kidney injury.   - Cr initially elevated to 2s, baseline in 1s  - Workup: renal lytes FeUrea c/w intrinsic renal disease, making good outputs -1.9 L O/N 12/4-12/5. Check UA and renal/bladder US  - Cr downtrended to 1s with diuresis and medical treatment.  - Improved    # Elevated troponin level.   · Initial trops 42 - 44  - cardiology following  - recent echo with grade I DD  - c/w diuresis as above, likely type II NSTEMI.    # T2DM (type 2 diabetes mellitus).   · a1c 6.9 11/2021  - c/w AISS premeal and qHS  - CC diet.    # MSK  -Right knee proximal tibia periprosthetic fracture  - Pt w/ R knee immobilizer - Ortho recs appreciated  - c/w NWB, plan to follow up with outpatient Orthopaedic   - PT - anticipate inpatient restorative Rehab     # Abdominal bloating/discomfort (improved)  - s/p Simethicone - feeling much better  - c/w Miralax BID and Senna 2 tabs qhs   - Abd xray - non specific bowel gas pattern     # Prophylactic measure.   · DVT PPx: HSQ  - Dispo: PT - Inpatient Restorative Rehab     Case discussed in morning rounds in details with Covering RCU Attg.

## 2022-03-18 NOTE — CONSULT NOTE ADULT - ATTENDING COMMENTS
[facility] Emergency Department    [address]     [place] [address]    Phone:  [phone]           Regan Durán   MRN: [id number]    Department:  [facility] Emergency Department   Date of Visit:  5/21/2017           Diagnosis     Acute URI        You were seen by Festus Santiago MD.      Disclaimer     Follow-up Care:  It is your responsibility to arrange for follow-up care with your healthcare provider or as instructed. Call to get an appointment time.           Contact your doctor for follow-up appointment if not already scheduled.     Follow up with Sukhdev Johns MD. Schedule an appointment as soon as possible for a visit in 1 week.    Specialty:  Pediatrics    Comments:  For reevaluation    Contact information    [address] [place], [place] [address]   [place] [address]  [phone]        Medications you received while in the ED through 05/21/2017  1:46 PM     None         What to Do with Your Medications      Notice     No changes were made to your prescriptions during this visit.            Procedures     None      Imaging Results     None        Discharge Instructions         Viral Upper Respiratory Illness (Child)  Your child has a viral upper respiratory illness (URI), which is another term for the common cold. The virus is contagious during the first few days. It is spread through the air by coughing, sneezing, or by direct contact (touching your sick child then touching your own eyes, nose, or mouth). Frequent handwashing will decrease risk of spread. Most viral illnesses resolve within 7 to 14 days with rest and simple home remedies. However, they may sometimes last up to 4 weeks. Antibiotics will not kill a virus and are generally not prescribed for this condition.    Home care  · Fluids: Fever increases water loss from the body. Encourage your child to drink lots of fluids to loosen lung secretions and make it easier to breathe. For infants under 1 year old, continue regular formula or breast feedings. Between  feedings, give oral rehydration solution. This is available from drugstores and grocery stores without a prescription. For children over 1 year old, give plenty of fluids, such as water, juice, gelatin water, soda without caffeine, ginger ale, lemonade, or ice pops.  · Eating: If your child doesn't want to eat solid foods, it's OK for a few days, as long as he or she drinks lots of fluid.  · Rest: Keep children with fever at home resting or playing quietly until the fever is gone. Encourage frequent naps. Your child may return to day care or school when the fever is gone and he or she is eating well and feeling better.  · Sleep: Periods of sleeplessness and irritability are common. A congested child will sleep best with the head and upper body propped up on pillows or with the head of the bed frame raised on a 6-inch block. An infant may sleep in a car seat placed in the crib or in a baby swing. If you use a car seat or baby swing, always make certain the baby is safely fastened in the device.  · Cough: Coughing is a normal part of this illness. A cool mist humidifier at the bedside may be helpful. Be sure to clean the humidifier every day to prevent mold. Over-the-counter cough and cold medicines have not proved to be any more helpful than a placebo (syrup with no medicine in it). In addition, these medicines can produce serious side effects, especially in infants under 2 years of age. Do not give over-the-counter cough and cold medicines to children under 6 years unless your healthcare provider has specifically advised you to do so. Also, don’t expose your child to cigarette smoke. It can make the cough worse.  · Nasal congestion: Suction the nose of infants with a bulb syringe. You may put 2 to 3 drops of saltwater (saline) nose drops in each nostril before suctioning. This helps thin and remove secretions. Saline nose drops are available without a prescription. You can also use ¼ teaspoon of table salt dissolved  in 1 cup of water.  · Fever: Use children’s acetaminophen for fever, fussiness, or discomfort, unless another medicine was prescribed. In infants over 6 months of age, you may use children’s ibuprofen or acetaminophen. (Note: If your child has chronic liver or kidney disease or has ever had a stomach ulcer or gastrointestinal bleeding, talk with your healthcare provider before using these medicines.) Aspirin should never be given to anyone younger than 18 years of age who is ill with a viral infection or fever. It may cause severe liver or brain damage.  · Preventing spread: Washing your hands before and after touching your sick child will help prevent a new infection. It will also help prevent the spread of this viral illness to yourself and other children.  Follow-up care  Follow up with your healthcare provider, or as advised.  When to seek medical advice  For a usually healthy child, call your child's healthcare provider right away if any of these occur:  · A fever, as follows:  ¨ Your child is 3 months old or younger and has a fever of 100.4°F (38°C) or higher. Get medical care right away. Fever in a young baby can be a sign of a dangerous infection.  ¨ Your child is of any age and has repeated fevers above 104°F (40°C).  ¨ Your child is younger than 2 years of age and a fever of 100.4°F (38°C) continues for more than 1 day.  ¨ Your child is 2 years old or older and a fever of 100.4°F (38°C) continues for more than 3 days.  · Earache, sinus pain, stiff or painful neck, headache, repeated diarrhea, or vomiting.  · Unusual fussiness.  · A new rash appears.  · Your child is dehydrated, with one or more of these symptoms:  ¨ No tears when crying.  ¨ “Sunken” eyes or a dry mouth.  ¨ No wet diapers for 8 hours in infants.  ¨ Reduced urine output in older children.  Call 911, or get immediate medical care  Contact emergency services if any of these occur:  · Increased wheezing or difficulty breathing  · Unusual  drowsiness or confusion  · Fast breathing, as follows:  ¨ Birth to 6 weeks: over 60 breaths per minute.  ¨ 6 weeks to 2 years: over 45 breaths per minute.  ¨ 3 to 6 years: over 35 breaths per minute.  ¨ 7 to 10 years: over 30 breaths per minute.  ¨ Older than 10 years: over 25 breaths per minute.  © 0422-5361 [employer]. [address] [place] [address], [place] [address]. All rights reserved. This information is not intended as a substitute for professional medical care. Always follow your healthcare professional's instructions.          Discharge References/Attachments     None      Medication Safety: What you need to know     Maintain Security - It is important to keep all medications in a secure location:  Keep out of the reach of children and pets    Consider using a lock box or locked filing cabinet    Place pill bottles in private area such as bedroom or drawer    Don't Share - It is illegal to share your prescription medication, even with family:  The doctor prescribes medications specifically for you and your body    You cannot be sure how the drug may affect others physically or emotionally    It is a criminal offense to share prescriptions    Proper Disposal - It is no longer acceptable to flush or throw away medications:  Recent studies show measurable amounts of medication have been found in drinking water and wildlife due to flushing or throwing away medications    Medication strength changes over time and is not typically safe after one year    Proper disposal removes the medication from your home in a safe way so that others don't have access to it. Use your local drug drop site.    Your local pharmacy can provide information on medication disposal options in your community. The Department [employer] Drug Enforcement Administration website also has information on safe medication disposal:  www.deadiversion.usdoj.gov/drug_disposal/index.html         pt with acute on chronic hypercapneic respiratory failure, + COVID-19 infection. tolerating O2 3 LPM NC, (on 2 LPM O2 at home 24/7). continue Bilevel at night, nocturnally. Needs to have Bilevel ordered for home use. Cell Phone/PDA (specify)/Clothing

## 2022-05-01 RX ORDER — LACTULOSE 10 G/15ML
30 SOLUTION ORAL
Qty: 0 | Refills: 0 | DISCHARGE

## 2022-05-01 RX ORDER — GABAPENTIN 400 MG/1
1 CAPSULE ORAL
Qty: 0 | Refills: 0 | DISCHARGE

## 2022-05-01 RX ORDER — FLUTICASONE PROPIONATE AND SALMETEROL 50; 250 UG/1; UG/1
1 POWDER ORAL; RESPIRATORY (INHALATION)
Qty: 0 | Refills: 0 | DISCHARGE

## 2022-05-01 RX ORDER — MONTELUKAST 4 MG/1
1 TABLET, CHEWABLE ORAL
Qty: 0 | Refills: 0 | DISCHARGE

## 2022-05-01 RX ORDER — AZELASTINE 137 UG/1
2 SPRAY, METERED NASAL
Qty: 0 | Refills: 0 | DISCHARGE

## 2022-05-01 RX ORDER — ROSUVASTATIN CALCIUM 5 MG/1
1 TABLET ORAL
Qty: 0 | Refills: 0 | DISCHARGE

## 2022-05-01 RX ORDER — CHOLECALCIFEROL (VITAMIN D3) 125 MCG
1 CAPSULE ORAL
Qty: 0 | Refills: 0 | DISCHARGE

## 2022-05-01 RX ORDER — IPRATROPIUM BROMIDE 0.2 MG/ML
2 SOLUTION, NON-ORAL INHALATION
Qty: 0 | Refills: 0 | DISCHARGE

## 2022-05-01 RX ORDER — GLIMEPIRIDE 1 MG
1 TABLET ORAL
Qty: 0 | Refills: 0 | DISCHARGE

## 2022-05-01 RX ORDER — CALCIUM CARBONATE 500(1250)
0.5 TABLET ORAL
Qty: 0 | Refills: 0 | DISCHARGE

## 2022-05-01 RX ORDER — ASCORBIC ACID 60 MG
1 TABLET,CHEWABLE ORAL
Qty: 0 | Refills: 0 | DISCHARGE

## 2022-05-01 RX ORDER — ALENDRONATE SODIUM 70 MG/1
1 TABLET ORAL
Qty: 0 | Refills: 0 | DISCHARGE

## 2022-05-01 RX ORDER — AMLODIPINE BESYLATE 2.5 MG/1
1 TABLET ORAL
Qty: 0 | Refills: 0 | DISCHARGE

## 2022-05-01 RX ORDER — LOSARTAN POTASSIUM 100 MG/1
1 TABLET, FILM COATED ORAL
Qty: 0 | Refills: 0 | DISCHARGE

## 2022-05-01 RX ORDER — AZELASTINE 137 UG/1
1 SPRAY, METERED NASAL
Qty: 0 | Refills: 0 | DISCHARGE

## 2022-05-01 RX ORDER — HYDRALAZINE HCL 50 MG
1 TABLET ORAL
Qty: 0 | Refills: 0 | DISCHARGE

## 2022-05-01 RX ORDER — METOPROLOL TARTRATE 50 MG
1 TABLET ORAL
Qty: 0 | Refills: 0 | DISCHARGE

## 2022-05-01 RX ORDER — ALBUTEROL 90 UG/1
2 AEROSOL, METERED ORAL
Qty: 0 | Refills: 0 | DISCHARGE

## 2023-01-09 NOTE — PHYSICAL THERAPY INITIAL EVALUATION ADULT - BED MOBILITY LIMITATIONS, REHAB EVAL
"Odilia is a 72 year old who is being evaluated via a billable video visit.      How would you like to obtain your AVS? MyChart  If the video visit is dropped, the invitation should be resent by: Text to cell phone: [phone]  Will anyone else be joining your video visit? No        Video-Visit Details    Type of service:  Video Visit   Video Start Time: 3:05 pm  Video End Time: 3:21 pm    Originating Location (pt. Location): Home    Distant Location (provider location):  On-site  Platform used for Video Visit: [facility]     Pulmonary Clinic Return Visit  History of Present Illness    Ms. Odilia Gan is a 72 yof with a history of YOHANNES on CPAP who presents to pulmonary clinic today for follow up.  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  Please see my note from 11/8/2021 for additional details.      To briefly summarize, she had complaints of aching lungs and a cough productive of a small amount of clear phlegm dating back about 4 years, to around the time she was exposed to VOCs in her carpet. We discussed that her cough could be related to some form of low-dose or subacute reactive airways dysfunction syndrome or irritant induced asthma.  I suggested that PFTs and potentially a methacholine challenge test would be needed to solidify these diagnoses and that either would be treated inhalers and irritant avoidance similar to \"regular asthma.  We also reviewed other potential causes of chronic cough.  PFT testing was recommended but Ms. Gan declined to proceed with any PFT testing or empiric trials of inhalers.  With respect to her pulmonary nodules, a few, sub 6 mm pulmonary nodules were reported on CT chest.  We reviewed that  is a low risk patient (never smoker, no personal history of malignancy)  Based on this, Fleischner criteria suggest optional 1 year follow up for " pulmonary nodule surveillance.  She had 2 scans 8 months apart demonstrating stability of nodules.  Outside images were requested for review and we agreed that no further scans were needed for dedicated follow up given stability of nodules.    She returns to clinic today having had a recent CT coronary arteries in 11/2022 which reportedly showed a 5 mm RML nodule as well as linear atelectasis in the mid and lower lung zones. She presents to clinic today to discuss these findings further.    -Has had 6 recurrent respiratory infections since our last visit.  Last infection was 2 months ago.  -Is having sore throat, fevers, chills, and overall malaise and has been experiencing this for the last few weeks. Denies exposure to sick contacts and states no one else has gotten what she has so she is unsure if the cause is infectious.   -Developed cardiac arrhythmia while on a drug - this prompted her to have a cardiology evaluation with CT coronary arteries.  Cardiology notes from 11/9 are reviewed.  She had an echo which showed only mild MR with no other significant abnormalities.  The cardiac CT had no evidence of obstructive coronary disease with parenchymal lung findings as described above.  -Is planning to have to cancel a cruise she has been waiting to take for years because she is feeling so poorly and worries about risk of getting another infection onboard. Wonders if I can write a letter to the cruise company providing a medical exemption so she can get her money back      She is a never smoker.  She does not vape.  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  She did have exposure to chlorine products there.  Does not have any pets at home.  Denies any significant exposure to birds or hot tub or Jacuzzi shoes on a regular basis.  She does believe that she has some bedding stuffed with down feathers.      Review of Systems:  10 of 14 systems reviewed and are negative  unless otherwise stated in HPI.    Past Medical History:   Diagnosis Date     Graves disease      YOHANNES (obstructive sleep apnea)      Strabismus        No past surgical history on file.    No family history on file.    Social History     Socioeconomic History     Marital status: Single     Spouse name: None     Number of children: None     Years of education: None     Highest education level: None   Occupational History     None   Tobacco Use     Smoking status: Never Smoker     Smokeless tobacco: Never Used   Substance and Sexual Activity     Alcohol use: Never     Drug use: None     Sexual activity: None   Other Topics Concern     None   Social History Narrative     None     Social Determinants of Health     Financial Resource Strain: Not on file   Food Insecurity: Not on file   Transportation Needs: Not on file   Physical Activity: Not on file   Stress: Not on file   Social Connections: Not on file   Intimate Partner Violence: Unknown     Fear of Current or Ex-Partner: No     Emotionally Abused: No     Physically Abused: Not on file     Sexually Abused: Not on file   Housing Stability: Not on file         Allergies   Allergen Reactions     Gluten Meal GI Disturbance     Gluten sensitive      Sulfa Drugs Rash         Current Outpatient Medications:      eszopiclone (LUNESTA) 2 MG tablet, Take 2 mg by mouth, Disp: , Rfl:      flecainide (TAMBOCOR) 100 MG tablet, Take 100 mg by mouth 2 times daily, Disp: , Rfl:      metoprolol tartrate (LOPRESSOR) 25 MG tablet, Take 12.5 mg by mouth every morning, Disp: , Rfl:      pramipexole (MIRAPEX) 0.125 MG tablet, TAKE ONE TABLET BY MOUTH 90 MINUTES BEFORE SYMPTOMS OF RESTLESS LEG TYPICALLY OCCUR. INCREASE BY 1 TABLET EVERY 7 DAYS IF NEEDED. MAX OF 4 T, Disp: , Rfl:      zolpidem (AMBIEN) 10 MG tablet, Take 10 mg by mouth, Disp: , Rfl:       Physical Exam:  GENERAL: Healthy, alert and no distress  EYES: Eyes grossly normal to inspection.  No discharge or erythema, or obvious  scleral/conjunctival abnormalities.  RESP: No audible wheeze, cough, or visible cyanosis.  No visible retractions or increased work of breathing.    SKIN: Visible skin clear. No significant rash, abnormal pigmentation or lesions.  NEURO: Cranial nerves grossly intact.  Mentation and speech appropriate for age.  PSYCH: Mentation appears normal, affect normal/bright, judgement and insight intact, normal speech and appearance well-groomed.    Results:  CT Coronaries from November 2022 as described in HPI    Radiology reports from before, as provided by the patient were reviewed with reports scanned into our record.     CT chest from February 24, 2021: Noncalcified pulmonary nodules in the right lung measuring 3 to 5 mm.  Stable compared with previous imaging from June 22, 2020.  Otherwise clear lungs.    Chest x-ray September 14, 2021: Mild hyperinflation.  Mild biapical pleural thickening and minimal linear scarring in the left lung base.    Assessment and Plan:   Odilia Gan is a 72 year old female with a history of volatile organic compounds exposure who presents to pulmonary clinic today for further evaluation and discussion of previous chest imaging and chronic cough.  1.  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  I again reviewed with Ms. Gan that it is possible that her cough is related to some form of low-dose or subacute reactive airways dysfunction syndrome or irritant induced asthma.  We also discussed it could also be related to some combination of acid reflux or post nasal drip, as both are common causes of cough.  I reiterated to her multiple times that in order to proceed with further work up or investigation that pulmonary function testing would be necessary.  She was quite adamant that she did not believe her symptoms were related to asthma and that was not interested in coming in for PFTs.  We also discussed that  pulmonary work up and evaluation for recurrent pulmonary infections would require investigation for underlying lung diseases and that would also require PFTs.  We reviewed that there was no evidence of pneumonia or overt structural lung disease on any prior imaging or her most recent CT chest from November 2022 which was reassuring.  The linear atelectasis seen on this scan was minimal and likely also visualized on the CXR from 2021 reviewed above.  We discussed that linear atelectasis could also be representative of hypoventilation due to phase of the respiratory cycle during the CT.  Either way findings are minimal, do not require further investigation and are not related to her current symptoms.  2. Pulmonary nodule.  A few, sub 6 mm pulmonary nodules were reported on CT chest from February 2021.  A 5 mm RML nodule was seen on this most recent scan and likely is represents the same nodule seen in the RML as before.  I let her know that to be definitive I could request the outside images for review and ask the radiologist for comparison.  Regardless though, she remains a low risk patient (never smoker, no personal history of malignancy) and updated Fleischner criteria to not recommend any routine follow up for nodules < 6 mm in this population.  Ms. Gan asked me to write a medical excuse letter for the cruise company supporting her cancelling her cruise and requesting a refund based on her pulmonary conditions.  I explained to her that the only confirmed pulmonary diagnosis I had for her was pulmonary nodules which do not require further follow up.  I suggested it would be more appropriate for a PCP to provide this documentation as they could provide the most comprehensive picture of her medical diagnoses.  I explained that I would be happy to work her up further for her respiratory symptoms and provide any medical documentation appropriate for her pulmonary diagnoses, but that next steps would require  additional testing including PFTs to help ascertain the presence of lung disease. She continued to decline to come in for PFTs or other testing.  Barring additional data, I let her know that I had no active pulmonary diagnoses for her upon which I could reasonably or justifiable provide the letter she was requesting. At this point Ms. Gan terminated the video visit with me stating she could see we were getting nowhere I was not interested in helping her.  Due to the abrupt termination of this visit, there was no opportunity for further discussion of obtaining outside CT imaging for pulmonary nodule comparison, etc although I do see in Care Everywhere she has been referred to pulmonary nodule clinic in the [facility] system.  Return to clinic as needed.   Angelica Chinchilla MD  Pulmonary and Critical Care Medicine    The above note was dictated using voice recognition software and may include typographical errors. Please contact the author for any clarifications.    I spent 60 minutes on the date of encounter doing chart review, review of outside records, review of test results, conducting the patient visit, completing documentation and further activities as noted above.                                     decreased ability to use legs for bridging/pushing/impaired ability to control trunk for mobility

## 2023-01-24 NOTE — DIETITIAN INITIAL EVALUATION ADULT. - PERTINENT MEDS FT
Colchicine Counseling:  Patient counseled regarding adverse effects including but not limited to stomach upset (nausea, vomiting, stomach pain, or diarrhea).  Patient instructed to limit alcohol consumption while taking this medication.  Colchicine may reduce blood counts especially with prolonged use.  The patient understands that monitoring of kidney function and blood counts may be required, especially at baseline. The patient verbalized understanding of the proper use and possible adverse effects of colchicine.  All of the patient's questions and concerns were addressed. MEDICATIONS  (STANDING):  albuterol/ipratropium for Nebulization 3 milliLiter(s) Nebulizer every 6 hours  amLODIPine   Tablet 10 milliGRAM(s) Oral daily  atorvastatin 40 milliGRAM(s) Oral at bedtime  budesonide 160 MICROgram(s)/formoterol 4.5 MICROgram(s) Inhaler 2 Puff(s) Inhalation two times a day  chlorhexidine 2% Cloths 1 Application(s) Topical daily  dextrose 40% Gel 15 Gram(s) Oral once  dextrose 5%. 1000 milliLiter(s) (50 mL/Hr) IV Continuous <Continuous>  dextrose 5%. 1000 milliLiter(s) (100 mL/Hr) IV Continuous <Continuous>  dextrose 50% Injectable 25 Gram(s) IV Push once  dextrose 50% Injectable 12.5 Gram(s) IV Push once  dextrose 50% Injectable 25 Gram(s) IV Push once  gabapentin 100 milliGRAM(s) Oral three times a day  glucagon  Injectable 1 milliGRAM(s) IntraMuscular once  heparin   Injectable 7500 Unit(s) SubCutaneous every 8 hours  hydrALAZINE 100 milliGRAM(s) Oral three times a day  insulin lispro (ADMELOG) corrective regimen sliding scale   SubCutaneous three times a day before meals  insulin lispro (ADMELOG) corrective regimen sliding scale   SubCutaneous at bedtime  metoprolol succinate  milliGRAM(s) Oral daily  montelukast 10 milliGRAM(s) Oral daily  polyethylene glycol 3350 17 Gram(s) Oral daily  senna 2 Tablet(s) Oral at bedtime    MEDICATIONS  (PRN):  acetaminophen     Tablet .. 650 milliGRAM(s) Oral every 6 hours PRN Mild Pain (1 - 3), Moderate Pain (4 - 6)

## 2023-08-02 NOTE — ED ADULT TRIAGE NOTE - STATUS:
Render Risk Assessment In Note?: no Comment: -SCC, L upper ABDOMEN, s/p excision with Dr. CHARLA Biggs 6.2018\\n-left temple, SCCIS, s/p Mohs by Dr. 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

## 2023-08-28 NOTE — CONSULT NOTE ADULT - ASSESSMENT
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     Problems:  Acute on chronic hypoxemic and hypercapnic respiratory failure  Obesity Hypoventilation Syndrome  Restrictive Lung Disease  COVID-19 infection    Recommendations  - Check ABG today, repeat in AM after using bipap  - BIPAP 18/10 40% qhs and PRN for naps or lethargy  - Dexamethasone 6 mg IV x10 days  - Remdesivir   - TTE for history of possible pulmonary hypertension, write indication as pulmonary hypertension in order    Teddy Childress MD  Fellow, Pulmonary and Critical Care Medicine  [facility]  Pager: [phone], [phone] ([facility])  Email: [email or web address]     no one

## 2024-02-16 NOTE — PROGRESS NOTE ADULT - GASTROINTESTINAL DETAILS
soft/nontender/no distention
soft/no distention/bowel sounds normal
no
soft/nontender
normal/soft/nontender

## 2024-03-07 NOTE — ED PROVIDER NOTE - SHIFT CHANGE
Speech Therapy      Visit Type: Treatment  -  Communication/cognition  Reason for consult: Cognitive communication evaluation     Relevant History/Co-morbidities: 2/28/24:  SCCAN   2/27/24:  SCCAN initiated   2/26/24:  pt transferred to [facility]  2/23/24: Comm/cog Eval. Blind version of MOCA 8.1, MRI remarkable   2/22/24: Patient admitted to [facility] due to left arm weakness.    SUBJECTIVE  - Patient agreed to participate in therapy this date.    Pt seen in therapy office    Pain at onset of session:   Patient does not demonstrate pain behaviors., Patient reports pain is not an issue/concern.      OBJECTIVE         Communication/Cognition  Expression-Verbal:     - Structured sentence formulation: mild impairment (75-89% accuracy)    - Structured sentence formulation given initial words completed with min assist.    Naming:      - Naming items from description min repetition assist.    Auditory Comprehension:      - Sentences: mild impairment (75-89% accuracy)    - Effective techniques: repetition, louder volume and slower rate    - Listening for details single unit sentence 100%, increased length of sentence and 3 units of information completed with min to mod assist              ASSESSMENT  Impairments: verbal expression, motor speech/speech intelligibility, auditory comprehension, written expression, memory, attention/concentration, problem solving/executive function and visual deficits  Functional Limitations: communication/cognition, expression of ideas/needs, comprehension, medication management, finance management and IADLs  Requires SLP Follow Up: Yes    Discharge Recommendations  Recommendation for Discharge Location: SLP [place]: Home with outpatient therapy, Home with Home therapy  Recommendation for Discharge Support: SLP [place]: 24 Hour assist, Assistance with medication, Assistance with IADLs        The patient is demonstrating good progress as evidenced by motivation, assessment data at this time.  At this time, the  patient continues to demonstrate moderate impairments in verbal expression, motor speech/speech intelligibility, reading comprehension, memory, attention/concentration, problem solving/executive function, and visual deficits which limit the performance of communication/cognition, expression of ideas/needs, medication management, finance management, and IADLs.  Patient is currently needing  moderate cueing for communication/cognition, medication management, finance management, and IADLs.          Progress: Progressing toward goals      Interferring components: dysarthria        Rehab Potential: good    Therapy Participation: This patient participated in all scheduled speech therapy time this session.    Education:   - Present and ready to learn: patient  Education provided during session:  - cognition, role of SLP and dysarthria  - Results of above outlined education: Verbalizes understanding and Needs reinforcement    Patient at end of session:    - location: in wheelchair    - safety measures: equipment intact    - hand off to: rehab aide/tech    PLAN  SLP Frequency: 60min, 3/5 on 3/11 (3/7)   Duration: TBD     Family teaching completed on 3/6/24     Cog/com orders only:   eval meds as able pt completed at baseline using med box,highly suspect pt will need assistance with medication management,  tx to address:  visual scanning, dysarthria reduction, multiunit aud comp, memory, attention, problem solving, daughter can assist at discharge per pt as was helping at baseline although pt lived alone and was driving.      Interventions:  Patient/family education and communication/cognition therapy    Plan/Goal Agreement:  Patient agrees with goals and individualized plan of care      RECOMMENDATIONS     -Diet:          *Liquid- Thin and Regular    -Communication Cognition:          *Patient continues to demonstrate acute communication and cognition deficits, will continue to follow.  Patient will require 24/7 supervision  at discharge.        GOALS  Review date: 3/8/2024  Short Term Goals: to be met 7 days from date established, unless otherwise stated.  Pt will state two dysarthria reduction strategies and demonstrate use with multi syllable word and functional phrase productions at 90% accuracy with model.      Pt will complete left to right scanning tasks at 90% min assist.      Pt will state two memory strategies and demonstrate use during immediate and delayed recall tasks at 90% min assist.      Pt will complete multiunit auditory comprehension tasks at 90% accuracy modified independent   Long Term Goals: to be met by discharge from rehab program.  Improve motor speech to modified independent   Improve visual scanning to supervision   Improve memory to supervision   Improve attention to supervision   Improve auditory comprehension to modified independent     Documented in the chart in the following areas:    Assessment. patient education flowsheet        Therapy procedure time and total treatment time can be found documented on the Time Entry flowsheet   Yes...

## 2024-05-06 NOTE — PHYSICAL THERAPY INITIAL EVALUATION ADULT - PATIENT PROFILE REVIEW, REHAB EVAL
activity order- OOB with assistance , ok to perform PT evaluation for OOB ambulation as per RN Fátima and PA/yes
Yes

## 2024-07-23 NOTE — ED PROVIDER NOTE - CROS ED MUSC ALL NEG
[de-identified] : Patient is an 83 year old female with a history of hypertension, stable MGUS (first diagnosed in 2014), hyperlipidemia, arthritis, chronic back problems and thrombocytosis, diagnosed with JAK2 positive thrombocytosis/myeloproliferative disorder who returns for follow up. Patient states that she was first made aware of an elevated platelet count earlier in 2023. At the last visit in May, the CBC was significant for platelet count of 448,000, hemoglobin that was slightly lower at 11.5 and a white count of 8.12. Beta-2 microglobulin was 3.7. Sed rate, plasma potassium, and folate were normal. B12 was low end of normal at 319. Ferritin was 35 but serum iron was 170 and iron saturation was 54%. Comprehensive metabolic panel was significant for a sodium of 133 and a potassium of 5.5 which patient has had chronically. CRP was normal as was LDH, uric acid, plasma potassium and ionized calcium. Since the last visit, the patient received an injection in the right eye for macular degeneration. Her weight is stable. Today, the patient complains of  constipation from the hydroxyurea, chronic low back pain, joint pain and stiffness in shoulders and knees. She continues to take prunes and Metamucil for constipation relief. Patient remains on hydroxyurea 1000 mg 4 times a week and 500 mg 3 times a week. She is supplementing quick dissolve B12 500 mcg 2-3 times per week. Denies dizziness, skin rashes, bruising excessively, fever, chills, drenching night sweats, chest pain, shortness of breath, unintentional weight loss or recent/frequent infections. 
[de-identified] : Patient is an 83 year old female with a history of hypertension, stable MGUS (first diagnosed in 2014), hyperlipidemia, arthritis, chronic back problems and thrombocytosis, diagnosed with JAK2 positive thrombocytosis/myeloproliferative disorder who returns for follow up. Patient states that she was first made aware of an elevated platelet count earlier in 2023. At the last visit in May, the CBC was significant for platelet count of 448,000, hemoglobin that was slightly lower at 11.5 and a white count of 8.12. Beta-2 microglobulin was 3.7. Sed rate, plasma potassium, and folate were normal. B12 was low end of normal at 319. Ferritin was 35 but serum iron was 170 and iron saturation was 54%. Comprehensive metabolic panel was significant for a sodium of 133 and a potassium of 5.5 which patient has had chronically. CRP was normal as was LDH, uric acid, plasma potassium and ionized calcium. Since the last visit, the patient received an injection in the right eye for macular degeneration. Her weight is stable. Today, the patient complains of  constipation from the hydroxyurea, chronic low back pain, joint pain and stiffness in shoulders and knees. She continues to take prunes and Metamucil for constipation relief. Patient remains on hydroxyurea 1000 mg 4 times a week and 500 mg 3 times a week. She is supplementing quick dissolve B12 500 mcg 2-3 times per week. Denies dizziness, skin rashes, bruising excessively, fever, chills, drenching night sweats, chest pain, shortness of breath, unintentional weight loss or recent/frequent infections. 
- - -

## 2024-10-07 NOTE — PROGRESS NOTE ADULT - PROBLEM/PLAN-10
Left patient a message to call back and reschedule her appointment  
DISPLAY PLAN FREE TEXT

## 2024-10-25 NOTE — ED PROVIDER NOTE - INTERNATIONAL TRAVEL
attempted to contact Angie.   left a message with name and number.    requested a call back to [phone].    Plan:    will follow up with Angie in 2 weeks.    No

## 2024-10-28 NOTE — ED ADULT TRIAGE NOTE - CHIEF COMPLAINT QUOTE
Chief Complaint   Patient presents with    Hematuria     Here in clinic for a cystoscopy    Prior to the start of the procedure DR AMIN and with procedural staff participation, I verbally confirmed the patient s identity using two indicators, relevant allergies, that the procedure was appropriate and matched the consent or emergent situation, and that the correct equipment/implants were available. Immediately prior to starting the procedure I conducted the Time Out with the procedural staff and re-confirmed the patient s name, procedure, and site/side. I have wiped the patient off with the povidone-Iodine solution, draped them,  used Lidocaine hydrochloride jelly, and instilled sterile water into the bladder. (The Joint Commission universal protocol was followed.)  Yes    5mL 2% lidocaine hydrochloride Urojet instilled into urethra.    NDC# [address]-1  Lot #: [id number]  Expiration Date:  06-26  Amber Landry CMA     c/o right leg pain s/p mechanical fall. no LOC. on 4L nasal cannula oxygen at baseline  hx: HTN, DM, COPD, b/l knee replacements

## 2024-12-16 NOTE — DIETITIAN INITIAL EVALUATION ADULT. - CALCULATED TO (G/KG)
Right hip joint injection performed in clinic today.  Avoid hot tubs or baths for next two days.  Follow up in 3 weeks.  Continue with physical therapy with addition of dry needling.   79.24

## 2025-05-25 NOTE — ED ADULT NURSE NOTE - DRUG PRE-SCREENING (DAST -1)
Patient presents syncope and collapse noted to have hypoglycemia with glucose of 40  Likely multifactorial  Patient with history of internal insufficiency and orthostatic hypotension  2D echo EF 64% grade 2 diastolic dysfunction, aortic valve sclerosis reported  Received IV fluids  Symptomatically improved  Supportive cares  Safe ambulation discussed and encouraged   Statement Selected

## 2025-05-28 NOTE — ED ADULT NURSE NOTE - PAIN RATING/NUMBER SCALE (0-10): ACTIVITY
[FreeTextEntry1] : Objective:   - **Diagnostic Results:** Rapid strep test is negative. A throat culture has been sent out for further analysis.  Assessment and Plan:   - **Viral Pharyngitis:** The patient is diagnosed with probable viral pharyngitis based on the negative rapid strep test and the absence of typical streptococcal pharyngitis symptoms such as fever and red throat.     - Therapeutic Interventions: Recommend plenty of warm fluids and rest.      - Diagnostic Tests: Throat culture sent for further analysis.      - Patient Education: Advised to avoid cold drinks for now.      - Follow-Up: Call in two days for results of the throat culture.      - Return to School: Patient is cleared to return to school.    - **Thalassemia Trait:** The patient has a known history of Mediterranean anemia (thalassemia trait), which may contribute to fatigue, especially during menstruation.     - Diagnostic Tests: Consider scheduling a blood test in June to monitor hemoglobin levels.      - Follow-Up: Discuss the possibility of performing the blood test before their planned trip in early June.      - Patient Education: Explain the importance of regular monitoring due to the thalassemia trait, especially considering menstrual blood loss.    - **Preventive Care:** Discussion about upcoming HPV vaccination and annual physical examination.     - Follow-Up: Schedule HPV vaccination and annual physical for late August or early September, after the family returns from their trip.      - Patient Education: Explain the importance of timing for insurance coverage of the annual physical. 
10